# Patient Record
Sex: MALE | Race: WHITE | NOT HISPANIC OR LATINO | Employment: OTHER | URBAN - METROPOLITAN AREA
[De-identification: names, ages, dates, MRNs, and addresses within clinical notes are randomized per-mention and may not be internally consistent; named-entity substitution may affect disease eponyms.]

---

## 2017-01-30 DIAGNOSIS — I70.213 ATHEROSCLEROSIS OF NATIVE ARTERY OF BOTH LOWER EXTREMITIES WITH INTERMITTENT CLAUDICATION (H): ICD-10-CM

## 2017-01-30 DIAGNOSIS — I25.10 CORONARY ATHEROSCLEROSIS OF NATIVE CORONARY ARTERY: Primary | ICD-10-CM

## 2017-01-30 DIAGNOSIS — R06.02 SOB (SHORTNESS OF BREATH): ICD-10-CM

## 2017-02-01 ENCOUNTER — HOSPITAL ENCOUNTER (OUTPATIENT)
Dept: CARDIOLOGY | Facility: CLINIC | Age: 72
Discharge: HOME | End: 2017-02-01
Payer: MEDICARE

## 2017-02-01 ENCOUNTER — TELEPHONE (OUTPATIENT)
Dept: FAMILY MEDICINE | Facility: CLINIC | Age: 72
End: 2017-02-01

## 2017-02-01 DIAGNOSIS — I70.213 ATHEROSCLEROSIS OF NATIVE ARTERY OF BOTH LOWER EXTREMITIES WITH INTERMITTENT CLAUDICATION (H): ICD-10-CM

## 2017-02-01 DIAGNOSIS — R06.02 SOB (SHORTNESS OF BREATH): ICD-10-CM

## 2017-02-01 DIAGNOSIS — I25.10 CORONARY ATHEROSCLEROSIS OF NATIVE CORONARY ARTERY: ICD-10-CM

## 2017-02-01 PROCEDURE — 40000264 ECHO COMPLETE WITH LUMASON

## 2017-02-01 PROCEDURE — 25500064 ZZH RX 255 OP 636: Performed by: INTERNAL MEDICINE

## 2017-02-01 PROCEDURE — 93306 TTE W/DOPPLER COMPLETE: CPT | Mod: 26 | Performed by: INTERNAL MEDICINE

## 2017-02-01 RX ADMIN — SULFUR HEXAFLUORIDE 5 ML: KIT at 10:48

## 2017-02-01 NOTE — TELEPHONE ENCOUNTER
----- Message from [employer] sent at 1/31/2017  2:19 PM CST -----  Regarding: Appointment Request ()  Contact: [phone]  Appointment Request From: Michele Vance    With Provider: Juarez Nuno DO [-Primary Care Physician-]    Preferred Date Range: From 2/6/2017 To 2/8/2017    Preferred Times: Any    Reason: To address the following health maintenance concerns.  Medicare Annual Wellness Visit    Comments:

## 2017-02-01 NOTE — TELEPHONE ENCOUNTER
Phone picked up, unable to leave a message. Please find out if the patient prefers [facility] or [facility] and when he was hoping to get in to be seen? Amparo AYALA

## 2017-02-13 ENCOUNTER — OFFICE VISIT (OUTPATIENT)
Dept: INTERNAL MEDICINE | Facility: CLINIC | Age: 72
End: 2017-02-13
Payer: MEDICARE

## 2017-02-13 VITALS
OXYGEN SATURATION: 98 % | SYSTOLIC BLOOD PRESSURE: 112 MMHG | DIASTOLIC BLOOD PRESSURE: 62 MMHG | HEIGHT: 68 IN | BODY MASS INDEX: 26.07 KG/M2 | HEART RATE: 93 BPM | TEMPERATURE: 96.6 F | WEIGHT: 172 LBS

## 2017-02-13 DIAGNOSIS — I73.9 PERIPHERAL VASCULAR DISEASE, UNSPECIFIED (H): ICD-10-CM

## 2017-02-13 DIAGNOSIS — K21.9 GASTROESOPHAGEAL REFLUX DISEASE WITHOUT ESOPHAGITIS: ICD-10-CM

## 2017-02-13 DIAGNOSIS — F51.01 PRIMARY INSOMNIA: ICD-10-CM

## 2017-02-13 DIAGNOSIS — N18.30 CKD (CHRONIC KIDNEY DISEASE) STAGE 3, GFR 30-59 ML/MIN (H): ICD-10-CM

## 2017-02-13 DIAGNOSIS — I10 ESSENTIAL HYPERTENSION, BENIGN: ICD-10-CM

## 2017-02-13 DIAGNOSIS — I63.239 CEREBRAL INFARCTION DUE TO OCCLUSION OF CAROTID ARTERY, UNSPECIFIED BLOOD VESSEL LATERALITY (H): Primary | ICD-10-CM

## 2017-02-13 LAB
ALBUMIN UR-MCNC: NEGATIVE MG/DL
ANION GAP SERPL CALCULATED.3IONS-SCNC: 6 MMOL/L (ref 3–14)
APPEARANCE UR: CLEAR
BILIRUB UR QL STRIP: NEGATIVE
BUN SERPL-MCNC: 21 MG/DL (ref 7–30)
CALCIUM SERPL-MCNC: 9.3 MG/DL (ref 8.5–10.1)
CHLORIDE SERPL-SCNC: 104 MMOL/L (ref 94–109)
CO2 SERPL-SCNC: 31 MMOL/L (ref 20–32)
COLOR UR AUTO: YELLOW
CREAT SERPL-MCNC: 1.27 MG/DL (ref 0.66–1.25)
GFR SERPL CREATININE-BSD FRML MDRD: 56 ML/MIN/1.7M2
GLUCOSE SERPL-MCNC: 92 MG/DL (ref 70–99)
GLUCOSE UR STRIP-MCNC: NEGATIVE MG/DL
HGB UR QL STRIP: ABNORMAL
KETONES UR STRIP-MCNC: NEGATIVE MG/DL
LEUKOCYTE ESTERASE UR QL STRIP: NEGATIVE
NITRATE UR QL: NEGATIVE
NON-SQ EPI CELLS #/AREA URNS LPF: NORMAL /LPF
PH UR STRIP: 6 PH (ref 5–7)
POTASSIUM SERPL-SCNC: 3.6 MMOL/L (ref 3.4–5.3)
RBC #/AREA URNS AUTO: NORMAL /HPF (ref 0–2)
SODIUM SERPL-SCNC: 141 MMOL/L (ref 133–144)
SP GR UR STRIP: <=1.005 (ref 1–1.03)
URN SPEC COLLECT METH UR: ABNORMAL
UROBILINOGEN UR STRIP-ACNC: 0.2 EU/DL (ref 0.2–1)
WBC #/AREA URNS AUTO: NORMAL /HPF (ref 0–2)

## 2017-02-13 PROCEDURE — 81001 URINALYSIS AUTO W/SCOPE: CPT | Performed by: INTERNAL MEDICINE

## 2017-02-13 PROCEDURE — 36415 COLL VENOUS BLD VENIPUNCTURE: CPT | Performed by: INTERNAL MEDICINE

## 2017-02-13 PROCEDURE — G0438 PPPS, INITIAL VISIT: HCPCS | Performed by: INTERNAL MEDICINE

## 2017-02-13 PROCEDURE — 80048 BASIC METABOLIC PNL TOTAL CA: CPT | Performed by: INTERNAL MEDICINE

## 2017-02-13 RX ORDER — CILOSTAZOL 100 MG/1
100 TABLET ORAL 2 TIMES DAILY
Qty: 180 TABLET | Refills: 3 | Status: SHIPPED | OUTPATIENT
Start: 2017-02-13 | End: 2018-04-07

## 2017-02-13 RX ORDER — ZOLPIDEM TARTRATE 5 MG/1
5 TABLET ORAL
Qty: 90 TABLET | Refills: 1 | Status: SHIPPED | OUTPATIENT
Start: 2017-02-13 | End: 2017-08-16

## 2017-02-13 RX ORDER — AMLODIPINE BESYLATE 10 MG/1
10 TABLET ORAL EVERY EVENING
Qty: 90 TABLET | Refills: 3 | Status: SHIPPED | OUTPATIENT
Start: 2017-02-13 | End: 2018-02-24

## 2017-02-13 RX ORDER — OMEPRAZOLE 40 MG/1
40 CAPSULE, DELAYED RELEASE ORAL DAILY
Qty: 90 CAPSULE | Refills: 1 | Status: SHIPPED | OUTPATIENT
Start: 2017-02-13 | End: 2017-08-27

## 2017-02-13 ASSESSMENT — PAIN SCALES - GENERAL: PAINLEVEL: NO PAIN (0)

## 2017-02-13 NOTE — PROGRESS NOTES
SUBJECTIVE:                                                            Michele Vance is a 71 year old male who presents for Preventive Visit.  Are you in the first 12 months of your Medicare Part B coverage?  No    Healthy Habits:    Do you get at least three servings of calcium containing foods daily (dairy, green leafy vegetables, etc.)? yes    Amount of exercise or daily activities, outside of work: 3 day(s) per week    Problems taking medications regularly No    Medication side effects: No    Have you had an eye exam in the past two years? no    Do you see a dentist twice per year? yes    Do you have sleep apnea, excessive snoring or daytime drowsiness?no    COGNITIVE SCREEN  1) Repeat 3 items (Banana, Sunrise, Chair)    2) Clock draw: NORMAL  3) 3 item recall: Recalls 1 object   Results: NORMAL clock, 1-2 items recalled: COGNITIVE IMPAIRMENT LESS LIKELY    Mini-CogTM Copyright S Jovanna. Licensed by the author for use in [facility]; reprinted with permission (matthew@[facility]). All rights reserved.          All Histories reviewed and updated in [facility] as appropriate.  Social History   Substance Use Topics     Smoking status: Former Smoker     Packs/day: 2.50     Years: 20.00     Types: Cigarettes     Quit date: 1/1/2000     Smokeless tobacco: Never Used     Alcohol use No       The patient does not drink >3 drinks per day nor >7 drinks per week.    Today's PHQ-2 Score:   PHQ-2 ( 1999 Pf[employer]) 2/13/2017 10/19/2016   Q1: Little interest or pleasure in doing things 0 0   Q2: Feeling down, depressed or hopeless 0 0   PHQ-2 Score 0 0       Do you feel safe in your environment - Yes    Do you have a Health Care Directive?: No: Advance care planning reviewed with patient; information given to patient to review.    Current providers sharing in care for this patient include:   Patient Care Team:  Juarez Nuno DO as PCP - General (Internal Medicine)  Benito Hale MD as Referring Physician  (Surgery)  Micheal Hogan MD as MD (Internal Medicine)  Catia Null, RN as Nurse Coordinator (Cardiology)  Mikie Wise MD as MD (Cardiology)  Yusuf Xiong MD as MD (Orthopaedic Surgery)  Suzy Hammer, VIPIN as Clinic Care Coordinator (Neurosurgery)      Hearing impairment: No    Ability to successfully perform activities of daily living: Yes, no assistance needed     Fall risk:  Fallen 2 or more times in the past year?: No  Any fall with injury in the past year?: No    Home safety:  none identified    The following health maintenance items are reviewed in Epic and correct as of today:  Health Maintenance   Topic Date Due     MIGRAINE ACTION PLAN,ONE TIME,NO INBASKET  08/24/1964     PNEUMOCOCCAL (1 of 2 - PCV13) 08/24/2011     AORTIC ANEURYSM SCREENING (SYSTEM ASSIGNED)  08/24/2011     MEDICARE ANNUAL WELLNESS VISIT  08/24/2012     HEMOGLOBIN Q1 YR (NO INBASKET)  03/26/2016     INFLUENZA VACCINE (SYSTEM ASSIGNED)  09/01/2016     MICROALBUMIN Q1 YEAR( NO INBASKET)  02/25/2017     BMP Q1 YR (NO INBASKET)  06/02/2017     FALL RISK ASSESSMENT  06/02/2017     LIPID MONITORING Q1 YEAR( NO INBASKET)  08/08/2017     ADVANCE DIRECTIVE PLANNING Q5 YRS (NO INBASKET)  02/10/2019     TETANUS IMMUNIZATION (SYSTEM ASSIGNED)  04/25/2021     COLON CANCER SCREEN (SYSTEM ASSIGNED)  10/07/2024     HEPATITIS C SCREENING  Completed         Pneumonia Vaccine:Adults age 65+ who received Pneumovax (PPSV23) at 65 years or older: Should be given PCV13 > 1 year after their most recent PPSV23     ROS:  C: NEGATIVE for fever, chills, change in weight  I: NEGATIVE for worrisome rashes, moles or lesions  E: NEGATIVE for vision changes or irritation  E/M: NEGATIVE for ear, mouth and throat problems  R: NEGATIVE for significant cough or SOB  B: NEGATIVE for masses, tenderness or discharge  CV: NEGATIVE for chest pain, palpitations or peripheral edema  GI: NEGATIVE for nausea, abdominal pain, heartburn, or change in  "bowel habits  : NEGATIVE for frequency, dysuria, or hematuria  M: NEGATIVE for significant arthralgias or myalgia  N: NEGATIVE for weakness, dizziness or paresthesias  E: NEGATIVE for temperature intolerance, skin/hair changes  H: NEGATIVE for bleeding problems  P: NEGATIVE for changes in mood or affect    Problem list, Medication list, Allergies, and Medical/Social/Surgical histories reviewed in [facility] and updated as appropriate.  Labs reviewed in EPIC  BP Readings from Last 3 Encounters:   02/13/17 112/62   10/19/16 128/64   09/20/16 142/89    Wt Readings from Last 3 Encounters:   02/13/17 172 lb (78 kg)   10/19/16 179 lb (81.2 kg)   09/20/16 174 lb 12.8 oz (79.3 kg)                  OBJECTIVE:                                                            /62 (BP Location: Left arm, Patient Position: Chair, Cuff Size: Adult Large)  Pulse 93  Temp 96.6  F (35.9  C) (Temporal)  Ht 5' 8\" (1.727 m)  Wt 172 lb (78 kg)  SpO2 98%  BMI 26.15 kg/m2 Estimated body mass index is 26.15 kg/(m^2) as calculated from the following:    Height as of this encounter: 5' 8\" (1.727 m).    Weight as of this encounter: 172 lb (78 kg).  EXAM:   GENERAL: healthy, alert and no distress  EYES: Eyes grossly normal to inspection, PERRL and conjunctivae and sclerae normal  HENT: ear canals and TM's normal, nose and mouth without ulcers or lesions  NECK: no adenopathy, no asymmetry, masses, or scars and thyroid normal to palpation  RESP: lungs clear to auscultation - no rales, rhonchi or wheezes  CV: regular rate and rhythm, normal S1 S2, no S3 or S4, no murmur, click or rub, no peripheral edema and peripheral pulses strong  ABDOMEN: soft, nontender, no hepatosplenomegaly, no masses and bowel sounds normal  MS: no gross musculoskeletal defects noted, no edema  SKIN: no suspicious lesions or rashes  NEURO: Normal strength and tone, mentation intact and speech normal  PSYCH: mentation appears normal, affect normal/bright    ASSESSMENT / PLAN:   " "                                                             ICD-10-CM    1. Cerebral infarction due to occlusion of carotid artery, unspecified blood vessel laterality (H) I63.239    2. Primary insomnia F51.01 zolpidem (AMBIEN) 5 MG tablet   3. Essential hypertension, benign I10 amLODIPine (NORVASC) 10 MG tablet     *UA reflex to Microscopic and Culture ([facility] (except [place] and [place])   4. Peripheral vascular disease, unspecified (H) I73.9 cilostazol (PLETAL) 100 MG tablet     Basic metabolic panel   5. Gastroesophageal reflux disease without esophagitis K21.9 omeprazole (PRILOSEC) 40 MG capsule   6. CKD (chronic kidney disease) stage 3, GFR 30-59 ml/min N18.3        End of Life Planning:  Patient currently has an advanced directive: No.  I have verified the patient's ablity to prepare an advanced directive/make health care decisions.  Literature was provided to assist patient in preparing an advanced directive.    COUNSELING:  Reviewed preventive health counseling, as reflected in patient instructions       Regular exercise       Healthy diet/nutrition       Vision screening       Hearing screening        Estimated body mass index is 26.15 kg/(m^2) as calculated from the following:    Height as of this encounter: 5' 8\" (1.727 m).    Weight as of this encounter: 172 lb (78 kg).     reports that he quit smoking about 17 years ago. His smoking use included Cigarettes. He has a 50.00 pack-year smoking history. He has never used smokeless tobacco.      Appropriate preventive services were discussed with this patient, including applicable screening as appropriate for cardiovascular disease, diabetes, osteopenia/osteoporosis, and glaucoma.  As appropriate for age/gender, discussed screening for colorectal cancer, prostate cancer, breast cancer, and cervical cancer. Checklist reviewing preventive services available has been given to the patient.    Reviewed patients plan of care and " provided an AVS. The Basic Care Plan (routine screening as documented in Health Maintenance) for Michele meets the Care Plan requirement. This Care Plan has been established and reviewed with the Patient and spouse.    Counseling Resources:  ATP IV Guidelines  Pooled Cohorts Equation Calculator  Breast Cancer Risk Calculator  FRAX Risk Assessment  ICSI Preventive Guidelines  Dietary Guidelines for Americans, 2010  USDA's MyPlate  ASA Prophylaxis  Lung CA Screening    Juarez Nuno DO  [facility]

## 2017-02-13 NOTE — LETTER
My Migraine Action Plan      Date: 2/13/2017     My Name: Michele Vance   YOB: 1945  My Pharmacy:    [facility] - EPRESCRIBE ONLY  [facility] - [place], [place] - [address] [facility] [place] - [place], [place] - [address] [facility] - [place], [place] - [address]       My (Preventative) Control Medicine:         My Rescue Medicine:    My Doctor: Juarez Nuno     My Clinic: [address] [address] [phone]        GREEN ZONE = Good Control    My headache plan is working.   I can do what I need to do.           I WILL:     ? Keep managing my triggers.  ? Write in my migraine diary each time I have a headache.  ? Keep taking my preventive (controller) medicine daily.  ? Take my relief and rescue medicine as needed.             YELLOW ZONE = Not Enough Control    My headache plan isn t always working.   My headaches keep me from doing   some of the things I need to do.       I WILL:     ? Set goals to control my triggers and act on them.  ? Write in my migraine diary each time I have a headache and review it for                      patterns or new triggers.  ? Keep taking my preventive (controller) medicine daily.  ? Take my relief and rescue medicine as needed.  ? Call my doctor or clinic at if I stay in the Yellow Zone.             RED ZONE = Poor or No Control    My headache plan has  failed. I can t do anything  when I have one. My  medicines aren t working.           I WILL:   ? Set goals to control my triggers and act on them.  ? Write in my migraine diary each time I have a headache and review it for                      patterns or new triggers.  ? Keep taking my preventive (controller) medicine daily.  ? Take my relief and rescue medicine as needed.  ? Call my doctor or clinic or go to urgent care or an ER if I m having the worst                   headache of my life.  ? Call my doctor or clinic or go to urgent care or an ER if my medicine doesn t work.  ? Let my doctor or clinic know within 2 weeks if I have gone to an urgent care or             emergency department.          Provider specific instructions:

## 2017-02-13 NOTE — MR AVS SNAPSHOT
After Visit Summary   2/13/2017    Michele Vance    MRN: [id number]           Patient Information     Date Of Birth          1945        Visit Information        Provider Department      2/13/2017 7:20 AM Juarez Nuno DO [facility]        Today's Diagnoses     Cerebral infarction due to occlusion of carotid artery, unspecified blood vessel laterality (H)    -  1    Primary insomnia        Essential hypertension, benign        Peripheral vascular disease, unspecified (H)        Gastroesophageal reflux disease without esophagitis        CKD (chronic kidney disease) stage 3, GFR 30-59 ml/min          Care Instructions      Preventive Health Recommendations:       Male Ages 65 and over    Yearly exam:             See your health care provider every year in order to  o   Review health changes.   o   Discuss preventive care.    o   Review your medicines if your doctor has prescribed any.    Talk with your health care provider about whether you should have a test to screen for prostate cancer (PSA).    Every 3 years, have a diabetes test (fasting glucose). If you are at risk for diabetes, you should have this test more often.    Every 5 years, have a cholesterol test. Have this test more often if you are at risk for high cholesterol or heart disease.     Every 10 years, have a colonoscopy. Or, have a yearly FIT test (stool test). These exams will check for colon cancer.    Talk to with your health care provider about screening for Abdominal Aortic Aneurysm if you have a family history of AAA or have a history of smoking.  Shots:     Get a flu shot each year.     Get a tetanus shot every 10 years.     Talk to your doctor about your pneumonia vaccines. There are now two you should receive - Pneumovax (PPSV 23) and Prevnar (PCV 13).    Talk to your doctor about a shingles vaccine.     Talk to your doctor about the hepatitis B vaccine.  Nutrition:     Eat at least 5 servings  of fruits and vegetables each day.     Eat whole-grain bread, whole-wheat pasta and brown rice instead of white grains and rice.     Talk to your doctor about Calcium and Vitamin D.   Lifestyle    Exercise for at least 150 minutes a week (30 minutes a day, 5 days a week). This will help you control your weight and prevent disease.     Limit alcohol to one drink per day.     No smoking.     Wear sunscreen to prevent skin cancer.     See your dentist every six months for an exam and cleaning.     See your eye doctor every 1 to 2 years to screen for conditions such as glaucoma, macular degeneration and cataracts.        Follow-ups after your visit        Follow-up notes from your care team     Return in about 6 months (around 8/13/2017).      Your next 10 appointments already scheduled     Feb 14, 2017  8:40 AM CST   Return Visit with Pari Matta PA-C   [facility] ([facility])    [address] [place] [id number]   [phone]            Sep 19, 2017  9:00 AM CDT   US CAROTID BILATERAL with UCUSV2   [facility] ([facility])    [address] [place] [address] [phone]           Please bring a list of your medicines (including vitamins, minerals and over-the-counter drugs). Also, tell your doctor about any allergies you may have. Wear comfortable clothes and leave your valuables at home.  You do not need to do anything special to prepare for your exam.  Please call the Imaging Department at your exam site with any questions.            Sep 19, 2017 10:30 AM CDT   (Arrive by 10:15 AM)   Return Visit with Shola Cuellar MD   [facility] ([facility])    [address] [place] [address] [phone]              Who to contact     If you have questions or need follow up information about today's clinic visit or your schedule please  "contact [facility] directly at [phone].  Normal or non-critical lab and imaging results will be communicated to you by MyChart, letter or phone within 4 business days after the clinic has received the results. If you do not hear from us within 7 days, please contact the clinic through MyChart or phone. If you have a critical or abnormal lab result, we will notify you by phone as soon as possible.  Submit refill requests through [employer] or call your pharmacy and they will forward the refill request to us. Please allow 3 business days for your refill to be completed.          Additional Information About Your Visit        [employer] Information     [employer] gives you secure access to your electronic health record. If you see a primary care provider, you can also send messages to your care team and make appointments. If you have questions, please call your primary care clinic.  If you do not have a primary care provider, please call [phone] and they will assist you.        Care EveryWhere ID     This is your Care EveryWhere ID. This could be used by other organizations to access your [place] medical records  [id number]        Your Vitals Were     Pulse Temperature Height Pulse Oximetry BMI (Body Mass Index)       93 96.6  F (35.9  C) (Temporal) 5' 8\" (1.727 m) 98% 26.15 kg/m2        Blood Pressure from Last 3 Encounters:   02/13/17 112/62   10/19/16 128/64   09/20/16 142/89    Weight from Last 3 Encounters:   02/13/17 172 lb (78 kg)   10/19/16 179 lb (81.2 kg)   09/20/16 174 lb 12.8 oz (79.3 kg)              We Performed the Following     *UA reflex to Microscopic and Culture ([facility] (except [place] and [place])     Basic metabolic panel          Where to get your medicines      These medications were sent to [facility] - [facility], [place] - [id number] [facility] [address]     Phone:  [phone]     amLODIPine " 10 MG tablet    cilostazol 100 MG tablet    omeprazole 40 MG capsule         Some of these will need a paper prescription and others can be bought over the counter.  Ask your nurse if you have questions.     Bring a paper prescription for each of these medications     zolpidem 5 MG tablet          Primary Care Provider Office Phone # Fax #    Juarez Nuno [phone]       [address] [place] [address]        Thank you!     Thank you for choosing [facility]  for your care. Our goal is always to provide you with excellent care. Hearing back from our patients is one way we can continue to improve our services. Please take a few minutes to complete the written survey that you may receive in the mail after your visit with us. Thank you!             Your Updated Medication List - Protect others around you: Learn how to safely use, store and throw away your medicines at www.disposemymeds.org.          This list is accurate as of: 2/13/17  8:01 AM.  Always use your most recent med list.                   Brand Name Dispense Instructions for use    amitriptyline 25 MG tablet    ELAVIL    180 tablet    Take 1 tablet (25 mg) by mouth 2 times daily       amLODIPine 10 MG tablet    NORVASC    90 tablet    Take 1 tablet (10 mg) by mouth every evening       ASPIRIN PO      Take 325 mg by mouth daily       cilostazol 100 MG tablet    PLETAL    180 tablet    Take 1 tablet (100 mg) by mouth 2 times daily       gabapentin 300 MG capsule    NEURONTIN    210 capsule    TAKE 1 CAPSULE THREE TIMES A DAY       losartan 25 MG tablet    COZAAR    93 tablet    Take 1 tablet (25 mg) by mouth daily       metoprolol 100 MG 24 hr tablet    TOPROL-XL    93 tablet    Take 1 tablet (100 mg) by mouth every morning       omeprazole 40 MG capsule    priLOSEC    90 capsule    Take 1 capsule (40 mg) by mouth daily       polyethylene glycol Packet    MIRALAX/GLYCOLAX     Take 1  packet by mouth daily       psyllium 58.6 % Powd    METAMUCIL     Take by mouth daily       sildenafil 50 MG cap/tab    REVATIO/VIAGRA    6 tablet    Take 0.5-1 tablets (25-50 mg) by mouth daily as needed for erectile dysfunction Take 30 min to 4 hours before intercourse.  Never use with nitroglycerin, terazosin or doxazosin.       STATIN NOT PRESCRIBED (INTENTIONAL)     1 each    Pt has known rhabdomyolysis in the past, somewhat associated with statins.  Also wasn't able to tolerate Zetia.       zolpidem 5 MG tablet    AMBIEN    90 tablet    Take 1 tablet (5 mg) by mouth nightly as needed for sleep

## 2017-02-13 NOTE — NURSING NOTE
"Chief Complaint   Patient presents with     Wellness Visit     Medicare Visit       Initial /62 (BP Location: Left arm, Patient Position: Chair, Cuff Size: Adult Large)  Pulse 93  Temp 96.6  F (35.9  C) (Temporal)  Ht 5' 8\" (1.727 m)  Wt 172 lb (78 kg)  SpO2 98%  BMI 26.15 kg/m2 Estimated body mass index is 26.15 kg/(m^2) as calculated from the following:    Height as of this encounter: 5' 8\" (1.727 m).    Weight as of this encounter: 172 lb (78 kg).  Medication Reconciliation: complete   Amparo AYALA      "

## 2017-02-15 NOTE — PROGRESS NOTES
Dear Michele, your recent test results are attached.   Your urine sample was normal.  Kidney function is slightly decreased but stable.            Feel free to contact me via the office or My Chart if you have any questions regarding the above.    Sincerely,  Juarez Nuno DO FACOI

## 2017-03-10 ENCOUNTER — APPOINTMENT (OUTPATIENT)
Dept: GENERAL RADIOLOGY | Facility: CLINIC | Age: 72
End: 2017-03-10
Attending: EMERGENCY MEDICINE
Payer: MEDICARE

## 2017-03-10 ENCOUNTER — HOSPITAL ENCOUNTER (EMERGENCY)
Facility: CLINIC | Age: 72
Discharge: HOME | End: 2017-03-10
Attending: EMERGENCY MEDICINE | Admitting: EMERGENCY MEDICINE
Payer: MEDICARE

## 2017-03-10 ENCOUNTER — TELEPHONE (OUTPATIENT)
Dept: INTERNAL MEDICINE | Facility: CLINIC | Age: 72
End: 2017-03-10

## 2017-03-10 VITALS
TEMPERATURE: 96.7 F | SYSTOLIC BLOOD PRESSURE: 128 MMHG | DIASTOLIC BLOOD PRESSURE: 67 MMHG | HEIGHT: 68 IN | OXYGEN SATURATION: 97 % | BODY MASS INDEX: 25.76 KG/M2 | WEIGHT: 170 LBS

## 2017-03-10 DIAGNOSIS — K59.01 SLOW TRANSIT CONSTIPATION: ICD-10-CM

## 2017-03-10 PROCEDURE — A9270 NON-COVERED ITEM OR SERVICE: HCPCS | Mod: GY | Performed by: EMERGENCY MEDICINE

## 2017-03-10 PROCEDURE — 99284 EMERGENCY DEPT VISIT MOD MDM: CPT

## 2017-03-10 PROCEDURE — 99284 EMERGENCY DEPT VISIT MOD MDM: CPT | Performed by: EMERGENCY MEDICINE

## 2017-03-10 PROCEDURE — 25000132 ZZH RX MED GY IP 250 OP 250 PS 637: Mod: GY | Performed by: EMERGENCY MEDICINE

## 2017-03-10 PROCEDURE — 74020 XR ABDOMEN 2 VW: CPT | Mod: TC

## 2017-03-10 PROCEDURE — 25000125 ZZHC RX 250: Performed by: EMERGENCY MEDICINE

## 2017-03-10 RX ORDER — METOCLOPRAMIDE 10 MG/1
10 TABLET ORAL 4 TIMES DAILY PRN
Qty: 40 TABLET | Refills: 0 | Status: SHIPPED | OUTPATIENT
Start: 2017-03-10 | End: 2017-03-20

## 2017-03-10 RX ORDER — MAGNESIUM CARB/ALUMINUM HYDROX 105-160MG
296 TABLET,CHEWABLE ORAL ONCE
Status: COMPLETED | OUTPATIENT
Start: 2017-03-10 | End: 2017-03-10

## 2017-03-10 RX ORDER — ONDANSETRON 4 MG/1
4 TABLET, ORALLY DISINTEGRATING ORAL ONCE
Status: COMPLETED | OUTPATIENT
Start: 2017-03-10 | End: 2017-03-10

## 2017-03-10 RX ADMIN — MAGESIUM CITRATE 296 ML: 1.75 LIQUID ORAL at 11:53

## 2017-03-10 RX ADMIN — ONDANSETRON 4 MG: 4 TABLET, ORALLY DISINTEGRATING ORAL at 09:45

## 2017-03-10 RX ADMIN — DOCUSATE SODIUM 286 ML: 50 LIQUID ORAL at 10:35

## 2017-03-10 NOTE — ED NOTES
In to check on patient to see if there were any results. Still sitting on the commode, no results yet. MD updated. Annelise Hawkins

## 2017-03-10 NOTE — ED NOTES
Patient able to tolerate full pink lady enema. Patient resting on right side, call light within reach. Bedside commode placed at bedside. Annelise Hawkins

## 2017-03-10 NOTE — ED AVS SNAPSHOT
[facility] Emergency Department    [address] [place] [address]    Phone:  [phone]    Fax:  [phone]                                       Michele Vance   MRN: [id number]    Department:  [facility] Emergency Department   Date of Visit:  3/10/2017           Patient Information     Date Of Birth          1945        Your diagnoses for this visit were:     Slow transit constipation        You were seen by Mrecedes Salcido MD.      Follow-up Information     Follow up with Juarez Nuno DO.    Specialty:  Internal Medicine    Contact information:    [address] [place] [id number] [phone]          Discharge Instructions       Be sure to drink plenty of fluids.    Please see information regarding bowel prep. You can take MiraLAX mixed with Gatorade until you have a bowel movement.    Reglan as needed for nausea.    Return to the ED at any time for worsening, changes or concerns.    Follow-up with Dr. Nuno as needed.    I hope this resolves soon!!!        Discharge References/Attachments     CONSTIPATION (ADULT) (ENGLISH)    CONSTIPATION, TREATING (ENGLISH)    DIET, EATING A HIGH-FIBER  (ENGLISH)      Future Appointments        Provider Department Dept Phone Center    4/27/2017 9:00 AM Stevie Felipe MD [facility] [phone] [facility]    9/19/2017 9:00 AM [facility] VASCULAR ULTRASOUND ROOM 2 [facility]  [phone] [facility] [[email or web address]]    9/19/2017 10:30 AM Shola Cuellar MD [facility] Neurosurgery [phone] [facility] [[email or web address]]      24 Hour Appointment Hotline       To make an appointment at any [facility], call [phone] ([phone]). If you don't have a family doctor or clinic, we will help you find one. [facility] are conveniently located to serve the needs of you and your family.             Review of your medicines      START taking        Dose /  Directions Last dose taken    metoclopramide 10 MG tablet   Commonly known as:  REGLAN   Dose:  10 mg   Quantity:  40 tablet        Take 1 tablet (10 mg) by mouth 4 times daily as needed For nausea   Refills:  0          Our records show that you are taking the medicines listed below. If these are incorrect, please call your family doctor or clinic.        Dose / Directions Last dose taken    amitriptyline 25 MG tablet   Commonly known as:  ELAVIL   Dose:  25 mg   Quantity:  180 tablet        Take 1 tablet (25 mg) by mouth 2 times daily   Refills:  2        amLODIPine 10 MG tablet   Commonly known as:  NORVASC   Dose:  10 mg   Quantity:  90 tablet        Take 1 tablet (10 mg) by mouth every evening   Refills:  3        ASPIRIN PO   Dose:  325 mg        Take 325 mg by mouth daily   Refills:  0        cilostazol 100 MG tablet   Commonly known as:  PLETAL   Dose:  100 mg   Quantity:  180 tablet        Take 1 tablet (100 mg) by mouth 2 times daily   Refills:  3        gabapentin 300 MG capsule   Commonly known as:  NEURONTIN   Quantity:  210 capsule        TAKE 1 CAPSULE THREE TIMES A DAY   Refills:  4        losartan 25 MG tablet   Commonly known as:  COZAAR   Dose:  25 mg   Quantity:  93 tablet        Take 1 tablet (25 mg) by mouth daily   Refills:  3        metoprolol 100 MG 24 hr tablet   Commonly known as:  TOPROL-XL   Dose:  100 mg   Quantity:  93 tablet        Take 1 tablet (100 mg) by mouth every morning   Refills:  3        omeprazole 40 MG capsule   Commonly known as:  priLOSEC   Dose:  40 mg   Quantity:  90 capsule        Take 1 capsule (40 mg) by mouth daily   Refills:  1        polyethylene glycol Packet   Commonly known as:  MIRALAX/GLYCOLAX   Dose:  1 packet        Take 1 packet by mouth daily   Refills:  0        psyllium 58.6 % Powd   Commonly known as:  METAMUCIL        Take by mouth daily   Refills:  0        sildenafil 50 MG cap/tab   Commonly known as:  REVATIO/VIAGRA   Dose:  25-50 mg   Quantity:   6 tablet        Take 0.5-1 tablets (25-50 mg) by mouth daily as needed for erectile dysfunction Take 30 min to 4 hours before intercourse.  Never use with nitroglycerin, terazosin or doxazosin.   Refills:  0        STATIN NOT PRESCRIBED (INTENTIONAL)   Quantity:  1 each        Pt has known rhabdomyolysis in the past, somewhat associated with statins.  Also wasn't able to tolerate Zetia.   Refills:  0        zolpidem 5 MG tablet   Commonly known as:  AMBIEN   Dose:  5 mg   Quantity:  90 tablet        Take 1 tablet (5 mg) by mouth nightly as needed for sleep   Refills:  1                Prescriptions were sent or printed at these locations (1 Prescription)                   [facility] - [address]    [address] [place] [address]    Telephone:  [phone]   Fax:  [phone]   Hours:                  E-Prescribed (1 of 1)         metoclopramide (REGLAN) 10 MG tablet                Procedures and tests performed during your visit     XR Abdomen 2 Views      Orders Needing Specimen Collection     Ordered          03/10/17 0902  UA with Microscopic - ROUTINE, Prio: Routine, Needs to be Collected     Scheduled Task Status   03/10/17 0902 Collect UA with Microscopic Open   Order Class:  PCU Collect                  Pending Results     No orders found from 3/8/2017 to 3/11/2017.            Pending Culture Results     No orders found from 3/8/2017 to 3/11/2017.            Thank you for choosing [facility]       Thank you for choosing [facility] for your care. Our goal is always to provide you with excellent care. Hearing back from our patients is one way we can continue to improve our services. Please take a few minutes to complete the written survey that you may receive in the mail after you visit with us. Thank you!        [facility] Information     [facility] gives you secure access to your electronic health record. If you see a primary care provider, you can also send messages  to your care team and make appointments. If you have questions, please call your primary care clinic.  If you do not have a primary care provider, please call [phone] and they will assist you.        Care EveryWhere ID     This is your Care EveryWhere ID. This could be used by other organizations to access your [facility] medical records  [id number]        After Visit Summary       This is your record. Keep this with you and show to your community pharmacist(s) and doctor(s) at your next visit.

## 2017-03-10 NOTE — DISCHARGE INSTRUCTIONS
Be sure to drink plenty of fluids.    Please see information regarding bowel prep. You can take MiraLAX mixed with Gatorade until you have a bowel movement.    Reglan as needed for nausea.    Return to the ED at any time for worsening, changes or concerns.    Follow-up with Dr. Nuno as needed.    I hope this resolves soon!!!

## 2017-03-10 NOTE — ED NOTES
In to check on patient. Patient still tolerating the enema and resting on his right side. Told that if he could hold it in for about 40-45 min it would be ideal. Patient already holding for 20 min on this check. Call light within reach. Annelise Hawkins

## 2017-03-10 NOTE — ED NOTES
"Patient has not had a bowel movement in the last 4 days. Last night woke up, feeling like he needed to have a BM, but could not pass stool. Patient last vomited on Sunday. He regularly takes Metamucil and Miralax everyday. Has recently added \"dulcolax\"  with no result. Spoke to regular provider on the phone and told to come to the ED. Has a history of IBS. Annelise Hawkins    "

## 2017-03-10 NOTE — ED AVS SNAPSHOT
[facility] Emergency Department    [address] [place] [address]    Phone:  [phone]    Fax:  [phone]                                       Michele Vance   MRN: [id number]    Department:  [facility] Emergency Department   Date of Visit:  3/10/2017           After Visit Summary Signature Page     I have received my discharge instructions, and my questions have been answered. I have discussed any challenges I see with this plan with the nurse or doctor.    ..........................................................................................................................................  Patient/Patient Representative Signature      ..........................................................................................................................................  Patient Representative Print Name and Relationship to Patient    ..................................................               ................................................  Date                                            Time    ..........................................................................................................................................  Reviewed by Signature/Title    ...................................................              ..............................................  Date                                                            Time

## 2017-03-10 NOTE — TELEPHONE ENCOUNTER
Reason for Call:  Same Day Appointment, Requested Provider:  Juarez Nuno DO    PCP: Juarez Nuno    Reason for visit: Pts wife states pt hasn't had a BM in over 4 days & last wknd was throwing up.  Laxatives & Metamucil are not working, wife is concerned they would end up in the ED over the wknd, please advise if pt could be seen today    Duration of symptoms: 1 wk    Have you been treated for this in the past? Yes    Additional comments:     Can we leave a detailed message on this number? YES    Phone number patient can be reached at: Home number on file [phone] (home)    Best Time: any    Call taken on 3/10/2017 at 7:51 AM by Dasia Ortiz

## 2017-03-11 NOTE — ED PROVIDER NOTES
"  History     Chief Complaint   Patient presents with     Constipation     possible blockage; vomitting; no passing BM last 4 days; hx or      The history is provided by medical records, the patient and the spouse.     This is a 71-year-old male with history of irritable bowel syndrome - constipation type, GERD and coronary disease status post CABG presenting with constipation. Patient notes that he was ill 1 week ago with symptoms of nausea and vomiting. He believes that this was a \"flare\" of his IBS. Since that time, he has had difficulties passing stools. He has not passed stool for 4 days. He feels bloated, nauseated. He has not had any other episodes of vomiting. He is still passing gas. He has tried his usual regimen of daily Metamucil and MiraLAX and recently added Dulcolax without results. Patient has history of appendectomy but no other abdominal surgeries. He has had colonoscopies in the past and is followed by gastroenterology. He denies any fevers, chills. He is still making urine. No chest pain, shortness of breath, other symptoms.    I have reviewed the Medications, Allergies, Past Medical and Surgical History, and Social History in the Epic system.    Review of Systems   All other ROS reviewed and are negative or non-contributory except as stated in HPI.     Physical Exam   BP: 136/83  Heart Rate: 109  Temp: 96.7  F (35.9  C)  Height: 172.7 cm (5' 8\")  Weight: 77.1 kg (170 lb)  SpO2: 97 %  Physical Exam   Constitutional: He appears well-developed and well-nourished.   Uncomfortable appearing male sitting in the bed   HENT:   Head: Normocephalic.   Nose: Nose normal.   Mouth/Throat: Oropharynx is clear and moist.   Eyes: Conjunctivae and EOM are normal.   Neck: Normal range of motion. Neck supple.   Cardiovascular: Regular rhythm, normal heart sounds and intact distal pulses.    Tachycardia   Pulmonary/Chest: Effort normal and breath sounds normal.   Abdominal: Soft. There is tenderness (Mild diffuse " tenderness with bloating).   Some audible bowel sounds   Musculoskeletal: Normal range of motion.   Neurological: He is alert. He exhibits normal muscle tone.   Skin: Skin is warm and dry. He is not diaphoretic.   Psychiatric: His behavior is normal.   Mild anxiety   Vitals reviewed.      ED Course (with Medical Decision Making)    Pt seen and examined by me.  RN and EPIC notes reviewed.      Patient with abdominal pain, bloating, decreased stools, nausea. He had some sort of nausea and vomiting episode 6 days ago that may have either been food related, viral or a flare of his IBS. It is reassuring that he is still passing stools. I'm going to give him 1 dose of Zofran for the nausea and get an x-ray of the abdomen to evaluate for any evidence for blockage.     X-ray results as below show evidence of large amounts of stool consistent with constipation. By my read, there seems to be the most stool in the right upper quadrant.     Patient was given a pink lady enema. He was not having good results and he was given magnesium citrate. Still no results. He continues to pass gas.     We discussed options. Plan is to try to pass stool at home. I recommend that he use bowel prep regimen with increased amounts of MiraLAX, Dulcolax and magnesium citrate. I gave him Reglan to use as needed for nausea. Hopefully this could also help stimulate some of the bowel.     If he has any increased pain, vomiting, worsening symptoms they will return to the ED. Patient and his wife comfortable with this plan.      ED Course     Procedures  Results for orders placed or performed during the hospital encounter of 03/10/17   XR Abdomen 2 Views    Narrative    ABDOMEN TWO VIEWS   3/10/2017 10:05 AM    HISTORY: Abdomen pain.  Constipation (evaluate for obstruction).    COMPARISON: 3/6/2014.      Impression    IMPRESSION: Previously seen support devices and tubes have been  removed. There is currently a very large amount of stool throughout  the  entire colon consistent with constipation. No definite distended  loop of bowel is seen to suggest an obstruction. No other change or  abnormality is seen.     HUNTER DE LA CRUZ MD        Assessments & Plan      I have reviewed the findings, diagnosis, plan and need for follow up with the patient.    Discharge Medication List as of 3/10/2017  1:37 PM      START taking these medications    Details   metoclopramide (REGLAN) 10 MG tablet Take 1 tablet (10 mg) by mouth 4 times daily as needed For nausea, Disp-40 tablet, R-0, E-Prescribe             Final diagnoses:   Slow transit constipation     Disposition: Patient discharged home in the care of his wife stable condition. Plan as above. Return for concerns.    Note: Chart documentation done in part with Dragon Voice Recognition software. Although reviewed after completion, some word and grammatical errors may remain.     3/10/2017   [facility] EMERGENCY DEPARTMENT     Mercedes aSlcido MD  03/10/17 0096

## 2017-03-20 ENCOUNTER — OFFICE VISIT (OUTPATIENT)
Dept: INTERNAL MEDICINE | Facility: CLINIC | Age: 72
End: 2017-03-20
Payer: MEDICARE

## 2017-03-20 ENCOUNTER — HOSPITAL ENCOUNTER (OUTPATIENT)
Dept: GENERAL RADIOLOGY | Facility: CLINIC | Age: 72
Discharge: HOME | End: 2017-03-20
Attending: INTERNAL MEDICINE | Admitting: INTERNAL MEDICINE
Payer: MEDICARE

## 2017-03-20 VITALS
DIASTOLIC BLOOD PRESSURE: 62 MMHG | SYSTOLIC BLOOD PRESSURE: 122 MMHG | TEMPERATURE: 97.1 F | BODY MASS INDEX: 25.85 KG/M2 | OXYGEN SATURATION: 96 % | HEART RATE: 106 BPM | WEIGHT: 170 LBS

## 2017-03-20 DIAGNOSIS — K59.01 SLOW TRANSIT CONSTIPATION: ICD-10-CM

## 2017-03-20 DIAGNOSIS — K59.01 SLOW TRANSIT CONSTIPATION: Primary | ICD-10-CM

## 2017-03-20 PROCEDURE — 99213 OFFICE O/P EST LOW 20 MIN: CPT | Performed by: INTERNAL MEDICINE

## 2017-03-20 PROCEDURE — 74020 XR ABDOMEN 2 VW: CPT | Mod: TC

## 2017-03-20 RX ORDER — METOCLOPRAMIDE 10 MG/1
10 TABLET ORAL 4 TIMES DAILY
Qty: 40 TABLET | Refills: 0 | Status: SHIPPED | OUTPATIENT
Start: 2017-03-20 | End: 2017-04-06

## 2017-03-20 ASSESSMENT — PAIN SCALES - GENERAL: PAINLEVEL: NO PAIN (0)

## 2017-03-20 NOTE — PROGRESS NOTES
SUBJECTIVE:                                                    Michele Vance is a 71 year old male who presents to clinic today for the following health issues:    ED/UC Followup:    Facility:  [facility]  Date of visit: 3/10/17  Reason for visit: slow transit constipation  Current Status: still swollen         Problem list and histories reviewed & adjusted, as indicated.  Additional history: none        CHIEF COMPLAINT:    The patient is a pleasant 71-year-old gentlemennwho was recently in the emergency department with severe obstipation. He was treated with repetitive cough preparation and has had significant output. He notes most of his bowel passage has been liquid. He has a great deal of abdominal bloating and discomfort at this time. He notes that he feels water sloshing around in his abdomen. He has not been taking metoclopramide because he was believing that this was for nausea only. X-ray performed demonstrates clearance of the obstipation but now has significant bloating and distention of the colon consistent with his preparation. Bowel sounds are present and hyperactive.                         PAST, FAMILY,SOCIAL HISTORY:     Medical  History:   has a past medical history of Carotid stenosis; Chronic kidney disease; Coronary artery disease (3-2014); Crohn's disease (H); CVA (cerebral infarction); Elevated CK; Esophageal reflux; Hypercholesteremia; Hypertension; Nonsenile cataract; Other and unspecified hyperlipidemia; PAD (peripheral artery disease) (H); Rhabdomyolysis (2010); and Unspecified essential hypertension.     Surgical History:   has a past surgical history that includes knee surgery (1976); lasix (2001); appendectomy (1974); Dental surgery; cataracts; colonoscopy (12/12/02); UPPER GI ENDOSCOPY,EXAM (01/08/99); Release carpal tunnel (1/13/2011); Release carpal tunnel (1/20/2011); Bypass graft artery coronary (3/5/2014); cataract iol, rt/lt (Bilateral, 2008); Endarterectomy carotid;  CAPSULE ENDOSCOPY (N/A, 10/7/2014); Colonoscopy (N/A, 10/7/2014); Esophagoscopy, gastroscopy, duodenoscopy (EGD), combined (Left, 10/7/2014); and Esophagoscopy, gastroscopy, duodenoscopy (EGD), combined (Left, 10/7/2014).     Social History:   reports that he quit smoking about 17 years ago. His smoking use included Cigarettes. He has a 50.00 pack-year smoking history. He has never used smokeless tobacco. He reports that he does not drink alcohol or use illicit drugs.     Family History:  family history includes CANCER in his sister; CEREBROVASCULAR DISEASE in his father; Eye Disorder in his mother; HEART DISEASE in his father and sister; Hypertension in his mother; Lipids in his father; Obesity in his father. There is no history of Glaucoma or Macular Degeneration.            MEDICATIONS  Current Outpatient Prescriptions   Medication Sig Dispense Refill     metoclopramide (REGLAN) 10 MG tablet Take 1 tablet (10 mg) by mouth 4 times daily 40 tablet 0     zolpidem (AMBIEN) 5 MG tablet Take 1 tablet (5 mg) by mouth nightly as needed for sleep 90 tablet 1     amLODIPine (NORVASC) 10 MG tablet Take 1 tablet (10 mg) by mouth every evening 90 tablet 3     cilostazol (PLETAL) 100 MG tablet Take 1 tablet (100 mg) by mouth 2 times daily 180 tablet 3     omeprazole (PRILOSEC) 40 MG capsule Take 1 capsule (40 mg) by mouth daily 90 capsule 1     amitriptyline (ELAVIL) 25 MG tablet Take 1 tablet (25 mg) by mouth 2 times daily 180 tablet 2     losartan (COZAAR) 25 MG tablet Take 1 tablet (25 mg) by mouth daily 93 tablet 3     psyllium (METAMUCIL) 58.6 % POWD Take by mouth daily       gabapentin (NEURONTIN) 300 MG capsule TAKE 1 CAPSULE THREE TIMES A  capsule 4     metoprolol (TOPROL-XL) 100 MG 24 hr tablet Take 1 tablet (100 mg) by mouth every morning 93 tablet 3     sildenafil (VIAGRA) 50 MG tablet Take 0.5-1 tablets (25-50 mg) by mouth daily as needed for erectile dysfunction Take 30 min to 4 hours before intercourse.   Never use with nitroglycerin, terazosin or doxazosin. 6 tablet 0     ASPIRIN PO Take 325 mg by mouth daily       polyethylene glycol (MIRALAX/GLYCOLAX) packet Take 1 packet by mouth daily        STATIN NOT PRESCRIBED, INTENTIONAL, Pt has known rhabdomyolysis in the past, somewhat associated with statins.  Also wasn't able to tolerate Zetia. 1 each 0         --------------------------------------------------------------------------------------------------------------------                          REVIEW OF SYSTEMS:         LUNGS: Pt denies: cough,excess sputum, hemoptysis, or shortness of breath.   HEART: Pt denies: chest pain, arrythmia, syncope, tachy or bradyarrhythmia or excess edema.   GI: Pt denies: nausea, vomitting. As the bloating as noted with considerable diarrhea   NEURO: Pt denies: seizures, strokes, diplopia, weakness, paraesthesias, or paralysis.   SKIN: Pt denies: itching, rashes, discoloration, or specific lesions of concern. Denies recent hair loss.                          EXAMINATION:         /62 (BP Location: Left arm, Patient Position: Chair, Cuff Size: Adult Regular)  Pulse 106  Temp 97.1  F (36.2  C) (Temporal)  Wt 170 lb (77.1 kg)  SpO2 96%  BMI 25.85 kg/m2   Constitutional: The patient appears to be in moderate acute distress. The patient appears to be adequately hydrated. No acute respiratory or hemodynamic distress is noted at this time.   LUNGS: clear bilaterally, airflow is brisk, no intercostal retraction or stridor is noted. No coughing is noted during visit.   HEART:  regular without rubs, clicks, gallops, or murmurs. PMI is nondisplaced. Upstrokes are brisk. S1,S2 are heard.   GI: Abdomen is soft, without rebound, guarding or tenderness. Bowel sounds are markedly hyperactive. No renal bruits are heard.    NEURO: Pt is alert and appropriate. No neurologic lateralization is noted. Cranial nerves 2-12 are intact. Peripheral sensory and motor function are grossly normal                         DECISION MAKIN. Slow transit constipation  Discontinue: Colonoscopy prep, Doreen Lax, magnesium citrate, etc.  Continue Reglan  Advance diet slowly  Possible benefit of probiotic    - metoclopramide (REGLAN) 10 MG tablet; Take 1 tablet (10 mg) by mouth 4 times daily  Dispense: 40 tablet; Refill: 0  - XR Abdomen 2 Views; Future                             FOLLOW UP    I have asked the patient to make an appointment for follow up with me in 2 weeks or sooner as needed        I have carefully explained the diagnosis and treatment options with the patient. The patient has displayed an understanding of the above, and all subsequent questions were answered.         DO JENNA Bal    Portions of this note were produced using [employer]  Although every attempt at real-time proof reading has been made, occasional grammar/syntax errors may have been missed.

## 2017-03-20 NOTE — MR AVS SNAPSHOT
After Visit Summary   3/20/2017    Michele Vance    MRN: [id number]           Patient Information     Date Of Birth          1945        Visit Information        Provider Department      3/20/2017 7:40 AM Juarez Nuno DO [facility]        Today's Diagnoses     Slow transit constipation    -  1       Follow-ups after your visit        Your next 10 appointments already scheduled     Apr 27, 2017  9:00 AM CDT   Return Visit with Stevie Felipe MD   [facility]    [address] [place] [id number] [phone]            Sep 19, 2017  9:00 AM CDT   US CAROTID BILATERAL with UCUSV2   [facility] ([facility])    [address] [place] [id number] [phone]           Please bring a list of your medicines (including vitamins, minerals and over-the-counter drugs). Also, tell your doctor about any allergies you may have. Wear comfortable clothes and leave your valuables at home.  You do not need to do anything special to prepare for your exam.  Please call the Imaging Department at your exam site with any questions.            Sep 19, 2017 10:30 AM CDT   (Arrive by 10:15 AM)   Return Visit with Shola Cuellar MD   [facility] ([facility])    [address] [place] [address] [phone]              Who to contact     If you have questions or need follow up information about today's clinic visit or your schedule please contact [facility] directly at [phone].  Normal or non-critical lab and imaging results will be communicated to you by MyChart, letter or phone within 4 business days after the clinic has received the results. If you do not hear from us within 7 days, please contact the clinic through MyChart or phone. If you have a critical or abnormal  lab result, we will notify you by phone as soon as possible.  Submit refill requests through [employer] or call your pharmacy and they will forward the refill request to us. Please allow 3 business days for your refill to be completed.          Additional Information About Your Visit        [employer] Information     [employer] gives you secure access to your electronic health record. If you see a primary care provider, you can also send messages to your care team and make appointments. If you have questions, please call your primary care clinic.  If you do not have a primary care provider, please call [phone] and they will assist you.        Care EveryWhere ID     This is your Care EveryWhere ID. This could be used by other organizations to access your [place] medical records  [id number]        Your Vitals Were     Pulse Temperature Pulse Oximetry BMI (Body Mass Index)          106 97.1  F (36.2  C) (Temporal) 96% 25.85 kg/m2         Blood Pressure from Last 3 Encounters:   03/20/17 122/62   03/10/17 128/67   02/13/17 112/62    Weight from Last 3 Encounters:   03/20/17 170 lb (77.1 kg)   03/10/17 170 lb (77.1 kg)   02/13/17 172 lb (78 kg)                 Today's Medication Changes          These changes are accurate as of: 3/20/17 11:59 PM.  If you have any questions, ask your nurse or doctor.               These medicines have changed or have updated prescriptions.        Dose/Directions    metoclopramide 10 MG tablet   Commonly known as:  REGLAN   This may have changed:    - when to take this  - reasons to take this  - additional instructions   Used for:  Slow transit constipation   Changed by:  Juarez Nuno DO        Dose:  10 mg   Take 1 tablet (10 mg) by mouth 4 times daily   Quantity:  40 tablet   Refills:  0            Where to get your medicines      These medications were sent to [facility] - [id number]  [address] [address]      Phone:  [phone]     metoclopramide 10 MG tablet                Primary Care Provider Office Phone # Fax #    Juarez Nuno [phone]       [address] [place] [address]        Thank you!     Thank you for choosing [facility]  for your care. Our goal is always to provide you with excellent care. Hearing back from our patients is one way we can continue to improve our services. Please take a few minutes to complete the written survey that you may receive in the mail after your visit with us. Thank you!             Your Updated Medication List - Protect others around you: Learn how to safely use, store and throw away your medicines at www.disposemymeds.org.          This list is accurate as of: 3/20/17 11:59 PM.  Always use your most recent med list.                   Brand Name Dispense Instructions for use    amitriptyline 25 MG tablet    ELAVIL    180 tablet    Take 1 tablet (25 mg) by mouth 2 times daily       amLODIPine 10 MG tablet    NORVASC    90 tablet    Take 1 tablet (10 mg) by mouth every evening       ASPIRIN PO      Take 325 mg by mouth daily       cilostazol 100 MG tablet    PLETAL    180 tablet    Take 1 tablet (100 mg) by mouth 2 times daily       gabapentin 300 MG capsule    NEURONTIN    210 capsule    TAKE 1 CAPSULE THREE TIMES A DAY       losartan 25 MG tablet    COZAAR    93 tablet    Take 1 tablet (25 mg) by mouth daily       metoclopramide 10 MG tablet    REGLAN    40 tablet    Take 1 tablet (10 mg) by mouth 4 times daily       metoprolol 100 MG 24 hr tablet    TOPROL-XL    93 tablet    Take 1 tablet (100 mg) by mouth every morning       omeprazole 40 MG capsule    priLOSEC    90 capsule    Take 1 capsule (40 mg) by mouth daily       polyethylene glycol Packet    MIRALAX/GLYCOLAX     Take 1 packet by mouth daily       psyllium 58.6 % Powd    METAMUCIL     Take by mouth daily       sildenafil 50 MG cap/tab     REVATIO/VIAGRA    6 tablet    Take 0.5-1 tablets (25-50 mg) by mouth daily as needed for erectile dysfunction Take 30 min to 4 hours before intercourse.  Never use with nitroglycerin, terazosin or doxazosin.       STATIN NOT PRESCRIBED (INTENTIONAL)     1 each    Pt has known rhabdomyolysis in the past, somewhat associated with statins.  Also wasn't able to tolerate Zetia.       zolpidem 5 MG tablet    AMBIEN    90 tablet    Take 1 tablet (5 mg) by mouth nightly as needed for sleep

## 2017-03-20 NOTE — NURSING NOTE
"Chief Complaint   Patient presents with     Tingling     inlegs- right leg more     ER F/U       Initial /62 (BP Location: Left arm, Patient Position: Chair, Cuff Size: Adult Regular)  Pulse 106  Temp 97.1  F (36.2  C) (Temporal)  Wt 170 lb (77.1 kg)  SpO2 96%  BMI 25.85 kg/m2 Estimated body mass index is 25.85 kg/(m^2) as calculated from the following:    Height as of 3/10/17: 5' 8\" (1.727 m).    Weight as of this encounter: 170 lb (77.1 kg).  Medication Reconciliation: complete   Amparo AYALA      "

## 2017-03-29 DIAGNOSIS — I10 ESSENTIAL HYPERTENSION, BENIGN: ICD-10-CM

## 2017-03-30 NOTE — TELEPHONE ENCOUNTER
metoprolol (TOPROL-XL) 100 MG 24 hr tablet      Last Written Prescription Date: 2/25/16  Last Fill Quantity: 93, # refills: 3    Last Office Visit with G, P or [facility] prescribing provider:  3/20/17   Future Office Visit:    Next 5 appointments (look out 90 days)     Apr 06, 2017  7:00 AM CDT   Office Visit with Juarez Nuno DO   [facility]    [address] [place] [id number]   [phone]            Apr 27, 2017  9:00 AM CDT   Return Visit with Stevie Felipe MD   [facility]    [address] [place] [address]   [phone]                    BP Readings from Last 3 Encounters:   03/20/17 122/62   03/10/17 128/67   02/13/17 112/62

## 2017-04-03 RX ORDER — METOPROLOL SUCCINATE 100 MG/1
TABLET, EXTENDED RELEASE ORAL
Qty: 93 TABLET | Refills: 2 | Status: SHIPPED | OUTPATIENT
Start: 2017-04-03 | End: 2018-01-08

## 2017-04-03 NOTE — TELEPHONE ENCOUNTER
Routing refill request to provider for review/approval because:  A break in medication, patient should have been out of medication >1 month ago    Maura Shukla RN  [facility]

## 2017-04-06 ENCOUNTER — OFFICE VISIT (OUTPATIENT)
Dept: INTERNAL MEDICINE | Facility: CLINIC | Age: 72
End: 2017-04-06
Payer: MEDICARE

## 2017-04-06 ENCOUNTER — HOSPITAL ENCOUNTER (OUTPATIENT)
Dept: MRI IMAGING | Facility: CLINIC | Age: 72
Discharge: HOME | End: 2017-04-06
Attending: INTERNAL MEDICINE | Admitting: INTERNAL MEDICINE
Payer: MEDICARE

## 2017-04-06 ENCOUNTER — HOSPITAL ENCOUNTER (OUTPATIENT)
Dept: GENERAL RADIOLOGY | Facility: CLINIC | Age: 72
End: 2017-04-06
Attending: INTERNAL MEDICINE
Payer: MEDICARE

## 2017-04-06 VITALS
RESPIRATION RATE: 16 BRPM | HEART RATE: 80 BPM | WEIGHT: 174 LBS | TEMPERATURE: 97.9 F | SYSTOLIC BLOOD PRESSURE: 128 MMHG | BODY MASS INDEX: 26.46 KG/M2 | DIASTOLIC BLOOD PRESSURE: 62 MMHG

## 2017-04-06 DIAGNOSIS — K59.01 SLOW TRANSIT CONSTIPATION: ICD-10-CM

## 2017-04-06 DIAGNOSIS — M54.41 CHRONIC RIGHT-SIDED LOW BACK PAIN WITH RIGHT-SIDED SCIATICA: ICD-10-CM

## 2017-04-06 DIAGNOSIS — M70.22 OLECRANON BURSITIS OF LEFT ELBOW: ICD-10-CM

## 2017-04-06 DIAGNOSIS — G89.29 CHRONIC RIGHT-SIDED LOW BACK PAIN WITH RIGHT-SIDED SCIATICA: ICD-10-CM

## 2017-04-06 DIAGNOSIS — G89.29 CHRONIC RIGHT-SIDED LOW BACK PAIN WITH RIGHT-SIDED SCIATICA: Primary | ICD-10-CM

## 2017-04-06 DIAGNOSIS — M54.41 CHRONIC RIGHT-SIDED LOW BACK PAIN WITH RIGHT-SIDED SCIATICA: Primary | ICD-10-CM

## 2017-04-06 PROCEDURE — 72100 X-RAY EXAM L-S SPINE 2/3 VWS: CPT | Mod: TC

## 2017-04-06 PROCEDURE — 73080 X-RAY EXAM OF ELBOW: CPT | Mod: TC,LT

## 2017-04-06 PROCEDURE — 99214 OFFICE O/P EST MOD 30 MIN: CPT | Performed by: INTERNAL MEDICINE

## 2017-04-06 PROCEDURE — 72148 MRI LUMBAR SPINE W/O DYE: CPT

## 2017-04-06 RX ORDER — METOCLOPRAMIDE 10 MG/1
10 TABLET ORAL 4 TIMES DAILY
Qty: 120 TABLET | Refills: 0 | Status: SHIPPED | OUTPATIENT
Start: 2017-04-06 | End: 2017-04-12

## 2017-04-06 ASSESSMENT — PAIN SCALES - GENERAL: PAINLEVEL: MILD PAIN (2)

## 2017-04-06 NOTE — MR AVS SNAPSHOT
After Visit Summary   4/6/2017    Michele Vance    MRN: [id number]           Patient Information     Date Of Birth          1945        Visit Information        Provider Department      4/6/2017 7:00 AM Juarez Nuno DO [facility]        Today's Diagnoses     Chronic right-sided low back pain with right-sided sciatica    -  1    Slow transit constipation        Olecranon bursitis of left elbow           Follow-ups after your visit        Your next 10 appointments already scheduled     Apr 27, 2017  9:00 AM CDT   Return Visit with Stevie Felipe MD   [facility]    [address] [place] [id number] [phone]            Sep 19, 2017  9:00 AM CDT   US CAROTID BILATERAL with UCUSV2   [facility] ([facility])    [address] [place] [address] [phone]           Please bring a list of your medicines (including vitamins, minerals and over-the-counter drugs). Also, tell your doctor about any allergies you may have. Wear comfortable clothes and leave your valuables at home.  You do not need to do anything special to prepare for your exam.  Please call the Imaging Department at your exam site with any questions.            Sep 19, 2017 10:30 AM CDT   (Arrive by 10:15 AM)   Return Visit with Shola Cuellar MD   [facility] ([facility])    [address] [place] [address] [phone]              Who to contact     If you have questions or need follow up information about today's clinic visit or your schedule please contact [facility] directly at [phone].  Normal or non-critical lab and imaging results will be communicated to you by MyChart, letter or phone within 4 business days after the clinic has received the results. If you do not hear from  us within 7 days, please contact the clinic through [employer] or phone. If you have a critical or abnormal lab result, we will notify you by phone as soon as possible.  Submit refill requests through [employer] or call your pharmacy and they will forward the refill request to us. Please allow 3 business days for your refill to be completed.          Additional Information About Your Visit        [employer] Information     [employer] gives you secure access to your electronic health record. If you see a primary care provider, you can also send messages to your care team and make appointments. If you have questions, please call your primary care clinic.  If you do not have a primary care provider, please call [phone] and they will assist you.        Care EveryWhere ID     This is your Care EveryWhere ID. This could be used by other organizations to access your [place] medical records  [id number]        Your Vitals Were     Pulse Temperature Respirations BMI (Body Mass Index)          80 97.9  F (36.6  C) (Temporal) 16 26.46 kg/m2         Blood Pressure from Last 3 Encounters:   04/12/17 152/70   04/06/17 128/62   03/20/17 122/62    Weight from Last 3 Encounters:   04/17/17 170 lb 1.6 oz (77.2 kg)   04/12/17 167 lb 12.8 oz (76.1 kg)   04/06/17 174 lb (78.9 kg)                 Where to get your medicines      These medications were sent to [facility] - [id number]  [address] [place] [address]     Phone:  [phone]     metoclopramide 10 MG tablet          Primary Care Provider Office Phone # Fax #    Juarezmarti FernandezDO frances [phone]       [address] [place] [address]        Thank you!     Thank you for choosing [facility]  for your care. Our goal is always to provide you with excellent care. Hearing back from our patients is one way we can continue to improve our services. Please take a few minutes to  complete the written survey that you may receive in the mail after your visit with us. Thank you!             Your Updated Medication List - Protect others around you: Learn how to safely use, store and throw away your medicines at www.disposemymeds.org.          This list is accurate as of: 4/6/17 11:59 PM.  Always use your most recent med list.                   Brand Name Dispense Instructions for use    amitriptyline 25 MG tablet    ELAVIL    180 tablet    Take 1 tablet (25 mg) by mouth 2 times daily       amLODIPine 10 MG tablet    NORVASC    90 tablet    Take 1 tablet (10 mg) by mouth every evening       ASPIRIN PO      Take 325 mg by mouth daily       cilostazol 100 MG tablet    PLETAL    180 tablet    Take 1 tablet (100 mg) by mouth 2 times daily       gabapentin 300 MG capsule    NEURONTIN    210 capsule    TAKE 1 CAPSULE THREE TIMES A DAY       losartan 25 MG tablet    COZAAR    93 tablet    Take 1 tablet (25 mg) by mouth daily       metoclopramide 10 MG tablet    REGLAN    120 tablet    Take 1 tablet (10 mg) by mouth 4 times daily       omeprazole 40 MG capsule    priLOSEC    90 capsule    Take 1 capsule (40 mg) by mouth daily       polyethylene glycol Packet    MIRALAX/GLYCOLAX     Take 1 packet by mouth daily       psyllium 58.6 % Powd    METAMUCIL     Take by mouth daily       sildenafil 50 MG cap/tab    REVATIO/VIAGRA    6 tablet    Take 0.5-1 tablets (25-50 mg) by mouth daily as needed for erectile dysfunction Take 30 min to 4 hours before intercourse.  Never use with nitroglycerin, terazosin or doxazosin.       STATIN NOT PRESCRIBED (INTENTIONAL)     1 each    Pt has known rhabdomyolysis in the past, somewhat associated with statins.  Also wasn't able to tolerate Zetia.       TOPROL  MG 24 hr tablet   Generic drug:  metoprolol     93 tablet    TAKE 1 TABLET EVERY MORNING       zolpidem 5 MG tablet    AMBIEN    90 tablet    Take 1 tablet (5 mg) by mouth nightly as needed for sleep

## 2017-04-06 NOTE — PROGRESS NOTES
SUBJECTIVE:                                                    Michele Vance is a 71 year old male who presents to clinic today for the following health issues:      Joint Pain     Onset: 1 month    Description:   Location: right shin  Character: tingling    Intensity: moderate    Progression of Symptoms: same    Accompanying Signs & Symptoms:  Other symptoms: tingling and swelling   History:   Previous similar pain: YES      Precipitating factors:   Trauma or overuse: no     Alleviating factors:  Improved by: nothing       Therapies Tried and outcome: none    Concern - bunyon on left elbow     Onset: ongoing    Description:   Raised and filled with fluid    Intensity: moderate    Progression of Symptoms:  worsening    Accompanying Signs & Symptoms:  none       Previous history of similar problem:   Yes, had it drained    Precipitating factors:   Worsened by: none    Alleviating factors:  Improved by: none       Therapies Tried and outcome: none  CHIEF COMPLAINT:    The patient is a pleasant 71-year-old gentleman who presents today for several reasons. First of all, he has recurrence of the olecranon bursitis on his left elbow. It is not fluid filled or tense at this time. Is minimally painful with motion. It is not red or inflamed. He notes that he has not struck or hit it again. His second concern is that of some increased back discomfort in the lumbar area with now pain in the lower right leg ranging from the knee on down. He notes that he has been evaluated by his chiropractor who is suggesting MRI. I would agree that this is appropriate at this time given the radiation and radicular pain. He remembers no injury or trauma. His next concern is that of his ongoing gastric bloating. He notes that upon discontinuing the Reglan, his symptoms worsened. He has a lot of belching and burping. He questions whether it's because he is using generic omeprazole. I suggested is probably because he discontinued the  medication. He does have a history of stroke and has no residual effect from it at this time. He is otherwise doing well.                         PAST, FAMILY,SOCIAL HISTORY:     Medical  History:   has a past medical history of Carotid stenosis; Chronic kidney disease; Coronary artery disease (3-2014); Crohn's disease (H); CVA (cerebral infarction); Elevated CK; Esophageal reflux; Hypercholesteremia; Hypertension; Nonsenile cataract; Other and unspecified hyperlipidemia; PAD (peripheral artery disease) (H); Rhabdomyolysis (2010); and Unspecified essential hypertension.     Surgical History:   has a past surgical history that includes knee surgery (1976); lasix (2001); appendectomy (1974); Dental surgery; cataracts; colonoscopy (12/12/02); UPPER GI ENDOSCOPY,EXAM (01/08/99); Release carpal tunnel (1/13/2011); Release carpal tunnel (1/20/2011); Bypass graft artery coronary (3/5/2014); cataract iol, rt/lt (Bilateral, 2008); Endarterectomy carotid; CAPSULE ENDOSCOPY (N/A, 10/7/2014); Colonoscopy (N/A, 10/7/2014); Esophagoscopy, gastroscopy, duodenoscopy (EGD), combined (Left, 10/7/2014); and Esophagoscopy, gastroscopy, duodenoscopy (EGD), combined (Left, 10/7/2014).     Social History:   reports that he quit smoking about 17 years ago. His smoking use included Cigarettes. He has a 50.00 pack-year smoking history. He has never used smokeless tobacco. He reports that he does not drink alcohol or use illicit drugs.     Family History:  family history includes CANCER in his sister; CEREBROVASCULAR DISEASE in his father; Eye Disorder in his mother; HEART DISEASE in his father and sister; Hypertension in his mother; Lipids in his father; Obesity in his father. There is no history of Glaucoma or Macular Degeneration.            MEDICATIONS  Current Outpatient Prescriptions   Medication Sig Dispense Refill     metoclopramide (REGLAN) 10 MG tablet Take 1 tablet (10 mg) by mouth 4 times daily 120 tablet 0     TOPROL  MG 24  hr tablet TAKE 1 TABLET EVERY MORNING 93 tablet 2     zolpidem (AMBIEN) 5 MG tablet Take 1 tablet (5 mg) by mouth nightly as needed for sleep 90 tablet 1     amLODIPine (NORVASC) 10 MG tablet Take 1 tablet (10 mg) by mouth every evening 90 tablet 3     cilostazol (PLETAL) 100 MG tablet Take 1 tablet (100 mg) by mouth 2 times daily 180 tablet 3     omeprazole (PRILOSEC) 40 MG capsule Take 1 capsule (40 mg) by mouth daily 90 capsule 1     amitriptyline (ELAVIL) 25 MG tablet Take 1 tablet (25 mg) by mouth 2 times daily 180 tablet 2     losartan (COZAAR) 25 MG tablet Take 1 tablet (25 mg) by mouth daily 93 tablet 3     psyllium (METAMUCIL) 58.6 % POWD Take by mouth daily       gabapentin (NEURONTIN) 300 MG capsule TAKE 1 CAPSULE THREE TIMES A  capsule 4     sildenafil (VIAGRA) 50 MG tablet Take 0.5-1 tablets (25-50 mg) by mouth daily as needed for erectile dysfunction Take 30 min to 4 hours before intercourse.  Never use with nitroglycerin, terazosin or doxazosin. 6 tablet 0     ASPIRIN PO Take 325 mg by mouth daily       polyethylene glycol (MIRALAX/GLYCOLAX) packet Take 1 packet by mouth daily        STATIN NOT PRESCRIBED, INTENTIONAL, Pt has known rhabdomyolysis in the past, somewhat associated with statins.  Also wasn't able to tolerate Zetia. 1 each 0         --------------------------------------------------------------------------------------------------------------------                          REVIEW OF SYSTEMS:         LUNGS: Pt denies: cough,excess sputum, hemoptysis, or shortness of breath.   HEART: Pt denies: chest pain, arrythmia, syncope, tachy or bradyarrhythmia or excess edema.   GI: Pt denies: nausea, vomitting, diarrhea, constipation, melena, or hematochezia. He does have a lot of belching and burping.   NEURO: Pt denies: seizures, strokes, diplopia, weakness, paraesthesias, or paralysis.   SKIN: Pt denies: itching, rashes, discoloration, or specific lesions of concern. Denies recent hair  "loss.                          EXAMINATION:            Constitutional: The patient appears to be in no acute distress. The patient appears to be adequately hydrated. No acute respiratory or hemodynamic distress is noted at this time.   LUNGS: clear bilaterally, airflow is brisk, no intercostal retraction or stridor is noted. No coughing is noted during visit.   HEART:  regular without rubs, clicks, gallops, or murmurs. PMI is nondisplaced. Upstrokes are brisk. S1,S2 are heard.   GI: Abdomen is soft, without rebound, guarding or tenderness. Bowel sounds are slightly decreased. No renal bruits are heard.    NEURO: Pt is alert and appropriate. No neurologic lateralization is noted. Cranial nerves 2-12 are intact. Peripheral sensory and motor function are grossly normal. Heel and toe walking are intact. Sensation is intact in the lower extremities bilaterally.   MS: Minimal crepitance is noted in the extremities. No deformity is present. Muscle strength is appropriate and equal bilaterally. No acute joint erythema or swelling is present. Some nonspecific back discomfort is noted with palpation of the paraspinal musculature in the lumbar area. It is more tender on the right than the left. The left olecranon bursa is enlarged not inflamed, warm to touch. There is no fluid in it.                        DECISION MAKIN. Chronic right-sided low back pain with right-sided sciatica    - XR Lumbar Spine 2/3 Views; Future  - MR Lumbar Spine w/o Contrast; Future    2. Slow transit constipation    - metoclopramide (REGLAN) 10 MG tablet; Take 1 tablet (10 mg) by mouth 4 times daily  Dispense: 120 tablet; Refill: 0    3. Olecranon bursitis of left elbow  Will obtain x-ray to rule out possible occult fracture. At this time there is no reason to \"tap\" the elbow as is minimal fluid. There is no inflammation.  - XR Elbow Left 2 Views; Future                             FOLLOW UP    I have asked the patient to make an " appointment for follow up with me following the x-rays        I have carefully explained the diagnosis and treatment options with the patient. The patient has displayed an understanding of the above, and all subsequent questions were answered.         DO JENNA Bal    Portions of this note were produced using [employer]  Although every attempt at real-time proof reading has been made, occasional grammar/syntax errors may have been missed.

## 2017-04-06 NOTE — NURSING NOTE
"Chief Complaint   Patient presents with     Musculoskeletal Problem     Shin tingle for 1 month in the right leg     Elbow left     Bunyon fluid, pt wants drained, came back for 3 weeks       Initial /62 (Cuff Size: Adult Regular)  Pulse 80  Temp 97.9  F (36.6  C) (Temporal)  Resp 16  Wt 174 lb (78.9 kg)  BMI 26.46 kg/m2 Estimated body mass index is 26.46 kg/(m^2) as calculated from the following:    Height as of 3/10/17: 5' 8\" (1.727 m).    Weight as of this encounter: 174 lb (78.9 kg).  Medication Reconciliation: complete   Armando Calderon MA      "

## 2017-04-12 ENCOUNTER — OFFICE VISIT (OUTPATIENT)
Dept: FAMILY MEDICINE | Facility: OTHER | Age: 72
End: 2017-04-12
Payer: MEDICARE

## 2017-04-12 VITALS
SYSTOLIC BLOOD PRESSURE: 152 MMHG | WEIGHT: 167.8 LBS | TEMPERATURE: 97.2 F | DIASTOLIC BLOOD PRESSURE: 70 MMHG | OXYGEN SATURATION: 100 % | BODY MASS INDEX: 25.51 KG/M2 | HEART RATE: 74 BPM

## 2017-04-12 DIAGNOSIS — G89.29 CHRONIC MIDLINE LOW BACK PAIN WITH BILATERAL SCIATICA: Primary | ICD-10-CM

## 2017-04-12 DIAGNOSIS — M54.41 CHRONIC MIDLINE LOW BACK PAIN WITH BILATERAL SCIATICA: Primary | ICD-10-CM

## 2017-04-12 DIAGNOSIS — I10 ESSENTIAL HYPERTENSION WITH GOAL BLOOD PRESSURE LESS THAN 140/90: ICD-10-CM

## 2017-04-12 DIAGNOSIS — M70.22 OLECRANON BURSITIS OF LEFT ELBOW: ICD-10-CM

## 2017-04-12 DIAGNOSIS — K59.01 SLOW TRANSIT CONSTIPATION: ICD-10-CM

## 2017-04-12 DIAGNOSIS — K59.09 CHRONIC CONSTIPATION: ICD-10-CM

## 2017-04-12 DIAGNOSIS — M54.42 CHRONIC MIDLINE LOW BACK PAIN WITH BILATERAL SCIATICA: Primary | ICD-10-CM

## 2017-04-12 DIAGNOSIS — K55.1 MESENTERIC ARTERY STENOSIS (H): ICD-10-CM

## 2017-04-12 LAB
BACTERIA SPEC CULT: NORMAL
GRAM STN SPEC: NORMAL
MICRO REPORT STATUS: NORMAL
MICRO REPORT STATUS: NORMAL
SPECIMEN SOURCE: NORMAL
SPECIMEN SOURCE: NORMAL

## 2017-04-12 PROCEDURE — 20605 DRAIN/INJ JOINT/BURSA W/O US: CPT | Performed by: INTERNAL MEDICINE

## 2017-04-12 PROCEDURE — 99213 OFFICE O/P EST LOW 20 MIN: CPT | Mod: 25 | Performed by: INTERNAL MEDICINE

## 2017-04-12 RX ORDER — METOCLOPRAMIDE 10 MG/1
10 TABLET ORAL 4 TIMES DAILY
Qty: 120 TABLET | Refills: 0 | Status: SHIPPED | OUTPATIENT
Start: 2017-04-12 | End: 2017-05-17

## 2017-04-12 ASSESSMENT — PAIN SCALES - GENERAL: PAINLEVEL: MILD PAIN (2)

## 2017-04-12 NOTE — PATIENT INSTRUCTIONS
LUMBAR SPINE THREE VIEWS 4/6/2017 4:23 PM      HISTORY: Lumbago with sciatica, right side. Other chronic pain.     COMPARISON: MRI today. Radiographs 3/20/2017.         IMPRESSION: Chronic-appearing fractures of T12 and L2 superior  endplates are unchanged. Multilevel degenerative disc disease,  especially at L5-S1 and to lesser extent at the other lumbar levels.  Five lumbar-type vertebral bodies. Minimal scoliosis convex to the  left. Arterial calcifications.     HAYDEN SANTOS MD        IMPRESSION:  1. Multilevel degenerative disc and facet disease.  2. Suspected cholelithiasis  3. L4-L5: Moderate central stenosis. Moderate-severe bilateral  foraminal stenosis.  4. L5-S1: Moderate-severe right and moderate-severe to severe left  foraminal stenosis.     CHERELLE THORPE MD

## 2017-04-12 NOTE — MR AVS SNAPSHOT
After Visit Summary   4/12/2017    Michele Vance    MRN: [id number]           Patient Information     Date Of Birth          1945        Visit Information        Provider Department      4/12/2017 7:20 AM Juarez Nuno DO [facility]        Today's Diagnoses     Slow transit constipation          Care Instructions    LUMBAR SPINE THREE VIEWS 4/6/2017 4:23 PM      HISTORY: Lumbago with sciatica, right side. Other chronic pain.     COMPARISON: MRI today. Radiographs 3/20/2017.         IMPRESSION: Chronic-appearing fractures of T12 and L2 superior  endplates are unchanged. Multilevel degenerative disc disease,  especially at L5-S1 and to lesser extent at the other lumbar levels.  Five lumbar-type vertebral bodies. Minimal scoliosis convex to the  left. Arterial calcifications.     HAYDEN SANTOS MD        IMPRESSION:  1. Multilevel degenerative disc and facet disease.  2. Suspected cholelithiasis  3. L4-L5: Moderate central stenosis. Moderate-severe bilateral  foraminal stenosis.  4. L5-S1: Moderate-severe right and moderate-severe to severe left  foraminal stenosis.     CHERELLE THORPE MD          Follow-ups after your visit        Your next 10 appointments already scheduled     Apr 27, 2017  9:00 AM CDT   [facility] Felipe [facility]   [facility]    [address] [place] [id number] [phone]            Sep 19, 2017  9:00 AM CDT   US CAROTID BILATERAL with UCUSV2   [facility] ([facility])    [address] [address] [phone]           Please bring a list of your medicines (including vitamins, minerals and over-the-counter drugs). Also, tell your doctor about any allergies you may have. Wear comfortable clothes and leave your valuables at home.  You do not need to do anything special to prepare for your exam.  Please call  the Imaging Department at your exam site with any questions.            Sep 19, 2017 10:30 AM CDT   (Arrive by 10:15 AM)   Return Visit with Shola Cuellar MD   [facility] ([facility])    [address] [facility]  3rd [facility] [id number] [phone]              Who to contact     If you have questions or need follow up information about today's clinic visit or your schedule please contact [facility] directly at [phone].  Normal or non-critical lab and imaging results will be communicated to you by CypherWorXhart, letter or phone within 4 business days after the clinic has received the results. If you do not hear from us within 7 days, please contact the clinic through Cymbett or phone. If you have a critical or abnormal lab result, we will notify you by phone as soon as possible.  Submit refill requests through CreditCardsOnline or call your pharmacy and they will forward the refill request to us. Please allow 3 business days for your refill to be completed.          Additional Information About Your Visit        CreditCardsOnline Information     [employer] gives you secure access to your electronic health record. If you see a primary care provider, you can also send messages to your care team and make appointments. If you have questions, please call your primary care clinic.  If you do not have a primary care provider, please call [phone] and they will assist you.        Care EveryWhere ID     This is your Care EveryWhere ID. This could be used by other organizations to access your [facility] medical records  [id number]        Your Vitals Were     Pulse Temperature Pulse Oximetry BMI (Body Mass Index)          74 97.2  F (36.2  C) (Temporal) 100% 25.51 kg/m2         Blood Pressure from Last 3 Encounters:   04/12/17 152/70   04/06/17 128/62   03/20/17 122/62    Weight from Last 3 Encounters:   04/12/17 167 lb 12.8 oz (76.1 kg)   04/06/17 174 lb (78.9 kg)    03/20/17 170 lb (77.1 kg)              Today, you had the following     No orders found for display         Where to get your medicines      These medications were sent to [email or web address] - [place], [place] - [address] [place] [address]  [address] [place] [address]     Phone:  [phone]     metoclopramide 10 MG tablet          Primary Care Provider Office Phone # Fax #    Juarez Nuno [phone]       [address] [place] [address]        Thank you!     Thank you for choosing [facility]  for your care. Our goal is always to provide you with excellent care. Hearing back from our patients is one way we can continue to improve our services. Please take a few minutes to complete the written survey that you may receive in the mail after your visit with us. Thank you!             Your Updated Medication List - Protect others around you: Learn how to safely use, store and throw away your medicines at www.disposemymeds.org.          This list is accurate as of: 4/12/17  7:57 AM.  Always use your most recent med list.                   Brand Name Dispense Instructions for use    amitriptyline 25 MG tablet    ELAVIL    180 tablet    Take 1 tablet (25 mg) by mouth 2 times daily       amLODIPine 10 MG tablet    NORVASC    90 tablet    Take 1 tablet (10 mg) by mouth every evening       ASPIRIN PO      Take 325 mg by mouth daily       cilostazol 100 MG tablet    PLETAL    180 tablet    Take 1 tablet (100 mg) by mouth 2 times daily       gabapentin 300 MG capsule    NEURONTIN    210 capsule    TAKE 1 CAPSULE THREE TIMES A DAY       losartan 25 MG tablet    COZAAR    93 tablet    Take 1 tablet (25 mg) by mouth daily       metoclopramide 10 MG tablet    REGLAN    120 tablet    Take 1 tablet (10 mg) by mouth 4 times daily       omeprazole 40 MG capsule    priLOSEC    90 capsule    Take 1 capsule (40 mg) by mouth daily       polyethylene  glycol Packet    MIRALAX/GLYCOLAX     Take 1 packet by mouth daily       psyllium 58.6 % Powd    METAMUCIL     Take by mouth daily       sildenafil 50 MG cap/tab    REVATIO/VIAGRA    6 tablet    Take 0.5-1 tablets (25-50 mg) by mouth daily as needed for erectile dysfunction Take 30 min to 4 hours before intercourse.  Never use with nitroglycerin, terazosin or doxazosin.       STATIN NOT PRESCRIBED (INTENTIONAL)     1 each    Pt has known rhabdomyolysis in the past, somewhat associated with statins.  Also wasn't able to tolerate Zetia.       TOPROL  MG 24 hr tablet   Generic drug:  metoprolol     93 tablet    TAKE 1 TABLET EVERY MORNING       zolpidem 5 MG tablet    AMBIEN    90 tablet    Take 1 tablet (5 mg) by mouth nightly as needed for sleep

## 2017-04-12 NOTE — PROGRESS NOTES
Chief Complaint   Patient presents with     Elbow Pain     f/u not better     CHIEF COMPLAINT:    The patient is a pleasant 8-year-old gentleman who has a olecranon bursitis on the left. X-rays previously performed demonstrate no chip fracture or elbow injury. It is now becoming somewhat more tender. It is mildly warm to touch, he has decreased range of motion due to the discomfort. He remembers no specific trauma or injury. Additionally, he has ongoing low back pain and his chiropractor would not manipulate him without an MRI, MRI does demonstrate significant foraminal stenosis and I recommended neurosurgical evaluation for this. I anticipate that his chiropractor will not be interested in any manipulation at this time. Otherwise, his gastrointestinal symptoms have improved completely with the use the metoclopramide. He is now passing appropriate gas and no longer feeling of bloatedness/uncomfortable feeling. He's been able to drop the dose down to twice daily and has had good results.                         PAST, FAMILY,SOCIAL HISTORY:     Medical  History:   has a past medical history of Carotid stenosis; Chronic kidney disease; Coronary artery disease (3-2014); Crohn's disease (H); CVA (cerebral infarction); Elevated CK; Esophageal reflux; Hypercholesteremia; Hypertension; Nonsenile cataract; Other and unspecified hyperlipidemia; PAD (peripheral artery disease) (H); Rhabdomyolysis (2010); and Unspecified essential hypertension.     Surgical History:   has a past surgical history that includes knee surgery (1976); lasix (2001); appendectomy (1974); Dental surgery; cataracts; colonoscopy (12/12/02); UPPER GI ENDOSCOPY,EXAM (01/08/99); Release carpal tunnel (1/13/2011); Release carpal tunnel (1/20/2011); Bypass graft artery coronary (3/5/2014); cataract iol, rt/lt (Bilateral, 2008); Endarterectomy carotid; CAPSULE ENDOSCOPY (N/A, 10/7/2014); Colonoscopy (N/A, 10/7/2014); Esophagoscopy, gastroscopy, duodenoscopy  (EGD), combined (Left, 10/7/2014); and Esophagoscopy, gastroscopy, duodenoscopy (EGD), combined (Left, 10/7/2014).     Social History:   reports that he quit smoking about 17 years ago. His smoking use included Cigarettes. He has a 50.00 pack-year smoking history. He has never used smokeless tobacco. He reports that he does not drink alcohol or use illicit drugs.     Family History:  family history includes CANCER in his sister; CEREBROVASCULAR DISEASE in his father; Eye Disorder in his mother; HEART DISEASE in his father and sister; Hypertension in his mother; Lipids in his father; Obesity in his father. There is no history of Glaucoma or Macular Degeneration.            MEDICATIONS  Current Outpatient Prescriptions   Medication Sig Dispense Refill     metoclopramide (REGLAN) 10 MG tablet Take 1 tablet (10 mg) by mouth 4 times daily 120 tablet 0     TOPROL  MG 24 hr tablet TAKE 1 TABLET EVERY MORNING 93 tablet 2     zolpidem (AMBIEN) 5 MG tablet Take 1 tablet (5 mg) by mouth nightly as needed for sleep 90 tablet 1     amLODIPine (NORVASC) 10 MG tablet Take 1 tablet (10 mg) by mouth every evening 90 tablet 3     cilostazol (PLETAL) 100 MG tablet Take 1 tablet (100 mg) by mouth 2 times daily 180 tablet 3     omeprazole (PRILOSEC) 40 MG capsule Take 1 capsule (40 mg) by mouth daily 90 capsule 1     amitriptyline (ELAVIL) 25 MG tablet Take 1 tablet (25 mg) by mouth 2 times daily 180 tablet 2     losartan (COZAAR) 25 MG tablet Take 1 tablet (25 mg) by mouth daily 93 tablet 3     psyllium (METAMUCIL) 58.6 % POWD Take by mouth daily       gabapentin (NEURONTIN) 300 MG capsule TAKE 1 CAPSULE THREE TIMES A  capsule 4     sildenafil (VIAGRA) 50 MG tablet Take 0.5-1 tablets (25-50 mg) by mouth daily as needed for erectile dysfunction Take 30 min to 4 hours before intercourse.  Never use with nitroglycerin, terazosin or doxazosin. 6 tablet 0     ASPIRIN PO Take 325 mg by mouth daily       polyethylene glycol  (MIRALAX/GLYCOLAX) packet Take 1 packet by mouth daily        STATIN NOT PRESCRIBED, INTENTIONAL, Pt has known rhabdomyolysis in the past, somewhat associated with statins.  Also wasn't able to tolerate Zetia. 1 each 0         --------------------------------------------------------------------------------------------------------------------                          REVIEW OF SYSTEMS:         LUNGS: Pt denies: cough,excess sputum, hemoptysis, or shortness of breath.   HEART: Pt denies: chest pain, arrythmia, syncope, tachy or bradyarrhythmia or excess edema.   GI: Pt denies: nausea, vomitting, diarrhea, constipation, melena, or hematochezia.   NEURO: Pt denies: seizures, strokes, diplopia, weakness, paraesthesias, or paralysis.   SKIN: Pt denies: itching, rashes, discoloration, or specific lesions of concern. Denies recent hair loss.                          EXAMINATION:         /70 (BP Location: Right arm, Patient Position: Chair, Cuff Size: Adult Large)  Pulse 74  Temp 97.2  F (36.2  C) (Temporal)  Wt 167 lb 12.8 oz (76.1 kg)  SpO2 100%  BMI 25.51 kg/m2   Constitutional: The patient appears to be in no acute distress. The patient appears to be adequately hydrated. No acute respiratory or hemodynamic distress is noted at this time.   LUNGS: clear bilaterally, airflow is brisk, no intercostal retraction or stridor is noted. No coughing is noted during visit.   HEART:  regular without rubs, clicks, gallops, or murmurs. PMI is nondisplaced. Upstrokes are brisk. S1,S2 are heard.   GI: Abdomen is soft, without rebound, guarding or tenderness. Bowel sounds are appropriate. No renal bruits are heard.    MS: Minimal crepitance is noted in the elbow. No deformity is present. Muscle strength is appropriate and equal bilaterally. No acute joint erythema or swelling is present. Olecranon bursitis is noted. It is mildly warm to touch but not particularly inflamed.   NEURO: Pt is alert and appropriate. No neurologic  lateralization is noted. Cranial nerves 2-12 are intact. Peripheral sensory and motor function are grossly normal. Straight leg raising is positive bilaterally. Heel and toe walking are intact. He does have pain down the back of both legs.                          DECISION MAKIN. Slow transit constipation  Continue metoclopramide twice daily  - metoclopramide (REGLAN) 10 MG tablet; Take 1 tablet (10 mg) by mouth 4 times daily  Dispense: 120 tablet; Refill: 0    2. Chronic midline low back pain with bilateral sciatica  Continue gabapentin, avoid narcotics area  - SPINE SURGERY REFERRAL    3. Olecranon bursitis of left elbow  Arthrocentesis performed  Sent for culture  1 cc of Depo-Medrol instilled into the elbow/bursa    4. Chronic constipation  As above    5. Essential hypertension with goal blood pressure less than 140/90  Continue current blood pressure medication    6. Mesenteric artery stenosis (H)  Continue treatment atherosclerotic disease i.e. Aspirin  Statin contraindicated due to rhabdomyolysis                               FOLLOW UP    I have asked the patient to make an appointment for follow up with me in 1 month or sooner as needed        I have carefully explained the diagnosis and treatment options with the patient. The patient has displayed an understanding of the above, and all subsequent questions were answered.         DO JENNA Bal    Portions of this note were produced using [employer]Gene  Although every attempt at real-time proof reading has been made, occasional grammar/syntax errors may have been missed.                 Procedure note  Procedure: Arthrocentesis of left elbow  Indication: Olecranon bursitis  Sedation: None  Anesthesia: 1 cc of 1% lidocaine without epinephrine  Medications instilled: 1 cc of Depo-Medrol  Blood loss: 1 drop  Postprocedural diagnosis: Olecranon bursitis  Procedure details: Sterilely prepped and draped with Betadine, lidocaine was used  for the superficial skin using a #27 needle. A #22 needle was then placed via this area and fluid was removed/approximately 5 cc/passed off to pathology for culture, Depo-Medrol and 1/2 cc of lidocaine instilled into the bursa. Needle removed and Band-Aid applied    Follow-up: As needed            DO JENNA Bal    Portions of this note were produced using Jumpstarter  Although every attempt at real-time proof reading has been made, occasional grammar/syntax errors may have been missed.

## 2017-04-12 NOTE — NURSING NOTE
"Chief Complaint   Patient presents with     Elbow Pain     f/u not better       Initial /70 (BP Location: Right arm, Patient Position: Chair, Cuff Size: Adult Large)  Pulse 74  Temp 97.2  F (36.2  C) (Temporal)  Wt 167 lb 12.8 oz (76.1 kg)  SpO2 100%  BMI 25.51 kg/m2 Estimated body mass index is 25.51 kg/(m^2) as calculated from the following:    Height as of 3/10/17: 5' 8\" (1.727 m).    Weight as of this encounter: 167 lb 12.8 oz (76.1 kg).  Medication Reconciliation: complete   Amparo AYALA      "

## 2017-04-13 NOTE — PROGRESS NOTES
Dear Michele, your recent test results are attached.  You have multilevel degenerative disc disease.  The MRI also shows some spinal stenosis in the low back.  This is severe in locations.              Feel free to contact me via the office or My Chart if you have any questions regarding the above.    Sincerely,  Juarez Nuno, DO [facility]

## 2017-04-17 ENCOUNTER — OFFICE VISIT (OUTPATIENT)
Dept: NEUROSURGERY | Facility: CLINIC | Age: 72
End: 2017-04-17
Payer: MEDICARE

## 2017-04-17 VITALS — BODY MASS INDEX: 25.78 KG/M2 | HEIGHT: 68 IN | WEIGHT: 170.1 LBS | TEMPERATURE: 97.6 F

## 2017-04-17 DIAGNOSIS — M54.16 LUMBAR RADICULOPATHY: Primary | ICD-10-CM

## 2017-04-17 PROCEDURE — 99204 OFFICE O/P NEW MOD 45 MIN: CPT | Performed by: PHYSICIAN ASSISTANT

## 2017-04-17 ASSESSMENT — PAIN SCALES - GENERAL: PAINLEVEL: SEVERE PAIN (6)

## 2017-04-17 NOTE — PROGRESS NOTES
Dr. Ole Thurman  [facility]  Neurosurgery Clinic Visit      CC: Right leg pain    Primary care Provider: Juarez Nuno      Reason For Visit:   I was asked to consult on the patient for right leg pain.      HPI: Michele Vance is a 71 year old male who presents for evaluation of his chief complaint of right leg pain. He has been experiencing burning, shooting pain down the right leg for the last month, with no event or injury. He describes pain that radiates down the lateral aspect of his right thigh, then crosses the knee and moves anteriorly into the shin as far as the ankle. He does have some numbness in this distribution as well. It is difficult to walk for any significant length of time. He does achieve some relief with rest. He denies bowel or bladder changes, or any problems with balance or coordination. He does have a history of angioplasty in both lower extremities.    Past Medical History:   Diagnosis Date     Carotid stenosis     right endartectomy     Chronic kidney disease     stage 3     Coronary artery disease 3-2014    CABG x6     Crohn's disease (H)      CVA (cerebral infarction)     balance problems, mild     Elevated CK      Esophageal reflux      Hypercholesteremia      Hypertension      Nonsenile cataract      Other and unspecified hyperlipidemia      PAD (peripheral artery disease) (H)      Rhabdomyolysis 2010     Unspecified essential hypertension        Past Medical History reviewed with patient during visit.    Past Surgical History:   Procedure Laterality Date     APPENDECTOMY  1974     BYPASS GRAFT ARTERY CORONARY  3/5/2014    Procedure: BYPASS GRAFT ARTERY CORONARY;  Median Sternotomy, Coronary Artery Bypass Graft X6 used Left internal mammary artery, Left and Right Greater Saphenous vein, on Pump oxygenator.;  Surgeon: Tim Montanez MD;  Location: [facility] OR     CATARACT IOL, RT/LT Bilateral 2008    in [place]     cataracts       COLONOSCOPY  12/12/02     [facility]     COLONOSCOPY N/A 10/7/2014    Procedure: COMBINED COLONOSCOPY, SINGLE OR MULTIPLE BIOPSY/POLYPECTOMY BY BIOPSY;  Surgeon: Micheal Mora MD;  Location:  GI     DENTAL SURGERY      TMJ, implants     ENDARTERECTOMY CAROTID      right carotid stent     ESOPHAGOSCOPY, GASTROSCOPY, DUODENOSCOPY (EGD), COMBINED Left 10/7/2014    Procedure: COMBINED ESOPHAGOSCOPY, GASTROSCOPY, DUODENOSCOPY (EGD), BIOPSY SINGLE OR MULTIPLE;  Surgeon: Micheal Mora MD;  Location: [facility]     ESOPHAGOSCOPY, GASTROSCOPY, DUODENOSCOPY (EGD), COMBINED Left 10/7/2014    Procedure: COMBINED ESOPHAGOSCOPY, GASTROSCOPY, DUODENOSCOPY (EGD), REMOVE FOREIGN BODY;  Surgeon: Micheal Mora MD;  Location: [facility] CAPSULE ENDOSCOPY N/A 10/7/2014    Procedure: CAPSULE/PILL CAM ENDOSCOPY;  Surgeon: Micheal Mora MD;  Location: [facility] UGI ENDOSCOPY, SIMPLE EXAM  01/08/99    [facility]     KNEE SURGERY  1976    left knee reconstruction     lasix  2001    both eyes     RELEASE CARPAL TUNNEL  1/13/2011    RELEASE CARPAL TUNNEL performed by JO MADRID at  OR     RELEASE CARPAL TUNNEL  1/20/2011    RELEASE CARPAL TUNNEL performed by JO MADRID at  OR     Past Surgical History reviewed with patient during visit.    Current Outpatient Prescriptions   Medication     metoclopramide (REGLAN) 10 MG tablet     TOPROL  MG 24 hr tablet     zolpidem (AMBIEN) 5 MG tablet     amLODIPine (NORVASC) 10 MG tablet     cilostazol (PLETAL) 100 MG tablet     omeprazole (PRILOSEC) 40 MG capsule     amitriptyline (ELAVIL) 25 MG tablet     losartan (COZAAR) 25 MG tablet     psyllium (METAMUCIL) 58.6 % POWD     gabapentin (NEURONTIN) 300 MG capsule     sildenafil (VIAGRA) 50 MG tablet     ASPIRIN PO     polyethylene glycol (MIRALAX/GLYCOLAX) packet     STATIN NOT PRESCRIBED, INTENTIONAL,     No current facility-administered medications for this visit.        Allergies   Allergen Reactions     Hmg-Coa-R  "Inhibitors Other (See Comments)     Rhabdo  Same as \"statins\"     Gemfibrozil      Resting thigh-calf pain     Sulfa Drugs      Zetia [Ezetimibe]      Muscle cramping       Social History     Social History     Marital status:      Spouse name: N/A     Number of children: N/A     Years of education: N/A     Occupational History      Retired     Social History Main Topics     Smoking status: Former Smoker     Packs/day: 2.50     Years: 20.00     Types: Cigarettes     Quit date: 1/1/2000     Smokeless tobacco: Never Used     Alcohol use No     Drug use: No     Sexual activity: Yes     Partners: Female     Other Topics Concern     Exercise Yes     3 times per week     Social History Narrative    Moved from [place] in August, retired  for [employer].       Family History   Problem Relation Age of Onset     Hypertension Mother      Eye Disorder Mother      CEREBROVASCULAR DISEASE Father      HEART DISEASE Father      Lipids Father      Obesity Father      CANCER Sister      HEART DISEASE Sister      Glaucoma No family hx of      Macular Degeneration No family hx of           ROS: 10 point ROS neg other than the symptoms noted above in the HPI.    Vital Signs: Temp 97.6  F (36.4  C) (Temporal)  Ht 1.727 m (5' 8\")  Wt 77.2 kg (170 lb 1.6 oz)  BMI 25.86 kg/m2    Examination:  Constitutional:  Alert, well nourished, NAD.  HEENT: Normocephalic, atraumatic.   Pulmonary:  Without shortness of breath, normal effort.   Lymph: no lymphadenopathy to low back or LE.   Integumentary: Skin is free of rashes or lesions.   Cardiovascular:  No pitting edema of BLE.      Neurological:  Awake  Alert  Oriented x 3  Speech clear  Cranial nerves II - XII grossly intact  PERRL  EOMI  Face symmetric  Tongue midline  Motor exam     Hip Flexor:                Right: 5/5  Left:  5/5  Hip Adductor:             Right:  5/5  Left:  5/5  Hip Abductor:             Right:  5/5  Left:  5/5  Gastroc Soleus:        Right:  5/5 "  Left:  5/5  Tib/Ant:                      Right:  5/5  Left:  5/5  EHL:                          Right:  5/5  Left:  5/5       Sensation normal to bilateral upper and lower extremities.    Reflexes are 2+ in the patellar and Achilles. There is no clonus. Downgoing Babinski.    Musculoskeletal:  Gait: Able to stand from a seated position. Normal non-antalgic, non-myelopathic gait.  Able to heel/toe walk without loss of balance    Lumbar examination reveals no tenderness of the spine or paraspinous muscles.  Hip height is symmetrical. Negative SI joint, sciatic notch or greater trochanteric tenderness to palpation bilaterally.  Straight leg raise is negative bilaterally.      Imaging:   MRI of the lumbar spine was reviewed in the office today. It demonstrates degenerative disc disease most pronounced at L4-5 and L5-S1, with collapse of disc height at L5-S1 and bilateral foraminal stenosis. There is right worse than left foraminal stenosis at L4 5.    Assessment/Plan:     Radicular right leg pain  Multilevel disc degeneration lumbar spine most pronounced at L5-S1  Right-sided foraminal stenosis at L4-5    Michele Vance is a 71 year old male. I did have a discussion with the patient regarding his symptoms. He is describing radicular right leg pain, and an L4 distribution. He is currently working on a home exercise program and has done formal physical therapy in the past. He is also taking occasional anti-inflammatories. I think his next best treatment option would be epidural steroid injections. I did refer him for a right-sided L4-5 transforaminal epidural injection. He will follow up with us after the injection if necessary to discuss further treatment options.      Mushtaq Sierra PA-C  [facility]  [address] [address]    Tel [phone]  Pager [phone]

## 2017-04-17 NOTE — PROGRESS NOTES
"Michele Vance is a 71 year old male who presents for:  Chief Complaint   Patient presents with     Neurologic Problem     mid to low back pain w/ pain going down right leg w/ numbness & swelling        Initial Vitals:  There were no vitals taken for this visit. Estimated body mass index is 25.51 kg/(m^2) as calculated from the following:    Height as of 3/10/17: 1.727 m (5' 8\").    Weight as of 4/12/17: 76.1 kg (167 lb 12.8 oz).. There is no height or weight on file to calculate BSA. BP completed using cuff size: NA (Not Taken)  Data Unavailable     Medications and allergies- reviewed    Do you feel safe in your environment?  Yes  Do you need any refills today? No    Nursing Comments:         Lavern Ram CMA (AAMA)      "

## 2017-04-17 NOTE — MR AVS SNAPSHOT
After Visit Summary   4/17/2017    Michele Vance    MRN: [id number]           Patient Information     Date Of Birth          1945        Visit Information        Provider Department      4/17/2017 1:10 PM Mushtaq Sierra PA-C [facility]        Today's Diagnoses     Lumbar radiculopathy    -  1       Follow-ups after your visit        Additional Services     PAIN MANAGEMENT CENTER ([place]) REFERRAL       Your provider has referred you to the [facility].    Reason for Referral: Procedure or injection only - patient will be contacted within 24 hrs to schedule: Epidural Steroid (transforaminal approach): Lumbar, Transforaminal JORDAN, right L4-5    Please complete the following questions:    What is your diagnosis for the patient's pain?     Do you have any specific questions for the pain specialist? No    Are there any red flags that may impact the assessment or management of the patient? None    **ANY DIAGNOSTIC TESTS THAT ARE NOT IN EPIC SHOULD BE SENT TO THE PAIN CENTER**    Please note the Pre-Op Pain Consult must be scheduled 2-3 weeks prior to the patient's surgery.  Patient's trying to schedule within 2 weeks of surgery may not be accommodated.     Pre-Op Pain Consults are only good for 30 days.    REGARDING OPIOID MEDICATIONS:  We will always address appropriateness of opioid pain medications, but we generally will not automatically take on a prescribing role. When we do take on prescribing of opioids for chronic pain, it is in collaboration with the referring physician for an intermediate period of time (months), with an expectation that the primary physician or provider will assume the prescribing role if medications are effective at stable doses with demonstrated compliance.  Therefore, please do not assume that your prescribing responsibilities end on the day of pain clinic consultation.  Is this agreeable to you? YES    For any questions, contact the  [facility] at [phone].    Please be aware that coverage of these services is subject to the terms and limitations of your health insurance plan.  Call member services at your health plan with any benefit or coverage questions.      Please bring the following with you to your appointment:    (1) Any X-Rays, CTs or MRIs which have been performed.  Contact the facility where they were done to arrange for  prior to your scheduled appointment.    (2) List of current medications   (3) This referral request   (4) Any documents/labs given to you for this referral                  Follow-up notes from your care team     Return if symptoms worsen or fail to improve.      Your next 10 appointments already scheduled     Apr 27, 2017  9:00 AM CDT   [facility]    [address] [place] [id number] [phone]            Sep 19, 2017  9:00 AM CDT   US CAROTID BILATERAL with [id number]   [facility] ([facility])    [address] [place] [id number] [phone]           Please bring a list of your medicines (including vitamins, minerals and over-the-counter drugs). Also, tell your doctor about any allergies you may have. Wear comfortable clothes and leave your valuables at home.  You do not need to do anything special to prepare for your exam.  Please call the Imaging Department at your exam site with any questions.            Sep 19, 2017 10:30 AM CDT   (Arrive by 10:15 AM)   Return Visit with Shola Cuellar MD   [facility] ([facility])    [address] [place] [address] [phone]              Who to contact     If you have questions or need follow up information about today's clinic visit or your schedule please contact [facility]  "directly at [phone].  Normal or non-critical lab and imaging results will be communicated to you by MyChart, letter or phone within 4 business days after the clinic has received the results. If you do not hear from us within 7 days, please contact the clinic through lmbanghart or phone. If you have a critical or abnormal lab result, we will notify you by phone as soon as possible.  Submit refill requests through [employer] or call your pharmacy and they will forward the refill request to us. Please allow 3 business days for your refill to be completed.          Additional Information About Your Visit        lmbanghart Information     [employer] gives you secure access to your electronic health record. If you see a primary care provider, you can also send messages to your care team and make appointments. If you have questions, please call your primary care clinic.  If you do not have a primary care provider, please call [phone] and they will assist you.        Care EveryWhere ID     This is your Care EveryWhere ID. This could be used by other organizations to access your [facility] medical records  [id number]        Your Vitals Were     Temperature Height BMI (Body Mass Index)             97.6  F (36.4  C) (Temporal) 1.727 m (5' 8\") 25.86 kg/m2          Blood Pressure from Last 3 Encounters:   04/12/17 152/70   04/06/17 128/62   03/20/17 122/62    Weight from Last 3 Encounters:   04/17/17 77.2 kg (170 lb 1.6 oz)   04/12/17 76.1 kg (167 lb 12.8 oz)   04/06/17 78.9 kg (174 lb)              We Performed the Following     PAIN MANAGEMENT CENTER ([place]) REFERRAL        Primary Care Provider Office Phone # Fax #    Juarez Hubbard DO [facility] [phone]       [address] [place] [address]        Thank you!     Thank you for choosing [facility]  for your care. Our goal is always to provide you with excellent care. Hearing back from our patients is one way we can " continue to improve our services. Please take a few minutes to complete the written survey that you may receive in the mail after your visit with us. Thank you!             Your Updated Medication List - Protect others around you: Learn how to safely use, store and throw away your medicines at www.disposemymeds.org.          This list is accurate as of: 4/17/17  2:37 PM.  Always use your most recent med list.                   Brand Name Dispense Instructions for use    amitriptyline 25 MG tablet    ELAVIL    180 tablet    Take 1 tablet (25 mg) by mouth 2 times daily       amLODIPine 10 MG tablet    NORVASC    90 tablet    Take 1 tablet (10 mg) by mouth every evening       ASPIRIN PO      Take 325 mg by mouth daily       cilostazol 100 MG tablet    PLETAL    180 tablet    Take 1 tablet (100 mg) by mouth 2 times daily       gabapentin 300 MG capsule    NEURONTIN    210 capsule    TAKE 1 CAPSULE THREE TIMES A DAY       losartan 25 MG tablet    COZAAR    93 tablet    Take 1 tablet (25 mg) by mouth daily       metoclopramide 10 MG tablet    REGLAN    120 tablet    Take 1 tablet (10 mg) by mouth 4 times daily       omeprazole 40 MG capsule    priLOSEC    90 capsule    Take 1 capsule (40 mg) by mouth daily       polyethylene glycol Packet    MIRALAX/GLYCOLAX     Take 1 packet by mouth daily       psyllium 58.6 % Powd    METAMUCIL     Take by mouth daily       sildenafil 50 MG cap/tab    REVATIO/VIAGRA    6 tablet    Take 0.5-1 tablets (25-50 mg) by mouth daily as needed for erectile dysfunction Take 30 min to 4 hours before intercourse.  Never use with nitroglycerin, terazosin or doxazosin.       STATIN NOT PRESCRIBED (INTENTIONAL)     1 each    Pt has known rhabdomyolysis in the past, somewhat associated with statins.  Also wasn't able to tolerate Zetia.       TOPROL  MG 24 hr tablet   Generic drug:  metoprolol     93 tablet    TAKE 1 TABLET EVERY MORNING       zolpidem 5 MG tablet    AMBIEN    90 tablet    Take 1  tablet (5 mg) by mouth nightly as needed for sleep

## 2017-04-19 DIAGNOSIS — E78.5 HYPERLIPIDEMIA LDL GOAL <130: Primary | ICD-10-CM

## 2017-04-25 ENCOUNTER — TELEPHONE (OUTPATIENT)
Dept: PALLIATIVE MEDICINE | Facility: CLINIC | Age: 72
End: 2017-04-25

## 2017-04-25 NOTE — TELEPHONE ENCOUNTER
Pre-screening Questions for Radiology Injections:    Injection to be done at which interventional clinic site? [place]    Procedure ordered by Rigo    Procedure ordered? Lumbar Epidural Steroid Injection    What insurance would patient like us to bill for this procedure? Medicare      Worker's comp-Any injection DO NOT SCHEDULE and route to Mercedes Chawla.      [employer] insurance - For SI joint injections, DO NOT SCHEDULE and route Mercedes Chawla.      [employer]- MBB's must be scheduled at LEAST two weeks apart      [employer] - Any injection besides hip/shoulder/knee joint DO NOT SCHEDULE and route to Mercedes Chawla. She will obtain PA and call pt back to schedule procedure or notify pt of denial.     HP [employer]-PA REQUIRED FOR NON-JORDAN OR Joint injections    Any chance of pregnancy? NO   If YES, do NOT schedule and route to RN pool    Is an  needed? No     Patient has a drive home? (mandatory) YES:     Is patient taking any blood thinners (plavix, coumadin, jantoven, warfarin, heparin, pradaxa or dabigatran )? No   If hold needed, do NOT schedule, route to RN pool     Is patient taking any aspirin products? Yes - Pt takes 81mg daily; instructed to hold 325 day(s) prior to procedure.      If more than 325mg/day do NOT schedule; route to RN pool     For CERVICAL procedures, hold all aspirin products for 6 days.      Does the patient have a bleeding or clotting disorder? No     If yes, okay to schedule AND route to RN nurse pool    **For any patients with platelet count <100, must be forwarded to provider**    Is patient diabetic?  No  If YES, have them bring their glucometer.    Does patient have an active infection or treated for one within the past week? No     Is patient currently taking any antibiotics?  No     For patients on chronic, preventative, or prophylactic antibiotics, procedures may be scheduled.     For patients on antibiotics for active or recent infection:    Liana Kerns,  Kimani Evans-antibiotic course must have been completed for 4 days    Negin Lazo-antibiotic course must have been completed for 7 days    Is patient currently taking any steroid medications? (i.e. Prednisone, Medrol)  No     For patients on steroid medications:    Liana Kerns [place], [place]-steroid course must have been completed for 4 days    Negin Lazo-steroid course must have been completed for 7 days    Reviewed with patient:  If you are started on any steroids or antibiotics between now and your appointment, you must contact us because it may affect our ability to perform your procedure.  Yes    Is patient actively being treated for cancer or immunocompromised, including the spleen having been removed? No    If YES, do NOT schedule and route to RN pool     **For Dr. Denise patients without spleens should have the chart sent to her**    Are you able to get on and off an exam table with minimal or no assistance? Yes  If NO, do NOT schedule and route to RN pool    Are you able to roll over and lay on your stomach with minimal or no assistance? Yes  If NO, do NOT schedule and route to RN pool     Any allergies to contrast dye, iodine, shellfish, or numbing and steroid medications? No  If YES, route to RN pool AND add allergy information to appointment notes    Allergies: Hmg-coa-r inhibitors; Gemfibrozil; Sulfa drugs; and Zetia [ezetimibe]        Has the patient had a flu shot or any other vaccinations within 7 days before or after the procedure.  No       Does patient have an MRI/CT?  YES: MRI  (SI joint, hip injections, lumbar sympathetic blocks, and stellate ganglion blocks do not require an MRI)    Was the MRI done w/in the last 3 years?  Yes    Was MRI done at [facility]? Yes      If not, where was it done? N/A       If MRI was not done at [facility], [facility] or [facility] do NOT schedule and route to nursing.  If pt has an imaging disc, the injection may be scheduled but pt  has to bring disc to appt. If they show up w/out disc the injection cannot be done    Reminders (please tell patient if applicable):       Instructed pt to arrive 30 minutes early for IV start if this is for a cervical procedure, ALL sympathetic (stellate ganglion, hypogastric, or lumbar sympathetic block) and all sedation procedures (RFA, spinal cord stimulation trials).  Not Applicable    -IVs are not routinely placed for [place] and [place] cervical case       If NPO for sedation, informed patient that it is okay to take medications with sips of water (except if they are to hold blood thinners).  Not Applicable   *DO take blood pressure medication if it is prescribed*      If this is for a cervical JORDAN, informed patient that aspirin needs to be held for 6 days.   Not Applicable      For all patients not having spinal cord stimulator (SCS) trials or radiofrequency ablations (RFAs), informed patient:  IV sedation is not provided for this procedure.  If you feel that an oral anti-anxiety medication is needed, you can discuss this further with your referring provider or primary care provider.  The Pain Clinic provider will discuss specifics of what the procedure includes at your appointment.  Most procedures last 10-20 minutes.  We use numbing medications to help with any discomfort during the procedure.  Not Applicable      Do not schedule procedures requiring IV placement in the first appointment of the day or first appointment after lunch.       For patients 85 or older we recommend having an adult stay w/ them for the remainder of the day.       Does the patient have any questions?  NO  Deidra Desir  [facility]

## 2017-04-27 ENCOUNTER — TELEPHONE (OUTPATIENT)
Dept: CARDIOLOGY | Facility: CLINIC | Age: 72
End: 2017-04-27

## 2017-04-27 ENCOUNTER — OFFICE VISIT (OUTPATIENT)
Dept: CARDIOLOGY | Facility: CLINIC | Age: 72
End: 2017-04-27
Payer: MEDICARE

## 2017-04-27 VITALS
DIASTOLIC BLOOD PRESSURE: 58 MMHG | HEART RATE: 70 BPM | BODY MASS INDEX: 25.82 KG/M2 | SYSTOLIC BLOOD PRESSURE: 144 MMHG | HEIGHT: 68 IN | OXYGEN SATURATION: 99 % | WEIGHT: 170.4 LBS

## 2017-04-27 DIAGNOSIS — E78.00 HYPERCHOLESTEREMIA: ICD-10-CM

## 2017-04-27 DIAGNOSIS — I70.211 ATHEROSCLEROSIS OF NATIVE ARTERY OF RIGHT LOWER EXTREMITY WITH INTERMITTENT CLAUDICATION (H): ICD-10-CM

## 2017-04-27 DIAGNOSIS — E78.5 HYPERLIPIDEMIA LDL GOAL <130: ICD-10-CM

## 2017-04-27 DIAGNOSIS — I25.10 CORONARY ARTERY DISEASE INVOLVING NATIVE CORONARY ARTERY OF NATIVE HEART WITHOUT ANGINA PECTORIS: Primary | ICD-10-CM

## 2017-04-27 PROCEDURE — 99214 OFFICE O/P EST MOD 30 MIN: CPT | Performed by: INTERNAL MEDICINE

## 2017-04-27 NOTE — PROGRESS NOTES
HPI and Plan:   See dictation    Orders Placed This Encounter   Procedures     Follow-Up with Cardiologist       No orders of the defined types were placed in this encounter.      There are no discontinued medications.      Encounter Diagnoses   Name Primary?     Hyperlipidemia LDL goal <130      Coronary artery disease involving native coronary artery of native heart without angina pectoris Yes     Atherosclerosis of native artery of right lower extremity with intermittent claudication (H)      Hypercholesteremia        CURRENT MEDICATIONS:  Current Outpatient Prescriptions   Medication Sig Dispense Refill     metoclopramide (REGLAN) 10 MG tablet Take 1 tablet (10 mg) by mouth 4 times daily 120 tablet 0     TOPROL  MG 24 hr tablet TAKE 1 TABLET EVERY MORNING 93 tablet 2     zolpidem (AMBIEN) 5 MG tablet Take 1 tablet (5 mg) by mouth nightly as needed for sleep 90 tablet 1     amLODIPine (NORVASC) 10 MG tablet Take 1 tablet (10 mg) by mouth every evening 90 tablet 3     cilostazol (PLETAL) 100 MG tablet Take 1 tablet (100 mg) by mouth 2 times daily 180 tablet 3     omeprazole (PRILOSEC) 40 MG capsule Take 1 capsule (40 mg) by mouth daily 90 capsule 1     amitriptyline (ELAVIL) 25 MG tablet Take 1 tablet (25 mg) by mouth 2 times daily 180 tablet 2     losartan (COZAAR) 25 MG tablet Take 1 tablet (25 mg) by mouth daily 93 tablet 3     psyllium (METAMUCIL) 58.6 % POWD Take by mouth daily       gabapentin (NEURONTIN) 300 MG capsule TAKE 1 CAPSULE THREE TIMES A  capsule 4     sildenafil (VIAGRA) 50 MG tablet Take 0.5-1 tablets (25-50 mg) by mouth daily as needed for erectile dysfunction Take 30 min to 4 hours before intercourse.  Never use with nitroglycerin, terazosin or doxazosin. 6 tablet 0     ASPIRIN PO Take 325 mg by mouth daily       polyethylene glycol (MIRALAX/GLYCOLAX) packet Take 1 packet by mouth daily        STATIN NOT PRESCRIBED, INTENTIONAL, Pt has known rhabdomyolysis in the past, somewhat  "associated with statins.  Also wasn't able to tolerate Zetia. 1 each 0       ALLERGIES     Allergies   Allergen Reactions     Hmg-Coa-R Inhibitors Other (See Comments)     Rhabdo  Same as \"statins\"     Gemfibrozil      Resting thigh-calf pain     Sulfa Drugs      Zetia [Ezetimibe]      Muscle cramping       PAST MEDICAL HISTORY:  Past Medical History:   Diagnosis Date     Carotid stenosis     right endartectomy     Chronic kidney disease     stage 3     Coronary artery disease 3-2014    CABG x6     Crohn's disease (H)      CVA (cerebral infarction)     balance problems, mild     Elevated CK      Esophageal reflux      Hypercholesteremia      Hypertension      Nonsenile cataract      Other and unspecified hyperlipidemia      PAD (peripheral artery disease) (H)      Rhabdomyolysis 2010     Unspecified essential hypertension        PAST SURGICAL HISTORY:  Past Surgical History:   Procedure Laterality Date     APPENDECTOMY  1974     BYPASS GRAFT ARTERY CORONARY  3/5/2014    Procedure: BYPASS GRAFT ARTERY CORONARY;  Median Sternotomy, Coronary Artery Bypass Graft X6 used Left internal mammary artery, Left and Right Greater Saphenous vein, on Pump oxygenator.;  Surgeon: Tim Montanez MD;  Location:  OR     CATARACT IOL, RT/LT Bilateral 2008    in [place]     cataracts       COLONOSCOPY  12/12/02    [facility]     COLONOSCOPY N/A 10/7/2014    Procedure: COMBINED COLONOSCOPY, SINGLE OR MULTIPLE BIOPSY/POLYPECTOMY BY BIOPSY;  Surgeon: Micheal Mora MD;  Location:  GI     DENTAL SURGERY      TMJ, implants     ENDARTERECTOMY CAROTID      right carotid stent     ESOPHAGOSCOPY, GASTROSCOPY, DUODENOSCOPY (EGD), COMBINED Left 10/7/2014    Procedure: COMBINED ESOPHAGOSCOPY, GASTROSCOPY, DUODENOSCOPY (EGD), BIOPSY SINGLE OR MULTIPLE;  Surgeon: Micheal Mora MD;  Location:  GI     ESOPHAGOSCOPY, GASTROSCOPY, DUODENOSCOPY (EGD), COMBINED Left 10/7/2014    Procedure: COMBINED ESOPHAGOSCOPY, GASTROSCOPY, " DUODENOSCOPY (EGD), REMOVE FOREIGN BODY;  Surgeon: Micheal Mora MD;  Location:  GI      CAPSULE ENDOSCOPY N/A 10/7/2014    Procedure: CAPSULE/PILL CAM ENDOSCOPY;  Surgeon: Micheal Mora MD;  Location:  GI      UGI ENDOSCOPY, SIMPLE EXAM  01/08/99    [facility]     KNEE SURGERY  1976    left knee reconstruction     lasix  2001    both eyes     RELEASE CARPAL TUNNEL  1/13/2011    RELEASE CARPAL TUNNEL performed by JO MADRID at  OR     RELEASE CARPAL TUNNEL  1/20/2011    RELEASE CARPAL TUNNEL performed by JO MADRID at  OR       FAMILY HISTORY:  Family History   Problem Relation Age of Onset     Hypertension Mother      Eye Disorder Mother      CEREBROVASCULAR DISEASE Father      HEART DISEASE Father      Lipids Father      Obesity Father      CANCER Sister      HEART DISEASE Sister      Glaucoma No family hx of      Macular Degeneration No family hx of        SOCIAL HISTORY:  Social History     Social History     Marital status:      Spouse name: N/A     Number of children: N/A     Years of education: N/A     Occupational History      Retired     Social History Main Topics     Smoking status: Former Smoker     Packs/day: 2.50     Years: 20.00     Types: Cigarettes     Quit date: 1/1/2000     Smokeless tobacco: Never Used     Alcohol use No     Drug use: No     Sexual activity: Yes     Partners: Female     Other Topics Concern     Exercise Yes     3 times per week     Social History Narrative    Moved from [place] in August, retired  for [employer].       Review of Systems:  Skin:  Negative       Eyes:  Positive for glasses    ENT:  Negative      Respiratory:  Positive for shortness of breath when just sitting has to take deep breaths once in a while    Cardiovascular:  Negative for;palpitations;chest pain;lightheadedness;dizziness Positive for;chest pain;edema slight swelling in right leg  Gastroenterology: Positive for   IBS undercontrol with  "mirilax and metamucil  Genitourinary:  Negative      Musculoskeletal:  Positive for   bursitis in left arm  Neurologic:  Positive for numbness or tingling of hands;numbness or tingling of feet;stroke hx stoke   Psychiatric:  Positive for sleep disturbances has ambien if needed  Heme/Lymph/Imm:  Positive for allergies    Endocrine:  Negative        Physical Exam:  Vitals: /58 (BP Location: Right arm, Patient Position: Fowlers, Cuff Size: Adult Regular)  Pulse 70  Ht 1.727 m (5' 8\")  Wt 77.3 kg (170 lb 6.4 oz)  SpO2 99%  BMI 25.91 kg/m2    Constitutional:  cooperative, alert and oriented, well developed, well nourished, in no acute distress        Skin:  warm and dry to the touch, no apparent skin lesions or masses noted        Head:  normocephalic, no masses or lesions        Eyes:  pupils equal and round, conjunctivae and lids unremarkable, sclera white, no xanthalasma, EOMS intact, no nystagmus        ENT:  no pallor or cyanosis, dentition good        Neck:  carotid pulses are full and equal bilaterally, JVP normal, no carotid bruit, no thyromegaly        Chest:  normal breath sounds, clear to auscultation, normal A-P diameter, normal symmetry, normal respiratory excursion, no use of accessory muscles          Cardiac: normal S1 and S2       early systolic murmur          Abdomen:  abdomen soft, non-tender, BS normoactive, no mass, no HSM, no bruits        Vascular:     1+ 3+ 3+           2+ 2+   0 0 0 right femoral bruit (+) left femoral bruit (+)      Extremities and Back:        RLE edema;1+        Neurological:  affect appropriate, oriented to time, person and place              CC  No referring provider defined for this encounter.              "

## 2017-04-27 NOTE — LETTER
4/27/2017    Juarez Nuno, [address] [place] [address]    RE: Michele Vance       Dear Colleague,    I had the pleasure of seeing Michele Vance in the [facility].    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      Mr. Vance has a longstanding history of hypercholesterolemia, characterized by elevated LDL cholesterols.  The patient is unable to take statin drugs because of documented myositis with attempts to add any statin drug.  He is likewise not able to tolerate either fibrate drugs or bile acid questions.  At one point, the [facility] even considered ileal bypass.      The patient has a history of known coronary disease, status post bypass surgery at the [facility], at which time a LIMA graft to the LAD and separate saphenous venous bypass grafts were placed to the acute marginal branch of the right coronary, the posterior descending branch of the right coronary, the second marginal branch of the circumflex, the first marginal branch and the second diagonal branch.  The patient never experienced angina and remains free of angina since I last saw him 1 year ago.      The patient has known severe peripheral vascular disease.  He underwent carotid stent implantation in 2014 after a TIA.  The patient has a history of longstanding lower extremity claudication.  He underwent angioplasty of the right superficial femoral artery and the right popliteal artery which has relieved his claudication.  He then developed left leg claudication and underwent successful angioplasty of the left leg.  The patient has been followed very carefully by Dr. Brown and most recently underwent stent implantation in the left leg.  The patient still  experiences claudication with vigorous activity primarily in the buttocks, but finds the symptoms are tolerable.  He still continues to walk daily on the treadmill.  He remains free of chest, arm, neck, jaw or back discomfort with exertion or new neurologic deficit.      Unfortunately, our application for alirocumab was turned down (Praluent).  We may consider an application for evolocumab (Repatha).  The patient continues to follow a Mediterranean-style diet.      PAST MEDICAL HISTORY:   1.  Coronary artery disease, status post bypass surgery at the [facility].  Previous implantation of a LIMA graft to the LAD.  Vein graft placed to the acute marginal branch of right coronary, posterior descending branch of right coronary, second marginal branch of circumflex, first marginal branch circumflex and second diagonal branch.   2.  Atherosclerotic peripheral vascular disease:   a.  History of TIA with a stent implantation in carotid.   b.  History of longstanding lower extremity claudication with multiple peripheral vascular procedures.  Currently with chronic left leg claudication.   3.  Hypertension.   4.  Dyslipidemia.  Unable to tolerate statin drugs with myositis.  Unable to tolerate fibrates and fibrates has requested agents.  Recent application for alirocumab declined.   5.  Dyslipidemia.   6.  Hypertension.      PHYSICAL EXAMINATION:   GENERAL:  Exam today demonstrates a very pleasant and cooperative 71-year-old man.   VITAL SIGNS:  His blood pressure was 144/58.  His heart rate is 70.   LUNGS:  Clear to percussion and auscultation.   CARDIOVASCULAR:  Shows a normal S1 with a normal S2, no S3.  There is no murmur, rub or click.   The patient's carotid, radial and brachial pulses are full and symmetrical bilaterally.  There are bilateral carotid bruits.  The patient has bilateral femoral bruits.  I can palpate a good right femoral pulse, but not a left femoral pulse.  I cannot feel pulses on either  side below the femorals.  His right leg has mild 1+ pedal edema.      LABORATORY STUDIES:  The patient's most recent total was 242, HDL 46, LDL of 175.  The patient's most recent creatinine is 1.27.      I reviewed the echocardiogram performed earlier this month.  The patient's ejection fraction is unchanged in the 50% range with an area of posterior wall akinesis and no significant valvular stenosis or insufficiency.     Outpatient Encounter Prescriptions as of 4/27/2017   Medication Sig Dispense Refill     metoclopramide (REGLAN) 10 MG tablet Take 1 tablet (10 mg) by mouth 4 times daily 120 tablet 0     TOPROL  MG 24 hr tablet TAKE 1 TABLET EVERY MORNING 93 tablet 2     zolpidem (AMBIEN) 5 MG tablet Take 1 tablet (5 mg) by mouth nightly as needed for sleep 90 tablet 1     amLODIPine (NORVASC) 10 MG tablet Take 1 tablet (10 mg) by mouth every evening 90 tablet 3     cilostazol (PLETAL) 100 MG tablet Take 1 tablet (100 mg) by mouth 2 times daily 180 tablet 3     omeprazole (PRILOSEC) 40 MG capsule Take 1 capsule (40 mg) by mouth daily 90 capsule 1     amitriptyline (ELAVIL) 25 MG tablet Take 1 tablet (25 mg) by mouth 2 times daily 180 tablet 2     losartan (COZAAR) 25 MG tablet Take 1 tablet (25 mg) by mouth daily 93 tablet 3     psyllium (METAMUCIL) 58.6 % POWD Take by mouth daily       gabapentin (NEURONTIN) 300 MG capsule TAKE 1 CAPSULE THREE TIMES A  capsule 4     sildenafil (VIAGRA) 50 MG tablet Take 0.5-1 tablets (25-50 mg) by mouth daily as needed for erectile dysfunction Take 30 min to 4 hours before intercourse.  Never use with nitroglycerin, terazosin or doxazosin. 6 tablet 0     ASPIRIN PO Take 325 mg by mouth daily       polyethylene glycol (MIRALAX/GLYCOLAX) packet Take 1 packet by mouth daily        STATIN NOT PRESCRIBED, INTENTIONAL, Pt has known rhabdomyolysis in the past, somewhat associated with statins.  Also wasn't able to tolerate Zetia. 1 each 0     No facility-administered  encounter medications on file as of 4/27/2017.       ASSESSMENT:  Mr. Vance remains free of anginal symptoms or heart failure.  He has chronic severe peripheral vascular disease with left leg claudication which appears to be unchanged and managed aggressively by a vascular surgeon.      I would prefer for the patient to be on a PCSK9 inhibitor.  He clearly seems to be a good candidate in that he has a true documented allergy to all statin drugs with development of myositis as well as intolerance of both gemfibrozil and bile acid sequestering agents.  Our next step will be to try to apply for an alternate PCSK9 inhibitor.      PLAN:   1.  Will again explore options of trying to obtain an alternate PCSK9 inhibitor for the patient.   2.  Mediterranean style diet.   3.  Exercise program.   4.  Continue present medical therapy.   5.  Visit with us next year.      We greatly appreciate helping your patient, Mr. Michele Vance.      Sincerely,    Stevie Felipe MD     [facility]

## 2017-04-27 NOTE — PROGRESS NOTES
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      Mr. Vnace has a longstanding history of hypercholesterolemia, characterized by elevated LDL cholesterols.  The patient is unable to take statin drugs because of documented myositis with attempts to add any statin drug.  He is likewise not able to tolerate either fibrate drugs or bile acid questions.  At one point, the [facility] even considered ileal bypass.      The patient has a history of known coronary disease, status post bypass surgery at the [facility], at which time a LIMA graft to the LAD and separate saphenous venous bypass grafts were placed to the acute marginal branch of the right coronary, the posterior descending branch of the right coronary, the second marginal branch of the circumflex, the first marginal branch and the second diagonal branch.  The patient never experienced angina and remains free of angina since I last saw him 1 year ago.      The patient has known severe peripheral vascular disease.  He underwent carotid stent implantation in 2014 after a TIA.  The patient has a history of longstanding lower extremity claudication.  He underwent angioplasty of the right superficial femoral artery and the right popliteal artery which has relieved his claudication.  He then developed left leg claudication and underwent successful angioplasty of the left leg.  The patient has been followed very carefully by Dr. Brown and most recently underwent stent implantation in the left leg.  The patient still experiences claudication with vigorous activity primarily in the buttocks, but finds the symptoms are tolerable.  He still continues to walk daily on the treadmill.  He remains free of chest, arm, neck, jaw or back discomfort  with exertion or new neurologic deficit.      Unfortunately, our application for alirocumab was turned down (Praluent).  We may consider an application for evolocumab (Repatha).  The patient continues to follow a Mediterranean-style diet.      PAST MEDICAL HISTORY:   1.  Coronary artery disease, status post bypass surgery at the [facility].  Previous implantation of a LIMA graft to the LAD.  Vein graft placed to the acute marginal branch of right coronary, posterior descending branch of right coronary, second marginal branch of circumflex, first marginal branch circumflex and second diagonal branch.   2.  Atherosclerotic peripheral vascular disease:   a.  History of TIA with a stent implantation in carotid.   b.  History of longstanding lower extremity claudication with multiple peripheral vascular procedures.  Currently with chronic left leg claudication.   3.  Hypertension.   4.  Dyslipidemia.  Unable to tolerate statin drugs with myositis.  Unable to tolerate fibrates and fibrates has requested agents.  Recent application for alirocumab declined.   5.  Dyslipidemia.   6.  Hypertension.      PHYSICAL EXAMINATION:   GENERAL:  Exam today demonstrates a very pleasant and cooperative 71-year-old man.   VITAL SIGNS:  His blood pressure was 144/58.  His heart rate is 70.   LUNGS:  Clear to percussion and auscultation.   CARDIOVASCULAR:  Shows a normal S1 with a normal S2, no S3.  There is no murmur, rub or click.   The patient's carotid, radial and brachial pulses are full and symmetrical bilaterally.  There are bilateral carotid bruits.  The patient has bilateral femoral bruits.  I can palpate a good right femoral pulse, but not a left femoral pulse.  I cannot feel pulses on either side below the femorals.  His right leg has mild 1+ pedal edema.      LABORATORY STUDIES:  The patient's most recent total was 242, HDL 46, LDL of 175.  The patient's most recent creatinine is 1.27.      I reviewed the  echocardiogram performed earlier this month.  The patient's ejection fraction is unchanged in the 50% range with an area of posterior wall akinesis and no significant valvular stenosis or insufficiency.      ASSESSMENT:  Mr. Vance remains free of anginal symptoms or heart failure.  He has chronic severe peripheral vascular disease with left leg claudication which appears to be unchanged and managed aggressively by a vascular surgeon.      I would prefer for the patient to be on a PCSK9 inhibitor.  He clearly seems to be a good candidate in that he has a true documented allergy to all statin drugs with development of myositis as well as intolerance of both gemfibrozil and bile acid sequestering agents.  Our next step will be to try to apply for an alternate PCSK9 inhibitor.      PLAN:   1.  Will again explore options of trying to obtain an alternate PCSK9 inhibitor for the patient.   2.  Mediterranean style diet.   3.  Exercise program.   4.  Continue present medical therapy.   5.  Visit with us next year.      We greatly appreciate helping your patient, Mr. Michele Vance.      cc:      [place] [address]         KARISSA GIPSON MD             D: 2017 09:43   T: 2017 15:52   MT: RAIN      Name:     MICHELE VANCE   MRN:      [id number]        Account:      [id number]   :      1945           Service Date: 2017      Document: [id number]

## 2017-04-27 NOTE — TELEPHONE ENCOUNTER
Message left for Team 1 Nurses for Dr. Felipe. Patient saw Dr. Felipe today here in [facility] and would to continue to pursue on trying to get praluent or repatha approved for patient. Writers direct call back number given to return call.

## 2017-04-27 NOTE — MR AVS SNAPSHOT
After Visit Summary   4/27/2017    Michele Vance    MRN: [id number]           Patient Information     Date Of Birth          1945        Visit Information        Provider Department      4/27/2017 9:00 AM Stevie Felipe MD [facility]        Today's Diagnoses     Coronary artery disease involving native coronary artery of native heart without angina pectoris    -  1    Hyperlipidemia LDL goal <130        Atherosclerosis of native artery of right lower extremity with intermittent claudication (H)        Hypercholesteremia           Follow-ups after your visit        Additional Services     Follow-Up with Cardiologist                 Your next 10 appointments already scheduled     May 08, 2017   Procedure with Anthony Mandel MD   [facility] ([facility])    [address] [id number] [phone]           From [address]: Exit at [address] on [address] side of [place]. Turn right on [address]. Turn left at stoplight on [address]. [facility] will be in view two blocks ahead            May 08, 2017  8:30 AM CDT   XR FLUORO NEEDLE PLACEMENT SPINE with [id number]   [facility] ([facility])    [address] [address]   [phone]           For nerve root injection, please send or bring copies of any MRIs or other scans you have had.  Bring a list of your current medicines to your exam. (Include vitamins, minerals and over-the-counter medicines.) Leave your valuables at home.  Plan to have someone drive you home afterward.  Stop taking the following medicines (but talk to your doctor first):   If you take blood thinners, you may need to stop taking them a few days before treatment. Talk to your doctor before stopping these medicines.Stop taking Coumadin (warfarin) 3 days before treatment. Restart the day after treatment.   If you take Plavix,  Ticlid, Pletal or Persantine, please ask your doctor if you should stop these medicines. You may need extra tests on the morning of your scan. You may take your other medicines as normal.  Stop all food and drink (including water) 3 hours before your test or treatment.  Please tell the doctor:   If you are allergic to X-ray dye (contrast fluid).   If you may be pregnant.  Injections take about 30 to 45 minutes. Most people spend up to 2 hours in the clinic or hospital.  Please call the Imaging Department at your exam site with any questions            Sep 19, 2017  9:00 AM CDT   US CAROTID BILATERAL with [email or web address]   [facility] ([facility])    [address] [place] [id number] [phone]           Please bring a list of your medicines (including vitamins, minerals and over-the-counter drugs). Also, tell your doctor about any allergies you may have. Wear comfortable clothes and leave your valuables at home.  You do not need to do anything special to prepare for your exam.  Please call the Imaging Department at your exam site with any questions.            Sep 19, 2017 10:30 AM CDT   (Arrive by 10:15 AM)   Return Visit with Shola Cuellar MD   [facility] ([facility])    [address] [place] [address] [phone]              Future tests that were ordered for you today     Open Future Orders        Priority Expected Expires Ordered    Follow-Up with Cardiologist Routine 4/27/2018 9/9/2018 4/27/2017    XR Fluoro Needle Placement Spine (With Procedure) Routine 4/27/2017 4/27/2018 4/27/2017            Who to contact     If you have questions or need follow up information about today's clinic visit or your schedule please contact [facility] directly at [phone].  Normal or non-critical lab and imaging results will be communicated to you by MyChart, letter or  "phone within 4 business days after the clinic has received the results. If you do not hear from us within 7 days, please contact the clinic through [employer] or phone. If you have a critical or abnormal lab result, we will notify you by phone as soon as possible.  Submit refill requests through [employer] or call your pharmacy and they will forward the refill request to us. Please allow 3 business days for your refill to be completed.          Additional Information About Your Visit        [facility] Information     [employer] gives you secure access to your electronic health record. If you see a primary care provider, you can also send messages to your care team and make appointments. If you have questions, please call your primary care clinic.  If you do not have a primary care provider, please call [phone] and they will assist you.        Care EveryWhere ID     This is your Care EveryWhere ID. This could be used by other organizations to access your [facility] medical records  [id number]        Your Vitals Were     Pulse Height Pulse Oximetry BMI (Body Mass Index)          70 1.727 m (5' 8\") 99% 25.91 kg/m2         Blood Pressure from Last 3 Encounters:   04/27/17 144/58   04/12/17 152/70   04/06/17 128/62    Weight from Last 3 Encounters:   04/27/17 77.3 kg (170 lb 6.4 oz)   04/17/17 77.2 kg (170 lb 1.6 oz)   04/12/17 76.1 kg (167 lb 12.8 oz)               Primary Care Provider Office Phone # Fax #    Juarez Stevie Nuno [phone]       [address]   [facility] [address]        Thank you!     Thank you for choosing [facility]  for your care. Our goal is always to provide you with excellent care. Hearing back from our patients is one way we can continue to improve our services. Please take a few minutes to complete the written survey that you may receive in the mail after your visit with us. Thank you!             Your Updated Medication List - Protect " others around you: Learn how to safely use, store and throw away your medicines at www.disposemymeds.org.          This list is accurate as of: 4/27/17  9:30 AM.  Always use your most recent med list.                   Brand Name Dispense Instructions for use    amitriptyline 25 MG tablet    ELAVIL    180 tablet    Take 1 tablet (25 mg) by mouth 2 times daily       amLODIPine 10 MG tablet    NORVASC    90 tablet    Take 1 tablet (10 mg) by mouth every evening       ASPIRIN PO      Take 325 mg by mouth daily       cilostazol 100 MG tablet    PLETAL    180 tablet    Take 1 tablet (100 mg) by mouth 2 times daily       gabapentin 300 MG capsule    NEURONTIN    210 capsule    TAKE 1 CAPSULE THREE TIMES A DAY       losartan 25 MG tablet    COZAAR    93 tablet    Take 1 tablet (25 mg) by mouth daily       metoclopramide 10 MG tablet    REGLAN    120 tablet    Take 1 tablet (10 mg) by mouth 4 times daily       omeprazole 40 MG capsule    priLOSEC    90 capsule    Take 1 capsule (40 mg) by mouth daily       polyethylene glycol Packet    MIRALAX/GLYCOLAX     Take 1 packet by mouth daily       psyllium 58.6 % Powd    METAMUCIL     Take by mouth daily       sildenafil 50 MG cap/tab    REVATIO/VIAGRA    6 tablet    Take 0.5-1 tablets (25-50 mg) by mouth daily as needed for erectile dysfunction Take 30 min to 4 hours before intercourse.  Never use with nitroglycerin, terazosin or doxazosin.       STATIN NOT PRESCRIBED (INTENTIONAL)     1 each    Pt has known rhabdomyolysis in the past, somewhat associated with statins.  Also wasn't able to tolerate Zetia.       TOPROL  MG 24 hr tablet   Generic drug:  metoprolol     93 tablet    TAKE 1 TABLET EVERY MORNING       zolpidem 5 MG tablet    AMBIEN    90 tablet    Take 1 tablet (5 mg) by mouth nightly as needed for sleep

## 2017-04-27 NOTE — LETTER
4/27/2017    RE: Michele Vance  [address]  [place], [place] [address]       Dear Colleague,    Thank you for the opportunity to participate in the care of your patient, Michele Vance, at the [facility] at [facility]. Please see a copy of my visit note below.    HPI and Plan:   See dictation    Orders Placed This Encounter   Procedures     Follow-Up with Cardiologist       No orders of the defined types were placed in this encounter.      There are no discontinued medications.      Encounter Diagnoses   Name Primary?     Hyperlipidemia LDL goal <130      Coronary artery disease involving native coronary artery of native heart without angina pectoris Yes     Atherosclerosis of native artery of right lower extremity with intermittent claudication (H)      Hypercholesteremia        CURRENT MEDICATIONS:  Current Outpatient Prescriptions   Medication Sig Dispense Refill     metoclopramide (REGLAN) 10 MG tablet Take 1 tablet (10 mg) by mouth 4 times daily 120 tablet 0     TOPROL  MG 24 hr tablet TAKE 1 TABLET EVERY MORNING 93 tablet 2     zolpidem (AMBIEN) 5 MG tablet Take 1 tablet (5 mg) by mouth nightly as needed for sleep 90 tablet 1     amLODIPine (NORVASC) 10 MG tablet Take 1 tablet (10 mg) by mouth every evening 90 tablet 3     cilostazol (PLETAL) 100 MG tablet Take 1 tablet (100 mg) by mouth 2 times daily 180 tablet 3     omeprazole (PRILOSEC) 40 MG capsule Take 1 capsule (40 mg) by mouth daily 90 capsule 1     amitriptyline (ELAVIL) 25 MG tablet Take 1 tablet (25 mg) by mouth 2 times daily 180 tablet 2     losartan (COZAAR) 25 MG tablet Take 1 tablet (25 mg) by mouth daily 93 tablet 3     psyllium (METAMUCIL) 58.6 % POWD Take by mouth daily       gabapentin (NEURONTIN) 300 MG capsule TAKE 1 CAPSULE THREE TIMES A  capsule 4     sildenafil (VIAGRA) 50 MG tablet Take 0.5-1 tablets (25-50 mg) by mouth daily as needed for erectile dysfunction Take  "30 min to 4 hours before intercourse.  Never use with nitroglycerin, terazosin or doxazosin. 6 tablet 0     ASPIRIN PO Take 325 mg by mouth daily       polyethylene glycol (MIRALAX/GLYCOLAX) packet Take 1 packet by mouth daily        STATIN NOT PRESCRIBED, INTENTIONAL, Pt has known rhabdomyolysis in the past, somewhat associated with statins.  Also wasn't able to tolerate Zetia. 1 each 0       ALLERGIES     Allergies   Allergen Reactions     Hmg-Coa-R Inhibitors Other (See Comments)     Rhabdo  Same as \"statins\"     Gemfibrozil      Resting thigh-calf pain     Sulfa Drugs      Zetia [Ezetimibe]      Muscle cramping       PAST MEDICAL HISTORY:  Past Medical History:   Diagnosis Date     Carotid stenosis     right endartectomy     Chronic kidney disease     stage 3     Coronary artery disease 3-2014    CABG x6     Crohn's disease (H)      CVA (cerebral infarction)     balance problems, mild     Elevated CK      Esophageal reflux      Hypercholesteremia      Hypertension      Nonsenile cataract      Other and unspecified hyperlipidemia      PAD (peripheral artery disease) (H)      Rhabdomyolysis 2010     Unspecified essential hypertension        PAST SURGICAL HISTORY:  Past Surgical History:   Procedure Laterality Date     APPENDECTOMY  1974     BYPASS GRAFT ARTERY CORONARY  3/5/2014    Procedure: BYPASS GRAFT ARTERY CORONARY;  Median Sternotomy, Coronary Artery Bypass Graft X6 used Left internal mammary artery, Left and Right Greater Saphenous vein, on Pump oxygenator.;  Surgeon: Tim Montanez MD;  Location:  OR     CATARACT IOL, RT/LT Bilateral 2008    in [place]     cataracts       COLONOSCOPY  12/12/02    [facility]     COLONOSCOPY N/A 10/7/2014    Procedure: COMBINED COLONOSCOPY, SINGLE OR MULTIPLE BIOPSY/POLYPECTOMY BY BIOPSY;  Surgeon: Micheal Mora MD;  Location:  GI     DENTAL SURGERY      TMJ, implants     ENDARTERECTOMY CAROTID      right carotid stent     ESOPHAGOSCOPY, GASTROSCOPY, " DUODENOSCOPY (EGD), COMBINED Left 10/7/2014    Procedure: COMBINED ESOPHAGOSCOPY, GASTROSCOPY, DUODENOSCOPY (EGD), BIOPSY SINGLE OR MULTIPLE;  Surgeon: Micheal Mora MD;  Location:  GI     ESOPHAGOSCOPY, GASTROSCOPY, DUODENOSCOPY (EGD), COMBINED Left 10/7/2014    Procedure: COMBINED ESOPHAGOSCOPY, GASTROSCOPY, DUODENOSCOPY (EGD), REMOVE FOREIGN BODY;  Surgeon: Micheal Mora MD;  Location:  GI      CAPSULE ENDOSCOPY N/A 10/7/2014    Procedure: CAPSULE/PILL CAM ENDOSCOPY;  Surgeon: Micheal Mora MD;  Location: [facility] UGI ENDOSCOPY, SIMPLE EXAM  01/08/99    [facility]     KNEE SURGERY  1976    left knee reconstruction     lasix  2001    both eyes     RELEASE CARPAL TUNNEL  1/13/2011    RELEASE CARPAL TUNNEL performed by JO MADRID at  OR     RELEASE CARPAL TUNNEL  1/20/2011    RELEASE CARPAL TUNNEL performed by JO MADRID at  OR       FAMILY HISTORY:  Family History   Problem Relation Age of Onset     Hypertension Mother      Eye Disorder Mother      CEREBROVASCULAR DISEASE Father      HEART DISEASE Father      Lipids Father      Obesity Father      CANCER Sister      HEART DISEASE Sister      Glaucoma No family hx of      Macular Degeneration No family hx of        SOCIAL HISTORY:  Social History     Social History     Marital status:      Spouse name: N/A     Number of children: N/A     Years of education: N/A     Occupational History      Retired     Social History Main Topics     Smoking status: Former Smoker     Packs/day: 2.50     Years: 20.00     Types: Cigarettes     Quit date: 1/1/2000     Smokeless tobacco: Never Used     Alcohol use No     Drug use: No     Sexual activity: Yes     Partners: Female     Other Topics Concern     Exercise Yes     3 times per week     Social History Narrative    Moved from [place] in August, retired  for [employer].       Review of Systems:  Skin:  Negative       Eyes:  Positive for glasses    ENT:   "Negative      Respiratory:  Positive for shortness of breath when just sitting has to take deep breaths once in a while    Cardiovascular:  Negative for;palpitations;chest pain;lightheadedness;dizziness Positive for;chest pain;edema slight swelling in right leg  Gastroenterology: Positive for   IBS undercontrol with mirilax and metamucil  Genitourinary:  Negative      Musculoskeletal:  Positive for   bursitis in left arm  Neurologic:  Positive for numbness or tingling of hands;numbness or tingling of feet;stroke hx stoke   Psychiatric:  Positive for sleep disturbances has ambien if needed  Heme/Lymph/Imm:  Positive for allergies    Endocrine:  Negative        Physical Exam:  Vitals: /58 (BP Location: Right arm, Patient Position: Fowlers, Cuff Size: Adult Regular)  Pulse 70  Ht 1.727 m (5' 8\")  Wt 77.3 kg (170 lb 6.4 oz)  SpO2 99%  BMI 25.91 kg/m2    Constitutional:  cooperative, alert and oriented, well developed, well nourished, in no acute distress        Skin:  warm and dry to the touch, no apparent skin lesions or masses noted        Head:  normocephalic, no masses or lesions        Eyes:  pupils equal and round, conjunctivae and lids unremarkable, sclera white, no xanthalasma, EOMS intact, no nystagmus        ENT:  no pallor or cyanosis, dentition good        Neck:  carotid pulses are full and equal bilaterally, JVP normal, no carotid bruit, no thyromegaly        Chest:  normal breath sounds, clear to auscultation, normal A-P diameter, normal symmetry, normal respiratory excursion, no use of accessory muscles          Cardiac: normal S1 and S2       early systolic murmur          Abdomen:  abdomen soft, non-tender, BS normoactive, no mass, no HSM, no bruits        Vascular:     1+ 3+ 3+           2+ 2+   0 0 0 right femoral bruit (+) left femoral bruit (+)      Extremities and Back:        RLE edema;1+        Neurological:  affect appropriate, oriented to time, person and place              CC  No " referring provider defined for this encounter.                          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      Mr. Vance has a longstanding history of hypercholesterolemia, characterized by elevated LDL cholesterols.  The patient is unable to take statin drugs because of documented myositis with attempts to add any statin drug.  He is likewise not able to tolerate either fibrate drugs or bile acid questions.  At one point, the [facility] even considered ileal bypass.      The patient has a history of known coronary disease, status post bypass surgery at the [facility], at which time a LIMA graft to the LAD and separate saphenous venous bypass grafts were placed to the acute marginal branch of the right coronary, the posterior descending branch of the right coronary, the second marginal branch of the circumflex, the first marginal branch and the second diagonal branch.  The patient never experienced angina and remains free of angina since I last saw him 1 year ago.      The patient has known severe peripheral vascular disease.  He underwent carotid stent implantation in 2014 after a TIA.  The patient has a history of longstanding lower extremity claudication.  He underwent angioplasty of the right superficial femoral artery and the right popliteal artery which has relieved his claudication.  He then developed left leg claudication and underwent successful angioplasty of the left leg.  The patient has been followed very carefully by Dr. Brown and most recently underwent stent implantation in the left leg.  The patient still experiences claudication with vigorous activity primarily in the buttocks, but finds the symptoms are tolerable.  He still continues to walk daily on the  treadmill.  He remains free of chest, arm, neck, jaw or back discomfort with exertion or new neurologic deficit.      Unfortunately, our application for alirocumab was turned down (Praluent).  We may consider an application for evolocumab (Repatha).  The patient continues to follow a Mediterranean-style diet.      PAST MEDICAL HISTORY:   1.  Coronary artery disease, status post bypass surgery at the [facility].  Previous implantation of a LIMA graft to the LAD.  Vein graft placed to the acute marginal branch of right coronary, posterior descending branch of right coronary, second marginal branch of circumflex, first marginal branch circumflex and second diagonal branch.   2.  Atherosclerotic peripheral vascular disease:   a.  History of TIA with a stent implantation in carotid.   b.  History of longstanding lower extremity claudication with multiple peripheral vascular procedures.  Currently with chronic left leg claudication.   3.  Hypertension.   4.  Dyslipidemia.  Unable to tolerate statin drugs with myositis.  Unable to tolerate fibrates and fibrates has requested agents.  Recent application for alirocumab declined.   5.  Dyslipidemia.   6.  Hypertension.      PHYSICAL EXAMINATION:   GENERAL:  Exam today demonstrates a very pleasant and cooperative 71-year-old man.   VITAL SIGNS:  His blood pressure was 144/58.  His heart rate is 70.   LUNGS:  Clear to percussion and auscultation.   CARDIOVASCULAR:  Shows a normal S1 with a normal S2, no S3.  There is no murmur, rub or click.   The patient's carotid, radial and brachial pulses are full and symmetrical bilaterally.  There are bilateral carotid bruits.  The patient has bilateral femoral bruits.  I can palpate a good right femoral pulse, but not a left femoral pulse.  I cannot feel pulses on either side below the femorals.  His right leg has mild 1+ pedal edema.      LABORATORY STUDIES:  The patient's most recent total was 242, HDL 46, LDL of 175.  The  patient's most recent creatinine is 1.27.      I reviewed the echocardiogram performed earlier this month.  The patient's ejection fraction is unchanged in the 50% range with an area of posterior wall akinesis and no significant valvular stenosis or insufficiency.      ASSESSMENT:  Mr. Vance remains free of anginal symptoms or heart failure.  He has chronic severe peripheral vascular disease with left leg claudication which appears to be unchanged and managed aggressively by a vascular surgeon.      I would prefer for the patient to be on a PCSK9 inhibitor.  He clearly seems to be a good candidate in that he has a true documented allergy to all statin drugs with development of myositis as well as intolerance of both gemfibrozil and bile acid sequestering agents.  Our next step will be to try to apply for an alternate PCSK9 inhibitor.      PLAN:   1.  Will again explore options of trying to obtain an alternate PCSK9 inhibitor for the patient.   2.  Mediterranean style diet.   3.  Exercise program.   4.  Continue present medical therapy.   5.  Visit with us next year.      We greatly appreciate helping your patient, Mr. Michele Vance.         KARISSA GIPSON MD        cc:      [place] [address]      D: 2017 09:43   T: 2017 15:52   MT: RAIN    Name:     MICHELE VANCE   MRN:      [id number]        Account:      [id number]   :      1945           Service Date: 2017    Document: [id number]

## 2017-05-08 ENCOUNTER — HOSPITAL ENCOUNTER (OUTPATIENT)
Facility: CLINIC | Age: 72
Discharge: HOME | End: 2017-05-08
Attending: ANESTHESIOLOGY | Admitting: ANESTHESIOLOGY
Payer: MEDICARE

## 2017-05-08 ENCOUNTER — SURGERY (OUTPATIENT)
Age: 72
End: 2017-05-08
Payer: MEDICARE

## 2017-05-08 ENCOUNTER — HOSPITAL ENCOUNTER (OUTPATIENT)
Dept: GENERAL RADIOLOGY | Facility: CLINIC | Age: 72
End: 2017-05-08
Attending: ANESTHESIOLOGY
Payer: MEDICARE

## 2017-05-08 VITALS
RESPIRATION RATE: 14 BRPM | SYSTOLIC BLOOD PRESSURE: 146 MMHG | OXYGEN SATURATION: 95 % | DIASTOLIC BLOOD PRESSURE: 85 MMHG | TEMPERATURE: 97.5 F

## 2017-05-08 DIAGNOSIS — M54.16 LUMBAR RADICULOPATHY: ICD-10-CM

## 2017-05-08 PROCEDURE — 25000125 ZZHC RX 250: Performed by: ANESTHESIOLOGY

## 2017-05-08 PROCEDURE — 64483 NJX AA&/STRD TFRM EPI L/S 1: CPT | Mod: RT | Performed by: ANESTHESIOLOGY

## 2017-05-08 PROCEDURE — 64484 NJX AA&/STRD TFRM EPI L/S EA: CPT | Performed by: ANESTHESIOLOGY

## 2017-05-08 PROCEDURE — 25000125 ZZHC RX 250: Performed by: NURSE ANESTHETIST, CERTIFIED REGISTERED

## 2017-05-08 PROCEDURE — 25500064 ZZH RX 255 OP 636: Performed by: ANESTHESIOLOGY

## 2017-05-08 PROCEDURE — 25000128 H RX IP 250 OP 636: Performed by: ANESTHESIOLOGY

## 2017-05-08 PROCEDURE — 64483 NJX AA&/STRD TFRM EPI L/S 1: CPT | Performed by: ANESTHESIOLOGY

## 2017-05-08 PROCEDURE — 40000277 XR FLUORO NEEDLE PLACEMENT SPINE (WITH PROCEDURE)

## 2017-05-08 PROCEDURE — S0020 INJECTION, BUPIVICAINE HYDRO: HCPCS | Performed by: ANESTHESIOLOGY

## 2017-05-08 PROCEDURE — 64484 NJX AA&/STRD TFRM EPI L/S EA: CPT | Mod: RT | Performed by: ANESTHESIOLOGY

## 2017-05-08 RX ORDER — BUPIVACAINE HYDROCHLORIDE 2.5 MG/ML
INJECTION, SOLUTION EPIDURAL; INFILTRATION; INTRACAUDAL PRN
Status: DISCONTINUED | OUTPATIENT
Start: 2017-05-08 | End: 2017-05-08 | Stop reason: HOSPADM

## 2017-05-08 RX ORDER — IOPAMIDOL 612 MG/ML
INJECTION, SOLUTION INTRATHECAL PRN
Status: DISCONTINUED | OUTPATIENT
Start: 2017-05-08 | End: 2017-05-08 | Stop reason: HOSPADM

## 2017-05-08 RX ORDER — METHYLPREDNISOLONE ACETATE 40 MG/ML
INJECTION, SUSPENSION INTRA-ARTICULAR; INTRALESIONAL; INTRAMUSCULAR; SOFT TISSUE PRN
Status: DISCONTINUED | OUTPATIENT
Start: 2017-05-08 | End: 2017-05-08 | Stop reason: HOSPADM

## 2017-05-08 RX ORDER — LIDOCAINE HYDROCHLORIDE 10 MG/ML
INJECTION, SOLUTION INFILTRATION; PERINEURAL PRN
Status: DISCONTINUED | OUTPATIENT
Start: 2017-05-08 | End: 2017-05-08 | Stop reason: HOSPADM

## 2017-05-08 RX ORDER — DEXAMETHASONE SODIUM PHOSPHATE 10 MG/ML
INJECTION, SOLUTION INTRAMUSCULAR; INTRAVENOUS PRN
Status: DISCONTINUED | OUTPATIENT
Start: 2017-05-08 | End: 2017-05-08 | Stop reason: HOSPADM

## 2017-05-08 RX ADMIN — DEXAMETHASONE SODIUM PHOSPHATE 10 MG: 10 INJECTION, SOLUTION INTRAMUSCULAR; INTRAVENOUS at 09:03

## 2017-05-08 RX ADMIN — IOPAMIDOL 1 ML: 612 INJECTION, SOLUTION INTRATHECAL at 09:03

## 2017-05-08 RX ADMIN — METHYLPREDNISOLONE ACETATE 40 MG: 40 INJECTION, SUSPENSION INTRA-ARTICULAR; INTRALESIONAL; INTRAMUSCULAR; SOFT TISSUE at 09:03

## 2017-05-08 RX ADMIN — BUPIVACAINE HYDROCHLORIDE 1 ML: 2.5 INJECTION, SOLUTION EPIDURAL; INFILTRATION; INTRACAUDAL; PERINEURAL at 09:02

## 2017-05-08 RX ADMIN — LIDOCAINE HYDROCHLORIDE 1 ML: 10 INJECTION, SOLUTION EPIDURAL; INFILTRATION; INTRACAUDAL; PERINEURAL at 08:35

## 2017-05-08 RX ADMIN — LIDOCAINE HYDROCHLORIDE 1 ML: 10 INJECTION, SOLUTION INFILTRATION; PERINEURAL at 09:04

## 2017-05-08 NOTE — OP NOTE
Pre procedure Diagnosis: lumbar radiculopathy, lumbar stenosis   Post procedure Diagnosis: Same  Procedure performed: lumbar transforaminal epidural steroid injection at L4-5 and L5-S1 right, fluoroscopically guided, contrast controlled  Anesthesia: none  Complications: none  Operators: Anthony Mandel MD     Indications:   Michele Vance is a 71 year old male was sent by Mushtaq Sierra PA-C  for lumbar epidural steroid injection.  They have a history of chronic lower back pain with pain and paresthesias down the right lower extremity.  Exam shows lumbar tenderness and positive straight leg raise on the right and they have tried conservative treatment including activity modifications, medications, and surgical evaluation    MRI lumbar spine was done on 4/6/17 which showed   FINDINGS: Five lumbar vertebrae are assumed. Multilevel degenerative  disc disease changes, most prominent at L5-S1. Chronic compression  deformities at L2, T12. Incidentally noted hemangioma within L5. I  suspect incidental cholelithiasis.     Findings by specific level:     There is facet degenerative change as follows: Mild right and minimal  left L2-L3, minimal bilateral L3-L4, moderate bilateral L4-L5, mild  right and minimal left L5-S1.     There is disc bulging at each level from L1 to S1. There are foraminal  osteophyte-disc complexes bilaterally at L4-L5, L5-S1. There is  ligamentum flavum thickening at L4-L5.      These findings combine to result in central stenosis as follows:  Moderate L4-L5.   There is right foraminal stenosis as follows: Moderate-severe L5-S1  and L4-L5, mild L3-L4 and L2-L3.      There is left foraminal stenosis as follows: Moderate-severe to severe  L5-S1, moderate-severe L4-L5, mild-moderate to moderate L3-L4,  mild-moderate L2-L3.        IMPRESSION:  1. Multilevel degenerative disc and facet disease.  2. Suspected cholelithiasis  3. L4-L5: Moderate central stenosis. Moderate-severe bilateral  foraminal  stenosis.  4. L5-S1: Moderate-severe right and moderate-severe to severe left  foraminal stenosis.    Options/alternatives, benefits and risks were discussed with the patient including bleeding, infection, tissue trauma, numbness, weakness, paralysis, spinal cord injury, radiation exposure, headache and reaction to medications. Questions were answered to his satisfaction and he agrees to proceed. Voluntary informed consent was obtained and signed.     Vitals were reviewed: Yes  /85  Temp 97.5  F (36.4  C) (Oral)  Resp 14  SpO2 95%  Allergies were reviewed:  Yes   Medications were reviewed:  Yes   Pre-procedure pain score: 5/10    Procedure:  After getting informed consent, patient was brought into the procedure suite and was placed in a prone position on the procedure table.   A Pause for the Cause was performed.  Patient was prepped and draped in sterile fashion.     After identifying the right L4-5 and L5-S1 neuroforamen, the C-arm was rotated to a right lateral oblique angle.  A total of 2 ml of Lidocaine 1% was used to anesthetize the skin and the needle track at a skin entry site coaxial with the fluoroscopy beam, and overriding the superior aspect of the neuroforamen.  25 gauge 5 inch spinal needles were advanced under intermittent fluoroscopy until they entered the foramen superiorly beneath the pedicles at both levels.    The needle positions were then inspected from anteroposterior and lateral views, and the needles adjusted appropriately.  A total of 1.5 ml of Isovue-300 was injected, confirming appropriate needle positions, with spread into the nerve root sheath and the epidural space at both levels, with no intravascular uptake. 1.5 ml was wasted    Then, after repeated negative aspiration, each level was injected with 2.5 ml of a combination of Depomedrol 40 mg, Decadron 5 mg, bupivacaine 0.25% 1.5 ml, diluted with 2 ml of normal saline for a total injectate volume of 5 ml and the needles were  flushed with lidocaine and removed.    During the procedure, there was a paresthesia described as a mild pressure sensation in his usual pain distribution with instillation of medications.  Hemostasis was achieved, the area was cleaned, and bandaids were placed when appropriate.  The patient tolerated the procedure well, and was taken to the recovery room.    Images were saved to PACS.    Post-procedure pain score: 1/10  Follow-up includes:   -f/u phone call in one week  -f/u with referring provider    Anthony Mandel MD  [facility]

## 2017-05-08 NOTE — IP AVS SNAPSHOT
MRN:[id number]                      After Visit Summary   5/8/2017    Michele Vance    MRN: [id number]           Thank you!     Thank you for choosing [facility] for your care. Our goal is always to provide you with excellent care. Hearing back from our patients is one way we can continue to improve our services. Please take a few minutes to complete the written survey that you may receive in the mail after you visit with us. Thank you!        Patient Information     Date Of Birth          1945        About your hospital stay     You were admitted on:  May 8, 2017 You last received care in the:  [facility]    You were discharged on:  May 8, 2017       Who to Call     For medical emergencies, please call 911.  For non-urgent questions about your medical care, please call your primary care provider or clinic, [phone]  For questions related to your surgery, please call your surgery clinic        Attending Provider     Provider Specialty    Anthony Gonzalez MD ANESTHESIOLOGY-PAIN MEDICINE       Primary Care Provider Office Phone # Fax #    Juarez Stevie Nuno [phone]       [address] [address]        After Care Instructions     Discharge Instructions       [facility]   Procedure Discharge Instructions    Today you saw:    Dr. Anthony Denise, Dr. Lamar Harman    You had an:  Epidural steroid injection   sacro-iliac joint injection   facet joint injection  hip injection  piriformis injection     Medications used:  Lidocaine   Bupivacaine   Dexamethasone Depo-medrol  Omnipaque  Ropivicaine   Kenalog   Omniscan   Normal saline        If you have received sedation before, during, or after your procedure, for the next 24 hours you shall NOT:   -Drive  -Operate machinery  -Drink alcohol  -Sign any legal  documents  Be cautious when walking. Numbness and/or weakness in the lower extremities may occur up to 6-8 hours after the procedure due to effect of the local anesthetic  Do not drive for 6 hours. The effect of the local anesthetic could slow your reflexes.   You may resume your regular activities after 24 hours  Avoid strenuous activity for the first 24 hours  You may shower, however avoid swimming, tub baths or hot tubs for 24 hours following your procedure  You may have a mild to moderate increase in pain for several days following the injection.  It may take up to 14 days for the steroid medication to start working although you may feel the effect as early as a few days after the procedure.     You may use ice packs for 10-15 minutes, 3 to 4 times a day at the injection site for comfort  Do not use heat to painful areas for 6 to 8 hours. This will give the local anesthetic time to wear off and prevent you from accidentally burning your skin.   You may use anti-inflammatory medications (such as Ibuprofen or Aleve or Advil) or Tylenol for pain control if necessary  If you were fasting, you may resume your normal diet and medications after the procedure  If you have diabetes, check your blood sugar more frequently than usual as your blood sugar may be higher than normal for 10-14 days following a steroid injection. Contact your doctor who manages your diabetes if your blood sugar is higher than usual  If you experience any of the following, call the pain center nursing line during work hours at [phone] or the after hours provider line at [phone]:  -Fever over 100 degree F  -Swelling, bleeding, redness, drainage, warmth at the injection site  -Progressive weakness or numbness in your legs or arms  -Loss of bowel or bladder function  -Unusual headache that is not relieved by Tylenol  -Unusual new onset of pain that is not improving    Phone #s:  Appointment line: [phone];  Nurse line: [phone]                   Your next 10 appointments already scheduled     Sep 19, 2017  9:00 AM CDT   US CAROTID BILATERAL with UCUSV2   [facility] ([facility])    [address] [place] [address] [phone]           Please bring a list of your medicines (including vitamins, minerals and over-the-counter drugs). Also, tell your doctor about any allergies you may have. Wear comfortable clothes and leave your valuables at home.  You do not need to do anything special to prepare for your exam.  Please call the Imaging Department at your exam site with any questions.            Sep 19, 2017 10:30 AM CDT   (Arrive by 10:15 AM)   Return Visit with Shola Cuellar MD   [facility] ([facility] [place])    [address] [place] [address] [phone]              Pending Results     No orders found from 5/6/2017 to 5/9/2017.            Admission Information     Date & Time Provider Department Dept. Phone    5/8/2017 Anthony Gonzalez MD [facility] [phone]      Your Vitals Were     Blood Pressure Temperature Respirations Pulse Oximetry          153/82 97.5  F (36.4  C) (Oral) 14 100%        MyChart Information     [facility] gives you secure access to your electronic health record. If you see a primary care provider, you can also send messages to your care team and make appointments. If you have questions, please call your primary care clinic.  If you do not have a primary care provider, please call [phone] and they will assist you.        Care EveryWhere ID     This is your Care EveryWhere ID. This could be used by other organizations to access your [facility] medical records  [id number]           Review of your medicines      UNREVIEWED medicines. Ask your doctor about these medicines        Dose / Directions    amitriptyline 25 MG tablet   Commonly known as:  ELAVIL    Used for:  Migraine without aura and without status migrainosus, not intractable        Dose:  25 mg   Take 1 tablet (25 mg) by mouth 2 times daily   Quantity:  180 tablet   Refills:  2       amLODIPine 10 MG tablet   Commonly known as:  NORVASC   Used for:  Essential hypertension, benign        Dose:  10 mg   Take 1 tablet (10 mg) by mouth every evening   Quantity:  90 tablet   Refills:  3       ASPIRIN PO        Dose:  325 mg   Take 325 mg by mouth daily   Refills:  0       cilostazol 100 MG tablet   Commonly known as:  PLETAL   Used for:  Peripheral vascular disease, unspecified (H)        Dose:  100 mg   Take 1 tablet (100 mg) by mouth 2 times daily   Quantity:  180 tablet   Refills:  3       gabapentin 300 MG capsule   Commonly known as:  NEURONTIN   Used for:  Arthritis        TAKE 1 CAPSULE THREE TIMES A DAY   Quantity:  210 capsule   Refills:  4       losartan 25 MG tablet   Commonly known as:  COZAAR   Used for:  Essential hypertension, benign        Dose:  25 mg   Take 1 tablet (25 mg) by mouth daily   Quantity:  93 tablet   Refills:  3       metoclopramide 10 MG tablet   Commonly known as:  REGLAN   Used for:  Slow transit constipation        Dose:  10 mg   Take 1 tablet (10 mg) by mouth 4 times daily   Quantity:  120 tablet   Refills:  0       omeprazole 40 MG capsule   Commonly known as:  priLOSEC   Used for:  Gastroesophageal reflux disease without esophagitis        Dose:  40 mg   Take 1 capsule (40 mg) by mouth daily   Quantity:  90 capsule   Refills:  1       polyethylene glycol Packet   Commonly known as:  MIRALAX/GLYCOLAX        Dose:  1 packet   Take 1 packet by mouth daily   Refills:  0       psyllium 58.6 % Powd   Commonly known as:  METAMUCIL        Take by mouth daily   Refills:  0       sildenafil 50 MG cap/tab   Commonly known as:  REVATIO/VIAGRA   Used for:  Peripheral vascular disease, unspecified (H)        Dose:  25-50 mg   Take 0.5-1 tablets (25-50 mg) by mouth daily as needed for  erectile dysfunction Take 30 min to 4 hours before intercourse.  Never use with nitroglycerin, terazosin or doxazosin.   Quantity:  6 tablet   Refills:  0       STATIN NOT PRESCRIBED (INTENTIONAL)   Used for:  Hyperlipidemia LDL goal <130        Pt has known rhabdomyolysis in the past, somewhat associated with statins.  Also wasn't able to tolerate Zetia.   Quantity:  1 each   Refills:  0       TOPROL  MG 24 hr tablet   Used for:  Essential hypertension, benign   Generic drug:  metoprolol        TAKE 1 TABLET EVERY MORNING   Quantity:  93 tablet   Refills:  2       zolpidem 5 MG tablet   Commonly known as:  AMBIEN   Used for:  Primary insomnia        Dose:  5 mg   Take 1 tablet (5 mg) by mouth nightly as needed for sleep   Quantity:  90 tablet   Refills:  1                Protect others around you: Learn how to safely use, store and throw away your medicines at www.disposemymeds.org.             Medication List: This is a list of all your medications and when to take them. Check marks below indicate your daily home schedule. Keep this list as a reference.      Medications           Morning Afternoon Evening Bedtime As Needed    amitriptyline 25 MG tablet   Commonly known as:  ELAVIL   Take 1 tablet (25 mg) by mouth 2 times daily                                amLODIPine 10 MG tablet   Commonly known as:  NORVASC   Take 1 tablet (10 mg) by mouth every evening                                ASPIRIN PO   Take 325 mg by mouth daily                                cilostazol 100 MG tablet   Commonly known as:  PLETAL   Take 1 tablet (100 mg) by mouth 2 times daily                                gabapentin 300 MG capsule   Commonly known as:  NEURONTIN   TAKE 1 CAPSULE THREE TIMES A DAY                                losartan 25 MG tablet   Commonly known as:  COZAAR   Take 1 tablet (25 mg) by mouth daily                                metoclopramide 10 MG tablet   Commonly known as:  REGLAN   Take 1 tablet (10 mg)  by mouth 4 times daily                                omeprazole 40 MG capsule   Commonly known as:  priLOSEC   Take 1 capsule (40 mg) by mouth daily                                polyethylene glycol Packet   Commonly known as:  MIRALAX/GLYCOLAX   Take 1 packet by mouth daily                                psyllium 58.6 % Powd   Commonly known as:  METAMUCIL   Take by mouth daily                                sildenafil 50 MG cap/tab   Commonly known as:  REVATIO/VIAGRA   Take 0.5-1 tablets (25-50 mg) by mouth daily as needed for erectile dysfunction Take 30 min to 4 hours before intercourse.  Never use with nitroglycerin, terazosin or doxazosin.                                STATIN NOT PRESCRIBED (INTENTIONAL)   Pt has known rhabdomyolysis in the past, somewhat associated with statins.  Also wasn't able to tolerate Zetia.                                TOPROL  MG 24 hr tablet   TAKE 1 TABLET EVERY MORNING   Generic drug:  metoprolol                                zolpidem 5 MG tablet   Commonly known as:  AMBIEN   Take 1 tablet (5 mg) by mouth nightly as needed for sleep

## 2017-05-08 NOTE — IP AVS SNAPSHOT
[facility]    [address]     [place] [address]    Phone:  [phone]                                       After Visit Summary   5/8/2017    Michele Vance    MRN: [id number]           After Visit Summary Signature Page     I have received my discharge instructions, and my questions have been answered. I have discussed any challenges I see with this plan with the nurse or doctor.    ..........................................................................................................................................  Patient/Patient Representative Signature      ..........................................................................................................................................  Patient Representative Print Name and Relationship to Patient    ..................................................               ................................................  Date                                            Time    ..........................................................................................................................................  Reviewed by Signature/Title    ...................................................              ..............................................  Date                                                            Time

## 2017-05-17 DIAGNOSIS — K59.01 SLOW TRANSIT CONSTIPATION: ICD-10-CM

## 2017-05-17 NOTE — TELEPHONE ENCOUNTER
metoclopramide (REGLAN) 10 MG tablet      Last Written Prescription Date: 4/12/17  Last Fill Quantity: 120,  # refills: 0   Last Office Visit with [facility], [facility] or [facility] prescribing provider: 4/12/17

## 2017-05-18 RX ORDER — METOCLOPRAMIDE 10 MG/1
TABLET ORAL
Qty: 120 TABLET | Refills: 1 | Status: SHIPPED | OUTPATIENT
Start: 2017-05-18 | End: 2017-06-22

## 2017-05-18 NOTE — TELEPHONE ENCOUNTER
Prescription approved per [facility].    Charlotte Olguin RN

## 2017-06-22 ENCOUNTER — OFFICE VISIT (OUTPATIENT)
Dept: INTERNAL MEDICINE | Facility: CLINIC | Age: 72
End: 2017-06-22
Payer: MEDICARE

## 2017-06-22 VITALS
DIASTOLIC BLOOD PRESSURE: 70 MMHG | RESPIRATION RATE: 16 BRPM | OXYGEN SATURATION: 98 % | HEART RATE: 75 BPM | TEMPERATURE: 96.5 F | BODY MASS INDEX: 26 KG/M2 | SYSTOLIC BLOOD PRESSURE: 128 MMHG | WEIGHT: 171 LBS

## 2017-06-22 DIAGNOSIS — K59.01 SLOW TRANSIT CONSTIPATION: ICD-10-CM

## 2017-06-22 DIAGNOSIS — I73.9 PAD (PERIPHERAL ARTERY DISEASE) (H): ICD-10-CM

## 2017-06-22 DIAGNOSIS — Z95.1 S/P CABG X 6: ICD-10-CM

## 2017-06-22 DIAGNOSIS — M54.16 LUMBAR RADICULOPATHY: Primary | ICD-10-CM

## 2017-06-22 DIAGNOSIS — I10 ESSENTIAL HYPERTENSION: ICD-10-CM

## 2017-06-22 DIAGNOSIS — J43.1 PANLOBULAR EMPHYSEMA (H): ICD-10-CM

## 2017-06-22 PROCEDURE — 99214 OFFICE O/P EST MOD 30 MIN: CPT | Performed by: INTERNAL MEDICINE

## 2017-06-22 RX ORDER — METOCLOPRAMIDE 10 MG/1
TABLET ORAL
Qty: 120 TABLET | Refills: 1 | Status: SHIPPED | OUTPATIENT
Start: 2017-06-22 | End: 2017-09-06

## 2017-06-22 ASSESSMENT — PAIN SCALES - GENERAL: PAINLEVEL: SEVERE PAIN (6)

## 2017-06-22 NOTE — PROGRESS NOTES
CHIEF COMPLAINT:    The patient is a pleasant 71-year-old gentleman who recently had a lumbar epidural injection. This was at the recommendation of neurosurgery. He was told that if he does not have complete resolution that he should return. He states that he believes that he needs a referral to set this up again. With respect to his history of coronary artery disease and peripheral vascular disease, these are currently stable.He denies any chest pain, he denies any significant intermittent claudication with normal activity.ddoes have a history of some mesenteric arterial stenosis but this seems to be doing better. He's had no abdominal pain. Does have slow transit constipation which is responding quite well to the use of the Reglan.                         PAST, FAMILY,SOCIAL HISTORY:     Medical  History:   has a past medical history of Carotid stenosis; Chronic kidney disease; Coronary artery disease (3-2014); Crohn's disease (H); CVA (cerebral infarction); Elevated CK; Esophageal reflux; Hypercholesteremia; Hypertension; Nonsenile cataract; Other and unspecified hyperlipidemia; PAD (peripheral artery disease) (H); Rhabdomyolysis (2010); and Unspecified essential hypertension.     Surgical History:   has a past surgical history that includes knee surgery (1976); lasix (2001); appendectomy (1974); Dental surgery; cataracts; colonoscopy (12/12/02); UPPER GI ENDOSCOPY,EXAM (01/08/99); Release carpal tunnel (1/13/2011); Release carpal tunnel (1/20/2011); Bypass graft artery coronary (3/5/2014); cataract iol, rt/lt (Bilateral, 2008); Endarterectomy carotid; CAPSULE ENDOSCOPY (N/A, 10/7/2014); Colonoscopy (N/A, 10/7/2014); Esophagoscopy, gastroscopy, duodenoscopy (EGD), combined (Left, 10/7/2014); Esophagoscopy, gastroscopy, duodenoscopy (EGD), combined (Left, 10/7/2014); and Inject epidural lumbar (Right, 5/8/2017).     Social History:   reports that he quit smoking about 17 years ago. His smoking use included  Cigarettes. He has a 50.00 pack-year smoking history. He has never used smokeless tobacco. He reports that he does not drink alcohol or use illicit drugs.     Family History:  family history includes CANCER in his sister; CEREBROVASCULAR DISEASE in his father; Eye Disorder in his mother; HEART DISEASE in his father and sister; Hypertension in his mother; Lipids in his father; Obesity in his father. There is no history of Glaucoma or Macular Degeneration.            MEDICATIONS  Current Outpatient Prescriptions   Medication Sig Dispense Refill     metoclopramide (REGLAN) 10 MG tablet TAKE 1 TABLET FOUR TIMES A  tablet 1     TOPROL  MG 24 hr tablet TAKE 1 TABLET EVERY MORNING 93 tablet 2     zolpidem (AMBIEN) 5 MG tablet Take 1 tablet (5 mg) by mouth nightly as needed for sleep 90 tablet 1     amLODIPine (NORVASC) 10 MG tablet Take 1 tablet (10 mg) by mouth every evening 90 tablet 3     cilostazol (PLETAL) 100 MG tablet Take 1 tablet (100 mg) by mouth 2 times daily 180 tablet 3     omeprazole (PRILOSEC) 40 MG capsule Take 1 capsule (40 mg) by mouth daily 90 capsule 1     amitriptyline (ELAVIL) 25 MG tablet Take 1 tablet (25 mg) by mouth 2 times daily 180 tablet 2     losartan (COZAAR) 25 MG tablet Take 1 tablet (25 mg) by mouth daily 93 tablet 3     psyllium (METAMUCIL) 58.6 % POWD Take by mouth daily       gabapentin (NEURONTIN) 300 MG capsule TAKE 1 CAPSULE THREE TIMES A  capsule 4     sildenafil (VIAGRA) 50 MG tablet Take 0.5-1 tablets (25-50 mg) by mouth daily as needed for erectile dysfunction Take 30 min to 4 hours before intercourse.  Never use with nitroglycerin, terazosin or doxazosin. 6 tablet 0     ASPIRIN PO Take 325 mg by mouth daily       polyethylene glycol (MIRALAX/GLYCOLAX) packet Take 1 packet by mouth daily        STATIN NOT PRESCRIBED, INTENTIONAL, Pt has known rhabdomyolysis in the past, somewhat associated with statins.  Also wasn't able to tolerate Zetia. 1 each 0          --------------------------------------------------------------------------------------------------------------------                          REVIEW OF SYSTEMS:         LUNGS: Pt denies: cough,excess sputum, hemoptysis, or shortness of breath at rest. Patient does have some dyspnea upon exertion.   HEART: Pt denies: chest pain, arrythmia, syncope, tachy or bradyarrhythmia or excess edema.   GI: Pt denies: nausea, vomitting, diarrhea, constipation, melena, or hematochezia.   NEURO: Pt denies: seizures, strokes, diplopia, weakness, paraesthesias, or paralysis.   SKIN: Pt denies: itching, rashes, discoloration, or specific lesions of concern. Denies recent hair loss.                          EXAMINATION:         /70 (BP Location: Left arm, Patient Position: Chair, Cuff Size: Adult Regular)  Pulse 75  Temp 96.5  F (35.8  C) (Temporal)  Resp 16  Wt 171 lb (77.6 kg)  SpO2 98%  BMI 26 kg/m2   Constitutional: The patient appears to be in no acute distress. The patient appears to be adequately hydrated. No acute respiratory or hemodynamic distress is noted at this time.   LUNGS: breath sounds are decreased bilaterally, airflow is average, no intercostal retraction or stridor is noted. No coughing is noted during visit.   GI: Abdomen is soft, without rebound, guarding or tenderness. Bowel sounds are appropriate. No renal bruits are heard.    NEURO: Pt is alert and appropriate. No neurologic lateralization is noted. Cranial nerves 2-12 are intact. Peripheral sensory and motor function are grossly normal. Heel and toe walking are intact. Straight leg raising is positive bilaterally.   SKIN:  warm and dry. No erythema, or rashes are noted. No specific lesions of concern are noted.   No ischemic changes or ulceration is noted peripherally.   MS: moderatecrepitance is noted in the extremities. No deformity is present. Muscle strength is appropriate and equal bilaterally. No acute joint erythema or swelling is  present. lumbar range of motion is decreased due to discomfort.                      DECISION MAKIN. Slow transit constipation  Continue metoclopramide  - metoclopramide (REGLAN) 10 MG tablet; TAKE 1 TABLET FOUR TIMES A DAY  Dispense: 120 tablet; Refill: 1    2. Lumbar radiculopathy  Set up for follow-up with neurosurgery  - NEUROSURGERY REFERRAL    3. Essential hypertension  Currently controlled continue current medication    4. PAD (peripheral artery disease) (H)  Stable,continue Pletal    5. S/P CABG x 6  Continue beta blocker, aspirin,  Intolerant to Zetia  Intolerant to statins due to rhabdomyolysis.    6. Panlobular emphysema (H)  Stable, not requiring nebulizer therapy at this time.                                 FOLLOW UP    I have asked the patient to make an appointment for follow up with me 4 months or as needed        I have carefully explained the diagnosis and treatment options with the patient. The patient has displayed an understanding of the above, and all subsequent questions were answered.

## 2017-06-22 NOTE — NURSING NOTE
"Chief Complaint   Patient presents with     Back Pain       Initial /70 (BP Location: Left arm, Patient Position: Chair, Cuff Size: Adult Regular)  Pulse 75  Temp 96.5  F (35.8  C) (Temporal)  Resp 16  Wt 171 lb (77.6 kg)  SpO2 98%  BMI 26 kg/m2 Estimated body mass index is 26 kg/(m^2) as calculated from the following:    Height as of 4/27/17: 5' 8\" (1.727 m).    Weight as of this encounter: 171 lb (77.6 kg)..   BP completed using cuff size: regular  Medication Rec Completed    Prisca Montaño CMA    "

## 2017-06-22 NOTE — MR AVS SNAPSHOT
After Visit Summary   6/22/2017    Michele Vance    MRN: [id number]           Patient Information     Date Of Birth          1945        Visit Information        Provider Department      6/22/2017 9:20 AM Juarez Nuno DO [facility]        Today's Diagnoses     Lumbar radiculopathy    -  1    Slow transit constipation        Essential hypertension        PAD (peripheral artery disease) (H)        S/P CABG x 6        Panlobular emphysema (H)           Follow-ups after your visit        Additional Services     NEUROSURGERY REFERRAL       Your provider has referred you to:  [facility]: [facility]  Neurosurgery Clinic [phone]   [email or web address]    Please be aware that coverage of these services is subject to the terms and limitations of your health insurance plan.  Call member services at your health plan with any benefit or coverage questions.      Please bring the following to your appointment:  >>   Any x-rays, CTs or MRIs which have been performed.  Contact the facility where they were done to arrange for  prior to your scheduled appointment.  Any new CT, MRI or other procedures ordered by your specialist must be performed at a [place] facility or coordinated by your clinic's referral office.    >>   List of current medications   >>   This referral request   >>   Any documents/labs given to you for this referral                  Your next 10 appointments already scheduled     Jun 30, 2017  2:30 PM CDT   Return Visit with Mushtaq Sierra PA-C   [facility] ([facility])    [address] [place] [id number] [phone]            Sep 19, 2017  9:00 AM CDT   US CAROTID BILATERAL with UCUSV2   [facility] ([facility])    [address]  1st Floor  [facility] [address] [phone]           Please bring a list of  your medicines (including vitamins, minerals and over-the-counter drugs). Also, tell your doctor about any allergies you may have. Wear comfortable clothes and leave your valuables at home.  You do not need to do anything special to prepare for your exam.  Please call the Imaging Department at your exam site with any questions.            Sep 19, 2017 10:30 AM CDT   (Arrive by 10:15 AM)   Return Visit with Shola Cuellar MD   [facility] ([facility])    [address] [place] [address]  [address] [facility] [address] [phone]              Who to contact     If you have questions or need follow up information about today's clinic visit or your schedule please contact [facility] directly at [phone].  Normal or non-critical lab and imaging results will be communicated to you by MyChart, letter or phone within 4 business days after the clinic has received the results. If you do not hear from us within 7 days, please contact the clinic through [employer] or phone. If you have a critical or abnormal lab result, we will notify you by phone as soon as possible.  Submit refill requests through [employer] or call your pharmacy and they will forward the refill request to us. Please allow 3 business days for your refill to be completed.          Additional Information About Your Visit        [employer] Information     [employer] gives you secure access to your electronic health record. If you see a primary care provider, you can also send messages to your care team and make appointments. If you have questions, please call your primary care clinic.  If you do not have a primary care provider, please call [phone] and they will assist you.        Care EveryWhere ID     This is your Care EveryWhere ID. This could be used by other organizations to access your [facility] medical records  [id number]        Your Vitals Were     Pulse Temperature Respirations Pulse Oximetry  BMI (Body Mass Index)       75 96.5  F (35.8  C) (Temporal) 16 98% 26 kg/m2        Blood Pressure from Last 3 Encounters:   06/22/17 128/70   05/08/17 146/85   04/27/17 144/58    Weight from Last 3 Encounters:   06/22/17 171 lb (77.6 kg)   04/27/17 170 lb 6.4 oz (77.3 kg)   04/17/17 170 lb 1.6 oz (77.2 kg)              We Performed the Following     NEUROSURGERY REFERRAL          Today's Medication Changes          These changes are accurate as of: 6/22/17  5:24 PM.  If you have any questions, ask your nurse or doctor.               These medicines have changed or have updated prescriptions.        Dose/Directions    metoclopramide 10 MG tablet   Commonly known as:  REGLAN   This may have changed:  See the new instructions.   Used for:  Slow transit constipation   Changed by:  Juarez Nuno DO        TAKE 1 TABLET FOUR TIMES A DAY   Quantity:  120 tablet   Refills:  1            Where to get your medicines      These medications were sent to [facility] [address] [address]     Phone:  [phone]     metoclopramide 10 MG tablet                Primary Care Provider Office Phone # Fax #    Juarez Nuno [phone]       [address] [place] [address]        Equal Access to Services     ANSHU AGUSTIN AH: Hadii naomi montgomery hadasho Soriaali, waaxda luqadaha, qaybta kaalmada adeegyada, arnoldo marin . So [facility] [phone].    ATENCIÓN: Si habla español, tiene a fischer disposición servicios gratuitos de asistencia lingüística. Ladonna al [phone].    We comply with applicable federal civil rights laws and [place] laws. We do not discriminate on the basis of race, color, national origin, age, disability sex, sexual orientation or gender identity.            Thank you!     Thank you for choosing [facility]  for your care. Our goal is  always to provide you with excellent care. Hearing back from our patients is one way we can continue to improve our services. Please take a few minutes to complete the written survey that you may receive in the mail after your visit with us. Thank you!             Your Updated Medication List - Protect others around you: Learn how to safely use, store and throw away your medicines at www.disposemymeds.org.          This list is accurate as of: 6/22/17  5:24 PM.  Always use your most recent med list.                   Brand Name Dispense Instructions for use Diagnosis    amitriptyline 25 MG tablet    ELAVIL    180 tablet    Take 1 tablet (25 mg) by mouth 2 times daily    Migraine without aura and without status migrainosus, not intractable       amLODIPine 10 MG tablet    NORVASC    90 tablet    Take 1 tablet (10 mg) by mouth every evening    Essential hypertension, benign       ASPIRIN PO      Take 325 mg by mouth daily        cilostazol 100 MG tablet    PLETAL    180 tablet    Take 1 tablet (100 mg) by mouth 2 times daily    Peripheral vascular disease, unspecified (H)       gabapentin 300 MG capsule    NEURONTIN    210 capsule    TAKE 1 CAPSULE THREE TIMES A DAY    Arthritis       losartan 25 MG tablet    COZAAR    93 tablet    Take 1 tablet (25 mg) by mouth daily    Essential hypertension, benign       metoclopramide 10 MG tablet    REGLAN    120 tablet    TAKE 1 TABLET FOUR TIMES A DAY    Slow transit constipation       omeprazole 40 MG capsule    priLOSEC    90 capsule    Take 1 capsule (40 mg) by mouth daily    Gastroesophageal reflux disease without esophagitis       polyethylene glycol Packet    MIRALAX/GLYCOLAX     Take 1 packet by mouth daily        psyllium 58.6 % Powd    METAMUCIL     Take by mouth daily        sildenafil 50 MG cap/tab    REVATIO/VIAGRA    6 tablet    Take 0.5-1 tablets (25-50 mg) by mouth daily as needed for erectile dysfunction Take 30 min to 4 hours before intercourse.  Never use with  nitroglycerin, terazosin or doxazosin.    Peripheral vascular disease, unspecified (H)       STATIN NOT PRESCRIBED (INTENTIONAL)     1 each    Pt has known rhabdomyolysis in the past, somewhat associated with statins.  Also wasn't able to tolerate Zetia.    Hyperlipidemia LDL goal <130       TOPROL  MG 24 hr tablet   Generic drug:  metoprolol     93 tablet    TAKE 1 TABLET EVERY MORNING    Essential hypertension, benign       zolpidem 5 MG tablet    AMBIEN    90 tablet    Take 1 tablet (5 mg) by mouth nightly as needed for sleep    Primary insomnia

## 2017-06-30 ENCOUNTER — TELEPHONE (OUTPATIENT)
Dept: PALLIATIVE MEDICINE | Facility: CLINIC | Age: 72
End: 2017-06-30

## 2017-06-30 ENCOUNTER — OFFICE VISIT (OUTPATIENT)
Dept: NEUROSURGERY | Facility: CLINIC | Age: 72
End: 2017-06-30
Payer: MEDICARE

## 2017-06-30 VITALS — HEIGHT: 67 IN | WEIGHT: 171 LBS | BODY MASS INDEX: 26.84 KG/M2 | TEMPERATURE: 97 F

## 2017-06-30 DIAGNOSIS — M53.3 SI (SACROILIAC) JOINT DYSFUNCTION: Primary | ICD-10-CM

## 2017-06-30 PROCEDURE — 99213 OFFICE O/P EST LOW 20 MIN: CPT | Performed by: PHYSICIAN ASSISTANT

## 2017-06-30 NOTE — TELEPHONE ENCOUNTER
Pre-screening Questions for Radiology Injections:    Injection to be done at which interventional clinic site? [facility]    Procedure ordered by Mushtaq Sierra    Procedure ordered? Bilateral SI Joint Injection    What insurance would patient like us to bill for this procedure? Medicare, [employer]      Worker's comp-Any injection DO NOT SCHEDULE and route to Mercedes Chawla.      [employer] insurance - For SI joint injections, DO NOT SCHEDULE and route Mercedes Chawla.      [employer]- MBB's must be scheduled at LEAST two weeks apart      [employer] - Any injection besides hip/shoulder/knee joint DO NOT SCHEDULE and route to Mercedes Chawla. She will obtain PA and call pt back to schedule procedure or notify pt of denial.     HP [employer]-PA REQUIRED FOR NON-JORDAN OR Joint injections    Any chance of pregnancy? Not Applicable   If YES, do NOT schedule and route to RN pool    Is an  needed? No     Patient has a drive home? (mandatory) YES: INFORMED    Is patient taking any blood thinners (plavix, coumadin, jantoven, warfarin, heparin, pradaxa or dabigatran )? No   If hold needed, do NOT schedule, route to RN pool     Is patient taking any aspirin products? Yes - Pt takes 325mg daily; instructed to hold 0 day(s) prior to procedure.      If more than 325mg/day do NOT schedule; route to RN pool     For CERVICAL procedures, hold all aspirin products for 6 days.      Does the patient have a bleeding or clotting disorder? No     If yes, okay to schedule AND route to RN nurse pool    **For any patients with platelet count <100, must be forwarded to provider**    Is patient diabetic?  No  If YES, have them bring their glucometer.    Does patient have an active infection or treated for one within the past week? No     Is patient currently taking any antibiotics?  No     For patients on chronic, preventative, or prophylactic antibiotics, procedures may be scheduled.     For patients on antibiotics for active or recent  infection:    Liana Kerns [place], [place]-antibiotic course must have been completed for 4 days    Negin Lazo-antibiotic course must have been completed for 7 days    Is patient currently taking any steroid medications? (i.e. Prednisone, Medrol)  No     For patients on steroid medications:    Liana Kerns [place], [place]-steroid course must have been completed for 4 days    Negin Lazo-steroid course must have been completed for 7 days    Reviewed with patient:  If you are started on any steroids or antibiotics between now and your appointment, you must contact us because it may affect our ability to perform your procedure.  Yes    Is patient actively being treated for cancer or immunocompromised, including the spleen having been removed? No    If YES, do NOT schedule and route to RN pool     **For Dr. Denise patients without spleens should have the chart sent to her**    Are you able to get on and off an exam table with minimal or no assistance? Yes  If NO, do NOT schedule and route to RN pool    Are you able to roll over and lay on your stomach with minimal or no assistance? Yes  If NO, do NOT schedule and route to RN pool     Any allergies to contrast dye, iodine, shellfish, or numbing and steroid medications? No  If YES, route to RN pool AND add allergy information to appointment notes    Allergies: Hmg-coa-r inhibitors; Gemfibrozil; Sulfa drugs; and Zetia [ezetimibe]        Has the patient had a flu shot or any other vaccinations within 7 days before or after the procedure.  No       Does patient have an MRI/CT?  Not Applicable  (SI joint, hip injections, lumbar sympathetic blocks, and stellate ganglion blocks do not require an MRI)    Was the MRI done w/in the last 3 years?  NA    Was MRI done at [place]? No      If not, where was it done? N/A       If MRI was not done at [place], [facility] or [facility] do NOT schedule and route to nursing.  If pt has an imaging  disc, the injection may be scheduled but pt has to bring disc to appt. If they show up w/out disc the injection cannot be done    Reminders (please tell patient if applicable):       Instructed pt to arrive 30 minutes early for IV start if this is for a cervical procedure, ALL sympathetic (stellate ganglion, hypogastric, or lumbar sympathetic block) and all sedation procedures (RFA, spinal cord stimulation trials).  Not Applicable    -IVs are not routinely placed for [place] and [place] cervical case       If NPO for sedation, informed patient that it is okay to take medications with sips of water (except if they are to hold blood thinners).  Not Applicable   *DO take blood pressure medication if it is prescribed*      If this is for a cervical JORDAN, informed patient that aspirin needs to be held for 6 days.   Not Applicable      For all patients not having spinal cord stimulator (SCS) trials or radiofrequency ablations (RFAs), informed patient:  IV sedation is not provided for this procedure.  If you feel that an oral anti-anxiety medication is needed, you can discuss this further with your referring provider or primary care provider.  The Pain Clinic provider will discuss specifics of what the procedure includes at your appointment.  Most procedures last 10-20 minutes.  We use numbing medications to help with any discomfort during the procedure.  Not Applicable      Do not schedule procedures requiring IV placement in the first appointment of the day or first appointment after lunch. REVIEWED      For patients 85 or older we recommend having an adult stay w/ them for the remainder of the day.   REVIEWED    Does the patient have any questions?  NO  Evelin Godinez  [facility]

## 2017-06-30 NOTE — MR AVS SNAPSHOT
After Visit Summary   6/30/2017    Michele Vance    MRN: [id number]           Patient Information     Date Of Birth          1945        Visit Information        Provider Department      6/30/2017 2:30 PM Mushtaq Sierra PA-C [facility]        Today's Diagnoses     SI (sacroiliac) joint dysfunction    -  1       Follow-ups after your visit        Additional Services     PAIN [facility]NA[facility]EMENT CENTER ([place]) REFERRAL       Your provider has referred you to the [facility].    Reason for Referral: Procedure or injection only - patient will be contacted within 24 hrs to schedule: Right SI injection    Please complete the following questions:    What is your diagnosis for the patient's pain?     Do you have any specific questions for the pain specialist? No    Are there any red flags that may impact the assessment or management of the patient? None    **ANY DIAGNOSTIC TESTS THAT ARE NOT IN EPIC SHOULD BE SENT TO THE PAIN CENTER**    Please note the Pre-Op Pain Consult must be scheduled 2-3 weeks prior to the patient's surgery.  Patient's trying to schedule within 2 weeks of surgery may not be accommodated.     Pre-Op Pain Consults are only good for 30 days.    REGARDING OPIOID MEDICATIONS:  We will always address appropriateness of opioid pain medications, but we generally will not automatically take on a prescribing role. When we do take on prescribing of opioids for chronic pain, it is in collaboration with the referring physician for an intermediate period of time (months), with an expectation that the primary physician or provider will assume the prescribing role if medications are effective at stable doses with demonstrated compliance.  Therefore, please do not assume that your prescribing responsibilities end on the day of pain clinic consultation.  Is this agreeable to you? YES    For any questions, contact the [facility] at [phone]  [phone].    Please be aware that coverage of these services is subject to the terms and limitations of your health insurance plan.  Call member services at your health plan with any benefit or coverage questions.      Please bring the following with you to your appointment:    (1) Any X-Rays, CTs or MRIs which have been performed.  Contact the facility where they were done to arrange for  prior to your scheduled appointment.    (2) List of current medications   (3) This referral request   (4) Any documents/labs given to you for this referral            PAIN [facility]NA[facility]ENTER ([place]) REFERRAL       Your provider has referred you to the [facility].    Reason for Referral: Procedure or injection only - patient will be contacted within 24 hrs to schedule: Other: Left SI joint injection    Please complete the following questions:    What is your diagnosis for the patient's pain? SI joint dysfunction    Do you have any specific questions for the pain specialist? No    Are there any red flags that may impact the assessment or management of the patient? None    **ANY DIAGNOSTIC TESTS THAT ARE NOT IN EPIC SHOULD BE SENT TO THE PAIN CENTER**    Please note the Pre-Op Pain Consult must be scheduled 2-3 weeks prior to the patient's surgery.  Patient's trying to schedule within 2 weeks of surgery may not be accommodated.     Pre-Op Pain Consults are only good for 30 days.    REGARDING OPIOID MEDICATIONS:  We will always address appropriateness of opioid pain medications, but we generally will not automatically take on a prescribing role. When we do take on prescribing of opioids for chronic pain, it is in collaboration with the referring physician for an intermediate period of time (months), with an expectation that the primary physician or provider will assume the prescribing role if medications are effective at stable doses with demonstrated compliance.  Therefore, please do not assume that your  prescribing responsibilities end on the day of pain clinic consultation.  Is this agreeable to you? YES    For any questions, contact the [facility] at [phone].    Please be aware that coverage of these services is subject to the terms and limitations of your health insurance plan.  Call member services at your health plan with any benefit or coverage questions.      Please bring the following with you to your appointment:    (1) Any X-Rays, CTs or MRIs which have been performed.  Contact the facility where they were done to arrange for  prior to your scheduled appointment.    (2) List of current medications   (3) This referral request   (4) Any documents/labs given to you for this referral                  Your next 10 appointments already scheduled     Sep 19, 2017  9:00 AM CDT   US CAROTID BILATERAL with [id number]   [facility] ([facility])    [address] [place] [address]  [address] [facility] [address] [phone]           Please bring a list of your medicines (including vitamins, minerals and over-the-counter drugs). Also, tell your doctor about any allergies you may have. Wear comfortable clothes and leave your valuables at home.  You do not need to do anything special to prepare for your exam.  Please call the Imaging Department at your exam site with any questions.            Sep 19, 2017 10:30 AM CDT   (Arrive by 10:15 AM)   Return Visit with Shola Cuellar MD   [facility] ([facility])    [address] [place] [address]  [address] [address] [phone]              Who to contact     If you have questions or need follow up information about today's clinic visit or your schedule please contact [facility] directly at [phone].  Normal or non-critical lab and imaging results will be communicated to you by MyChart, letter or phone within 4 business days after  "the clinic has received the results. If you do not hear from us within 7 days, please contact the clinic through [employer] or phone. If you have a critical or abnormal lab result, we will notify you by phone as soon as possible.  Submit refill requests through [employer] or call your pharmacy and they will forward the refill request to us. Please allow 3 business days for your refill to be completed.          Additional Information About Your Visit        [employer] Information     [employer] gives you secure access to your electronic health record. If you see a primary care provider, you can also send messages to your care team and make appointments. If you have questions, please call your primary care clinic.  If you do not have a primary care provider, please call [phone] and they will assist you.        Care EveryWhere ID     This is your Care EveryWhere ID. This could be used by other organizations to access your [facility] medical records  [id number]        Your Vitals Were     Temperature Height BMI (Body Mass Index)             97  F (36.1  C) (Temporal) 1.702 m (5' 7\") 26.78 kg/m2          Blood Pressure from Last 3 Encounters:   06/22/17 128/70   05/08/17 146/85   04/27/17 144/58    Weight from Last 3 Encounters:   06/30/17 77.6 kg (171 lb)   06/22/17 77.6 kg (171 lb)   04/27/17 77.3 kg (170 lb 6.4 oz)              We Performed the Following     PAIN [facility]EMENT [facility] ([place]) REFERRAL     [facility] ([place]) REFERRAL        Primary Care Provider Office Phone # Fax #    Juarez Stevie Nuno [phone]       [address]   [facility] [address]        Equal Access to Services     [facility] : Hadii naomi Dill, waaxda luqadaha, qaybta kaalmaarnoldo walsh. So [facility] [phone].    ATENCIÓN: Si habla español, tiene a fischer disposición servicios gratuitos de asistencia lingüística. Llame al " [phone].    We comply with applicable federal civil rights laws and [place] laws. We do not discriminate on the basis of race, color, national origin, age, disability sex, sexual orientation or gender identity.            Thank you!     Thank you for choosing [facility]  for your care. Our goal is always to provide you with excellent care. Hearing back from our patients is one way we can continue to improve our services. Please take a few minutes to complete the written survey that you may receive in the mail after your visit with us. Thank you!             Your Updated Medication List - Protect others around you: Learn how to safely use, store and throw away your medicines at www.disposemymeds.org.          This list is accurate as of: 6/30/17  2:40 PM.  Always use your most recent med list.                   Brand Name Dispense Instructions for use Diagnosis    amitriptyline 25 MG tablet    ELAVIL    180 tablet    Take 1 tablet (25 mg) by mouth 2 times daily    Migraine without aura and without status migrainosus, not intractable       amLODIPine 10 MG tablet    NORVASC    90 tablet    Take 1 tablet (10 mg) by mouth every evening    Essential hypertension, benign       ASPIRIN PO      Take 325 mg by mouth daily        cilostazol 100 MG tablet    PLETAL    180 tablet    Take 1 tablet (100 mg) by mouth 2 times daily    Peripheral vascular disease, unspecified (H)       gabapentin 300 MG capsule    NEURONTIN    210 capsule    TAKE 1 CAPSULE THREE TIMES A DAY    Arthritis       losartan 25 MG tablet    COZAAR    93 tablet    Take 1 tablet (25 mg) by mouth daily    Essential hypertension, benign       metoclopramide 10 MG tablet    REGLAN    120 tablet    TAKE 1 TABLET FOUR TIMES A DAY    Slow transit constipation       omeprazole 40 MG capsule    priLOSEC    90 capsule    Take 1 capsule (40 mg) by mouth daily    Gastroesophageal reflux disease without esophagitis       polyethylene glycol Packet     MIRALAX/GLYCOLAX     Take 1 packet by mouth daily        psyllium 58.6 % Powd    METAMUCIL     Take by mouth daily        sildenafil 50 MG cap/tab    REVATIO/VIAGRA    6 tablet    Take 0.5-1 tablets (25-50 mg) by mouth daily as needed for erectile dysfunction Take 30 min to 4 hours before intercourse.  Never use with nitroglycerin, terazosin or doxazosin.    Peripheral vascular disease, unspecified (H)       STATIN NOT PRESCRIBED (INTENTIONAL)     1 each    Pt has known rhabdomyolysis in the past, somewhat associated with statins.  Also wasn't able to tolerate Zetia.    Hyperlipidemia LDL goal <130       TOPROL  MG 24 hr tablet   Generic drug:  metoprolol     93 tablet    TAKE 1 TABLET EVERY MORNING    Essential hypertension, benign       zolpidem 5 MG tablet    AMBIEN    90 tablet    Take 1 tablet (5 mg) by mouth nightly as needed for sleep    Primary insomnia

## 2017-06-30 NOTE — NURSING NOTE
"Michele Vance is a 71 year old male who presents for:  Chief Complaint   Patient presents with     RECHECK     discuss care options, possibly get another injection     Neurologic Problem        Initial Vitals:  Temp 97  F (36.1  C) (Temporal)  Ht 1.702 m (5' 7\")  Wt 77.6 kg (171 lb)  BMI 26.78 kg/m2 Estimated body mass index is 26.78 kg/(m^2) as calculated from the following:    Height as of this encounter: 1.702 m (5' 7\").    Weight as of this encounter: 77.6 kg (171 lb).. Body surface area is 1.92 meters squared. BP completed using cuff size: NA (Not Taken)  Data Unavailable    Do you feel safe in your environment?  Yes  Do you need any refills today? No    Nursing Comments:         Navin Figueredo    "

## 2017-06-30 NOTE — PROGRESS NOTES
[facility]  Neurosurgery followup:    HPI: 71-year-old male, intubated to discuss his progress. At his last visit, I did send him for right-sided transforaminal epidural injections at L4-5 and L5-S1. He states this was very helpful in relieving his leg pain. However, he does continue with bilateral low back pain, which seems to be localized over the SI joints. This is exacerbated with any twisting motions.   Exam:  Constitutional:  Alert, well nourished, NAD.  HEENT: Normocephalic, atraumatic.   Pulm:  Without shortness of breath   CV:  No pitting edema of BLE.      Neurological:  Awake  Alert  Oriented x 3  Motor exam:        IP Q DF PF EHL  R   5  5   5   5    5  L   5  5   5   5    5     Reflexes are 2+ in the patellar and Achilles. There is no clonus. Downgoing Babinski.    Reflexes are 2+ in the brachial radialis and triceps. Negative Lyudmila sign bilaterally.    Able to spontaneously move L/E bilaterally  Sensation intact throughout all L/E dermatomes.    He is tender to palpation over the SI joints bilaterally.  A/P: Bilateral SI joint dysfunction  Fortunately, the previous injections were very helpful in relieving his leg pain, however he does still have some low back pain. It was suggested to him that he may benefit from SI joint injections. I think this is a good option for him. I did place an order for bilateral SI injections. He will follow up with us afterwards if needed. He voiced agreement and understanding.      Mushtaq Sierra PA-C  [facility]  [address] [address]    Tel [phone]  Pager [phone]

## 2017-07-06 ENCOUNTER — TELEPHONE (OUTPATIENT)
Dept: INTERNAL MEDICINE | Facility: CLINIC | Age: 72
End: 2017-07-06

## 2017-07-06 NOTE — TELEPHONE ENCOUNTER
Panel Management Review      Patient has the following on his problem list:    COPD  Diagnosis date: 6/4/2014   Is this diagnosis new within the last year?   NO   Was spirometry completed?  YES      Composite cancer screening  Chart review shows that this patient is due/due soon for the following None  Summary:    Patient is due/failing the following:   Review diagnosis    Action needed:   Routed to provider for review.    Type of outreach:    None, routed to provider for review.    Questions for provider review:    None                                                                                                                                    Amparo AYALA       Chart routed to Provider .

## 2017-07-25 ENCOUNTER — OFFICE VISIT (OUTPATIENT)
Dept: FAMILY MEDICINE | Facility: OTHER | Age: 72
End: 2017-07-25
Payer: MEDICARE

## 2017-07-25 VITALS
HEIGHT: 67 IN | OXYGEN SATURATION: 97 % | BODY MASS INDEX: 27.15 KG/M2 | DIASTOLIC BLOOD PRESSURE: 62 MMHG | TEMPERATURE: 97.1 F | WEIGHT: 173 LBS | RESPIRATION RATE: 16 BRPM | SYSTOLIC BLOOD PRESSURE: 110 MMHG | HEART RATE: 77 BPM

## 2017-07-25 DIAGNOSIS — J43.1 PANLOBULAR EMPHYSEMA (H): ICD-10-CM

## 2017-07-25 DIAGNOSIS — M70.22 OLECRANON BURSITIS OF LEFT ELBOW: Primary | ICD-10-CM

## 2017-07-25 DIAGNOSIS — I10 ESSENTIAL HYPERTENSION WITH GOAL BLOOD PRESSURE LESS THAN 140/90: ICD-10-CM

## 2017-07-25 PROCEDURE — 20605 DRAIN/INJ JOINT/BURSA W/O US: CPT | Performed by: INTERNAL MEDICINE

## 2017-07-25 PROCEDURE — 99213 OFFICE O/P EST LOW 20 MIN: CPT | Mod: 25 | Performed by: INTERNAL MEDICINE

## 2017-07-25 ASSESSMENT — PAIN SCALES - GENERAL: PAINLEVEL: SEVERE PAIN (6)

## 2017-07-25 NOTE — NURSING NOTE
"Chief Complaint   Patient presents with     Elbow Pain     Left elbow pain, requesting steriod injection       Initial /62  Pulse 77  Temp 97.1  F (36.2  C) (Tympanic)  Resp 16  Ht 5' 7\" (1.702 m)  Wt 173 lb (78.5 kg)  SpO2 97%  BMI 27.1 kg/m2 Estimated body mass index is 27.1 kg/(m^2) as calculated from the following:    Height as of this encounter: 5' 7\" (1.702 m).    Weight as of this encounter: 173 lb (78.5 kg).  Medication Reconciliation: complete   Loren Navarrete CMA (AAMA)   "

## 2017-07-25 NOTE — PROGRESS NOTES
SUBJECTIVE:                                                    Michele Vance is a 71 year old male who presents to clinic today for the following health issues:      Chief Complaint   Patient presents with     Elbow Pain     Left elbow pain, requesting steriod injection     CHIEF COMPLAINT:    Patient has a significant left olecranon bursitis which has been present for weeks. It is slowly worsening and getting larger. It is tender, swollen and making it difficult for him to bend his elbow. After obtaining informed consent the procedure was initiated.  He also notes that he has been doing quite well from the stand point of his emphysema.  He continues To not need Any nebulizer therapy. His blood pressure has been well-controlled.  He takes medications compliantly and has had no headache, chest pain, shortness of breath at rest.  A little bit of dyspnea with exertion associated with his modest emphysema.  He is tolerating this well.                        Examination       Vital Signs:  B/P: 110/62, T: 97.1, P: 77, R: 16, BMI: Body mass index is 27.1 kg/(m^2).   Constitutional: The patient appears to be in no acute distress. The patient appears to be adequately hydrated. No acute respiratory or hemodynamic distress is noted at this time.   LUNGS: Clear but decreased bilaterally, airflow is Slightly decreased, no intercostal retraction or stridor is noted. No coughing is noted during visit.   HEART:  regular without rubs, clicks, gallops, or murmurs. PMI is nondisplaced. Upstrokes are brisk. S1,S2 are heard.   GI: Abdomen is soft, without rebound, guarding or tenderness. Bowel sounds are appropriate. No renal bruits are heard.    NEURO: Pt is alert and appropriate. No neurologic lateralization is noted. Cranial nerves 2-12 are intact. Peripheral sensory and motor function are grossly normal.    MS: Minimal crepitance is noted in the Left elbow.  Significant fluid accumulation in the elbow is noted.  No acute  tenderness or evidence of infection present.. No deformity is present. Muscle strength is appropriate and equal bilaterally. No acute joint erythema or swelling is present.                         Decision-Making          1. Olecranon bursitis of left elbow  Addressed below.  Avoid contact or leaning on things    2. Essential hypertension with goal blood pressure less than 140/90  Continue current medications including Norvasc, low certain    3. Panlobular emphysema (H)  Discuss the benefits of long-acting beta agonist, patient would like to hold off for now.            I have carefully explained the diagnosis and treatment options with the patient. The patient has displayed an understanding of the above, and all subsequent questions were answered.               DO JENNA Bal    Portions of this note were produced using [employer]  Although every attempt at real-time proof reading has been made, occasional grammar/syntax errors may have been missed.               Procedure Note  Procedure: Aspiration of olecranon bursa with injection of steroid medication  Indication: Chronic olecranon bursitis with pain  Medications: 1 cc of 1% lidocaine without epinephrine/1 cc of 40 mg Kenalog suspension  Procedure details: After sterilely prepping the elbow with rubbing alcohol, a insulin syringe was used to superficially inject lidocaine into the skin. Following this a 23-gauge needle on a 60 cc syringe was advanced into the olecranon bursa where 25 cc of fluid were removed. Fluid was resting brown color and nonpurulent. Subsequent to deflation of the bursa, injection of the above medication was performed with a third needle.    Patient tolerated procedure well.    Postprocedural diagnosis: The same as above    Follow-up: As needed      Nurys

## 2017-07-25 NOTE — MR AVS SNAPSHOT
After Visit Summary   7/25/2017    Michele Vance    MRN: [id number]           Patient Information     Date Of Birth          1945        Visit Information        Provider Department      7/25/2017 1:20 PM Juarze Nuno DO [facility]         Follow-ups after your visit        Your next 10 appointments already scheduled     Aug 28, 2017   Procedure with Anthony Mandel MD   [facility] ([facility])    [address] [place] [address] [address] [place] [address]   [phone]           From [address]: Exit at [employer] on south side of [place]. Turn right on [address]. Turn left at stoplight on [address]. [facility] will be in view two blocks ahead            Sep 19, 2017  9:00 AM CDT   US CAROTID BILATERAL with UCUSV2   [facility] US ([facility])    [address]  1st [facility] [id number] [phone]           Please bring a list of your medicines (including vitamins, minerals and over-the-counter drugs). Also, tell your doctor about any allergies you may have. Wear comfortable clothes and leave your valuables at home.  You do not need to do anything special to prepare for your exam.  Please call the Imaging Department at your exam site with any questions.            Sep 19, 2017 10:30 AM CDT   (Arrive by 10:15 AM)   Return Visit with Shola Cuellar MD   [facility] ([facility])    [address]  3rd [facility] [id number] [phone]              Who to contact     If you have questions or need follow up information about today's clinic visit or your schedule please contact [facility] directly at [phone].  Normal or non-critical lab and imaging results will be communicated to you by MyChart, letter or phone within 4 business days after the clinic has received the results. If  "you do not hear from us within 7 days, please contact the clinic through [facility] or phone. If you have a critical or abnormal lab result, we will notify you by phone as soon as possible.  Submit refill requests through [facility] or call your pharmacy and they will forward the refill request to us. Please allow 3 business days for your refill to be completed.          Additional Information About Your Visit        zkipsterharModusly Information     [facility] gives you secure access to your electronic health record. If you see a primary care provider, you can also send messages to your care team and make appointments. If you have questions, please call your primary care clinic.  If you do not have a primary care provider, please call [phone] and they will assist you.        Care EveryWhere ID     This is your Care EveryWhere ID. This could be used by other organizations to access your [place] medical records  [id number]        Your Vitals Were     Pulse Temperature Respirations Height Pulse Oximetry BMI (Body Mass Index)    77 97.1  F (36.2  C) (Tympanic) 16 5' 7\" (1.702 m) 97% 27.1 kg/m2       Blood Pressure from Last 3 Encounters:   07/25/17 110/62   06/22/17 128/70   05/08/17 146/85    Weight from Last 3 Encounters:   07/25/17 173 lb (78.5 kg)   06/30/17 171 lb (77.6 kg)   06/22/17 171 lb (77.6 kg)              Today, you had the following     No orders found for display       Primary Care Provider Office Phone # Fax #    Juarez Stevie Nuno [phone]       [address]   [facility] [address]        Equal Access to Services     [facility] Center[facility] : Hadii naomi montgomery hadashtom Sobe, waaxda luqadaha, qaybta kaalmada maliha, arnoldo scherer. So [facility] [phone].    ATENCIÓN: Si habla español, tiene a fischer disposición servicios gratuitos de asistencia lingüística. Llame al [phone].    We comply with applicable federal civil rights laws and [place] laws. " We do not discriminate on the basis of race, color, national origin, age, disability sex, sexual orientation or gender identity.            Thank you!     Thank you for choosing [facility]  for your care. Our goal is always to provide you with excellent care. Hearing back from our patients is one way we can continue to improve our services. Please take a few minutes to complete the written survey that you may receive in the mail after your visit with us. Thank you!             Your Updated Medication List - Protect others around you: Learn how to safely use, store and throw away your medicines at www.disposemymeds.org.          This list is accurate as of: 7/25/17  6:53 PM.  Always use your most recent med list.                   Brand Name Dispense Instructions for use Diagnosis    amitriptyline 25 MG tablet    ELAVIL    180 tablet    Take 1 tablet (25 mg) by mouth 2 times daily    Migraine without aura and without status migrainosus, not intractable       amLODIPine 10 MG tablet    NORVASC    90 tablet    Take 1 tablet (10 mg) by mouth every evening    Essential hypertension, benign       ASPIRIN PO      Take 325 mg by mouth daily        cilostazol 100 MG tablet    PLETAL    180 tablet    Take 1 tablet (100 mg) by mouth 2 times daily    Peripheral vascular disease, unspecified (H)       gabapentin 300 MG capsule    NEURONTIN    210 capsule    TAKE 1 CAPSULE THREE TIMES A DAY    Arthritis       losartan 25 MG tablet    COZAAR    93 tablet    Take 1 tablet (25 mg) by mouth daily    Essential hypertension, benign       metoclopramide 10 MG tablet    REGLAN    120 tablet    TAKE 1 TABLET FOUR TIMES A DAY    Slow transit constipation       omeprazole 40 MG capsule    priLOSEC    90 capsule    Take 1 capsule (40 mg) by mouth daily    Gastroesophageal reflux disease without esophagitis       polyethylene glycol Packet    MIRALAX/GLYCOLAX     Take 1 packet by mouth daily        psyllium 58.6 % Powd     METAMUCIL     Take by mouth daily        sildenafil 50 MG cap/tab    REVATIO/VIAGRA    6 tablet    Take 0.5-1 tablets (25-50 mg) by mouth daily as needed for erectile dysfunction Take 30 min to 4 hours before intercourse.  Never use with nitroglycerin, terazosin or doxazosin.    Peripheral vascular disease, unspecified (H)       STATIN NOT PRESCRIBED (INTENTIONAL)     1 each    Pt has known rhabdomyolysis in the past, somewhat associated with statins.  Also wasn't able to tolerate Zetia.    Hyperlipidemia LDL goal <130       TOPROL  MG 24 hr tablet   Generic drug:  metoprolol     93 tablet    TAKE 1 TABLET EVERY MORNING    Essential hypertension, benign       zolpidem 5 MG tablet    AMBIEN    90 tablet    Take 1 tablet (5 mg) by mouth nightly as needed for sleep    Primary insomnia

## 2017-08-16 ENCOUNTER — MYC REFILL (OUTPATIENT)
Dept: FAMILY MEDICINE | Facility: OTHER | Age: 72
End: 2017-08-16

## 2017-08-16 DIAGNOSIS — F51.01 PRIMARY INSOMNIA: ICD-10-CM

## 2017-08-17 NOTE — TELEPHONE ENCOUNTER
Message from [email or web address]:  Original authorizing provider: DO Michele Costello would like a refill of the following medications:  zolpidem (AMBIEN) 5 MG tablet [Juarez Nuno DO]    Preferred pharmacy: [facility] - [address]    Comment:

## 2017-08-17 NOTE — TELEPHONE ENCOUNTER
AMBIEN      Last Written Prescription Date:  2/13/17  Last Fill Quantity: 90,   # refills: 1  Last Office Visit with [facility] prescribing provider: 7/25/17  Future Office visit:       Routing refill request to provider for review/approval because:  Drug not on the [facility] refill protocol or controlled substance

## 2017-08-18 RX ORDER — ZOLPIDEM TARTRATE 5 MG/1
5 TABLET ORAL
Qty: 90 TABLET | Refills: 1 | Status: SHIPPED | OUTPATIENT
Start: 2017-08-18 | End: 2019-01-15

## 2017-08-28 ENCOUNTER — HOSPITAL ENCOUNTER (OUTPATIENT)
Dept: GENERAL RADIOLOGY | Facility: CLINIC | Age: 72
End: 2017-08-28
Attending: ANESTHESIOLOGY | Admitting: ANESTHESIOLOGY
Payer: MEDICARE

## 2017-08-28 ENCOUNTER — HOSPITAL ENCOUNTER (OUTPATIENT)
Facility: CLINIC | Age: 72
Discharge: HOME | End: 2017-08-28
Attending: ANESTHESIOLOGY | Admitting: ANESTHESIOLOGY
Payer: MEDICARE

## 2017-08-28 ENCOUNTER — SURGERY (OUTPATIENT)
Age: 72
End: 2017-08-28

## 2017-08-28 VITALS
SYSTOLIC BLOOD PRESSURE: 157 MMHG | OXYGEN SATURATION: 97 % | RESPIRATION RATE: 16 BRPM | DIASTOLIC BLOOD PRESSURE: 73 MMHG

## 2017-08-28 DIAGNOSIS — M53.3 SI (SACROILIAC) JOINT DYSFUNCTION: ICD-10-CM

## 2017-08-28 PROCEDURE — S0020 INJECTION, BUPIVICAINE HYDRO: HCPCS | Performed by: ANESTHESIOLOGY

## 2017-08-28 PROCEDURE — 40000277 XR SURGERY CARM FLUORO LESS THAN 5 MIN W STILLS: Mod: TC

## 2017-08-28 PROCEDURE — 25000125 ZZHC RX 250: Performed by: ANESTHESIOLOGY

## 2017-08-28 PROCEDURE — 27096 INJECT SACROILIAC JOINT: CPT | Performed by: ANESTHESIOLOGY

## 2017-08-28 PROCEDURE — 27096 INJECT SACROILIAC JOINT: CPT | Mod: 50 | Performed by: ANESTHESIOLOGY

## 2017-08-28 PROCEDURE — 25000128 H RX IP 250 OP 636: Performed by: ANESTHESIOLOGY

## 2017-08-28 RX ORDER — BUPIVACAINE HYDROCHLORIDE 2.5 MG/ML
INJECTION, SOLUTION EPIDURAL; INFILTRATION; INTRACAUDAL PRN
Status: DISCONTINUED | OUTPATIENT
Start: 2017-08-28 | End: 2017-08-28 | Stop reason: HOSPADM

## 2017-08-28 RX ORDER — IOPAMIDOL 612 MG/ML
INJECTION, SOLUTION INTRATHECAL PRN
Status: DISCONTINUED | OUTPATIENT
Start: 2017-08-28 | End: 2017-08-28 | Stop reason: HOSPADM

## 2017-08-28 RX ORDER — METHYLPREDNISOLONE ACETATE 40 MG/ML
INJECTION, SUSPENSION INTRA-ARTICULAR; INTRALESIONAL; INTRAMUSCULAR; SOFT TISSUE PRN
Status: DISCONTINUED | OUTPATIENT
Start: 2017-08-28 | End: 2017-08-28 | Stop reason: HOSPADM

## 2017-08-28 RX ADMIN — METHYLPREDNISOLONE ACETATE 40 MG: 40 INJECTION, SUSPENSION INTRA-ARTICULAR; INTRALESIONAL; INTRAMUSCULAR; SOFT TISSUE at 08:33

## 2017-08-28 RX ADMIN — IOPAMIDOL 1 ML: 612 INJECTION, SOLUTION INTRATHECAL at 08:33

## 2017-08-28 RX ADMIN — BUPIVACAINE HYDROCHLORIDE 1 ML: 2.5 INJECTION, SOLUTION EPIDURAL; INFILTRATION; INTRACAUDAL; PERINEURAL at 08:33

## 2017-08-28 RX ADMIN — LIDOCAINE HYDROCHLORIDE 1 ML: 10 INJECTION, SOLUTION EPIDURAL; INFILTRATION; INTRACAUDAL; PERINEURAL at 08:28

## 2017-08-28 NOTE — IP AVS SNAPSHOT
[facility] OR    [address] [place] [address] DR [place]URIDIA [place]ARRASCO [address]    Phone:  [phone]                                       After Visit Summary   8/28/2017    Michele Vance    MRN: [id number]           After Visit Summary Signature Page     I have received my discharge instructions, and my questions have been answered. I have discussed any challenges I see with this plan with the nurse or doctor.    ..........................................................................................................................................  Patient/Patient Representative Signature      ..........................................................................................................................................  Patient Representative Print Name and Relationship to Patient    ..................................................               ................................................  Date                                            Time    ..........................................................................................................................................  Reviewed by Signature/Title    ...................................................              ..............................................  Date                                                            Time

## 2017-08-28 NOTE — OP NOTE
Pre procedure Diagnosis: SI joint dysfunction, chronic lower back pain    Post procedure Diagnosis: Same  Procedure performed: bilateral SI joint injections, fluroscopically guided, contrast controlled  Anesthesia: none  Complications: none  Operators: Anthony Mandel MD     Indications:   Michele Vance is a 72 year old male was sent by Mushtaq Sierra PA-C for sacroiliac joint injections.  The patient has a history of chronic lower back pain across the lower back.  Exam shows tenderness at the SI joints bilaterally.  Other conservative treatments prior to injection include activity modifications, medications, and previous interventional procedures.    Options/alternatives, benefits and risks were discussed with the patient including bleeding, infection, tissue trauma, exposure to radiation, reaction to medications including seizure, nerve injury, weakness, and numbness.  Questions were answered to his satisfaction and he agrees to proceed. Voluntary informed consent was obtained and signed.     Vitals were reviewed: Yes  /73  Resp 16  SpO2 97%  Allergies were reviewed:  Yes   Medications were reviewed:  Yes   Pre-procedure pain score: 5/10    Procedure:  After obtaining signed informed consent, the patient was brought into the procedure suite and was placed in a prone position on the procedure table.   A Pause for the Cause was performed.  The patient was prepped and draped in the usual sterile fashion.     After identifying the bilateral SI joints, the C-arm was rotated obliquely to obtain the best view of the inferior angle of each joint.  Then 2 ml of Lidocaine 1%  was used to anesthetize the skin at both skin entry sites coaxial with the fluoroscopy beam at both locations.  27 gauge 3.5 inch spinal needles were advanced under intermittent fluoroscopy until they were felt to enter the SI joints with positive pain reproduction bilaterally.  Aspiration was negative.    A total of 1 ml of Isovue-300 was  injected, confirming appropriate needle positions, with spread into the intraarticular space, with no intravascular uptake noted.  2 ml of Isovue was wasted. Location was verified in lateral view.    Each joint was injected with 1.5 ml of a combination of 0.5% bupivacaine 2 ml with 40 mg of depomedrol (total injectate volume 3 ml) and the needles were flushed with lidocaine and removed.     Hemostasis was achieved, the area was cleaned, and bandaids were placed when appropriate.  The patient tolerated the procedure well, and was taken to the recovery room.  Images were saved to PACS.    Post-procedure pain score: 5/10  Follow-up includes:   -f/u phone call in one week  -f/u with referring provider    Anthony Mandel MD  [facility] [place]

## 2017-08-28 NOTE — IP AVS SNAPSHOT
MRN:[id number]                      After Visit Summary   8/28/2017    Michele Vance    MRN: [id number]           Thank you!     Thank you for choosing [facility] for your care. Our goal is always to provide you with excellent care. Hearing back from our patients is one way we can continue to improve our services. Please take a few minutes to complete the written survey that you may receive in the mail after you visit with us. Thank you!        Patient Information     Date Of Birth          1945        About your hospital stay     You were admitted on:  August 28, 2017 You last received care in the:  [facility] OR    You were discharged on:  August 28, 2017       Who to Call     For medical emergencies, please call 911.  For non-urgent questions about your medical care, please call your primary care provider or clinic, [phone]  For questions related to your surgery, please call your surgery clinic        Attending Provider     Provider Specialty    Anthony Gonzalez MD ANESTHESIOLOGY-PAIN MEDICINE       Primary Care Provider Office Phone # Fax #    Juarez Stevie NunoDO [phone]      After Care Instructions     Discharge Instructions       [facility]   Procedure Discharge Instructions    Today you saw:    Dr. Anthony Harman    You had an:  Epidural steroid injection   sacro-iliac joint injection   facet joint injection  hip injection  piriformis injection     Medications used:  Lidocaine   Bupivacaine   Dexamethasone Depo-medrol  Omnipaque  Ropivicaine   Kenalog   Omniscan   Normal saline        If you have received sedation before, during, or after your procedure, for the next 24 hours you shall NOT:   -Drive  -Operate machinery  -Drink alcohol  -Sign any legal documents  Be cautious when walking. Numbness and/or weakness in the lower extremities may occur for up to 6-8 hours after the  procedure due to effect of the local anesthetic  Do not drive for 6 hours. The effect of the local anesthetic could slow your reflexes.   You may resume your regular activities after 24 hours  Avoid strenuous activity for the first 24 hours  You may shower, however avoid swimming, tub baths or hot tubs for 24 hours following your procedure  You may have a mild to moderate increase in pain for several days following the injection.  It may take up to 14 days for the steroid medication to start working although you may feel the effect as early as a few days after the procedure.     You may use ice packs for 10-15 minutes, 3 to 4 times a day at the injection site for comfort  Do not use heat to painful areas for 6 to 8 hours. This will give the local anesthetic time to wear off and prevent you from accidentally burning your skin.   You may use anti-inflammatory medications (such as Ibuprofen or Aleve or Advil) or Tylenol for pain control if necessary  If you were fasting, you may resume your normal diet and medications after the procedure  If you have diabetes, check your blood sugar more frequently than usual as your blood sugar may be higher than normal for 10-14 days following a steroid injection. Contact your doctor who manages your diabetes if your blood sugar is higher than usual  If you experience any of the following, call the pain center nursing line during work hours at [phone] or the after hours provider line at [phone]:  -Fever over 100 degree F  -Swelling, bleeding, redness, drainage, warmth at the injection site  -Progressive weakness or numbness in your legs or arms  -Loss of bowel or bladder function  -Unusual headache that is not relieved by Tylenol  -Unusual new onset of pain that is not improving    Phone #s:  Appointment line: [phone];  Nurse line: [phone]                  Your next 10 appointments already scheduled     Sep 19, 2017  9:00 AM CDT   US CAROTID BILATERAL with UCUSV2    [facility] ([facility] [place])    [address] [facility] [address]  1st Floor  [facility] [address] [phone]           Please bring a list of your medicines (including vitamins, minerals and over-the-counter drugs). Also, tell your doctor about any allergies you may have. Wear comfortable clothes and leave your valuables at home.  You do not need to do anything special to prepare for your exam.  Please call the Imaging Department at your exam site with any questions.            Sep 19, 2017 10:30 AM CDT   (Arrive by 10:15 AM)   Return Visit with Shola Cuellar MD   [facility] ([facility])    [address] [facility]  3rd [facility] [address] [phone]              Pending Results     Date and Time Order Name Status Description    8/28/2017 0714 XR Surgery ROBYN L/T 5 Min Fluoro w Stills In process             Admission Information     Date & Time Provider Department Dept. Phone    8/28/2017 Anthony Gonzalez MD [facility] [phone]      Your Vitals Were     Blood Pressure Respirations Pulse Oximetry             161/79 16 98%         MyChart Information     [employer] gives you secure access to your electronic health record. If you see a primary care provider, you can also send messages to your care team and make appointments. If you have questions, please call your primary care clinic.  If you do not have a primary care provider, please call [phone] and they will assist you.        Care EveryWhere ID     This is your Care EveryWhere ID. This could be used by other organizations to access your [place] medical records  [id number]        Equal Access to Services     JIMBO AGUSTIN : Hadii naomi mejias Sobe, waaxda luqadaha, qaybta kaalmada arnoldo jett . So [facility] [phone].    ATENCIÓN: Si habla español, tiene a fischer disposición servicios gratuitos de  asistencia lingüística. Ladonna al [phone].    We comply with applicable federal civil rights laws and [place] laws. We do not discriminate on the basis of race, color, national origin, age, disability sex, sexual orientation or gender identity.               Review of your medicines      UNREVIEWED medicines. Ask your doctor about these medicines        Dose / Directions    amitriptyline 25 MG tablet   Commonly known as:  ELAVIL   Used for:  Migraine without aura and without status migrainosus, not intractable        Dose:  25 mg   Take 1 tablet (25 mg) by mouth 2 times daily   Quantity:  180 tablet   Refills:  2       amLODIPine 10 MG tablet   Commonly known as:  NORVASC   Used for:  Essential hypertension, benign        Dose:  10 mg   Take 1 tablet (10 mg) by mouth every evening   Quantity:  90 tablet   Refills:  3       ASPIRIN PO        Dose:  325 mg   Take 325 mg by mouth daily   Refills:  0       cilostazol 100 MG tablet   Commonly known as:  PLETAL   Used for:  Peripheral vascular disease, unspecified (H)        Dose:  100 mg   Take 1 tablet (100 mg) by mouth 2 times daily   Quantity:  180 tablet   Refills:  3       gabapentin 300 MG capsule   Commonly known as:  NEURONTIN   Used for:  Arthritis        TAKE 1 CAPSULE THREE TIMES A DAY   Quantity:  210 capsule   Refills:  4       losartan 25 MG tablet   Commonly known as:  COZAAR   Used for:  Essential hypertension, benign        Dose:  25 mg   Take 1 tablet (25 mg) by mouth daily   Quantity:  93 tablet   Refills:  3       metoclopramide 10 MG tablet   Commonly known as:  REGLAN   Used for:  Slow transit constipation        TAKE 1 TABLET FOUR TIMES A DAY   Quantity:  120 tablet   Refills:  1       omeprazole 40 MG capsule   Commonly known as:  priLOSEC   Used for:  Gastroesophageal reflux disease without esophagitis        Dose:  40 mg   Take 1 capsule (40 mg) by mouth daily   Quantity:  90 capsule   Refills:  1       polyethylene glycol Packet    Commonly known as:  MIRALAX/GLYCOLAX        Dose:  1 packet   Take 1 packet by mouth daily   Refills:  0       psyllium 58.6 % Powd   Commonly known as:  METAMUCIL        Take by mouth daily   Refills:  0       sildenafil 50 MG cap/tab   Commonly known as:  REVATIO/VIAGRA   Used for:  Peripheral vascular disease, unspecified (H)        Dose:  25-50 mg   Take 0.5-1 tablets (25-50 mg) by mouth daily as needed for erectile dysfunction Take 30 min to 4 hours before intercourse.  Never use with nitroglycerin, terazosin or doxazosin.   Quantity:  6 tablet   Refills:  0       STATIN NOT PRESCRIBED (INTENTIONAL)   Used for:  Hyperlipidemia LDL goal <130        Pt has known rhabdomyolysis in the past, somewhat associated with statins.  Also wasn't able to tolerate Zetia.   Quantity:  1 each   Refills:  0       TOPROL  MG 24 hr tablet   Used for:  Essential hypertension, benign   Generic drug:  metoprolol        TAKE 1 TABLET EVERY MORNING   Quantity:  93 tablet   Refills:  2       zolpidem 5 MG tablet   Commonly known as:  AMBIEN   Used for:  Primary insomnia        Dose:  5 mg   Take 1 tablet (5 mg) by mouth nightly as needed for sleep   Quantity:  90 tablet   Refills:  1                Protect others around you: Learn how to safely use, store and throw away your medicines at www.disposemymeds.org.             Medication List: This is a list of all your medications and when to take them. Check marks below indicate your daily home schedule. Keep this list as a reference.      Medications           Morning Afternoon Evening Bedtime As Needed    amitriptyline 25 MG tablet   Commonly known as:  ELAVIL   Take 1 tablet (25 mg) by mouth 2 times daily                                amLODIPine 10 MG tablet   Commonly known as:  NORVASC   Take 1 tablet (10 mg) by mouth every evening                                ASPIRIN PO   Take 325 mg by mouth daily                                cilostazol 100 MG tablet   Commonly known  as:  PLETAL   Take 1 tablet (100 mg) by mouth 2 times daily                                gabapentin 300 MG capsule   Commonly known as:  NEURONTIN   TAKE 1 CAPSULE THREE TIMES A DAY                                losartan 25 MG tablet   Commonly known as:  COZAAR   Take 1 tablet (25 mg) by mouth daily                                metoclopramide 10 MG tablet   Commonly known as:  REGLAN   TAKE 1 TABLET FOUR TIMES A DAY                                omeprazole 40 MG capsule   Commonly known as:  priLOSEC   Take 1 capsule (40 mg) by mouth daily                                polyethylene glycol Packet   Commonly known as:  MIRALAX/GLYCOLAX   Take 1 packet by mouth daily                                psyllium 58.6 % Powd   Commonly known as:  METAMUCIL   Take by mouth daily                                sildenafil 50 MG cap/tab   Commonly known as:  REVATIO/VIAGRA   Take 0.5-1 tablets (25-50 mg) by mouth daily as needed for erectile dysfunction Take 30 min to 4 hours before intercourse.  Never use with nitroglycerin, terazosin or doxazosin.                                STATIN NOT PRESCRIBED (INTENTIONAL)   Pt has known rhabdomyolysis in the past, somewhat associated with statins.  Also wasn't able to tolerate Zetia.                                TOPROL  MG 24 hr tablet   TAKE 1 TABLET EVERY MORNING   Generic drug:  metoprolol                                zolpidem 5 MG tablet   Commonly known as:  AMBIEN   Take 1 tablet (5 mg) by mouth nightly as needed for sleep

## 2017-08-29 ENCOUNTER — MYC REFILL (OUTPATIENT)
Dept: FAMILY MEDICINE | Facility: OTHER | Age: 72
End: 2017-08-29

## 2017-08-29 DIAGNOSIS — M19.90 ARTHRITIS: ICD-10-CM

## 2017-08-29 RX ORDER — GABAPENTIN 300 MG/1
CAPSULE ORAL
Qty: 210 CAPSULE | Refills: 4 | Status: SHIPPED | OUTPATIENT
Start: 2017-08-29 | End: 2018-09-26

## 2017-08-29 NOTE — TELEPHONE ENCOUNTER
Message from MyChart:  Original authorizing provider: DO Michele Costello would like a refill of the following medications:  gabapentin (NEURONTIN) 300 MG capsule [Juarez Nuno DO]    Preferred pharmacy: [facility] - [address]    Comment:

## 2017-08-29 NOTE — TELEPHONE ENCOUNTER
NEURONTIN      Last Written Prescription Date:  2/25/16  Last Fill Quantity: 210,   # refills: 4  Last Office Visit with [facility] or  Health prescribing provider: 7/25/17  Future Office visit:       Routing refill request to provider for review/approval because:  Drug not on the [facility], [facility] or  [facility] refill protocol or controlled substance

## 2017-09-06 DIAGNOSIS — K59.01 SLOW TRANSIT CONSTIPATION: ICD-10-CM

## 2017-09-08 RX ORDER — METOCLOPRAMIDE 10 MG/1
TABLET ORAL
Qty: 120 TABLET | Refills: 2 | Status: SHIPPED | OUTPATIENT
Start: 2017-09-08 | End: 2017-11-22

## 2017-11-13 ENCOUNTER — TELEPHONE (OUTPATIENT)
Dept: INTERNAL MEDICINE | Facility: CLINIC | Age: 72
End: 2017-11-13

## 2017-11-13 NOTE — TELEPHONE ENCOUNTER
After reviewing the patient's symptoms, I don't believe he needs an appointment immediately but please schedule him for the first available Nonemergent..  Nurys

## 2017-11-13 NOTE — TELEPHONE ENCOUNTER
Reason for Call:  Same Day Appointment, Requested Provider:  Juarez Nuno D.O.    PCP: Juarez Nuno    Reason for visit: sudden onset nausea, dizziness and became pale.  Then went away    Duration of symptoms: Came on Thursday 11/9/17 and Saturday 11/11/17    Have you been treated for this in the past? Yes    Additional comments: wife states he had bypass in the past and had similar symptoms which worries her    Can we leave a detailed message on this number? YES    Phone number patient can be reached at: Home number on file [phone] (home)    Best Time:     Call taken on 11/13/2017 at 10:02 AM by Abbi Garcia

## 2017-11-13 NOTE — TELEPHONE ENCOUNTER
Called and spoke to the patient. He has a couple symptoms he would like Dr. Nuno to review.     On Thursday he said he got up on a ladder in the garage and suddenly felt extremely dizzy. He said the entire room was spinning. Patient was able to hang onto the ladder and get down. He said he felt nauseous and then threw up. Patient said the dizziness lasted about 5 minutes and then was better after he threw up.     Patient said this same episode happened on Saturday as well. He said as soon as he got up on the ladder in the garage he felt dizzy and sick to his stomach. He said he got down and took an antinausea pill and then felt better. Patient said yesterday he felt nauseous and dizzy but was not up on the ladder. He said again it lasted about 5 minutes and then went away. Patient denies any chest pain or trouble breathing.     Patient said today he feels fine. He said he has not been up on the ladder too much today. He said he does have halogen lights in the garage. He is not sure if the lights cause the dizziness or maybe the height of the ladder?    Wife is concerned because he has had a bypass in the past. Patient is wondering if Dr. Nuno thinks he should come in for a check up or see if symptoms improve. He again said today he has been feeling fine. He also denies any chest pain or any other symptom besides nausea and dizziness.     Will route to provider for further advise.     Maura Shukla RN  [facility]

## 2017-11-13 NOTE — TELEPHONE ENCOUNTER
Pt was advised of the recommendations from Dr. Nuno. Pt was scheduled on 11/20/17 at 7:20 am for a follow on Dizziness.

## 2017-11-22 DIAGNOSIS — K59.01 SLOW TRANSIT CONSTIPATION: ICD-10-CM

## 2017-11-24 RX ORDER — METOCLOPRAMIDE 10 MG/1
TABLET ORAL
Qty: 120 TABLET | Refills: 2 | Status: SHIPPED | OUTPATIENT
Start: 2017-11-24 | End: 2018-12-07

## 2017-11-24 NOTE — TELEPHONE ENCOUNTER
Requested Prescriptions   Pending Prescriptions Disp Refills     metoclopramide (REGLAN) 10 MG tablet 120 tablet 2     Sig: TAKE 1 TABLET FOUR TIMES A DAY     Antivertigo/Antiemetic Agents Passed    11/22/2017 11:15 AM       Passed - Recent or future visit with authorizing provider's specialty    Patient had office visit in the last year or has a visit in the next 30 days with authorizing provider.  See chart review.              Passed - Patient is 18 years of age or older

## 2017-11-24 NOTE — TELEPHONE ENCOUNTER
Prescription approved per [facility] Refill Protocol.    Maura Shukla RN  [facility]

## 2017-12-07 ENCOUNTER — TELEPHONE (OUTPATIENT)
Dept: CARDIOLOGY | Facility: CLINIC | Age: 72
End: 2017-12-07

## 2017-12-07 DIAGNOSIS — E78.5 HYPERLIPIDEMIA LDL GOAL <130: Primary | ICD-10-CM

## 2017-12-07 NOTE — TELEPHONE ENCOUNTER
RN spoke with patient regarding interest in pursuing prior authorization for Praluent. Patient remains interested. He will need to have a current lipid profile.

## 2017-12-08 NOTE — TELEPHONE ENCOUNTER
RN called patient and left . Per Dr. Felipe, we are to start the process for PCKS9. Lipid panel order has been placed. Informed patient in  to have his labs drawn in the next 2 weeks.

## 2017-12-09 DIAGNOSIS — E78.5 HYPERLIPIDEMIA LDL GOAL <130: ICD-10-CM

## 2017-12-09 LAB
CHOLEST SERPL-MCNC: 233 MG/DL
HDLC SERPL-MCNC: 41 MG/DL
LDLC SERPL CALC-MCNC: 167 MG/DL
NONHDLC SERPL-MCNC: 192 MG/DL
TRIGL SERPL-MCNC: 125 MG/DL

## 2017-12-09 PROCEDURE — 36415 COLL VENOUS BLD VENIPUNCTURE: CPT | Performed by: INTERNAL MEDICINE

## 2017-12-09 PROCEDURE — 80061 LIPID PANEL: CPT | Performed by: INTERNAL MEDICINE

## 2017-12-15 ENCOUNTER — TELEPHONE (OUTPATIENT)
Dept: CARDIOLOGY | Facility: CLINIC | Age: 72
End: 2017-12-15

## 2017-12-15 NOTE — TELEPHONE ENCOUNTER
Patient's wife called with questions about obtaining drug from a local [facility] pharmacy. [facility]  RN also provided the MyPraluent help line for injection training. Advised wife to call back with any other questions or concerns.

## 2017-12-19 ENCOUNTER — TELEPHONE (OUTPATIENT)
Dept: CARDIOLOGY | Facility: CLINIC | Age: 72
End: 2017-12-19

## 2017-12-19 NOTE — TELEPHONE ENCOUNTER
Called patient to inform him he was approved for Praluent. Asked patient to call back to discuss resources available for training.

## 2018-01-04 ENCOUNTER — TELEPHONE (OUTPATIENT)
Dept: CARDIOLOGY | Facility: CLINIC | Age: 73
End: 2018-01-04

## 2018-01-04 DIAGNOSIS — E78.5 HYPERLIPIDEMIA LDL GOAL <130: Primary | ICD-10-CM

## 2018-01-08 ENCOUNTER — TELEPHONE (OUTPATIENT)
Dept: FAMILY MEDICINE | Facility: OTHER | Age: 73
End: 2018-01-08

## 2018-01-08 DIAGNOSIS — Z12.11 SPECIAL SCREENING FOR MALIGNANT NEOPLASMS, COLON: Primary | ICD-10-CM

## 2018-01-08 DIAGNOSIS — I10 ESSENTIAL HYPERTENSION, BENIGN: ICD-10-CM

## 2018-01-08 NOTE — TELEPHONE ENCOUNTER
Requested Prescriptions   Pending Prescriptions Disp Refills     TOPROL  MG 24 hr tablet [Pharmacy Med Name: TOPROL XL TABS 100MG] 93 tablet 2     Sig: TAKE 1 TABLET EVERY MORNING    Beta-Blockers Protocol Failed    1/8/2018  9:46 AM       Failed - Blood pressure under 140/90    BP Readings from Last 3 Encounters:   08/28/17 157/73   07/25/17 110/62   06/22/17 128/70                Passed - Patient is age 6 or older       Passed - Recent or future visit with authorizing provider's specialty    Patient had office visit in the last year or has a visit in the next 30 days with authorizing provider.  See chart review.               Last Written Prescription Date:  4/3/17  Last Fill Quantity: 93,  # refills: 2   Last Office Visit with G, P or [facility] prescribing provider:  11/20/17   Future Office Visit:

## 2018-01-08 NOTE — TELEPHONE ENCOUNTER
Reason for Call: Request for an order or referral:  Order     Order or referral being requested: Colonoscopy    Date needed: at your convenience    Has the patient been seen by the PCP for this problem? Not Applicable    Additional comments: Pt received notice in the mail that he is due for a colonscopy    Phone number Patient can be reached at:  [phone]    Best Time:  any    Can we leave a detailed message on this number?  YES    Call taken on 1/8/2018 at 11:07 AM by Kassandra Short

## 2018-01-09 RX ORDER — METOPROLOL SUCCINATE 100 MG/1
TABLET, EXTENDED RELEASE ORAL
Qty: 93 TABLET | Refills: 1 | Status: SHIPPED | OUTPATIENT
Start: 2018-01-09 | End: 2018-07-02

## 2018-01-09 NOTE — TELEPHONE ENCOUNTER
Routing refill request to provider for review/approval because:  Labs out of range:  BP    Maura Shukla, RN  [facility]

## 2018-01-10 ENCOUNTER — TELEPHONE (OUTPATIENT)
Dept: INTERNAL MEDICINE | Facility: CLINIC | Age: 73
End: 2018-01-10

## 2018-01-10 DIAGNOSIS — Z12.11 SPECIAL SCREENING FOR MALIGNANT NEOPLASM OF COLON: Primary | ICD-10-CM

## 2018-01-10 RX ORDER — BISACODYL 5 MG/1
TABLET, DELAYED RELEASE ORAL
Qty: 4 TABLET | Refills: 0 | Status: SHIPPED | OUTPATIENT
Start: 2018-01-10 | End: 2018-04-02

## 2018-01-10 NOTE — LETTER
COLONOSCOPY INSTRUCTIONS   For patients with CHF, cardiomyopathy,   kidney and/or renal disease or over the age of 75    (with GoLytely/NuLytely/CoLyte)    Patient Name   Michele Vance   Procedure Date 1/16/2047 Arrival Time 8:00am Procedure Time 9:00am   Physician Dr. Sims   Location   [facility], Same Day Surgery   Other Instructions       Review the preparation schedule below for the five days preceding your procedure.     If you have any questions, please call [phone].  YOU WILL NEED TO PURCHASE   - Bisacodyl/Dulcolax tablets  5 mg (4 ORAL tablets) (No prescription needed)  - Fill your prescription for GoLytely/NuLytely/Colyte  - A packet of non-red or non-purple Crystal Light (Optional--to improve taste of prep solution)   BEFORE THE PROCEDURE   - You will receive a phone call 1 to 2 days prior to your procedure. Information will be collected to pre-admit you, which will assist us in giving you the best care possible or you can call [phone].  - If you are diabetic, consult your physician for additional instruction.  - Check with your insurance carrier about coverage (phone number on the back of your insurance card).  - You will need to make arrangements to have someone drive you home. You will not be able to drive the day of your examination. You can expect to be here approximately 2 hours; your  needs to be at [facility] 2 hours after your arrival time.  - You will not be able to return to work the day of your procedure.  - If using iron supplements, stop taking those five days before your procedure.  - Three days before your procedure, begin a low-fiber diet. Avoid raw fruits, vegetables, whole wheat, nuts, popcorn, Metamucil, Fibercon, bran or bulking agents. Meats and cooked vegetables are fine.  - Two days before your procedure, increase your water intake (8 glasses of water is recommended).   DAY BEFORE PROCEDURE   - IF YOU ARE SCHEDULED TO ARRIVE AT 11 AM OR  BEFORE FOLLOW THE INSTRUCTIONS BELOW  - You will need to be on a clear liquid diet the entire day. No solid foods.  - In the morning, mix the GoLytely/NuLytely/Colyte powder with water until completely dissolved and then refrigerate.  - At 9 am take four tablets of Bisacodyl.  -  At 3 pm start drinking the prep solution. You will need to have access to the bathroom once you start drinking the prep solution. Some people prefer to drink the solution at room temperature. You may take it out of the refrigerator 1-2 hours prior to drinking it. You may also add a packet of non-red or non-purple Crystal Light to improve the taste. It is best to drink an 8 oz glassful every 15 minutes.    - It will take about 4-6 hours to drink the solution.  Continue drinking clear liquids after you have finished the prep solution.   - If you experience bloating, cramping, nausea, or vomiting, take a 15-30 minute break and then start drinking prep solution again.     IF YOU ARE SCHEDULED TO ARRIVE 11:00 AM OR LATER:  DAY BEFORE PROCEDURE   - You will need to be on a clear liquid diet the entire day (SEE BELOW). No solid foods.  - In the morning, mix the GoLytely/NuLytely/Colyte powder with water until completely dissolved and then refrigerate.  - At 9 am take four tablets of Bisacodyl.  - At 3 pm you will start drinking your prep solution. You will only be drinking half of the solution today, it should take you about 2 - 3 hours to drink half. Some people prefer to drink the solution at room temperature. You may take it out of the refrigerator 1-2 hours prior to drinking it. You may also add a packet of non-red or non-purple Crystal Light to improve the taste. It is best to drink an 8 oz glassful every 15 minutes.    -  Put the remainder of the prep solution back in the refrigerator for procedure morning.   - If you experience bloating, cramping, nausea, or vomiting, take a 15-30 minute break and then start drinking the prep solution  again.   PROCEDURE DAY    - THIS STEP ONLY FOR THOSE WHO DRANK HALF OF PREP DAY BEFORE  - Start drinking your last half of prep solution at 6 am.  Drink an 8 oz glass every 15 minutes until prep solution is gone.  It will take about 2 to 3 hours to finish solution.   - No eating or drinking 3 hours prior to your procedure.  - If you experience bloating, cramping, nausea, or vomiting take a 15 to 30 minute break and then start drinking prep solution again.   Dress comfortably. Short sleeves are allowed under your patient gown. You may have clear liquids until three hours before your procedure. Please do not wear any perfume or cologne.    Please check with your provider regarding holding any medications. Please bring a list of your current medications with you. If you have any questions regarding these instructions, please call us at [phone].        CLEAR LIQUID DIET  The clear liquid diet are foods that are free of fat and fiber. They will liquefy to clear liquid at room temperature. No red or purple colored juices or Jell-O s, dairy products, or alcoholic beverages.  TYPE OF FOOD USE ONLY THESE FOODS   Beverages Coffee      Tea      Decaffeinated coffee  Carbonated beverages (pop)  Water  Sport drinks (except red or purple)   Desserts Jell-O (except red or purple)  Fruit ice  Popsicle   Fruit and Fruit Juices Citrus juices, strained  Fruit nectars, strained  Apple juice  Fruit drinks (except red or purple)   Soups Broth  Bouillon  Consommé  Meat stock, strained  Vegetable broth, strained   Sweets Hard candy, non-red or non-purple  Sugar   Miscellaneous Flavorings  Salt           [place] [facility] [place]:   Take the [address]nd [address] exit off of [address] (on the south side of [place]).  Turn left on [address]  Turn left at the first stoplight (at [facility]).  The hospital and clinic is the third building on the left.     From the South:  Follow [address] north to  the first [place] exit.  Exit on [address] following it to the right.  Turn left at the first stoplight.  The hospital and clinic is the third building on the left.    From the East:     Following [address] west, turn left at the stoplight onto [address] Follow [address] through [place].  Take a right at the light on [address] (at [facility]).  The hospital and clinic is the third building on the left.    From the West:    Following [address] going east, turn south on [address].  Take the [address] exit off of [address] (on the south side of [place]).  Follow [address] through [place] to the stoplight on [address] (at [facility]). Take a left.  The hospital and clinic is the third building on the left.    Colonoscopy  What you should know    What is a colonoscopy?  A colonoscopy lets the doctor look inside your large intestine (colon). The doctor guides a long, flexible, lighted tube through the entire colon. The exam is used to look for early signs of cancer. It is also used to find the cause of a change in bowel habits. The doctor is able to see any inflamed tissue, polyps, ulcers, bleeding or muscle spasms.    How do I prepare for the exam?  See the guidelines for cleaning out your colon. The steps to prepare your colon begin four days before the exam.    Please arrive with someone who can drive you home after the exam. The medicines used in the exam will make you sleepy.  You will not be able to drive. You cannot take a bus or taxi by yourself.  You cannot walk home.    How do I prepare the day of the exam?    *Dress in comfortable, loose clothes.  *Leave your purse, billfold, credit cards, etc. at home.  *Bring your insurance card.  *Bring a list of your medications.  *Plan to arrive on time.  *We do our best to stay on time, but there may be a delay. Please bring something to pass the time, such as a newspaper, book or magazine.    What happens when I arrive?    *You will do  some paper work.  *We will take you to the admission area. Your family may stay with you during this time.  *A nurse will check your records and ask you questions.  *You will change into a hospital gown without underwear.   *You will sign a consent form.  *We will start an IV (intravenous). We will use it to give you medicines during the exam.    What happens during the exam?    *We will take you on a cart to the exam room.   *You can ask questions of the doctor who is doing your exam.  *You will lie on your left side on a table.    *You will receive medicines through your IV to relax you and for pain.    *The doctor will insert a thin, lighted tube (scope) into your rectum. Then the doctor will slowly guide it into your colon. The scope bends, so he or she can move it around the curves of your colon.    *The scope sends an image of the inside of the colon. The image allows the doctor to examine the lining of the colon.    *We may ask you to change positions to help the doctor move the scope.    *The scope also blows air into your colon. This enlarges the colon and helps the doctor see the lining.  *You should feel little pain during the exam.   *You may feel full and have cramps. Breathe slowly and evenly to help your body relax. Tell your doctor or nurse if you have any pain.    If we see a polyp, we will remove a piece of it (polypectomy). That tissue (biopsy) will be tested in the lab. Most polyps are benign (not cancer). We remove most polyps because they can cause bleeding or turn into cancer.     Risks of the exam are bleeding and piercing or tearing the colon. But this is rare.    What happens after the exam?    *We will return you to your room. We will watch you for one hour or until most of the pain medicine has worn off.  *You may feel bloated or have cramping for a short time because of the air injected during the exam. You will pass the rest of the air from your colon in the next couple hours.  *We will  remove the IV.   *Your first meal should be light. Slowly return to normal meals, unless your doctor gives you other orders.

## 2018-01-11 NOTE — TELEPHONE ENCOUNTER
Spoke with patient, he is aware of Rx in [place], I am also going to call (Sera at [facility]) and have prep instructions placed at the  for him.    Ale Merritt XRO/  [facility]

## 2018-01-16 ENCOUNTER — HOSPITAL ENCOUNTER (OUTPATIENT)
Facility: CLINIC | Age: 73
Discharge: HOME | End: 2018-01-16
Attending: SURGERY | Admitting: SURGERY
Payer: MEDICARE

## 2018-01-16 ENCOUNTER — ANESTHESIA (OUTPATIENT)
Dept: GASTROENTEROLOGY | Facility: CLINIC | Age: 73
End: 2018-01-16
Payer: MEDICARE

## 2018-01-16 ENCOUNTER — ANESTHESIA EVENT (OUTPATIENT)
Dept: GASTROENTEROLOGY | Facility: CLINIC | Age: 73
End: 2018-01-16
Payer: MEDICARE

## 2018-01-16 ENCOUNTER — SURGERY (OUTPATIENT)
Age: 73
End: 2018-01-16

## 2018-01-16 VITALS
RESPIRATION RATE: 16 BRPM | OXYGEN SATURATION: 97 % | TEMPERATURE: 97.5 F | SYSTOLIC BLOOD PRESSURE: 170 MMHG | DIASTOLIC BLOOD PRESSURE: 82 MMHG

## 2018-01-16 LAB — COLONOSCOPY: NORMAL

## 2018-01-16 PROCEDURE — 45385 COLONOSCOPY W/LESION REMOVAL: CPT | Performed by: SURGERY

## 2018-01-16 PROCEDURE — 25000128 H RX IP 250 OP 636: Performed by: NURSE ANESTHETIST, CERTIFIED REGISTERED

## 2018-01-16 PROCEDURE — 40000296 ZZH STATISTIC ENDO RECOVERY CLASS 1:2 FIRST HOUR: Performed by: SURGERY

## 2018-01-16 PROCEDURE — 45385 COLONOSCOPY W/LESION REMOVAL: CPT | Mod: PT | Performed by: SURGERY

## 2018-01-16 PROCEDURE — 88305 TISSUE EXAM BY PATHOLOGIST: CPT | Mod: 26 | Performed by: SURGERY

## 2018-01-16 PROCEDURE — 25000125 ZZHC RX 250: Performed by: NURSE ANESTHETIST, CERTIFIED REGISTERED

## 2018-01-16 PROCEDURE — 88305 TISSUE EXAM BY PATHOLOGIST: CPT | Performed by: SURGERY

## 2018-01-16 RX ORDER — LIDOCAINE HYDROCHLORIDE 20 MG/ML
INJECTION, SOLUTION INFILTRATION; PERINEURAL PRN
Status: DISCONTINUED | OUTPATIENT
Start: 2018-01-16 | End: 2018-01-16

## 2018-01-16 RX ORDER — NALOXONE HYDROCHLORIDE 0.4 MG/ML
.1-.4 INJECTION, SOLUTION INTRAMUSCULAR; INTRAVENOUS; SUBCUTANEOUS
Status: DISCONTINUED | OUTPATIENT
Start: 2018-01-16 | End: 2018-01-16 | Stop reason: HOSPADM

## 2018-01-16 RX ORDER — ONDANSETRON 2 MG/ML
4 INJECTION INTRAMUSCULAR; INTRAVENOUS
Status: DISCONTINUED | OUTPATIENT
Start: 2018-01-16 | End: 2018-01-16 | Stop reason: HOSPADM

## 2018-01-16 RX ORDER — LIDOCAINE 40 MG/G
CREAM TOPICAL
Status: DISCONTINUED | OUTPATIENT
Start: 2018-01-16 | End: 2018-01-16 | Stop reason: HOSPADM

## 2018-01-16 RX ORDER — PROPOFOL 10 MG/ML
INJECTION, EMULSION INTRAVENOUS PRN
Status: DISCONTINUED | OUTPATIENT
Start: 2018-01-16 | End: 2018-01-16

## 2018-01-16 RX ORDER — SODIUM CHLORIDE, SODIUM LACTATE, POTASSIUM CHLORIDE, CALCIUM CHLORIDE 600; 310; 30; 20 MG/100ML; MG/100ML; MG/100ML; MG/100ML
INJECTION, SOLUTION INTRAVENOUS CONTINUOUS
Status: DISCONTINUED | OUTPATIENT
Start: 2018-01-16 | End: 2018-01-16 | Stop reason: HOSPADM

## 2018-01-16 RX ORDER — ONDANSETRON 4 MG/1
4 TABLET, ORALLY DISINTEGRATING ORAL EVERY 6 HOURS PRN
Status: DISCONTINUED | OUTPATIENT
Start: 2018-01-16 | End: 2018-01-16 | Stop reason: HOSPADM

## 2018-01-16 RX ORDER — FLUMAZENIL 0.1 MG/ML
0.2 INJECTION, SOLUTION INTRAVENOUS
Status: DISCONTINUED | OUTPATIENT
Start: 2018-01-16 | End: 2018-01-16 | Stop reason: HOSPADM

## 2018-01-16 RX ORDER — PROPOFOL 10 MG/ML
INJECTION, EMULSION INTRAVENOUS CONTINUOUS PRN
Status: DISCONTINUED | OUTPATIENT
Start: 2018-01-16 | End: 2018-01-16

## 2018-01-16 RX ORDER — ONDANSETRON 2 MG/ML
4 INJECTION INTRAMUSCULAR; INTRAVENOUS EVERY 6 HOURS PRN
Status: DISCONTINUED | OUTPATIENT
Start: 2018-01-16 | End: 2018-01-16 | Stop reason: HOSPADM

## 2018-01-16 RX ADMIN — SODIUM CHLORIDE, POTASSIUM CHLORIDE, SODIUM LACTATE AND CALCIUM CHLORIDE: 600; 310; 30; 20 INJECTION, SOLUTION INTRAVENOUS at 08:24

## 2018-01-16 RX ADMIN — PROPOFOL 50 MG: 10 INJECTION, EMULSION INTRAVENOUS at 08:59

## 2018-01-16 RX ADMIN — LIDOCAINE HYDROCHLORIDE 1 ML: 10 INJECTION, SOLUTION EPIDURAL; INFILTRATION; INTRACAUDAL; PERINEURAL at 08:24

## 2018-01-16 RX ADMIN — LIDOCAINE HYDROCHLORIDE 80 MG: 20 INJECTION, SOLUTION INFILTRATION; PERINEURAL at 08:58

## 2018-01-16 RX ADMIN — PROPOFOL 150 MCG/KG/MIN: 10 INJECTION, EMULSION INTRAVENOUS at 08:59

## 2018-01-16 ASSESSMENT — LIFESTYLE VARIABLES: TOBACCO_USE: 1

## 2018-01-16 NOTE — DISCHARGE INSTRUCTIONS
[facility]    Home Care Following Endoscopy    Dr. Josef Sims      Activity:    You have just undergone an endoscopic procedure usually performed with conscious sedation.  Do not work or operate machinery (including a car) for at least 12 hours.      I encourage you to walk and attempt to pass this air as soon as possible.    Diet:    Return to the diet you were on before your procedure but eat lightly for the first 12-24 hours.    Drink plenty of water.    Resume any regular medications unless otherwise advised by your physician.  Please begin any new medication prescribed as a result of your procedure as directed by your physician.     If you had any biopsy or polyp removed please refrain from aspirin or aspirin products for 2 days.  If on Coumadin please restart as instructed by your physician.   Pain:    You may take Tylenol as needed for pain.  Expected Recovery:    You can expect some mild abdominal fullness and/or discomfort due to the air used to inflate your intestinal tract. It is also normal to have a mild sore throat after upper endoscopy.    Call Your Physician if You Have:    After Upper Endoscopy:  o Shoulder, back or chest pain.  o Difficulty breathing or swallowing.  o Vomiting blood.    After Colonoscopy:  o Worsening persisting abdominal pain which is worse with activity.  o Fevers (>101 degrees F), chills or shakes.  o Passage of continued blood with bowel movements.   Any questions or concerns about your recovery, please call [phone] or after hours [phone] Nurse Advice Line.    Follow-up Care:    You should receive a call or letter with your results within 1 week. Please call if you have not received a notification of your results.  If asked to return to clinic please make an appointment 1 week after your procedure.  Call [phone]      [facility]   Adult Discharge Orders & Instructions     For 24 hours after surgery    1. Get plenty of rest.  A  responsible adult must stay with you for at least 24 hours after you leave the hospital.   2. Do not drive or use heavy equipment.  If you have weakness or tingling, don't drive or use heavy equipment until this feeling goes away.  3. Do not drink alcohol.  4. Avoid strenuous or risky activities.  Ask for help when climbing stairs.   5. You may feel lightheaded.  IF so, sit for a few minutes before standing.  Have someone help you get up.   6. If you have nausea (feel sick to your stomach): Drink only clear liquids such as apple juice, ginger ale, broth or 7-Up.  Rest may also help.  Be sure to drink enough fluids.  Move to a regular diet as you feel able.  7. You may have a slight fever. Call the doctor if your fever is over 100 F (37.7 C) (taken under the tongue) or lasts longer than 24 hours.  8. You may have a dry mouth, a sore throat, muscle aches or trouble sleeping.  These should go away after 24 hours.  9. Do not make important or legal decisions.   Call your doctor for any of the followin.  Signs of infection (fever, growing tenderness at the surgery site, a large amount of drainage or bleeding, severe pain, foul-smelling drainage, redness, swelling).    2. It has been over 8 to 10 hours since surgery and you are still not able to urinate (pass water).    3.  Headache for over 24 hours.  4.  Numbness, tingling or weakness the day after surgery (if you had spinal anesthesia).

## 2018-01-16 NOTE — IP AVS SNAPSHOT
[facility]    [address] [address]    Phone:  [phone]                                       After Visit Summary   1/16/2018    Michele Vance    MRN: [id number]           After Visit Summary Signature Page     I have received my discharge instructions, and my questions have been answered. I have discussed any challenges I see with this plan with the nurse or doctor.    ..........................................................................................................................................  Patient/Patient Representative Signature      ..........................................................................................................................................  Patient Representative Print Name and Relationship to Patient    ..................................................               ................................................  Date                                            Time    ..........................................................................................................................................  Reviewed by Signature/Title    ...................................................              ..............................................  Date                                                            Time

## 2018-01-16 NOTE — ANESTHESIA PREPROCEDURE EVALUATION
Anesthesia Evaluation     . Pt has had prior anesthetic. Type: General and MAC           ROS/MED HX    ENT/Pulmonary:     (+)tobacco use, Past use , . .    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 : . . . :. .       METS/Exercise Tolerance:     Hematologic:  - neg hematologic  ROS       Musculoskeletal:  - neg musculoskeletal ROS       GI/Hepatic:     (+) GERD Asymptomatic on medication,       Renal/Genitourinary:  - ROS Renal section negative       Endo:  - neg endo ROS       Psychiatric:  - neg psychiatric ROS       Infectious Disease:  - neg infectious disease ROS       Malignancy:      - no malignancy   Other:    - neg other ROS                 Physical Exam  Normal systems: cardiovascular, pulmonary and dental    Airway   Mallampati: IV  TM distance: <3 FB  Neck ROM: full    Dental     Cardiovascular   Rhythm and rate: regular and normal      Pulmonary    breath sounds clear to auscultation                    Anesthesia Plan      History & Physical Review  History and physical reviewed and following examination; no interval change.    ASA Status:  3 .    NPO Status:  > 2 hours    Plan for MAC with Propofol induction. Maintenance will be Balanced.           Postoperative Care      Consents  Anesthetic plan, risks, benefits and alternatives discussed with:  Patient..                          .

## 2018-01-16 NOTE — ANESTHESIA POSTPROCEDURE EVALUATION
Patient: Michele Vance    Procedure(s):  Colonoscopy with polypectomy by snare - Wound Class: II-Clean Contaminated    Diagnosis:screening  Diagnosis Additional Information: No value filed.    Anesthesia Type:  MAC    Note:  Anesthesia Post Evaluation    Patient location during evaluation: Phase 2  Patient participation: Able to fully participate in evaluation  Level of consciousness: awake and alert  Pain management: adequate  Airway patency: patent  Cardiovascular status: acceptable  Respiratory status: acceptable  Hydration status: acceptable  PONV: none     Anesthetic complications: None          Last vitals:  Vitals:    01/16/18 0814   BP: 187/85   Resp: 20   Temp: 97.5  F (36.4  C)   SpO2: 97%         Electronically Signed By: MIAH Velasco CRNA  January 16, 2018  9:31 AM

## 2018-01-16 NOTE — LETTER
Michele Vance  [address] [facility] [address]    January 19, 2018    Dear Michele,  This letter is to inform you of the results of your pathology report from your colonoscopy.  Your pathology report was:  FINAL DIAGNOSIS:   Colon polyp, descending:   - Tubular adenoma.   - Negative for high grade dysplasia or malignancy.    This type of polyp carries a low risk to develop into a cancer. It was completely removed at the time of your colonoscopy. For these reasons I recommend a surveillance colonoscopy in 5 years.  If you have further questions please don t hesitate to call our clinic at [phone].   Sincerely,     Josef Sims, DO

## 2018-01-16 NOTE — ANESTHESIA CARE TRANSFER NOTE
Patient: Michele Vance    Procedure(s):  Colonoscopy with polypectomy by snare - Wound Class: II-Clean Contaminated    Diagnosis: screening  Diagnosis Additional Information: No value filed.    Anesthesia Type:   MAC     Note:  Airway :Face Mask  Patient transferred to:Phase II  Handoff Report: Identifed the Patient, Identified the Reponsible Provider, Reviewed the pertinent medical history, Discussed the surgical course, Reviewed Intra-OP anesthesia mangement and issues during anesthesia, Set expectations for post-procedure period and Allowed opportunity for questions and acknowledgement of understanding      Vitals: (Last set prior to Anesthesia Care Transfer)    CRNA VITALS  1/16/2018 0854 - 1/16/2018 0930      1/16/2018             Resp Rate (observed): 15                Electronically Signed By: MIAH Velasco CRNA  January 16, 2018  9:30 AM

## 2018-01-16 NOTE — H&P
Patient seen for screening colonoscopy    HPI:  Patient is a 72 year old male with no active GI issues. He carried a diagnosis of Crohn's disease for several years made by clinical evaluation but multiple colonscopies since with biopsies have not demonstrated this. He denies rectal bleeding, no unexplained weight loss or abdominal pain. He does take an ASA. He takes cilostazol but he held it for the procedure today. No family history of colon cancer or IBD. He has had multiple cardiac bypass and a left sided carotid stent. He took plavix for some time but currently only ASA.    Review Of Systems    Skin: negative  Ears/Nose/Throat: negative  Respiratory: No shortness of breath, dyspnea on exertion, cough, or hemoptysis  Cardiovascular: negative  Gastrointestinal: as above  Genitourinary: negative  Musculoskeletal: negative  Neurologic: negative  Hematologic/Lymphatic/Immunologic: negative  Endocrine: negative      Past Medical History:   Diagnosis Date     Carotid stenosis     right endartectomy     Chronic kidney disease     stage 3     Coronary artery disease 3-2014    CABG x6     Crohn's disease (H)      CVA (cerebral infarction)     balance problems, mild     Elevated CK      Esophageal reflux      Hypercholesteremia      Hypertension      Nonsenile cataract      Other and unspecified hyperlipidemia      PAD (peripheral artery disease) (H)      Rhabdomyolysis 2010     Unspecified essential hypertension        Past Surgical History:   Procedure Laterality Date     APPENDECTOMY  1974     BYPASS GRAFT ARTERY CORONARY  3/5/2014    Procedure: BYPASS GRAFT ARTERY CORONARY;  Median Sternotomy, Coronary Artery Bypass Graft X6 used Left internal mammary artery, Left and Right Greater Saphenous vein, on Pump oxygenator.;  Surgeon: Tim Montanez MD;  Location: [facility] OR     CATARACT IOL, RT/LT Bilateral 2008    in [place]     cataracts       COLONOSCOPY  12/12/02    [facility]     COLONOSCOPY N/A 10/7/2014     Procedure: COMBINED COLONOSCOPY, SINGLE OR MULTIPLE BIOPSY/POLYPECTOMY BY BIOPSY;  Surgeon: Micheal Mora MD;  Location:  GI     DENTAL SURGERY      TMJ, implants     ENDARTERECTOMY CAROTID      right carotid stent     ESOPHAGOSCOPY, GASTROSCOPY, DUODENOSCOPY (EGD), COMBINED Left 10/7/2014    Procedure: COMBINED ESOPHAGOSCOPY, GASTROSCOPY, DUODENOSCOPY (EGD), BIOPSY SINGLE OR MULTIPLE;  Surgeon: Micheal Mora MD;  Location:  GI     ESOPHAGOSCOPY, GASTROSCOPY, DUODENOSCOPY (EGD), COMBINED Left 10/7/2014    Procedure: COMBINED ESOPHAGOSCOPY, GASTROSCOPY, DUODENOSCOPY (EGD), REMOVE FOREIGN BODY;  Surgeon: Micheal Mora MD;  Location:  GI      CAPSULE ENDOSCOPY N/A 10/7/2014    Procedure: CAPSULE/PILL CAM ENDOSCOPY;  Surgeon: Micheal Mora MD;  Location:  GI      UGI ENDOSCOPY, SIMPLE EXAM  01/08/99    [facility]     INJECT EPIDURAL LUMBAR Right 5/8/2017    Procedure: INJECT EPIDURAL LUMBAR;  Lumbar transforaminal Epidural Steroid Injection right lumbar 4-5, and right  lumbar 5-Sacral 1;  Surgeon: Anthony Gonzalez MD;  Location: [facility] OR     INJECT JOINT SACROILIAC Bilateral 8/28/2017    Procedure: INJECT JOINT SACROILIAC;  sacroiliac joint injection bilateral;  Surgeon: Anthony Gonzalez MD;  Location:  OR     KNEE SURGERY  1976    left knee reconstruction     lasix  2001    both eyes     RELEASE CARPAL TUNNEL  1/13/2011    RELEASE CARPAL TUNNEL performed by JO MADRID at  OR     RELEASE CARPAL TUNNEL  1/20/2011    RELEASE CARPAL TUNNEL performed by JO MADRID at  OR       Family History   Problem Relation Age of Onset     Hypertension Mother      Eye Disorder Mother      CEREBROVASCULAR DISEASE Father      HEART DISEASE Father      Lipids Father      Obesity Father      CANCER Sister      HEART DISEASE Sister      Glaucoma No family hx of      Macular Degeneration No family hx of        Social History     Social History     Marital status:       Spouse name: N/A     Number of children: N/A     Years of education: N/A     Occupational History      Retired     Social History Main Topics     Smoking status: Former Smoker     Packs/day: 2.50     Years: 20.00     Types: Cigarettes     Quit date: 1/1/2000     Smokeless tobacco: Never Used     Alcohol use No     Drug use: No     Sexual activity: Yes     Partners: Female     Other Topics Concern     Exercise Yes     3 times per week     Social History Narrative    Moved from [place] in August, retired  for [employer].       No current outpatient prescriptions on file.       Medications and history reviewed    Physical exam:  Vitals: /85  Temp 97.5  F (36.4  C) (Oral)  Resp 20  SpO2 97%  BMI= There is no height or weight on file to calculate BMI.    Constitutional: healthy, alert and no distress  Head: Normocephalic. No masses, lesions, tenderness or abnormalities  Cardiovascular: PMI normal. No lifts, heaves, or thrills. RRR. No murmurs, clicks gallops or rub  Respiratory: Percussion normal. Good diaphragmatic excursion. Lungs clear  Gastrointestinal: Abdomen soft, non-tender. BS normal. No masses, organomegaly  : Deferred  Musculoskeletal: extremities normal- no gross deformities noted, gait normal and normal muscle tone  Skin: no suspicious lesions or rashes  Psychiatric: mentation appears normal and affect normal/bright  Hematologic/Lymphatic/Immunologic: Normal cervical lymph nodes  Patient able to get up on table without difficulty.      Assessment: screening colonoscopy  Plan: OK to proceed with colonoscopy with MAC/propofol sedation. If this colonoscopy is normal, he may not need another screening colonoscopy.    Josef Sims, DO

## 2018-01-16 NOTE — IP AVS SNAPSHOT
MRN:[id number]                      After Visit Summary   1/16/2018    Michele Vance    MRN: [id number]           Thank you!     Thank you for choosing [facility] for your care. Our goal is always to provide you with excellent care. Hearing back from our patients is one way we can continue to improve our services. Please take a few minutes to complete the written survey that you may receive in the mail after you visit with us. Thank you!        Patient Information     Date Of Birth          1945        About your hospital stay     You were admitted on:  January 16, 2018 You last received care in the:  [facility]    You were discharged on:  January 16, 2018       Who to Call     For medical emergencies, please call 911.  For non-urgent questions about your medical care, please call your primary care provider or clinic, [phone]  For questions related to your surgery, please call your surgery clinic        Attending Provider     Provider Josef Coles DO Surgery       Primary Care Provider Office Phone # Fax #    Rljgdetv Stevie Nuno [phone]      Further instructions from your care team         [facility]    Home Care Following Endoscopy    Dr. Josef Sims      Activity:    You have just undergone an endoscopic procedure usually performed with conscious sedation.  Do not work or operate machinery (including a car) for at least 12 hours.      I encourage you to walk and attempt to pass this air as soon as possible.    Diet:    Return to the diet you were on before your procedure but eat lightly for the first 12-24 hours.    Drink plenty of water.    Resume any regular medications unless otherwise advised by your physician.  Please begin any new medication prescribed as a result of your procedure as directed by your physician.     If you had any biopsy or polyp removed please refrain from aspirin or aspirin  products for 2 days.  If on Coumadin please restart as instructed by your physician.   Pain:    You may take Tylenol as needed for pain.  Expected Recovery:    You can expect some mild abdominal fullness and/or discomfort due to the air used to inflate your intestinal tract. It is also normal to have a mild sore throat after upper endoscopy.    Call Your Physician if You Have:    After Upper Endoscopy:  o Shoulder, back or chest pain.  o Difficulty breathing or swallowing.  o Vomiting blood.    After Colonoscopy:  o Worsening persisting abdominal pain which is worse with activity.  o Fevers (>101 degrees F), chills or shakes.  o Passage of continued blood with bowel movements.   Any questions or concerns about your recovery, please call [phone] or after hours [phone] Nurse Advice Line.    Follow-up Care:    You should receive a call or letter with your results within 1 week. Please call if you have not received a notification of your results.  If asked to return to clinic please make an appointment 1 week after your procedure.  Call [phone]      [facility]   Adult Discharge Orders & Instructions     For 24 hours after surgery    1. Get plenty of rest.  A responsible adult must stay with you for at least 24 hours after you leave the hospital.   2. Do not drive or use heavy equipment.  If you have weakness or tingling, don't drive or use heavy equipment until this feeling goes away.  3. Do not drink alcohol.  4. Avoid strenuous or risky activities.  Ask for help when climbing stairs.   5. You may feel lightheaded.  IF so, sit for a few minutes before standing.  Have someone help you get up.   6. If you have nausea (feel sick to your stomach): Drink only clear liquids such as apple juice, ginger ale, broth or 7-Up.  Rest may also help.  Be sure to drink enough fluids.  Move to a regular diet as you feel able.  7. You may have a slight fever. Call the doctor if your fever is over 100 F  (37.7 C) (taken under the tongue) or lasts longer than 24 hours.  8. You may have a dry mouth, a sore throat, muscle aches or trouble sleeping.  These should go away after 24 hours.  9. Do not make important or legal decisions.   Call your doctor for any of the followin.  Signs of infection (fever, growing tenderness at the surgery site, a large amount of drainage or bleeding, severe pain, foul-smelling drainage, redness, swelling).    2. It has been over 8 to 10 hours since surgery and you are still not able to urinate (pass water).    3.  Headache for over 24 hours.  4.  Numbness, tingling or weakness the day after surgery (if you had spinal anesthesia).        Pending Results     No orders found from 2018 to 2018.            Admission Information     Date & Time Provider Department Dept. Phone    2018 Josef Sims,  [facility] [phone]      Your Vitals Were     Blood Pressure Temperature Respirations Pulse Oximetry          168/77 97.5  F (36.4  C) (Oral) 16 97%        MyChart Information     [employer] gives you secure access to your electronic health record. If you see a primary care provider, you can also send messages to your care team and make appointments. If you have questions, please call your primary care clinic.  If you do not have a primary care provider, please call [phone] and they will assist you.        Care EveryWhere ID     This is your Care EveryWhere ID. This could be used by other organizations to access your [facility] medical records  [id number]        Equal Access to Services     [facility]: Hadii aad ku hadasho Soriaali, waaxda luqadaha, qaybta kaalmada adeegyada, arnoldo scherer. So [facility] [phone].    ATENCIÓN: Si habla español, tiene a fischer disposición servicios gratuitos de asistencia lingüística. Llame al [phone].    We comply with applicable federal civil rights laws and [place] laws. We do  "not discriminate on the basis of race, color, national origin, age, disability, sex, sexual orientation, or gender identity.               Review of your medicines      CONTINUE these medicines which have NOT CHANGED        Dose / Directions    alirocumab 75 MG/ML injectable pen   Commonly known as:  PRALUENT   Used for:  Hyperlipidemia LDL goal <130        Dose:  75 mg   Inject 1 mL (75 mg) Subcutaneous every 14 days   Quantity:  2 mL   Refills:  11       amitriptyline 25 MG tablet   Commonly known as:  ELAVIL   Used for:  Migraine without aura and without status migrainosus, not intractable        TAKE 1 TABLET TWICE A DAY   Quantity:  180 tablet   Refills:  2       amLODIPine 10 MG tablet   Commonly known as:  NORVASC   Used for:  Essential hypertension, benign        Dose:  10 mg   Take 1 tablet (10 mg) by mouth every evening   Quantity:  90 tablet   Refills:  3       ASPIRIN PO        Dose:  325 mg   Take 325 mg by mouth daily   Refills:  0       bisacodyl 5 MG EC tablet   Commonly known as:  DULCOLAX   Used for:  Special screening for malignant neoplasm of colon        Refer to \"Getting Ready for a Colonoscopy\" instruction handout   Quantity:  4 tablet   Refills:  0       cilostazol 100 MG tablet   Commonly known as:  PLETAL   Used for:  Peripheral vascular disease, unspecified        Dose:  100 mg   Take 1 tablet (100 mg) by mouth 2 times daily   Quantity:  180 tablet   Refills:  3       gabapentin 300 MG capsule   Commonly known as:  NEURONTIN   Used for:  Arthritis        TAKE 1 CAPSULE THREE TIMES A DAY   Quantity:  210 capsule   Refills:  4       losartan 25 MG tablet   Commonly known as:  COZAAR   Used for:  Essential hypertension, benign        Dose:  25 mg   Take 1 tablet (25 mg) by mouth daily   Quantity:  93 tablet   Refills:  3       metoclopramide 10 MG tablet   Commonly known as:  REGLAN   Used for:  Slow transit constipation        TAKE 1 TABLET FOUR TIMES A DAY   Quantity:  120 tablet   Refills: "  2       omeprazole 40 MG capsule   Commonly known as:  priLOSEC   Used for:  Gastroesophageal reflux disease without esophagitis        TAKE 1 CAPSULE DAILY   Quantity:  90 capsule   Refills:  2       polyethylene glycol Packet   Commonly known as:  MIRALAX/GLYCOLAX        Dose:  1 packet   Take 1 packet by mouth daily   Refills:  0       psyllium 58.6 % Powd   Commonly known as:  METAMUCIL        Take by mouth daily   Refills:  0       STATIN NOT PRESCRIBED (INTENTIONAL)   Used for:  Hyperlipidemia LDL goal <130        Pt has known rhabdomyolysis in the past, somewhat associated with statins.  Also wasn't able to tolerate Zetia.   Quantity:  1 each   Refills:  0       TOPROL  MG 24 hr tablet   Used for:  Essential hypertension, benign   Generic drug:  metoprolol succinate        TAKE 1 TABLET EVERY MORNING   Quantity:  93 tablet   Refills:  1       zolpidem 5 MG tablet   Commonly known as:  AMBIEN   Used for:  Primary insomnia        Dose:  5 mg   Take 1 tablet (5 mg) by mouth nightly as needed for sleep   Quantity:  90 tablet   Refills:  1                Protect others around you: Learn how to safely use, store and throw away your medicines at www.disposemymeds.org.             Medication List: This is a list of all your medications and when to take them. Check marks below indicate your daily home schedule. Keep this list as a reference.      Medications           Morning Afternoon Evening Bedtime As Needed    alirocumab 75 MG/ML injectable pen   Commonly known as:  PRALUENT   Inject 1 mL (75 mg) Subcutaneous every 14 days                                amitriptyline 25 MG tablet   Commonly known as:  ELAVIL   TAKE 1 TABLET TWICE A DAY                                amLODIPine 10 MG tablet   Commonly known as:  NORVASC   Take 1 tablet (10 mg) by mouth every evening                                ASPIRIN PO   Take 325 mg by mouth daily                                bisacodyl 5 MG EC tablet   Commonly known  "as:  DULCOLAX   Refer to \"Getting Ready for a Colonoscopy\" instruction handout                                cilostazol 100 MG tablet   Commonly known as:  PLETAL   Take 1 tablet (100 mg) by mouth 2 times daily                                gabapentin 300 MG capsule   Commonly known as:  NEURONTIN   TAKE 1 CAPSULE THREE TIMES A DAY                                losartan 25 MG tablet   Commonly known as:  COZAAR   Take 1 tablet (25 mg) by mouth daily                                metoclopramide 10 MG tablet   Commonly known as:  REGLAN   TAKE 1 TABLET FOUR TIMES A DAY                                omeprazole 40 MG capsule   Commonly known as:  priLOSEC   TAKE 1 CAPSULE DAILY                                polyethylene glycol Packet   Commonly known as:  MIRALAX/GLYCOLAX   Take 1 packet by mouth daily                                psyllium 58.6 % Powd   Commonly known as:  METAMUCIL   Take by mouth daily                                STATIN NOT PRESCRIBED (INTENTIONAL)   Pt has known rhabdomyolysis in the past, somewhat associated with statins.  Also wasn't able to tolerate Zetia.                                TOPROL  MG 24 hr tablet   TAKE 1 TABLET EVERY MORNING   Generic drug:  metoprolol succinate                                zolpidem 5 MG tablet   Commonly known as:  AMBIEN   Take 1 tablet (5 mg) by mouth nightly as needed for sleep                                  "

## 2018-01-18 LAB — COPATH REPORT: NORMAL

## 2018-01-19 NOTE — PROGRESS NOTES
Dear Michele, your recent test results are attached.  The colon polyp was benign.  Would recommend repeat endoscopy in 2 years.  Feel free to contact me via the office or My Chart if you have any questions regarding the above.  Sincerely,  DO JERAMIE BalOI

## 2018-02-24 DIAGNOSIS — I10 ESSENTIAL HYPERTENSION, BENIGN: ICD-10-CM

## 2018-02-26 RX ORDER — AMLODIPINE BESYLATE 10 MG/1
TABLET ORAL
Qty: 90 TABLET | Refills: 3 | Status: SHIPPED | OUTPATIENT
Start: 2018-02-26 | End: 2019-03-13

## 2018-02-26 NOTE — TELEPHONE ENCOUNTER
"Requested Prescriptions   Pending Prescriptions Disp Refills     amLODIPine (NORVASC) 10 MG tablet [Pharmacy Med Name: AMLODIPINE BESYLATE TABS 10MG] 90 tablet 3     Sig: TAKE 1 TABLET EVERY EVENING    Calcium Channel Blockers Protocol  Failed    2/24/2018  3:50 PM       Failed - Blood pressure under 140/90 in past 12 months    BP Readings from Last 3 Encounters:   01/16/18 170/82   08/28/17 157/73   07/25/17 110/62                Failed - Normal serum creatinine on file in past 12 months    Recent Labs   Lab Test  02/13/17   0758   CR  1.27*            Passed - Recent or future visit with authorizing provider    Patient had office visit in the last year or has a visit in the next 30 days with authorizing provider.  See \"Patient Info\" tab in inbasket, or \"Choose Columns\" in Meds & Orders section of the refill encounter.            Passed - Patient is age 18 or older          Last Written Prescription Date:  2/13/17  Last Fill Quantity: 90,  # refills: 3   Last Office Visit with G, P or [facility] prescribing provider:  11/20/17   Future Office Visit:       "

## 2018-02-26 NOTE — TELEPHONE ENCOUNTER
Routing refill request to provider for review/approval because:  BP was 170/82 on 1/16/18   Kassandra Gorman RN

## 2018-02-27 NOTE — TELEPHONE ENCOUNTER
Patient is to set up a follow-up appointment for recheck of his hypertension.    Thank you.    Nurys

## 2018-03-05 DIAGNOSIS — E78.5 HYPERLIPIDEMIA LDL GOAL <130: ICD-10-CM

## 2018-03-05 LAB
CHOLEST SERPL-MCNC: 202 MG/DL
HDLC SERPL-MCNC: 47 MG/DL
LDLC SERPL CALC-MCNC: 133 MG/DL
NONHDLC SERPL-MCNC: 155 MG/DL
TRIGL SERPL-MCNC: 112 MG/DL

## 2018-03-05 PROCEDURE — 36415 COLL VENOUS BLD VENIPUNCTURE: CPT | Performed by: INTERNAL MEDICINE

## 2018-03-05 PROCEDURE — 80061 LIPID PANEL: CPT | Performed by: INTERNAL MEDICINE

## 2018-03-10 DIAGNOSIS — I10 ESSENTIAL HYPERTENSION, BENIGN: ICD-10-CM

## 2018-03-12 ENCOUNTER — OFFICE VISIT (OUTPATIENT)
Dept: FAMILY MEDICINE | Facility: OTHER | Age: 73
End: 2018-03-12
Payer: MEDICARE

## 2018-03-12 VITALS
DIASTOLIC BLOOD PRESSURE: 60 MMHG | OXYGEN SATURATION: 95 % | HEART RATE: 76 BPM | TEMPERATURE: 96.1 F | BODY MASS INDEX: 25.46 KG/M2 | RESPIRATION RATE: 16 BRPM | SYSTOLIC BLOOD PRESSURE: 142 MMHG | WEIGHT: 168 LBS | HEIGHT: 68 IN

## 2018-03-12 DIAGNOSIS — E87.6 HYPOKALEMIA: ICD-10-CM

## 2018-03-12 DIAGNOSIS — I10 ESSENTIAL HYPERTENSION: Primary | ICD-10-CM

## 2018-03-12 DIAGNOSIS — Z23 NEED FOR PROPHYLACTIC VACCINATION AGAINST STREPTOCOCCUS PNEUMONIAE (PNEUMOCOCCUS): ICD-10-CM

## 2018-03-12 DIAGNOSIS — Z91.81 AT RISK FOR FALLING: ICD-10-CM

## 2018-03-12 DIAGNOSIS — R53.83 FATIGUE, UNSPECIFIED TYPE: ICD-10-CM

## 2018-03-12 DIAGNOSIS — I73.9 PAD (PERIPHERAL ARTERY DISEASE) (H): ICD-10-CM

## 2018-03-12 DIAGNOSIS — Z95.1 S/P CABG X 6: ICD-10-CM

## 2018-03-12 LAB
ANION GAP SERPL CALCULATED.3IONS-SCNC: 5 MMOL/L (ref 3–14)
BUN SERPL-MCNC: 12 MG/DL (ref 7–30)
CALCIUM SERPL-MCNC: 9.2 MG/DL (ref 8.5–10.1)
CHLORIDE SERPL-SCNC: 99 MMOL/L (ref 94–109)
CO2 SERPL-SCNC: 36 MMOL/L (ref 20–32)
CREAT SERPL-MCNC: 1.13 MG/DL (ref 0.66–1.25)
CREAT UR-MCNC: 16 MG/DL
GFR SERPL CREATININE-BSD FRML MDRD: 64 ML/MIN/1.7M2
GLUCOSE SERPL-MCNC: 94 MG/DL (ref 70–99)
HGB BLD-MCNC: 14.7 G/DL (ref 13.3–17.7)
MICROALBUMIN UR-MCNC: 259 MG/L
MICROALBUMIN/CREAT UR: 1588.96 MG/G CR (ref 0–17)
POTASSIUM SERPL-SCNC: 2.6 MMOL/L (ref 3.4–5.3)
SODIUM SERPL-SCNC: 140 MMOL/L (ref 133–144)

## 2018-03-12 PROCEDURE — 85018 HEMOGLOBIN: CPT | Performed by: INTERNAL MEDICINE

## 2018-03-12 PROCEDURE — 36415 COLL VENOUS BLD VENIPUNCTURE: CPT | Performed by: INTERNAL MEDICINE

## 2018-03-12 PROCEDURE — 99214 OFFICE O/P EST MOD 30 MIN: CPT | Performed by: INTERNAL MEDICINE

## 2018-03-12 PROCEDURE — 82043 UR ALBUMIN QUANTITATIVE: CPT | Performed by: INTERNAL MEDICINE

## 2018-03-12 PROCEDURE — 80048 BASIC METABOLIC PNL TOTAL CA: CPT | Performed by: INTERNAL MEDICINE

## 2018-03-12 ASSESSMENT — PAIN SCALES - GENERAL: PAINLEVEL: NO PAIN (0)

## 2018-03-12 NOTE — MR AVS SNAPSHOT
After Visit Summary   3/12/2018    Michele Vance    MRN: [id number]           Patient Information     Date Of Birth          1945        Visit Information        Provider Department      3/12/2018 1:40 PM Juarez Nuno DO [facility]        Today's Diagnoses     Essential hypertension    -  1    PAD (peripheral artery disease) (H)        S/P CABG x 6        Need for prophylactic vaccination against Streptococcus pneumoniae (pneumococcus)        At risk for falling        Fatigue, unspecified type           Follow-ups after your visit        Follow-up notes from your care team     Return in about 4 months (around 7/12/2018).      Your next 10 appointments already scheduled     Mar 28, 2018  9:30 AM CDT   Return Visit with Stevie Felipe MD   [facility] ([facility])    [address] [place] [id number] [phone]              Who to contact     If you have questions or need follow up information about today's clinic visit or your schedule please contact [facility] directly at [phone].  Normal or non-critical lab and imaging results will be communicated to you by [employer], letter or phone within 4 business days after the clinic has received the results. If you do not hear from us within 7 days, please contact the clinic through [employer] or phone. If you have a critical or abnormal lab result, we will notify you by phone as soon as possible.  Submit refill requests through [employer] or call your pharmacy and they will forward the refill request to us. Please allow 3 business days for your refill to be completed.          Additional Information About Your Visit        MyChart Information     [employer] gives you secure access to your electronic health record. If you see a primary care provider, you can also send messages to your care team and make appointments. If you have questions,  "please call your primary care clinic.  If you do not have a primary care provider, please call [phone] and they will assist you.        Care EveryWhere ID     This is your Care EveryWhere ID. This could be used by other organizations to access your [facility] medical records  [id number]        Your Vitals Were     Pulse Temperature Respirations Height Pulse Oximetry BMI (Body Mass Index)    76 96.1  F (35.6  C) (Tympanic) 16 5' 8\" (1.727 m) 95% 25.54 kg/m2       Blood Pressure from Last 3 Encounters:   03/12/18 142/60   01/16/18 170/82   08/28/17 157/73    Weight from Last 3 Encounters:   03/12/18 168 lb (76.2 kg)   07/25/17 173 lb (78.5 kg)   06/30/17 171 lb (77.6 kg)              We Performed the Following     Albumin Random Urine Quantitative with Creat Ratio     BASIC METABOLIC PANEL     Hemoglobin        Primary Care Provider Office Phone # Fax #    Juarez Stevie Nuno [phone]       [address] [place] [address]        Equal Access to Services     JIMBO AGUSTIN : Hadii aad ku hadasho Soomaali, waaxda luqadaha, qaybta kaalmada adeegyada, arnoldo marin . [facility] [phone].    ATENCIÓN: Si habla español, tiene a fischer disposición servicios gratuitos de asistencia lingüística. [facility] [phone].    We comply with applicable federal civil rights laws and [place] laws. We do not discriminate on the basis of race, color, national origin, age, disability, sex, sexual orientation, or gender identity.            Thank you!     Thank you for choosing [facility]  for your care. Our goal is always to provide you with excellent care. Hearing back from our patients is one way we can continue to improve our services. Please take a few minutes to complete the written survey that you may receive in the mail after your visit with us. Thank you!             Your Updated Medication List - Protect others around you: Learn how to safely use, store " "and throw away your medicines at www.disposemymeds.org.          This list is accurate as of 3/12/18 11:59 PM.  Always use your most recent med list.                   Brand Name Dispense Instructions for use Diagnosis    alirocumab 75 MG/ML injectable pen    PRALUENT    2 mL    Inject 1 mL (75 mg) Subcutaneous every 14 days    Hyperlipidemia LDL goal <130       amitriptyline 25 MG tablet    ELAVIL    180 tablet    TAKE 1 TABLET TWICE A DAY    Migraine without aura and without status migrainosus, not intractable       amLODIPine 10 MG tablet    NORVASC    90 tablet    TAKE 1 TABLET EVERY EVENING    Essential hypertension, benign       ASPIRIN PO      Take 325 mg by mouth daily        bisacodyl 5 MG EC tablet    DULCOLAX    4 tablet    Refer to \"Getting Ready for a Colonoscopy\" instruction handout    Special screening for malignant neoplasm of colon       cilostazol 100 MG tablet    PLETAL    180 tablet    Take 1 tablet (100 mg) by mouth 2 times daily    Peripheral vascular disease, unspecified       gabapentin 300 MG capsule    NEURONTIN    210 capsule    TAKE 1 CAPSULE THREE TIMES A DAY    Arthritis       losartan 25 MG tablet    COZAAR    93 tablet    TAKE 1 TABLET DAILY    Essential hypertension, benign       metoclopramide 10 MG tablet    REGLAN    120 tablet    TAKE 1 TABLET FOUR TIMES A DAY    Slow transit constipation       omeprazole 40 MG capsule    priLOSEC    90 capsule    TAKE 1 CAPSULE DAILY    Gastroesophageal reflux disease without esophagitis       polyethylene glycol Packet    MIRALAX/GLYCOLAX     Take 1 packet by mouth daily        psyllium 58.6 % Powd    METAMUCIL     Take by mouth daily        STATIN NOT PRESCRIBED (INTENTIONAL)     1 each    Pt has known rhabdomyolysis in the past, somewhat associated with statins.  Also wasn't able to tolerate Zetia.    Hyperlipidemia LDL goal <130       TOPROL  MG 24 hr tablet   Generic drug:  metoprolol succinate     93 tablet    TAKE 1 TABLET EVERY " MORNING    Essential hypertension, benign       zolpidem 5 MG tablet    AMBIEN    90 tablet    Take 1 tablet (5 mg) by mouth nightly as needed for sleep    Primary insomnia

## 2018-03-12 NOTE — NURSING NOTE
"Chief Complaint   Patient presents with     Hypertension       Initial /60 (BP Location: Left arm, Patient Position: Chair, Cuff Size: Adult Regular)  Pulse 76  Temp 96.1  F (35.6  C) (Tympanic)  Resp 16  Ht 5' 8\" (1.727 m)  Wt 168 lb (76.2 kg)  SpO2 95%  BMI 25.54 kg/m2 Estimated body mass index is 25.54 kg/(m^2) as calculated from the following:    Height as of this encounter: 5' 8\" (1.727 m).    Weight as of this encounter: 168 lb (76.2 kg).  Medication Reconciliation: complete   Health Maintenance Due   Topic Date Due     MIGRAINE ACTION PLAN  08/24/1964     PNEUMOCOCCAL (1 of 2 - PCV13) 08/24/2011     AORTIC ANEURYSM SCREENING (SYSTEM ASSIGNED)  08/24/2011     HEMOGLOBIN Q1 YR  03/26/2016     MICROALBUMIN Q1 YEAR  02/25/2017     BMP Q1 YR  02/13/2018     MEDICARE ANNUAL WELLNESS VISIT  02/13/2018     FALL RISK ASSESSMENT  02/13/2018         "

## 2018-03-12 NOTE — PROGRESS NOTES
SUBJECTIVE:   Michele Vance is a 72 year old male who presents to clinic today for the following health issues:      Hypertension Follow-up      Outpatient blood pressures are being checked at home.  Results are was higher last week, but recently good.    Low Salt Diet: no added salt      Amount of exercise or physical activity: 2-3 days/week for an average of 45-60 minutes    Problems taking medications regularly: No    Medication side effects: none    Diet: regular (no restrictions)    CHIEF COMPLAINT:    The patient is a pleasant 72-year-old gentleman who has a history of significant vasculopathy. He's recently seen his surgeon regarding follow-up for his lower extremity revascularization. He has a history of coronary artery bypass and several CVAs. Despite all this, he is alert, appropriate and modest distress at this time. He is now taking Praluent for his hyperlipidemia. Despite this, his LDL is still elevated at 133. This is markedly better however. He denies any problems or concerns at this time. His blood pressures at home are still in the 140 range systolically and slightly higher than desired. He is taking medications now including the amlodipine without any difficulty.                         PAST, FAMILY,SOCIAL HISTORY:     Medical  History:   has a past medical history of Carotid stenosis; Chronic kidney disease; Coronary artery disease (3-2014); Crohn's disease (H); CVA (cerebral infarction); Elevated CK; Esophageal reflux; Hypercholesteremia; Hypertension; Nonsenile cataract; Other and unspecified hyperlipidemia; PAD (peripheral artery disease) (H); Rhabdomyolysis (2010); and Unspecified essential hypertension.     Surgical History:   has a past surgical history that includes knee surgery (1976); lasix (2001); appendectomy (1974); Dental surgery; cataracts; colonoscopy (12/12/02); UPPER GI ENDOSCOPY,EXAM (01/08/99); Release carpal tunnel (1/13/2011); Release carpal tunnel (1/20/2011); Bypass graft  artery coronary (3/5/2014); cataract iol, rt/lt (Bilateral, 2008); Endarterectomy carotid; CAPSULE ENDOSCOPY (N/A, 10/7/2014); Colonoscopy (N/A, 10/7/2014); Esophagoscopy, gastroscopy, duodenoscopy (EGD), combined (Left, 10/7/2014); Esophagoscopy, gastroscopy, duodenoscopy (EGD), combined (Left, 10/7/2014); Inject epidural lumbar (Right, 5/8/2017); and Inject joint sacroiliac (Bilateral, 8/28/2017).     Social History:   reports that he quit smoking about 18 years ago. His smoking use included Cigarettes. He has a 50.00 pack-year smoking history. He has never used smokeless tobacco. He reports that he does not drink alcohol or use illicit drugs.     Family History:  family history includes CANCER in his sister; CEREBROVASCULAR DISEASE in his father; Eye Disorder in his mother; HEART DISEASE in his father and sister; Hypertension in his mother; Lipids in his father; Obesity in his father. There is no history of Glaucoma or Macular Degeneration.            MEDICATIONS  Current Outpatient Prescriptions   Medication Sig Dispense Refill     amLODIPine (NORVASC) 10 MG tablet TAKE 1 TABLET EVERY EVENING 90 tablet 3     TOPROL  MG 24 hr tablet TAKE 1 TABLET EVERY MORNING 93 tablet 1     alirocumab (PRALUENT) 75 MG/ML injectable pen Inject 1 mL (75 mg) Subcutaneous every 14 days 2 mL 11     metoclopramide (REGLAN) 10 MG tablet TAKE 1 TABLET FOUR TIMES A  tablet 2     omeprazole (PRILOSEC) 40 MG capsule TAKE 1 CAPSULE DAILY 90 capsule 2     amitriptyline (ELAVIL) 25 MG tablet TAKE 1 TABLET TWICE A  tablet 2     gabapentin (NEURONTIN) 300 MG capsule TAKE 1 CAPSULE THREE TIMES A  capsule 4     cilostazol (PLETAL) 100 MG tablet Take 1 tablet (100 mg) by mouth 2 times daily 180 tablet 3     losartan (COZAAR) 25 MG tablet Take 1 tablet (25 mg) by mouth daily 93 tablet 3     psyllium (METAMUCIL) 58.6 % POWD Take by mouth daily       polyethylene glycol (MIRALAX/GLYCOLAX) packet Take 1 packet by mouth  "daily        bisacodyl (DULCOLAX) 5 MG EC tablet Refer to \"Getting Ready for a Colonoscopy\" instruction handout (Patient not taking: Reported on 3/12/2018) 4 tablet 0     zolpidem (AMBIEN) 5 MG tablet Take 1 tablet (5 mg) by mouth nightly as needed for sleep (Patient not taking: Reported on 3/12/2018) 90 tablet 1     ASPIRIN PO Take 325 mg by mouth daily       STATIN NOT PRESCRIBED, INTENTIONAL, Pt has known rhabdomyolysis in the past, somewhat associated with statins.  Also wasn't able to tolerate Zetia. (Patient not taking: Reported on 3/12/2018) 1 each 0         --------------------------------------------------------------------------------------------------------------------                          REVIEW OF SYSTEMS:         LUNGS: Pt denies: cough,excess sputum, hemoptysis, or shortness of breath.   HEART: Pt denies: chest pain, arrythmia, syncope, tachy or bradyarrhythmia or excess edema.   GI: Pt denies: nausea, vomitting, diarrhea, constipation, melena, or hematochezia.   NEURO: Pt denies: seizures, strokes, diplopia, weakness, paraesthesias, or paralysis.   SKIN: Pt denies: itching, rashes, discoloration, or specific lesions of concern. Denies recent hair loss.                          EXAMINATION:         /60 (BP Location: Left arm, Patient Position: Chair, Cuff Size: Adult Regular)  Pulse 76  Temp 96.1  F (35.6  C) (Tympanic)  Resp 16  Ht 5' 8\" (1.727 m)  Wt 168 lb (76.2 kg)  SpO2 95%  BMI 25.54 kg/m2   Constitutional: The patient appears to be in no acute distress. The patient appears to be adequately hydrated. No acute respiratory or hemodynamic distress is noted at this time.   LUNGS: clear bilaterally, airflow is brisk, no intercostal retraction or stridor is noted. No coughing is noted during visit.   HEART:  regular without rubs, clicks, gallops, or murmurs. PMI is nondisplaced. Upstrokes are brisk. S1,S2 are heard. Peripheral pulses are present but slightly weak. No ischemic changes " are noted peripherally   GI: Abdomen is soft, without rebound, guarding or tenderness. Bowel sounds are appropriate. No renal bruits are heard.    NEURO: Pt is alert and appropriate. No neurologic lateralization is noted. Cranial nerves 2-12 are intact. Peripheral sensory and motor function are grossly normal                        DECISION MAKIN. Essential hypertension  Will check laboratory work before changing his medications. Anticipate slight increase in his ARB.  - BASIC METABOLIC PANEL  - Albumin Random Urine Quantitative with Creat Ratio    2. PAD (peripheral artery disease) (H)  Continue to follow up with vascular medicine  Continue antihyperlipidemia medications  Continue blood pressure management.  Continue antiplatelet therapy with Pletal and aspirin  3. S/P CABG x 6  Stable, as above    4. Need for prophylactic vaccination against Streptococcus pneumoniae (pneumococcus)  Given    5. At risk for falling  Discussed careful motion and movement. No need for physical therapy at this time    6. Fatigue, unspecified type  Persistent, rule out hypothyroidism  - Hemoglobin                             FOLLOW UP    I have asked the patient to make an appointment for follow up with me in 4 months or as needed        I have carefully explained the diagnosis and treatment options with the patient. The patient has displayed an understanding of the above, and all subsequent questions were answered.         DO JENNA Bal    Portions of this note were produced using [employer]  Although every attempt at real-time proof reading has been made, occasional grammar/syntax errors may have been missed.

## 2018-03-12 NOTE — LETTER
March 20, 2018      Michele Vance  [address]  [place] [address]        Dear ,    We are writing to inform you of your test results.    The microalbumin has increased significantly.   Potassium is decreased at 2.6.   I have called a prescription for potassium to your pharmacy ([facility]  You should take 1 pill twice daily and we should recheck your potassium in 1 week.   Feel free to contact me via the office or My Chart if you have any questions regarding the above.     Resulted Orders   BASIC METABOLIC PANEL   Result Value Ref Range    Sodium 140 133 - 144 mmol/L    Potassium 2.6 (LL) 3.4 - 5.3 mmol/L      Comment:      1st attempt to call critical value on clinic patient  2048,MP  Critical Value called to and read back by  DR AUSTIN MP,2052      Chloride 99 94 - 109 mmol/L    Carbon Dioxide 36 (H) 20 - 32 mmol/L    Anion Gap 5 3 - 14 mmol/L    Glucose 94 70 - 99 mg/dL    Urea Nitrogen 12 7 - 30 mg/dL    Creatinine 1.13 0.66 - 1.25 mg/dL    GFR Estimate 64 >60 mL/min/1.7m2      Comment:      CORRECTED ON 03/12 AT 2051: PREVIOUSLY REPORTED AS 64 Non  GFR   Calc      GFR Estimate If Black 77 >60 mL/min/1.7m2      Comment:      CORRECTED ON 03/12 AT 2051: PREVIOUSLY REPORTED AS 77  GFR   Calc      Calcium 9.2 8.5 - 10.1 mg/dL   Hemoglobin   Result Value Ref Range    Hemoglobin 14.7 13.3 - 17.7 g/dL   Albumin Random Urine Quantitative with Creat Ratio   Result Value Ref Range    Creatinine Urine 16 mg/dL    Albumin Urine mg/L 259 mg/L    Albumin Urine mg/g Cr 1588.96 (H) 0 - 17 mg/g Cr       If you have any questions or concerns, please call the clinic at the number listed above.       Sincerely,    Juarez Nuno, DO

## 2018-03-12 NOTE — TELEPHONE ENCOUNTER
"Last Written Prescription Date:  10/19/2016  Last Fill Quantity: 93,  # refills: 3   Last office visit: 6/22/2017 with prescribing provider:  7/ 25/2017  Future Office Visit:   Next 5 appointments (look out 90 days)     Mar 12, 2018  1:40 PM CDT   SHORT with Juarez Nuno DO   [facility]    [address] [facility] [id number] [phone]            Mar 28, 2018  9:30 AM CDT   Return Visit with Stevie Felipe MD   [facility] ([facility])    [address] [facility] [address] [phone]                 Requested Prescriptions   Pending Prescriptions Disp Refills     losartan (COZAAR) 25 MG tablet [Pharmacy Med Name: LOSARTAN TABS 25MG] 93 tablet 3     Sig: TAKE 1 TABLET DAILY    Angiotensin-II Receptors Failed    3/10/2018  9:37 AM       Failed - Blood pressure under 140/90 in past 12 months    BP Readings from Last 3 Encounters:   01/16/18 170/82   08/28/17 157/73   07/25/17 110/62                Failed - Normal serum creatinine on file in past 12 months    Recent Labs   Lab Test  02/13/17   0758   CR  1.27*            Failed - Normal serum potassium on file in past 12 months    Recent Labs   Lab Test  02/13/17   0758   POTASSIUM  3.6                   Passed - Recent (12 mo) or future (30 days) visit within the authorizing provider's specialty    Patient had office visit in the last 12 months or has a visit in the next 30 days with authorizing provider or within the authorizing provider's specialty.  See \"Patient Info\" tab in inbasket, or \"Choose Columns\" in Meds & Orders section of the refill encounter.           Passed - Patient is age 18 or older          "

## 2018-03-13 RX ORDER — LOSARTAN POTASSIUM 25 MG/1
TABLET ORAL
Qty: 93 TABLET | Refills: 1 | Status: SHIPPED | OUTPATIENT
Start: 2018-03-13 | End: 2018-03-28

## 2018-03-13 NOTE — PROGRESS NOTES
Dear Michele, your recent test results are attached.  Hemoglobin is stable at 14.7.  Remaining lab work is pending.  Feel free to contact me via the office or My Chart if you have any questions regarding the above.  Sincerely,  Juarez Nuno,  FACOI

## 2018-03-13 NOTE — TELEPHONE ENCOUNTER
Prescription approved per [facility] Refill Protocol.........VIPIN Mcbride

## 2018-03-19 RX ORDER — POTASSIUM CHLORIDE 750 MG/1
10 TABLET, EXTENDED RELEASE ORAL DAILY
Qty: 30 TABLET | Refills: 1 | Status: SHIPPED | OUTPATIENT
Start: 2018-03-19 | End: 2018-04-02

## 2018-03-20 NOTE — PROGRESS NOTES
Dear Michele, your recent test results are attached.  The microalbumin has increased significantly.  Potassium is decreased at 2.6.  I have called a prescription for potassium to your pharmacy ([facility]).  You should take 1 pill twice daily and we should recheck your potassium in 1 week.  Feel free to contact me via the office or My Chart if you have any questions regarding the above.  Sincerely,  Juarez Nuno,  FACOI

## 2018-03-28 ENCOUNTER — OFFICE VISIT (OUTPATIENT)
Dept: CARDIOLOGY | Facility: CLINIC | Age: 73
End: 2018-03-28
Payer: MEDICARE

## 2018-03-28 VITALS
BODY MASS INDEX: 25.14 KG/M2 | SYSTOLIC BLOOD PRESSURE: 138 MMHG | HEIGHT: 68 IN | OXYGEN SATURATION: 98 % | DIASTOLIC BLOOD PRESSURE: 60 MMHG | HEART RATE: 68 BPM | WEIGHT: 165.9 LBS

## 2018-03-28 DIAGNOSIS — E78.00 HYPERCHOLESTEREMIA: ICD-10-CM

## 2018-03-28 DIAGNOSIS — I70.211 ATHEROSCLEROSIS OF NATIVE ARTERY OF RIGHT LOWER EXTREMITY WITH INTERMITTENT CLAUDICATION (H): ICD-10-CM

## 2018-03-28 DIAGNOSIS — I25.10 CORONARY ARTERY DISEASE INVOLVING NATIVE CORONARY ARTERY OF NATIVE HEART WITHOUT ANGINA PECTORIS: ICD-10-CM

## 2018-03-28 DIAGNOSIS — I10 ESSENTIAL HYPERTENSION, BENIGN: ICD-10-CM

## 2018-03-28 DIAGNOSIS — E78.5 HYPERLIPIDEMIA LDL GOAL <130: ICD-10-CM

## 2018-03-28 PROCEDURE — 99214 OFFICE O/P EST MOD 30 MIN: CPT | Performed by: INTERNAL MEDICINE

## 2018-03-28 RX ORDER — LOSARTAN POTASSIUM 25 MG/1
50 TABLET ORAL DAILY
Qty: 93 TABLET | Refills: 1 | Status: SHIPPED | OUTPATIENT
Start: 2018-03-28 | End: 2018-08-22

## 2018-03-28 NOTE — PROGRESS NOTES
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      Mr. Vance has a longstanding history of hypercholesterolemia. In the past, applications for PCSK9 inhibitors were turned down despite the fact that the patient has been proven  intolerant of all statin therapy manifested by myositis. He is also intolerant of fibrates and at one point had been considered for ileal bypass surgery.    He was finally approved for alirocumab (Praluent) after our last visit  and is on 75 mg biweekly since late Fall 2017.  His LDL cholesterol on 03/05/2018 was 133 (improved from over 166 at baseline.     The patient has known severe peripheral vascular disease.  He underwent a carotid stent implantation in 2014 after a TIA.  He has had longstanding right eye blindness since then.  The patient has a longstanding history of chronic lower extremity claudication which presently involves the left calf.  He has undergone angioplasty of the right superficial femoral artery and the right popliteal artery.  He recently underwent successful stent implantation in the left leg.  He is followed very carefully by Dr. Reno Brown, peripheral vascular surgeon.  Dr. Brown had advised removal of the patient's left toenail because of failure to heal.  The patient does not report any active ulcers or change in his chronic claudication at this time.  He is satisfied with his current activity level.     He underwent bypass surgery at the [facility] several years ago at which time  LIMA graft was placed to the LAD, a sequential saphenous venous bypass graft was placed to M2/D1, and separate saphenous venous grafts were placed to the acute marginal branch of the RCA, the distal RCA and M1. He remains  free of angina since seen one year ago.      The patient's blood pressures have remained in the 140 range despite his current dose of losartan.  The patient is on potassium supplementation.      PAST MEDICAL HISTORY:   1.  Coronary artery disease, status post bypass surgery with LIMA graft to the LAD, vein graft to the acute marginal of the right coronary, vein graft to posterior descending branch of right coronary, vein graft to the second marginal branch of circumflex, and sequential vein bypass graft to the first marginal and second diagonal branch.   2.  Atherosclerotic peripheral vascular disease:   a.  History of TIA with stent implantation and left carotid, chronic right eye blindness   b.  Longstanding lower extremity claudication with multiple previous percutaneous interventions.  Most recently implantation of stent in the left iliac artery   c.  Chronic left leg calf claudication, improved after recent stent treatment and with continued regular exercise on treadmill.   3.  Hypertension.   4.  Dyslipidemia.   5.  Intolerance of statin drugs with myositis.  Unable to tolerate fibrates.  Currently on alirocumab LDL cholesterol of 133.      PHYSICAL EXAMINATION:   GENERAL:  Exam today demonstrates a very pleasant, cooperative and intelligent 72-year-old man.   VITAL SIGNS:  His blood pressure is 138/60.  His heart rate is 68.  His height is 1.7 meters, his weight is 75 kg.  His BMI is 25.3.   LUNGS:  Clear to percussion and auscultation.   CARDIOVASCULAR:  Shows a normal S1 with a soft S4.  There is no S3.  There is no murmur, rub or click.      His carotids, radials and brachials are full and symmetrical at 2+.  He has bilateral carotid bruits.  He has bilateral femoral bruits.  His right femoral pulse is palpable.  I cannot palpate either a dorsalis pedis or posterior tibial pulse on the right side.  I cannot palpate femoral, popliteal, dorsalis pedis pulse on the left side.      LABORATORY STUDIES:  His  total cholesterol is 202, HDL 47, .  His most recent creatinine is 1.13.      ASSESSMENT:  Except for stable  chronic left leg calf  claudication, Mr. Vance is asymptomatic at this time.  He is on guideline-directed medical therapy.Current ACC guidelines would advise trying to lower his LDL cholesterol further. and I would recommend that we increase his alirocumab (Praluent) from 75 mg  to 150 mg subcutaneous biweekly.  I would also recommend that we increase losartan from 25 mg to 50 mg a day and if possible  target systolic pressure of 130 mmHg consistent with current ACC consensus recommendations.     RECOMMENDATIONS:   1.  Increase alirocumab (Praluent) to 150 mg injectable every 14 days.   2.  Increase losartan from 25 mg to 50 mg daily. Target systolic blood pressure 130 or less if tolerated.    3.  Followup lipid profile in 3 months.   4.  Follow up with me in about 1 year with a lipid profile at that time.      We greatly appreciate the opportunity to be involved in the care of your patient, Michele Vance.      cc:      [place] [address]         KARISSA GIPSON MD             D: 2018   T: 2018   MT: RAIN      Name:     MICHELE VANCE   MRN:      [id number]        Account:      [id number]   :      1945           Service Date: 2018      Document: [id number]

## 2018-03-28 NOTE — LETTER
3/28/2018      Juarez Nuno, DO  [address] [place] [address]      RE: Michele Vance       Dear Colleague,    I had the pleasure of seeing Michele Vance in the [facility].    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      Mr. Vance has a longstanding history of hypercholesterolemia. In the past, applications for PCSK9 inhibitors were turned down despite the fact that the patient has been proven  intolerant of all statin therapy manifested by myositis. He is also intolerant of fibrates and at one point had been considered for ileal bypass surgery.    He was finally approved for alirocumab (Praluent) after our last visit  and is on 75 mg biweekly since late Fall 2017.  His LDL cholesterol on 03/05/2018 was 133 (improved from over 166 at baseline.     The patient has known severe peripheral vascular disease.  He underwent a carotid stent implantation in 2014 after a TIA.  He has had longstanding right eye blindness since then.  The patient has a longstanding history of chronic lower extremity claudication which presently involves the left calf.  He has undergone angioplasty of the right superficial femoral artery and the right popliteal artery.  He recently underwent successful stent implantation in the left leg.  He is followed very carefully by Dr. Reno Brown, peripheral vascular surgeon.  Dr. Brown had advised removal of the patient's left toenail because of failure to heal.  The patient does not report any active ulcers or change in his chronic claudication at this time.  He is satisfied with his current activity level.     He underwent bypass surgery at the [facility] several years ago at which  time  LIMA graft was placed to the LAD, a sequential saphenous venous bypass graft was placed to M2/D1, and separate saphenous venous grafts were placed to the acute marginal branch of the RCA, the distal RCA and M1. He remains free of angina since seen one year ago.      The patient's blood pressures have remained in the 140 range despite his current dose of losartan.  The patient is on potassium supplementation.      PAST MEDICAL HISTORY:   1.  Coronary artery disease, status post bypass surgery with LIMA graft to the LAD, vein graft to the acute marginal of the right coronary, vein graft to posterior descending branch of right coronary, vein graft to the second marginal branch of circumflex, and sequential vein bypass graft to the first marginal and second diagonal branch.   2.  Atherosclerotic peripheral vascular disease:   a.  History of TIA with stent implantation and left carotid, chronic right eye blindness   b.  Longstanding lower extremity claudication with multiple previous percutaneous interventions.  Most recently implantation of stent in the left iliac artery   c.  Chronic left leg calf claudication, improved after recent stent treatment and with continued regular exercise on treadmill.   3.  Hypertension.   4.  Dyslipidemia.   5.  Intolerance of statin drugs with myositis.  Unable to tolerate fibrates.  Currently on alirocumab LDL cholesterol of 133.      PHYSICAL EXAMINATION:   GENERAL:  Exam today demonstrates a very pleasant, cooperative and intelligent 72-year-old man.   VITAL SIGNS:  His blood pressure is 138/60.  His heart rate is 68.  His height is 1.7 meters, his weight is 75 kg.  His BMI is 25.3.   LUNGS:  Clear to percussion and auscultation.   CARDIOVASCULAR:  Shows a normal S1 with a soft S4.  There is no S3.  There is no murmur, rub or click.      His carotids, radials and brachials are full and symmetrical at 2+.  He has bilateral carotid bruits.  He has bilateral femoral bruits.  His  right femoral pulse is palpable.  I cannot palpate either a dorsalis pedis or posterior tibial pulse on the right side.  I cannot palpate femoral, popliteal, dorsalis pedis pulse on the left side.      LABORATORY STUDIES:  His total cholesterol is 202, HDL 47, .  His most recent creatinine is 1.13.      ASSESSMENT:  Except for stable  chronic left leg calf  claudication, Mr. Vance is asymptomatic at this time.  He is on guideline-directed medical therapy.Current ACC guidelines would advise trying to lower his LDL cholesterol further. and I would recommend that we increase his alirocumab (Praluent) from 75 mg  to 150 mg subcutaneous biweekly.  I would also recommend that we increase losartan from 25 mg to 50 mg a day and if possible  target systolic pressure of 130 mmHg consistent with current ACC consensus recommendations.     RECOMMENDATIONS:   1.  Increase alirocumab (Praluent) to 150 mg injectable every 14 days.   2.  Increase losartan from 25 mg to 50 mg daily. Target systolic blood pressure 130 or less if tolerated.    3.  Followup lipid profile in 3 months.   4.  Follow up with me in about 1 year with a lipid profile at that time.      We greatly appreciate the opportunity to be involved in the care of your patient, Michele Vance.      cc:      [place] [address]         KARISSA GIPSON MD             D: 2018   T: 2018   MT: RAIN      Name:     MICHELE VANCE   MRN:      [id number]        Account:      [id number]   :      1945           Service Date: 2018      Document: [id number]           Outpatient Encounter Prescriptions as of 3/28/2018   Medication Sig Dispense Refill     alirocumab (PRALUENT) 75 MG/ML injectable pen Inject 2 mLs (150 mg) Subcutaneous every 14 days 2 mL 11     losartan (COZAAR) 25 MG tablet Take 2 tablets (50 mg) by mouth daily 93 tablet 1     potassium chloride SA  "(K-DUR/KLOR-CON M) 10 MEQ CR tablet Take 1 tablet (10 mEq) by mouth daily 30 tablet 1     amLODIPine (NORVASC) 10 MG tablet TAKE 1 TABLET EVERY EVENING 90 tablet 3     TOPROL  MG 24 hr tablet TAKE 1 TABLET EVERY MORNING 93 tablet 1     metoclopramide (REGLAN) 10 MG tablet TAKE 1 TABLET FOUR TIMES A  tablet 2     omeprazole (PRILOSEC) 40 MG capsule TAKE 1 CAPSULE DAILY 90 capsule 2     amitriptyline (ELAVIL) 25 MG tablet TAKE 1 TABLET TWICE A  tablet 2     gabapentin (NEURONTIN) 300 MG capsule TAKE 1 CAPSULE THREE TIMES A  capsule 4     cilostazol (PLETAL) 100 MG tablet Take 1 tablet (100 mg) by mouth 2 times daily 180 tablet 3     psyllium (METAMUCIL) 58.6 % POWD Take by mouth daily       ASPIRIN PO Take 325 mg by mouth daily       polyethylene glycol (MIRALAX/GLYCOLAX) packet Take 1 packet by mouth daily        [DISCONTINUED] losartan (COZAAR) 25 MG tablet TAKE 1 TABLET DAILY 93 tablet 1     [DISCONTINUED] bisacodyl (DULCOLAX) 5 MG EC tablet Refer to \"Getting Ready for a Colonoscopy\" instruction handout (Patient not taking: Reported on 4/2/2018) 4 tablet 0     [DISCONTINUED] alirocumab (PRALUENT) 75 MG/ML injectable pen Inject 1 mL (75 mg) Subcutaneous every 14 days 2 mL 11     zolpidem (AMBIEN) 5 MG tablet Take 1 tablet (5 mg) by mouth nightly as needed for sleep (Patient not taking: Reported on 4/2/2018) 90 tablet 1     STATIN NOT PRESCRIBED, INTENTIONAL, Pt has known rhabdomyolysis in the past, somewhat associated with statins.  Also wasn't able to tolerate Zetia. 1 each 0     No facility-administered encounter medications on file as of 3/28/2018.        Again, thank you for allowing me to participate in the care of your patient.      Sincerely,    Stevie Felipe MD     [facility]    "

## 2018-03-28 NOTE — LETTER
3/28/2018    Juarez Nuno, DO  [address] [place] [address]    RE: Michele Vance       Dear Colleague,    I had the pleasure of seeing Michele Vance in the [facility].    HISTORY OF PRESENT ILLNESS:  Asymptomatic.      Orders this Visit:  Orders Placed This Encounter   Procedures     Lipid Profile     Follow-Up with Cardiologist     Orders Placed This Encounter   Medications     alirocumab (PRALUENT) 75 MG/ML injectable pen     Sig: Inject 2 mLs (150 mg) Subcutaneous every 14 days     Dispense:  2 mL     Refill:  11     Medications Discontinued During This Encounter   Medication Reason     alirocumab (PRALUENT) 75 MG/ML injectable pen Reorder       Encounter Diagnoses   Name Primary?     Hyperlipidemia LDL goal <130      Hypercholesteremia      Atherosclerosis of native artery of right lower extremity with intermittent claudication (H)      Coronary artery disease involving native coronary artery of native heart without angina pectoris        CURRENT MEDICATIONS:  Current Outpatient Prescriptions   Medication Sig Dispense Refill     alirocumab (PRALUENT) 75 MG/ML injectable pen Inject 2 mLs (150 mg) Subcutaneous every 14 days 2 mL 11     potassium chloride SA (K-DUR/KLOR-CON M) 10 MEQ CR tablet Take 1 tablet (10 mEq) by mouth daily 30 tablet 1     losartan (COZAAR) 25 MG tablet TAKE 1 TABLET DAILY 93 tablet 1     amLODIPine (NORVASC) 10 MG tablet TAKE 1 TABLET EVERY EVENING 90 tablet 3     TOPROL  MG 24 hr tablet TAKE 1 TABLET EVERY MORNING 93 tablet 1     metoclopramide (REGLAN) 10 MG tablet TAKE 1 TABLET FOUR TIMES A  tablet 2     omeprazole (PRILOSEC) 40 MG capsule TAKE 1 CAPSULE DAILY 90 capsule 2     amitriptyline (ELAVIL) 25 MG tablet TAKE 1 TABLET TWICE A  tablet 2     gabapentin (NEURONTIN) 300 MG capsule TAKE 1 CAPSULE THREE TIMES A  capsule 4     cilostazol (PLETAL) 100 MG tablet Take 1 tablet (100 mg) by mouth 2  "times daily 180 tablet 3     psyllium (METAMUCIL) 58.6 % POWD Take by mouth daily       ASPIRIN PO Take 325 mg by mouth daily       polyethylene glycol (MIRALAX/GLYCOLAX) packet Take 1 packet by mouth daily        bisacodyl (DULCOLAX) 5 MG EC tablet Refer to \"Getting Ready for a Colonoscopy\" instruction handout (Patient not taking: Reported on 3/12/2018) 4 tablet 0     zolpidem (AMBIEN) 5 MG tablet Take 1 tablet (5 mg) by mouth nightly as needed for sleep (Patient not taking: Reported on 3/12/2018) 90 tablet 1     STATIN NOT PRESCRIBED, INTENTIONAL, Pt has known rhabdomyolysis in the past, somewhat associated with statins.  Also wasn't able to tolerate Zetia. (Patient not taking: Reported on 3/12/2018) 1 each 0       ALLERGIES     Allergies   Allergen Reactions     Hmg-Coa-R Inhibitors Other (See Comments)     Rhabdo  Same as \"statins\"     Gemfibrozil      Resting thigh-calf pain     Sulfa Drugs      Reacted as a child     Zetia [Ezetimibe]      Muscle cramping       PAST MEDICAL, SURGICAL, FAMILY, SOCIAL HISTORY:  History was reviewed and updated as needed, see medical record.    Review of Systems:  A 12-point review of systems was completed, see medical record for detailed review of systems information.    Physical Exam:  Vitals: /60  Pulse 68  Ht 1.727 m (5' 8\")  Wt 75.3 kg (165 lb 14.4 oz)  SpO2 98%  BMI 25.23 kg/m2    Constitutional:  cooperative, alert and oriented, well developed, well nourished, in no acute distress        Skin:  warm and dry to the touch, no apparent skin lesions or masses noted        Head:  normocephalic, no masses or lesions        Eyes:  pupils equal and round, conjunctivae and lids unremarkable, sclera white, no xanthalasma, EOMS intact, no nystagmus   Right eye blind    ENT:           Neck:           Chest:  normal breath sounds, clear to auscultation, normal A-P diameter, normal symmetry, normal respiratory excursion, no use of accessory muscles        Cardiac: regular " rhythm, normal S1/S2, no S3 or S4, apical impulse not displaced, no murmurs, gallops or rubs                  Abdomen:  abdomen soft, non-tender, BS normoactive, no mass, no HSM, no bruits        Vascular:     2+ 2+ 2+   0 0 0 0 2+ 2+ 2+ 0 0 0 right femoral bruit (+);right carotid bruit (+)      Extremities and Back:  no deformities, clubbing, cyanosis, erythema observed        Neurological:  no gross motor deficits        ASSESSMENT:  Increase Praluent to 150 biweekly       RECOMMENDATIONS: Losartan 50  Praluent 150  Lipid profile in 3 months Fu with me in 1 year      Recent Lab Results:  LIPID RESULTS:  Lab Results   Component Value Date    CHOL 202 (H) 03/05/2018    HDL 47 03/05/2018     (H) 03/05/2018    TRIG 112 03/05/2018    CHOLHDLRATIO 5.2 (H) 07/23/2015       LIVER ENZYME RESULTS:  Lab Results   Component Value Date    AST 18 03/26/2015    ALT 18 08/08/2016       CBC RESULTS:  Lab Results   Component Value Date    WBC 7.5 03/26/2015    RBC 4.32 (L) 03/26/2015    HGB 14.7 03/12/2018    HCT 41.4 03/26/2015    MCV 96 03/26/2015    MCH 32.4 03/26/2015    MCHC 33.8 03/26/2015    RDW 13.6 03/26/2015     03/26/2015       BMP RESULTS:  Lab Results   Component Value Date     03/12/2018    POTASSIUM 2.6 (LL) 03/12/2018    CHLORIDE 99 03/12/2018    CO2 36 (H) 03/12/2018    ANIONGAP 5 03/12/2018    GLC 94 03/12/2018    BUN 12 03/12/2018    CR 1.13 03/12/2018    GFRESTIMATED 64 03/12/2018    GFRESTBLACK 77 03/12/2018    RAHEEM 9.2 03/12/2018        A1C RESULTS:  Lab Results   Component Value Date    A1C 5.6 03/07/2014       INR RESULTS:  Lab Results   Component Value Date    INR 1.00 12/05/2014    INR 1.32 (H) 03/06/2014       We greatly appreciate the opportunity to be involved in the care of your patient, Michele Vance.    Sincerely,  Stevie Felipe MD      CC  No referring provider defined for this encounter.                                                                       Thank you for  allowing me to participate in the care of your patient.      Sincerely,     Stevie Felipe MD     [facility]    cc:   No referring provider defined for this encounter.

## 2018-03-28 NOTE — PROGRESS NOTES
HISTORY OF PRESENT ILLNESS:  Asymptomatic.      Orders this Visit:  Orders Placed This Encounter   Procedures     Lipid Profile     Follow-Up with Cardiologist     Orders Placed This Encounter   Medications     alirocumab (PRALUENT) 75 MG/ML injectable pen     Sig: Inject 2 mLs (150 mg) Subcutaneous every 14 days     Dispense:  2 mL     Refill:  11     Medications Discontinued During This Encounter   Medication Reason     alirocumab (PRALUENT) 75 MG/ML injectable pen Reorder       Encounter Diagnoses   Name Primary?     Hyperlipidemia LDL goal <130      Hypercholesteremia      Atherosclerosis of native artery of right lower extremity with intermittent claudication (H)      Coronary artery disease involving native coronary artery of native heart without angina pectoris        CURRENT MEDICATIONS:  Current Outpatient Prescriptions   Medication Sig Dispense Refill     alirocumab (PRALUENT) 75 MG/ML injectable pen Inject 2 mLs (150 mg) Subcutaneous every 14 days 2 mL 11     potassium chloride SA (K-DUR/KLOR-CON M) 10 MEQ CR tablet Take 1 tablet (10 mEq) by mouth daily 30 tablet 1     losartan (COZAAR) 25 MG tablet TAKE 1 TABLET DAILY 93 tablet 1     amLODIPine (NORVASC) 10 MG tablet TAKE 1 TABLET EVERY EVENING 90 tablet 3     TOPROL  MG 24 hr tablet TAKE 1 TABLET EVERY MORNING 93 tablet 1     metoclopramide (REGLAN) 10 MG tablet TAKE 1 TABLET FOUR TIMES A  tablet 2     omeprazole (PRILOSEC) 40 MG capsule TAKE 1 CAPSULE DAILY 90 capsule 2     amitriptyline (ELAVIL) 25 MG tablet TAKE 1 TABLET TWICE A  tablet 2     gabapentin (NEURONTIN) 300 MG capsule TAKE 1 CAPSULE THREE TIMES A  capsule 4     cilostazol (PLETAL) 100 MG tablet Take 1 tablet (100 mg) by mouth 2 times daily 180 tablet 3     psyllium (METAMUCIL) 58.6 % POWD Take by mouth daily       ASPIRIN PO Take 325 mg by mouth daily       polyethylene glycol (MIRALAX/GLYCOLAX) packet Take 1 packet by mouth daily        bisacodyl  "(DULCOLAX) 5 MG EC tablet Refer to \"Getting Ready for a Colonoscopy\" instruction handout (Patient not taking: Reported on 3/12/2018) 4 tablet 0     zolpidem (AMBIEN) 5 MG tablet Take 1 tablet (5 mg) by mouth nightly as needed for sleep (Patient not taking: Reported on 3/12/2018) 90 tablet 1     STATIN NOT PRESCRIBED, INTENTIONAL, Pt has known rhabdomyolysis in the past, somewhat associated with statins.  Also wasn't able to tolerate Zetia. (Patient not taking: Reported on 3/12/2018) 1 each 0       ALLERGIES     Allergies   Allergen Reactions     Hmg-Coa-R Inhibitors Other (See Comments)     Rhabdo  Same as \"statins\"     Gemfibrozil      Resting thigh-calf pain     Sulfa Drugs      Reacted as a child     Zetia [Ezetimibe]      Muscle cramping       PAST MEDICAL, SURGICAL, FAMILY, SOCIAL HISTORY:  History was reviewed and updated as needed, see medical record.    Review of Systems:  A 12-point review of systems was completed, see medical record for detailed review of systems information.    Physical Exam:  Vitals: /60  Pulse 68  Ht 1.727 m (5' 8\")  Wt 75.3 kg (165 lb 14.4 oz)  SpO2 98%  BMI 25.23 kg/m2    Constitutional:  cooperative, alert and oriented, well developed, well nourished, in no acute distress        Skin:  warm and dry to the touch, no apparent skin lesions or masses noted        Head:  normocephalic, no masses or lesions        Eyes:  pupils equal and round, conjunctivae and lids unremarkable, sclera white, no xanthalasma, EOMS intact, no nystagmus   Right eye blind    ENT:           Neck:           Chest:  normal breath sounds, clear to auscultation, normal A-P diameter, normal symmetry, normal respiratory excursion, no use of accessory muscles        Cardiac: regular rhythm, normal S1/S2, no S3 or S4, apical impulse not displaced, no murmurs, gallops or rubs                  Abdomen:  abdomen soft, non-tender, BS normoactive, no mass, no HSM, no bruits        Vascular:     2+ 2+ 2+   0 0 0 " 0 2+ 2+ 2+ 0 0 0 right femoral bruit (+);right carotid bruit (+)      Extremities and Back:  no deformities, clubbing, cyanosis, erythema observed        Neurological:  no gross motor deficits        ASSESSMENT:  Increase Praluent to 150 biweekly       RECOMMENDATIONS: Losartan 50  Praluent 150  Lipid profile in 3 months Fu with me in 1 year      Recent Lab Results:  LIPID RESULTS:  Lab Results   Component Value Date    CHOL 202 (H) 03/05/2018    HDL 47 03/05/2018     (H) 03/05/2018    TRIG 112 03/05/2018    CHOLHDLRATIO 5.2 (H) 07/23/2015       LIVER ENZYME RESULTS:  Lab Results   Component Value Date    AST 18 03/26/2015    ALT 18 08/08/2016       CBC RESULTS:  Lab Results   Component Value Date    WBC 7.5 03/26/2015    RBC 4.32 (L) 03/26/2015    HGB 14.7 03/12/2018    HCT 41.4 03/26/2015    MCV 96 03/26/2015    MCH 32.4 03/26/2015    MCHC 33.8 03/26/2015    RDW 13.6 03/26/2015     03/26/2015       BMP RESULTS:  Lab Results   Component Value Date     03/12/2018    POTASSIUM 2.6 (LL) 03/12/2018    CHLORIDE 99 03/12/2018    CO2 36 (H) 03/12/2018    ANIONGAP 5 03/12/2018    GLC 94 03/12/2018    BUN 12 03/12/2018    CR 1.13 03/12/2018    GFRESTIMATED 64 03/12/2018    GFRESTBLACK 77 03/12/2018    RAHEEM 9.2 03/12/2018        A1C RESULTS:  Lab Results   Component Value Date    A1C 5.6 03/07/2014       INR RESULTS:  Lab Results   Component Value Date    INR 1.00 12/05/2014    INR 1.32 (H) 03/06/2014       We greatly appreciate the opportunity to be involved in the care of your patient, Michele Vance.    Sincerely,  Stevie Felipe MD      CC  No referring provider defined for this encounter.

## 2018-03-28 NOTE — MR AVS SNAPSHOT
After Visit Summary   3/28/2018    Michele Vance    MRN: [id number]           Patient Information     Date Of Birth          1945        Visit Information        Provider Department      3/28/2018 9:30 AM Stevie Felipe MD [facility]        Today's Diagnoses     Hyperlipidemia LDL goal <130        Hypercholesteremia        Atherosclerosis of native artery of right lower extremity with intermittent claudication (H)        Coronary artery disease involving native coronary artery of native heart without angina pectoris        Essential hypertension, benign           Follow-ups after your visit        Additional Services     Follow-Up with Cardiologist                 Your next 10 appointments already scheduled     Apr 02, 2018 10:00 AM CDT   Office Visit with Juarez Nuno DO   [facility]    [address] [facility] [address] [phone]           Bring a current list of meds and any records pertaining to this visit. For Physicals, please bring immunization records and any forms needing to be filled out. Please arrive 10 minutes early to complete paperwork.              Future tests that were ordered for you today     Open Future Orders        Priority Expected Expires Ordered    Lipid Profile Routine 6/26/2018 3/28/2019 3/28/2018    Lipid Profile Routine 3/28/2019 3/29/2019 3/28/2018    Follow-Up with Cardiologist Routine 3/28/2019 3/29/2019 3/28/2018            Who to contact     If you have questions or need follow up information about today's clinic visit or your schedule please contact [facility] directly at [phone].  Normal or non-critical lab and imaging results will be communicated to you by MyChart, letter or phone within 4 business days after the clinic has received the results. If you do not hear from us within 7 days, please  "contact the clinic through [employer] or phone. If you have a critical or abnormal lab result, we will notify you by phone as soon as possible.  Submit refill requests through [employer] or call your pharmacy and they will forward the refill request to us. Please allow 3 business days for your refill to be completed.          Additional Information About Your Visit        [email or web address] Information     [employer] gives you secure access to your electronic health record. If you see a primary care provider, you can also send messages to your care team and make appointments. If you have questions, please call your primary care clinic.  If you do not have a primary care provider, please call [phone] and they will assist you.        Care EveryWhere ID     This is your Care EveryWhere ID. This could be used by other organizations to access your [place] medical records  [id number]        Your Vitals Were     Pulse Height Pulse Oximetry BMI (Body Mass Index)          68 1.727 m (5' 8\") 98% 25.23 kg/m2         Blood Pressure from Last 3 Encounters:   03/28/18 138/60   03/12/18 142/60   01/16/18 170/82    Weight from Last 3 Encounters:   03/28/18 75.3 kg (165 lb 14.4 oz)   03/12/18 76.2 kg (168 lb)   07/25/17 78.5 kg (173 lb)              We Performed the Following     Follow-Up with Cardiologist          Today's Medication Changes          These changes are accurate as of 3/28/18  9:41 AM.  If you have any questions, ask your nurse or doctor.               These medicines have changed or have updated prescriptions.        Dose/Directions    alirocumab 75 MG/ML injectable pen   Commonly known as:  PRALUENT   This may have changed:  how much to take   Used for:  Hyperlipidemia LDL goal <130   Changed by:  Stevie Felipe MD        Dose:  150 mg   Inject 2 mLs (150 mg) Subcutaneous every 14 days   Quantity:  2 mL   Refills:  11       losartan 25 MG tablet   Commonly known as:  COZAAR   This may have changed:  See the new " instructions.   Used for:  Essential hypertension, benign   Changed by:  Stevie Felipe MD        Dose:  50 mg   Take 2 tablets (50 mg) by mouth daily   Quantity:  93 tablet   Refills:  1            Where to get your medicines      These medications were sent to [facility] [address] [place] [address]  [address] [facility] [address]     Phone:  [phone]     alirocumab 75 MG/ML injectable pen    losartan 25 MG tablet                Primary Care Provider Office Phone # Fax #    Juarez Stevie Nuno,  [phone]       [address] [place] [address]        Equal Access to Services     [facility]: Hadii aad ku hadasho Soomaali, waaxda luqadaha, qaybta kaalmada adeegyada, waxaneta marin . So [facility] [phone].    ATENCIÓN: Si habla español, tiene a fischer disposición servicios gratuitos de asistencia lingüística. [facility] [phone].    We comply with applicable federal civil rights laws and [place] laws. We do not discriminate on the basis of race, color, national origin, age, disability, sex, sexual orientation, or gender identity.            Thank you!     Thank you for choosing [facility]  for your care. Our goal is always to provide you with excellent care. Hearing back from our patients is one way we can continue to improve our services. Please take a few minutes to complete the written survey that you may receive in the mail after your visit with us. Thank you!             Your Updated Medication List - Protect others around you: Learn how to safely use, store and throw away your medicines at www.disposemymeds.org.          This list is accurate as of 3/28/18  9:41 AM.  Always use your most recent med list.                   Brand Name Dispense Instructions for use Diagnosis    alirocumab 75 MG/ML injectable pen    PRALUENT    2 mL    Inject 2 mLs (150 mg)  "Subcutaneous every 14 days    Hyperlipidemia LDL goal <130       amitriptyline 25 MG tablet    ELAVIL    180 tablet    TAKE 1 TABLET TWICE A DAY    Migraine without aura and without status migrainosus, not intractable       amLODIPine 10 MG tablet    NORVASC    90 tablet    TAKE 1 TABLET EVERY EVENING    Essential hypertension, benign       ASPIRIN PO      Take 325 mg by mouth daily        bisacodyl 5 MG EC tablet    DULCOLAX    4 tablet    Refer to \"Getting Ready for a Colonoscopy\" instruction handout    Special screening for malignant neoplasm of colon       cilostazol 100 MG tablet    PLETAL    180 tablet    Take 1 tablet (100 mg) by mouth 2 times daily    Peripheral vascular disease, unspecified       gabapentin 300 MG capsule    NEURONTIN    210 capsule    TAKE 1 CAPSULE THREE TIMES A DAY    Arthritis       losartan 25 MG tablet    COZAAR    93 tablet    Take 2 tablets (50 mg) by mouth daily    Essential hypertension, benign       metoclopramide 10 MG tablet    REGLAN    120 tablet    TAKE 1 TABLET FOUR TIMES A DAY    Slow transit constipation       omeprazole 40 MG capsule    priLOSEC    90 capsule    TAKE 1 CAPSULE DAILY    Gastroesophageal reflux disease without esophagitis       polyethylene glycol Packet    MIRALAX/GLYCOLAX     Take 1 packet by mouth daily        potassium chloride SA 10 MEQ CR tablet    K-DUR/KLOR-CON M    30 tablet    Take 1 tablet (10 mEq) by mouth daily    Hypokalemia       psyllium 58.6 % Powd    METAMUCIL     Take by mouth daily        STATIN NOT PRESCRIBED (INTENTIONAL)     1 each    Pt has known rhabdomyolysis in the past, somewhat associated with statins.  Also wasn't able to tolerate Zetia.    Hyperlipidemia LDL goal <130       TOPROL  MG 24 hr tablet   Generic drug:  metoprolol succinate     93 tablet    TAKE 1 TABLET EVERY MORNING    Essential hypertension, benign       zolpidem 5 MG tablet    AMBIEN    90 tablet    Take 1 tablet (5 mg) by mouth nightly as needed for " sleep    Primary insomnia

## 2018-03-30 ENCOUNTER — TELEPHONE (OUTPATIENT)
Dept: CARDIOLOGY | Facility: CLINIC | Age: 73
End: 2018-03-30

## 2018-03-30 NOTE — TELEPHONE ENCOUNTER
Patient has new order for Praluent 150 mg every 14 days. Pharmacist called to say it is better to change to 150 mg pen inject 1 ml every 14 days, 90 day supply with 1 refill.

## 2018-04-02 ENCOUNTER — OFFICE VISIT (OUTPATIENT)
Dept: FAMILY MEDICINE | Facility: OTHER | Age: 73
End: 2018-04-02
Payer: MEDICARE

## 2018-04-02 ENCOUNTER — TELEPHONE (OUTPATIENT)
Dept: FAMILY MEDICINE | Facility: OTHER | Age: 73
End: 2018-04-02

## 2018-04-02 VITALS
HEART RATE: 70 BPM | BODY MASS INDEX: 25.83 KG/M2 | WEIGHT: 169.9 LBS | OXYGEN SATURATION: 96 % | DIASTOLIC BLOOD PRESSURE: 70 MMHG | TEMPERATURE: 97.8 F | SYSTOLIC BLOOD PRESSURE: 132 MMHG | RESPIRATION RATE: 16 BRPM

## 2018-04-02 DIAGNOSIS — E87.6 HYPOKALEMIA: Primary | ICD-10-CM

## 2018-04-02 DIAGNOSIS — E78.5 HYPERLIPIDEMIA LDL GOAL <130: ICD-10-CM

## 2018-04-02 DIAGNOSIS — N18.30 CKD (CHRONIC KIDNEY DISEASE) STAGE 3, GFR 30-59 ML/MIN (H): ICD-10-CM

## 2018-04-02 DIAGNOSIS — I10 ESSENTIAL HYPERTENSION: ICD-10-CM

## 2018-04-02 DIAGNOSIS — I63.10 CEREBROVASCULAR ACCIDENT (CVA) DUE TO EMBOLISM OF PRECEREBRAL ARTERY (H): ICD-10-CM

## 2018-04-02 DIAGNOSIS — E87.6 HYPOKALEMIA: ICD-10-CM

## 2018-04-02 DIAGNOSIS — Z95.1 S/P CABG X 6: ICD-10-CM

## 2018-04-02 LAB
ANION GAP SERPL CALCULATED.3IONS-SCNC: 5 MMOL/L (ref 3–14)
BUN SERPL-MCNC: 9 MG/DL (ref 7–30)
CALCIUM SERPL-MCNC: 8.8 MG/DL (ref 8.5–10.1)
CHLORIDE SERPL-SCNC: 101 MMOL/L (ref 94–109)
CO2 SERPL-SCNC: 35 MMOL/L (ref 20–32)
CREAT SERPL-MCNC: 1.15 MG/DL (ref 0.66–1.25)
GFR SERPL CREATININE-BSD FRML MDRD: 63 ML/MIN/1.7M2
GLUCOSE SERPL-MCNC: 134 MG/DL (ref 70–99)
POTASSIUM SERPL-SCNC: 2.4 MMOL/L (ref 3.4–5.3)
SODIUM SERPL-SCNC: 141 MMOL/L (ref 133–144)

## 2018-04-02 PROCEDURE — 99214 OFFICE O/P EST MOD 30 MIN: CPT | Performed by: INTERNAL MEDICINE

## 2018-04-02 PROCEDURE — 36415 COLL VENOUS BLD VENIPUNCTURE: CPT | Performed by: INTERNAL MEDICINE

## 2018-04-02 PROCEDURE — 80048 BASIC METABOLIC PNL TOTAL CA: CPT | Performed by: INTERNAL MEDICINE

## 2018-04-02 RX ORDER — POTASSIUM CHLORIDE 750 MG/1
10 TABLET, EXTENDED RELEASE ORAL 3 TIMES DAILY
Qty: 30 TABLET | Refills: 1 | Status: SHIPPED | OUTPATIENT
Start: 2018-04-02 | End: 2018-05-17

## 2018-04-02 ASSESSMENT — PAIN SCALES - GENERAL: PAINLEVEL: NO PAIN (0)

## 2018-04-02 NOTE — TELEPHONE ENCOUNTER
[facility] Cher, just called RN at [facility] with a Critical Lab result for Michele. His K+ today= 2.4. RN will send message to Dr. Nuno as well and hand deliver the message.  VIPIN Mcbride

## 2018-04-02 NOTE — NURSING NOTE
"Chief Complaint   Patient presents with     RECHECK       Initial /70 (BP Location: Left arm, Patient Position: Chair, Cuff Size: Adult Regular)  Pulse 70  Temp 97.8  F (36.6  C) (Tympanic)  Resp 16  Wt 169 lb 14.4 oz (77.1 kg)  SpO2 96%  BMI 25.83 kg/m2 Estimated body mass index is 25.83 kg/(m^2) as calculated from the following:    Height as of 3/28/18: 5' 8\" (1.727 m).    Weight as of this encounter: 169 lb 14.4 oz (77.1 kg).  Medication Reconciliation: complete     Health Maintenance Due   Topic Date Due     MIGRAINE ACTION PLAN  08/24/1964     PNEUMOCOCCAL (1 of 2 - PCV13) 08/24/2011     AORTIC ANEURYSM SCREENING (SYSTEM ASSIGNED)  08/24/2011     MEDICARE ANNUAL WELLNESS VISIT  02/13/2018     Grady Martínez CMA      "

## 2018-04-02 NOTE — PROGRESS NOTES
SUBJECTIVE:   Michele Vance is a 72 year old male who presents to clinic today for the following health issues:    Recheck Potassium  The patient is a pleasant 72-year-old gentleman who recently had a potassium level is 2.6. He is currently on potassium supplementation and we are here to recheck his values. He additionally is on an ARB medication and I'm hesitant to over supplement the potassium. He notes no muscle cramps or symptoms of hypokalemia at this time.the abnormal potassium levels found serendipitously.                         PAST, FAMILY,SOCIAL HISTORY:     Medical  History:   has a past medical history of Carotid stenosis; Chronic kidney disease; Coronary artery disease (3-2014); Crohn's disease (H); CVA (cerebral infarction); Elevated CK; Esophageal reflux; Hypercholesteremia; Hypertension; Nonsenile cataract; Other and unspecified hyperlipidemia; PAD (peripheral artery disease) (H); Rhabdomyolysis (2010); and Unspecified essential hypertension.     Surgical History:   has a past surgical history that includes knee surgery (1976); lasix (2001); appendectomy (1974); Dental surgery; cataracts; colonoscopy (12/12/02); UPPER GI ENDOSCOPY,EXAM (01/08/99); Release carpal tunnel (1/13/2011); Release carpal tunnel (1/20/2011); Bypass graft artery coronary (3/5/2014); cataract iol, rt/lt (Bilateral, 2008); Endarterectomy carotid; CAPSULE ENDOSCOPY (N/A, 10/7/2014); Colonoscopy (N/A, 10/7/2014); Esophagoscopy, gastroscopy, duodenoscopy (EGD), combined (Left, 10/7/2014); Esophagoscopy, gastroscopy, duodenoscopy (EGD), combined (Left, 10/7/2014); Inject epidural lumbar (Right, 5/8/2017); and Inject joint sacroiliac (Bilateral, 8/28/2017).     Social History:   reports that he quit smoking about 18 years ago. His smoking use included Cigarettes. He has a 50.00 pack-year smoking history. He has never used smokeless tobacco. He reports that he does not drink alcohol or use illicit drugs.     Family  History:  family history includes CANCER in his sister; CEREBROVASCULAR DISEASE in his father; Eye Disorder in his mother; HEART DISEASE in his father and sister; Hypertension in his mother; Lipids in his father; Obesity in his father. There is no history of Glaucoma or Macular Degeneration.            MEDICATIONS  Current Outpatient Prescriptions   Medication Sig Dispense Refill     alirocumab (PRALUENT) 75 MG/ML injectable pen Inject 2 mLs (150 mg) Subcutaneous every 14 days 2 mL 11     losartan (COZAAR) 25 MG tablet Take 2 tablets (50 mg) by mouth daily 93 tablet 1     amLODIPine (NORVASC) 10 MG tablet TAKE 1 TABLET EVERY EVENING 90 tablet 3     TOPROL  MG 24 hr tablet TAKE 1 TABLET EVERY MORNING 93 tablet 1     metoclopramide (REGLAN) 10 MG tablet TAKE 1 TABLET FOUR TIMES A  tablet 2     omeprazole (PRILOSEC) 40 MG capsule TAKE 1 CAPSULE DAILY 90 capsule 2     amitriptyline (ELAVIL) 25 MG tablet TAKE 1 TABLET TWICE A  tablet 2     gabapentin (NEURONTIN) 300 MG capsule TAKE 1 CAPSULE THREE TIMES A  capsule 4     cilostazol (PLETAL) 100 MG tablet Take 1 tablet (100 mg) by mouth 2 times daily 180 tablet 3     psyllium (METAMUCIL) 58.6 % POWD Take by mouth daily       ASPIRIN PO Take 325 mg by mouth daily       polyethylene glycol (MIRALAX/GLYCOLAX) packet Take 1 packet by mouth daily        STATIN NOT PRESCRIBED, INTENTIONAL, Pt has known rhabdomyolysis in the past, somewhat associated with statins.  Also wasn't able to tolerate Zetia. 1 each 0     potassium chloride SA (K-DUR/KLOR-CON M) 10 MEQ CR tablet Take 1 tablet (10 mEq) by mouth 3 times daily 30 tablet 1     zolpidem (AMBIEN) 5 MG tablet Take 1 tablet (5 mg) by mouth nightly as needed for sleep (Patient not taking: Reported on 4/2/2018) 90 tablet 1         --------------------------------------------------------------------------------------------------------------------                          REVIEW OF SYSTEMS:         LUNGS:  Pt denies: cough,excess sputum, hemoptysis, or shortness of breath.   HEART: Pt denies: chest pain, arrythmia, syncope, tachy or bradyarrhythmia or excess edema.   GI: Pt denies: nausea, vomitting, diarrhea, constipation, melena, or hematochezia.   NEURO: Pt denies: seizures, strokes, diplopia, weakness, paraesthesias, or paralysis.   SKIN: Pt denies: itching, rashes, discoloration, or specific lesions of concern. Denies recent hair loss.                          EXAMINATION:         /70 (BP Location: Left arm, Patient Position: Chair, Cuff Size: Adult Regular)  Pulse 70  Temp 97.8  F (36.6  C) (Tympanic)  Resp 16  Wt 169 lb 14.4 oz (77.1 kg)  SpO2 96%  BMI 25.83 kg/m2   Constitutional: The patient appears to be in no acute distress. The patient appears to be adequately hydrated. No acute respiratory or hemodynamic distress is noted at this time.   LUNGS: clear bilaterally, airflow is brisk, no intercostal retraction or stridor is noted. No coughing is noted during visit.   HEART:  regular without rubs, clicks, gallops, or murmurs. PMI is nondisplaced. Upstrokes are brisk. S1,S2 are heard.   GI: Abdomen is soft, without rebound, guarding or tenderness. Bowel sounds are appropriate. No renal bruits are heard.    NEURO: Pt is alert and appropriate. No neurologic lateralization is noted. Cranial nerves 2-12 are intact. Peripheral sensory and motor function are grossly normal   SKIN:  warm and dry. No erythema, or rashes are noted. No specific lesions of concern are noted.                           DECISION MAKIN. Hypokalemia  Recheck potassium level and adjust accordingly  - Basic metabolic panel    2. Essential hypertension  Currently controlled on medication    3. S/P CABG x 6  Asymptomatic.  Currently on  Beta blocker,aspirin  Intolerant to statins and Zetia  4. CKD (chronic kidney disease) stage 3, GFR 30-59 ml/min  Heck creatinine    5. Hyperlipidemia LDL goal <130  as above  On Praluent per  cardiology  6. Cerebrovascular accident (CVA) due to embolism of precerebral artery (H)  No sequelae                               FOLLOW UP    I have asked the patient to make an appointment for follow up with me in 4 months or as needed          I have carefully explained the diagnosis and treatment options with the patient. The patient has displayed an understanding of the above, and all subsequent questions were answered.         DO JENNA Bal    Portions of this note were produced using [employer]  Although every attempt at real-time proof reading has been made, occasional grammar/syntax errors may have been missed.

## 2018-04-02 NOTE — TELEPHONE ENCOUNTER
Please let the patient know that his potassium is still markedly decreased and I would like to have him increase the potassium to one pill 3 times daily for the next 10 days. I have sent a prescription for additional medication to the [facility] pharmacy as that seems to be his local pharmacy. I would like to recheck his lab work in about 10 days. Orders have been placed.  Thank you.    Nurys

## 2018-04-02 NOTE — MR AVS SNAPSHOT
After Visit Summary   4/2/2018    Michele Vance    MRN: [id number]           Patient Information     Date Of Birth          1945        Visit Information        Provider Department      4/2/2018 10:00 AM Juarez Nuno DO [facility]        Today's Diagnoses     Hypokalemia    -  1    Essential hypertension        S/P CABG x 6           Follow-ups after your visit        Who to contact     If you have questions or need follow up information about today's clinic visit or your schedule please contact [facility] directly at [phone].  Normal or non-critical lab and imaging results will be communicated to you by [employer], letter or phone within 4 business days after the clinic has received the results. If you do not hear from us within 7 days, please contact the clinic through [employer] or phone. If you have a critical or abnormal lab result, we will notify you by phone as soon as possible.  Submit refill requests through [employer] or call your pharmacy and they will forward the refill request to us. Please allow 3 business days for your refill to be completed.          Additional Information About Your Visit        MyChart Information     [employer] gives you secure access to your electronic health record. If you see a primary care provider, you can also send messages to your care team and make appointments. If you have questions, please call your primary care clinic.  If you do not have a primary care provider, please call [phone] and they will assist you.        Care EveryWhere ID     This is your Care EveryWhere ID. This could be used by other organizations to access your [place] medical records  [id number]        Your Vitals Were     Pulse Temperature Respirations Pulse Oximetry BMI (Body Mass Index)       70 97.8  F (36.6  C) (Tympanic) 16 96% 25.83 kg/m2        Blood Pressure from Last 3 Encounters:   04/02/18 132/70   03/28/18 138/60   03/12/18 142/60     Weight from Last 3 Encounters:   04/02/18 169 lb 14.4 oz (77.1 kg)   03/28/18 165 lb 14.4 oz (75.3 kg)   03/12/18 168 lb (76.2 kg)              We Performed the Following     Basic metabolic panel        Primary Care Provider Office Phone # Fax #    Juarez Nuno [phone]       [address] [place] [address]        Equal Access to Services     ANSHU AGUSTIN : Hadii aad ku hadasho Soomaali, waaxda luqadaha, qaybta kaalmada adeegyada, waxay idiin hayaan adeeg kharash la'aan . So [facility] [phone].    ATENCIÓN: Si habla español, tiene a fischer disposición servicios gratuitos de asistencia lingüística. [facility] [phone].    We comply with applicable federal civil rights laws and [place] laws. We do not discriminate on the basis of race, color, national origin, age, disability, sex, sexual orientation, or gender identity.            Thank you!     Thank you for choosing [facility]  for your care. Our goal is always to provide you with excellent care. Hearing back from our patients is one way we can continue to improve our services. Please take a few minutes to complete the written survey that you may receive in the mail after your visit with us. Thank you!             Your Updated Medication List - Protect others around you: Learn how to safely use, store and throw away your medicines at www.disposemymeds.org.          This list is accurate as of 4/2/18  3:35 PM.  Always use your most recent med list.                   Brand Name Dispense Instructions for use Diagnosis    alirocumab 75 MG/ML injectable pen    PRALUENT    2 mL    Inject 2 mLs (150 mg) Subcutaneous every 14 days    Hyperlipidemia LDL goal <130       amitriptyline 25 MG tablet    ELAVIL    180 tablet    TAKE 1 TABLET TWICE A DAY    Migraine without aura and without status migrainosus, not intractable       amLODIPine 10 MG tablet    NORVASC    90 tablet    TAKE 1 TABLET EVERY EVENING    Essential  hypertension, benign       ASPIRIN PO      Take 325 mg by mouth daily        cilostazol 100 MG tablet    PLETAL    180 tablet    Take 1 tablet (100 mg) by mouth 2 times daily    Peripheral vascular disease, unspecified       gabapentin 300 MG capsule    NEURONTIN    210 capsule    TAKE 1 CAPSULE THREE TIMES A DAY    Arthritis       losartan 25 MG tablet    COZAAR    93 tablet    Take 2 tablets (50 mg) by mouth daily    Essential hypertension, benign       metoclopramide 10 MG tablet    REGLAN    120 tablet    TAKE 1 TABLET FOUR TIMES A DAY    Slow transit constipation       omeprazole 40 MG capsule    priLOSEC    90 capsule    TAKE 1 CAPSULE DAILY    Gastroesophageal reflux disease without esophagitis       polyethylene glycol Packet    MIRALAX/GLYCOLAX     Take 1 packet by mouth daily        potassium chloride SA 10 MEQ CR tablet    K-DUR/KLOR-CON M    30 tablet    Take 1 tablet (10 mEq) by mouth daily    Hypokalemia       psyllium 58.6 % Powd    METAMUCIL     Take by mouth daily        STATIN NOT PRESCRIBED (INTENTIONAL)     1 each    Pt has known rhabdomyolysis in the past, somewhat associated with statins.  Also wasn't able to tolerate Zetia.    Hyperlipidemia LDL goal <130       TOPROL  MG 24 hr tablet   Generic drug:  metoprolol succinate     93 tablet    TAKE 1 TABLET EVERY MORNING    Essential hypertension, benign       zolpidem 5 MG tablet    AMBIEN    90 tablet    Take 1 tablet (5 mg) by mouth nightly as needed for sleep    Primary insomnia

## 2018-04-07 DIAGNOSIS — I73.9 PERIPHERAL VASCULAR DISEASE (H): ICD-10-CM

## 2018-04-09 RX ORDER — CILOSTAZOL 100 MG/1
TABLET ORAL
Qty: 180 TABLET | Refills: 3 | Status: SHIPPED | OUTPATIENT
Start: 2018-04-09 | End: 2019-06-23

## 2018-04-09 NOTE — TELEPHONE ENCOUNTER
Last Written Prescription Date:  2/13/2017  Last Fill Quantity: 180,  # refills: 3   Last office visit: 4/2/2018 with prescribing provider:  Dr. Nuno   Future Office Visit:

## 2018-04-09 NOTE — TELEPHONE ENCOUNTER
Routing refill request to provider for review/approval because:  Drug not on the [facility] refill protocol   Sabra Acevedo, RN, BSN

## 2018-05-17 ENCOUNTER — TELEPHONE (OUTPATIENT)
Dept: INTERNAL MEDICINE | Facility: CLINIC | Age: 73
End: 2018-05-17

## 2018-05-17 ENCOUNTER — OFFICE VISIT (OUTPATIENT)
Dept: INTERNAL MEDICINE | Facility: CLINIC | Age: 73
End: 2018-05-17
Payer: MEDICARE

## 2018-05-17 VITALS
BODY MASS INDEX: 24.6 KG/M2 | WEIGHT: 161.8 LBS | TEMPERATURE: 97.1 F | HEART RATE: 74 BPM | OXYGEN SATURATION: 99 % | RESPIRATION RATE: 20 BRPM | DIASTOLIC BLOOD PRESSURE: 60 MMHG | SYSTOLIC BLOOD PRESSURE: 136 MMHG

## 2018-05-17 DIAGNOSIS — I10 ESSENTIAL HYPERTENSION WITH GOAL BLOOD PRESSURE LESS THAN 140/90: Primary | ICD-10-CM

## 2018-05-17 DIAGNOSIS — J43.1 PANLOBULAR EMPHYSEMA (H): ICD-10-CM

## 2018-05-17 DIAGNOSIS — E78.5 HYPERLIPIDEMIA LDL GOAL <130: ICD-10-CM

## 2018-05-17 DIAGNOSIS — Z95.1 S/P CABG X 6: ICD-10-CM

## 2018-05-17 DIAGNOSIS — E87.6 HYPOKALEMIA: ICD-10-CM

## 2018-05-17 DIAGNOSIS — R82.90 ABNORMAL URINE FINDINGS: ICD-10-CM

## 2018-05-17 DIAGNOSIS — R09.89 BILATERAL CAROTID BRUITS: ICD-10-CM

## 2018-05-17 DIAGNOSIS — N52.1 ERECTILE DYSFUNCTION DUE TO DISEASES CLASSIFIED ELSEWHERE: ICD-10-CM

## 2018-05-17 LAB
ALBUMIN UR-MCNC: NEGATIVE MG/DL
ANION GAP SERPL CALCULATED.3IONS-SCNC: 4 MMOL/L (ref 3–14)
APPEARANCE UR: ABNORMAL
BACTERIA #/AREA URNS HPF: ABNORMAL /HPF
BILIRUB UR QL STRIP: NEGATIVE
BUN SERPL-MCNC: 9 MG/DL (ref 7–30)
CALCIUM SERPL-MCNC: 9.2 MG/DL (ref 8.5–10.1)
CHLORIDE SERPL-SCNC: 106 MMOL/L (ref 94–109)
CHOLEST SERPL-MCNC: 171 MG/DL
CO2 SERPL-SCNC: 33 MMOL/L (ref 20–32)
COLOR UR AUTO: YELLOW
CREAT SERPL-MCNC: 1.18 MG/DL (ref 0.66–1.25)
GFR SERPL CREATININE-BSD FRML MDRD: 61 ML/MIN/1.7M2
GLUCOSE SERPL-MCNC: 93 MG/DL (ref 70–99)
GLUCOSE UR STRIP-MCNC: NEGATIVE MG/DL
HDLC SERPL-MCNC: 48 MG/DL
HGB UR QL STRIP: NEGATIVE
KETONES UR STRIP-MCNC: NEGATIVE MG/DL
LDLC SERPL CALC-MCNC: 99 MG/DL
LEUKOCYTE ESTERASE UR QL STRIP: ABNORMAL
MUCOUS THREADS #/AREA URNS LPF: PRESENT /LPF
NITRATE UR QL: NEGATIVE
NONHDLC SERPL-MCNC: 123 MG/DL
PH UR STRIP: 7 PH (ref 5–7)
POTASSIUM SERPL-SCNC: 3.2 MMOL/L (ref 3.4–5.3)
RBC #/AREA URNS AUTO: 9 /HPF (ref 0–2)
SODIUM SERPL-SCNC: 143 MMOL/L (ref 133–144)
SOURCE: ABNORMAL
SP GR UR STRIP: 1 (ref 1–1.03)
SQUAMOUS #/AREA URNS AUTO: 1 /HPF (ref 0–1)
TRIGL SERPL-MCNC: 120 MG/DL
UROBILINOGEN UR STRIP-MCNC: 0 MG/DL (ref 0–2)
WBC #/AREA URNS AUTO: 144 /HPF (ref 0–5)
WBC CLUMPS #/AREA URNS HPF: PRESENT /HPF

## 2018-05-17 PROCEDURE — 87086 URINE CULTURE/COLONY COUNT: CPT | Performed by: INTERNAL MEDICINE

## 2018-05-17 PROCEDURE — 81001 URINALYSIS AUTO W/SCOPE: CPT | Performed by: INTERNAL MEDICINE

## 2018-05-17 PROCEDURE — 80048 BASIC METABOLIC PNL TOTAL CA: CPT | Performed by: INTERNAL MEDICINE

## 2018-05-17 PROCEDURE — 80061 LIPID PANEL: CPT | Performed by: INTERNAL MEDICINE

## 2018-05-17 PROCEDURE — 36415 COLL VENOUS BLD VENIPUNCTURE: CPT | Performed by: INTERNAL MEDICINE

## 2018-05-17 PROCEDURE — 99214 OFFICE O/P EST MOD 30 MIN: CPT | Performed by: INTERNAL MEDICINE

## 2018-05-17 RX ORDER — POTASSIUM CHLORIDE 750 MG/1
10 TABLET, EXTENDED RELEASE ORAL 3 TIMES DAILY
Qty: 30 TABLET | Refills: 1 | Status: SHIPPED | OUTPATIENT
Start: 2018-05-17 | End: 2018-10-23

## 2018-05-17 RX ORDER — SILDENAFIL 100 MG/1
100 TABLET, FILM COATED ORAL DAILY PRN
Qty: 2 TABLET | Refills: 0 | Status: SHIPPED | OUTPATIENT
Start: 2018-05-17 | End: 2018-10-23

## 2018-05-17 NOTE — TELEPHONE ENCOUNTER
Prior Authorization Retail Medication Request    Medication/Dose: Sildenafil 100mg   ICD code (if different than what is on RX):    Previously Tried and Failed:    Rationale:  Prior Auth Required    Insurance Name:  ULISES iverson  Insurance ID:  [id number]      Pharmacy Information (if different than what is on RX)  Name:  [facility]  Phone:  [phone]

## 2018-05-17 NOTE — MR AVS SNAPSHOT
After Visit Summary   5/17/2018    Michele Vance    MRN: [id number]           Patient Information     Date Of Birth          1945        Visit Information        Provider Department      5/17/2018 10:20 AM Juarez Nuno DO [facility]        Today's Diagnoses     Essential hypertension with goal blood pressure less than 140/90    -  1    S/P CABG x 6        Erectile dysfunction due to diseases classified elsewhere        Bilateral carotid bruits        Panlobular emphysema (H)        Hyperlipidemia LDL goal <130        Hypokalemia        Abnormal urine findings           Follow-ups after your visit        Your next 10 appointments already scheduled     May 31, 2018  2:15 PM CDT   US CAROTID BILATERAL with PHUS1   [facility] Ultrasound ([facility])    [address] [address] [phone]           Please bring a list of your medicines (including vitamins, minerals and over-the-counter drugs). Also, tell your doctor about any allergies you may have. Wear comfortable clothes and leave your valuables at home.  You do not need to do anything special to prepare for your exam.  Please call the Imaging Department at your exam site with any questions.              Who to contact     If you have questions or need follow up information about today's clinic visit or your schedule please contact [facility] directly at [phone].  Normal or non-critical lab and imaging results will be communicated to you by MyChart, letter or phone within 4 business days after the clinic has received the results. If you do not hear from us within 7 days, please contact the clinic through MyChart or phone. If you have a critical or abnormal lab result, we will notify you by phone as soon as possible.  Submit refill requests through Qminder or call your pharmacy and they will forward the refill request to us. Please allow 3 business  days for your refill to be completed.          Additional Information About Your Visit        MyChart Information     SeatMe gives you secure access to your electronic health record. If you see a primary care provider, you can also send messages to your care team and make appointments. If you have questions, please call your primary care clinic.  If you do not have a primary care provider, please call [phone] and they will assist you.        Care EveryWhere ID     This is your Care EveryWhere ID. This could be used by other organizations to access your [facility] medical records  [id number]        Your Vitals Were     Pulse Temperature Respirations Pulse Oximetry BMI (Body Mass Index)       74 97.1  F (36.2  C) (Temporal) 20 99% 24.6 kg/m2        Blood Pressure from Last 3 Encounters:   05/17/18 136/60   04/02/18 132/70   03/28/18 138/60    Weight from Last 3 Encounters:   05/17/18 161 lb 12.8 oz (73.4 kg)   04/02/18 169 lb 14.4 oz (77.1 kg)   03/28/18 165 lb 14.4 oz (75.3 kg)              We Performed the Following     *UA reflex to Microscopic and Culture ([place]; [facility]; [facility]; [facility]; [facility]; [facility]; [place]; [place])     Basic metabolic panel     Lipid Profile     Urine Culture Aerobic Bacterial          Today's Medication Changes          These changes are accurate as of 5/17/18 11:59 PM.  If you have any questions, ask your nurse or doctor.               Start taking these medicines.        Dose/Directions    sildenafil 100 MG tablet   Commonly known as:  VIAGRA   Used for:  Erectile dysfunction due to diseases classified elsewhere   Started by:  Juarez Nuno DO        Dose:  100 mg   Take 1 tablet (100 mg) by mouth daily as needed 30 min to 4 hrs before sex. Do not use with nitroglycerin, terazosin or doxazosin.   Quantity:  2 tablet   Refills:  0            Where to get your medicines      These medications were sent to [facility] [place] - [address] [place] [address]  [address] [place] [id number]     Phone:  [phone]     potassium chloride SA 10 MEQ CR tablet         These medications were sent to [facility] - [address]  [address] [address]     Phone:  [phone]     sildenafil 100 MG tablet                Primary Care Provider Office Phone # Fax #    Juarez Nuno,  [phone]       [address] [place] [address]        Equal Access to Services     [facility]: Hadii aad ku hadasho Soomaali, waaxda luqadaha, qaybta kaalmada adeegyada, waxay morgan hayaan kiera marin . So [facility] [phone].    ATENCIÓN: Si habla español, tiene a fischer disposición servicios gratuitos de asistencia lingüística. [facility] [phone].    We comply with applicable federal civil rights laws and [place] laws. We do not discriminate on the basis of race, color, national origin, age, disability, sex, sexual orientation, or gender identity.            Thank you!     Thank you for choosing [facility]  for your care. Our goal is always to provide you with excellent care. Hearing back from our patients is one way we can continue to improve our services. Please take a few minutes to complete the written survey that you may receive in the mail after your visit with us. Thank you!             Your Updated Medication List - Protect others around you: Learn how to safely use, store and throw away your medicines at www.disposemymeds.org.          This list is accurate as of 5/17/18 11:59 PM.  Always use your most recent med list.                   Brand Name Dispense Instructions for use Diagnosis    alirocumab 75 MG/ML injectable pen    PRALUENT    2 mL    Inject 2 mLs (150 mg) Subcutaneous every 14 days    Hyperlipidemia LDL goal <130       amitriptyline 25 MG tablet    ELAVIL    180 tablet    TAKE 1 TABLET TWICE A DAY     Migraine without aura and without status migrainosus, not intractable       amLODIPine 10 MG tablet    NORVASC    90 tablet    TAKE 1 TABLET EVERY EVENING    Essential hypertension, benign       ASPIRIN PO      Take 325 mg by mouth daily        cilostazol 100 MG tablet    PLETAL    180 tablet    TAKE 1 TABLET TWICE A DAY    Peripheral vascular disease (H)       gabapentin 300 MG capsule    NEURONTIN    210 capsule    TAKE 1 CAPSULE THREE TIMES A DAY    Arthritis       losartan 25 MG tablet    COZAAR    93 tablet    Take 2 tablets (50 mg) by mouth daily    Essential hypertension, benign       metoclopramide 10 MG tablet    REGLAN    120 tablet    TAKE 1 TABLET FOUR TIMES A DAY    Slow transit constipation       omeprazole 40 MG capsule    priLOSEC    90 capsule    TAKE 1 CAPSULE DAILY    Gastroesophageal reflux disease without esophagitis       polyethylene glycol Packet    MIRALAX/GLYCOLAX     Take 1 packet by mouth daily        potassium chloride SA 10 MEQ CR tablet    K-DUR/KLOR-CON M    30 tablet    Take 1 tablet (10 mEq) by mouth 3 times daily    Hypokalemia       psyllium 58.6 % Powd    METAMUCIL     Take by mouth daily        sildenafil 100 MG tablet    VIAGRA    2 tablet    Take 1 tablet (100 mg) by mouth daily as needed 30 min to 4 hrs before sex. Do not use with nitroglycerin, terazosin or doxazosin.    Erectile dysfunction due to diseases classified elsewhere       STATIN NOT PRESCRIBED (INTENTIONAL)     1 each    Pt has known rhabdomyolysis in the past, somewhat associated with statins.  Also wasn't able to tolerate Zetia.    Hyperlipidemia LDL goal <130       TOPROL  MG 24 hr tablet   Generic drug:  metoprolol succinate     93 tablet    TAKE 1 TABLET EVERY MORNING    Essential hypertension, benign       zolpidem 5 MG tablet    AMBIEN    90 tablet    Take 1 tablet (5 mg) by mouth nightly as needed for sleep    Primary insomnia

## 2018-05-17 NOTE — PROGRESS NOTES
Recheck potassium. Labs not drawn from 4/2/18. Patient not aware of need for rechecking                   Chief Complaint    The patient is a pleasant 72-year-old gentleman who has a history of coronary artery disease as well as hypertension.  He was recently found to have some hypokalemia.  This has been rechecked.  Additionally,He has a history of some emphysema as well as previous cerebrovascular accident which is left with no significant sequelae.Currently, he denies any chest pain or severe dyspnea.  His emphysema is controlled.  He continues his medications compliantly.                       PAST, FAMILY,SOCIAL HISTORY:     Medical  History:   has a past medical history of Carotid stenosis; Chronic kidney disease; Coronary artery disease (3-2014); Crohn's disease (H); CVA (cerebral infarction); Elevated CK; Esophageal reflux; Hypercholesteremia; Hypertension; Nonsenile cataract; Other and unspecified hyperlipidemia; PAD (peripheral artery disease) (H); Rhabdomyolysis (2010); and Unspecified essential hypertension.     Surgical History:   has a past surgical history that includes knee surgery (1976); lasix (2001); appendectomy (1974); Dental surgery; cataracts; colonoscopy (12/12/02); UPPER GI ENDOSCOPY,EXAM (01/08/99); Release carpal tunnel (1/13/2011); Release carpal tunnel (1/20/2011); Bypass graft artery coronary (3/5/2014); cataract iol, rt/lt (Bilateral, 2008); Endarterectomy carotid; CAPSULE ENDOSCOPY (N/A, 10/7/2014); Colonoscopy (N/A, 10/7/2014); Esophagoscopy, gastroscopy, duodenoscopy (EGD), combined (Left, 10/7/2014); Esophagoscopy, gastroscopy, duodenoscopy (EGD), combined (Left, 10/7/2014); Inject epidural lumbar (Right, 5/8/2017); and Inject joint sacroiliac (Bilateral, 8/28/2017).     Social History:   reports that he quit smoking about 18 years ago. His smoking use included Cigarettes. He has a 50.00 pack-year smoking history. He has never used smokeless tobacco. He reports that he does not  drink alcohol or use illicit drugs.     Family History:  family history includes CANCER in his sister; CEREBROVASCULAR DISEASE in his father; Eye Disorder in his mother; HEART DISEASE in his father and sister; Hypertension in his mother; Lipids in his father; Obesity in his father. There is no history of Glaucoma or Macular Degeneration.            MEDICATIONS  Current Outpatient Prescriptions   Medication Sig Dispense Refill     alirocumab (PRALUENT) 75 MG/ML injectable pen Inject 2 mLs (150 mg) Subcutaneous every 14 days 2 mL 11     amitriptyline (ELAVIL) 25 MG tablet TAKE 1 TABLET TWICE A  tablet 2     amLODIPine (NORVASC) 10 MG tablet TAKE 1 TABLET EVERY EVENING 90 tablet 3     ASPIRIN PO Take 325 mg by mouth daily       cilostazol (PLETAL) 100 MG tablet TAKE 1 TABLET TWICE A  tablet 3     gabapentin (NEURONTIN) 300 MG capsule TAKE 1 CAPSULE THREE TIMES A  capsule 4     losartan (COZAAR) 25 MG tablet Take 2 tablets (50 mg) by mouth daily 93 tablet 1     metoclopramide (REGLAN) 10 MG tablet TAKE 1 TABLET FOUR TIMES A  tablet 2     omeprazole (PRILOSEC) 40 MG capsule TAKE 1 CAPSULE DAILY 90 capsule 2     polyethylene glycol (MIRALAX/GLYCOLAX) packet Take 1 packet by mouth daily        potassium chloride SA (K-DUR/KLOR-CON M) 10 MEQ CR tablet Take 1 tablet (10 mEq) by mouth 3 times daily 30 tablet 1     psyllium (METAMUCIL) 58.6 % POWD Take by mouth daily       sildenafil (VIAGRA) 100 MG tablet Take 1 tablet (100 mg) by mouth daily as needed 30 min to 4 hrs before sex. Do not use with nitroglycerin, terazosin or doxazosin. 2 tablet 0     TOPROL  MG 24 hr tablet TAKE 1 TABLET EVERY MORNING 93 tablet 1     zolpidem (AMBIEN) 5 MG tablet Take 1 tablet (5 mg) by mouth nightly as needed for sleep 90 tablet 1     STATIN NOT PRESCRIBED, INTENTIONAL, Pt has known rhabdomyolysis in the past, somewhat associated with statins.  Also wasn't able to tolerate Zetia. 1 each 0          --------------------------------------------------------------------------------------------------------------------                              Review of Systems     LUNGS: Pt denies: cough,excess sputum, hemoptysis, or shortness of breath.   HEART: Pt denies: chest pain, arrythmia, syncope, tachy or bradyarrhythmia.   GI: Pt denies: nausea, vomitting, diarrhea, constipation, melena, or hematochezia.   NEURO: Pt denies: seizures, strokes, diplopia, weakness, paraesthesias, or paralysis.   SKIN: Pt denies: itching, rashes, discoloration, or specific lesions of concern. Denies recent hair loss.   PSYCH: The patient denies significant depression, anxiety, mood imbalance. Specifically denies any suicidal ideation.   GENITAL: Pt denies: testicular pain or mass, penile lesions.  Patient does have persistent erectile function that does respond to the Viagra.                                   Examination    /60  Pulse 74  Temp 97.1  F (36.2  C) (Temporal)  Resp 20  Wt 161 lb 12.8 oz (73.4 kg)  SpO2 99%  BMI 24.6 kg/m2   Constitutional: The patient appears to be in no acute distress. The patient appears to be adequately hydrated. No acute respiratory or hemodynamic distress is noted at this time.   LUNGS: clear with decreased breath sounds noted bilaterally, airflow is slightly slowed, no intercostal retraction or stridor is noted. No coughing is noted during visit.   HEART:  regular without rubs, clicks, gallops, or murmurs. PMI is nondisplaced. Upstrokes are brisk. S1,S2 are heard.  Bilateral carotid bruits are heard.   GI: Abdomen is soft, without rebound, guarding or tenderness. Bowel sounds are appropriate. No renal bruits are heard.   NEURO: Pt is alert and appropriate. No neurologic lateralization is noted. Cranial nerves 2-12 are intact. Peripheral sensory and motor function are grossly normal.    SKIN:  warm and dry. No erythema, or rashes are noted. No specific lesions of concern are noted.                                              Decision Making    1. Essential hypertension with goal blood pressure less than 140/90  Continue current medications.  Check urine for protein  - *UA reflex to Microscopic and Culture ([place]; [facility]; [facility]; [facility]; [facility]; [facility]; [place]; [place])  - Urine Culture Aerobic Bacterial    2. S/P CABG x 6  Currently stable, check renal function  - Basic metabolic panel    3. Erectile dysfunction due to diseases classified elsewhere  Continue Viagra as needed.  Cautions discussed in detail  - sildenafil (VIAGRA) 100 MG tablet; Take 1 tablet (100 mg) by mouth daily as needed 30 min to 4 hrs before sex. Do not use with nitroglycerin, terazosin or doxazosin.  Dispense: 2 tablet; Refill: 0    4. Bilateral carotid bruits  We will set up carotid Doppler study  - US Carotid Bilateral; Future    5. Panlobular emphysema (H)  Not on any nebulizer therapy at this time.  No longer smoking.    6. Hyperlipidemia LDL goal <130  Check lipid levels and adjust medication  - Lipid Profile    7. Hypokalemia  Continue potassium supplement as needed  - potassium chloride SA (K-DUR/KLOR-CON M) 10 MEQ CR tablet; Take 1 tablet (10 mEq) by mouth 3 times daily  Dispense: 30 tablet; Refill: 1    8. Abnormal urine findings  Urine culture  - Urine Culture Aerobic Bacterial                          FOLLOW UP   I have asked the patient to make an appointment for followup with me in 2 weeks for repeat urine rapid up        I have carefully explained the diagnosis and treatment options to the patient.  The patient has displayed an understanding of the above, and all subsequent questions were answered.      DO JENNA Bal    Portions of this note were produced using [employer]  Although every attempt at real-time proof reading has been made, occasional grammar/syntax errors may have been missed.

## 2018-05-18 LAB
BACTERIA SPEC CULT: NORMAL
Lab: NORMAL
SPECIMEN SOURCE: NORMAL

## 2018-05-18 NOTE — TELEPHONE ENCOUNTER
PA Initiation    Medication: sildenafil (VIAGRA) 100 MG tablet -   Insurance Company: [employer] - Phone [phone] Fax [phone]  Pharmacy Filling the Rx: [facility] - [address]  Filling Pharmacy Phone: [phone]  Filling Pharmacy Fax:    Start Date: 5/18/2018

## 2018-05-26 DIAGNOSIS — K21.9 GASTROESOPHAGEAL REFLUX DISEASE WITHOUT ESOPHAGITIS: ICD-10-CM

## 2018-05-29 RX ORDER — OMEPRAZOLE 40 MG/1
CAPSULE, DELAYED RELEASE ORAL
Qty: 90 CAPSULE | Refills: 1 | Status: SHIPPED | OUTPATIENT
Start: 2018-05-29 | End: 2018-12-07

## 2018-05-29 NOTE — PROGRESS NOTES
Dear Michele, your recent test results are attached.  Urine sample demonstrates sample contamination.  Potassium has improved but is still decreased at 3.2.  Recommend continuing current medication.  Cholesterol is well controlled with an LDL of 99.      Feel free to contact me via the office or My Chart if you have any questions regarding the above.  Sincerely,  Juarez Nuno, DO FACOI

## 2018-05-29 NOTE — TELEPHONE ENCOUNTER
"Requested Prescriptions   Pending Prescriptions Disp Refills     omeprazole (PRILOSEC) 40 MG capsule [Pharmacy Med Name: OMEPRAZOLE CAPS 40MG] 90 capsule 2     Sig: TAKE 1 CAPSULE DAILY    PPI Protocol Passed    5/26/2018  3:04 AM       Passed - Not on Clopidogrel (unless Pantoprazole ordered)       Passed - No diagnosis of osteoporosis on record       Passed - Recent (12 mo) or future (30 days) visit within the authorizing provider's specialty    Patient had office visit in the last 12 months or has a visit in the next 30 days with authorizing provider or within the authorizing provider's specialty.  See \"Patient Info\" tab in inbasket, or \"Choose Columns\" in Meds & Orders section of the refill encounter.           Passed - Patient is age 18 or older        "

## 2018-05-29 NOTE — TELEPHONE ENCOUNTER
Prescription approved per [facility] Refill Protocol.    Maura Shukla RN  [facility]

## 2018-05-31 ENCOUNTER — HOSPITAL ENCOUNTER (OUTPATIENT)
Dept: ULTRASOUND IMAGING | Facility: CLINIC | Age: 73
Discharge: HOME | End: 2018-05-31
Attending: INTERNAL MEDICINE | Admitting: INTERNAL MEDICINE
Payer: MEDICARE

## 2018-05-31 DIAGNOSIS — R09.89 BILATERAL CAROTID BRUITS: ICD-10-CM

## 2018-05-31 PROCEDURE — 93880 EXTRACRANIAL BILAT STUDY: CPT

## 2018-06-03 DIAGNOSIS — G43.009 MIGRAINE WITHOUT AURA AND WITHOUT STATUS MIGRAINOSUS, NOT INTRACTABLE: ICD-10-CM

## 2018-06-04 NOTE — TELEPHONE ENCOUNTER
"Last Written Prescription Date:  8/29/2017  Last Fill Quantity: 180,  # refills: 2   Last office visit: 5/17/2018 with prescribing provider:  Dr. Nuno   Future Office Visit:    Requested Prescriptions   Pending Prescriptions Disp Refills     amitriptyline (ELAVIL) 25 MG tablet [Pharmacy Med Name: AMITRIPTYLINE  TABS 25MG] 180 tablet 2     Sig: TAKE 1 TABLET TWICE A DAY    Tricyclic Agents ( Annual appt and no PHQ9) Passed    6/3/2018  8:51 AM       Passed - Blood Pressure under 140/90 in past 12 mos    BP Readings from Last 3 Encounters:   05/17/18 136/60   04/02/18 132/70   03/28/18 138/60                Passed - Recent (12 mo) or future (30 days) visit within authorizing provider's specialty    Patient had office visit in the last 12 months or has a visit in the next 30 days with authorizing provider or within the authorizing provider's specialty.  See \"Patient Info\" tab in inbasket, or \"Choose Columns\" in Meds & Orders section of the refill encounter.           Passed - Patient is age 18 or older          "

## 2018-06-05 NOTE — TELEPHONE ENCOUNTER
Prescription approved per [facility] Refill Protocol.    Maura Shukal RN  [facility]

## 2018-06-05 NOTE — PROGRESS NOTES
Dear Michele, your recent test results are attached.    The right internal carotid artery demonstrates less than 50% obstruction.  The left demonstrates less than 70%.  At this time, neither 1 of these is significant.  Feel free to contact me via the office or My Chart if you have any questions regarding the above.  Sincerely,  Juarez Nuno,  FACOI

## 2018-07-02 DIAGNOSIS — I10 ESSENTIAL HYPERTENSION, BENIGN: ICD-10-CM

## 2018-07-02 RX ORDER — METOPROLOL SUCCINATE 100 MG/1
TABLET, EXTENDED RELEASE ORAL
Qty: 93 TABLET | Refills: 1 | Status: SHIPPED | OUTPATIENT
Start: 2018-07-02 | End: 2019-01-15

## 2018-07-02 NOTE — TELEPHONE ENCOUNTER
Prescription approved per [facility] Refill Protocol.    Maura Shukla RN  [facility]

## 2018-07-02 NOTE — TELEPHONE ENCOUNTER
"Requested Prescriptions   Pending Prescriptions Disp Refills     TOPROL  MG 24 hr tablet [Pharmacy Med Name: TOPROL XL TABS 100MG] 93 tablet 1     Sig: TAKE 1 TABLET EVERY MORNING    Beta-Blockers Protocol Passed    7/2/2018 10:28 AM       Passed - Blood pressure under 140/90 in past 12 months    BP Readings from Last 3 Encounters:   05/17/18 136/60   04/02/18 132/70   03/28/18 138/60                Passed - Patient is age 6 or older       Passed - Recent (12 mo) or future (30 days) visit within the authorizing provider's specialty    Patient had office visit in the last 12 months or has a visit in the next 30 days with authorizing provider or within the authorizing provider's specialty.  See \"Patient Info\" tab in inbasket, or \"Choose Columns\" in Meds & Orders section of the refill encounter.            "

## 2018-08-22 DIAGNOSIS — I10 ESSENTIAL HYPERTENSION, BENIGN: ICD-10-CM

## 2018-08-22 RX ORDER — LOSARTAN POTASSIUM 50 MG/1
50 TABLET ORAL DAILY
Qty: 90 TABLET | Refills: 3 | Status: SHIPPED | OUTPATIENT
Start: 2018-08-22 | End: 2018-10-23

## 2018-08-27 ENCOUNTER — MYC REFILL (OUTPATIENT)
Dept: CARDIOLOGY | Facility: CLINIC | Age: 73
End: 2018-08-27

## 2018-08-27 DIAGNOSIS — E78.5 HYPERLIPIDEMIA LDL GOAL <130: ICD-10-CM

## 2018-09-05 NOTE — TELEPHONE ENCOUNTER
Message from [place]:  Randa Chamorro RN Mon Aug 27, 2018 4:39 PM        ----- Message -----   From: Michele Vance   Sent: 8/27/2018 9:19 AM   To: [facility] Heart Team 1  Subject: Medication Renewal Request     Original authorizing provider: MD Michele Devlin would like a refill of the following medications:  alirocumab (PRALUENT) 75 MG/ML injectable pen [Stevie Felipe MD]    Preferred pharmacy: [facility] - [address]    Comment:  Current prescription is for the 150 mg/ml single dose pre-filled pen. NOT the 75 mg/ml. If order is for the 75 mg/ml he needs 2 every 14 days. But he has been getting the 150 mg/ml single dose in the last 2 refills of this medication. There are no refills left. Need new prescription sent to [facility].

## 2018-09-26 DIAGNOSIS — M19.90 ARTHRITIS: ICD-10-CM

## 2018-09-26 RX ORDER — GABAPENTIN 300 MG/1
CAPSULE ORAL
Qty: 210 CAPSULE | Refills: 4 | Status: SHIPPED | OUTPATIENT
Start: 2018-09-26 | End: 2019-08-13

## 2018-09-26 NOTE — TELEPHONE ENCOUNTER
Gabapentin 300 MG       Last Written Prescription Date:  8/29/17  Last Fill Quantity: 210,   # refills: 4  Last Office Visit: 5/17/18  Future Office visit:       Routing refill request to provider for review/approval because:  Drug not on the G, P or [facility] refill protocol or controlled substance

## 2018-10-05 ENCOUNTER — HOSPITAL ENCOUNTER (EMERGENCY)
Facility: CLINIC | Age: 73
Discharge: HOME | End: 2018-10-05
Attending: FAMILY MEDICINE | Admitting: FAMILY MEDICINE
Payer: MEDICARE

## 2018-10-05 VITALS
OXYGEN SATURATION: 96 % | BODY MASS INDEX: 23.57 KG/M2 | TEMPERATURE: 96.8 F | WEIGHT: 155 LBS | SYSTOLIC BLOOD PRESSURE: 188 MMHG | DIASTOLIC BLOOD PRESSURE: 84 MMHG | RESPIRATION RATE: 16 BRPM | HEART RATE: 75 BPM

## 2018-10-05 DIAGNOSIS — S30.813A ABRASION OF SCROTUM, INITIAL ENCOUNTER: ICD-10-CM

## 2018-10-05 PROCEDURE — 99282 EMERGENCY DEPT VISIT SF MDM: CPT | Mod: Z6 | Performed by: FAMILY MEDICINE

## 2018-10-05 PROCEDURE — 99282 EMERGENCY DEPT VISIT SF MDM: CPT | Performed by: FAMILY MEDICINE

## 2018-10-05 PROCEDURE — 27211033 ZZH DRESSING QUICK CLOT 4X4: Performed by: FAMILY MEDICINE

## 2018-10-05 NOTE — ED TRIAGE NOTES
Patient here with a laceration/skin tear to the underside of his scrotum. He states he does not know how it got there but he work up with blood in his bed and underwear. Uncomfortable to walk/ move.

## 2018-10-05 NOTE — ED NOTES
Patient's wound is no longer bleeding, he will sent home with gauze to dress it if it starts again.

## 2018-10-05 NOTE — ED AVS SNAPSHOT
[facility] Emergency Department    [address] [place] [address]    Phone:  [phone]    Fax:  [phone]                                       Michele Vance   MRN: [id number]    Department:  [facility] Emergency Department   Date of Visit:  10/5/2018           Patient Information     Date Of Birth          1945        Your diagnoses for this visit were:     Abrasion of scrotum, initial encounter        You were seen by Artem Dyer MD.      Follow-up Information     Schedule an appointment as soon as possible for a visit with Juarez Nuno DO.    Specialty:  Internal Medicine    Why:  As needed    Contact information:    [facility] [address] [facility]   [place] [address]  [phone]          Discharge Instructions       1.  Leave the bandage in place for the remainder of the day.  2.  Remove the bandage tomorrow, and you can place a thin film of antibacterial ointment.  3.  I would wear boxers for the next few days to increase airflow and promote healing in this area.  ----------------------------------------          Abrasions  Abrasions are skin scrapes. Their treatment depends on how large and deep the abrasion is.  Home care  You may be prescribed an antibiotic cream or ointment to apply to the wound. This helps prevent infection. Follow instructions when using this medicine.  General care    To care for the abrasion, do the following each day for as long as directed by your healthcare provider.  ? If you were given a bandage, change it once a day. If your bandage sticks to the wound, soak it in warm water until it loosens.  ? Wash the area with soap and warm water. You may do this in a sink or under a tub faucet or shower. Rinse off the soap. Then pat the area dry with a clean towel.  ? If antibiotic ointment or cream was prescribed, reapply it to the wound as directed. Cover the wound with a fresh nonstick bandage. If the bandage becomes wet or dirty,  change it as soon as possible.  ? Some antibiotic ointments or cream can cause an allergic reaction or dermatitis. This may cause redness, itching and or hives. If this occurs, stop using the ointment immediately and wash off any remaining ointment. You may need to take some allergy medicine to relieve symptoms.    You may use acetaminophen or ibuprofen to control pain unless another pain medicine was prescribed. Talk with your healthcare provider before using these medicines if you have chronic liver or kidney disease or ever had a stomach ulcer or GI bleeding. Don t use ibuprofen in children younger than six months old.    Most skin wounds heal within 10 days. But an infection may occur even with treatment. So it s important to watch the wound for signs of infection as listed below.  Follow-up care  Follow up with your healthcare provider, or as advised.  When to seek medical advice  Call your healthcare provider right away if any of these occur:    Fever of 100.4 F (38 C) or higher, or as directed by your healthcare provider    Increasing pain, redness, swelling, or drainage from the wound    Bleeding from the wound that does not stop after a few minutes of steady, firm pressure    Decreased ability to move any body part near the wound  Date Last Reviewed: 3/3/2017    5858-2769 [employer]. [address] [place] [address]. All rights reserved. This information is not intended as a substitute for professional medical care. Always follow your healthcare professional's instructions.          24 Hour Appointment Hotline       To make an appointment at any [facility], call [phone] ([phone]). If you don't have a family doctor or clinic, we will help you find one. [facility] are conveniently located to serve the needs of you and your family.             Review of your medicines      Our records show that you are taking the medicines listed below. If these are incorrect,  please call your family doctor or clinic.        Dose / Directions Last dose taken    alirocumab 150 MG/ML injectable pen   Commonly known as:  PRALUENT   Dose:  150 mg   Quantity:  6 mL        Inject 1 mL (150 mg) Subcutaneous every 14 days   Refills:  2        amitriptyline 25 MG tablet   Commonly known as:  ELAVIL   Quantity:  180 tablet        TAKE 1 TABLET TWICE A DAY   Refills:  1        amLODIPine 10 MG tablet   Commonly known as:  NORVASC   Quantity:  90 tablet        TAKE 1 TABLET EVERY EVENING   Refills:  3        ASPIRIN PO   Dose:  325 mg        Take 325 mg by mouth daily   Refills:  0        cilostazol 100 MG tablet   Commonly known as:  PLETAL   Quantity:  180 tablet        TAKE 1 TABLET TWICE A DAY   Refills:  3        gabapentin 300 MG capsule   Commonly known as:  NEURONTIN   Quantity:  210 capsule        TAKE 1 CAPSULE THREE TIMES A DAY   Refills:  4        losartan 50 MG tablet   Commonly known as:  COZAAR   Dose:  50 mg   Quantity:  90 tablet        Take 1 tablet (50 mg) by mouth daily   Refills:  3        metoclopramide 10 MG tablet   Commonly known as:  REGLAN   Quantity:  120 tablet        TAKE 1 TABLET FOUR TIMES A DAY   Refills:  2        omeprazole 40 MG capsule   Commonly known as:  priLOSEC   Quantity:  90 capsule        TAKE 1 CAPSULE DAILY   Refills:  1        polyethylene glycol Packet   Commonly known as:  MIRALAX/GLYCOLAX   Dose:  1 packet        Take 1 packet by mouth daily   Refills:  0        potassium chloride SA 10 MEQ CR tablet   Commonly known as:  K-DUR/KLOR-CON M   Dose:  10 mEq   Quantity:  30 tablet        Take 1 tablet (10 mEq) by mouth 3 times daily   Refills:  1        psyllium 58.6 % Powd   Commonly known as:  METAMUCIL        Take by mouth daily   Refills:  0        sildenafil 100 MG tablet   Commonly known as:  VIAGRA   Dose:  100 mg   Quantity:  2 tablet        Take 1 tablet (100 mg) by mouth daily as needed 30 min to 4 hrs before sex. Do not use with nitroglycerin,  terazosin or doxazosin.   Refills:  0        STATIN NOT PRESCRIBED (INTENTIONAL)   Quantity:  1 each        Pt has known rhabdomyolysis in the past, somewhat associated with statins.  Also wasn't able to tolerate Zetia.   Refills:  0        TOPROL  MG 24 hr tablet   Quantity:  93 tablet   Generic drug:  metoprolol succinate        TAKE 1 TABLET EVERY MORNING   Refills:  1        zolpidem 5 MG tablet   Commonly known as:  AMBIEN   Dose:  5 mg   Quantity:  90 tablet        Take 1 tablet (5 mg) by mouth nightly as needed for sleep   Refills:  1                Orders Needing Specimen Collection     None      Pending Results     No orders found from 10/3/2018 to 10/6/2018.            Pending Culture Results     No orders found from 10/3/2018 to 10/6/2018.            Pending Results Instructions     If you had any lab results that were not finalized at the time of your Discharge, you can call the ED Lab Result RN at [phone]. You will be contacted by this team for any positive Lab results or changes in treatment. The nurses are available 7 days a week from 10A to 6:30P.  You can leave a message 24 hours per day and they will return your call.        Thank you for choosing [facility]       Thank you for choosing [facility] for your care. Our goal is always to provide you with excellent care. Hearing back from our patients is one way we can continue to improve our services. Please take a few minutes to complete the written survey that you may receive in the mail after you visit with us. Thank you!        [employer] Information     [employer] gives you secure access to your electronic health record. If you see a primary care provider, you can also send messages to your care team and make appointments. If you have questions, please call your primary care clinic.  If you do not have a primary care provider, please call [phone] and they will assist you.        Care EveryWhere ID     This is your Care EveryWhere ID. This  could be used by other organizations to access your [facility] [facility] medical records  [id number]        Equal Access to Services     ANSHU AGUSTIN : Hadii naomi Dill, barbie millard, arnoldo frank. So [facility] [phone].    ATENCIÓN: Si habla español, tiene a fischer disposición servicios gratuitos de asistencia lingüística. Llame al [phone].    We comply with applicable federal civil rights laws and [place] laws. We do not discriminate on the basis of race, color, national origin, age, disability, sex, sexual orientation, or gender identity.            After Visit Summary       This is your record. Keep this with you and show to your community pharmacist(s) and doctor(s) at your next visit.

## 2018-10-05 NOTE — ED PROVIDER NOTES
History     Chief Complaint   Patient presents with     Laceration     HPI  Michele Vance is a 73 year old male who resents with concerns of bleeding to his right scrotum.  Patient states he just woke up this morning and noticed a lot of blood down in his groin area.  He applied a bandage and came here for further evaluation.  He denies any recent trauma.  He is unsure if he scratched in that area but certainly could have did that while he was sleeping.  Patient is on aspirin a day but no other blood thinners.  Patient denies any fevers or chills.  Patient has been able to urinate well.    Problem List:    Patient Active Problem List    Diagnosis Date Noted     Carotid stenosis      Priority: Medium     right endartectomy       Essential hypertension with goal blood pressure less than 140/90 06/02/2016     Priority: Medium     PVD (peripheral vascular disease) (H) 07/22/2015     Priority: Medium     Chronic constipation 12/24/2014     Priority: Medium     Irritable bowel syndrome 12/24/2014     Priority: Medium     Health Care Home 09/11/2014     Priority: Medium     Status:  Unable to reach  Care Coordinator:  Erika Marcus    See Letters for [facility] Care Plan  Date:  September 11, 2014         Carotid artery occlusion with cerebral infarction (H) 08/06/2014     Priority: Medium     Dizziness 08/06/2014     Priority: Medium     Panlobular emphysema (H) 06/04/2014     Priority: Medium     Cerebral infarction due to occlusion or stenosis of carotid artery 05/07/2014     Priority: Medium     Problem list name updated by automated process. Provider to review       Stroke, embolic (H) 04/25/2014     Priority: Medium     Mesenteric artery stenosis (H) 04/04/2014     Priority: Medium     2014: Asymptomatic SMA and MELI stenoses, meriting clinical FU       Insomnia 03/12/2014     Priority: Medium     Nutritional deficiency 03/12/2014     Priority: Medium     Pain 03/12/2014     Priority: Medium     Urinary retention  03/12/2014     Priority: Medium     S/P CABG x 6 03/06/2014     Priority: Medium     Left main coronary artery disease 03/04/2014     Priority: Medium     PAD (peripheral artery disease) (H) 03/04/2014     Priority: Medium     2/19/14: RUBEN 0.73 LLE       Arthritis 01/14/2013     Priority: Medium     Carotid bruit 01/14/2013     Priority: Medium     Carotid stenosis, bilateral 01/14/2013     Priority: Medium     Anemia 04/06/2012     Priority: Medium     CKD (chronic kidney disease) stage 3, GFR 30-59 ml/min (H) 04/06/2012     Priority: Medium     Advanced directives, counseling/discussion 04/05/2012     Priority: Medium     Hypertension 11/22/2010     Priority: Medium     Hyperlipidemia LDL goal <130 11/22/2010     Priority: Medium     Myalgia and myositis 11/22/2010     Priority: Medium     GERD (gastroesophageal reflux disease) 11/22/2010     Priority: Medium        Past Medical History:    Past Medical History:   Diagnosis Date     Carotid stenosis      Chronic kidney disease      Coronary artery disease 3-2014     Crohn's disease (H)      CVA (cerebral infarction)      Elevated CK      Esophageal reflux      Hypercholesteremia      Hypertension      Nonsenile cataract      Other and unspecified hyperlipidemia      PAD (peripheral artery disease) (H)      Rhabdomyolysis 2010     Unspecified essential hypertension        Past Surgical History:    Past Surgical History:   Procedure Laterality Date     APPENDECTOMY  1974     BYPASS GRAFT ARTERY CORONARY  3/5/2014    Procedure: BYPASS GRAFT ARTERY CORONARY;  Median Sternotomy, Coronary Artery Bypass Graft X6 used Left internal mammary artery, Left and Right Greater Saphenous vein, on Pump oxygenator.;  Surgeon: Tim Montanez MD;  Location: [facility] OR     CATARACT IOL, RT/LT Bilateral 2008    in [place]     cataracts       COLONOSCOPY  12/12/02    [facility]     COLONOSCOPY N/A 10/7/2014    Procedure: COMBINED COLONOSCOPY, SINGLE OR MULTIPLE  BIOPSY/POLYPECTOMY BY BIOPSY;  Surgeon: Micheal Mora MD;  Location:  GI     DENTAL SURGERY      TMJ, implants     ENDARTERECTOMY CAROTID      right carotid stent     ESOPHAGOSCOPY, GASTROSCOPY, DUODENOSCOPY (EGD), COMBINED Left 10/7/2014    Procedure: COMBINED ESOPHAGOSCOPY, GASTROSCOPY, DUODENOSCOPY (EGD), BIOPSY SINGLE OR MULTIPLE;  Surgeon: Micheal Mora MD;  Location:  GI     ESOPHAGOSCOPY, GASTROSCOPY, DUODENOSCOPY (EGD), COMBINED Left 10/7/2014    Procedure: COMBINED ESOPHAGOSCOPY, GASTROSCOPY, DUODENOSCOPY (EGD), REMOVE FOREIGN BODY;  Surgeon: Micheal Mora MD;  Location:  GI      CAPSULE ENDOSCOPY N/A 10/7/2014    Procedure: CAPSULE/PILL CAM ENDOSCOPY;  Surgeon: Micheal Mora MD;  Location:  GI      UGI ENDOSCOPY, SIMPLE EXAM  01/08/99    [facility]     INJECT EPIDURAL LUMBAR Right 5/8/2017    Procedure: INJECT EPIDURAL LUMBAR;  Lumbar transforaminal Epidural Steroid Injection right lumbar 4-5, and right  lumbar 5-Sacral 1;  Surgeon: Anthony Gonzalez MD;  Location: [facility] OR     INJECT JOINT SACROILIAC Bilateral 8/28/2017    Procedure: INJECT JOINT SACROILIAC;  sacroiliac joint injection bilateral;  Surgeon: Anthony Gonzalez MD;  Location:  OR     KNEE SURGERY  1976    left knee reconstruction     lasix  2001    both eyes     RELEASE CARPAL TUNNEL  1/13/2011    RELEASE CARPAL TUNNEL performed by JO MADRID at  OR     RELEASE CARPAL TUNNEL  1/20/2011    RELEASE CARPAL TUNNEL performed by JO MADRID at  OR       Family History:    Family History   Problem Relation Age of Onset     Hypertension Mother      Eye Disorder Mother      Cerebrovascular Disease Father      HEART DISEASE Father      Lipids Father      Obesity Father      Cancer Sister      HEART DISEASE Sister      Glaucoma No family hx of      Macular Degeneration No family hx of        Social History:  Marital Status:   [2]  Social History   Substance Use Topics     Smoking status:  Former Smoker     Packs/day: 2.50     Years: 20.00     Types: Cigarettes     Quit date: 1/1/2000     Smokeless tobacco: Never Used     Alcohol use No        Medications:      alirocumab (PRALUENT) 150 MG/ML injectable pen   amitriptyline (ELAVIL) 25 MG tablet   amLODIPine (NORVASC) 10 MG tablet   ASPIRIN PO   cilostazol (PLETAL) 100 MG tablet   gabapentin (NEURONTIN) 300 MG capsule   losartan (COZAAR) 50 MG tablet   metoclopramide (REGLAN) 10 MG tablet   omeprazole (PRILOSEC) 40 MG capsule   polyethylene glycol (MIRALAX/GLYCOLAX) packet   potassium chloride SA (K-DUR/KLOR-CON M) 10 MEQ CR tablet   psyllium (METAMUCIL) 58.6 % POWD   sildenafil (VIAGRA) 100 MG tablet   STATIN NOT PRESCRIBED, INTENTIONAL,   TOPROL  MG 24 hr tablet   zolpidem (AMBIEN) 5 MG tablet         Review of Systems   All other systems reviewed and are negative.      Physical Exam   BP: 188/84  Pulse: 75  Temp: 96.8  F (36  C)  Resp: 16  Weight: 70.3 kg (155 lb)  SpO2: 96 %      Physical Exam   Constitutional: He appears well-developed and well-nourished. No distress.   HENT:   Head: Normocephalic and atraumatic.   Mouth/Throat: Oropharynx is clear and moist. No oropharyngeal exudate.   Genitourinary: Penis normal. Right testis shows tenderness. Right testis shows no mass and no swelling. Right testis is descended. Cremasteric reflex is not absent on the right side. Left testis shows no mass, no swelling and no tenderness. Left testis is descended. Cremasteric reflex is not absent on the left side.         Skin: He is not diaphoretic.   Nursing note and vitals reviewed.      ED Course     ED Course     Procedures           Patient has what appears to be a small capillary bleed most likely from a scratch.  We will place some quick clot over the area and a bandage and the patient will leave this alone for the rest of the day.  Patient will remove the bandage tomorrow and then just cover with antibacterial ointment.  At this point this does not  need to be sutured or any other procedures done.  Patient is safe to be discharged home.    Assessments & Plan (with Medical Decision Making)  Scrotal abrasion     I have reviewed the nursing notes.    I have reviewed the findings, diagnosis, plan and need for follow up with the patient.              10/5/2018   [facility] EMERGENCY DEPARTMENT     Artem Dyer MD  10/05/18 0923

## 2018-10-05 NOTE — DISCHARGE INSTRUCTIONS
1.  Leave the bandage in place for the remainder of the day.  2.  Remove the bandage tomorrow, and you can place a thin film of antibacterial ointment.  3.  I would wear boxers for the next few days to increase airflow and promote healing in this area.  ----------------------------------------          Abrasions  Abrasions are skin scrapes. Their treatment depends on how large and deep the abrasion is.  Home care  You may be prescribed an antibiotic cream or ointment to apply to the wound. This helps prevent infection. Follow instructions when using this medicine.  General care    To care for the abrasion, do the following each day for as long as directed by your healthcare provider.  ? If you were given a bandage, change it once a day. If your bandage sticks to the wound, soak it in warm water until it loosens.  ? Wash the area with soap and warm water. You may do this in a sink or under a tub faucet or shower. Rinse off the soap. Then pat the area dry with a clean towel.  ? If antibiotic ointment or cream was prescribed, reapply it to the wound as directed. Cover the wound with a fresh nonstick bandage. If the bandage becomes wet or dirty, change it as soon as possible.  ? Some antibiotic ointments or cream can cause an allergic reaction or dermatitis. This may cause redness, itching and or hives. If this occurs, stop using the ointment immediately and wash off any remaining ointment. You may need to take some allergy medicine to relieve symptoms.    You may use acetaminophen or ibuprofen to control pain unless another pain medicine was prescribed. Talk with your healthcare provider before using these medicines if you have chronic liver or kidney disease or ever had a stomach ulcer or GI bleeding. Don t use ibuprofen in children younger than six months old.    Most skin wounds heal within 10 days. But an infection may occur even with treatment. So it s important to watch the wound for signs of infection as listed  below.  Follow-up care  Follow up with your healthcare provider, or as advised.  When to seek medical advice  Call your healthcare provider right away if any of these occur:    Fever of 100.4 F (38 C) or higher, or as directed by your healthcare provider    Increasing pain, redness, swelling, or drainage from the wound    Bleeding from the wound that does not stop after a few minutes of steady, firm pressure    Decreased ability to move any body part near the wound  Date Last Reviewed: 3/3/2017    2750-5733 [employer]. [address] [place], [place] [address]. All rights reserved. This information is not intended as a substitute for professional medical care. Always follow your healthcare professional's instructions.

## 2018-10-05 NOTE — ED AVS SNAPSHOT
[facility] Emergency Department    [address] [place] [address]    Phone:  [phone]    Fax:  [phone]                                       Michele Vance   MRN: [id number]    Department:  [facility] Emergency Department   Date of Visit:  10/5/2018           After Visit Summary Signature Page     I have received my discharge instructions, and my questions have been answered. I have discussed any challenges I see with this plan with the nurse or doctor.    ..........................................................................................................................................  Patient/Patient Representative Signature      ..........................................................................................................................................  Patient Representative Print Name and Relationship to Patient    ..................................................               ................................................  Date                                   Time    ..........................................................................................................................................  Reviewed by Signature/Title    ...................................................              ..............................................  Date                                               Time          22EPIC Rev 08/18

## 2018-10-23 ENCOUNTER — OFFICE VISIT (OUTPATIENT)
Dept: CARDIOLOGY | Facility: CLINIC | Age: 73
End: 2018-10-23
Payer: MEDICARE

## 2018-10-23 VITALS
DIASTOLIC BLOOD PRESSURE: 72 MMHG | SYSTOLIC BLOOD PRESSURE: 170 MMHG | OXYGEN SATURATION: 91 % | HEART RATE: 58 BPM | WEIGHT: 159.7 LBS | BODY MASS INDEX: 24.2 KG/M2 | HEIGHT: 68 IN

## 2018-10-23 DIAGNOSIS — I25.10 CORONARY ARTERY DISEASE INVOLVING NATIVE CORONARY ARTERY OF NATIVE HEART WITHOUT ANGINA PECTORIS: ICD-10-CM

## 2018-10-23 DIAGNOSIS — I10 ESSENTIAL HYPERTENSION, BENIGN: ICD-10-CM

## 2018-10-23 DIAGNOSIS — E87.6 HYPOKALEMIA: ICD-10-CM

## 2018-10-23 DIAGNOSIS — R06.09 DOE (DYSPNEA ON EXERTION): Primary | ICD-10-CM

## 2018-10-23 DIAGNOSIS — R30.0 DYSURIA: ICD-10-CM

## 2018-10-23 LAB
ALBUMIN UR-MCNC: 30 MG/DL
ANION GAP SERPL CALCULATED.3IONS-SCNC: 6 MMOL/L (ref 3–14)
APPEARANCE UR: ABNORMAL
BACTERIA #/AREA URNS HPF: ABNORMAL /HPF
BILIRUB UR QL STRIP: NEGATIVE
BUN SERPL-MCNC: 10 MG/DL (ref 7–30)
CALCIUM SERPL-MCNC: 9 MG/DL (ref 8.5–10.1)
CHLORIDE SERPL-SCNC: 94 MMOL/L (ref 94–109)
CO2 SERPL-SCNC: 41 MMOL/L (ref 20–32)
COLOR UR AUTO: YELLOW
CREAT SERPL-MCNC: 1.19 MG/DL (ref 0.66–1.25)
ERYTHROCYTE [DISTWIDTH] IN BLOOD BY AUTOMATED COUNT: 13.7 % (ref 10–15)
GFR SERPL CREATININE-BSD FRML MDRD: 60 ML/MIN/1.7M2
GLUCOSE SERPL-MCNC: 93 MG/DL (ref 70–99)
GLUCOSE UR STRIP-MCNC: NEGATIVE MG/DL
HCT VFR BLD AUTO: 39.8 % (ref 40–53)
HGB BLD-MCNC: 13.3 G/DL (ref 13.3–17.7)
HGB UR QL STRIP: NEGATIVE
KETONES UR STRIP-MCNC: NEGATIVE MG/DL
LEUKOCYTE ESTERASE UR QL STRIP: ABNORMAL
MCH RBC QN AUTO: 32.1 PG (ref 26.5–33)
MCHC RBC AUTO-ENTMCNC: 33.4 G/DL (ref 31.5–36.5)
MCV RBC AUTO: 96 FL (ref 78–100)
NITRATE UR QL: NEGATIVE
NT-PROBNP SERPL-MCNC: 419 PG/ML (ref 0–125)
PH UR STRIP: 7 PH (ref 5–7)
PLATELET # BLD AUTO: 285 10E9/L (ref 150–450)
POTASSIUM SERPL-SCNC: 2.2 MMOL/L (ref 3.4–5.3)
RBC # BLD AUTO: 4.14 10E12/L (ref 4.4–5.9)
RBC #/AREA URNS AUTO: <1 /HPF (ref 0–2)
SODIUM SERPL-SCNC: 141 MMOL/L (ref 133–144)
SOURCE: ABNORMAL
SP GR UR STRIP: 1 (ref 1–1.03)
SQUAMOUS #/AREA URNS AUTO: <1 /HPF (ref 0–1)
UROBILINOGEN UR STRIP-MCNC: 2 MG/DL (ref 0–2)
WBC # BLD AUTO: 6.8 10E9/L (ref 4–11)
WBC #/AREA URNS AUTO: 19 /HPF (ref 0–5)

## 2018-10-23 PROCEDURE — 85027 COMPLETE CBC AUTOMATED: CPT | Performed by: PHYSICIAN ASSISTANT

## 2018-10-23 PROCEDURE — 80048 BASIC METABOLIC PNL TOTAL CA: CPT | Performed by: PHYSICIAN ASSISTANT

## 2018-10-23 PROCEDURE — 36415 COLL VENOUS BLD VENIPUNCTURE: CPT | Performed by: PHYSICIAN ASSISTANT

## 2018-10-23 PROCEDURE — 87086 URINE CULTURE/COLONY COUNT: CPT | Performed by: PHYSICIAN ASSISTANT

## 2018-10-23 PROCEDURE — 81001 URINALYSIS AUTO W/SCOPE: CPT | Performed by: PHYSICIAN ASSISTANT

## 2018-10-23 PROCEDURE — 99214 OFFICE O/P EST MOD 30 MIN: CPT | Performed by: PHYSICIAN ASSISTANT

## 2018-10-23 PROCEDURE — 83880 ASSAY OF NATRIURETIC PEPTIDE: CPT | Performed by: PHYSICIAN ASSISTANT

## 2018-10-23 RX ORDER — LOSARTAN POTASSIUM 100 MG/1
100 TABLET ORAL DAILY
Qty: 90 TABLET | Refills: 3 | Status: SHIPPED | OUTPATIENT
Start: 2018-10-23 | End: 2019-10-24

## 2018-10-23 RX ORDER — ISOSORBIDE MONONITRATE 60 MG/1
60 TABLET, EXTENDED RELEASE ORAL DAILY
Qty: 30 TABLET | Refills: 11 | Status: SHIPPED | OUTPATIENT
Start: 2018-10-23 | End: 2018-11-13

## 2018-10-23 RX ORDER — POTASSIUM CHLORIDE 1500 MG/1
100 TABLET, EXTENDED RELEASE ORAL DAILY
Qty: 10 TABLET | Refills: 0 | Status: SHIPPED | OUTPATIENT
Start: 2018-10-23 | End: 2018-10-25

## 2018-10-23 NOTE — LETTER
10/23/2018      Juarez Nuno, DO  [address] [place] [address]      RE: Michele GARZA Rajiv       Dear Colleague,    I had the pleasure of seeing Michele Vance in the [facility].    Service Date: 10/23/2018      PRIMARY CARDIOLOGIST:  Stevie Felipe MD      REASON FOR VISIT:  Dyspnea on exertion.      HISTORY OF PRESENT ILLNESS:  Mr. Vance is delightful 73-year-old gentleman who has unfortunately a complex past medical history and is a vasculopath, significant for the followin.  Coronary artery disease with past anginal equivalent being dyspnea on exertion with history of CAB in 2014.  This was a 6-vessel CAB with LIMA to the LAD, saphenous vein graft to the marginal, saphenous vein graft to the PDA, saphenous vein graft to the OM2, saphenous vein graft to the OM1 and saphenous vein graft to the second diagonal.  He had bilateral venous harvesting of the legs.   2.  Peripheral arterial disease with ongoing claudication with history of angioplasty, I believe to his left femoral.   3.  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   4.  Hypertension.   5.  History of TIA.     6.  Dyslipidemia with intolerance to statins with myositis and also unable to tolerate fibrates on Praluent with good results.      He comes in today for non-routine followup.  He notices that over the last month, he has been feeling poorly and had more dyspnea on exertion.  He is primarily limited by hip pain at this point that is bilateral, but if he does something as simple as sweeping the floor, he gets fairly dyspneic and has dizziness and feels like he may pass out.  He also noticed late this summer when he was putting in an irrigation system, he used a tractor for a trencher and if he had to go out and doing one hole, he would get short of breath, he would feel flushed and  diaphoretic and then he would vomit small amounts.  He notes he is irritable bowel syndrome and he previously would have vomiting and diarrhea at the same time, but was not exertional.  Now his vomiting seems to be almost always exertional.  He is also intermittently seems to catch-up breath at rest.  He has not had orthopnea or PND.  He has not had palpitations or true syncope.      His wife notes that before his CAB, he never had chest pain.  All he had was dyspnea on exertion.  At some point he was actually treated for asthma with inhaler.  After his CAB, shortness of breath resolved.  He says that he checked his blood pressure this morning at home and it was 130, but he does not take it typically.  He has taken his pills today.  He also notes that since starting Praluent, his urine seems darker and more cloudy.  He also has increased urinary frequency and occasional burning when he urinates, but this is intermittent.  He otherwise is urinating fine.  He does not take sildenafil on a regular basis as it is not effective.      SOCIAL HISTORY:  He comes in today with his wife, Ke.  They previously lived in [place] and now are back living in [place].  He is a former smoker, quit in 2000, about 50-pack-year history.  No alcohol use.      PHYSICAL EXAMINATION:   GENERAL:  Well-developed, well-nourished gentleman in no acute distress.   HEENT:  Normocephalic, atraumatic.   HEART:  Regular with a few early beats consistent with PACs or PVCs.   EXTREMITIES:  Have 1+ pitting edema to mid shin on the right, none on the left.  I cannot palpate either dorsalis pedis or speech posterior tibialis pulses on either leg.  He has 2+ radial pulses bilaterally and 1+ ulnar pulses bilaterally.   RESPIRATORY:  Lungs are  clear without wheezes, rales or rhonchi and his neck veins are flat at 30 degrees.   SKIN:  Warm and dry.      ASSESSMENT AND PLAN:   1.  Dyspnea on exertion and exertional, vomiting and dizziness and known  vasculopath a vasculopath who has history of 6-vessel CAB.  For this gentleman, his anginal equivalent has visit dyspnea on exertion.  At this point he is limited by claudication type symptoms.  I have a high suspicion for progression of his vascular disease and dyspnea being his anginal equivalent and vomiting possibly being an exertional component as well.  Given he has had a CAB and in order to somewhat localize possible ischemia, we will do a nuclear stress test.  He can remain on his beta blocker for this as he will not be able to exercise on a treadmill adequately to get his heart rate up.  In addition, we will do an echocardiogram as he has not had one recently either.  I would not be surprised if his EF was down and at that point may consider going straight to coronary angiogram.  Should his stress test be positive or his EF be down, we did discuss a coronary angiogram as a next step, and he is agreeable to this if need be.  Risks and benefits were discussed including bruising, bleeding, infection, stroke, heart attack, death, renal failure, and emergency open heart surgery.   2.  Hypertension, markedly elevated today.  This is likely part of his symptoms as well.  I will increase his losartan from 50 mg to 100 mg daily and start him on Imdur 60 mg daily.  He tells me that he does not take his Viagra regularly as it is not effective and will discontinue that completely.  Today, he was told to not take those together.  Hopefully, we can get his blood pressure controlled.   3.  Severe peripheral arterial disease with last carotid ultrasound showing a 50%-70% lesion which is stable.  The other is less than 50% and will follow.  We do know as a part of this are his vertebrals are compromised significantly.   4.  Peripheral arterial disease, follows with Dr. Fisher.   5.  Dyslipidemia.  Tolerating Praluent well, but with concerns that his urine became cloudy.  He does endorse some dysuria as well.  We will get a  UA to rule out a possible UTI that may be smoldering.  Would defer to Dr. Nuno for treatment of this.      We will get these tests and see him back in about 2 weeks with Dr. Felipe or myself, possibly go direct angiogram if need be.      Thank you for allowing me to participate in his care.         AGUSTÍN MESA PA-C             D: 10/23/2018   T: 10/23/2018   MT: RAIN      Name:     NINFA CID   MRN:      [id number]        Account:      [id number]   :      1945           Service Date: 10/23/2018      Document: [id number]           Outpatient Encounter Prescriptions as of 10/23/2018   Medication Sig Dispense Refill     alirocumab (PRALUENT) 150 MG/ML injectable pen Inject 1 mL (150 mg) Subcutaneous every 14 days 6 mL 2     amitriptyline (ELAVIL) 25 MG tablet TAKE 1 TABLET TWICE A  tablet 1     amLODIPine (NORVASC) 10 MG tablet TAKE 1 TABLET EVERY EVENING 90 tablet 3     ASPIRIN PO Take 325 mg by mouth daily       cilostazol (PLETAL) 100 MG tablet TAKE 1 TABLET TWICE A  tablet 3     gabapentin (NEURONTIN) 300 MG capsule TAKE 1 CAPSULE THREE TIMES A  capsule 4     isosorbide mononitrate (IMDUR) 60 MG 24 hr tablet Take 1 tablet (60 mg) by mouth daily 30 tablet 11     losartan (COZAAR) 100 MG tablet Take 1 tablet (100 mg) by mouth daily 90 tablet 3     metoclopramide (REGLAN) 10 MG tablet TAKE 1 TABLET FOUR TIMES A  tablet 2     omeprazole (PRILOSEC) 40 MG capsule TAKE 1 CAPSULE DAILY 90 capsule 1     polyethylene glycol (MIRALAX/GLYCOLAX) packet Take 1 packet by mouth daily        psyllium (METAMUCIL) 58.6 % POWD Take by mouth daily       TOPROL  MG 24 hr tablet TAKE 1 TABLET EVERY MORNING 93 tablet 1     zolpidem (AMBIEN) 5 MG tablet Take 1 tablet (5 mg) by mouth nightly as needed for sleep 90 tablet 1     [DISCONTINUED] potassium chloride SA (K-DUR/KLOR-CON M) 10 MEQ CR tablet Take 1 tablet (10 mEq) by mouth 3 times daily 30 tablet 1     STATIN NOT  PRESCRIBED, INTENTIONAL, Pt has known rhabdomyolysis in the past, somewhat associated with statins.  Also wasn't able to tolerate Zetia. 1 each 0     [DISCONTINUED] losartan (COZAAR) 50 MG tablet Take 1 tablet (50 mg) by mouth daily 90 tablet 3     [DISCONTINUED] sildenafil (VIAGRA) 100 MG tablet Take 1 tablet (100 mg) by mouth daily as needed 30 min to 4 hrs before sex. Do not use with nitroglycerin, terazosin or doxazosin. 2 tablet 0     No facility-administered encounter medications on file as of 10/23/2018.        Again, thank you for allowing me to participate in the care of your patient.      Sincerely,    Christiane Coffey PA-C     [facility]

## 2018-10-23 NOTE — MR AVS SNAPSHOT
After Visit Summary   10/23/2018    Michele Vance    MRN: [id number]           Patient Information     Date Of Birth          1945        Visit Information        Provider Department      10/23/2018 11:20 AM More, Christiane Ca PA-C [facility]        Today's Diagnoses     ELAM (dyspnea on exertion)    -  1    Coronary artery disease involving native coronary artery of native heart without angina pectoris        Essential hypertension, benign          Care Instructions    Thanks for coming into [facility] today.    We discussed: we'll figure out why you are so winded.  Please schedule a stress test, and an echocardiogram.    We'll do labs today.      Medication changes: increase losartan to 100 mg once a day.    Start taking Imdur/ isosorbide mononitrate 60 mg once a day in the morning.      Follow up: in the next 2 weeks with myself or Dr. Felipe.      Please call the clinic at  [phone] with any questions or concerns and my our nurses will be happy to help.    Please call [phone] for scheduling.      Reminder: Please bring in all current medications, over the counter supplements and vitamin bottles to your next appointment.              Follow-ups after your visit        Additional Services     Follow-Up with Cardiologist                 Your next 10 appointments already scheduled     Nov 20, 2018  1:30 PM CST   Return Visit with Stevie Felipe MD   [facility] ([address] [address] [phone]              Future tests that were ordered for you today     Open Future Orders        Priority Expected Expires Ordered    Follow-Up with Cardiologist Routine 10/30/2018 10/23/2019 10/23/2018    Basic metabolic panel Routine 10/30/2018 10/23/2019 10/23/2018    N terminal pro BNP outpatient Routine 10/30/2018 10/23/2019  "10/23/2018    CBC with platelets Routine 10/30/2018 10/23/2019 10/23/2018    NM Lexiscan stress test (nuc card) Routine 10/30/2018 10/23/2019 10/23/2018    Echocardiogram Routine 10/30/2018 10/23/2019 10/23/2018            Who to contact     If you have questions or need follow up information about today's clinic visit or your schedule please contact [facility] directly at [phone].  Normal or non-critical lab and imaging results will be communicated to you by [employer], letter or phone within 4 business days after the clinic has received the results. If you do not hear from us within 7 days, please contact the clinic through [employer] or phone. If you have a critical or abnormal lab result, we will notify you by phone as soon as possible.  Submit refill requests through [email or web address] or call your pharmacy and they will forward the refill request to us. Please allow 3 business days for your refill to be completed.          Additional Information About Your Visit        [email or web address] Information     [email or web address] gives you secure access to your electronic health record. If you see a primary care provider, you can also send messages to your care team and make appointments. If you have questions, please call your primary care clinic.  If you do not have a primary care provider, please call [phone] and they will assist you.        Care EveryWhere ID     This is your Care EveryWhere ID. This could be used by other organizations to access your [place] medical records  [id number]        Your Vitals Were     Pulse Height Pulse Oximetry BMI (Body Mass Index)          58 1.727 m (5' 8\") 91% 24.28 kg/m2         Blood Pressure from Last 3 Encounters:   10/23/18 170/72   10/05/18 188/84   05/17/18 136/60    Weight from Last 3 Encounters:   10/23/18 72.4 kg (159 lb 11.2 oz)   10/05/18 70.3 kg (155 lb)   05/17/18 73.4 kg (161 lb 12.8 oz)                 Today's Medication Changes          These " changes are accurate as of 10/23/18 12:17 PM.  If you have any questions, ask your nurse or doctor.               Start taking these medicines.        Dose/Directions    isosorbide mononitrate 60 MG 24 hr tablet   Commonly known as:  IMDUR   Used for:  ELAM (dyspnea on exertion)        Dose:  60 mg   Take 1 tablet (60 mg) by mouth daily   Quantity:  30 tablet   Refills:  11         These medicines have changed or have updated prescriptions.        Dose/Directions    losartan 100 MG tablet   Commonly known as:  COZAAR   This may have changed:    - medication strength  - how much to take   Used for:  Essential hypertension, benign        Dose:  100 mg   Take 1 tablet (100 mg) by mouth daily   Quantity:  90 tablet   Refills:  3         Stop taking these medicines if you haven't already. Please contact your care team if you have questions.     sildenafil 100 MG tablet   Commonly known as:  VIAGRA                Where to get your medicines      These medications were sent to [facility] [address] [address]     Phone:  [phone]     losartan 100 MG tablet         These medications were sent to [facility]  DANILO Schuler - [address]   [address] [place] [address]     Phone:  [phone]     isosorbide mononitrate 60 MG 24 hr tablet                Primary Care Provider Office Phone # Fax #    Juarez Nuno [phone]        [address] [place] [address]        Equal Access to Services     [facility] Hospital[facility]ARMELINA AH: Hadii aad ku hadasho Soriaali, waaxda luqadaha, qaybta kaalmada adeegyada, arnoldo scherer. [facility] [phone].    ATENCIÓN: Si habla español, tiene a fischer disposición servicios gratuitos de asistencia lingüística. Ladonna al [phone].    We comply with applicable federal civil rights laws and [place] laws. We do not discriminate on the  basis of race, color, national origin, age, disability, sex, sexual orientation, or gender identity.            Thank you!     Thank you for choosing [facility]  for your care. Our goal is always to provide you with excellent care. Hearing back from our patients is one way we can continue to improve our services. Please take a few minutes to complete the written survey that you may receive in the mail after your visit with us. Thank you!             Your Updated Medication List - Protect others around you: Learn how to safely use, store and throw away your medicines at www.disposemymeds.org.          This list is accurate as of 10/23/18 12:17 PM.  Always use your most recent med list.                   Brand Name Dispense Instructions for use Diagnosis    alirocumab 150 MG/ML injectable pen    PRALUENT    6 mL    Inject 1 mL (150 mg) Subcutaneous every 14 days    Hyperlipidemia LDL goal <130       amitriptyline 25 MG tablet    ELAVIL    180 tablet    TAKE 1 TABLET TWICE A DAY    Migraine without aura and without status migrainosus, not intractable       amLODIPine 10 MG tablet    NORVASC    90 tablet    TAKE 1 TABLET EVERY EVENING    Essential hypertension, benign       ASPIRIN PO      Take 325 mg by mouth daily        cilostazol 100 MG tablet    PLETAL    180 tablet    TAKE 1 TABLET TWICE A DAY    Peripheral vascular disease (H)       gabapentin 300 MG capsule    NEURONTIN    210 capsule    TAKE 1 CAPSULE THREE TIMES A DAY    Arthritis       isosorbide mononitrate 60 MG 24 hr tablet    IMDUR    30 tablet    Take 1 tablet (60 mg) by mouth daily    ELAM (dyspnea on exertion)       losartan 100 MG tablet    COZAAR    90 tablet    Take 1 tablet (100 mg) by mouth daily    Essential hypertension, benign       metoclopramide 10 MG tablet    REGLAN    120 tablet    TAKE 1 TABLET FOUR TIMES A DAY    Slow transit constipation       omeprazole 40 MG capsule    priLOSEC    90 capsule     TAKE 1 CAPSULE DAILY    Gastroesophageal reflux disease without esophagitis       polyethylene glycol Packet    MIRALAX/GLYCOLAX     Take 1 packet by mouth daily        potassium chloride SA 10 MEQ CR tablet    K-DUR/KLOR-CON M    30 tablet    Take 1 tablet (10 mEq) by mouth 3 times daily    Hypokalemia       psyllium 58.6 % Powd    METAMUCIL     Take by mouth daily        STATIN NOT PRESCRIBED (INTENTIONAL)     1 each    Pt has known rhabdomyolysis in the past, somewhat associated with statins.  Also wasn't able to tolerate Zetia.    Hyperlipidemia LDL goal <130       TOPROL  MG 24 hr tablet   Generic drug:  metoprolol succinate     93 tablet    TAKE 1 TABLET EVERY MORNING    Essential hypertension, benign       zolpidem 5 MG tablet    AMBIEN    90 tablet    Take 1 tablet (5 mg) by mouth nightly as needed for sleep    Primary insomnia

## 2018-10-23 NOTE — PROGRESS NOTES
[id number]  HPI and Plan:   See dictation    Orders this Visit:  Orders Placed This Encounter   Procedures     NM Lexiscan stress test (nuc card)     Basic metabolic panel     N terminal pro BNP outpatient     CBC with platelets     Routine UA with micro reflex to culture     Follow-Up with Cardiologist     Echocardiogram     Orders Placed This Encounter   Medications     losartan (COZAAR) 100 MG tablet     Sig: Take 1 tablet (100 mg) by mouth daily     Dispense:  90 tablet     Refill:  3     isosorbide mononitrate (IMDUR) 60 MG 24 hr tablet     Sig: Take 1 tablet (60 mg) by mouth daily     Dispense:  30 tablet     Refill:  11     Medications Discontinued During This Encounter   Medication Reason     losartan (COZAAR) 50 MG tablet Reorder     sildenafil (VIAGRA) 100 MG tablet          Encounter Diagnoses   Name Primary?     ELAM (dyspnea on exertion) Yes     Coronary artery disease involving native coronary artery of native heart without angina pectoris      Essential hypertension, benign      Dysuria        CURRENT MEDICATIONS:  Current Outpatient Prescriptions   Medication Sig Dispense Refill     alirocumab (PRALUENT) 150 MG/ML injectable pen Inject 1 mL (150 mg) Subcutaneous every 14 days 6 mL 2     amitriptyline (ELAVIL) 25 MG tablet TAKE 1 TABLET TWICE A  tablet 1     amLODIPine (NORVASC) 10 MG tablet TAKE 1 TABLET EVERY EVENING 90 tablet 3     ASPIRIN PO Take 325 mg by mouth daily       cilostazol (PLETAL) 100 MG tablet TAKE 1 TABLET TWICE A  tablet 3     gabapentin (NEURONTIN) 300 MG capsule TAKE 1 CAPSULE THREE TIMES A  capsule 4     isosorbide mononitrate (IMDUR) 60 MG 24 hr tablet Take 1 tablet (60 mg) by mouth daily 30 tablet 11     losartan (COZAAR) 100 MG tablet Take 1 tablet (100 mg) by mouth daily 90 tablet 3     metoclopramide (REGLAN) 10 MG tablet TAKE 1 TABLET FOUR TIMES A  tablet 2     omeprazole (PRILOSEC) 40 MG capsule TAKE 1 CAPSULE DAILY 90 capsule 1     polyethylene  "glycol (MIRALAX/GLYCOLAX) packet Take 1 packet by mouth daily        potassium chloride SA (K-DUR/KLOR-CON M) 10 MEQ CR tablet Take 1 tablet (10 mEq) by mouth 3 times daily 30 tablet 1     psyllium (METAMUCIL) 58.6 % POWD Take by mouth daily       TOPROL  MG 24 hr tablet TAKE 1 TABLET EVERY MORNING 93 tablet 1     zolpidem (AMBIEN) 5 MG tablet Take 1 tablet (5 mg) by mouth nightly as needed for sleep 90 tablet 1     STATIN NOT PRESCRIBED, INTENTIONAL, Pt has known rhabdomyolysis in the past, somewhat associated with statins.  Also wasn't able to tolerate Zetia. 1 each 0     [DISCONTINUED] losartan (COZAAR) 50 MG tablet Take 1 tablet (50 mg) by mouth daily 90 tablet 3       ALLERGIES     Allergies   Allergen Reactions     Hmg-Coa-R Inhibitors Other (See Comments)     Rhabdo  Same as \"statins\"     Gemfibrozil      Resting thigh-calf pain     Sulfa Drugs      Reacted as a child     Zetia [Ezetimibe]      Muscle cramping       PAST MEDICAL HISTORY:  Past Medical History:   Diagnosis Date     Carotid stenosis     right endartectomy     Chronic kidney disease     stage 3     Coronary artery disease 3-2014    CABG x6     Crohn's disease (H)      CVA (cerebral infarction)     balance problems, mild     Elevated CK      Esophageal reflux      Hypercholesteremia      Hypertension      Nonsenile cataract      Other and unspecified hyperlipidemia      PAD (peripheral artery disease) (H)      Rhabdomyolysis 2010     Unspecified essential hypertension        PAST SURGICAL HISTORY:  Past Surgical History:   Procedure Laterality Date     APPENDECTOMY  1974     BYPASS GRAFT ARTERY CORONARY  3/5/2014    Procedure: BYPASS GRAFT ARTERY CORONARY;  Median Sternotomy, Coronary Artery Bypass Graft X6 used Left internal mammary artery, Left and Right Greater Saphenous vein, on Pump oxygenator.;  Surgeon: Tim Montanez MD;  Location: [facility] OR     CATARACT IOL, RT/LT Bilateral 2008    in [place]     cataracts       COLONOSCOPY  12/12/02    " [facility]     COLONOSCOPY N/A 10/7/2014    Procedure: COMBINED COLONOSCOPY, SINGLE OR MULTIPLE BIOPSY/POLYPECTOMY BY BIOPSY;  Surgeon: Micheal Mora MD;  Location:  GI     DENTAL SURGERY      TMJ, implants     ENDARTERECTOMY CAROTID      right carotid stent     ESOPHAGOSCOPY, GASTROSCOPY, DUODENOSCOPY (EGD), COMBINED Left 10/7/2014    Procedure: COMBINED ESOPHAGOSCOPY, GASTROSCOPY, DUODENOSCOPY (EGD), BIOPSY SINGLE OR MULTIPLE;  Surgeon: Micheal Mora MD;  Location:  GI     ESOPHAGOSCOPY, GASTROSCOPY, DUODENOSCOPY (EGD), COMBINED Left 10/7/2014    Procedure: COMBINED ESOPHAGOSCOPY, GASTROSCOPY, DUODENOSCOPY (EGD), REMOVE FOREIGN BODY;  Surgeon: Micheal Mora MD;  Location: [facility] CAPSULE ENDOSCOPY N/A 10/7/2014    Procedure: CAPSULE/PILL CAM ENDOSCOPY;  Surgeon: Micheal Mora MD;  Location: [facility] UGI ENDOSCOPY, SIMPLE EXAM  01/08/99    [facility]     INJECT EPIDURAL LUMBAR Right 5/8/2017    Procedure: INJECT EPIDURAL LUMBAR;  Lumbar transforaminal Epidural Steroid Injection right lumbar 4-5, and right  lumbar 5-Sacral 1;  Surgeon: Anthony Gonzalez MD;  Location: [facility] OR     INJECT JOINT SACROILIAC Bilateral 8/28/2017    Procedure: INJECT JOINT SACROILIAC;  sacroiliac joint injection bilateral;  Surgeon: Anthony Gonzalez MD;  Location: [facility] OR     KNEE SURGERY  1976    left knee reconstruction     lasix  2001    both eyes     RELEASE CARPAL TUNNEL  1/13/2011    RELEASE CARPAL TUNNEL performed by JO MADRID at  OR     RELEASE CARPAL TUNNEL  1/20/2011    RELEASE CARPAL TUNNEL performed by JO MADRID at  OR       FAMILY HISTORY:  Family History   Problem Relation Age of Onset     Hypertension Mother      Eye Disorder Mother      Cerebrovascular Disease Father      HEART DISEASE Father      Lipids Father      Obesity Father      Cancer Sister      HEART DISEASE Sister      Glaucoma No family hx of      Macular Degeneration No family hx of   "      SOCIAL HISTORY:  Social History     Social History     Marital status:      Spouse name: N/A     Number of children: N/A     Years of education: N/A     Occupational History      Retired     Social History Main Topics     Smoking status: Former Smoker     Packs/day: 2.50     Years: 20.00     Types: Cigarettes     Quit date: 1/1/2000     Smokeless tobacco: Never Used     Alcohol use No     Drug use: No     Sexual activity: Yes     Partners: Female     Other Topics Concern     Exercise Yes     3 times per week     Social History Narrative    Moved from [place] in August, retired  for [employer].       Review of Systems:  Skin:  Negative     Eyes:  Positive for glasses  ENT:  Negative    Respiratory:  Positive for shortness of breath;dyspnea on exertion  Cardiovascular:  Negative for;palpitations;chest pain dizziness;Positive for;edema;fatigue  Gastroenterology: Negative    Genitourinary:  Negative    Musculoskeletal:  Negative    Neurologic:  Positive for numbness or tingling of hands;numbness or tingling of feet;stroke  Psychiatric:  Positive for    Heme/Lymph/Imm:  Positive for allergies  Endocrine:  Negative      Physical Exam:  Vitals: /72 (BP Location: Right arm, Patient Position: Fowlers, Cuff Size: Adult Regular)  Pulse 58  Ht 1.727 m (5' 8\")  Wt 72.4 kg (159 lb 11.2 oz)  SpO2 91%  BMI 24.28 kg/m2   Please refer to dictation for physical exam    Recent Lab Results:  LIPID RESULTS:  Lab Results   Component Value Date    CHOL 171 05/17/2018    HDL 48 05/17/2018    LDL 99 05/17/2018    TRIG 120 05/17/2018    CHOLHDLRATIO 5.2 (H) 07/23/2015       LIVER ENZYME RESULTS:  Lab Results   Component Value Date    AST 18 03/26/2015    ALT 18 08/08/2016       CBC RESULTS:  Lab Results   Component Value Date    WBC 7.5 03/26/2015    RBC 4.32 (L) 03/26/2015    HGB 14.7 03/12/2018    HCT 41.4 03/26/2015    MCV 96 03/26/2015    MCH 32.4 03/26/2015    MCHC 33.8 03/26/2015    RDW 13.6 " 03/26/2015     03/26/2015       BMP RESULTS:  Lab Results   Component Value Date     05/17/2018    POTASSIUM 3.2 (L) 05/17/2018    CHLORIDE 106 05/17/2018    CO2 33 (H) 05/17/2018    ANIONGAP 4 05/17/2018    GLC 93 05/17/2018    BUN 9 05/17/2018    CR 1.18 05/17/2018    GFRESTIMATED 61 05/17/2018    GFRESTBLACK 73 05/17/2018    RAHEEM 9.2 05/17/2018        A1C RESULTS:  Lab Results   Component Value Date    A1C 5.6 03/07/2014       INR RESULTS:  Lab Results   Component Value Date    INR 1.00 12/05/2014    INR 1.32 (H) 03/06/2014           CC  No referring provider defined for this encounter.  ,inic

## 2018-10-23 NOTE — PATIENT INSTRUCTIONS
Thanks for coming into [facility] clinic today.    We discussed: we'll figure out why you are so winded.  Please schedule a stress test, and an echocardiogram.    We'll do labs today.      Medication changes: increase losartan to 100 mg once a day.    Start taking Imdur/ isosorbide mononitrate 60 mg once a day in the morning.      Follow up: in the next 2 weeks with myself or Dr. Felipe.      Please call the clinic at  [phone] with any questions or concerns and my our nurses will be happy to help.    Please call [phone] for scheduling.      Reminder: Please bring in all current medications, over the counter supplements and vitamin bottles to your next appointment.

## 2018-10-23 NOTE — PROGRESS NOTES
Service Date: 10/23/2018      PRIMARY CARDIOLOGIST:  Stevie Felipe MD      REASON FOR VISIT:  Dyspnea on exertion.      HISTORY OF PRESENT ILLNESS:  Mr. Vance is delightful 73-year-old gentleman who has unfortunately a complex past medical history and is a vasculopath, significant for the followin.  Coronary artery disease with past anginal equivalent being dyspnea on exertion with history of CAB in 2014.  This was a 6-vessel CAB with LIMA to the LAD, saphenous vein graft to the marginal, saphenous vein graft to the PDA, saphenous vein graft to the OM2, saphenous vein graft to the OM1 and saphenous vein graft to the second diagonal.  He had bilateral venous harvesting of the legs.   2.  Peripheral arterial disease with ongoing claudication with history of angioplasty, I believe to his left femoral.   3.  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   4.  Hypertension.   5.  History of TIA.     6.  Dyslipidemia with intolerance to statins with myositis and also unable to tolerate fibrates on Praluent with good results.      He comes in today for non-routine followup.  He notices that over the last month, he has been feeling poorly and had more dyspnea on exertion.  He is primarily limited by hip pain at this point that is bilateral, but if he does something as simple as sweeping the floor, he gets fairly dyspneic and has dizziness and feels like he may pass out.  He also noticed late this summer when he was putting in an irrigation system, he used a tractor for a trencher and if he had to go out and doing one hole, he would get short of breath, he would feel flushed and diaphoretic and then he would vomit small amounts.  He notes he is irritable bowel syndrome and he previously would have vomiting and diarrhea at the same time, but was not exertional.  Now his vomiting seems to be almost always  exertional.  He is also intermittently seems to catch-up breath at rest.  He has not had orthopnea or PND.  He has not had palpitations or true syncope.      His wife notes that before his CAB, he never had chest pain.  All he had was dyspnea on exertion.  At some point he was actually treated for asthma with inhaler.  After his CAB, shortness of breath resolved.  He says that he checked his blood pressure this morning at home and it was 130, but he does not take it typically.  He has taken his pills today.  He also notes that since starting Praluent, his urine seems darker and more cloudy.  He also has increased urinary frequency and occasional burning when he urinates, but this is intermittent.  He otherwise is urinating fine.  He does not take sildenafil on a regular basis as it is not effective.      SOCIAL HISTORY:  He comes in today with his wife, Ke.  They previously lived in [place] and now are back living in [place].  He is a former smoker, quit in 2000, about 50-pack-year history.  No alcohol use.      PHYSICAL EXAMINATION:   GENERAL:  Well-developed, well-nourished gentleman in no acute distress.   HEENT:  Normocephalic, atraumatic.   HEART:  Regular with a few early beats consistent with PACs or PVCs.   EXTREMITIES:  Have 1+ pitting edema to mid shin on the right, none on the left.  I cannot palpate either dorsalis pedis or speech posterior tibialis pulses on either leg.  He has 2+ radial pulses bilaterally and 1+ ulnar pulses bilaterally.   RESPIRATORY:  Lungs are  clear without wheezes, rales or rhonchi and his neck veins are flat at 30 degrees.   SKIN:  Warm and dry.      ASSESSMENT AND PLAN:   1.  Dyspnea on exertion and exertional, vomiting and dizziness and known vasculopath a vasculopath who has history of 6-vessel CAB.  For this gentleman, his anginal equivalent has visit dyspnea on exertion.  At this point he is limited by claudication type symptoms.  I have a high suspicion for progression  of his vascular disease and dyspnea being his anginal equivalent and vomiting possibly being an exertional component as well.  Given he has had a CAB and in order to somewhat localize possible ischemia, we will do a nuclear stress test.  He can remain on his beta blocker for this as he will not be able to exercise on a treadmill adequately to get his heart rate up.  In addition, we will do an echocardiogram as he has not had one recently either.  I would not be surprised if his EF was down and at that point may consider going straight to coronary angiogram.  Should his stress test be positive or his EF be down, we did discuss a coronary angiogram as a next step, and he is agreeable to this if need be.  Risks and benefits were discussed including bruising, bleeding, infection, stroke, heart attack, death, renal failure, and emergency open heart surgery.   2.  Hypertension, markedly elevated today.  This is likely part of his symptoms as well.  I will increase his losartan from 50 mg to 100 mg daily and start him on Imdur 60 mg daily.  He tells me that he does not take his Viagra regularly as it is not effective and will discontinue that completely.  Today, he was told to not take those together.  Hopefully, we can get his blood pressure controlled.   3.  Severe peripheral arterial disease with last carotid ultrasound showing a 50%-70% lesion which is stable.  The other is less than 50% and will follow.  We do know as a part of this are his vertebrals are compromised significantly.   4.  Peripheral arterial disease, follows with Dr. Fisher.   5.  Dyslipidemia.  Tolerating Praluent well, but with concerns that his urine became cloudy.  He does endorse some dysuria as well.  We will get a UA to rule out a possible UTI that may be smoldering.  Would defer to Dr. Nuno for treatment of this.      We will get these tests and see him back in about 2 weeks with Dr. Felipe or myself, possibly go direct angiogram if need  be.      Thank you for allowing me to participate in his care.         AGUSTÍN MESA PA-C             D: 10/23/2018   T: 10/23/2018   MT: RAIN      Name:     NINFA CID   MRN:      [id number]        Account:      [id number]   :      1945           Service Date: 10/23/2018      Document: [id number]

## 2018-10-23 NOTE — LETTER
10/23/2018    Juarez Nuno, DO  [address] [place] [address]    RE: Michele Vance       Dear Colleague,    I had the pleasure of seeing Michele Vance in the [facility].    [id number]  HPI and Plan:   See dictation    Orders this Visit:  Orders Placed This Encounter   Procedures     NM Lexiscan stress test (nuc card)     Basic metabolic panel     N terminal pro BNP outpatient     CBC with platelets     Routine UA with micro reflex to culture     Follow-Up with Cardiologist     Echocardiogram     Orders Placed This Encounter   Medications     losartan (COZAAR) 100 MG tablet     Sig: Take 1 tablet (100 mg) by mouth daily     Dispense:  90 tablet     Refill:  3     isosorbide mononitrate (IMDUR) 60 MG 24 hr tablet     Sig: Take 1 tablet (60 mg) by mouth daily     Dispense:  30 tablet     Refill:  11     Medications Discontinued During This Encounter   Medication Reason     losartan (COZAAR) 50 MG tablet Reorder     sildenafil (VIAGRA) 100 MG tablet          Encounter Diagnoses   Name Primary?     ELAM (dyspnea on exertion) Yes     Coronary artery disease involving native coronary artery of native heart without angina pectoris      Essential hypertension, benign      Dysuria        CURRENT MEDICATIONS:  Current Outpatient Prescriptions   Medication Sig Dispense Refill     alirocumab (PRALUENT) 150 MG/ML injectable pen Inject 1 mL (150 mg) Subcutaneous every 14 days 6 mL 2     amitriptyline (ELAVIL) 25 MG tablet TAKE 1 TABLET TWICE A  tablet 1     amLODIPine (NORVASC) 10 MG tablet TAKE 1 TABLET EVERY EVENING 90 tablet 3     ASPIRIN PO Take 325 mg by mouth daily       cilostazol (PLETAL) 100 MG tablet TAKE 1 TABLET TWICE A  tablet 3     gabapentin (NEURONTIN) 300 MG capsule TAKE 1 CAPSULE THREE TIMES A  capsule 4     isosorbide mononitrate (IMDUR) 60 MG 24 hr tablet Take 1 tablet (60 mg) by mouth daily 30 tablet 11     losartan (COZAAR) 100 MG tablet  "Take 1 tablet (100 mg) by mouth daily 90 tablet 3     metoclopramide (REGLAN) 10 MG tablet TAKE 1 TABLET FOUR TIMES A  tablet 2     omeprazole (PRILOSEC) 40 MG capsule TAKE 1 CAPSULE DAILY 90 capsule 1     polyethylene glycol (MIRALAX/GLYCOLAX) packet Take 1 packet by mouth daily        potassium chloride SA (K-DUR/KLOR-CON M) 10 MEQ CR tablet Take 1 tablet (10 mEq) by mouth 3 times daily 30 tablet 1     psyllium (METAMUCIL) 58.6 % POWD Take by mouth daily       TOPROL  MG 24 hr tablet TAKE 1 TABLET EVERY MORNING 93 tablet 1     zolpidem (AMBIEN) 5 MG tablet Take 1 tablet (5 mg) by mouth nightly as needed for sleep 90 tablet 1     STATIN NOT PRESCRIBED, INTENTIONAL, Pt has known rhabdomyolysis in the past, somewhat associated with statins.  Also wasn't able to tolerate Zetia. 1 each 0     [DISCONTINUED] losartan (COZAAR) 50 MG tablet Take 1 tablet (50 mg) by mouth daily 90 tablet 3       ALLERGIES     Allergies   Allergen Reactions     Hmg-Coa-R Inhibitors Other (See Comments)     Rhabdo  Same as \"statins\"     Gemfibrozil      Resting thigh-calf pain     Sulfa Drugs      Reacted as a child     Zetia [Ezetimibe]      Muscle cramping       PAST MEDICAL HISTORY:  Past Medical History:   Diagnosis Date     Carotid stenosis     right endartectomy     Chronic kidney disease     stage 3     Coronary artery disease 3-2014    CABG x6     Crohn's disease (H)      CVA (cerebral infarction)     balance problems, mild     Elevated CK      Esophageal reflux      Hypercholesteremia      Hypertension      Nonsenile cataract      Other and unspecified hyperlipidemia      PAD (peripheral artery disease) (H)      Rhabdomyolysis 2010     Unspecified essential hypertension        PAST SURGICAL HISTORY:  Past Surgical History:   Procedure Laterality Date     APPENDECTOMY  1974     BYPASS GRAFT ARTERY CORONARY  3/5/2014    Procedure: BYPASS GRAFT ARTERY CORONARY;  Median Sternotomy, Coronary Artery Bypass Graft X6 used Left " internal mammary artery, Left and Right Greater Saphenous vein, on Pump oxygenator.;  Surgeon: Tim Montanez MD;  Location: [facility] OR     CATARACT IOL, RT/LT Bilateral 2008    in [place]     cataracts       COLONOSCOPY  12/12/02    [facility]     COLONOSCOPY N/A 10/7/2014    Procedure: COMBINED COLONOSCOPY, SINGLE OR MULTIPLE BIOPSY/POLYPECTOMY BY BIOPSY;  Surgeon: Micheal Mora MD;  Location:  GI     DENTAL SURGERY      TMJ, implants     ENDARTERECTOMY CAROTID      right carotid stent     ESOPHAGOSCOPY, GASTROSCOPY, DUODENOSCOPY (EGD), COMBINED Left 10/7/2014    Procedure: COMBINED ESOPHAGOSCOPY, GASTROSCOPY, DUODENOSCOPY (EGD), BIOPSY SINGLE OR MULTIPLE;  Surgeon: Micheal Mora MD;  Location:  GI     ESOPHAGOSCOPY, GASTROSCOPY, DUODENOSCOPY (EGD), COMBINED Left 10/7/2014    Procedure: COMBINED ESOPHAGOSCOPY, GASTROSCOPY, DUODENOSCOPY (EGD), REMOVE FOREIGN BODY;  Surgeon: Micheal Mora MD;  Location:  GI      CAPSULE ENDOSCOPY N/A 10/7/2014    Procedure: CAPSULE/PILL CAM ENDOSCOPY;  Surgeon: Micheal Mora MD;  Location: [facility] ENDOSCOPY, SIMPLE EXAM  01/08/99    [facility]     INJECT EPIDURAL LUMBAR Right 5/8/2017    Procedure: INJECT EPIDURAL LUMBAR;  Lumbar transforaminal Epidural Steroid Injection right lumbar 4-5, and right  lumbar 5-Sacral 1;  Surgeon: Anthony Gonzalez MD;  Location: [facility] OR     INJECT JOINT SACROILIAC Bilateral 8/28/2017    Procedure: INJECT JOINT SACROILIAC;  sacroiliac joint injection bilateral;  Surgeon: Anthony Gonzalez MD;  Location:  OR     KNEE SURGERY  1976    left knee reconstruction     lasix  2001    both eyes     RELEASE CARPAL TUNNEL  1/13/2011    RELEASE CARPAL TUNNEL performed by JO MADRID at  OR     RELEASE CARPAL TUNNEL  1/20/2011    RELEASE CARPAL TUNNEL performed by JO MADRID at  OR       FAMILY HISTORY:  Family History   Problem Relation Age of Onset     Hypertension Mother      Eye  "Disorder Mother      Cerebrovascular Disease Father      HEART DISEASE Father      Lipids Father      Obesity Father      Cancer Sister      HEART DISEASE Sister      Glaucoma No family hx of      Macular Degeneration No family hx of        SOCIAL HISTORY:  Social History     Social History     Marital status:      Spouse name: N/A     Number of children: N/A     Years of education: N/A     Occupational History      Retired     Social History Main Topics     Smoking status: Former Smoker     Packs/day: 2.50     Years: 20.00     Types: Cigarettes     Quit date: 1/1/2000     Smokeless tobacco: Never Used     Alcohol use No     Drug use: No     Sexual activity: Yes     Partners: Female     Other Topics Concern     Exercise Yes     3 times per week     Social History Narrative    Moved from [place] in August, retired  for [email or web address].       Review of Systems:  Skin:  Negative     Eyes:  Positive for glasses  ENT:  Negative    Respiratory:  Positive for shortness of breath;dyspnea on exertion  Cardiovascular:  Negative for;palpitations;chest pain dizziness;Positive for;edema;fatigue  Gastroenterology: Negative    Genitourinary:  Negative    Musculoskeletal:  Negative    Neurologic:  Positive for numbness or tingling of hands;numbness or tingling of feet;stroke  Psychiatric:  Positive for    Heme/Lymph/Imm:  Positive for allergies  Endocrine:  Negative      Physical Exam:  Vitals: /72 (BP Location: Right arm, Patient Position: Fowlers, Cuff Size: Adult Regular)  Pulse 58  Ht 1.727 m (5' 8\")  Wt 72.4 kg (159 lb 11.2 oz)  SpO2 91%  BMI 24.28 kg/m2   Please refer to dictation for physical exam    Recent Lab Results:  LIPID RESULTS:  Lab Results   Component Value Date    CHOL 171 05/17/2018    HDL 48 05/17/2018    LDL 99 05/17/2018    TRIG 120 05/17/2018    CHOLHDLRATIO 5.2 (H) 07/23/2015       LIVER ENZYME RESULTS:  Lab Results   Component Value Date    AST 18 03/26/2015    ALT 18 " 08/08/2016       CBC RESULTS:  Lab Results   Component Value Date    WBC 7.5 03/26/2015    RBC 4.32 (L) 03/26/2015    HGB 14.7 03/12/2018    HCT 41.4 03/26/2015    MCV 96 03/26/2015    MCH 32.4 03/26/2015    MCHC 33.8 03/26/2015    RDW 13.6 03/26/2015     03/26/2015       BMP RESULTS:  Lab Results   Component Value Date     05/17/2018    POTASSIUM 3.2 (L) 05/17/2018    CHLORIDE 106 05/17/2018    CO2 33 (H) 05/17/2018    ANIONGAP 4 05/17/2018    GLC 93 05/17/2018    BUN 9 05/17/2018    CR 1.18 05/17/2018    GFRESTIMATED 61 05/17/2018    GFRESTBLACK 73 05/17/2018    RAHEEM 9.2 05/17/2018        A1C RESULTS:  Lab Results   Component Value Date    A1C 5.6 03/07/2014       INR RESULTS:  Lab Results   Component Value Date    INR 1.00 12/05/2014    INR 1.32 (H) 03/06/2014           CC  No referring provider defined for this encounter.  ,inic        Thank you for allowing me to participate in the care of your patient.      Sincerely,     YANE SánchezC     [facility]    cc:   No referring provider defined for this encounter.

## 2018-10-25 ENCOUNTER — HOSPITAL ENCOUNTER (OUTPATIENT)
Dept: CARDIOLOGY | Facility: CLINIC | Age: 73
Discharge: HOME | End: 2018-10-25
Attending: PHYSICIAN ASSISTANT | Admitting: PHYSICIAN ASSISTANT
Payer: MEDICARE

## 2018-10-25 ENCOUNTER — HOSPITAL ENCOUNTER (OUTPATIENT)
Dept: NUCLEAR MEDICINE | Facility: CLINIC | Age: 73
Setting detail: NUCLEAR MEDICINE
End: 2018-10-25
Attending: PHYSICIAN ASSISTANT
Payer: MEDICARE

## 2018-10-25 ENCOUNTER — TELEPHONE (OUTPATIENT)
Dept: CARDIOLOGY | Facility: CLINIC | Age: 73
End: 2018-10-25

## 2018-10-25 DIAGNOSIS — I25.10 CORONARY ARTERY DISEASE INVOLVING NATIVE CORONARY ARTERY OF NATIVE HEART WITHOUT ANGINA PECTORIS: ICD-10-CM

## 2018-10-25 DIAGNOSIS — R06.09 DOE (DYSPNEA ON EXERTION): ICD-10-CM

## 2018-10-25 DIAGNOSIS — E87.6 HYPOKALEMIA: Primary | ICD-10-CM

## 2018-10-25 DIAGNOSIS — E87.6 HYPOKALEMIA: ICD-10-CM

## 2018-10-25 LAB
BACTERIA SPEC CULT: NORMAL
Lab: NORMAL
POTASSIUM SERPL-SCNC: 2.3 MMOL/L (ref 3.4–5.3)
SPECIMEN SOURCE: NORMAL

## 2018-10-25 PROCEDURE — A9502 TC99M TETROFOSMIN: HCPCS | Performed by: PHYSICIAN ASSISTANT

## 2018-10-25 PROCEDURE — 93018 CV STRESS TEST I&R ONLY: CPT | Performed by: INTERNAL MEDICINE

## 2018-10-25 PROCEDURE — 78452 HT MUSCLE IMAGE SPECT MULT: CPT

## 2018-10-25 PROCEDURE — 25000128 H RX IP 250 OP 636: Performed by: PHYSICIAN ASSISTANT

## 2018-10-25 PROCEDURE — 93016 CV STRESS TEST SUPVJ ONLY: CPT | Performed by: INTERNAL MEDICINE

## 2018-10-25 PROCEDURE — 78452 HT MUSCLE IMAGE SPECT MULT: CPT | Mod: 26 | Performed by: INTERNAL MEDICINE

## 2018-10-25 PROCEDURE — 93017 CV STRESS TEST TRACING ONLY: CPT

## 2018-10-25 PROCEDURE — 93306 TTE W/DOPPLER COMPLETE: CPT | Mod: 26 | Performed by: INTERNAL MEDICINE

## 2018-10-25 PROCEDURE — 84132 ASSAY OF SERUM POTASSIUM: CPT | Performed by: PHYSICIAN ASSISTANT

## 2018-10-25 PROCEDURE — 36415 COLL VENOUS BLD VENIPUNCTURE: CPT | Performed by: PHYSICIAN ASSISTANT

## 2018-10-25 PROCEDURE — 34300033 ZZH RX 343: Performed by: PHYSICIAN ASSISTANT

## 2018-10-25 PROCEDURE — 93306 TTE W/DOPPLER COMPLETE: CPT

## 2018-10-25 RX ORDER — REGADENOSON 0.08 MG/ML
0.4 INJECTION, SOLUTION INTRAVENOUS ONCE
Status: DISCONTINUED | OUTPATIENT
Start: 2018-10-25 | End: 2018-10-25

## 2018-10-25 RX ORDER — POTASSIUM CHLORIDE 1500 MG/1
40 TABLET, EXTENDED RELEASE ORAL 3 TIMES DAILY
Qty: 180 TABLET | Refills: 3 | Status: SHIPPED | OUTPATIENT
Start: 2018-10-25 | End: 2018-11-08

## 2018-10-25 RX ORDER — REGADENOSON 0.08 MG/ML
0.4 INJECTION, SOLUTION INTRAVENOUS ONCE
Status: COMPLETED | OUTPATIENT
Start: 2018-10-25 | End: 2018-10-25

## 2018-10-25 RX ADMIN — REGADENOSON 0.4 MG: 0.08 INJECTION, SOLUTION INTRAVENOUS at 09:45

## 2018-10-25 RX ADMIN — TETROFOSMIN 10.1 MCI.: 1.38 INJECTION, POWDER, LYOPHILIZED, FOR SOLUTION INTRAVENOUS at 08:30

## 2018-10-25 RX ADMIN — TETROFOSMIN 30.6 MCI.: 1.38 INJECTION, POWDER, LYOPHILIZED, FOR SOLUTION INTRAVENOUS at 10:00

## 2018-10-25 NOTE — TELEPHONE ENCOUNTER
Pt was in clinic for his nuclear stress test and echocardiogram.    Repeat labs showed potassium remains low at 2.3.  On further discussion pt notes that he took 100 mEq of potassium on Tuesday but none yesterday.  Also notes that about 7 years ago in [place] had issues with low potassium- no further testing was done.  He has not had significant diarrhea or vomiting.      Given he has only taken 100 mEq po will replace with medication over the weekend.     Plan:  120 mEq of potassium daily starting today through Monday.  This will be taken as 40 mEq (2 pills) with each meal.      Repeat labs/ BMP on Monday.      Referral to Dr. Nathaniel Spangler of nephrology for further work up.     Will call with results of urine culture, stress test and echo.      Christiane Coffey PA-C 10/25/2018 1:28 PM

## 2018-10-26 ENCOUNTER — TELEPHONE (OUTPATIENT)
Dept: CARDIOLOGY | Facility: CLINIC | Age: 73
End: 2018-10-26

## 2018-10-26 DIAGNOSIS — R06.09 DYSPNEA ON EXERTION: Primary | ICD-10-CM

## 2018-10-26 NOTE — TELEPHONE ENCOUNTER
Notes Recorded by Christiane Coffey PA-C on 10/25/2018 at 7:35 PM  Stress test is totally normal- honestly I am surprised by this given his sx, but with potassium so abnormal, I think something else/ different may be going on.  On echo RV function is slightly decreased.  He should get a ddimer on Monday with his repeat K.  Still needs to see Dr. Spangler with nephrology.  If ddimer is positive will arrange a CT PE study.  Will also see how he feels when potassium improves.      Order placed for for D dimer to be done on 10/29/18. Patient and wife updated with Christiane's results note above.  Understand that they need both K+, and the D dimer done on Monday.

## 2018-10-29 ENCOUNTER — CARE COORDINATION (OUTPATIENT)
Dept: CARDIOLOGY | Facility: CLINIC | Age: 73
End: 2018-10-29

## 2018-10-29 DIAGNOSIS — R79.89 ELEVATED D-DIMER: Primary | ICD-10-CM

## 2018-10-29 DIAGNOSIS — R06.09 DYSPNEA ON EXERTION: ICD-10-CM

## 2018-10-29 DIAGNOSIS — E87.6 HYPOKALEMIA: ICD-10-CM

## 2018-10-29 LAB
ANION GAP SERPL CALCULATED.3IONS-SCNC: 7 MMOL/L (ref 3–14)
BUN SERPL-MCNC: 10 MG/DL (ref 7–30)
CALCIUM SERPL-MCNC: 8.5 MG/DL (ref 8.5–10.1)
CHLORIDE SERPL-SCNC: 104 MMOL/L (ref 94–109)
CO2 SERPL-SCNC: 33 MMOL/L (ref 20–32)
CREAT SERPL-MCNC: 1.21 MG/DL (ref 0.66–1.25)
D DIMER PPP FEU-MCNC: 1.3 UG/ML FEU (ref 0–0.5)
GFR SERPL CREATININE-BSD FRML MDRD: 59 ML/MIN/1.7M2
GLUCOSE SERPL-MCNC: 125 MG/DL (ref 70–99)
POTASSIUM SERPL-SCNC: 3 MMOL/L (ref 3.4–5.3)
SODIUM SERPL-SCNC: 144 MMOL/L (ref 133–144)

## 2018-10-29 PROCEDURE — 80048 BASIC METABOLIC PNL TOTAL CA: CPT | Performed by: PHYSICIAN ASSISTANT

## 2018-10-29 PROCEDURE — 85379 FIBRIN DEGRADATION QUANT: CPT | Performed by: PHYSICIAN ASSISTANT

## 2018-10-29 PROCEDURE — 36415 COLL VENOUS BLD VENIPUNCTURE: CPT | Performed by: PHYSICIAN ASSISTANT

## 2018-10-29 NOTE — PROGRESS NOTES
BMP and D-Dimer results from today noted and reviewed with SARATH Callaway. Pt to continue on higher KCL dosing, 40meq TID with meals, as well as get CT chest/PE study. Order placed. Called [facility], they are able to do CT/PE study there, phone number for pt to call to schedule through Radiology is [phone], option #2.     Called pt, reviewed lab results showing improved K+ level, but still low. Reviewed to continue KCL at 40meq TID. Also reviewed result for elevated D-Dimer, which could indicate possible blood clot. Reviewed need for CT chest within the next 24 hours per SARATH Callaway. Pt stated understanding. Gave pt scheduling phone number, he will call now and try to get scheduled for tomorrow. FYI sent to Team 3 nurses. VIPIN Melendez 3:39 PM 10/29/18

## 2018-10-31 ENCOUNTER — HOSPITAL ENCOUNTER (OUTPATIENT)
Dept: CT IMAGING | Facility: CLINIC | Age: 73
Discharge: HOME | End: 2018-10-31
Attending: PHYSICIAN ASSISTANT | Admitting: PHYSICIAN ASSISTANT
Payer: MEDICARE

## 2018-10-31 ENCOUNTER — TELEPHONE (OUTPATIENT)
Dept: CARDIOLOGY | Facility: CLINIC | Age: 73
End: 2018-10-31

## 2018-10-31 DIAGNOSIS — R79.89 ELEVATED D-DIMER: ICD-10-CM

## 2018-10-31 DIAGNOSIS — I10 BENIGN ESSENTIAL HYPERTENSION: Primary | ICD-10-CM

## 2018-10-31 PROCEDURE — 71260 CT THORAX DX C+: CPT

## 2018-10-31 PROCEDURE — 25000125 ZZHC RX 250: Performed by: PHYSICIAN ASSISTANT

## 2018-10-31 PROCEDURE — 25000128 H RX IP 250 OP 636: Performed by: PHYSICIAN ASSISTANT

## 2018-10-31 RX ORDER — IOPAMIDOL 755 MG/ML
500 INJECTION, SOLUTION INTRAVASCULAR ONCE
Status: COMPLETED | OUTPATIENT
Start: 2018-10-31 | End: 2018-10-31

## 2018-10-31 RX ORDER — SPIRONOLACTONE 25 MG/1
25 TABLET ORAL DAILY
Qty: 30 TABLET | Refills: 1 | Status: SHIPPED | OUTPATIENT
Start: 2018-10-31 | End: 2018-11-13 | Stop reason: DRUGHIGH

## 2018-10-31 RX ADMIN — IOPAMIDOL 70 ML: 755 INJECTION, SOLUTION INTRAVENOUS at 10:45

## 2018-10-31 RX ADMIN — SODIUM CHLORIDE 70 ML: 9 INJECTION, SOLUTION INTRAVENOUS at 10:44

## 2018-10-31 NOTE — TELEPHONE ENCOUNTER
Pls have him start spironolactone 25 mg daily in the am- keep checking bps call on Friday for update pls.    Copy core team so they can call on Friday pls.  Christiane Coffey PA-C 10/31/2018 1:57 PM

## 2018-10-31 NOTE — TELEPHONE ENCOUNTER
Reviewed with ЕКАТЕРИНА Contreras, as pt is not currently CORE pt, in order to determine if pt's follow up visit on 11/8/18 in [place] should be changed to CORE visit.  ЕКАТЕРИНА Contreras confirms pt is not currently CORE and does not need to be scheduled as CORE. Will route Diane's requests for nurse to call on 11/2/18 for update to Diane's general nurse team.    VIPIN Terry 2:13 PM 10/31/2018

## 2018-10-31 NOTE — TELEPHONE ENCOUNTER
Writer called [facility] to get patient scheduled to see nephrologist Dr. Nathaniel Spangler. First available appointment is Jan. 15th at 8am. Writer scheduled the appointment and put the patient on a wait list. Updated patient's spouse regarding nephrology appointment. Patient spouse said patient still isn't feeling well and blood pressures are not improving. Patient's blood pressure this morning was 182/87 with heart rate of 70. Yesterday afternoon systolic was 143 but then by supper time blood pressure had increased to 177/86. Writer sending message to SARATH Callaway for review.

## 2018-10-31 NOTE — TELEPHONE ENCOUNTER
Telephone call to patient's spouse to have patient start spironolactone 25mg daily in the morning. Patient will continue to check blood pressures. Per SARATH Callaway message her nurse team from [place] will call patient on Friday for an update on patient. Medication sent to patients pharmacy.

## 2018-11-04 NOTE — TELEPHONE ENCOUNTER
No call on Friday from Team 3?  Team 3, pls call pt 11/5 and discuss bp and sx.   Also I had thought he had device check 11/1 and cannot find records of that.  We need his biv pacing data.

## 2018-11-05 NOTE — TELEPHONE ENCOUNTER
Spoke with wife, itz. Patient started Spironolactone 25 mg daily on 11/1/18.   BP's as follows.           Date                 8:30  AM                Noon               7:30 PM  Thursday, 11/1        180/85                      -                     167/78  Friday, 11/2              175/84                  180/83               207/95  Saturday, 11/3          157/66                     -                     186/89  Sunday, 11/4            168/79                  169/79                 -  Monday, 11/5            177/87    Does continue to complain of a headache, but tolerable. And not as groggy since the K+ is better.   Clarification, patient does NOT have a device. No pacemaker nor ICD.     Will message Christiane More with update for further recommendations.

## 2018-11-05 NOTE — TELEPHONE ENCOUNTER
Left voice message asking for a return call to review new recommendations of NIKKO Callaway.   Awaiting call back.

## 2018-11-05 NOTE — TELEPHONE ENCOUNTER
BP still remains markedly elevated.  Increase spironolactone to 50 mg daily.  Repeat bmp tomorrow am pls.    Thank you for clarification on pt- device check comment belongs to another pt.  My apologies.    Christiane Coffey PA-C 11/5/2018 3:05 PM

## 2018-11-05 NOTE — TELEPHONE ENCOUNTER
Call back received. Updated on increasing spironolactone to 50 mg daily and to have a BMP done Tuesday morning at [facility]. Verbalized understanding.   No further questions.

## 2018-11-06 DIAGNOSIS — I10 BENIGN ESSENTIAL HYPERTENSION: ICD-10-CM

## 2018-11-06 LAB
ANION GAP SERPL CALCULATED.3IONS-SCNC: 8 MMOL/L (ref 3–14)
BUN SERPL-MCNC: 12 MG/DL (ref 7–30)
CALCIUM SERPL-MCNC: 9.3 MG/DL (ref 8.5–10.1)
CHLORIDE SERPL-SCNC: 105 MMOL/L (ref 94–109)
CO2 SERPL-SCNC: 28 MMOL/L (ref 20–32)
CREAT SERPL-MCNC: 1.26 MG/DL (ref 0.66–1.25)
GFR SERPL CREATININE-BSD FRML MDRD: 56 ML/MIN/1.7M2
GLUCOSE SERPL-MCNC: 159 MG/DL (ref 70–99)
POTASSIUM SERPL-SCNC: 4.7 MMOL/L (ref 3.4–5.3)
SODIUM SERPL-SCNC: 141 MMOL/L (ref 133–144)

## 2018-11-06 PROCEDURE — 36415 COLL VENOUS BLD VENIPUNCTURE: CPT | Performed by: PHYSICIAN ASSISTANT

## 2018-11-06 PROCEDURE — 80048 BASIC METABOLIC PNL TOTAL CA: CPT | Performed by: PHYSICIAN ASSISTANT

## 2018-11-08 ENCOUNTER — OFFICE VISIT (OUTPATIENT)
Dept: CARDIOLOGY | Facility: CLINIC | Age: 73
End: 2018-11-08
Attending: PHYSICIAN ASSISTANT
Payer: MEDICARE

## 2018-11-08 VITALS
HEART RATE: 62 BPM | OXYGEN SATURATION: 98 % | HEIGHT: 68 IN | BODY MASS INDEX: 23.27 KG/M2 | WEIGHT: 153.5 LBS | SYSTOLIC BLOOD PRESSURE: 164 MMHG | RESPIRATION RATE: 13 BRPM | DIASTOLIC BLOOD PRESSURE: 70 MMHG

## 2018-11-08 DIAGNOSIS — I25.10 CORONARY ARTERY DISEASE INVOLVING NATIVE CORONARY ARTERY OF NATIVE HEART WITHOUT ANGINA PECTORIS: ICD-10-CM

## 2018-11-08 DIAGNOSIS — I10 BENIGN ESSENTIAL HTN: ICD-10-CM

## 2018-11-08 DIAGNOSIS — E87.6 HYPOKALEMIA: Primary | ICD-10-CM

## 2018-11-08 DIAGNOSIS — R06.09 DOE (DYSPNEA ON EXERTION): ICD-10-CM

## 2018-11-08 LAB
ANION GAP SERPL CALCULATED.3IONS-SCNC: 9 MMOL/L (ref 3–14)
BUN SERPL-MCNC: 15 MG/DL (ref 7–30)
CALCIUM SERPL-MCNC: 9.6 MG/DL (ref 8.5–10.1)
CHLORIDE SERPL-SCNC: 105 MMOL/L (ref 94–109)
CO2 SERPL-SCNC: 25 MMOL/L (ref 20–32)
CREAT SERPL-MCNC: 1.35 MG/DL (ref 0.66–1.25)
GFR SERPL CREATININE-BSD FRML MDRD: 52 ML/MIN/1.7M2
GLUCOSE SERPL-MCNC: 139 MG/DL (ref 70–99)
POTASSIUM SERPL-SCNC: 4.6 MMOL/L (ref 3.4–5.3)
SODIUM SERPL-SCNC: 139 MMOL/L (ref 133–144)

## 2018-11-08 PROCEDURE — 99214 OFFICE O/P EST MOD 30 MIN: CPT | Performed by: PHYSICIAN ASSISTANT

## 2018-11-08 PROCEDURE — 80048 BASIC METABOLIC PNL TOTAL CA: CPT | Performed by: PHYSICIAN ASSISTANT

## 2018-11-08 PROCEDURE — 36415 COLL VENOUS BLD VENIPUNCTURE: CPT | Performed by: PHYSICIAN ASSISTANT

## 2018-11-08 RX ORDER — CARVEDILOL 25 MG/1
25 TABLET ORAL 2 TIMES DAILY WITH MEALS
Qty: 60 TABLET | Refills: 11 | Status: SHIPPED | OUTPATIENT
Start: 2018-11-08 | End: 2018-12-07

## 2018-11-08 ASSESSMENT — PAIN SCALES - GENERAL: PAINLEVEL: NO PAIN (0)

## 2018-11-08 NOTE — PROGRESS NOTES
Reviewed during clinic visit.  Please see progress note for plan.  Christiane Coffey PA-C 11/8/2018 12:37 PM

## 2018-11-08 NOTE — LETTER
2018      Juarez Nuno, DO  [address] [place] [address]      RE: Michele Vance       Dear Colleague,    I had the pleasure of seeing Michele Vance in the [facility].    Service Date: 2018      PRIMARY CARDIOLOGIST:  Dr. Felipe.      REASON FOR VISIT:  Dyspnea on exertion, hypertension, and hypokalemia.      HISTORY OF PRESENT ILLNESS:  Mr. 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  Coronary disease with some past anginal equivalent being dyspnea on exertion with CAB in 2014.  This is a 6-vessel bypass with LIMA to the LAD, saphenous vein graft to the marginal, saphenous vein graft to the PDA, saphenous vein graft to the OM2, saphenous vein graft to the OM1, and saphenous vein graft to the D2.  He had bilateral venous harvesting of the legs.   2.  Peripheral arterial disease with ongoing claudication and history of angioplasty.   3.  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   4.  Hypertension.   5.  History of CVA post-CABG with residual memory issues.   6.  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      I met him several weeks ago when he had followed up for dyspnea on exertion.  This was a non-routine visit they made as when he exerted himself at all he became very dyspneic, dizzy and felt like he was almost going to pass out.  This was with fairly minimal exertion including sweeping the floor.  Unfortunately, when I saw him, he was markedly hypertensive in the 170s and found to be profoundly hypokalemic at 2.2.  Since that time over the phone, we have continued his workup and been working on replacing his potassium.  He had a positive D-dimer and I sent him for a PEE study.  This did not show any PEE nor significant lung disease, but  of course has extensive coronary and aortic atherosclerosis.      He comes in today feeling about the same.  He continues to be fairly short of breath with minimal exertion like sweeping the floor.  He also gets sometimes dizzy with this.  He has had a hard time swallowing his potassium.  He has developed a rash in his mouth that his dentist said is from his medications.  This is nonpainful on his gums.  He denies orthopnea or PND and he has not had any chest pain.  That being said, he did not have chest pain prior to his CABG either.  He also feels more tremulous.  Previously this was in one hand.  Now, it is in both hands and it seems to be right more than the left.  He also feels like his legs are more tremulous.  He has not had any falls.  He denies palpitations, syncope or syncope.      SOCIAL HISTORY:  He comes in today with his wife, Ke.  He previously lived in [place], now happens to be living back in [place], about a 50-pack-year history of smoking, quit in 2000.  No alcohol use.      PHYSICAL EXAMINATION:   GENERAL:  Reveals a well-developed, well-nourished gentleman in no acute distress.   HEENT:  Normocephalic, atraumatic.   HEART:  Regular.  I do not appreciate murmur, rub or gallop.   RESPIRATORY:  Lungs are clear without wheezes, rales or rhonchi.     EXTREMITIES:  He has no peripheral edema.   SKIN:  Warm and dry.      LABORATORY STUDIES:  Labs checked 2 days ago show a creatinine of 1.26, BUN 12, potassium 4.7, sodium 141.      Echocardiogram shows mild LVH, borderline RV enlargement, mildly decreased RV systolic function, but a normal EF, no significant valvular disease.      ASSESSMENT AND PLAN:   1.  Dyspnea on exertion, ongoing, that is exertional, and a known vasculopath with history of successful CAB.  Amazingly, he is in a normal nuclear study and no wall motion on his echocardiogram.  Even with this, I am concerned that this may be a secondary progressive coronary artery disease.   Alternatively, it could simply be because he is still hypertensive with exercise and at baseline.  Given there is no ischemia on stress test or wall motion, I will continue to try to optimize his blood pressure status to see if symptoms improve.  At this point, we will discontinue his Toprol-XL which is 100 mg a day and replace this with carvedilol 25 mg b.i.d.  He will remain on amlodipine 10 mg daily, losartan 100 mg daily, 60 of Imdur and 50 of spironolactone.  Depending on the results of these changes, I will defer to Dr. Felipe when he sees him in 2 weeks if he would like to send him for coronary angiogram.   2.  Hypertension, as above.   3.  Severe peripheral arterial disease with last carotid ultrasound showing 57% lesions which are stable with known compromised vertebrals.  This is followed by  **.   4.  Dyslipidemia, tolerating Praluent, completely intolerant to statins with myositis.   5.  Hypokalemia of unclear etiology.  He is not on any diuretics or medications that should have made him hypokalemic, especially so profoundly so.  He did not have vomiting or diarrhea.  This has been replaced and actually with excellent response with the addition of spironolactone.  He does have a referral for Nephrology.  He will see Dr. Person in January.   6.  Tremulousness, worsening for unclear reasons.  We will defer to his primary care doctor, Dr. West, and also can reach out to him for these issues.      Thank you for allowing me to participate in this delightful patient's care.  Followup is already arranged with Dr. Felipe.  We will see him back as determined at that time.         AGUSTÍN MESA PA-C             D: 2018   T: 2018   MT: CLARKE      Name:     NINFA CID   MRN:      [id number]        Account:      [id number]   :      1945           Service Date: 2018      Document: [id number]         Outpatient Encounter Prescriptions as of 2018   Medication Sig Dispense  Refill     alirocumab (PRALUENT) 150 MG/ML injectable pen Inject 1 mL (150 mg) Subcutaneous every 14 days 6 mL 2     amitriptyline (ELAVIL) 25 MG tablet TAKE 1 TABLET TWICE A  tablet 1     amLODIPine (NORVASC) 10 MG tablet TAKE 1 TABLET EVERY EVENING 90 tablet 3     ASPIRIN PO Take 325 mg by mouth daily       carvedilol (COREG) 25 MG tablet Take 1 tablet (25 mg) by mouth 2 times daily (with meals) 60 tablet 11     cilostazol (PLETAL) 100 MG tablet TAKE 1 TABLET TWICE A  tablet 3     gabapentin (NEURONTIN) 300 MG capsule TAKE 1 CAPSULE THREE TIMES A  capsule 4     losartan (COZAAR) 100 MG tablet Take 1 tablet (100 mg) by mouth daily 90 tablet 3     metoclopramide (REGLAN) 10 MG tablet TAKE 1 TABLET FOUR TIMES A  tablet 2     omeprazole (PRILOSEC) 40 MG capsule TAKE 1 CAPSULE DAILY 90 capsule 1     polyethylene glycol (MIRALAX/GLYCOLAX) packet Take 1 packet by mouth daily        psyllium (METAMUCIL) 58.6 % POWD Take by mouth daily       spironolactone (ALDACTONE) 25 MG tablet Take 1 tablet (25 mg) by mouth daily (Patient taking differently: Take 50 mg by mouth daily ) 30 tablet 1     STATIN NOT PRESCRIBED, INTENTIONAL, Pt has known rhabdomyolysis in the past, somewhat associated with statins.  Also wasn't able to tolerate Zetia. 1 each 0     TOPROL  MG 24 hr tablet TAKE 1 TABLET EVERY MORNING 93 tablet 1     [DISCONTINUED] isosorbide mononitrate (IMDUR) 60 MG 24 hr tablet Take 1 tablet (60 mg) by mouth daily 30 tablet 11     zolpidem (AMBIEN) 5 MG tablet Take 1 tablet (5 mg) by mouth nightly as needed for sleep (Patient not taking: Reported on 11/8/2018) 90 tablet 1     [DISCONTINUED] potassium chloride SA (K-DUR/KLOR-CON M) 20 MEQ CR tablet Take 2 tablets (40 mEq) by mouth 3 times daily Or as directed by cardiology 180 tablet 3     No facility-administered encounter medications on file as of 11/8/2018.        Again, thank you for allowing me to participate in the care of your  patient.      Sincerely,    Christiane Coffey PA-C     [facility]

## 2018-11-08 NOTE — PATIENT INSTRUCTIONS
Thanks for coming into [facility] today.     We discussed: Your potassium is back to normal.    Stop your potassium supplement.  We'll continue to try and figure out why your potassium was so low.      Stop taking Toprol XL/ metoprolol succinate.    Start taking Coreg/ carvedilol 25 mg twice a day.    Continue spironolactone at 50 mg today- we may adjust based on labs.    Labs today and again when you see Dr. West next week.        Follow up: as scheduled with Dr. Felipe- he can decide on the angiogram at that time.      Please be sure to talk to Dr. West about your tremors.       Please call the clinic at  [phone] with any questions or concerns and my our nurses will be happy to help.     Please call [phone] for scheduling.       Reminder: Please bring in all current medications, over the counter supplements and vitamin bottles to your next appointment.

## 2018-11-08 NOTE — MR AVS SNAPSHOT
After Visit Summary   11/8/2018    Michele Vance    MRN: [id number]           Patient Information     Date Of Birth          1945        Visit Information        Provider Department      11/8/2018 10:00 AM Christiane Coffey PA-C [facility]        Today's Diagnoses     Hypokalemia    -  1    Coronary artery disease involving native coronary artery of native heart without angina pectoris        ELAM (dyspnea on exertion)        Benign essential HTN          Care Instructions    Thanks for coming into [facility] clinic today.     We discussed: Your potassium is back to normal.    Stop your potassium supplement.  We'll continue to try and figure out why your potassium was so low.      Stop taking Toprol XL/ metoprolol succinate.    Start taking Coreg/ carvedilol 25 mg twice a day.    Continue spironolactone at 50 mg today- we may adjust based on labs.    Labs today and again when you see Dr. West next week.        Follow up: as scheduled with Dr. Felipe- he can decide on the angiogram at that time.      Please be sure to talk to Dr. West about your tremors.       Please call the clinic at  [phone] with any questions or concerns and my our nurses will be happy to help.     Please call [phone] for scheduling.       Reminder: Please bring in all current medications, over the counter supplements and vitamin bottles to your next appointment.            Follow-ups after your visit        Your next 10 appointments already scheduled     Nov 16, 2018 10:30 AM CST   [facility] Jonas [facility] MD   [facility]    [address] [place] [id number] [phone]           Bring a current list of meds and any records pertaining to this visit. For Physicals, please bring immunization records and any forms needing to be filled out. Please arrive 10 minutes early to complete  paperwork.            Nov 20, 2018  1:30 PM CST   Return Visit with Stevie Felipe MD   [facility] ([facility])    [address] [id number] [phone]            Juliano 15, 2019  8:00 AM CST   New Visit with Nathaniel Spangler MD   [facility]    [address] [place] [id number] [phone]              Future tests that were ordered for you today     Open Future Orders        Priority Expected Expires Ordered    Basic metabolic panel Routine 11/16/2018 11/8/2019 11/8/2018    Potassium Routine 11/8/2018 11/8/2019 11/8/2018            Who to contact     If you have questions or need follow up information about today's clinic visit or your schedule please contact [facility] directly at [phone].  Normal or non-critical lab and imaging results will be communicated to you by [employer], letter or phone within 4 business days after the clinic has received the results. If you do not hear from us within 7 days, please contact the clinic through [employer] or phone. If you have a critical or abnormal lab result, we will notify you by phone as soon as possible.  Submit refill requests through [employer] or call your pharmacy and they will forward the refill request to us. Please allow 3 business days for your refill to be completed.          Additional Information About Your Visit        [employer] Information     [employer] gives you secure access to your electronic health record. If you see a primary care provider, you can also send messages to your care team and make appointments. If you have questions, please call your primary care clinic.  If you do not have a primary care provider, please call [phone] and they will assist you.        Care EveryWhere ID     This is your Care EveryWhere ID. This could be used by other organizations  "to access your [facility] medical records  [id number]        Your Vitals Were     Pulse Respirations Height Pulse Oximetry BMI (Body Mass Index)       62 13 1.727 m (5' 8\") 98% 23.34 kg/m2        Blood Pressure from Last 3 Encounters:   11/08/18 164/70   10/23/18 170/72   10/05/18 188/84    Weight from Last 3 Encounters:   11/08/18 69.6 kg (153 lb 8 oz)   10/23/18 72.4 kg (159 lb 11.2 oz)   10/05/18 70.3 kg (155 lb)              We Performed the Following     Follow-Up with Cardiologist          Today's Medication Changes          These changes are accurate as of 11/8/18 10:22 AM.  If you have any questions, ask your nurse or doctor.               Start taking these medicines.        Dose/Directions    carvedilol 25 MG tablet   Commonly known as:  COREG   Used for:  Benign essential HTN        Dose:  25 mg   Take 1 tablet (25 mg) by mouth 2 times daily (with meals)   Quantity:  60 tablet   Refills:  11         These medicines have changed or have updated prescriptions.        Dose/Directions    spironolactone 25 MG tablet   Commonly known as:  ALDACTONE   This may have changed:  how much to take   Used for:  Benign essential hypertension        Dose:  25 mg   Take 1 tablet (25 mg) by mouth daily   Quantity:  30 tablet   Refills:  1         Stop taking these medicines if you haven't already. Please contact your care team if you have questions.     potassium chloride SA 20 MEQ CR tablet   Commonly known as:  K-DUR/KLOR-CON M                Where to get your medicines      These medications were sent to [facility] - [address]  [address] [place] [address] [address]     Phone:  [phone]     carvedilol 25 MG tablet                Primary Care Provider Office Phone # Fax #    Juarez Stevie Nuno [phone]       [address] [place] [address]        Equal Access to Services     ANSHU AGUSTIN AH: barbie Hines, " antonio solohectorjoe qureshilakesha arnoldo kamiin hayaan adeeg kharash la'aan ah. So [facility] [phone].    ATENCIÓN: Si dilshad huynh, tiene a fischer disposición servicios gratuitos de asistencia lingüística. Ladonna al [phone].    We comply with applicable federal civil rights laws and [place] laws. We do not discriminate on the basis of race, color, national origin, age, disability, sex, sexual orientation, or gender identity.            Thank you!     Thank you for choosing [facility]  for your care. Our goal is always to provide you with excellent care. Hearing back from our patients is one way we can continue to improve our services. Please take a few minutes to complete the written survey that you may receive in the mail after your visit with us. Thank you!             Your Updated Medication List - Protect others around you: Learn how to safely use, store and throw away your medicines at www.disposemymeds.org.          This list is accurate as of 11/8/18 10:22 AM.  Always use your most recent med list.                   Brand Name Dispense Instructions for use Diagnosis    alirocumab 150 MG/ML injectable pen    PRALUENT    6 mL    Inject 1 mL (150 mg) Subcutaneous every 14 days    Hyperlipidemia LDL goal <130       amitriptyline 25 MG tablet    ELAVIL    180 tablet    TAKE 1 TABLET TWICE A DAY    Migraine without aura and without status migrainosus, not intractable       amLODIPine 10 MG tablet    NORVASC    90 tablet    TAKE 1 TABLET EVERY EVENING    Essential hypertension, benign       ASPIRIN PO      Take 325 mg by mouth daily        carvedilol 25 MG tablet    COREG    60 tablet    Take 1 tablet (25 mg) by mouth 2 times daily (with meals)    Benign essential HTN       cilostazol 100 MG tablet    PLETAL    180 tablet    TAKE 1 TABLET TWICE A DAY    Peripheral vascular disease (H)       gabapentin 300 MG capsule    NEURONTIN    210 capsule    TAKE 1 CAPSULE THREE TIMES A DAY     Arthritis       isosorbide mononitrate 60 MG 24 hr tablet    IMDUR    30 tablet    Take 1 tablet (60 mg) by mouth daily    ELAM (dyspnea on exertion)       losartan 100 MG tablet    COZAAR    90 tablet    Take 1 tablet (100 mg) by mouth daily    Essential hypertension, benign       metoclopramide 10 MG tablet    REGLAN    120 tablet    TAKE 1 TABLET FOUR TIMES A DAY    Slow transit constipation       omeprazole 40 MG capsule    priLOSEC    90 capsule    TAKE 1 CAPSULE DAILY    Gastroesophageal reflux disease without esophagitis       polyethylene glycol Packet    MIRALAX/GLYCOLAX     Take 1 packet by mouth daily        psyllium 58.6 % Powd    METAMUCIL     Take by mouth daily        spironolactone 25 MG tablet    ALDACTONE    30 tablet    Take 1 tablet (25 mg) by mouth daily    Benign essential hypertension       STATIN NOT PRESCRIBED (INTENTIONAL)     1 each    Pt has known rhabdomyolysis in the past, somewhat associated with statins.  Also wasn't able to tolerate Zetia.    Hyperlipidemia LDL goal <130       TOPROL  MG 24 hr tablet   Generic drug:  metoprolol succinate     93 tablet    TAKE 1 TABLET EVERY MORNING    Essential hypertension, benign       zolpidem 5 MG tablet    AMBIEN    90 tablet    Take 1 tablet (5 mg) by mouth nightly as needed for sleep    Primary insomnia

## 2018-11-08 NOTE — PROGRESS NOTES
[id number]  HPI and Plan:   See dictation    Orders this Visit:  Orders Placed This Encounter   Procedures     Potassium     Basic metabolic panel     Orders Placed This Encounter   Medications     carvedilol (COREG) 25 MG tablet     Sig: Take 1 tablet (25 mg) by mouth 2 times daily (with meals)     Dispense:  60 tablet     Refill:  11     Medications Discontinued During This Encounter   Medication Reason     potassium chloride SA (K-DUR/KLOR-CON M) 20 MEQ CR tablet          Encounter Diagnoses   Name Primary?     Coronary artery disease involving native coronary artery of native heart without angina pectoris      ELAM (dyspnea on exertion)      Hypokalemia Yes     Benign essential HTN        CURRENT MEDICATIONS:  Current Outpatient Prescriptions   Medication Sig Dispense Refill     alirocumab (PRALUENT) 150 MG/ML injectable pen Inject 1 mL (150 mg) Subcutaneous every 14 days 6 mL 2     amitriptyline (ELAVIL) 25 MG tablet TAKE 1 TABLET TWICE A  tablet 1     amLODIPine (NORVASC) 10 MG tablet TAKE 1 TABLET EVERY EVENING 90 tablet 3     ASPIRIN PO Take 325 mg by mouth daily       carvedilol (COREG) 25 MG tablet Take 1 tablet (25 mg) by mouth 2 times daily (with meals) 60 tablet 11     cilostazol (PLETAL) 100 MG tablet TAKE 1 TABLET TWICE A  tablet 3     gabapentin (NEURONTIN) 300 MG capsule TAKE 1 CAPSULE THREE TIMES A  capsule 4     isosorbide mononitrate (IMDUR) 60 MG 24 hr tablet Take 1 tablet (60 mg) by mouth daily 30 tablet 11     losartan (COZAAR) 100 MG tablet Take 1 tablet (100 mg) by mouth daily 90 tablet 3     metoclopramide (REGLAN) 10 MG tablet TAKE 1 TABLET FOUR TIMES A  tablet 2     omeprazole (PRILOSEC) 40 MG capsule TAKE 1 CAPSULE DAILY 90 capsule 1     polyethylene glycol (MIRALAX/GLYCOLAX) packet Take 1 packet by mouth daily        psyllium (METAMUCIL) 58.6 % POWD Take by mouth daily       spironolactone (ALDACTONE) 25 MG tablet Take 1 tablet (25 mg) by mouth daily (Patient  "taking differently: Take 50 mg by mouth daily ) 30 tablet 1     STATIN NOT PRESCRIBED, INTENTIONAL, Pt has known rhabdomyolysis in the past, somewhat associated with statins.  Also wasn't able to tolerate Zetia. 1 each 0     TOPROL  MG 24 hr tablet TAKE 1 TABLET EVERY MORNING 93 tablet 1     zolpidem (AMBIEN) 5 MG tablet Take 1 tablet (5 mg) by mouth nightly as needed for sleep (Patient not taking: Reported on 11/8/2018) 90 tablet 1       ALLERGIES     Allergies   Allergen Reactions     Hmg-Coa-R Inhibitors Other (See Comments)     Rhabdo  Same as \"statins\"     Gemfibrozil      Resting thigh-calf pain     Sulfa Drugs      Reacted as a child     Zetia [Ezetimibe]      Muscle cramping       PAST MEDICAL HISTORY:  Past Medical History:   Diagnosis Date     Carotid stenosis     right endartectomy     Chronic kidney disease     stage 3     Coronary artery disease 3-2014    CABG x6     Crohn's disease (H)      CVA (cerebral infarction)     balance problems, mild     Elevated CK      Esophageal reflux      Hypercholesteremia      Hypertension      Nonsenile cataract      Other and unspecified hyperlipidemia      PAD (peripheral artery disease) (H)      Rhabdomyolysis 2010     Unspecified essential hypertension        PAST SURGICAL HISTORY:  Past Surgical History:   Procedure Laterality Date     APPENDECTOMY  1974     BYPASS GRAFT ARTERY CORONARY  3/5/2014    Procedure: BYPASS GRAFT ARTERY CORONARY;  Median Sternotomy, Coronary Artery Bypass Graft X6 used Left internal mammary artery, Left and Right Greater Saphenous vein, on Pump oxygenator.;  Surgeon: Tim Montanez MD;  Location:  OR     CATARACT IOL, RT/LT Bilateral 2008    in [place]     cataracts       COLONOSCOPY  12/12/02    [facility]     COLONOSCOPY N/A 10/7/2014    Procedure: COMBINED COLONOSCOPY, SINGLE OR MULTIPLE BIOPSY/POLYPECTOMY BY BIOPSY;  Surgeon: Micheal Mora MD;  Location:  GI     DENTAL SURGERY      TMJ, implants     " ENDARTERECTOMY CAROTID      right carotid stent     ESOPHAGOSCOPY, GASTROSCOPY, DUODENOSCOPY (EGD), COMBINED Left 10/7/2014    Procedure: COMBINED ESOPHAGOSCOPY, GASTROSCOPY, DUODENOSCOPY (EGD), BIOPSY SINGLE OR MULTIPLE;  Surgeon: Micheal Mora MD;  Location:  GI     ESOPHAGOSCOPY, GASTROSCOPY, DUODENOSCOPY (EGD), COMBINED Left 10/7/2014    Procedure: COMBINED ESOPHAGOSCOPY, GASTROSCOPY, DUODENOSCOPY (EGD), REMOVE FOREIGN BODY;  Surgeon: Micheal Mora MD;  Location:  GI      CAPSULE ENDOSCOPY N/A 10/7/2014    Procedure: CAPSULE/PILL CAM ENDOSCOPY;  Surgeon: Micheal Mora MD;  Location: [facility] UGI ENDOSCOPY, SIMPLE EXAM  01/08/99    [facility]     INJECT EPIDURAL LUMBAR Right 5/8/2017    Procedure: INJECT EPIDURAL LUMBAR;  Lumbar transforaminal Epidural Steroid Injection right lumbar 4-5, and right  lumbar 5-Sacral 1;  Surgeon: Anthony Gonzalez MD;  Location: [facility] OR     INJECT JOINT SACROILIAC Bilateral 8/28/2017    Procedure: INJECT JOINT SACROILIAC;  sacroiliac joint injection bilateral;  Surgeon: Anthony Gonzalez MD;  Location: [facility] OR     KNEE SURGERY  1976    left knee reconstruction     lasix  2001    both eyes     RELEASE CARPAL TUNNEL  1/13/2011    RELEASE CARPAL TUNNEL performed by JO MADRID at  OR     RELEASE CARPAL TUNNEL  1/20/2011    RELEASE CARPAL TUNNEL performed by JO MADRID at  OR       FAMILY HISTORY:  Family History   Problem Relation Age of Onset     Hypertension Mother      Eye Disorder Mother      Cerebrovascular Disease Father      HEART DISEASE Father      Lipids Father      Obesity Father      Cancer Sister      HEART DISEASE Sister      Glaucoma No family hx of      Macular Degeneration No family hx of        SOCIAL HISTORY:  Social History     Social History     Marital status:      Spouse name: N/A     Number of children: N/A     Years of education: N/A     Occupational History      Retired     Social History  "Main Topics     Smoking status: Former Smoker     Packs/day: 2.50     Years: 20.00     Types: Cigarettes     Quit date: 1/1/2000     Smokeless tobacco: Never Used     Alcohol use No     Drug use: No     Sexual activity: Yes     Partners: Female     Other Topics Concern     Exercise Yes     3 times per week     Social History Narrative    Moved from [place] in August, retired  for [employer].       Review of Systems:  Skin:  Negative     Eyes:  Positive for glasses  ENT:  Negative    Respiratory:  Positive for shortness of breath;dyspnea on exertion  Cardiovascular:    Positive for;edema;fatigue;dizziness  Gastroenterology: Negative    Genitourinary:  Negative    Musculoskeletal:  Negative neck pain  Neurologic:  Positive for numbness or tingling of hands;numbness or tingling of feet;stroke  Psychiatric:  Negative for    Heme/Lymph/Imm:  Positive for allergies  Endocrine:  Negative      Physical Exam:  Vitals: /70 (BP Location: Right arm, Cuff Size: Adult Regular)  Pulse 62  Resp 13  Ht 1.727 m (5' 8\")  Wt 69.6 kg (153 lb 8 oz)  SpO2 98%  BMI 23.34 kg/m2   Please refer to dictation for physical exam    Recent Lab Results:  LIPID RESULTS:  Lab Results   Component Value Date    CHOL 171 05/17/2018    HDL 48 05/17/2018    LDL 99 05/17/2018    TRIG 120 05/17/2018    CHOLHDLRATIO 5.2 (H) 07/23/2015       LIVER ENZYME RESULTS:  Lab Results   Component Value Date    AST 18 03/26/2015    ALT 18 08/08/2016       CBC RESULTS:  Lab Results   Component Value Date    WBC 6.8 10/23/2018    RBC 4.14 (L) 10/23/2018    HGB 13.3 10/23/2018    HCT 39.8 (L) 10/23/2018    MCV 96 10/23/2018    MCH 32.1 10/23/2018    MCHC 33.4 10/23/2018    RDW 13.7 10/23/2018     10/23/2018       BMP RESULTS:  Lab Results   Component Value Date     11/06/2018    POTASSIUM 4.7 11/06/2018    CHLORIDE 105 11/06/2018    CO2 28 11/06/2018    ANIONGAP 8 11/06/2018     (H) 11/06/2018    BUN 12 11/06/2018    CR 1.26 (H) " 11/06/2018    GFRESTIMATED 56 (L) 11/06/2018    GFRESTBLACK 68 11/06/2018    RAHEEM 9.3 11/06/2018        A1C RESULTS:  Lab Results   Component Value Date    A1C 5.6 03/07/2014       INR RESULTS:  Lab Results   Component Value Date    INR 1.00 12/05/2014    INR 1.32 (H) 03/06/2014           CC  Christiane Coffey PA-C  [address]  [place] [id number]    ]

## 2018-11-08 NOTE — LETTER
11/8/2018    Juarez Nuno, DO  [address] [place] [address]    RE: Michele Vance       Dear Colleague,    I had the pleasure of seeing Michele Vance in the [facility].    [id number]  HPI and Plan:   See dictation    Orders this Visit:  Orders Placed This Encounter   Procedures     Potassium     Basic metabolic panel     Orders Placed This Encounter   Medications     carvedilol (COREG) 25 MG tablet     Sig: Take 1 tablet (25 mg) by mouth 2 times daily (with meals)     Dispense:  60 tablet     Refill:  11     Medications Discontinued During This Encounter   Medication Reason     potassium chloride SA (K-DUR/KLOR-CON M) 20 MEQ CR tablet          Encounter Diagnoses   Name Primary?     Coronary artery disease involving native coronary artery of native heart without angina pectoris      ELAM (dyspnea on exertion)      Hypokalemia Yes     Benign essential HTN        CURRENT MEDICATIONS:  Current Outpatient Prescriptions   Medication Sig Dispense Refill     alirocumab (PRALUENT) 150 MG/ML injectable pen Inject 1 mL (150 mg) Subcutaneous every 14 days 6 mL 2     amitriptyline (ELAVIL) 25 MG tablet TAKE 1 TABLET TWICE A  tablet 1     amLODIPine (NORVASC) 10 MG tablet TAKE 1 TABLET EVERY EVENING 90 tablet 3     ASPIRIN PO Take 325 mg by mouth daily       carvedilol (COREG) 25 MG tablet Take 1 tablet (25 mg) by mouth 2 times daily (with meals) 60 tablet 11     cilostazol (PLETAL) 100 MG tablet TAKE 1 TABLET TWICE A  tablet 3     gabapentin (NEURONTIN) 300 MG capsule TAKE 1 CAPSULE THREE TIMES A  capsule 4     isosorbide mononitrate (IMDUR) 60 MG 24 hr tablet Take 1 tablet (60 mg) by mouth daily 30 tablet 11     losartan (COZAAR) 100 MG tablet Take 1 tablet (100 mg) by mouth daily 90 tablet 3     metoclopramide (REGLAN) 10 MG tablet TAKE 1 TABLET FOUR TIMES A  tablet 2     omeprazole (PRILOSEC) 40 MG capsule TAKE 1 CAPSULE DAILY 90 capsule 1      "polyethylene glycol (MIRALAX/GLYCOLAX) packet Take 1 packet by mouth daily        psyllium (METAMUCIL) 58.6 % POWD Take by mouth daily       spironolactone (ALDACTONE) 25 MG tablet Take 1 tablet (25 mg) by mouth daily (Patient taking differently: Take 50 mg by mouth daily ) 30 tablet 1     STATIN NOT PRESCRIBED, INTENTIONAL, Pt has known rhabdomyolysis in the past, somewhat associated with statins.  Also wasn't able to tolerate Zetia. 1 each 0     TOPROL  MG 24 hr tablet TAKE 1 TABLET EVERY MORNING 93 tablet 1     zolpidem (AMBIEN) 5 MG tablet Take 1 tablet (5 mg) by mouth nightly as needed for sleep (Patient not taking: Reported on 11/8/2018) 90 tablet 1       ALLERGIES     Allergies   Allergen Reactions     Hmg-Coa-R Inhibitors Other (See Comments)     Rhabdo  Same as \"statins\"     Gemfibrozil      Resting thigh-calf pain     Sulfa Drugs      Reacted as a child     Zetia [Ezetimibe]      Muscle cramping       PAST MEDICAL HISTORY:  Past Medical History:   Diagnosis Date     Carotid stenosis     right endartectomy     Chronic kidney disease     stage 3     Coronary artery disease 3-2014    CABG x6     Crohn's disease (H)      CVA (cerebral infarction)     balance problems, mild     Elevated CK      Esophageal reflux      Hypercholesteremia      Hypertension      Nonsenile cataract      Other and unspecified hyperlipidemia      PAD (peripheral artery disease) (H)      Rhabdomyolysis 2010     Unspecified essential hypertension        PAST SURGICAL HISTORY:  Past Surgical History:   Procedure Laterality Date     APPENDECTOMY  1974     BYPASS GRAFT ARTERY CORONARY  3/5/2014    Procedure: BYPASS GRAFT ARTERY CORONARY;  Median Sternotomy, Coronary Artery Bypass Graft X6 used Left internal mammary artery, Left and Right Greater Saphenous vein, on Pump oxygenator.;  Surgeon: Tim Montanez MD;  Location: [facility] OR     CATARACT IOL, RT/LT Bilateral 2008    in [place]     cataracts       COLONOSCOPY  12/12/02    [facility]     COLONOSCOPY N/A 10/7/2014    Procedure: COMBINED COLONOSCOPY, SINGLE OR MULTIPLE BIOPSY/POLYPECTOMY BY BIOPSY;  Surgeon: Micheal Mora MD;  Location:  GI     DENTAL SURGERY      TMJ, implants     ENDARTERECTOMY CAROTID      right carotid stent     ESOPHAGOSCOPY, GASTROSCOPY, DUODENOSCOPY (EGD), COMBINED Left 10/7/2014    Procedure: COMBINED ESOPHAGOSCOPY, GASTROSCOPY, DUODENOSCOPY (EGD), BIOPSY SINGLE OR MULTIPLE;  Surgeon: Micheal Mora MD;  Location:  GI     ESOPHAGOSCOPY, GASTROSCOPY, DUODENOSCOPY (EGD), COMBINED Left 10/7/2014    Procedure: COMBINED ESOPHAGOSCOPY, GASTROSCOPY, DUODENOSCOPY (EGD), REMOVE FOREIGN BODY;  Surgeon: Micheal Mora MD;  Location: [facility] CAPSULE ENDOSCOPY N/A 10/7/2014    Procedure: CAPSULE/PILL CAM ENDOSCOPY;  Surgeon: Micheal Mora MD;  Location: [facility] UGI ENDOSCOPY, SIMPLE EXAM  01/08/99    [facility]     INJECT EPIDURAL LUMBAR Right 5/8/2017    Procedure: INJECT EPIDURAL LUMBAR;  Lumbar transforaminal Epidural Steroid Injection right lumbar 4-5, and right  lumbar 5-Sacral 1;  Surgeon: Anthony Gonzalez MD;  Location: [facility] OR     INJECT JOINT SACROILIAC Bilateral 8/28/2017    Procedure: INJECT JOINT SACROILIAC;  sacroiliac joint injection bilateral;  Surgeon: Anthony Gonzalez MD;  Location: [facility] OR     KNEE SURGERY  1976    left knee reconstruction     lasix  2001    both eyes     RELEASE CARPAL TUNNEL  1/13/2011    RELEASE CARPAL TUNNEL performed by JO MADRID at  OR     RELEASE CARPAL TUNNEL  1/20/2011    RELEASE CARPAL TUNNEL performed by JO MADRID at  OR       FAMILY HISTORY:  Family History   Problem Relation Age of Onset     Hypertension Mother      Eye Disorder Mother      Cerebrovascular Disease Father      HEART DISEASE Father      Lipids Father      Obesity Father      Cancer Sister      HEART DISEASE Sister      Glaucoma No family hx of      Macular Degeneration No family hx of   "      SOCIAL HISTORY:  Social History     Social History     Marital status:      Spouse name: N/A     Number of children: N/A     Years of education: N/A     Occupational History      Retired     Social History Main Topics     Smoking status: Former Smoker     Packs/day: 2.50     Years: 20.00     Types: Cigarettes     Quit date: 1/1/2000     Smokeless tobacco: Never Used     Alcohol use No     Drug use: No     Sexual activity: Yes     Partners: Female     Other Topics Concern     Exercise Yes     3 times per week     Social History Narrative    Moved from [place] in August, retired  for [employer].       Review of Systems:  Skin:  Negative     Eyes:  Positive for glasses  ENT:  Negative    Respiratory:  Positive for shortness of breath;dyspnea on exertion  Cardiovascular:    Positive for;edema;fatigue;dizziness  Gastroenterology: Negative    Genitourinary:  Negative    Musculoskeletal:  Negative neck pain  Neurologic:  Positive for numbness or tingling of hands;numbness or tingling of feet;stroke  Psychiatric:  Negative for    Heme/Lymph/Imm:  Positive for allergies  Endocrine:  Negative      Physical Exam:  Vitals: /70 (BP Location: Right arm, Cuff Size: Adult Regular)  Pulse 62  Resp 13  Ht 1.727 m (5' 8\")  Wt 69.6 kg (153 lb 8 oz)  SpO2 98%  BMI 23.34 kg/m2   Please refer to dictation for physical exam    Recent Lab Results:  LIPID RESULTS:  Lab Results   Component Value Date    CHOL 171 05/17/2018    HDL 48 05/17/2018    LDL 99 05/17/2018    TRIG 120 05/17/2018    CHOLHDLRATIO 5.2 (H) 07/23/2015       LIVER ENZYME RESULTS:  Lab Results   Component Value Date    AST 18 03/26/2015    ALT 18 08/08/2016       CBC RESULTS:  Lab Results   Component Value Date    WBC 6.8 10/23/2018    RBC 4.14 (L) 10/23/2018    HGB 13.3 10/23/2018    HCT 39.8 (L) 10/23/2018    MCV 96 10/23/2018    MCH 32.1 10/23/2018    MCHC 33.4 10/23/2018    RDW 13.7 10/23/2018     10/23/2018       BMP " RESULTS:  Lab Results   Component Value Date     11/06/2018    POTASSIUM 4.7 11/06/2018    CHLORIDE 105 11/06/2018    CO2 28 11/06/2018    ANIONGAP 8 11/06/2018     (H) 11/06/2018    BUN 12 11/06/2018    CR 1.26 (H) 11/06/2018    GFRESTIMATED 56 (L) 11/06/2018    GFRESTBLACK 68 11/06/2018    RAHEEM 9.3 11/06/2018        A1C RESULTS:  Lab Results   Component Value Date    A1C 5.6 03/07/2014       INR RESULTS:  Lab Results   Component Value Date    INR 1.00 12/05/2014    INR 1.32 (H) 03/06/2014           CC  Christiane Coffey PA-C  [address]  [place] [address]    ]      Thank you for allowing me to participate in the care of your patient.      Sincerely,     Christiane Coffey PA-C     [facility]    cc:   Christiane Coffey PA-C  [address]  [place] [address]

## 2018-11-08 NOTE — PROGRESS NOTES
Service Date: 2018      PRIMARY CARDIOLOGIST:  Dr. Felipe.      REASON FOR VISIT:  Dyspnea on exertion, hypertension, and hypokalemia.      HISTORY OF PRESENT ILLNESS:  Mr. 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  Coronary disease with some past anginal equivalent being dyspnea on exertion with CAB in 2014.  This is a 6-vessel bypass with LIMA to the LAD, saphenous vein graft to the marginal, saphenous vein graft to the PDA, saphenous vein graft to the OM2, saphenous vein graft to the OM1, and saphenous vein graft to the D2.  He had bilateral venous harvesting of the legs.   2.  Peripheral arterial disease with ongoing claudication and history of angioplasty.   3.  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   4.  Hypertension.   5.  History of CVA post-CABG with residual memory issues.   6.  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      I met him several weeks ago when he had followed up for dyspnea on exertion.  This was a non-routine visit they made as when he exerted himself at all he became very dyspneic, dizzy and felt like he was almost going to pass out.  This was with fairly minimal exertion including sweeping the floor.  Unfortunately, when I saw him, he was markedly hypertensive in the 170s and found to be profoundly hypokalemic at 2.2.  Since that time over the phone, we have continued his workup and been working on replacing his potassium.  He had a positive D-dimer and I sent him for a PEE study.  This did not show any PEE nor significant lung disease, but of course has extensive coronary and aortic atherosclerosis.      He comes in today feeling about the same.  He continues to be fairly short of breath with minimal exertion like sweeping the floor.  He also gets sometimes dizzy  with this.  He has had a hard time swallowing his potassium.  He has developed a rash in his mouth that his dentist said is from his medications.  This is nonpainful on his gums.  He denies orthopnea or PND and he has not had any chest pain.  That being said, he did not have chest pain prior to his CABG either.  He also feels more tremulous.  Previously this was in one hand.  Now, it is in both hands and it seems to be right more than the left.  He also feels like his legs are more tremulous.  He has not had any falls.  He denies palpitations, syncope or syncope.      SOCIAL HISTORY:  He comes in today with his wife, Ke.  He previously lived in [place], now happens to be living back in [place], about a 50-pack-year history of smoking, quit in 2000.  No alcohol use.      PHYSICAL EXAMINATION:   GENERAL:  Reveals a well-developed, well-nourished gentleman in no acute distress.   HEENT:  Normocephalic, atraumatic.   HEART:  Regular.  I do not appreciate murmur, rub or gallop.   RESPIRATORY:  Lungs are clear without wheezes, rales or rhonchi.     EXTREMITIES:  He has no peripheral edema.   SKIN:  Warm and dry.      LABORATORY STUDIES:  Labs checked 2 days ago show a creatinine of 1.26, BUN 12, potassium 4.7, sodium 141.      Echocardiogram shows mild LVH, borderline RV enlargement, mildly decreased RV systolic function, but a normal EF, no significant valvular disease.      ASSESSMENT AND PLAN:   1.  Dyspnea on exertion, ongoing, that is exertional, and a known vasculopath with history of successful CAB.  Amazingly, he is in a normal nuclear study and no wall motion on his echocardiogram.  Even with this, I am concerned that this may be a secondary progressive coronary artery disease.  Alternatively, it could simply be because he is still hypertensive with exercise and at baseline.  Given there is no ischemia on stress test or wall motion, I will continue to try to optimize his blood pressure status to see if  symptoms improve.  At this point, we will discontinue his Toprol-XL which is 100 mg a day and replace this with carvedilol 25 mg b.i.d.  He will remain on amlodipine 10 mg daily, losartan 100 mg daily, 60 of Imdur and 50 of spironolactone.  Depending on the results of these changes, I will defer to Dr. Felipe when he sees him in 2 weeks if he would like to send him for coronary angiogram.   2.  Hypertension, as above.   3.  Severe peripheral arterial disease with last carotid ultrasound showing 57% lesions which are stable with known compromised vertebrals.  This is followed by  **.   4.  Dyslipidemia, tolerating Praluent, completely intolerant to statins with myositis.   5.  Hypokalemia of unclear etiology.  He is not on any diuretics or medications that should have made him hypokalemic, especially so profoundly so.  He did not have vomiting or diarrhea.  This has been replaced and actually with excellent response with the addition of spironolactone.  He does have a referral for Nephrology.  He will see Dr. Person in January.   6.  Tremulousness, worsening for unclear reasons.  We will defer to his primary care doctor, Dr. West, and also can reach out to him for these issues.      Thank you for allowing me to participate in this delightful patient's care.  Followup is already arranged with Dr. Felipe.  We will see him back as determined at that time.         AGUSTÍN MESA PA-C             D: 2018   T: 2018   MT: CLARKE      Name:     NINFA CID   MRN:      [id number]        Account:      [id number]   :      1945           Service Date: 2018      Document: [id number]

## 2018-11-13 ENCOUNTER — TELEPHONE (OUTPATIENT)
Dept: CARDIOLOGY | Facility: CLINIC | Age: 73
End: 2018-11-13

## 2018-11-13 DIAGNOSIS — R06.09 DOE (DYSPNEA ON EXERTION): ICD-10-CM

## 2018-11-13 DIAGNOSIS — I10 BENIGN ESSENTIAL HYPERTENSION: Primary | ICD-10-CM

## 2018-11-13 RX ORDER — SPIRONOLACTONE 50 MG/1
50 TABLET, FILM COATED ORAL DAILY
Qty: 90 TABLET | Refills: 3 | Status: SHIPPED | OUTPATIENT
Start: 2018-11-13 | End: 2018-11-16

## 2018-11-13 RX ORDER — ISOSORBIDE MONONITRATE 60 MG/1
60 TABLET, EXTENDED RELEASE ORAL DAILY
Qty: 90 TABLET | Refills: 2 | Status: SHIPPED | OUTPATIENT
Start: 2018-11-13 | End: 2018-11-14

## 2018-11-13 NOTE — TELEPHONE ENCOUNTER
Last seen in clinic by ara Coffey. Clinic note clearly states, patient is taking 50 mg  Of spironolactone daily.   Refill will be sent to [facility] as requested.

## 2018-11-13 NOTE — TELEPHONE ENCOUNTER
Justin (spouse) called in asking for a refill of spironolactone be sent to [facility]. Medication list says spironolactone 25 mg daily. Patient reports taking 50 mg daily. Routing to CORE Team 1 to review, update and send refill authorization to [facility].

## 2018-11-14 DIAGNOSIS — R06.09 DOE (DYSPNEA ON EXERTION): ICD-10-CM

## 2018-11-14 RX ORDER — ISOSORBIDE MONONITRATE 60 MG/1
60 TABLET, EXTENDED RELEASE ORAL DAILY
Qty: 90 TABLET | Refills: 3 | Status: SHIPPED | OUTPATIENT
Start: 2018-11-14 | End: 2018-11-16

## 2018-11-16 ENCOUNTER — TELEPHONE (OUTPATIENT)
Dept: INTERNAL MEDICINE | Facility: CLINIC | Age: 73
End: 2018-11-16

## 2018-11-16 ENCOUNTER — OFFICE VISIT (OUTPATIENT)
Dept: INTERNAL MEDICINE | Facility: CLINIC | Age: 73
End: 2018-11-16
Payer: MEDICARE

## 2018-11-16 VITALS
RESPIRATION RATE: 18 BRPM | WEIGHT: 152 LBS | BODY MASS INDEX: 23.11 KG/M2 | DIASTOLIC BLOOD PRESSURE: 72 MMHG | OXYGEN SATURATION: 99 % | TEMPERATURE: 97.1 F | SYSTOLIC BLOOD PRESSURE: 130 MMHG | HEART RATE: 72 BPM

## 2018-11-16 DIAGNOSIS — I10 BENIGN ESSENTIAL HYPERTENSION: ICD-10-CM

## 2018-11-16 DIAGNOSIS — I73.9 PAD (PERIPHERAL ARTERY DISEASE) (H): ICD-10-CM

## 2018-11-16 DIAGNOSIS — E87.6 HYPOKALEMIA: ICD-10-CM

## 2018-11-16 DIAGNOSIS — E87.6 HYPOKALEMIA: Primary | ICD-10-CM

## 2018-11-16 DIAGNOSIS — R06.09 DOE (DYSPNEA ON EXERTION): ICD-10-CM

## 2018-11-16 DIAGNOSIS — I25.10 ASCVD (ARTERIOSCLEROTIC CARDIOVASCULAR DISEASE): ICD-10-CM

## 2018-11-16 DIAGNOSIS — R82.90 ABNORMAL FINDING ON URINALYSIS: ICD-10-CM

## 2018-11-16 DIAGNOSIS — R30.0 DYSURIA: Primary | ICD-10-CM

## 2018-11-16 LAB
ALBUMIN UR-MCNC: 30 MG/DL
ANION GAP SERPL CALCULATED.3IONS-SCNC: 10 MMOL/L (ref 3–14)
APPEARANCE UR: ABNORMAL
BACTERIA #/AREA URNS HPF: ABNORMAL /HPF
BILIRUB UR QL STRIP: NEGATIVE
BUN SERPL-MCNC: 15 MG/DL (ref 7–30)
CALCIUM SERPL-MCNC: 8.8 MG/DL (ref 8.5–10.1)
CHLORIDE SERPL-SCNC: 101 MMOL/L (ref 94–109)
CO2 SERPL-SCNC: 31 MMOL/L (ref 20–32)
COLOR UR AUTO: YELLOW
CREAT SERPL-MCNC: 1.37 MG/DL (ref 0.66–1.25)
GFR SERPL CREATININE-BSD FRML MDRD: 51 ML/MIN/1.7M2
GLUCOSE SERPL-MCNC: 154 MG/DL (ref 70–99)
GLUCOSE UR STRIP-MCNC: 50 MG/DL
HGB UR QL STRIP: NEGATIVE
KETONES UR STRIP-MCNC: NEGATIVE MG/DL
LEUKOCYTE ESTERASE UR QL STRIP: ABNORMAL
NITRATE UR QL: NEGATIVE
PH UR STRIP: 7 PH (ref 5–7)
POTASSIUM SERPL-SCNC: 3.6 MMOL/L (ref 3.4–5.3)
RBC #/AREA URNS AUTO: 6 /HPF (ref 0–2)
SODIUM SERPL-SCNC: 142 MMOL/L (ref 133–144)
SOURCE: ABNORMAL
SP GR UR STRIP: 1 (ref 1–1.03)
SQUAMOUS #/AREA URNS AUTO: <1 /HPF (ref 0–1)
UROBILINOGEN UR STRIP-MCNC: 0 MG/DL (ref 0–2)
WBC #/AREA URNS AUTO: >182 /HPF (ref 0–5)
WBC CLUMPS #/AREA URNS HPF: PRESENT /HPF

## 2018-11-16 PROCEDURE — 99214 OFFICE O/P EST MOD 30 MIN: CPT | Performed by: INTERNAL MEDICINE

## 2018-11-16 PROCEDURE — 81001 URINALYSIS AUTO W/SCOPE: CPT | Performed by: INTERNAL MEDICINE

## 2018-11-16 PROCEDURE — 36415 COLL VENOUS BLD VENIPUNCTURE: CPT | Performed by: INTERNAL MEDICINE

## 2018-11-16 PROCEDURE — 80048 BASIC METABOLIC PNL TOTAL CA: CPT | Performed by: INTERNAL MEDICINE

## 2018-11-16 PROCEDURE — 87086 URINE CULTURE/COLONY COUNT: CPT | Performed by: INTERNAL MEDICINE

## 2018-11-16 RX ORDER — SPIRONOLACTONE 50 MG/1
50 TABLET, FILM COATED ORAL DAILY
Qty: 30 TABLET | Refills: 3 | Status: SHIPPED | OUTPATIENT
Start: 2018-11-16 | End: 2019-03-02

## 2018-11-16 RX ORDER — POTASSIUM CHLORIDE 750 MG/1
20 TABLET, EXTENDED RELEASE ORAL DAILY
Qty: 180 TABLET | Refills: 3 | Status: SHIPPED | OUTPATIENT
Start: 2018-11-16 | End: 2018-12-07

## 2018-11-16 RX ORDER — CIPROFLOXACIN 500 MG/1
500 TABLET, FILM COATED ORAL 2 TIMES DAILY
Qty: 14 TABLET | Refills: 0 | Status: SHIPPED | OUTPATIENT
Start: 2018-11-16 | End: 2018-12-07

## 2018-11-16 RX ORDER — ISOSORBIDE MONONITRATE 60 MG/1
60 TABLET, EXTENDED RELEASE ORAL DAILY
Qty: 30 TABLET | Refills: 3 | Status: SHIPPED | OUTPATIENT
Start: 2018-11-16 | End: 2019-09-16

## 2018-11-16 ASSESSMENT — PAIN SCALES - GENERAL: PAINLEVEL: NO PAIN (0)

## 2018-11-16 NOTE — TELEPHONE ENCOUNTER
Advised of the results and recommendations. RX pending for Dr. West to sign and send. Lab orders placed.

## 2018-11-16 NOTE — PROGRESS NOTES
SUBJECTIVE:   Michele Vance is a 73 year old male who presents to clinic today for the following health issues:    Chief Complaint   Patient presents with     Establish Care     His provider went to [place].      Had some low potassium at 2.2.  Supplemented and gave spironolactone from cardiology.     Bypass surgery in 2014.      No diarrhea, IBS and constipation, metamucil and miralax.   Past Medical History:   Diagnosis Date     Carotid stenosis     right endartectomy     Chronic kidney disease     stage 3     Coronary artery disease 3-2014    CABG x6     Crohn's disease (H)      CVA (cerebral infarction)     balance problems, mild     Elevated CK      Esophageal reflux      Hypercholesteremia      Hypertension      Nonsenile cataract      Other and unspecified hyperlipidemia      PAD (peripheral artery disease) (H)      Rhabdomyolysis 2010     Unspecified essential hypertension      Current Outpatient Prescriptions   Medication     alirocumab (PRALUENT) 150 MG/ML injectable pen     amitriptyline (ELAVIL) 25 MG tablet     amLODIPine (NORVASC) 10 MG tablet     ASPIRIN PO     carvedilol (COREG) 25 MG tablet     cilostazol (PLETAL) 100 MG tablet     gabapentin (NEURONTIN) 300 MG capsule     isosorbide mononitrate (IMDUR) 60 MG 24 hr tablet     losartan (COZAAR) 100 MG tablet     metoclopramide (REGLAN) 10 MG tablet     omeprazole (PRILOSEC) 40 MG capsule     polyethylene glycol (MIRALAX/GLYCOLAX) packet     potassium chloride SA (K-DUR/KLOR-CON M) 10 MEQ CR tablet     psyllium (METAMUCIL) 58.6 % POWD     spironolactone (ALDACTONE) 50 MG tablet     STATIN NOT PRESCRIBED, INTENTIONAL,     TOPROL  MG 24 hr tablet     zolpidem (AMBIEN) 5 MG tablet     [DISCONTINUED] isosorbide mononitrate (IMDUR) 60 MG 24 hr tablet     [DISCONTINUED] spironolactone (ALDACTONE) 50 MG tablet     No current facility-administered medications for this visit.      Social History   Substance Use Topics     Smoking status: Former Smoker      Packs/day: 2.50     Years: 20.00     Types: Cigarettes     Quit date: 1/1/2000     Smokeless tobacco: Never Used     Alcohol use No     Review of Systems  Constitutional-No fevers, chills, or weight changes..  Cardiac-No chest pain or palpitations.  Respiratory-No cough, sob, or hemoptysis.  Musculoskeletal-No muscles aches or joint pains.    Physical Exam  /72  Pulse 72  Temp 97.1  F (36.2  C) (Temporal)  Resp 18  Wt 152 lb (68.9 kg)  SpO2 99%  BMI 23.11 kg/m2  General Appearance-healthy, alert, no distress  Cardiac-regular rate and rhythm  with normal S1, S2 ; no murmur, rub or gallops  Lungs-clear to auscultation  Extremities-no peripheral edema, peripheral pulses normal    ASSESSMENT:  This is a 73-year-old gentleman who is normally in good health he does have significant vascular disease with a 6 vessel CABG in 2014, also has peripheral arterial disease.  There was concern in the spring and this fall he had hypokalemia down to 2.4 and 2.2 without any diuretics.  I do wonder if his injectable cholesterol medication as the cause of his low potassium there was concern about an adrenal adenoma he had a CT scan without any adrenal masses we will check his aldosterone level.  We will recheck his urinalysis as he did have some UTI since findings on his last UA.  We will check his magnesium as this can drive it down.  He denies vomiting he denies diarrhea he denies abnormal diet.  He has been placed on spironolactone his blood pressure is doing better at 130/72 today.  Off the potassium supplements his potassium did go down to 3.6 today.  I would get him back on 20 mEq of potassium daily.  His creatinine has gone up with spironolactone but I think this is okay I will see him back in 1 month.  If he can needs to continue to see nephrology he does see his cardiologist next week.            Electronically signed by Jonas West MD

## 2018-11-16 NOTE — TELEPHONE ENCOUNTER
----- Message from Jonas West MD sent at 11/16/2018  1:02 PM CST -----  Urine shows a UTI, should do cipro 500mg bid for 7 days then repeat a ua when done.

## 2018-11-16 NOTE — MR AVS SNAPSHOT
After Visit Summary   11/16/2018    Michele Vance    MRN: [id number]           Patient Information     Date Of Birth          1945        Visit Information        Provider Department      11/16/2018 10:30 AM Jonas West MD [facility]        Today's Diagnoses     ELAM (dyspnea on exertion)        Benign essential hypertension           Follow-ups after your visit        Your next 10 appointments already scheduled     Nov 16, 2018 10:30 AM CST   Office Visit with Jonas West MD   [facility] ([address] [address] [phone]           Bring a current list of meds and any records pertaining to this visit. For Physicals, please bring immunization records and any forms needing to be filled out. Please arrive 10 minutes early to complete paperwork.            Nov 17, 2018  8:00 AM CST   LAB with [facility] [address] [id number] [phone]           Please do not eat 10-12 hours before your appointment if you are coming in fasting for labs on lipids, cholesterol, or glucose (sugar). This does not apply to pregnant women. Water, hot tea and black coffee (with nothing added) are okay. Do not drink other fluids, diet soda or chew gum.            Nov 20, 2018  1:30 PM CST   Return Visit with Stevie Felipe MD   [facility] ([address] [address] [phone]            Juliano 15, 2019  8:00 AM CST   New Visit with Nathaniel Spangler MD   [facility]    [id number] [place] [phone]              Who to contact     If you have questions or need follow up information about today's clinic visit or your schedule please contact [facility]  YURIDIA directly at [phone].  Normal or non-critical lab and imaging results will be communicated to you by MyChart, letter or phone within 4 business days after the clinic has received the results. If you do not hear from us within 7 days, please contact the clinic through DÃ³ndehart or phone. If you have a critical or abnormal lab result, we will notify you by phone as soon as possible.  Submit refill requests through Mandic or call your pharmacy and they will forward the refill request to us. Please allow 3 business days for your refill to be completed.          Additional Information About Your Visit        D[employer]ndeharStudy[employer] Information     Mandic gives you secure access to your electronic health record. If you see a primary care provider, you can also send messages to your care team and make appointments. If you have questions, please call your primary care clinic.  If you do not have a primary care provider, please call [phone] and they will assist you.        Care EveryWhere ID     This is your Care EveryWhere ID. This could be used by other organizations to access your [place] medical records  [id number]        Your Vitals Were     Pulse Temperature Respirations Pulse Oximetry BMI (Body Mass Index)       72 97.1  F (36.2  C) (Temporal) 18 99% 23.11 kg/m2        Blood Pressure from Last 3 Encounters:   11/16/18 130/72   11/08/18 164/70   10/23/18 170/72    Weight from Last 3 Encounters:   11/16/18 152 lb (68.9 kg)   11/08/18 153 lb 8 oz (69.6 kg)   10/23/18 159 lb 11.2 oz (72.4 kg)              Today, you had the following     No orders found for display       Primary Care Provider Office Phone # Fax #    Juarez Stevie Nuno [phone] [address] [place] [address]        Equal Access to Services     ANSHU AGUSTIN : Khang Dill, warimmada luqadaha, qaybta kaalmada maliha, arnoldo scherer. So [facility] [phone].    ATENCIÓN:  Si habla lino, tiene a fischer disposición servicios gratuitos de asistencia lingüística. Ladonna louie [phone].    We comply with applicable federal civil rights laws and [place] laws. We do not discriminate on the basis of race, color, national origin, age, disability, sex, sexual orientation, or gender identity.            Thank you!     Thank you for choosing [facility]  for your care. Our goal is always to provide you with excellent care. Hearing back from our patients is one way we can continue to improve our services. Please take a few minutes to complete the written survey that you may receive in the mail after your visit with us. Thank you!             Your Updated Medication List - Protect others around you: Learn how to safely use, store and throw away your medicines at www.disposemymeds.org.          This list is accurate as of 11/16/18 10:22 AM.  Always use your most recent med list.                   Brand Name Dispense Instructions for use Diagnosis    alirocumab 150 MG/ML injectable pen    PRALUENT    6 mL    Inject 1 mL (150 mg) Subcutaneous every 14 days    Hyperlipidemia LDL goal <130       amitriptyline 25 MG tablet    ELAVIL    180 tablet    TAKE 1 TABLET TWICE A DAY    Migraine without aura and without status migrainosus, not intractable       amLODIPine 10 MG tablet    NORVASC    90 tablet    TAKE 1 TABLET EVERY EVENING    Essential hypertension, benign       ASPIRIN PO      Take 325 mg by mouth daily        carvedilol 25 MG tablet    COREG    60 tablet    Take 1 tablet (25 mg) by mouth 2 times daily (with meals)    Benign essential HTN       cilostazol 100 MG tablet    PLETAL    180 tablet    TAKE 1 TABLET TWICE A DAY    Peripheral vascular disease (H)       gabapentin 300 MG capsule    NEURONTIN    210 capsule    TAKE 1 CAPSULE THREE TIMES A DAY    Arthritis       isosorbide mononitrate 60 MG 24 hr tablet    IMDUR    90 tablet    Take 1 tablet (60 mg) by mouth daily    ELAM  (dyspnea on exertion)       losartan 100 MG tablet    COZAAR    90 tablet    Take 1 tablet (100 mg) by mouth daily    Essential hypertension, benign       metoclopramide 10 MG tablet    REGLAN    120 tablet    TAKE 1 TABLET FOUR TIMES A DAY    Slow transit constipation       omeprazole 40 MG capsule    priLOSEC    90 capsule    TAKE 1 CAPSULE DAILY    Gastroesophageal reflux disease without esophagitis       polyethylene glycol Packet    MIRALAX/GLYCOLAX     Take 1 packet by mouth daily        psyllium 58.6 % Powd    METAMUCIL     Take by mouth daily        spironolactone 50 MG tablet    ALDACTONE    90 tablet    Take 1 tablet (50 mg) by mouth daily    Benign essential hypertension       STATIN NOT PRESCRIBED (INTENTIONAL)     1 each    Pt has known rhabdomyolysis in the past, somewhat associated with statins.  Also wasn't able to tolerate Zetia.    Hyperlipidemia LDL goal <130       TOPROL  MG 24 hr tablet   Generic drug:  metoprolol succinate     93 tablet    TAKE 1 TABLET EVERY MORNING    Essential hypertension, benign       zolpidem 5 MG tablet    AMBIEN    90 tablet    Take 1 tablet (5 mg) by mouth nightly as needed for sleep    Primary insomnia

## 2018-11-17 DIAGNOSIS — E78.00 HYPERCHOLESTEREMIA: ICD-10-CM

## 2018-11-17 DIAGNOSIS — I10 ESSENTIAL HYPERTENSION, BENIGN: ICD-10-CM

## 2018-11-17 DIAGNOSIS — I10 BENIGN ESSENTIAL HYPERTENSION: ICD-10-CM

## 2018-11-17 DIAGNOSIS — E78.5 HYPERLIPIDEMIA LDL GOAL <130: ICD-10-CM

## 2018-11-17 DIAGNOSIS — E87.6 HYPOKALEMIA: ICD-10-CM

## 2018-11-17 DIAGNOSIS — I25.10 CORONARY ARTERY DISEASE INVOLVING NATIVE CORONARY ARTERY OF NATIVE HEART WITHOUT ANGINA PECTORIS: ICD-10-CM

## 2018-11-17 DIAGNOSIS — I70.211 ATHEROSCLEROSIS OF NATIVE ARTERY OF RIGHT LOWER EXTREMITY WITH INTERMITTENT CLAUDICATION (H): ICD-10-CM

## 2018-11-17 LAB
CHOLEST SERPL-MCNC: 149 MG/DL
HDLC SERPL-MCNC: 46 MG/DL
LDLC SERPL CALC-MCNC: 82 MG/DL
MAGNESIUM SERPL-MCNC: 1.9 MG/DL (ref 1.6–2.3)
NONHDLC SERPL-MCNC: 103 MG/DL
TRIGL SERPL-MCNC: 104 MG/DL

## 2018-11-17 PROCEDURE — 80061 LIPID PANEL: CPT | Performed by: INTERNAL MEDICINE

## 2018-11-17 PROCEDURE — 36415 COLL VENOUS BLD VENIPUNCTURE: CPT | Performed by: INTERNAL MEDICINE

## 2018-11-17 PROCEDURE — 83735 ASSAY OF MAGNESIUM: CPT | Performed by: INTERNAL MEDICINE

## 2018-11-18 LAB
ALDOST SERPL-MCNC: <3 NG/DL
BACTERIA SPEC CULT: NORMAL
SPECIMEN SOURCE: NORMAL

## 2018-11-20 ENCOUNTER — OFFICE VISIT (OUTPATIENT)
Dept: CARDIOLOGY | Facility: CLINIC | Age: 73
End: 2018-11-20
Attending: INTERNAL MEDICINE
Payer: MEDICARE

## 2018-11-20 VITALS
DIASTOLIC BLOOD PRESSURE: 56 MMHG | SYSTOLIC BLOOD PRESSURE: 116 MMHG | OXYGEN SATURATION: 98 % | HEIGHT: 68 IN | HEART RATE: 58 BPM | WEIGHT: 158.5 LBS | BODY MASS INDEX: 24.02 KG/M2

## 2018-11-20 DIAGNOSIS — I70.211 ATHEROSCLEROSIS OF NATIVE ARTERY OF RIGHT LOWER EXTREMITY WITH INTERMITTENT CLAUDICATION (H): ICD-10-CM

## 2018-11-20 DIAGNOSIS — I25.10 CORONARY ARTERY DISEASE INVOLVING NATIVE CORONARY ARTERY OF NATIVE HEART WITHOUT ANGINA PECTORIS: ICD-10-CM

## 2018-11-20 DIAGNOSIS — E78.00 HYPERCHOLESTEREMIA: ICD-10-CM

## 2018-11-20 DIAGNOSIS — E78.5 HYPERLIPIDEMIA LDL GOAL <130: ICD-10-CM

## 2018-11-20 PROCEDURE — 99214 OFFICE O/P EST MOD 30 MIN: CPT | Performed by: INTERNAL MEDICINE

## 2018-11-20 ASSESSMENT — PAIN SCALES - GENERAL: PAINLEVEL: NO PAIN (0)

## 2018-11-20 NOTE — LETTER
11/20/2018    Jonas West MD  [address] [address]    RE: Michele Vance       Dear Colleague,    I had the pleasure of seeing Michele Vance in the [facility].    HISTORY OF PRESENT ILLNESS:    BP control appears much better with k supplementation and dyazide.     Orders this Visit:  No orders of the defined types were placed in this encounter.    No orders of the defined types were placed in this encounter.    There are no discontinued medications.    Encounter Diagnoses   Name Primary?     Hyperlipidemia LDL goal <130      Hypercholesteremia      Atherosclerosis of native artery of right lower extremity with intermittent claudication (H)      Coronary artery disease involving native coronary artery of native heart without angina pectoris        CURRENT MEDICATIONS:  Current Outpatient Prescriptions   Medication Sig Dispense Refill     alirocumab (PRALUENT) 150 MG/ML injectable pen Inject 1 mL (150 mg) Subcutaneous every 14 days 6 mL 2     amitriptyline (ELAVIL) 25 MG tablet TAKE 1 TABLET TWICE A  tablet 1     amLODIPine (NORVASC) 10 MG tablet TAKE 1 TABLET EVERY EVENING 90 tablet 3     ASPIRIN PO Take 325 mg by mouth daily       carvedilol (COREG) 25 MG tablet Take 1 tablet (25 mg) by mouth 2 times daily (with meals) 60 tablet 11     cilostazol (PLETAL) 100 MG tablet TAKE 1 TABLET TWICE A  tablet 3     ciprofloxacin (CIPRO) 500 MG tablet Take 1 tablet (500 mg) by mouth 2 times daily 14 tablet 0     gabapentin (NEURONTIN) 300 MG capsule TAKE 1 CAPSULE THREE TIMES A  capsule 4     isosorbide mononitrate (IMDUR) 60 MG 24 hr tablet Take 1 tablet (60 mg) by mouth daily 30 tablet 3     losartan (COZAAR) 100 MG tablet Take 1 tablet (100 mg) by mouth daily 90 tablet 3     metoclopramide (REGLAN) 10 MG tablet TAKE 1 TABLET FOUR TIMES A  tablet 2     omeprazole (PRILOSEC) 40 MG capsule TAKE 1 CAPSULE DAILY 90 capsule 1     polyethylene glycol  "(MIRALAX/GLYCOLAX) packet Take 1 packet by mouth daily        potassium chloride SA (K-DUR/KLOR-CON M) 10 MEQ CR tablet Take 2 tablets (20 mEq) by mouth daily 180 tablet 3     psyllium (METAMUCIL) 58.6 % POWD Take by mouth daily       spironolactone (ALDACTONE) 50 MG tablet Take 1 tablet (50 mg) by mouth daily 30 tablet 3     TOPROL  MG 24 hr tablet TAKE 1 TABLET EVERY MORNING 93 tablet 1     zolpidem (AMBIEN) 5 MG tablet Take 1 tablet (5 mg) by mouth nightly as needed for sleep 90 tablet 1     STATIN NOT PRESCRIBED, INTENTIONAL, Pt has known rhabdomyolysis in the past, somewhat associated with statins.  Also wasn't able to tolerate Zetia. (Patient not taking: Reported on 11/20/2018) 1 each 0       ALLERGIES     Allergies   Allergen Reactions     Hmg-Coa-R Inhibitors Other (See Comments)     Rhabdo  Same as \"statins\"     Gemfibrozil      Resting thigh-calf pain     Sulfa Drugs      Reacted as a child     Zetia [Ezetimibe]      Muscle cramping       PAST MEDICAL, SURGICAL, FAMILY, SOCIAL HISTORY:  History was reviewed and updated as needed, see medical record.    Review of Systems:  A 12-point review of systems was completed, see medical record for detailed review of systems information.    Physical Exam:  Vitals: /56 (BP Location: Left arm, Patient Position: Sitting, Cuff Size: Adult Large)  Pulse 58  Ht 1.727 m (5' 8\")  Wt 71.9 kg (158 lb 8 oz)  SpO2 98%  BMI 24.1 kg/m2    Constitutional:  cooperative, alert and oriented, well developed, well nourished, in no acute distress        Skin:  warm and dry to the touch, no apparent skin lesions or masses noted        Head:  normocephalic, no masses or lesions        Eyes:  pupils equal and round, conjunctivae and lids unremarkable, sclera white, no xanthalasma, EOMS intact, no nystagmus        ENT:  no pallor or cyanosis, dentition good        Neck:           Chest:  normal breath sounds, clear to auscultation, normal A-P diameter, normal symmetry, " normal respiratory excursion, no use of accessory muscles        Cardiac: regular rhythm, normal S1/S2, no S3 or S4, apical impulse not displaced, no murmurs, gallops or rubs                  Abdomen:  abdomen soft, non-tender, BS normoactive, no mass, no HSM, no bruits        Vascular:     1+ 1+                         right femoral bruit (+);right carotid bruit (+)      Extremities and Back:  no deformities, clubbing, cyanosis, erythema observed        Neurological:  no gross motor deficits        ASSESSMENT:  Great response to Praluent. BP appears better.       RECOMMENDATIONS: continue present meds  Follow-up in 6 months. ? Endocrine evaluation      Recent Lab Results:  LIPID RESULTS:  Lab Results   Component Value Date    CHOL 149 11/17/2018    HDL 46 11/17/2018    LDL 82 11/17/2018    TRIG 104 11/17/2018    CHOLHDLRATIO 5.2 (H) 07/23/2015       LIVER ENZYME RESULTS:  Lab Results   Component Value Date    AST 18 03/26/2015    ALT 18 08/08/2016       CBC RESULTS:  Lab Results   Component Value Date    WBC 6.8 10/23/2018    RBC 4.14 (L) 10/23/2018    HGB 13.3 10/23/2018    HCT 39.8 (L) 10/23/2018    MCV 96 10/23/2018    MCH 32.1 10/23/2018    MCHC 33.4 10/23/2018    RDW 13.7 10/23/2018     10/23/2018       BMP RESULTS:  Lab Results   Component Value Date     11/16/2018    POTASSIUM 3.6 11/16/2018    CHLORIDE 101 11/16/2018    CO2 31 11/16/2018    ANIONGAP 10 11/16/2018     (H) 11/16/2018    BUN 15 11/16/2018    CR 1.37 (H) 11/16/2018    GFRESTIMATED 51 (L) 11/16/2018    GFRESTBLACK 62 11/16/2018    RAHEEM 8.8 11/16/2018        A1C RESULTS:  Lab Results   Component Value Date    A1C 5.6 03/07/2014       INR RESULTS:  Lab Results   Component Value Date    INR 1.00 12/05/2014    INR 1.32 (H) 03/06/2014       We greatly appreciate the opportunity to be involved in the care of your patient, Michele Vance.    Sincerely,    Stevie Felipe MD      [facility]

## 2018-11-20 NOTE — PROGRESS NOTES
HISTORY OF PRESENT ILLNESS:    BP control appears much better with k supplementation and dyazide.     Orders this Visit:  No orders of the defined types were placed in this encounter.    No orders of the defined types were placed in this encounter.    There are no discontinued medications.    Encounter Diagnoses   Name Primary?     Hyperlipidemia LDL goal <130      Hypercholesteremia      Atherosclerosis of native artery of right lower extremity with intermittent claudication (H)      Coronary artery disease involving native coronary artery of native heart without angina pectoris        CURRENT MEDICATIONS:  Current Outpatient Prescriptions   Medication Sig Dispense Refill     alirocumab (PRALUENT) 150 MG/ML injectable pen Inject 1 mL (150 mg) Subcutaneous every 14 days 6 mL 2     amitriptyline (ELAVIL) 25 MG tablet TAKE 1 TABLET TWICE A  tablet 1     amLODIPine (NORVASC) 10 MG tablet TAKE 1 TABLET EVERY EVENING 90 tablet 3     ASPIRIN PO Take 325 mg by mouth daily       carvedilol (COREG) 25 MG tablet Take 1 tablet (25 mg) by mouth 2 times daily (with meals) 60 tablet 11     cilostazol (PLETAL) 100 MG tablet TAKE 1 TABLET TWICE A  tablet 3     ciprofloxacin (CIPRO) 500 MG tablet Take 1 tablet (500 mg) by mouth 2 times daily 14 tablet 0     gabapentin (NEURONTIN) 300 MG capsule TAKE 1 CAPSULE THREE TIMES A  capsule 4     isosorbide mononitrate (IMDUR) 60 MG 24 hr tablet Take 1 tablet (60 mg) by mouth daily 30 tablet 3     losartan (COZAAR) 100 MG tablet Take 1 tablet (100 mg) by mouth daily 90 tablet 3     metoclopramide (REGLAN) 10 MG tablet TAKE 1 TABLET FOUR TIMES A  tablet 2     omeprazole (PRILOSEC) 40 MG capsule TAKE 1 CAPSULE DAILY 90 capsule 1     polyethylene glycol (MIRALAX/GLYCOLAX) packet Take 1 packet by mouth daily        potassium chloride SA (K-DUR/KLOR-CON M) 10 MEQ CR tablet Take 2 tablets (20 mEq) by mouth daily 180 tablet 3     psyllium (METAMUCIL) 58.6 % POWD Take by  "mouth daily       spironolactone (ALDACTONE) 50 MG tablet Take 1 tablet (50 mg) by mouth daily 30 tablet 3     TOPROL  MG 24 hr tablet TAKE 1 TABLET EVERY MORNING 93 tablet 1     zolpidem (AMBIEN) 5 MG tablet Take 1 tablet (5 mg) by mouth nightly as needed for sleep 90 tablet 1     STATIN NOT PRESCRIBED, INTENTIONAL, Pt has known rhabdomyolysis in the past, somewhat associated with statins.  Also wasn't able to tolerate Zetia. (Patient not taking: Reported on 11/20/2018) 1 each 0       ALLERGIES     Allergies   Allergen Reactions     Hmg-Coa-R Inhibitors Other (See Comments)     Rhabdo  Same as \"statins\"     Gemfibrozil      Resting thigh-calf pain     Sulfa Drugs      Reacted as a child     Zetia [Ezetimibe]      Muscle cramping       PAST MEDICAL, SURGICAL, FAMILY, SOCIAL HISTORY:  History was reviewed and updated as needed, see medical record.    Review of Systems:  A 12-point review of systems was completed, see medical record for detailed review of systems information.    Physical Exam:  Vitals: /56 (BP Location: Left arm, Patient Position: Sitting, Cuff Size: Adult Large)  Pulse 58  Ht 1.727 m (5' 8\")  Wt 71.9 kg (158 lb 8 oz)  SpO2 98%  BMI 24.1 kg/m2    Constitutional:  cooperative, alert and oriented, well developed, well nourished, in no acute distress        Skin:  warm and dry to the touch, no apparent skin lesions or masses noted        Head:  normocephalic, no masses or lesions        Eyes:  pupils equal and round, conjunctivae and lids unremarkable, sclera white, no xanthalasma, EOMS intact, no nystagmus        ENT:  no pallor or cyanosis, dentition good        Neck:           Chest:  normal breath sounds, clear to auscultation, normal A-P diameter, normal symmetry, normal respiratory excursion, no use of accessory muscles        Cardiac: regular rhythm, normal S1/S2, no S3 or S4, apical impulse not displaced, no murmurs, gallops or rubs                  Abdomen:  abdomen soft, " non-tender, BS normoactive, no mass, no HSM, no bruits        Vascular:     1+ 1+                         right femoral bruit (+);right carotid bruit (+)      Extremities and Back:  no deformities, clubbing, cyanosis, erythema observed        Neurological:  no gross motor deficits        ASSESSMENT:  Great response to Praluent. BP appears better.       RECOMMENDATIONS: continue present meds  Follow-up in 6 months. ? Endocrine evaluation      Recent Lab Results:  LIPID RESULTS:  Lab Results   Component Value Date    CHOL 149 11/17/2018    HDL 46 11/17/2018    LDL 82 11/17/2018    TRIG 104 11/17/2018    CHOLHDLRATIO 5.2 (H) 07/23/2015       LIVER ENZYME RESULTS:  Lab Results   Component Value Date    AST 18 03/26/2015    ALT 18 08/08/2016       CBC RESULTS:  Lab Results   Component Value Date    WBC 6.8 10/23/2018    RBC 4.14 (L) 10/23/2018    HGB 13.3 10/23/2018    HCT 39.8 (L) 10/23/2018    MCV 96 10/23/2018    MCH 32.1 10/23/2018    MCHC 33.4 10/23/2018    RDW 13.7 10/23/2018     10/23/2018       BMP RESULTS:  Lab Results   Component Value Date     11/16/2018    POTASSIUM 3.6 11/16/2018    CHLORIDE 101 11/16/2018    CO2 31 11/16/2018    ANIONGAP 10 11/16/2018     (H) 11/16/2018    BUN 15 11/16/2018    CR 1.37 (H) 11/16/2018    GFRESTIMATED 51 (L) 11/16/2018    GFRESTBLACK 62 11/16/2018    RAHEEM 8.8 11/16/2018        A1C RESULTS:  Lab Results   Component Value Date    A1C 5.6 03/07/2014       INR RESULTS:  Lab Results   Component Value Date    INR 1.00 12/05/2014    INR 1.32 (H) 03/06/2014       We greatly appreciate the opportunity to be involved in the care of your patient, Michele Vance.    Sincerely,  Stevie Felipe MD      CC  Setvie Felipe MD  [address]  [place] [address]

## 2018-11-20 NOTE — LETTER
11/20/2018    Jonas West MD  [address] [facility] [address]    RE: Michele Vance       Dear Colleague,    I had the pleasure of seeing Michele Vance in the [facility].    HISTORY OF PRESENT ILLNESS:    BP control appears much better with k supplementation and dyazide.     Orders this Visit:  No orders of the defined types were placed in this encounter.    No orders of the defined types were placed in this encounter.    There are no discontinued medications.    Encounter Diagnoses   Name Primary?     Hyperlipidemia LDL goal <130      Hypercholesteremia      Atherosclerosis of native artery of right lower extremity with intermittent claudication (H)      Coronary artery disease involving native coronary artery of native heart without angina pectoris        CURRENT MEDICATIONS:  Current Outpatient Prescriptions   Medication Sig Dispense Refill     alirocumab (PRALUENT) 150 MG/ML injectable pen Inject 1 mL (150 mg) Subcutaneous every 14 days 6 mL 2     amitriptyline (ELAVIL) 25 MG tablet TAKE 1 TABLET TWICE A  tablet 1     amLODIPine (NORVASC) 10 MG tablet TAKE 1 TABLET EVERY EVENING 90 tablet 3     ASPIRIN PO Take 325 mg by mouth daily       carvedilol (COREG) 25 MG tablet Take 1 tablet (25 mg) by mouth 2 times daily (with meals) 60 tablet 11     cilostazol (PLETAL) 100 MG tablet TAKE 1 TABLET TWICE A  tablet 3     ciprofloxacin (CIPRO) 500 MG tablet Take 1 tablet (500 mg) by mouth 2 times daily 14 tablet 0     gabapentin (NEURONTIN) 300 MG capsule TAKE 1 CAPSULE THREE TIMES A  capsule 4     isosorbide mononitrate (IMDUR) 60 MG 24 hr tablet Take 1 tablet (60 mg) by mouth daily 30 tablet 3     losartan (COZAAR) 100 MG tablet Take 1 tablet (100 mg) by mouth daily 90 tablet 3     metoclopramide (REGLAN) 10 MG tablet TAKE 1 TABLET FOUR TIMES A  tablet 2     omeprazole (PRILOSEC) 40 MG capsule TAKE 1 CAPSULE DAILY 90 capsule 1     polyethylene glycol  "(MIRALAX/GLYCOLAX) packet Take 1 packet by mouth daily        potassium chloride SA (K-DUR/KLOR-CON M) 10 MEQ CR tablet Take 2 tablets (20 mEq) by mouth daily 180 tablet 3     psyllium (METAMUCIL) 58.6 % POWD Take by mouth daily       spironolactone (ALDACTONE) 50 MG tablet Take 1 tablet (50 mg) by mouth daily 30 tablet 3     TOPROL  MG 24 hr tablet TAKE 1 TABLET EVERY MORNING 93 tablet 1     zolpidem (AMBIEN) 5 MG tablet Take 1 tablet (5 mg) by mouth nightly as needed for sleep 90 tablet 1     STATIN NOT PRESCRIBED, INTENTIONAL, Pt has known rhabdomyolysis in the past, somewhat associated with statins.  Also wasn't able to tolerate Zetia. (Patient not taking: Reported on 11/20/2018) 1 each 0       ALLERGIES     Allergies   Allergen Reactions     Hmg-Coa-R Inhibitors Other (See Comments)     Rhabdo  Same as \"statins\"     Gemfibrozil      Resting thigh-calf pain     Sulfa Drugs      Reacted as a child     Zetia [Ezetimibe]      Muscle cramping       PAST MEDICAL, SURGICAL, FAMILY, SOCIAL HISTORY:  History was reviewed and updated as needed, see medical record.    Review of Systems:  A 12-point review of systems was completed, see medical record for detailed review of systems information.    Physical Exam:  Vitals: /56 (BP Location: Left arm, Patient Position: Sitting, Cuff Size: Adult Large)  Pulse 58  Ht 1.727 m (5' 8\")  Wt 71.9 kg (158 lb 8 oz)  SpO2 98%  BMI 24.1 kg/m2    Constitutional:  cooperative, alert and oriented, well developed, well nourished, in no acute distress        Skin:  warm and dry to the touch, no apparent skin lesions or masses noted        Head:  normocephalic, no masses or lesions        Eyes:  pupils equal and round, conjunctivae and lids unremarkable, sclera white, no xanthalasma, EOMS intact, no nystagmus        ENT:  no pallor or cyanosis, dentition good        Neck:           Chest:  normal breath sounds, clear to auscultation, normal A-P diameter, normal symmetry, " normal respiratory excursion, no use of accessory muscles        Cardiac: regular rhythm, normal S1/S2, no S3 or S4, apical impulse not displaced, no murmurs, gallops or rubs                  Abdomen:  abdomen soft, non-tender, BS normoactive, no mass, no HSM, no bruits        Vascular:     1+ 1+                         right femoral bruit (+);right carotid bruit (+)      Extremities and Back:  no deformities, clubbing, cyanosis, erythema observed        Neurological:  no gross motor deficits        ASSESSMENT:  Great response to Praluent. BP appears better.       RECOMMENDATIONS: continue present meds  Follow-up in 6 months. ? Endocrine evaluation      Recent Lab Results:  LIPID RESULTS:  Lab Results   Component Value Date    CHOL 149 11/17/2018    HDL 46 11/17/2018    LDL 82 11/17/2018    TRIG 104 11/17/2018    CHOLHDLRATIO 5.2 (H) 07/23/2015       LIVER ENZYME RESULTS:  Lab Results   Component Value Date    AST 18 03/26/2015    ALT 18 08/08/2016       CBC RESULTS:  Lab Results   Component Value Date    WBC 6.8 10/23/2018    RBC 4.14 (L) 10/23/2018    HGB 13.3 10/23/2018    HCT 39.8 (L) 10/23/2018    MCV 96 10/23/2018    MCH 32.1 10/23/2018    MCHC 33.4 10/23/2018    RDW 13.7 10/23/2018     10/23/2018       BMP RESULTS:  Lab Results   Component Value Date     11/16/2018    POTASSIUM 3.6 11/16/2018    CHLORIDE 101 11/16/2018    CO2 31 11/16/2018    ANIONGAP 10 11/16/2018     (H) 11/16/2018    BUN 15 11/16/2018    CR 1.37 (H) 11/16/2018    GFRESTIMATED 51 (L) 11/16/2018    GFRESTBLACK 62 11/16/2018    RAHEEM 8.8 11/16/2018        A1C RESULTS:  Lab Results   Component Value Date    A1C 5.6 03/07/2014       INR RESULTS:  Lab Results   Component Value Date    INR 1.00 12/05/2014    INR 1.32 (H) 03/06/2014       We greatly appreciate the opportunity to be involved in the care of your patient, Michele Vance.    Sincerely,  Stevie Felipe MD        Stevie Felipe MD  [address]  [place] [address]                                                                       Thank you for allowing me to participate in the care of your patient.      Sincerely,     Stevie Felipe MD     [facility]    cc:   Stevie Felipe MD  [address]  [place] [address]

## 2018-11-20 NOTE — MR AVS SNAPSHOT
After Visit Summary   11/20/2018    Michele Vance    MRN: [id number]           Patient Information     Date Of Birth          1945        Visit Information        Provider Department      11/20/2018 1:30 PM Stevie Felipe MD [facility]        Today's Diagnoses     Hyperlipidemia LDL goal <130        Hypercholesteremia        Atherosclerosis of native artery of right lower extremity with intermittent claudication (H)        Coronary artery disease involving native coronary artery of native heart without angina pectoris           Follow-ups after your visit        Additional Services     Follow-Up with Cardiac Advanced Practice Provider                 Your next 10 appointments already scheduled     Dec 07, 2018 10:00 AM CST   Office Visit with Jonas West MD   [facility] ([facility])    [address] [facility] [address] [phone]           Bring a current list of meds and any records pertaining to this visit. For Physicals, please bring immunization records and any forms needing to be filled out. Please arrive 10 minutes early to complete paperwork.            Juliano 15, 2019  8:00 AM CST   New Visit with Nathaniel Spangler MD   [facility] ([facility])    [address] [place] [address] [phone]              Future tests that were ordered for you today     Open Future Orders        Priority Expected Expires Ordered    Follow-Up with Cardiac Advanced Practice Provider Routine 5/19/2019 11/20/2019 11/20/2018            Who to contact     If you have questions or need follow up information about today's clinic visit or your schedule please contact [facility] directly at [phone].  Normal or non-critical lab and imaging results will be communicated to you by MyChart, letter or phone within 4 business  "days after the clinic has received the results. If you do not hear from us within 7 days, please contact the clinic through [employer] or phone. If you have a critical or abnormal lab result, we will notify you by phone as soon as possible.  Submit refill requests through [employer] or call your pharmacy and they will forward the refill request to us. Please allow 3 business days for your refill to be completed.          Additional Information About Your Visit        [employer] Information     [employer] gives you secure access to your electronic health record. If you see a primary care provider, you can also send messages to your care team and make appointments. If you have questions, please call your primary care clinic.  If you do not have a primary care provider, please call [phone] and they will assist you.        Care EveryWhere ID     This is your Care EveryWhere ID. This could be used by other organizations to access your [place] medical records  [id number]        Your Vitals Were     Pulse Height Pulse Oximetry BMI (Body Mass Index)          58 1.727 m (5' 8\") 98% 24.1 kg/m2         Blood Pressure from Last 3 Encounters:   11/20/18 116/56   11/16/18 130/72   11/08/18 164/70    Weight from Last 3 Encounters:   11/20/18 71.9 kg (158 lb 8 oz)   11/16/18 68.9 kg (152 lb)   11/08/18 69.6 kg (153 lb 8 oz)              We Performed the Following     Follow-Up with Cardiologist        Primary Care Provider Office Phone # Fax #    Jonas West [phone]       [address] [facility] [address]        Equal Access to Services     [facility]: Hadii aad ku hadasho Soomaali, waaxda luqadaha, qaybta kaalmada arnoldo jett . So [facility] [phone].    ATENCIÓN: Si habla español, tiene a fischer disposición servicios gratuitos de asistencia lingüística. Llame al [phone].    We comply with applicable federal civil rights laws and [place] laws. We do not " discriminate on the basis of race, color, national origin, age, disability, sex, sexual orientation, or gender identity.            Thank you!     Thank you for choosing [facility]  for your care. Our goal is always to provide you with excellent care. Hearing back from our patients is one way we can continue to improve our services. Please take a few minutes to complete the written survey that you may receive in the mail after your visit with us. Thank you!             Your Updated Medication List - Protect others around you: Learn how to safely use, store and throw away your medicines at www.disposemymeds.org.          This list is accurate as of 11/20/18  2:18 PM.  Always use your most recent med list.                   Brand Name Dispense Instructions for use Diagnosis    alirocumab 150 MG/ML injectable pen    PRALUENT    6 mL    Inject 1 mL (150 mg) Subcutaneous every 14 days    Hyperlipidemia LDL goal <130       amitriptyline 25 MG tablet    ELAVIL    180 tablet    TAKE 1 TABLET TWICE A DAY    Migraine without aura and without status migrainosus, not intractable       amLODIPine 10 MG tablet    NORVASC    90 tablet    TAKE 1 TABLET EVERY EVENING    Essential hypertension, benign       ASPIRIN PO      Take 325 mg by mouth daily        carvedilol 25 MG tablet    COREG    60 tablet    Take 1 tablet (25 mg) by mouth 2 times daily (with meals)    Benign essential HTN       cilostazol 100 MG tablet    PLETAL    180 tablet    TAKE 1 TABLET TWICE A DAY    Peripheral vascular disease (H)       ciprofloxacin 500 MG tablet    CIPRO    14 tablet    Take 1 tablet (500 mg) by mouth 2 times daily    Dysuria       gabapentin 300 MG capsule    NEURONTIN    210 capsule    TAKE 1 CAPSULE THREE TIMES A DAY    Arthritis       isosorbide mononitrate 60 MG 24 hr tablet    IMDUR    30 tablet    Take 1 tablet (60 mg) by mouth daily    ELAM (dyspnea on exertion)       losartan 100 MG tablet     COZAAR    90 tablet    Take 1 tablet (100 mg) by mouth daily    Essential hypertension, benign       metoclopramide 10 MG tablet    REGLAN    120 tablet    TAKE 1 TABLET FOUR TIMES A DAY    Slow transit constipation       omeprazole 40 MG capsule    priLOSEC    90 capsule    TAKE 1 CAPSULE DAILY    Gastroesophageal reflux disease without esophagitis       polyethylene glycol Packet    MIRALAX/GLYCOLAX     Take 1 packet by mouth daily        potassium chloride SA 10 MEQ CR tablet    K-DUR/KLOR-CON M    180 tablet    Take 2 tablets (20 mEq) by mouth daily    Hypokalemia       psyllium 58.6 % Powd    METAMUCIL     Take by mouth daily        spironolactone 50 MG tablet    ALDACTONE    30 tablet    Take 1 tablet (50 mg) by mouth daily    Benign essential hypertension       STATIN NOT PRESCRIBED (INTENTIONAL)     1 each    Pt has known rhabdomyolysis in the past, somewhat associated with statins.  Also wasn't able to tolerate Zetia.    Hyperlipidemia LDL goal <130       TOPROL  MG 24 hr tablet   Generic drug:  metoprolol succinate     93 tablet    TAKE 1 TABLET EVERY MORNING    Essential hypertension, benign       zolpidem 5 MG tablet    AMBIEN    90 tablet    Take 1 tablet (5 mg) by mouth nightly as needed for sleep    Primary insomnia

## 2018-11-27 ENCOUNTER — TELEPHONE (OUTPATIENT)
Dept: INTERNAL MEDICINE | Facility: CLINIC | Age: 73
End: 2018-11-27

## 2018-11-27 DIAGNOSIS — R30.0 DYSURIA: ICD-10-CM

## 2018-11-27 LAB
ALBUMIN UR-MCNC: 30 MG/DL
APPEARANCE UR: CLEAR
BILIRUB UR QL STRIP: NEGATIVE
COLOR UR AUTO: YELLOW
GLUCOSE UR STRIP-MCNC: 50 MG/DL
HGB UR QL STRIP: NEGATIVE
KETONES UR STRIP-MCNC: NEGATIVE MG/DL
LEUKOCYTE ESTERASE UR QL STRIP: NEGATIVE
NITRATE UR QL: NEGATIVE
PH UR STRIP: 7 PH (ref 5–7)
RBC #/AREA URNS AUTO: 1 /HPF (ref 0–2)
SOURCE: ABNORMAL
SP GR UR STRIP: 1.01 (ref 1–1.03)
UROBILINOGEN UR STRIP-MCNC: 0 MG/DL (ref 0–2)
WBC #/AREA URNS AUTO: 0 /HPF (ref 0–5)

## 2018-11-27 PROCEDURE — 81001 URINALYSIS AUTO W/SCOPE: CPT | Performed by: INTERNAL MEDICINE

## 2018-11-27 NOTE — TELEPHONE ENCOUNTER
----- Message from Jonas West MD sent at 11/27/2018 12:26 PM CST -----  Urine is now clear, how is his blood pressure?

## 2018-11-27 NOTE — TELEPHONE ENCOUNTER
Pt was advised of the results and verbalized understanding. BP reading as follows:    11/24-  /76  11/25- /77  /77   11/26- /73

## 2018-11-27 NOTE — TELEPHONE ENCOUNTER
Left message to call back. Please relay results to pt when calls back and find out about his BP readings.

## 2018-11-28 NOTE — PROGRESS NOTES
Dear Michele, your recent test results are attached.    The chemistry panel shows an elevated blood sugar 154.  Kidney function is slightly decreased but stable.  Feel free to contact me via the office or My Chart if you have any questions regarding the above.  Sincerely,  DO JENNA Bal

## 2018-12-07 ENCOUNTER — TELEPHONE (OUTPATIENT)
Dept: INTERNAL MEDICINE | Facility: CLINIC | Age: 73
End: 2018-12-07

## 2018-12-07 ENCOUNTER — OFFICE VISIT (OUTPATIENT)
Dept: INTERNAL MEDICINE | Facility: CLINIC | Age: 73
End: 2018-12-07
Payer: MEDICARE

## 2018-12-07 VITALS
BODY MASS INDEX: 24.02 KG/M2 | SYSTOLIC BLOOD PRESSURE: 132 MMHG | HEART RATE: 74 BPM | WEIGHT: 158 LBS | OXYGEN SATURATION: 97 % | RESPIRATION RATE: 16 BRPM | DIASTOLIC BLOOD PRESSURE: 64 MMHG | TEMPERATURE: 96.7 F

## 2018-12-07 DIAGNOSIS — M25.512 CHRONIC PAIN OF BOTH SHOULDERS: ICD-10-CM

## 2018-12-07 DIAGNOSIS — G89.29 CHRONIC PAIN OF BOTH SHOULDERS: ICD-10-CM

## 2018-12-07 DIAGNOSIS — G43.009 MIGRAINE WITHOUT AURA AND WITHOUT STATUS MIGRAINOSUS, NOT INTRACTABLE: ICD-10-CM

## 2018-12-07 DIAGNOSIS — M25.511 CHRONIC PAIN OF BOTH SHOULDERS: ICD-10-CM

## 2018-12-07 DIAGNOSIS — E87.6 HYPOKALEMIA: Primary | ICD-10-CM

## 2018-12-07 DIAGNOSIS — K21.9 GASTROESOPHAGEAL REFLUX DISEASE WITHOUT ESOPHAGITIS: ICD-10-CM

## 2018-12-07 DIAGNOSIS — N18.30 CKD (CHRONIC KIDNEY DISEASE) STAGE 3, GFR 30-59 ML/MIN (H): Primary | ICD-10-CM

## 2018-12-07 DIAGNOSIS — I10 BENIGN ESSENTIAL HTN: ICD-10-CM

## 2018-12-07 DIAGNOSIS — K59.01 SLOW TRANSIT CONSTIPATION: ICD-10-CM

## 2018-12-07 LAB
ANION GAP SERPL CALCULATED.3IONS-SCNC: 7 MMOL/L (ref 3–14)
BUN SERPL-MCNC: 15 MG/DL (ref 7–30)
CALCIUM SERPL-MCNC: 8.8 MG/DL (ref 8.5–10.1)
CHLORIDE SERPL-SCNC: 100 MMOL/L (ref 94–109)
CO2 SERPL-SCNC: 29 MMOL/L (ref 20–32)
CREAT SERPL-MCNC: 1.46 MG/DL (ref 0.66–1.25)
GFR SERPL CREATININE-BSD FRML MDRD: 47 ML/MIN/1.7M2
GLUCOSE SERPL-MCNC: 91 MG/DL (ref 70–99)
POTASSIUM SERPL-SCNC: 4.3 MMOL/L (ref 3.4–5.3)
SODIUM SERPL-SCNC: 136 MMOL/L (ref 133–144)

## 2018-12-07 PROCEDURE — 99214 OFFICE O/P EST MOD 30 MIN: CPT | Performed by: INTERNAL MEDICINE

## 2018-12-07 PROCEDURE — 36415 COLL VENOUS BLD VENIPUNCTURE: CPT | Performed by: INTERNAL MEDICINE

## 2018-12-07 PROCEDURE — 80048 BASIC METABOLIC PNL TOTAL CA: CPT | Performed by: INTERNAL MEDICINE

## 2018-12-07 RX ORDER — POTASSIUM CHLORIDE 750 MG/1
10 TABLET, EXTENDED RELEASE ORAL 2 TIMES DAILY
Qty: 180 TABLET | Refills: 3 | COMMUNITY
Start: 2018-12-07 | End: 2019-10-24

## 2018-12-07 RX ORDER — CARVEDILOL 25 MG/1
25 TABLET ORAL 2 TIMES DAILY WITH MEALS
Qty: 180 TABLET | Refills: 3 | Status: SHIPPED | OUTPATIENT
Start: 2018-12-07 | End: 2019-10-24

## 2018-12-07 RX ORDER — METOCLOPRAMIDE 10 MG/1
TABLET ORAL
Qty: 120 TABLET | Refills: 3 | Status: SHIPPED | OUTPATIENT
Start: 2018-12-07 | End: 2019-01-05

## 2018-12-07 RX ORDER — OMEPRAZOLE 40 MG/1
40 CAPSULE, DELAYED RELEASE ORAL DAILY
Qty: 90 CAPSULE | Refills: 3 | Status: SHIPPED | OUTPATIENT
Start: 2018-12-07 | End: 2020-01-09

## 2018-12-07 ASSESSMENT — PAIN SCALES - GENERAL: PAINLEVEL: MODERATE PAIN (5)

## 2018-12-07 NOTE — PROGRESS NOTES
SUBJECTIVE:   Michele Vance is a 73 year old male who presents to clinic today for the following health issues:    Chief Complaint   Patient presents with     RECHECK     Follow up from 11/16/18 visit     He is feeling better.  Shoulder pains and did well with cortizone injections will refer to ortho.    Saw cardiology and doing ok.      Potassium was low for no good reason.  Taking spironolactone and potassium at 10 meq twice daily.      Needs some refills as well.    Past Medical History:   Diagnosis Date     Carotid stenosis     right endartectomy     Chronic kidney disease     stage 3     Coronary artery disease 3-2014    CABG x6     Crohn's disease (H)      CVA (cerebral infarction)     balance problems, mild     Elevated CK      Esophageal reflux      Hypercholesteremia      Hypertension      Nonsenile cataract      Other and unspecified hyperlipidemia      PAD (peripheral artery disease) (H)      Rhabdomyolysis 2010     Unspecified essential hypertension      Current Outpatient Prescriptions   Medication     alirocumab (PRALUENT) 150 MG/ML injectable pen     amitriptyline (ELAVIL) 25 MG tablet     amLODIPine (NORVASC) 10 MG tablet     carvedilol (COREG) 25 MG tablet     cilostazol (PLETAL) 100 MG tablet     gabapentin (NEURONTIN) 300 MG capsule     isosorbide mononitrate (IMDUR) 60 MG 24 hr tablet     losartan (COZAAR) 100 MG tablet     metoclopramide (REGLAN) 10 MG tablet     omeprazole (PRILOSEC) 40 MG DR capsule     polyethylene glycol (MIRALAX/GLYCOLAX) packet     potassium chloride ER (K-DUR/KLOR-CON M) 10 MEQ CR tablet     psyllium (METAMUCIL) 58.6 % POWD     spironolactone (ALDACTONE) 50 MG tablet     TOPROL  MG 24 hr tablet     zolpidem (AMBIEN) 5 MG tablet     ASPIRIN PO     STATIN NOT PRESCRIBED, INTENTIONAL,     [DISCONTINUED] amitriptyline (ELAVIL) 25 MG tablet     [DISCONTINUED] carvedilol (COREG) 25 MG tablet     No current facility-administered medications for this visit.      Social  History   Substance Use Topics     Smoking status: Former Smoker     Packs/day: 2.50     Years: 20.00     Types: Cigarettes     Quit date: 1/1/2000     Smokeless tobacco: Never Used     Alcohol use No     Review of Systems  Constitutional-No fevers, chills, or weight changes..  ENT-No earpain, sore throat, voice changes or rhinitis.  Cardiac-No chest pain or palpitations.  Respiratory-No cough, sob, or hemoptysis.  GI-No nausea, vomitting, diarrhea, constipation, or blood in the stool.  Musculoskeletal-bilateral shoulder pain.    Physical Exam  /62  Pulse 74  Temp 96.7  F (35.9  C) (Temporal)  Resp 16  Wt 158 lb (71.7 kg)  SpO2 97%  BMI 24.02 kg/m2  General Appearance-healthy, alert, no distress  Cardiac-regular rate and rhythm  with normal S1, S2 ; no murmur, rub or gallops  Lungs-clear to auscultation  Extremities-no peripheral edema, peripheral pulses normal      ASSESSMENT:  This is a 73-year-old gentleman who has a history of coronary artery bypass.  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  He is being treated with IV steroid medications which are working.  Unfortunately he is developed some hypokalemia possibly from the medication but he needs to stay on it.  He is also on losartan he is now been placed on spironolactone and potassium 20 mEq daily and his potassium is come up to the 3.6 range.  He continues to deny any vomiting, diarrhea, no issues with magnesium.  We will recheck his kidney function to make sure he is not getting dehydrated with the Spironolactone and check his potassium today.    He also has bilateral shoulder pain he has had multiple pains in the past and had injections with the pain clinic.  I think it is just shoulder pain we can set him up orthopedics for bilateral cortisone shots to his shoulders.   Electronically signed by Jonas West MD

## 2018-12-07 NOTE — MR AVS SNAPSHOT
After Visit Summary   12/7/2018    Michele Vance    MRN: [id number]           Patient Information     Date Of Birth          1945        Visit Information        Provider Department      12/7/2018 10:00 AM Jonas West MD [facility]         Follow-ups after your visit        Your next 10 appointments already scheduled     Juliano 15, 2019  8:00 AM CST   New Visit with Nathaniel Spangler MD   [facility] ([facility]    [address] [place] [address] [phone]              Who to contact     If you have questions or need follow up information about today's clinic visit or your schedule please contact [facility] directly at [phone].  Normal or non-critical lab and imaging results will be communicated to you by MyChart, letter or phone within 4 business days after the clinic has received the results. If you do not hear from us within 7 days, please contact the clinic through MyChart or phone. If you have a critical or abnormal lab result, we will notify you by phone as soon as possible.  Submit refill requests through [employer] or call your pharmacy and they will forward the refill request to us. Please allow 3 business days for your refill to be completed.          Additional Information About Your Visit        MyChart Information     [employer] gives you secure access to your electronic health record. If you see a primary care provider, you can also send messages to your care team and make appointments. If you have questions, please call your primary care clinic.  If you do not have a primary care provider, please call [phone] and they will assist you.        Care EveryWhere ID     This is your Care EveryWhere ID. This could be used by other organizations to access your [place] medical records  [id number]        Your Vitals Were     Pulse Temperature Respirations Pulse Oximetry BMI (Body Mass Index)        74 96.7  F (35.9  C) (Temporal) 16 97% 24.02 kg/m2        Blood Pressure from Last 3 Encounters:   12/07/18 144/62   11/20/18 116/56   11/16/18 130/72    Weight from Last 3 Encounters:   12/07/18 158 lb (71.7 kg)   11/20/18 158 lb 8 oz (71.9 kg)   11/16/18 152 lb (68.9 kg)              Today, you had the following     No orders found for display       Primary Care Provider Office Phone # Fax #    Jonas West [phone] [address] [facility] [address]        Equal Access to Services     [facility] : Hadii naomi montgomery hadsofio Sobe, waaxda luqadaha, qaybta kaalmada adeisabelayada, arnoldo marin . So [facility] [phone].    ATENCIÓN: Si habla español, tiene a fischer disposición servicios gratuitos de asistencia lingüística. Llame al [phone].    We comply with applicable federal civil rights laws and [place] laws. We do not discriminate on the basis of race, color, national origin, age, disability, sex, sexual orientation, or gender identity.            Thank you!     Thank you for choosing [facility]  for your care. Our goal is always to provide you with excellent care. Hearing back from our patients is one way we can continue to improve our services. Please take a few minutes to complete the written survey that you may receive in the mail after your visit with us. Thank you!             Your Updated Medication List - Protect others around you: Learn how to safely use, store and throw away your medicines at www.disposemymeds.org.          This list is accurate as of 12/7/18 10:00 AM.  Always use your most recent med list.                   Brand Name Dispense Instructions for use Diagnosis    alirocumab 150 MG/ML injectable pen    PRALUENT    6 mL    Inject 1 mL (150 mg) Subcutaneous every 14 days    Hyperlipidemia LDL goal <130       amitriptyline 25 MG tablet    ELAVIL    180 tablet    TAKE 1 TABLET TWICE A DAY    Migraine without aura  and without status migrainosus, not intractable       amLODIPine 10 MG tablet    NORVASC    90 tablet    TAKE 1 TABLET EVERY EVENING    Essential hypertension, benign       ASPIRIN PO      Take 325 mg by mouth daily        carvedilol 25 MG tablet    COREG    60 tablet    Take 1 tablet (25 mg) by mouth 2 times daily (with meals)    Benign essential HTN       cilostazol 100 MG tablet    PLETAL    180 tablet    TAKE 1 TABLET TWICE A DAY    Peripheral vascular disease (H)       gabapentin 300 MG capsule    NEURONTIN    210 capsule    TAKE 1 CAPSULE THREE TIMES A DAY    Arthritis       isosorbide mononitrate 60 MG 24 hr tablet    IMDUR    30 tablet    Take 1 tablet (60 mg) by mouth daily    ELAM (dyspnea on exertion)       losartan 100 MG tablet    COZAAR    90 tablet    Take 1 tablet (100 mg) by mouth daily    Essential hypertension, benign       metoclopramide 10 MG tablet    REGLAN    120 tablet    TAKE 1 TABLET FOUR TIMES A DAY    Slow transit constipation       omeprazole 40 MG DR capsule    priLOSEC    90 capsule    TAKE 1 CAPSULE DAILY    Gastroesophageal reflux disease without esophagitis       polyethylene glycol packet    MIRALAX/GLYCOLAX     Take 1 packet by mouth daily        potassium chloride ER 10 MEQ CR tablet    K-DUR/KLOR-CON M    180 tablet    Take 2 tablets (20 mEq) by mouth daily    Hypokalemia       psyllium 58.6 % powder    METAMUCIL/KONSYL     Take by mouth daily        spironolactone 50 MG tablet    ALDACTONE    30 tablet    Take 1 tablet (50 mg) by mouth daily    Benign essential hypertension       STATIN NOT PRESCRIBED    INTENTIONAL    1 each    Pt has known rhabdomyolysis in the past, somewhat associated with statins.  Also wasn't able to tolerate Zetia.    Hyperlipidemia LDL goal <130       TOPROL  MG 24 hr tablet   Generic drug:  metoprolol succinate ER     93 tablet    TAKE 1 TABLET EVERY MORNING    Essential hypertension, benign       zolpidem 5 MG tablet    AMBIEN    90 tablet     Take 1 tablet (5 mg) by mouth nightly as needed for sleep    Primary insomnia

## 2018-12-07 NOTE — PROGRESS NOTES
ORTHOPEDIC CONSULT      Chief Complaint: Michele Vance is a 73 year old right hand dominant male who works as a retired  for [employer].        He is being seen for   Chief Complaints and History of Present Illnesses   Patient presents with     Consult     b/l shoulder pain per Jonas West MD         History of Present Illness:   Michele Vance is a 73 year old male who is seen in consultation at the request of Jonas West MD.  History of Present illness:  Michele presents for evaluation of:  1.) b/l shoulder   Onset: for years but worse in the last few weeks  Symptoms brought on by: nothing.   Character:  stabbing and loss of rom.    Progression of symptoms:  worse.    Previous similar pain: YES.   Pain Level:  8/10.   Previous treatments:  acetaminophen and steroid injections years ago.  Currently on Blood thinners? asa  Diagnosis of Diabetes? No  Anterior and posterolateral shoulder pain, does HEP, right shoulder worse, left not bad  Past injections helped a lot      Patient's past medical, surgical, social and family histories reviewed.       Past Medical History:   Diagnosis Date     Carotid stenosis     right endartectomy     Chronic kidney disease     stage 3     Coronary artery disease 3-2014    CABG x6     Crohn's disease (H)      CVA (cerebral infarction)     balance problems, mild     Elevated CK      Esophageal reflux      Hypercholesteremia      Hypertension      Nonsenile cataract      Other and unspecified hyperlipidemia      PAD (peripheral artery disease) (H)      Rhabdomyolysis 2010     Unspecified essential hypertension          Past Surgical History:   Procedure Laterality Date     APPENDECTOMY  1974     BYPASS GRAFT ARTERY CORONARY  3/5/2014    Procedure: BYPASS GRAFT ARTERY CORONARY;  Median Sternotomy, Coronary Artery Bypass Graft X6 used Left internal mammary artery, Left and Right Greater Saphenous vein, on Pump oxygenator.;  Surgeon: Tim Montanez MD;  Location: [facility] OR     CATARACT  IOL, RT/LT Bilateral 2008    in [place]     cataracts       COLONOSCOPY  12/12/02    [facility]     COLONOSCOPY N/A 10/7/2014    Procedure: COMBINED COLONOSCOPY, SINGLE OR MULTIPLE BIOPSY/POLYPECTOMY BY BIOPSY;  Surgeon: Micheal Mora MD;  Location:  GI     DENTAL SURGERY      TMJ, implants     ENDARTERECTOMY CAROTID      right carotid stent     ESOPHAGOSCOPY, GASTROSCOPY, DUODENOSCOPY (EGD), COMBINED Left 10/7/2014    Procedure: COMBINED ESOPHAGOSCOPY, GASTROSCOPY, DUODENOSCOPY (EGD), BIOPSY SINGLE OR MULTIPLE;  Surgeon: Michela Mora MD;  Location:  GI     ESOPHAGOSCOPY, GASTROSCOPY, DUODENOSCOPY (EGD), COMBINED Left 10/7/2014    Procedure: COMBINED ESOPHAGOSCOPY, GASTROSCOPY, DUODENOSCOPY (EGD), REMOVE FOREIGN BODY;  Surgeon: Micheal Mora MD;  Location: [facility] CAPSULE ENDOSCOPY N/A 10/7/2014    Procedure: CAPSULE/PILL CAM ENDOSCOPY;  Surgeon: Micheal Mora MD;  Location: [facility] UGI ENDOSCOPY, SIMPLE EXAM  01/08/99    [facility]     INJECT EPIDURAL LUMBAR Right 5/8/2017    Procedure: INJECT EPIDURAL LUMBAR;  Lumbar transforaminal Epidural Steroid Injection right lumbar 4-5, and right  lumbar 5-Sacral 1;  Surgeon: Anthony Gonzalez MD;  Location: [facility]     INJECT JOINT SACROILIAC Bilateral 8/28/2017    Procedure: INJECT JOINT SACROILIAC;  sacroiliac joint injection bilateral;  Surgeon: Anthony Gonzalez MD;  Location: [facility] OR     KNEE SURGERY  1976    left knee reconstruction     lasix  2001    both eyes     RELEASE CARPAL TUNNEL  1/13/2011    RELEASE CARPAL TUNNEL performed by JO MADRID at  OR     RELEASE CARPAL TUNNEL  1/20/2011    RELEASE CARPAL TUNNEL performed by JO MADRID at  OR         Medications:    Current Outpatient Medications on File Prior to Visit:  alirocumab (PRALUENT) 150 MG/ML injectable pen Inject 1 mL (150 mg) Subcutaneous every 14 days   amitriptyline (ELAVIL) 25 MG tablet Take 1 tablet (25 mg) by mouth 2 times  "daily   amLODIPine (NORVASC) 10 MG tablet TAKE 1 TABLET EVERY EVENING   ASPIRIN PO Take 325 mg by mouth daily   carvedilol (COREG) 25 MG tablet Take 1 tablet (25 mg) by mouth 2 times daily (with meals)   cilostazol (PLETAL) 100 MG tablet TAKE 1 TABLET TWICE A DAY   gabapentin (NEURONTIN) 300 MG capsule TAKE 1 CAPSULE THREE TIMES A DAY   isosorbide mononitrate (IMDUR) 60 MG 24 hr tablet Take 1 tablet (60 mg) by mouth daily   losartan (COZAAR) 100 MG tablet Take 1 tablet (100 mg) by mouth daily   metoclopramide (REGLAN) 10 MG tablet TAKE 1 TABLET FOUR TIMES A DAY   omeprazole (PRILOSEC) 40 MG DR capsule Take 1 capsule (40 mg) by mouth daily   polyethylene glycol (MIRALAX/GLYCOLAX) packet Take 1 packet by mouth daily    potassium chloride ER (K-DUR/KLOR-CON M) 10 MEQ CR tablet Take 1 tablet (10 mEq) by mouth 2 times daily   psyllium (METAMUCIL) 58.6 % POWD Take by mouth daily   spironolactone (ALDACTONE) 50 MG tablet Take 1 tablet (50 mg) by mouth daily   STATIN NOT PRESCRIBED, INTENTIONAL, Pt has known rhabdomyolysis in the past, somewhat associated with statins.  Also wasn't able to tolerate Zetia. (Patient not taking: Reported on 2018)   TOPROL  MG 24 hr tablet TAKE 1 TABLET EVERY MORNING   zolpidem (AMBIEN) 5 MG tablet Take 1 tablet (5 mg) by mouth nightly as needed for sleep     No current facility-administered medications on file prior to visit.       Allergies   Allergen Reactions     Hmg-Coa-R Inhibitors Other (See Comments)     Rhabdo  Same as \"statins\"     Gemfibrozil      Resting thigh-calf pain     Sulfa Drugs      Reacted as a child     Zetia [Ezetimibe]      Muscle cramping         Social History     Occupational History     Occupation:      Employer: RETIRED   Tobacco Use     Smoking status: Former Smoker     Packs/day: 2.50     Years: 20.00     Pack years: 50.00     Types: Cigarettes     Last attempt to quit: 2000     Years since quittin.9     Smokeless tobacco: Never Used " "  Substance and Sexual Activity     Alcohol use: No     Alcohol/week: 0.0 oz     Drug use: No     Sexual activity: Yes     Partners: Female       Patient does not use Tobacco products.      Family History   Problem Relation Age of Onset     Hypertension Mother      Eye Disorder Mother      Cerebrovascular Disease Father      Heart Disease Father      Lipids Father      Obesity Father      Cancer Sister      Heart Disease Sister      Glaucoma No family hx of      Macular Degeneration No family hx of          REVIEW OF SYSTEMS  10 point review systems performed otherwise negative as noted as per history of present illness.      Physical Exam:  Vitals: /69   Ht 1.727 m (5' 8\")   Wt 71.7 kg (158 lb)   BMI 24.02 kg/m    BMI= Body mass index is 24.02 kg/m .    Constitutional: healthy, alert and no acute distress   Psychiatric: mentation appears normal and affect normal/bright  NEURO: no focal deficits  RESP: Normal with easy respirations and no use of accessory muscles to breathe, no audible wheezing or retractions  CV: regular pulse  SKIN: No erythema, rashes, excoriation, or breakdown. No evidence of infection.   JOINT/EXTREMITIES:right shoulder - FROM, positive impingement, good strength in rotator cuff  GAIT: non-antalgic  Lymph: no palpable lymph nodes    Diagnostic Modalities:  bilateral shoulder X-ray: AC joint narrowing and irregularity   Independent visualization of the images was performed.      Impression: right Shoulder Subacromial Impingement    Plan:  All of the above pertinent physical exam and imaging modalities findings was reviewed with Michele .                                          CONSERVATIVE CARE:  I recommend conservative care for the patient to include NSAIDs, Tylenol, focused self directed physical therapy, steroid injections, activity modifications. Today I provided or dispensed info and hep.                                        INJECTION PROCEDURE:  The patient was counseled about " an  injection, including discussion of risks (including infection), contents of the injection, rationale for performing the injection, and expected benefits of the injection. The skin was prepped with alcohol and betadine and then utilizing sterile technique an injection of the right shoulder subacromial space from the anterolateral approach  was performed. The injection consisted 1ml of Kenalog (40mg per 1ml) with 8ml 1% lidocaine plain. The patient tolerated the injection well, and there were no complications. The injection site was covered with a Band-Aid. The injection was performed by Sera Field M.D.    BP Readings from Last 1 Encounters:   12/10/18 132/69       BP noted to be well controlled today in office.     Return to clinic 6, week(s), PRN, or sooner as needed for changes.  Re-x-ray on return: No    Sera Field M.D.

## 2018-12-07 NOTE — TELEPHONE ENCOUNTER
----- Message from Jonas West MD sent at 12/7/2018 12:05 PM CST -----  Potassium is good but kidneys are dry, try cutting spironolactone from 50 to 25 mg a day, recheck basic panel in a week.

## 2018-12-10 ENCOUNTER — ANCILLARY PROCEDURE (OUTPATIENT)
Dept: GENERAL RADIOLOGY | Facility: CLINIC | Age: 73
End: 2018-12-10
Attending: ORTHOPAEDIC SURGERY
Payer: MEDICARE

## 2018-12-10 ENCOUNTER — OFFICE VISIT (OUTPATIENT)
Dept: ORTHOPEDICS | Facility: CLINIC | Age: 73
End: 2018-12-10
Payer: MEDICARE

## 2018-12-10 VITALS
SYSTOLIC BLOOD PRESSURE: 132 MMHG | HEIGHT: 68 IN | WEIGHT: 158 LBS | DIASTOLIC BLOOD PRESSURE: 69 MMHG | BODY MASS INDEX: 23.95 KG/M2

## 2018-12-10 DIAGNOSIS — M25.512 SHOULDER PAIN, BILATERAL: ICD-10-CM

## 2018-12-10 DIAGNOSIS — M75.41 IMPINGEMENT SYNDROME, SHOULDER, RIGHT: Primary | ICD-10-CM

## 2018-12-10 DIAGNOSIS — M25.511 SHOULDER PAIN, BILATERAL: ICD-10-CM

## 2018-12-10 PROCEDURE — 20610 DRAIN/INJ JOINT/BURSA W/O US: CPT | Mod: RT | Performed by: ORTHOPAEDIC SURGERY

## 2018-12-10 PROCEDURE — 99203 OFFICE O/P NEW LOW 30 MIN: CPT | Mod: 25 | Performed by: ORTHOPAEDIC SURGERY

## 2018-12-10 PROCEDURE — 73030 X-RAY EXAM OF SHOULDER: CPT | Mod: TC

## 2018-12-10 ASSESSMENT — MIFFLIN-ST. JEOR: SCORE: 1441.18

## 2018-12-10 ASSESSMENT — PAIN SCALES - GENERAL: PAINLEVEL: EXTREME PAIN (8)

## 2018-12-10 NOTE — LETTER
12/10/2018         RE: Michele Vance  [address]  [facility] [address]        Dear Colleague,    Thank you for referring your patient, Michele Vance, to the [facility]. Please see a copy of my visit note below.    ORTHOPEDIC CONSULT      Chief Complaint: Michele Vance is a 73 year old right hand dominant male who works as a retired  for [employer].        He is being seen for   Chief Complaints and History of Present Illnesses   Patient presents with     Consult     b/l shoulder pain per Jonas West MD         History of Present Illness:   Michele Vance is a 73 year old male who is seen in consultation at the request of Jonas West MD.  History of Present illness:  Michele presents for evaluation of:  1.) b/l shoulder   Onset: for years but worse in the last few weeks  Symptoms brought on by: nothing.   Character:  stabbing and loss of rom.    Progression of symptoms:  worse.    Previous similar pain: YES.   Pain Level:  8/10.   Previous treatments:  acetaminophen and steroid injections years ago.  Currently on Blood thinners? asa  Diagnosis of Diabetes? No  Anterior and posterolateral shoulder pain, does HEP, right shoulder worse, left not bad  Past injections helped a lot      Patient's past medical, surgical, social and family histories reviewed.       Past Medical History:   Diagnosis Date     Carotid stenosis     right endartectomy     Chronic kidney disease     stage 3     Coronary artery disease 3-2014    CABG x6     Crohn's disease (H)      CVA (cerebral infarction)     balance problems, mild     Elevated CK      Esophageal reflux      Hypercholesteremia      Hypertension      Nonsenile cataract      Other and unspecified hyperlipidemia      PAD (peripheral artery disease) (H)      Rhabdomyolysis 2010     Unspecified essential hypertension          Past Surgical History:   Procedure Laterality Date     APPENDECTOMY  1974     BYPASS GRAFT ARTERY CORONARY  3/5/2014     Procedure: BYPASS GRAFT ARTERY CORONARY;  Median Sternotomy, Coronary Artery Bypass Graft X6 used Left internal mammary artery, Left and Right Greater Saphenous vein, on Pump oxygenator.;  Surgeon: Tim Montanez MD;  [facility]     CATARACT IOL, RT/LT Bilateral 2008    in [place]     cataracts       COLONOSCOPY  12/12/02    [facility]     COLONOSCOPY N/A 10/7/2014    Procedure: COMBINED COLONOSCOPY, SINGLE OR MULTIPLE BIOPSY/POLYPECTOMY BY BIOPSY;  Surgeon: Micheal Mora MD;  Location:  GI     DENTAL SURGERY      TMJ, implants     ENDARTERECTOMY CAROTID      right carotid stent     ESOPHAGOSCOPY, GASTROSCOPY, DUODENOSCOPY (EGD), COMBINED Left 10/7/2014    Procedure: COMBINED ESOPHAGOSCOPY, GASTROSCOPY, DUODENOSCOPY (EGD), BIOPSY SINGLE OR MULTIPLE;  Surgeon: Micheal Mora MD;  Location: [facility]     ESOPHAGOSCOPY, GASTROSCOPY, DUODENOSCOPY (EGD), COMBINED Left 10/7/2014    Procedure: COMBINED ESOPHAGOSCOPY, GASTROSCOPY, DUODENOSCOPY (EGD), REMOVE FOREIGN BODY;  Surgeon: Micheal Mora MD;  Location: [facility][1] CAPSULE ENDOSCOPY N/A 10/7/2014    Procedure: CAPSULE/PILL CAM ENDOSCOPY;  Surgeon: Micheal Mora MD;  Location: [facility][1] UGI ENDOSCOPY, SIMPLE EXAM  01/08/99    [facility]     INJECT EPIDURAL LUMBAR Right 5/8/2017    Procedure: INJECT EPIDURAL LUMBAR;  Lumbar transforaminal Epidural Steroid Injection right lumbar 4-5, and right  lumbar 5-Sacral 1;  Surgeon: Anthony Gonzalez MD;  Location:  OR     INJECT JOINT SACROILIAC Bilateral 8/28/2017    Procedure: INJECT JOINT SACROILIAC;  sacroiliac joint injection bilateral;  Surgeon: Anthony Gonzalez MD;  Location:  OR     KNEE SURGERY  1976    left knee reconstruction     lasix  2001    both eyes     RELEASE CARPAL TUNNEL  1/13/2011    RELEASE CARPAL TUNNEL performed by JO MADRID at  OR     RELEASE CARPAL TUNNEL  1/20/2011    RELEASE CARPAL TUNNEL performed by JO MADRID at  OR  "        Medications:    Current Outpatient Medications on File Prior to Visit:  alirocumab (PRALUENT) 150 MG/ML injectable pen Inject 1 mL (150 mg) Subcutaneous every 14 days   amitriptyline (ELAVIL) 25 MG tablet Take 1 tablet (25 mg) by mouth 2 times daily   amLODIPine (NORVASC) 10 MG tablet TAKE 1 TABLET EVERY EVENING   ASPIRIN PO Take 325 mg by mouth daily   carvedilol (COREG) 25 MG tablet Take 1 tablet (25 mg) by mouth 2 times daily (with meals)   cilostazol (PLETAL) 100 MG tablet TAKE 1 TABLET TWICE A DAY   gabapentin (NEURONTIN) 300 MG capsule TAKE 1 CAPSULE THREE TIMES A DAY   isosorbide mononitrate (IMDUR) 60 MG 24 hr tablet Take 1 tablet (60 mg) by mouth daily   losartan (COZAAR) 100 MG tablet Take 1 tablet (100 mg) by mouth daily   metoclopramide (REGLAN) 10 MG tablet TAKE 1 TABLET FOUR TIMES A DAY   omeprazole (PRILOSEC) 40 MG DR capsule Take 1 capsule (40 mg) by mouth daily   polyethylene glycol (MIRALAX/GLYCOLAX) packet Take 1 packet by mouth daily    potassium chloride ER (K-DUR/KLOR-CON M) 10 MEQ CR tablet Take 1 tablet (10 mEq) by mouth 2 times daily   psyllium (METAMUCIL) 58.6 % POWD Take by mouth daily   spironolactone (ALDACTONE) 50 MG tablet Take 1 tablet (50 mg) by mouth daily   STATIN NOT PRESCRIBED, INTENTIONAL, Pt has known rhabdomyolysis in the past, somewhat associated with statins.  Also wasn't able to tolerate Zetia. (Patient not taking: Reported on 11/20/2018)   TOPROL  MG 24 hr tablet TAKE 1 TABLET EVERY MORNING   zolpidem (AMBIEN) 5 MG tablet Take 1 tablet (5 mg) by mouth nightly as needed for sleep     No current facility-administered medications on file prior to visit.       Allergies   Allergen Reactions     Hmg-Coa-R Inhibitors Other (See Comments)     Rhabdo  Same as \"statins\"     Gemfibrozil      Resting thigh-calf pain     Sulfa Drugs      Reacted as a child     Zetia [Ezetimibe]      Muscle cramping         Social History     Occupational History     Occupation: IT " "manager     Employer: RETIRED   Tobacco Use     Smoking status: Former Smoker     Packs/day: 2.50     Years: 20.00     Pack years: 50.00     Types: Cigarettes     Last attempt to quit: 2000     Years since quittin.9     Smokeless tobacco: Never Used   Substance and Sexual Activity     Alcohol use: No     Alcohol/week: 0.0 oz     Drug use: No     Sexual activity: Yes     Partners: Female       Patient does not use Tobacco products.      Family History   Problem Relation Age of Onset     Hypertension Mother      Eye Disorder Mother      Cerebrovascular Disease Father      Heart Disease Father      Lipids Father      Obesity Father      Cancer Sister      Heart Disease Sister      Glaucoma No family hx of      Macular Degeneration No family hx of          REVIEW OF SYSTEMS  10 point review systems performed otherwise negative as noted as per history of present illness.      Physical Exam:  Vitals: /69   Ht 1.727 m (5' 8\")   Wt 71.7 kg (158 lb)   BMI 24.02 kg/m     BMI= Body mass index is 24.02 kg/m .    Constitutional: healthy, alert and no acute distress   Psychiatric: mentation appears normal and affect normal/bright  NEURO: no focal deficits  RESP: Normal with easy respirations and no use of accessory muscles to breathe, no audible wheezing or retractions  CV: regular pulse  SKIN: No erythema, rashes, excoriation, or breakdown. No evidence of infection.   JOINT/EXTREMITIES:right shoulder - FROM, positive impingement, good strength in rotator cuff  GAIT: non-antalgic  Lymph: no palpable lymph nodes    Diagnostic Modalities:  bilateral shoulder X-ray: AC joint narrowing and irregularity   Independent visualization of the images was performed.      Impression: right Shoulder Subacromial Impingement    Plan:  All of the above pertinent physical exam and imaging modalities findings was reviewed with Michele .                                          CONSERVATIVE CARE:  I recommend conservative care for " the patient to include NSAIDs, Tylenol, focused self directed physical therapy, steroid injections, activity modifications. Today I provided or dispensed info and hep.                                        INJECTION PROCEDURE:  The patient was counseled about an  injection, including discussion of risks (including infection), contents of the injection, rationale for performing the injection, and expected benefits of the injection. The skin was prepped with alcohol and betadine and then utilizing sterile technique an injection of the right shoulder subacromial space from the anterolateral approach  was performed. The injection consisted 1ml of Kenalog (40mg per 1ml) with 8ml 1% lidocaine plain. The patient tolerated the injection well, and there were no complications. The injection site was covered with a Band-Aid. The injection was performed by Sera Field M.D.    BP Readings from Last 1 Encounters:   12/10/18 132/69       BP noted to be well controlled today in office.     Return to clinic 6, week(s), PRN, or sooner as needed for changes.  Re-x-ray on return: No    Sera Field M.D.    Again, thank you for allowing me to participate in the care of your patient.        Sincerely,        Sera Field MD

## 2018-12-10 NOTE — PATIENT INSTRUCTIONS
Patient Education     Understanding Shoulder Impingement Syndrome    Shoulder impingement syndrome is a problem with the shoulder joint. It occurs when certain parts within the joint swell and are pinched. This can cause nagging pain and problems with moving the arm.  What causes shoulder impingement syndrome?  It is possible to develop impingement after years of normal shoulder use. But in most cases the condition occurs because of repeated overhead movements. These include such things as stocking shelves, painting, swimming, and throwing. These movements can irritate parts of the shoulder, leading to swelling. Swollen parts of the shoulder take up more room, making the joint space smaller. Some parts that may be involved include:    A sac of fluid (bursa) that cushions the shoulder joint.  When the bursa is irritated, it may lead to a condition called bursitis. This is when the bursa swells with fluid, filling and squeezing the joint space.    Fibrous tissues (tendons) that connect muscle to bone. When tendons are irritated, they may become swollen. This is called tendonitis.    The end (acromion) of the shoulder blade. This bone may be flat or hooked. If the acromion is hooked, the joint space may be smaller than normal. Growths (bone spurs) on the acromion can also narrow the joint space. Acromion problems don t often cause impingement. But they can make it worse.  Symptoms of shoulder impingement syndrome  Symptoms include pain, pinching, or stiffness in your shoulder. Pain often comes with movement, particularly reaching overhead or backward. It may also be felt when the shoulder is at rest. Pain at night during sleep is common.  Treatment for shoulder impingement syndrome  Treatment will depend on the cause of the problem, how bad the problem is, and if other parts of the shoulder are damaged. Treatment may include the following:    Active rest. This allows the shoulder to heal. It means using the arm and  shoulder, but avoiding activities that cause pain. These likely include reaching overhead or sleeping on your shoulder.    Cold packs. These help reduce swelling and relieve pain.    Prescription or over-the-counter pain medicines. These help relieve pain and swelling.    Arm and shoulder exercises. These help keep your shoulder joint mobile as it heals. They also help improve muscle strength around the joint.    Shots of medicine into the joint. This can help reduce swelling and pain for a short time.  If other measures don t work to relieve symptoms, you may need surgery. This opens up space in the joint to allow pain-free motion.  Possible complications of shoulder impingement syndrome  It might be tempting to stop using your shoulder completely to avoid pain. But doing so may lead to a condition called frozen shoulder. To help prevent this, follow instructions you are given for active rest and for doing exercises to help your shoulder heal.  When to call your healthcare provider  Call your healthcare provider right away if you have any of these:    Fever of 100.4 F (38 C) or higher, or as directed    Symptoms that don t get better, or get worse    New symptoms   Date Last Reviewed: 3/10/2016    5097-1278 [employer]. [address] [place] [address]. All rights reserved. This information is not intended as a substitute for professional medical care. Always follow your healthcare professional's instructions.           Patient Education     Nonsurgical Treatment Options for Shoulder Impingement    Rest is key to healing your shoulder. If an activity hurts, don t do it. Otherwise, you may prevent healing and increase pain. Your shoulder needs active rest. This means avoiding overhead movements and activities that cause pain. But don't stop using your shoulder completely. This can cause it to stiffen or  freeze.  In addition to rest, impingement can be treated a number of ways. Your healthcare  provider can help you find which of these is best for you.  A physical therapist can also help you with exercises specific for your condition.  Listed below are several treatment options that may be considered:     Ice  Ice reduces inflammation and relieves pain. Apply an ice pack for about 15 minutes, 3 times a day. A pillow placed under your arm may help make you more comfortable. To make an ice pack, put ice cubes in a plastic bag that seals at the top. Wrap the bag in a clean, thin towel or cloth. Never put ice or an ice pack directly on the skin.     Heat  Heat may soothe aching muscles, but it won t reduce inflammation. Use a heating pad or take a warm shower or bath. Do this for 15 minutes at a time.  Don't use heat when pain is constant. Heat is best when used for warming up before an activity. You can also switch between ice and heat.  Medicine  To relieve pain and inflammation, try over-the-counter pain relievers, such as acetaminophen or ibuprofen. Or, your healthcare provider may prescribe medicines. Ask how and when to take your medicine. Be sure to follow all instructions you re given.  Electrical stimulation  Electrical stimulation can help reduce pain and swelling. Your healthcare provider attaches small pads to your shoulder. A mild electric current then flows into your shoulder. You may feel tingling. But you should not feel pain.  Ultrasound  Ultrasound can help reduce pain. First a slick gel or medicated cream is applied to your shoulder. Then your healthcare provider places a small device over the area. The device uses sound waves to loosen shoulder tightness. This treatment should be pain-free.  Injection therapy  Injection therapy may be used to help diagnose your problem. It may also be used to reduce pain and inflammation. The injection typically includes 2 medicines. One is an anesthetic to numb the shoulder. The other is a steroid, such as cortisone, to help reduce painful swelling. It can  take from a few hours to a couple of days before the injection helps. Talk with your healthcare provider about the possible risks and benefits of this therapy.  Date Last Reviewed: 5/1/2018 2000-2018 [employer]. [address] [place] [address], [place] [address]. All rights reserved. This information is not intended as a substitute for professional medical care. Always follow your healthcare professional's instructions.

## 2018-12-17 DIAGNOSIS — N18.30 CKD (CHRONIC KIDNEY DISEASE) STAGE 3, GFR 30-59 ML/MIN (H): ICD-10-CM

## 2018-12-17 LAB
ANION GAP SERPL CALCULATED.3IONS-SCNC: 8 MMOL/L (ref 3–14)
BUN SERPL-MCNC: 18 MG/DL (ref 7–30)
CALCIUM SERPL-MCNC: 8.8 MG/DL (ref 8.5–10.1)
CHLORIDE SERPL-SCNC: 100 MMOL/L (ref 94–109)
CO2 SERPL-SCNC: 26 MMOL/L (ref 20–32)
CREAT SERPL-MCNC: 1.46 MG/DL (ref 0.66–1.25)
GFR SERPL CREATININE-BSD FRML MDRD: 47 ML/MIN/1.7M2
GLUCOSE SERPL-MCNC: 84 MG/DL (ref 70–99)
POTASSIUM SERPL-SCNC: 4.5 MMOL/L (ref 3.4–5.3)
SODIUM SERPL-SCNC: 134 MMOL/L (ref 133–144)

## 2018-12-17 PROCEDURE — 36415 COLL VENOUS BLD VENIPUNCTURE: CPT | Performed by: INTERNAL MEDICINE

## 2018-12-17 PROCEDURE — 80048 BASIC METABOLIC PNL TOTAL CA: CPT | Performed by: INTERNAL MEDICINE

## 2018-12-18 ENCOUNTER — TELEPHONE (OUTPATIENT)
Dept: INTERNAL MEDICINE | Facility: CLINIC | Age: 73
End: 2018-12-18

## 2018-12-18 NOTE — TELEPHONE ENCOUNTER
----- Message from Jonas West MD sent at 12/17/2018  5:04 PM CST -----  Kidneys are about the same, continue his medications for now.

## 2018-12-18 NOTE — TELEPHONE ENCOUNTER
Call is returned, given message as written, please clarify if Dr West wants him to to take the 25 mg Spironolactone as previous instructed or the 50 mg?   Please leave a detailed message,

## 2019-01-05 ENCOUNTER — MYC REFILL (OUTPATIENT)
Dept: INTERNAL MEDICINE | Facility: CLINIC | Age: 74
End: 2019-01-05

## 2019-01-05 DIAGNOSIS — K59.01 SLOW TRANSIT CONSTIPATION: ICD-10-CM

## 2019-01-07 RX ORDER — METOCLOPRAMIDE 10 MG/1
TABLET ORAL
Qty: 120 TABLET | Refills: 1 | Status: SHIPPED | OUTPATIENT
Start: 2019-01-07 | End: 2019-10-24

## 2019-01-07 NOTE — TELEPHONE ENCOUNTER
"Prescription approved per RN refill protocol.  Sabra Acevedo RN, BSN    reglan  Last Written Prescription Date:  12/7/2018  Last Fill Quantity: 120,  # refills: 3   Last office visit: 12/7/2018 with prescribing provider:  12/7/2018   Future Office Visit:      Requested Prescriptions   Pending Prescriptions Disp Refills     metoclopramide (REGLAN) 10 MG tablet 120 tablet 3     Sig: TAKE 1 TABLET FOUR TIMES A DAY     Antivertigo/Antiemetic Agents Passed - 1/7/2019  8:18 AM       Passed - Recent (12 mo) or future (30 days) visit within the authorizing provider's specialty    Patient had office visit in the last 12 months or has a visit in the next 30 days with authorizing provider or within the authorizing provider's specialty.  See \"Patient Info\" tab in inbasket, or \"Choose Columns\" in Meds & Orders section of the refill encounter.             Passed - Medication is active on med list       Passed - Patient is 18 years of age or older        Sabra Acevedo RN on 1/7/2019 at 12:44 PM    "

## 2019-01-09 NOTE — TELEPHONE ENCOUNTER
Pt's wife is aware we do not have C2C to speak with her, please call pt and pharmacy.  Thank you,  Sera Gagnon- Pt Rep.

## 2019-01-09 NOTE — TELEPHONE ENCOUNTER
Pt's wife calling. Insurance is wondering why pt is still on this medication? They state that it is usually a medication you take for a short period of time. She is concerned that he may be dependant on it. Pharmacy will not fill it. Please call pharmacy at [phone].   Thank you,  Sera Gagnon- Pt Rep.

## 2019-01-09 NOTE — TELEPHONE ENCOUNTER
Reglan is for nausea and should only be used if needed, if he has symptoms.  Shouldn't need this everyday.

## 2019-01-15 ENCOUNTER — OFFICE VISIT (OUTPATIENT)
Dept: NEPHROLOGY | Facility: CLINIC | Age: 74
End: 2019-01-15
Attending: PHYSICIAN ASSISTANT
Payer: MEDICARE

## 2019-01-15 VITALS
HEIGHT: 68 IN | DIASTOLIC BLOOD PRESSURE: 68 MMHG | OXYGEN SATURATION: 96 % | SYSTOLIC BLOOD PRESSURE: 124 MMHG | BODY MASS INDEX: 24.14 KG/M2 | WEIGHT: 159.3 LBS | HEART RATE: 72 BPM

## 2019-01-15 DIAGNOSIS — E87.6 HYPOKALEMIA: ICD-10-CM

## 2019-01-15 DIAGNOSIS — D47.2 MONOCLONAL GAMMOPATHY: ICD-10-CM

## 2019-01-15 DIAGNOSIS — I10 ESSENTIAL HYPERTENSION: Primary | ICD-10-CM

## 2019-01-15 DIAGNOSIS — N18.30 CKD (CHRONIC KIDNEY DISEASE) STAGE 3, GFR 30-59 ML/MIN (H): ICD-10-CM

## 2019-01-15 DIAGNOSIS — I73.9 PAD (PERIPHERAL ARTERY DISEASE) (H): ICD-10-CM

## 2019-01-15 LAB
ALBUMIN SERPL-MCNC: 3.3 G/DL (ref 3.4–5)
ALBUMIN UR-MCNC: 30 MG/DL
ANION GAP SERPL CALCULATED.3IONS-SCNC: 5 MMOL/L (ref 3–14)
APPEARANCE UR: CLEAR
BILIRUB UR QL STRIP: NEGATIVE
BUN SERPL-MCNC: 11 MG/DL (ref 7–30)
CALCIUM SERPL-MCNC: 9.3 MG/DL (ref 8.5–10.1)
CHLORIDE SERPL-SCNC: 104 MMOL/L (ref 94–109)
CO2 SERPL-SCNC: 30 MMOL/L (ref 20–32)
COLOR UR AUTO: YELLOW
CREAT SERPL-MCNC: 1.29 MG/DL (ref 0.66–1.25)
CREAT UR-MCNC: 54 MG/DL
GFR SERPL CREATININE-BSD FRML MDRD: 55 ML/MIN/{1.73_M2}
GLUCOSE SERPL-MCNC: 120 MG/DL (ref 70–99)
GLUCOSE UR STRIP-MCNC: 70 MG/DL
HGB UR QL STRIP: NEGATIVE
KAPPA LC UR-MCNC: 2.81 MG/DL (ref 0.33–1.94)
KAPPA LC/LAMBDA SER: 1.12 {RATIO} (ref 0.26–1.65)
KETONES UR STRIP-MCNC: NEGATIVE MG/DL
LAMBDA LC SERPL-MCNC: 2.52 MG/DL (ref 0.57–2.63)
LEUKOCYTE ESTERASE UR QL STRIP: NEGATIVE
NITRATE UR QL: NEGATIVE
NON-SQ EPI CELLS #/AREA URNS LPF: NORMAL /LPF
PH UR STRIP: 7 PH (ref 5–7)
PHOSPHATE SERPL-MCNC: 3.1 MG/DL (ref 2.5–4.5)
POTASSIUM SERPL-SCNC: 3.7 MMOL/L (ref 3.4–5.3)
PROT UR-MCNC: 0.38 G/L
PROT/CREAT 24H UR: 0.71 G/G CR (ref 0–0.2)
PTH-INTACT SERPL-MCNC: 25 PG/ML (ref 18–80)
RBC #/AREA URNS AUTO: NORMAL /HPF
SODIUM SERPL-SCNC: 139 MMOL/L (ref 133–144)
SOURCE: ABNORMAL
SP GR UR STRIP: 1.01 (ref 1–1.03)
UROBILINOGEN UR STRIP-MCNC: 2 MG/DL (ref 0–2)
WBC #/AREA URNS AUTO: NORMAL /HPF

## 2019-01-15 PROCEDURE — 84156 ASSAY OF PROTEIN URINE: CPT | Performed by: INTERNAL MEDICINE

## 2019-01-15 PROCEDURE — 99204 OFFICE O/P NEW MOD 45 MIN: CPT | Performed by: INTERNAL MEDICINE

## 2019-01-15 PROCEDURE — 00000402 ZZHCL STATISTIC TOTAL PROTEIN: Performed by: INTERNAL MEDICINE

## 2019-01-15 PROCEDURE — 83970 ASSAY OF PARATHORMONE: CPT | Performed by: INTERNAL MEDICINE

## 2019-01-15 PROCEDURE — 81001 URINALYSIS AUTO W/SCOPE: CPT | Performed by: INTERNAL MEDICINE

## 2019-01-15 PROCEDURE — 80069 RENAL FUNCTION PANEL: CPT | Performed by: INTERNAL MEDICINE

## 2019-01-15 PROCEDURE — 36415 COLL VENOUS BLD VENIPUNCTURE: CPT | Performed by: INTERNAL MEDICINE

## 2019-01-15 PROCEDURE — 84165 PROTEIN E-PHORESIS SERUM: CPT | Performed by: INTERNAL MEDICINE

## 2019-01-15 PROCEDURE — 83883 ASSAY NEPHELOMETRY NOT SPEC: CPT | Performed by: INTERNAL MEDICINE

## 2019-01-15 ASSESSMENT — MIFFLIN-ST. JEOR: SCORE: 1447.08

## 2019-01-15 ASSESSMENT — PAIN SCALES - GENERAL: PAINLEVEL: NO PAIN (0)

## 2019-01-15 NOTE — NURSING NOTE
"Michele Vance's goals for this visit include:   He requests these members of his care team be copied on today's visit information:     PCP: Jonas West    Referring Provider:  Christiane Coffey PA-C  [address]  [place] [address]    /68   Pulse 72   Ht 1.727 m (5' 8\")   Wt 72.3 kg (159 lb 4.8 oz)   SpO2 96%   BMI 24.22 kg/m     "

## 2019-01-15 NOTE — PATIENT INSTRUCTIONS
1. Lab and urine tests today  2. Ultrasound to be done in [place] in the near future  3. Will update on follow-up plans based on results.

## 2019-01-15 NOTE — LETTER
"  1/15/2019      RE: Michele Vance  [address]  [facility] [address]     Dear Colleague,    Thank you for referring your patient, Michele Vance, to the [facility]. Please see a copy of my visit note below.    January 15, 2019    I was asked to see this patient by Christiane Ca PA-C for evaluation of hypertension and kidney disease.     CC: CKD, hypertension    HPI: Michele Vance is a 73 year old male who presents for evaluation of CKD and hypertension. Mr. Vance's hx includes CABG in 2014 - no sxs prior but presented with SOB and dizziness. This procedure was complicated by CVA which has left him with some memory issues and dizziness. Addt vascular hx includes PAD s/p bilateral angioplasty, stent placed in the left. He had the right leg intervened at [facility] and the left by vascular surgery associated (Karen Cash). Addt hx includes hypertension, rhabdomyolysis in 1999, carotid stenosis s/p right sided stent. His creatinine was 1.19 in October, 1.46 in December. When the creatinine jerson to 1.46, however, his spironolactone was lowered to 25 mg daily. He is taking spironolactone 25 mg daily along with potassium 10 BID given his hx of hypokalemia. He has always had his hypokalemia tx but has declined workup in the past. Blood pressure has been 140, 170 in the AM. No hx of diabetes. He is active and works out at gym 3 times per week.     - History of Hematuria: one episode 3 years ago  - Swelling: right lower leg since angioplasty  - Hx of UTIs: one episode  - Hx of stones: no  - Rashes/Joint pain: no  - Family hx of kidney disease: no  - NSAID use: tylenol       Allergies   Allergen Reactions     Hmg-Coa-R Inhibitors Other (See Comments)     Rhabdo  Same as \"statins\"     Gemfibrozil      Resting thigh-calf pain     Sulfa Drugs      Reacted as a child     Zetia [Ezetimibe]      Muscle cramping         Current Outpatient Medications on File Prior to Visit:  alirocumab (PRALUENT) 150 " MG/ML injectable pen Inject 1 mL (150 mg) Subcutaneous every 14 days   amitriptyline (ELAVIL) 25 MG tablet Take 1 tablet (25 mg) by mouth 2 times daily   amLODIPine (NORVASC) 10 MG tablet TAKE 1 TABLET EVERY EVENING   ASPIRIN PO Take 325 mg by mouth daily   carvedilol (COREG) 25 MG tablet Take 1 tablet (25 mg) by mouth 2 times daily (with meals)   cilostazol (PLETAL) 100 MG tablet TAKE 1 TABLET TWICE A DAY   gabapentin (NEURONTIN) 300 MG capsule TAKE 1 CAPSULE THREE TIMES A DAY (Patient taking differently: TAKE 1 CAPSULE  TWICE  A DAY)   isosorbide mononitrate (IMDUR) 60 MG 24 hr tablet Take 1 tablet (60 mg) by mouth daily   losartan (COZAAR) 100 MG tablet Take 1 tablet (100 mg) by mouth daily   metoclopramide (REGLAN) 10 MG tablet TAKE 1 TABLET FOUR TIMES A DAY (Patient taking differently: TAKE 1 TABLET A DAY)   omeprazole (PRILOSEC) 40 MG DR capsule Take 1 capsule (40 mg) by mouth daily   polyethylene glycol (MIRALAX/GLYCOLAX) packet Take 1 packet by mouth daily    potassium chloride ER (K-DUR/KLOR-CON M) 10 MEQ CR tablet Take 1 tablet (10 mEq) by mouth 2 times daily   psyllium (METAMUCIL) 58.6 % POWD Take by mouth daily   spironolactone (ALDACTONE) 50 MG tablet Take 1 tablet (50 mg) by mouth daily (Patient taking differently: Take 25 mg by mouth daily )   STATIN NOT PRESCRIBED, INTENTIONAL, Pt has known rhabdomyolysis in the past, somewhat associated with statins.  Also wasn't able to tolerate Zetia.     Current Facility-Administered Medications on File Prior to Visit:  triamcinolone hexacetonide (ARISTOSPAN) injection 40 mg       Past Medical History:   Diagnosis Date     Carotid stenosis     right endartectomy     Chronic kidney disease     stage 3     Coronary artery disease 3-2014    CABG x6     CVA (cerebral infarction)     balance problems, mild     Elevated CK      Esophageal reflux      Hypercholesteremia      Nonsenile cataract      Other and unspecified hyperlipidemia      PAD (peripheral artery  disease) (H)      Rhabdomyolysis 2010     Unspecified essential hypertension        Past Surgical History:   Procedure Laterality Date     APPENDECTOMY  1974     BYPASS GRAFT ARTERY CORONARY  3/5/2014    Procedure: BYPASS GRAFT ARTERY CORONARY;  Median Sternotomy, Coronary Artery Bypass Graft X6 used Left internal mammary artery, Left and Right Greater Saphenous vein, on Pump oxygenator.;  Surgeon: Tim Montanez MD;  Location: [facility] OR     CATARACT IOL, RT/LT Bilateral 2008    in [place]     cataracts       COLONOSCOPY  12/12/02    [facility]     COLONOSCOPY N/A 10/7/2014    Procedure: COMBINED COLONOSCOPY, SINGLE OR MULTIPLE BIOPSY/POLYPECTOMY BY BIOPSY;  Surgeon: Micheal Mora MD;  Location:  GI     DENTAL SURGERY      TMJ, implants     ENDARTERECTOMY CAROTID      right carotid stent     ESOPHAGOSCOPY, GASTROSCOPY, DUODENOSCOPY (EGD), COMBINED Left 10/7/2014    Procedure: COMBINED ESOPHAGOSCOPY, GASTROSCOPY, DUODENOSCOPY (EGD), BIOPSY SINGLE OR MULTIPLE;  Surgeon: Micheal Mora MD;  Location:  GI     ESOPHAGOSCOPY, GASTROSCOPY, DUODENOSCOPY (EGD), COMBINED Left 10/7/2014    Procedure: COMBINED ESOPHAGOSCOPY, GASTROSCOPY, DUODENOSCOPY (EGD), REMOVE FOREIGN BODY;  Surgeon: Micheal Mora MD;  Location:  GI      CAPSULE ENDOSCOPY N/A 10/7/2014    Procedure: CAPSULE/PILL CAM ENDOSCOPY;  Surgeon: Micheal Mora MD;  Location:  GI      UGI ENDOSCOPY, SIMPLE EXAM  01/08/99    [facility]     INJECT EPIDURAL LUMBAR Right 5/8/2017    Procedure: INJECT EPIDURAL LUMBAR;  Lumbar transforaminal Epidural Steroid Injection right lumbar 4-5, and right  lumbar 5-Sacral 1;  Surgeon: Anthony Gonzalez MD;  Location: [facility] OR     INJECT JOINT SACROILIAC Bilateral 8/28/2017    Procedure: INJECT JOINT SACROILIAC;  sacroiliac joint injection bilateral;  Surgeon: Anthony Gonzalez MD;  Location: [facility] OR     KNEE SURGERY  1976    left knee reconstruction     lasix  2001    both eyes      "RELEASE CARPAL TUNNEL  2011    RELEASE CARPAL TUNNEL performed by JO MADRID at  OR     RELEASE CARPAL TUNNEL  2011    RELEASE CARPAL TUNNEL performed by JO MADRID at  OR       Social History     Tobacco Use     Smoking status: Former Smoker     Packs/day: 2.50     Years: 20.00     Pack years: 50.00     Types: Cigarettes     Last attempt to quit: 2000     Years since quittin.1     Smokeless tobacco: Never Used   Substance Use Topics     Alcohol use: No     Alcohol/week: 0.0 oz     Drug use: No       Family History   Problem Relation Age of Onset     Hypertension Mother      Eye Disorder Mother      Cerebrovascular Disease Father      Heart Disease Father      Lipids Father      Obesity Father      Cancer Sister      Heart Disease Sister      Glaucoma No family hx of      Macular Degeneration No family hx of      Kidney Disease No family hx of        ROS: A 12 system review of systems was negative other than noted here or above.     Exam:  /68   Pulse 72   Ht 1.727 m (5' 8\")   Wt 72.3 kg (159 lb 4.8 oz)   SpO2 96%   BMI 24.22 kg/m       GENERAL APPEARANCE: alert and no distress  EYES: PERRL, no scleral icterus  HENT: mouth without ulcers or lesions  NECK: supple, no adenopathy  RESP: lungs clear to auscultation   CV: regular rhythm, normal rate, no rub  Extremities: no clubbing, cyanosis, trace edema  SKIN: no rash  NEURO: mentation intact and speech normal  PSYCH: affect normal/bright    Results:    Office Visit on 01/15/2019   Component Date Value Ref Range Status     Color Urine 01/15/2019 Yellow   Final     Appearance Urine 01/15/2019 Clear   Final     Glucose Urine 01/15/2019 70* NEG^Negative mg/dL Final     Bilirubin Urine 01/15/2019 Negative  NEG^Negative Final     Ketones Urine 01/15/2019 Negative  NEG^Negative mg/dL Final     Specific Gravity Urine 01/15/2019 1.006  1.003 - 1.035 Final     Blood Urine 01/15/2019 Negative  NEG^Negative Final     pH Urine " 01/15/2019 7.0  5.0 - 7.0 pH Final     Protein Albumin Urine 01/15/2019 30* NEG^Negative mg/dL Final     Urobilinogen mg/dL 01/15/2019 2.0  0.0 - 2.0 mg/dL Final     Nitrite Urine 01/15/2019 Negative  NEG^Negative Final     Leukocyte Esterase Urine 01/15/2019 Negative  NEG^Negative Final     Source 01/15/2019 Midstream Urine   Final     Parathyroid Hormone Intact 01/15/2019 25  18 - 80 pg/mL Final     Kappa Free Lt Chain 01/15/2019 2.81* 0.33 - 1.94 mg/dL Final     Lambda Free Lt Chain 01/15/2019 2.52  0.57 - 2.63 mg/dL Final     Kappa Lambda Ratio 01/15/2019 1.12  0.26 - 1.65 Final     Albumin Fraction 01/15/2019 4.0  3.7 - 5.1 g/dL Final     Alpha 1 Fraction 01/15/2019 0.3  0.2 - 0.4 g/dL Final     Alpha 2 Fraction 01/15/2019 0.7  0.5 - 0.9 g/dL Final     Beta Fraction 01/15/2019 1.0  0.6 - 1.0 g/dL Final     Gamma Fraction 01/15/2019 1.5  0.7 - 1.6 g/dL Final     Monoclonal Peak 01/15/2019 0.7* 0.0 g/dL Final     ELP Interpretation: 01/15/2019    Final                    Value:Monoclonal protein (about 0.7 g/dL) seen in the gamma fraction, not previously   characterized in our laboratory. Recommend serum and urine immunofixation for confirmation   and further characterization if not previously performed elsewhere. Pathologic   significance requires clinical correlation. ANDREW Avalos M.D., Ph.D., Pathologist ([phone]).        Protein Random Urine 01/15/2019 0.38  g/L Final     Protein Total Urine g/gr Creatinine 01/15/2019 0.71* 0 - 0.2 g/g Cr Final     Sodium 01/15/2019 139  133 - 144 mmol/L Final     Potassium 01/15/2019 3.7  3.4 - 5.3 mmol/L Final     Chloride 01/15/2019 104  94 - 109 mmol/L Final     Carbon Dioxide 01/15/2019 30  20 - 32 mmol/L Final     Anion Gap 01/15/2019 5  3 - 14 mmol/L Final     Glucose 01/15/2019 120* 70 - 99 mg/dL Final    Non Fasting     Urea Nitrogen 01/15/2019 11  7 - 30 mg/dL Final     Creatinine 01/15/2019 1.29* 0.66 - 1.25 mg/dL Final     GFR Estimate 01/15/2019 55* >60  mL/min/[1.73_m2] Final    Comment: Non  GFR Calc  Starting 12/18/2018, serum creatinine based estimated GFR (eGFR) will be   calculated using the Chronic Kidney Disease Epidemiology Collaboration   (CKD-EPI) equation.       GFR Estimate If Black 01/15/2019 64  >60 mL/min/[1.73_m2] Final    Comment:  GFR Calc  Starting 12/18/2018, serum creatinine based estimated GFR (eGFR) will be   calculated using the Chronic Kidney Disease Epidemiology Collaboration   (CKD-EPI) equation.       Calcium 01/15/2019 9.3  8.5 - 10.1 mg/dL Final     Phosphorus 01/15/2019 3.1  2.5 - 4.5 mg/dL Final     Albumin 01/15/2019 3.3* 3.4 - 5.0 g/dL Final     Creatinine Urine 01/15/2019 54  mg/dL Final     WBC Urine 01/15/2019 0 - 5  OTO5^0 - 5 /HPF Final     RBC Urine 01/15/2019 O - 2  OTO2^O - 2 /HPF Final     Squamous Epithelial /LPF Urine 01/15/2019 Few  FEW^Few /LPF Final       Assessment/Plan:   1. Hypertension/Hypokalemia: in the setting of hypertension and hypokalemia, need to consider hyperaldosterone state. Aldosterone was <3 when checked in Nov but that may have been when on spironolactone. Renin has not been checked. We spent much time discussing the potential of EJ vs primary hyperaldosteronism. If EJ, the evidence would not support intervention unless having hypertensive urgency/emergency. Ultrasound with doppler was ordered to start and his imaging shows a very small left kidney with normal sized right kidney. Given the size of the left kidney, it seems unlikely that intervening on that stenosis would lead to benefit. I think medical management should be followed for now. Creatinine is improved on this repeat testing. 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   - Potassium is stable at 3.7 today - will continue current regimen.    2.  CKD Stage 3: in the setting of likely vascular related injury given his significant vascular disease hx (CAD, PAD, carotid stenosis, CVA). He has some proteinuria so myeloma studies were done. He has a monoclonal protein of 0.7 so will refer to hematology for addt evaluation. He does not have diabetes. 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     3. Vascular Disease: will be important to optimize his lipid given his vascular disease burden. Last LDL was at level of 82 which is good to see. He is not on statin therapy given his hx of rhabdomyolysis.       Patient Instructions   1. Lab and urine tests today  2. Ultrasound to be done in [place] in the near future  3. Will update on follow-up plans based on results.       Nathaniel Spangler, DO     Again, thank you for allowing me to participate in the care of your patient.      Sincerely,    Nathaniel Spangler MD

## 2019-01-16 LAB
ALBUMIN SERPL ELPH-MCNC: 4 G/DL (ref 3.7–5.1)
ALPHA1 GLOB SERPL ELPH-MCNC: 0.3 G/DL (ref 0.2–0.4)
ALPHA2 GLOB SERPL ELPH-MCNC: 0.7 G/DL (ref 0.5–0.9)
B-GLOBULIN SERPL ELPH-MCNC: 1 G/DL (ref 0.6–1)
GAMMA GLOB SERPL ELPH-MCNC: 1.5 G/DL (ref 0.7–1.6)
M PROTEIN SERPL ELPH-MCNC: 0.7 G/DL
PROT PATTERN SERPL ELPH-IMP: ABNORMAL

## 2019-01-22 ENCOUNTER — HOSPITAL ENCOUNTER (OUTPATIENT)
Dept: ULTRASOUND IMAGING | Facility: CLINIC | Age: 74
Discharge: HOME | End: 2019-01-22
Attending: INTERNAL MEDICINE | Admitting: INTERNAL MEDICINE
Payer: MEDICARE

## 2019-01-22 DIAGNOSIS — E87.6 HYPOKALEMIA: ICD-10-CM

## 2019-01-22 DIAGNOSIS — I10 ESSENTIAL HYPERTENSION: ICD-10-CM

## 2019-01-22 DIAGNOSIS — N18.30 CKD (CHRONIC KIDNEY DISEASE) STAGE 3, GFR 30-59 ML/MIN (H): ICD-10-CM

## 2019-01-22 PROCEDURE — 93975 VASCULAR STUDY: CPT | Mod: TC

## 2019-02-05 NOTE — PROGRESS NOTES
"January 15, 2019    I was asked to see this patient by Christiane Ca PA-C for evaluation of hypertension and kidney disease.     CC: CKD, hypertension    HPI: Michele Vance is a 73 year old male who presents for evaluation of CKD and hypertension. Mr. Vance's hx includes CABG in 2014 - no sxs prior but presented with SOB and dizziness. This procedure was complicated by CVA which has left him with some memory issues and dizziness. Addt vascular hx includes PAD s/p bilateral angioplasty, stent placed in the left. He had the right leg intervened at [facility] and the left by vascular surgery associated (Karen Cash). Addt hx includes hypertension, rhabdomyolysis in 1999, carotid stenosis s/p right sided stent. His creatinine was 1.19 in October, 1.46 in December. When the creatinine jerson to 1.46, however, his spironolactone was lowered to 25 mg daily. He is taking spironolactone 25 mg daily along with potassium 10 BID given his hx of hypokalemia. He has always had his hypokalemia tx but has declined workup in the past. Blood pressure has been 140, 170 in the AM. No hx of diabetes. He is active and works out at gym 3 times per week.     - History of Hematuria: one episode 3 years ago  - Swelling: right lower leg since angioplasty  - Hx of UTIs: one episode  - Hx of stones: no  - Rashes/Joint pain: no  - Family hx of kidney disease: no  - NSAID use: tylenol       Allergies   Allergen Reactions     Hmg-Coa-R Inhibitors Other (See Comments)     Rhabdo  Same as \"statins\"     Gemfibrozil      Resting thigh-calf pain     Sulfa Drugs      Reacted as a child     Zetia [Ezetimibe]      Muscle cramping         Current Outpatient Medications on File Prior to Visit:  alirocumab (PRALUENT) 150 MG/ML injectable pen Inject 1 mL (150 mg) Subcutaneous every 14 days   amitriptyline (ELAVIL) 25 MG tablet Take 1 tablet (25 mg) by mouth 2 times daily   amLODIPine (NORVASC) 10 MG tablet TAKE 1 TABLET EVERY EVENING   ASPIRIN PO Take 325 mg " by mouth daily   carvedilol (COREG) 25 MG tablet Take 1 tablet (25 mg) by mouth 2 times daily (with meals)   cilostazol (PLETAL) 100 MG tablet TAKE 1 TABLET TWICE A DAY   gabapentin (NEURONTIN) 300 MG capsule TAKE 1 CAPSULE THREE TIMES A DAY (Patient taking differently: TAKE 1 CAPSULE  TWICE  A DAY)   isosorbide mononitrate (IMDUR) 60 MG 24 hr tablet Take 1 tablet (60 mg) by mouth daily   losartan (COZAAR) 100 MG tablet Take 1 tablet (100 mg) by mouth daily   metoclopramide (REGLAN) 10 MG tablet TAKE 1 TABLET FOUR TIMES A DAY (Patient taking differently: TAKE 1 TABLET A DAY)   omeprazole (PRILOSEC) 40 MG DR capsule Take 1 capsule (40 mg) by mouth daily   polyethylene glycol (MIRALAX/GLYCOLAX) packet Take 1 packet by mouth daily    potassium chloride ER (K-DUR/KLOR-CON M) 10 MEQ CR tablet Take 1 tablet (10 mEq) by mouth 2 times daily   psyllium (METAMUCIL) 58.6 % POWD Take by mouth daily   spironolactone (ALDACTONE) 50 MG tablet Take 1 tablet (50 mg) by mouth daily (Patient taking differently: Take 25 mg by mouth daily )   STATIN NOT PRESCRIBED, INTENTIONAL, Pt has known rhabdomyolysis in the past, somewhat associated with statins.  Also wasn't able to tolerate Zetia.     Current Facility-Administered Medications on File Prior to Visit:  triamcinolone hexacetonide (ARISTOSPAN) injection 40 mg       Past Medical History:   Diagnosis Date     Carotid stenosis     right endartectomy     Chronic kidney disease     stage 3     Coronary artery disease 3-2014    CABG x6     CVA (cerebral infarction)     balance problems, mild     Elevated CK      Esophageal reflux      Hypercholesteremia      Nonsenile cataract      Other and unspecified hyperlipidemia      PAD (peripheral artery disease) (H)      Rhabdomyolysis 2010     Unspecified essential hypertension        Past Surgical History:   Procedure Laterality Date     APPENDECTOMY  1974     BYPASS GRAFT ARTERY CORONARY  3/5/2014    Procedure: BYPASS GRAFT ARTERY CORONARY;   Median Sternotomy, Coronary Artery Bypass Graft X6 used Left internal mammary artery, Left and Right Greater Saphenous vein, on Pump oxygenator.;  Surgeon: Tim Montanez MD;  Location:  OR     CATARACT IOL, RT/LT Bilateral 2008    in [place]     cataracts       COLONOSCOPY  12/12/02    [facility]     COLONOSCOPY N/A 10/7/2014    Procedure: COMBINED COLONOSCOPY, SINGLE OR MULTIPLE BIOPSY/POLYPECTOMY BY BIOPSY;  Surgeon: Micheal Mora MD;  Location:  GI     DENTAL SURGERY      TMJ, implants     ENDARTERECTOMY CAROTID      right carotid stent     ESOPHAGOSCOPY, GASTROSCOPY, DUODENOSCOPY (EGD), COMBINED Left 10/7/2014    Procedure: COMBINED ESOPHAGOSCOPY, GASTROSCOPY, DUODENOSCOPY (EGD), BIOPSY SINGLE OR MULTIPLE;  Surgeon: Micheal Mora MD;  Location: [facility]     ESOPHAGOSCOPY, GASTROSCOPY, DUODENOSCOPY (EGD), COMBINED Left 10/7/2014    Procedure: COMBINED ESOPHAGOSCOPY, GASTROSCOPY, DUODENOSCOPY (EGD), REMOVE FOREIGN BODY;  Surgeon: Micheal Mora MD;  Location: [facility] CAPSULE ENDOSCOPY N/A 10/7/2014    Procedure: CAPSULE/PILL CAM ENDOSCOPY;  Surgeon: Micheal Mora MD;  Location: [facility] UGI ENDOSCOPY, SIMPLE EXAM  01/08/99    [facility]     INJECT EPIDURAL LUMBAR Right 5/8/2017    Procedure: INJECT EPIDURAL LUMBAR;  Lumbar transforaminal Epidural Steroid Injection right lumbar 4-5, and right  lumbar 5-Sacral 1;  Surgeon: Anthony Gonzalez MD;  Location:  OR     INJECT JOINT SACROILIAC Bilateral 8/28/2017    Procedure: INJECT JOINT SACROILIAC;  sacroiliac joint injection bilateral;  Surgeon: Anthony Gonzalez MD;  Location:  OR     KNEE SURGERY  1976    left knee reconstruction     lasix  2001    both eyes     RELEASE CARPAL TUNNEL  1/13/2011    RELEASE CARPAL TUNNEL performed by JO MADRID at  OR     RELEASE CARPAL TUNNEL  1/20/2011    RELEASE CARPAL TUNNEL performed by JO MADRID at  OR       Social History     Tobacco Use     Smoking  "status: Former Smoker     Packs/day: 2.50     Years: 20.00     Pack years: 50.00     Types: Cigarettes     Last attempt to quit: 2000     Years since quittin.1     Smokeless tobacco: Never Used   Substance Use Topics     Alcohol use: No     Alcohol/week: 0.0 oz     Drug use: No       Family History   Problem Relation Age of Onset     Hypertension Mother      Eye Disorder Mother      Cerebrovascular Disease Father      Heart Disease Father      Lipids Father      Obesity Father      Cancer Sister      Heart Disease Sister      Glaucoma No family hx of      Macular Degeneration No family hx of      Kidney Disease No family hx of        ROS: A 12 system review of systems was negative other than noted here or above.     Exam:  /68   Pulse 72   Ht 1.727 m (5' 8\")   Wt 72.3 kg (159 lb 4.8 oz)   SpO2 96%   BMI 24.22 kg/m      GENERAL APPEARANCE: alert and no distress  EYES: PERRL, no scleral icterus  HENT: mouth without ulcers or lesions  NECK: supple, no adenopathy  RESP: lungs clear to auscultation   CV: regular rhythm, normal rate, no rub  Extremities: no clubbing, cyanosis, trace edema  SKIN: no rash  NEURO: mentation intact and speech normal  PSYCH: affect normal/bright    Results:    Office Visit on 01/15/2019   Component Date Value Ref Range Status     Color Urine 01/15/2019 Yellow   Final     Appearance Urine 01/15/2019 Clear   Final     Glucose Urine 01/15/2019 70* NEG^Negative mg/dL Final     Bilirubin Urine 01/15/2019 Negative  NEG^Negative Final     Ketones Urine 01/15/2019 Negative  NEG^Negative mg/dL Final     Specific Gravity Urine 01/15/2019 1.006  1.003 - 1.035 Final     Blood Urine 01/15/2019 Negative  NEG^Negative Final     pH Urine 01/15/2019 7.0  5.0 - 7.0 pH Final     Protein Albumin Urine 01/15/2019 30* NEG^Negative mg/dL Final     Urobilinogen mg/dL 01/15/2019 2.0  0.0 - 2.0 mg/dL Final     Nitrite Urine 01/15/2019 Negative  NEG^Negative Final     Leukocyte Esterase Urine " 01/15/2019 Negative  NEG^Negative Final     Source 01/15/2019 Midstream Urine   Final     Parathyroid Hormone Intact 01/15/2019 25  18 - 80 pg/mL Final     Kappa Free Lt Chain 01/15/2019 2.81* 0.33 - 1.94 mg/dL Final     Lambda Free Lt Chain 01/15/2019 2.52  0.57 - 2.63 mg/dL Final     Kappa Lambda Ratio 01/15/2019 1.12  0.26 - 1.65 Final     Albumin Fraction 01/15/2019 4.0  3.7 - 5.1 g/dL Final     Alpha 1 Fraction 01/15/2019 0.3  0.2 - 0.4 g/dL Final     Alpha 2 Fraction 01/15/2019 0.7  0.5 - 0.9 g/dL Final     Beta Fraction 01/15/2019 1.0  0.6 - 1.0 g/dL Final     Gamma Fraction 01/15/2019 1.5  0.7 - 1.6 g/dL Final     Monoclonal Peak 01/15/2019 0.7* 0.0 g/dL Final     ELP Interpretation: 01/15/2019    Final                    Value:Monoclonal protein (about 0.7 g/dL) seen in the gamma fraction, not previously   characterized in our laboratory. Recommend serum and urine immunofixation for confirmation   and further characterization if not previously performed elsewhere. Pathologic   significance requires clinical correlation. ANDREW Avalos M.D., Ph.D., Pathologist ([phone]).        Protein Random Urine 01/15/2019 0.38  g/L Final     Protein Total Urine g/gr Creatinine 01/15/2019 0.71* 0 - 0.2 g/g Cr Final     Sodium 01/15/2019 139  133 - 144 mmol/L Final     Potassium 01/15/2019 3.7  3.4 - 5.3 mmol/L Final     Chloride 01/15/2019 104  94 - 109 mmol/L Final     Carbon Dioxide 01/15/2019 30  20 - 32 mmol/L Final     Anion Gap 01/15/2019 5  3 - 14 mmol/L Final     Glucose 01/15/2019 120* 70 - 99 mg/dL Final    Non Fasting     Urea Nitrogen 01/15/2019 11  7 - 30 mg/dL Final     Creatinine 01/15/2019 1.29* 0.66 - 1.25 mg/dL Final     GFR Estimate 01/15/2019 55* >60 mL/min/[1.73_m2] Final    Comment: Non  GFR Calc  Starting 12/18/2018, serum creatinine based estimated GFR (eGFR) will be   calculated using the Chronic Kidney Disease Epidemiology Collaboration   (CKD-EPI) equation.       GFR  Estimate If Black 01/15/2019 64  >60 mL/min/[1.73_m2] Final    Comment:  GFR Calc  Starting 12/18/2018, serum creatinine based estimated GFR (eGFR) will be   calculated using the Chronic Kidney Disease Epidemiology Collaboration   (CKD-EPI) equation.       Calcium 01/15/2019 9.3  8.5 - 10.1 mg/dL Final     Phosphorus 01/15/2019 3.1  2.5 - 4.5 mg/dL Final     Albumin 01/15/2019 3.3* 3.4 - 5.0 g/dL Final     Creatinine Urine 01/15/2019 54  mg/dL Final     WBC Urine 01/15/2019 0 - 5  OTO5^0 - 5 /HPF Final     RBC Urine 01/15/2019 O - 2  OTO2^O - 2 /HPF Final     Squamous Epithelial /LPF Urine 01/15/2019 Few  FEW^Few /LPF Final       Assessment/Plan:   1. Hypertension/Hypokalemia: in the setting of hypertension and hypokalemia, need to consider hyperaldosterone state. Aldosterone was <3 when checked in Nov but that may have been when on spironolactone. Renin has not been checked. We spent much time discussing the potential of EJ vs primary hyperaldosteronism. If EJ, the evidence would not support intervention unless having hypertensive urgency/emergency. Ultrasound with doppler was ordered to start and his imaging shows a very small left kidney with normal sized right kidney. Given the size of the left kidney, it seems unlikely that intervening on that stenosis would lead to benefit. I think medical management should be followed for now. Creatinine is improved on this repeat testing. 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   - Potassium is stable at 3.7 today - will continue current regimen.    2. CKD Stage 3: in the setting of likely vascular related injury given his significant vascular disease hx (CAD, PAD, carotid stenosis, CVA). He has some proteinuria so myeloma studies were done. He has a monoclonal protein of 0.7 so will refer  to hematology for addt evaluation. He does not have diabetes. 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     3. Vascular Disease: will be important to optimize his lipid given his vascular disease burden. Last LDL was at level of 82 which is good to see. He is not on statin therapy given his hx of rhabdomyolysis.       Patient Instructions   1. Lab and urine tests today  2. Ultrasound to be done in [place] in the near future  3. Will update on follow-up plans based on results.       Nathaniel Spangler, DO

## 2019-02-06 ENCOUNTER — TELEPHONE (OUTPATIENT)
Dept: ONCOLOGY | Facility: CLINIC | Age: 74
End: 2019-02-06

## 2019-02-06 NOTE — TELEPHONE ENCOUNTER
ONCOLOGY INTAKE: Records Information      APPT INFORMATION: 02/21 at 02:00 w/ Dr. Baptiste at    Referring provider:  Nathaniel Spangler MD  Referring provider s clinic:   Nephro  Reason for visit/diagnosis:  Monoclonal gammopathy    Were the records received with the referral (via [employer])? Complete/ Per pt     Has patient been seen for any external appt for this diagnosis (enter clinic/location)? No    Monoclonal gammopathy: Caller intake: Ref by: Nathaniel Spangler MD  Nephro: Records In Epic

## 2019-02-18 ENCOUNTER — TELEPHONE (OUTPATIENT)
Dept: CARDIOLOGY | Facility: CLINIC | Age: 74
End: 2019-02-18

## 2019-02-18 NOTE — TELEPHONE ENCOUNTER
RN called [employer] to provide information for Prior Authorization for Praluent 150 mg every 14 days. With the information provided pt was approved from 1/19/19 through 2/18/20.    RN left a message for pt with the above information. Pt to call with any questions or concerns.

## 2019-02-19 NOTE — TELEPHONE ENCOUNTER
RECORDS STATUS - ALL OTHER DIAGNOSIS      RECORDS RECEIVED FROM: Internal Referral   DATE RECEIVED: Epic   NOTES STATUS DETAILS   OFFICE NOTE from referring provider     OFFICE NOTE from medical oncologist     DISCHARGE SUMMARY from hospital     DISCHARGE REPORT from the ER     OPERATIVE REPORT     MEDICATION LIST     CLINICAL TRIAL TREATMENTS TO DATE     LABS     PATHOLOGY REPORTS     ANYTHING RELATED TO DIAGNOSIS     GENONOMIC TESTING     TYPE:     IMAGING (NEED IMAGES & REPORT)     CT SCANS     MRI     MAMMO     ULTRASOUND     PET

## 2019-02-21 ENCOUNTER — PRE VISIT (OUTPATIENT)
Dept: ONCOLOGY | Facility: CLINIC | Age: 74
End: 2019-02-21

## 2019-02-21 ENCOUNTER — ONCOLOGY VISIT (OUTPATIENT)
Dept: ONCOLOGY | Facility: CLINIC | Age: 74
End: 2019-02-21
Attending: INTERNAL MEDICINE
Payer: MEDICARE

## 2019-02-21 VITALS
RESPIRATION RATE: 18 BRPM | DIASTOLIC BLOOD PRESSURE: 66 MMHG | HEIGHT: 68 IN | SYSTOLIC BLOOD PRESSURE: 155 MMHG | OXYGEN SATURATION: 96 % | WEIGHT: 169.75 LBS | HEART RATE: 66 BPM | BODY MASS INDEX: 25.73 KG/M2 | TEMPERATURE: 97.5 F

## 2019-02-21 DIAGNOSIS — D47.2 MGUS (MONOCLONAL GAMMOPATHY OF UNKNOWN SIGNIFICANCE): Primary | ICD-10-CM

## 2019-02-21 LAB
ALBUMIN SERPL-MCNC: 3.4 G/DL (ref 3.4–5)
ALP SERPL-CCNC: 199 U/L (ref 40–150)
ALT SERPL W P-5'-P-CCNC: 43 U/L (ref 0–70)
ANION GAP SERPL CALCULATED.3IONS-SCNC: 9 MMOL/L (ref 3–14)
AST SERPL W P-5'-P-CCNC: 25 U/L (ref 0–45)
BASOPHILS # BLD AUTO: 0 10E9/L (ref 0–0.2)
BASOPHILS NFR BLD AUTO: 0.5 %
BILIRUB SERPL-MCNC: 0.6 MG/DL (ref 0.2–1.3)
BUN SERPL-MCNC: 11 MG/DL (ref 7–30)
CALCIUM SERPL-MCNC: 9 MG/DL (ref 8.5–10.1)
CHLORIDE SERPL-SCNC: 95 MMOL/L (ref 94–109)
CO2 SERPL-SCNC: 25 MMOL/L (ref 20–32)
CREAT SERPL-MCNC: 1.31 MG/DL (ref 0.66–1.25)
CRP SERPL-MCNC: <2.9 MG/L (ref 0–8)
DIFFERENTIAL METHOD BLD: ABNORMAL
EOSINOPHIL # BLD AUTO: 0.4 10E9/L (ref 0–0.7)
EOSINOPHIL NFR BLD AUTO: 5.8 %
ERYTHROCYTE [DISTWIDTH] IN BLOOD BY AUTOMATED COUNT: 12.7 % (ref 10–15)
GFR SERPL CREATININE-BSD FRML MDRD: 54 ML/MIN/{1.73_M2}
GLUCOSE SERPL-MCNC: 121 MG/DL (ref 70–99)
HCT VFR BLD AUTO: 35.5 % (ref 40–53)
HGB BLD-MCNC: 12.4 G/DL (ref 13.3–17.7)
IMM GRANULOCYTES # BLD: 0 10E9/L (ref 0–0.4)
IMM GRANULOCYTES NFR BLD: 0.2 %
LYMPHOCYTES # BLD AUTO: 2 10E9/L (ref 0.8–5.3)
LYMPHOCYTES NFR BLD AUTO: 30.6 %
MCH RBC QN AUTO: 33.1 PG (ref 26.5–33)
MCHC RBC AUTO-ENTMCNC: 34.9 G/DL (ref 31.5–36.5)
MCV RBC AUTO: 95 FL (ref 78–100)
MONOCYTES # BLD AUTO: 0.7 10E9/L (ref 0–1.3)
MONOCYTES NFR BLD AUTO: 10.8 %
NEUTROPHILS # BLD AUTO: 3.4 10E9/L (ref 1.6–8.3)
NEUTROPHILS NFR BLD AUTO: 52.1 %
PLATELET # BLD AUTO: 308 10E9/L (ref 150–450)
POTASSIUM SERPL-SCNC: 4 MMOL/L (ref 3.4–5.3)
PROT SERPL-MCNC: 7.7 G/DL (ref 6.8–8.8)
RBC # BLD AUTO: 3.75 10E12/L (ref 4.4–5.9)
SODIUM SERPL-SCNC: 129 MMOL/L (ref 133–144)
WBC # BLD AUTO: 6.6 10E9/L (ref 4–11)

## 2019-02-21 PROCEDURE — 82784 ASSAY IGA/IGD/IGG/IGM EACH: CPT | Performed by: INTERNAL MEDICINE

## 2019-02-21 PROCEDURE — 86140 C-REACTIVE PROTEIN: CPT | Performed by: INTERNAL MEDICINE

## 2019-02-21 PROCEDURE — 82232 ASSAY OF BETA-2 PROTEIN: CPT | Performed by: INTERNAL MEDICINE

## 2019-02-21 PROCEDURE — 99204 OFFICE O/P NEW MOD 45 MIN: CPT | Performed by: INTERNAL MEDICINE

## 2019-02-21 PROCEDURE — 80053 COMPREHEN METABOLIC PANEL: CPT | Performed by: INTERNAL MEDICINE

## 2019-02-21 PROCEDURE — 85025 COMPLETE CBC W/AUTO DIFF WBC: CPT | Performed by: INTERNAL MEDICINE

## 2019-02-21 PROCEDURE — 36415 COLL VENOUS BLD VENIPUNCTURE: CPT | Performed by: INTERNAL MEDICINE

## 2019-02-21 ASSESSMENT — PAIN SCALES - GENERAL: PAINLEVEL: NO PAIN (0)

## 2019-02-21 ASSESSMENT — MIFFLIN-ST. JEOR: SCORE: 1494.48

## 2019-02-21 NOTE — NURSING NOTE
"Oncology Rooming Note    February 21, 2019 1:45 PM   Michele Vance is a 73 year old male who presents for:    Chief Complaint   Patient presents with     Oncology Clinic Visit     New Patient     Initial Vitals: /66 (BP Location: Right arm)   Pulse 66   Temp 97.5  F (36.4  C) (Oral)   Resp 18   Ht 1.727 m (5' 8\")   Wt 77 kg (169 lb 12 oz)   SpO2 96%   BMI 25.81 kg/m   Estimated body mass index is 25.81 kg/m  as calculated from the following:    Height as of this encounter: 1.727 m (5' 8\").    Weight as of this encounter: 77 kg (169 lb 12 oz). Body surface area is 1.92 meters squared.  No Pain (0) Comment: Data Unavailable   No LMP for male patient.  Allergies reviewed: Yes  Medications reviewed: Yes    Medications: Medication refills not needed today.  Pharmacy name entered into EPIC:    [facility] - EPRESCRIBE ONLY  [facility] - [facility], [place] - [address] [place] [address] [place]ARMACY [place], [place] - [address] [facility] - [place], [place] - [address]              "

## 2019-02-21 NOTE — PROGRESS NOTES
[facility]    NEW PATIENT VISIT NOTE    PATIENT NAME: Michele Vance MRN # [id number]  DATE OF VISIT: February 21, 2019 YOB: 1945    REFERRING PROVIDER: Nathaniel Spangler MD  [address] [address]     HISTORY OF PRESENT ILLNESS     Michele has been following with Dr. Spangler for his CKD. He had CABG in 2014. He would get dizzy after trying to sweep floor. He followed with his cardiology team. He was noted to have hypokalemia. He was worked up for CAD again and was referred to nephrology with his CKD and hypokalemia. He had low potassium in the past when he was in [place] about 10 yrs ago.     He has swelling in his right leg since his CABG. He had peripheral vascular disease and had bilateral angioplasty with stents placed. He needed toe nail removed in left leg but has been deferred for poor circulation.     He is SOB most of the times. He has to take deep breath even at rest; has few episodes during the day. His hips hurt on walking. He can walk with the cart around the grocery store. He can walk half a mile when he has to stop with hip pain. He has dry mouth and cough.     He feels a lot colder since his operation. He is always cold. He had been going to gym but has not gone for last 2-3 weeks. He had been going to gym M/W/F and would spend 45 min. He has memory loss after 3 CVA - right around time of his CABG (before, during and after procedure) per his wife.     He denies anxiety or depression. He lost about 20 lbs over 3 months about 6 months ago. He has been recently gaining weight as he is less active.      PAST MEDICAL HISTORY     Past Medical History:   Diagnosis Date     Carotid stenosis     right endartectomy     Chronic kidney disease     stage 3     Coronary artery disease 3-2014    CABG x6     CVA (cerebral infarction)     balance problems, mild     Elevated CK      Esophageal reflux      Hypercholesteremia      Nonsenile  cataract      Other and unspecified hyperlipidemia      PAD (peripheral artery disease) (H)      Rhabdomyolysis 2010     Unspecified essential hypertension           CURRENT OUTPATIENT MEDICATIONS     Current Outpatient Medications   Medication Sig     alirocumab (PRALUENT) 150 MG/ML injectable pen Inject 1 mL (150 mg) Subcutaneous every 14 days     amitriptyline (ELAVIL) 25 MG tablet Take 1 tablet (25 mg) by mouth 2 times daily     amLODIPine (NORVASC) 10 MG tablet TAKE 1 TABLET EVERY EVENING     ASPIRIN PO Take 325 mg by mouth daily     carvedilol (COREG) 25 MG tablet Take 1 tablet (25 mg) by mouth 2 times daily (with meals)     cilostazol (PLETAL) 100 MG tablet TAKE 1 TABLET TWICE A DAY     gabapentin (NEURONTIN) 300 MG capsule TAKE 1 CAPSULE THREE TIMES A DAY (Patient taking differently: TAKE 1 CAPSULE  TWICE  A DAY)     isosorbide mononitrate (IMDUR) 60 MG 24 hr tablet Take 1 tablet (60 mg) by mouth daily     losartan (COZAAR) 100 MG tablet Take 1 tablet (100 mg) by mouth daily     metoclopramide (REGLAN) 10 MG tablet TAKE 1 TABLET FOUR TIMES A DAY (Patient taking differently: TAKE 1 TABLET A DAY)     omeprazole (PRILOSEC) 40 MG DR capsule Take 1 capsule (40 mg) by mouth daily     polyethylene glycol (MIRALAX/GLYCOLAX) packet Take 1 packet by mouth daily      potassium chloride ER (K-DUR/KLOR-CON M) 10 MEQ CR tablet Take 1 tablet (10 mEq) by mouth 2 times daily     psyllium (METAMUCIL) 58.6 % POWD Take by mouth daily     spironolactone (ALDACTONE) 50 MG tablet Take 1 tablet (50 mg) by mouth daily (Patient taking differently: Take 25 mg by mouth daily )     STATIN NOT PRESCRIBED, INTENTIONAL, Pt has known rhabdomyolysis in the past, somewhat associated with statins.  Also wasn't able to tolerate Zetia.     Current Facility-Administered Medications   Medication     triamcinolone hexacetonide (ARISTOSPAN) injection 40 mg        ALLERGIES      Allergies   Allergen Reactions     Hmg-Coa-R Inhibitors Other (See  "Comments)     Rhabdo  Same as \"statins\"     Gemfibrozil      Resting thigh-calf pain     Sulfa Drugs      Reacted as a child     Zetia [Ezetimibe]      Muscle cramping        SOCIAL HISTORY   He is  and lives with his wife. He worked as  for "[employer] in [place]. He is retired now. He was in  for 20 yrs. He has quit alcohol and cigarettes since then. He denies alcohol or drug abuse.     He smoked pack and half for about 20 yrs.     Social History     Social History Narrative    Moved from [place] in August, retired  for "[employer].          FAMILY HISTORY   - Sister had uterine cancer and oropharyngeal cancer (non-smoker)  - Father had ALS  - Mother had Alzheimer's disease     REVIEW OF SYSTEMS   As above in the HPI, o/w complete 12-point ROS was negative.     PHYSICAL EXAM   B/P: 155/66, T: 97.5, P: 66, R: 18  @LASTSAO2(4)@  Wt Readings from Last 3 Encounters:   02/21/19 77 kg (169 lb 12 oz)   01/15/19 72.3 kg (159 lb 4.8 oz)   12/10/18 71.7 kg (158 lb)     GEN: NAD  HEENT: PERRL, EOMI, no icterus, injection or pallor. Oropharynx is clear.  NECK: no cervical or supraclavicular lymphadenopathy  LUNGS: clear bilaterally  CV: regular, no murmurs, rubs, or gallops  ABDOMEN: soft, non-tender, non-distended, normal bowel sounds, no hepatosplenomegaly by percussion or palpation  EXT: warm, well perfused, no edema  NEURO: alert  SKIN: no rashes     LABORATORY AND IMAGING STUDIES     Recent Labs   Lab Test 01/15/19  0922 12/17/18  1139 12/07/18  1036 11/16/18  0923 11/08/18  1029    134 136 142 139   POTASSIUM 3.7 4.5 4.3 3.6 4.6   CHLORIDE 104 100 100 101 105   CO2 30 26 29 31 25   ANIONGAP 5 8 7 10 9   BUN 11 18 15 15 15   CR 1.29* 1.46* 1.46* 1.37* 1.35*   * 84 91 154* 139*   RAHEEM 9.3 8.8 8.8 8.8 9.6     Recent Labs   Lab Test 01/15/19  0922 11/17/18  0746 03/10/14  0405 03/09/14  0320 03/08/14  0300 03/07/14  0400 03/06/14  1000   MAG  --  1.9 2.0 2.1 2.3 2.2 2.2   PHOS " 3.1  --  2.8  --  2.5 2.9 3.7     Recent Labs   Lab Test 10/23/18  1240 03/12/18  1352 03/26/15  0708 12/05/14  0930 08/07/14  0728 08/06/14  1648 05/14/14  1014  03/06/14  0415   WBC 6.8  --  7.5 8.0 7.7 6.0 5.5   < > 13.3*   HGB 13.3 14.7 14.0 14.2 12.9* 13.2* 12.8*   < > 11.8*     --  307 260 286 327 329   < > 222   MCV 96  --  96 93 90 90 93   < > 94   NEUTROPHIL  --   --   --   --   --  52.3 48.7  --  71.7    < > = values in this interval not displayed.     Recent Labs   Lab Test 01/15/19  0922 08/08/16  0758 03/26/15  0708 10/24/14  0809 08/02/14  0742   BILITOTAL  --   --  0.5 0.4 0.3   ALKPHOS  --   --  72 90 83   ALT  --  18 17 22 21   AST  --   --  18 18 18   ALBUMIN 3.3*  --  3.7 3.4 3.4     TSH   Date Value Ref Range Status   05/14/2014 0.74 0.4 - 5.0 mU/L Final   12/09/2013 1.00 0.4 - 5.0 mU/L Final     No results for input(s): CEA in the last 78840 hours.  Results for orders placed or performed during the hospital encounter of 01/22/19   US Renal Complete w Doppler Complete    Narrative    US RENAL COMPLETE WITH DOPPLER COMPLETE  1/22/2019 10:30 AM     HISTORY:  Hypokalemia. Concerns for renal artery stenosis.    COMPARISON: CT chest dated 10/31/2018    FINDINGS:   The right kidney measures 10.2 x 5.4 x 5.5. The right renal cortex  measures 1.1 in thickness. This is within normal limits. There is no  hydronephrosis. Renal cortical echogenicity is unremarkable.    Spectral waveform analysis was performed.  The peak systolic  velocities in the right renal artery at its origin, mid aspect, and  distal aspect are 1:30, 124, and 1 29 cm/s respectively. Low  resistance waveforms are identified in the right renal artery. The  resistance indices in the right arcuate arteries range between  0.7-0.8.    The left kidney measures 7.7 x 4.4 x 4.5 cm. The left renal cortex  measures 1.1 cm in thickness. This is within normal limits. There is  no hydronephrosis. Renal cortical echogenicity is  "unremarkable.    Spectral waveform analysis was performed.  The peak systolic  velocities in the left renal artery at its origin, mid aspect, and  distal aspect are 768, 565, and 54 cm/s respectively. Low resistance  waveforms are identified in the left renal artery. Dampened  poststenotic waveforms are identified in the arcuate arteries. The  resistance indices in the left arcuate arteries range between 0.7-0.8.    The peak systolic velocity in the abdominal aorta superior to the  origin of the renal arteries is 113 cm/s.    Incidental note is made of a gallstone.      Impression    IMPRESSION: Sonographic findings suggest a significant stenosis in the  proximal left renal artery.     ILEANA GAMBOA MD       Lab Results   Component Value Date    PATH  01/16/2018     Patient Name: NINFA CID  MR#: [id number]  Specimen #: [id number]  Collected: 1/16/2018  Received: 1/16/2018  Reported: 1/18/2018 16:18  Ordering Phy(s): CORTEZ MERRITT    For improved result formatting, select 'View Enhanced Report Format' under   Linked Documents section.    SPECIMEN(S):  Colon polyp, descending    FINAL DIAGNOSIS:  Colon polyp, descending:  - Tubular adenoma.  - Negative for high grade dysplasia or malignancy.    Electronically signed out by:    CARMELINA Pineda M.D.    CLINICAL HISTORY:  72-year-old male. Screening.    GROSS:  The specimen is received in formalin, labeled the patient's name and date   of birth, and designated \"descending  colon polyp\".  It consists of two tan tissue fragments measuring 0.2 cm   and 0.7 cm in greatest dimension.  Entirely submitted in one cassette. (Dictated by: Olive CLEMENS 1/16/2018   04:20 PM)    MICROSCOPIC:  The sections show portions of a low grade adenomatous colon polyp.  The   polyp glands are lined by mitotically  active pseudostratified columnar epithelium with elongated and tapered   basal oriented nuclei. No appreciable  nuclear pleomorphism is seen. The glands show a " depletion of the normal   number of goblet cells.  No areas of  high-grade dysplasia or malignancy are identified.  (Dictated by: ANI Pineda MD 01/18/2018)    CPT Codes:  A: 85155-BM5    TESTING LAB LOCATION:  [facility], [address] [place] [id number]  [phone]    COLLECTION SITE:  Client: [facility]  Location: [facility] (P)       Recent Labs   Lab Test 01/15/19  0922   KAPPAFREELT 2.81*   LAMBDAFREELT 2.52   KLR 1.12   ELPM 0.7*         ASSESSMENT    1. Monoclonal gammopathy of undetermined significance with a M spike of 0.7 g/dl  2. CKD stage III  3. HTN  4. Coronary artery disease s/p PTCA and stents x 6;   5. Carotid stenosis s/p CVA x3 with minimal residual right hemiparesis and significant memory loss  6. Peripheral vascular disease needing bilateral stents for lower extremity    DISCUSSION   I had a lengthy discussion with Mr. Vance in presence of his family. I reviewed the finding of his new monoclonal gammopathy. This is considered a pre-malignant condition involving the plasma cells. I reviewed the 3 involved entities including MGUS, smoldering multiple myeloma(MM) and MM. Clearly MM is a serious malignancy involving the plasma cell and has aggressive course. he barely has a small spike of 0.7g/dl which has been identified incidentally as is usually the case. He has CKD stage III and his renal function has been relatively stable over last several years. I do not feel that this CKD is from his small abnormal protein but rather due to his macrovascular disease like is CAD/CVA/PVD. He does not have peripheral neuropathy, vasculitis, hemolytic anemia, skin rashes, hypercalcemia which are often seen with MM.   I reviewed some additional tests that could be done as a part of workup including the free light chain assay, beta 2 microglobulin, gamma globulin subsets, urinary protein electrophoresis and skeletal survey. I  briefly reviewed a bone marrow biopsy but it is not required given the small spike in abnormal protein.     We could follow him serially with monoclonal spike and work up more aggressively if there was suddenly a rapid increase. The likelihood of having this disease remains small.      PLAN   I would complete some additional work up for his MGUS  I would recommend serial follow up of monoclonal protein  I would schedule a follow up appointment once the above test results are available.     All questions for patient and his wife were answered to their satisfaction.     Over 45 min of direct face to face time spent with patient with more than 50% time spent in counseling and coordinating care.     Roger Garcia ,  Division of Hematology, Oncology & Transplantation  [facility].

## 2019-02-21 NOTE — LETTER
2/21/2019         RE: Michele Vance  [address]  [facility] [address]        Dear Colleague,    Thank you for referring your patient, Michele Vance, to the [facility]. Please see a copy of my visit note below.    [facility] CANCER [facility]    NEW PATIENT VISIT NOTE    PATIENT NAME: Michele Vance MRN # [id number]  DATE OF VISIT: February 21, 2019 YOB: 1945    REFERRING PROVIDER: Nathaniel Spangler MD  [address] [facility] [address]  [place] [address]     HISTORY OF PRESENT ILLNESS     Michele has been following with Dr. Spangler for his CKD. He had CABG in 2014. He would get dizzy after trying to sweep floor. He followed with his cardiology team. He was noted to have hypokalemia. He was worked up for CAD again and was referred to nephrology with his CKD and hypokalemia. He had low potassium in the past when he was in [place] about 10 yrs ago.     He has swelling in his right leg since his CABG. He had peripheral vascular disease and had bilateral angioplasty with stents placed. He needed toe nail removed in left leg but has been deferred for poor circulation.     He is SOB most of the times. He has to take deep breath even at rest; has few episodes during the day. His hips hurt on walking. He can walk with the cart around the grocery store. He can walk half a mile when he has to stop with hip pain. He has dry mouth and cough.     He feels a lot colder since his operation. He is always cold. He had been going to gym but has not gone for last 2-3 weeks. He had been going to gym M/W/F and would spend 45 min. He has memory loss after 3 CVA - right around time of his CABG (before, during and after procedure) per his wife.     He denies anxiety or depression. He lost about 20 lbs over 3 months about 6 months ago. He has been recently gaining weight as he is less active.      PAST MEDICAL HISTORY     Past Medical History:   Diagnosis Date     Carotid  stenosis     right endartectomy     Chronic kidney disease     stage 3     Coronary artery disease 3-2014    CABG x6     CVA (cerebral infarction)     balance problems, mild     Elevated CK      Esophageal reflux      Hypercholesteremia      Nonsenile cataract      Other and unspecified hyperlipidemia      PAD (peripheral artery disease) (H)      Rhabdomyolysis 2010     Unspecified essential hypertension           CURRENT OUTPATIENT MEDICATIONS     Current Outpatient Medications   Medication Sig     alirocumab (PRALUENT) 150 MG/ML injectable pen Inject 1 mL (150 mg) Subcutaneous every 14 days     amitriptyline (ELAVIL) 25 MG tablet Take 1 tablet (25 mg) by mouth 2 times daily     amLODIPine (NORVASC) 10 MG tablet TAKE 1 TABLET EVERY EVENING     ASPIRIN PO Take 325 mg by mouth daily     carvedilol (COREG) 25 MG tablet Take 1 tablet (25 mg) by mouth 2 times daily (with meals)     cilostazol (PLETAL) 100 MG tablet TAKE 1 TABLET TWICE A DAY     gabapentin (NEURONTIN) 300 MG capsule TAKE 1 CAPSULE THREE TIMES A DAY (Patient taking differently: TAKE 1 CAPSULE  TWICE  A DAY)     isosorbide mononitrate (IMDUR) 60 MG 24 hr tablet Take 1 tablet (60 mg) by mouth daily     losartan (COZAAR) 100 MG tablet Take 1 tablet (100 mg) by mouth daily     metoclopramide (REGLAN) 10 MG tablet TAKE 1 TABLET FOUR TIMES A DAY (Patient taking differently: TAKE 1 TABLET A DAY)     omeprazole (PRILOSEC) 40 MG DR capsule Take 1 capsule (40 mg) by mouth daily     polyethylene glycol (MIRALAX/GLYCOLAX) packet Take 1 packet by mouth daily      potassium chloride ER (K-DUR/KLOR-CON M) 10 MEQ CR tablet Take 1 tablet (10 mEq) by mouth 2 times daily     psyllium (METAMUCIL) 58.6 % POWD Take by mouth daily     spironolactone (ALDACTONE) 50 MG tablet Take 1 tablet (50 mg) by mouth daily (Patient taking differently: Take 25 mg by mouth daily )     STATIN NOT PRESCRIBED, INTENTIONAL, Pt has known rhabdomyolysis in the past, somewhat associated with  "statins.  Also wasn't able to tolerate Zetia.     Current Facility-Administered Medications   Medication     triamcinolone hexacetonide (ARISTOSPAN) injection 40 mg        ALLERGIES      Allergies   Allergen Reactions     Hmg-Coa-R Inhibitors Other (See Comments)     Rhabdo  Same as \"statins\"     Gemfibrozil      Resting thigh-calf pain     Sulfa Drugs      Reacted as a child     Zetia [Ezetimibe]      Muscle cramping        SOCIAL HISTORY   He is  and lives with his wife. He worked as  for [employer] in [place]. He is retired now. He was in  for 20 yrs. He has quit alcohol and cigarettes since then. He denies alcohol or drug abuse.     He smoked pack and half for about 20 yrs.     Social History     Social History Narrative    Moved from [place] in August, retired  for [employer].          FAMILY HISTORY   - Sister had uterine cancer and oropharyngeal cancer (non-smoker)  - Father had ALS  - Mother had Alzheimer's disease     REVIEW OF SYSTEMS   As above in the HPI, o/w complete 12-point ROS was negative.     PHYSICAL EXAM   B/P: 155/66, T: 97.5, P: 66, R: 18  @LASTSAO2(4)@  Wt Readings from Last 3 Encounters:   02/21/19 77 kg (169 lb 12 oz)   01/15/19 72.3 kg (159 lb 4.8 oz)   12/10/18 71.7 kg (158 lb)     GEN: NAD  HEENT: PERRL, EOMI, no icterus, injection or pallor. Oropharynx is clear.  NECK: no cervical or supraclavicular lymphadenopathy  LUNGS: clear bilaterally  CV: regular, no murmurs, rubs, or gallops  ABDOMEN: soft, non-tender, non-distended, normal bowel sounds, no hepatosplenomegaly by percussion or palpation  EXT: warm, well perfused, no edema  NEURO: alert  SKIN: no rashes     LABORATORY AND IMAGING STUDIES     Recent Labs   Lab Test 01/15/19  0922 12/17/18  1139 12/07/18  1036 11/16/18  0923 11/08/18  1029    134 136 142 139   POTASSIUM 3.7 4.5 4.3 3.6 4.6   CHLORIDE 104 100 100 101 105   CO2 30 26 29 31 25   ANIONGAP 5 8 7 10 9   BUN 11 18 15 15 15   CR 1.29* " 1.46* 1.46* 1.37* 1.35*   * 84 91 154* 139*   RAHEEM 9.3 8.8 8.8 8.8 9.6     Recent Labs   Lab Test 01/15/19  0922 11/17/18  0746 03/10/14  0405 03/09/14  0320 03/08/14  0300 03/07/14  0400 03/06/14  1000   MAG  --  1.9 2.0 2.1 2.3 2.2 2.2   PHOS 3.1  --  2.8  --  2.5 2.9 3.7     Recent Labs   Lab Test 10/23/18  1240 03/12/18  1352 03/26/15  0708 12/05/14  0930 08/07/14  0728 08/06/14  1648 05/14/14  1014  03/06/14  0415   WBC 6.8  --  7.5 8.0 7.7 6.0 5.5   < > 13.3*   HGB 13.3 14.7 14.0 14.2 12.9* 13.2* 12.8*   < > 11.8*     --  307 260 286 327 329   < > 222   MCV 96  --  96 93 90 90 93   < > 94   NEUTROPHIL  --   --   --   --   --  52.3 48.7  --  71.7    < > = values in this interval not displayed.     Recent Labs   Lab Test 01/15/19  0922 08/08/16  0758 03/26/15  0708 10/24/14  0809 08/02/14  0742   BILITOTAL  --   --  0.5 0.4 0.3   ALKPHOS  --   --  72 90 83   ALT  --  18 17 22 21   AST  --   --  18 18 18   ALBUMIN 3.3*  --  3.7 3.4 3.4     TSH   Date Value Ref Range Status   05/14/2014 0.74 0.4 - 5.0 mU/L Final   12/09/2013 1.00 0.4 - 5.0 mU/L Final     No results for input(s): CEA in the last 04360 hours.  Results for orders placed or performed during the hospital encounter of 01/22/19   US Renal Complete w Doppler Complete    Narrative    US RENAL COMPLETE WITH DOPPLER COMPLETE  1/22/2019 10:30 AM     HISTORY:  Hypokalemia. Concerns for renal artery stenosis.    COMPARISON: CT chest dated 10/31/2018    FINDINGS:   The right kidney measures 10.2 x 5.4 x 5.5. The right renal cortex  measures 1.1 in thickness. This is within normal limits. There is no  hydronephrosis. Renal cortical echogenicity is unremarkable.    Spectral waveform analysis was performed.  The peak systolic  velocities in the right renal artery at its origin, mid aspect, and  distal aspect are 1:30, 124, and 1 29 cm/s respectively. Low  resistance waveforms are identified in the right renal artery. The  resistance indices in the  "right arcuate arteries range between  0.7-0.8.    The left kidney measures 7.7 x 4.4 x 4.5 cm. The left renal cortex  measures 1.1 cm in thickness. This is within normal limits. There is  no hydronephrosis. Renal cortical echogenicity is unremarkable.    Spectral waveform analysis was performed.  The peak systolic  velocities in the left renal artery at its origin, mid aspect, and  distal aspect are 768, 565, and 54 cm/s respectively. Low resistance  waveforms are identified in the left renal artery. Dampened  poststenotic waveforms are identified in the arcuate arteries. The  resistance indices in the left arcuate arteries range between 0.7-0.8.    The peak systolic velocity in the abdominal aorta superior to the  origin of the renal arteries is 113 cm/s.    Incidental note is made of a gallstone.      Impression    IMPRESSION: Sonographic findings suggest a significant stenosis in the  proximal left renal artery.     ILEANA GAMBOA MD       Lab Results   Component Value Date    PATH  01/16/2018     Patient Name: NINFA CID  MR#: [id number]  Specimen #: [id number]  Collected: 1/16/2018  Received: 1/16/2018  Reported: 1/18/2018 16:18  Ordering Phy(s): CORTEZ MERRITT    For improved result formatting, select 'View Enhanced Report Format' under   Linked Documents section.    SPECIMEN(S):  Colon polyp, descending    FINAL DIAGNOSIS:  Colon polyp, descending:  - Tubular adenoma.  - Negative for high grade dysplasia or malignancy.    Electronically signed out by:    CARMELINA Pineda M.D.    CLINICAL HISTORY:  72-year-old male. Screening.    GROSS:  The specimen is received in formalin, labeled the patient's name and date   of birth, and designated \"descending  colon polyp\".  It consists of two tan tissue fragments measuring 0.2 cm   and 0.7 cm in greatest dimension.  Entirely submitted in one cassette. (Dictated by: Olive CLEMENS 1/16/2018   04:20 PM)    MICROSCOPIC:  The sections show portions of a low " grade adenomatous colon polyp.  The   polyp glands are lined by mitotically  active pseudostratified columnar epithelium with elongated and tapered   basal oriented nuclei. No appreciable  nuclear pleomorphism is seen. The glands show a depletion of the normal   number of goblet cells.  No areas of  high-grade dysplasia or malignancy are identified.  (Dictated by: ANI Pineda MD 01/18/2018)    CPT Codes:  A: 65598-YC2    TESTING LAB LOCATION:  [facility], [address] [place] [id number] [phone]    COLLECTION SITE:  Client: [facility]  Location: [facility] (P)       Recent Labs   Lab Test 01/15/19  0922   KAPPAFREELT 2.81*   LAMBDAFREELT 2.52   KLR 1.12   ELPM 0.7*         ASSESSMENT    1. Monoclonal gammopathy of undetermined significance with a M spike of 0.7 g/dl  2. CKD stage III  3. HTN  4. Coronary artery disease s/p PTCA and stents x 6;   5. Carotid stenosis s/p CVA x3 with minimal residual right hemiparesis and significant memory loss  6. Peripheral vascular disease needing bilateral stents for lower extremity    DISCUSSION   I had a lengthy discussion with Mr. Vance in presence of his family. I reviewed the finding of his new monoclonal gammopathy. This is considered a pre-malignant condition involving the plasma cells. I reviewed the 3 involved entities including MGUS, smoldering multiple myeloma(MM) and MM. Clearly MM is a serious malignancy involving the plasma cell and has aggressive course. he barely has a small spike of 0.7g/dl which has been identified incidentally as is usually the case. He has CKD stage III and his renal function has been relatively stable over last several years. I do not feel that this CKD is from his small abnormal protein but rather due to his macrovascular disease like is CAD/CVA/PVD. He does not have peripheral neuropathy, vasculitis, hemolytic anemia, skin rashes, hypercalcemia which  are often seen with MM.   I reviewed some additional tests that could be done as a part of workup including the free light chain assay, beta 2 microglobulin, gamma globulin subsets, urinary protein electrophoresis and skeletal survey. I briefly reviewed a bone marrow biopsy but it is not required given the small spike in abnormal protein.     We could follow him serially with monoclonal spike and work up more aggressively if there was suddenly a rapid increase. The likelihood of having this disease remains small.      PLAN   I would complete some additional work up for his MGUS  I would recommend serial follow up of monoclonal protein  I would schedule a follow up appointment once the above test results are available.     All questions for patient and his wife were answered to their satisfaction.     Over 45 min of direct face to face time spent with patient with more than 50% time spent in counseling and coordinating care.     Roger Baptiste  Adj ,  Division of Hematology, Oncology & Transplantation  [facility].       Again, thank you for allowing me to participate in the care of your patient.        Sincerely,        Rgoer Baptiste MD

## 2019-02-22 ENCOUNTER — HOSPITAL ENCOUNTER (OUTPATIENT)
Dept: GENERAL RADIOLOGY | Facility: CLINIC | Age: 74
Discharge: HOME | End: 2019-02-22
Attending: INTERNAL MEDICINE | Admitting: INTERNAL MEDICINE
Payer: MEDICARE

## 2019-02-22 DIAGNOSIS — D47.2 MGUS (MONOCLONAL GAMMOPATHY OF UNKNOWN SIGNIFICANCE): ICD-10-CM

## 2019-02-22 LAB
B2 MICROGLOB SERPL-MCNC: 3.1 MG/L
IGA SERPL-MCNC: 1050 MG/DL (ref 70–380)
IGG SERPL-MCNC: 1140 MG/DL (ref 695–1620)
IGM SERPL-MCNC: 118 MG/DL (ref 60–265)

## 2019-02-22 PROCEDURE — 86335 IMMUNFIX E-PHORSIS/URINE/CSF: CPT | Performed by: INTERNAL MEDICINE

## 2019-02-22 PROCEDURE — 81050 URINALYSIS VOLUME MEASURE: CPT | Performed by: INTERNAL MEDICINE

## 2019-02-22 PROCEDURE — 77075 RADEX OSSEOUS SURVEY COMPL: CPT | Mod: TC

## 2019-02-22 PROCEDURE — 84166 PROTEIN E-PHORESIS/URINE/CSF: CPT | Performed by: INTERNAL MEDICINE

## 2019-02-23 DIAGNOSIS — D47.2 MGUS (MONOCLONAL GAMMOPATHY OF UNKNOWN SIGNIFICANCE): ICD-10-CM

## 2019-02-23 LAB
COLLECT DURATION TIME UR: 24 H
SPECIMEN VOL UR: 2875 ML

## 2019-02-25 LAB
ALBUMIN MFR UR ELPH: 68.6 %
ALBUMIN UR QL: DETECTED
ALPHA1 GLOB 24H UR QL ELPH: DETECTED
ALPHA1 GLOB MFR UR ELPH: 8.1 %
ALPHA2 GLOB MFR UR ELPH: 3 %
ALPHA2 GLOB UR QL ELPH: DETECTED
B-GLOBULIN MFR UR ELPH: 6.2 %
B-GLOBULIN UR QL ELPH: DETECTED
COLLECT DURATION TIME SPEC: 24 H
GAMMA GLOB MFR UR ELPH: 14.1 %
GAMMA GLOB UR QL ELPH: DETECTED
INTERPRETATION UR IFE-IMP: ABNORMAL
KAPPA LC FREE 24H UR-MRATE: 84.81 MG/D
KAPPA LC FREE UR-MCNC: 2.95 MG/DL (ref 0.14–2.42)
KAPPA LC FREE/LAMBDA FREE UR: 9.22 RATIO (ref 2.04–10.37)
LAMBDA LC FREE 24H UR-MRATE: 9.2 MG/D
LAMBDA LC FREE UR-MCNC: 0.32 MG/DL (ref 0.02–0.67)
M PROTEIN MFR UR ELPH: 3.1 %
PROT 24H UR-MRATE: 364 MG/D (ref 10–140)
PROT ELPH PNL UR ELPH: NORMAL
PROT PATTERN UR ELPH-IMP: ABNORMAL
SPECIMEN VOL ?TM UR: 2875 ML

## 2019-02-26 LAB — TOTAL LIGHT CHAINS UR: NORMAL

## 2019-02-28 DIAGNOSIS — I10 ESSENTIAL HYPERTENSION: ICD-10-CM

## 2019-02-28 DIAGNOSIS — N18.30 CKD (CHRONIC KIDNEY DISEASE) STAGE 3, GFR 30-59 ML/MIN (H): ICD-10-CM

## 2019-02-28 LAB
ALBUMIN SERPL-MCNC: 3.5 G/DL (ref 3.4–5)
ANION GAP SERPL CALCULATED.3IONS-SCNC: 6 MMOL/L (ref 3–14)
BUN SERPL-MCNC: 18 MG/DL (ref 7–30)
CA-I SERPL ISE-MCNC: 4.9 MG/DL (ref 4.4–5.2)
CALCIUM SERPL-MCNC: 8.9 MG/DL (ref 8.5–10.1)
CHLORIDE SERPL-SCNC: 96 MMOL/L (ref 94–109)
CO2 SERPL-SCNC: 27 MMOL/L (ref 20–32)
CREAT SERPL-MCNC: 1.68 MG/DL (ref 0.66–1.25)
GFR SERPL CREATININE-BSD FRML MDRD: 40 ML/MIN/{1.73_M2}
GLUCOSE SERPL-MCNC: 122 MG/DL (ref 70–99)
PHOSPHATE SERPL-MCNC: 3.4 MG/DL (ref 2.5–4.5)
POTASSIUM SERPL-SCNC: 4.6 MMOL/L (ref 3.4–5.3)
SODIUM SERPL-SCNC: 129 MMOL/L (ref 133–144)

## 2019-02-28 PROCEDURE — 80069 RENAL FUNCTION PANEL: CPT | Performed by: INTERNAL MEDICINE

## 2019-02-28 PROCEDURE — 82784 ASSAY IGA/IGD/IGG/IGM EACH: CPT | Performed by: INTERNAL MEDICINE

## 2019-02-28 PROCEDURE — 86334 IMMUNOFIX E-PHORESIS SERUM: CPT | Performed by: INTERNAL MEDICINE

## 2019-02-28 PROCEDURE — 82330 ASSAY OF CALCIUM: CPT | Performed by: INTERNAL MEDICINE

## 2019-02-28 PROCEDURE — 36415 COLL VENOUS BLD VENIPUNCTURE: CPT | Performed by: INTERNAL MEDICINE

## 2019-03-01 DIAGNOSIS — I25.10 CORONARY ARTERY DISEASE INVOLVING NATIVE CORONARY ARTERY OF NATIVE HEART WITHOUT ANGINA PECTORIS: ICD-10-CM

## 2019-03-01 DIAGNOSIS — E78.5 HYPERLIPIDEMIA LDL GOAL <130: ICD-10-CM

## 2019-03-01 DIAGNOSIS — E78.00 HYPERCHOLESTEREMIA: ICD-10-CM

## 2019-03-01 DIAGNOSIS — I70.211 ATHEROSCLEROSIS OF NATIVE ARTERY OF RIGHT LOWER EXTREMITY WITH INTERMITTENT CLAUDICATION (H): ICD-10-CM

## 2019-03-01 LAB
CHOLEST SERPL-MCNC: 150 MG/DL
HDLC SERPL-MCNC: 49 MG/DL
IGA SERPL-MCNC: 994 MG/DL (ref 70–380)
IGG SERPL-MCNC: 1180 MG/DL (ref 695–1620)
IGM SERPL-MCNC: 116 MG/DL (ref 60–265)
LDLC SERPL CALC-MCNC: 82 MG/DL
NONHDLC SERPL-MCNC: 101 MG/DL
PROT PATTERN SERPL IFE-IMP: ABNORMAL
TRIGL SERPL-MCNC: 94 MG/DL

## 2019-03-01 PROCEDURE — 80061 LIPID PANEL: CPT | Performed by: INTERNAL MEDICINE

## 2019-03-01 PROCEDURE — 36415 COLL VENOUS BLD VENIPUNCTURE: CPT | Performed by: INTERNAL MEDICINE

## 2019-03-07 ENCOUNTER — ONCOLOGY VISIT (OUTPATIENT)
Dept: ONCOLOGY | Facility: CLINIC | Age: 74
End: 2019-03-07
Payer: MEDICARE

## 2019-03-07 VITALS
SYSTOLIC BLOOD PRESSURE: 168 MMHG | RESPIRATION RATE: 20 BRPM | OXYGEN SATURATION: 97 % | HEIGHT: 68 IN | BODY MASS INDEX: 26.07 KG/M2 | TEMPERATURE: 97.5 F | WEIGHT: 172 LBS | DIASTOLIC BLOOD PRESSURE: 64 MMHG | HEART RATE: 59 BPM

## 2019-03-07 DIAGNOSIS — D47.2 MGUS (MONOCLONAL GAMMOPATHY OF UNKNOWN SIGNIFICANCE): Primary | ICD-10-CM

## 2019-03-07 PROCEDURE — 99213 OFFICE O/P EST LOW 20 MIN: CPT | Performed by: INTERNAL MEDICINE

## 2019-03-07 ASSESSMENT — MIFFLIN-ST. JEOR: SCORE: 1504.69

## 2019-03-07 ASSESSMENT — PAIN SCALES - GENERAL: PAINLEVEL: NO PAIN (0)

## 2019-03-07 NOTE — PROGRESS NOTES
[facility]  HEMATOLOGY AND ONCOLOGY    FOLLOW-UP VISIT NOTE    PATIENT NAME: Michele Vance MRN # [id number]  DATE OF VISIT: Mar 7, 2019 YOB: 1945    REFERRING PROVIDER: Nathaniel Spangler MD  [address] [address]        DIAGNOSIS:: MGUS   STAGE: NA      CURRENT INTERVENTIONS:  OBSERVATION    SUBJECTIVE   Michele Vance is being followed for MGUS    Michele is being seen with labs for his MGUS. He was seen 3 weeks ago. He has no new symptoms since last visit.     He has SOB which is unchanged.       PAST MEDICAL HISTORY     Past Medical History:   Diagnosis Date     Carotid stenosis     right endartectomy     Chronic kidney disease     stage 3     Coronary artery disease 3-2014    CABG x6     CVA (cerebral infarction)     balance problems, mild     Elevated CK      Esophageal reflux      Hypercholesteremia      Nonsenile cataract      Other and unspecified hyperlipidemia      PAD (peripheral artery disease) (H)      Rhabdomyolysis 2010     Unspecified essential hypertension          CURRENT OUTPATIENT MEDICATIONS     Current Outpatient Medications   Medication Sig     alirocumab (PRALUENT) 150 MG/ML injectable pen Inject 1 mL (150 mg) Subcutaneous every 14 days     amitriptyline (ELAVIL) 25 MG tablet Take 1 tablet (25 mg) by mouth 2 times daily     amLODIPine (NORVASC) 10 MG tablet TAKE 1 TABLET EVERY EVENING     ASPIRIN PO Take 325 mg by mouth daily     carvedilol (COREG) 25 MG tablet Take 1 tablet (25 mg) by mouth 2 times daily (with meals)     cilostazol (PLETAL) 100 MG tablet TAKE 1 TABLET TWICE A DAY     cloNIDine (CATAPRES) 0.1 MG tablet Take 1 tablet (0.1 mg) by mouth 2 times daily     gabapentin (NEURONTIN) 300 MG capsule TAKE 1 CAPSULE THREE TIMES A DAY (Patient taking differently: TAKE 1 CAPSULE  TWICE  A DAY)     isosorbide mononitrate (IMDUR) 60 MG 24 hr tablet Take 1 tablet (60 mg) by mouth daily     losartan (COZAAR) 100 MG tablet Take 1 tablet  "(100 mg) by mouth daily     metoclopramide (REGLAN) 10 MG tablet TAKE 1 TABLET FOUR TIMES A DAY (Patient taking differently: TAKE 1 TABLET A DAY)     omeprazole (PRILOSEC) 40 MG DR capsule Take 1 capsule (40 mg) by mouth daily     polyethylene glycol (MIRALAX/GLYCOLAX) packet Take 1 packet by mouth daily      potassium chloride ER (K-DUR/KLOR-CON M) 10 MEQ CR tablet Take 1 tablet (10 mEq) by mouth 2 times daily     psyllium (METAMUCIL) 58.6 % POWD Take by mouth daily     spironolactone (ALDACTONE) 25 MG tablet Take 1 tablet (25 mg) by mouth daily     STATIN NOT PRESCRIBED, INTENTIONAL, Pt has known rhabdomyolysis in the past, somewhat associated with statins.  Also wasn't able to tolerate Zetia. (Patient not taking: Reported on 3/7/2019)     Current Facility-Administered Medications   Medication     triamcinolone hexacetonide (ARISTOSPAN) injection 40 mg        ALLERGIES      Allergies   Allergen Reactions     Hmg-Coa-R Inhibitors Other (See Comments)     Rhabdo  Same as \"statins\"     Gemfibrozil      Resting thigh-calf pain     Sulfa Drugs      Reacted as a child     Zetia [Ezetimibe]      Muscle cramping        REVIEW OF SYSTEMS   As above in the HPI, o/w complete 12-point ROS was negative.     PHYSICAL EXAM   /64   Pulse 59   Temp 97.5  F (36.4  C)   Resp 20   Ht 1.727 m (5' 8\")   Wt 78 kg (172 lb)   SpO2 97%   BMI 26.15 kg/m    GEN: NAD  HEENT: PERRL, EOMI, no icterus, injection or pallor. Oropharynx is clear.  LYMPHATICs: no cervical or supraclavicular lymphadenopathy; no other abn lymphadenopathy  PULMONARY: clear with good air entry bilaterally  CARDIOVASCULAR: regular, no murmurs, rubs, or gallops  GASTROINTESTINAL: soft, non-tender, non-distended, normal bowel sounds, no hepatosplenomegaly by percussion or palpation  MUSCULOSKELTAL: warm, well perfused, no edema  NEURO: awake, alert and oriented to time place and person, cranial nerves intact - II - XII, no focal neurologic deficits  SKIN: no " odette     LABORATORY AND IMAGING STUDIES     Recent Labs   Lab Test 02/28/19  0919 02/21/19  1429 01/15/19  0922 12/17/18  1139 12/07/18  1036   * 129* 139 134 136   POTASSIUM 4.6 4.0 3.7 4.5 4.3   CHLORIDE 96 95 104 100 100   CO2 27 25 30 26 29   ANIONGAP 6 9 5 8 7   BUN 18 11 11 18 15   CR 1.68* 1.31* 1.29* 1.46* 1.46*   * 121* 120* 84 91   RAHEEM 8.9 9.0 9.3 8.8 8.8     Recent Labs   Lab Test 02/28/19  0919 01/15/19  0922 11/17/18  0746 03/10/14  0405 03/09/14  0320 03/08/14  0300 03/07/14  0400   MAG  --   --  1.9 2.0 2.1 2.3 2.2   PHOS 3.4 3.1  --  2.8  --  2.5 2.9     Recent Labs   Lab Test 02/21/19  1429 10/23/18  1240 03/12/18  1352 03/26/15  0708 12/05/14  0930 08/07/14  0728 08/06/14  1648 05/14/14  1014  03/06/14  0415   WBC 6.6 6.8  --  7.5 8.0 7.7 6.0 5.5   < > 13.3*   HGB 12.4* 13.3 14.7 14.0 14.2 12.9* 13.2* 12.8*   < > 11.8*    285  --  307 260 286 327 329   < > 222   MCV 95 96  --  96 93 90 90 93   < > 94   NEUTROPHIL 52.1  --   --   --   --   --  52.3 48.7  --  71.7    < > = values in this interval not displayed.     Recent Labs   Lab Test 02/28/19  0919 02/21/19  1429 01/15/19  0922 08/08/16  0758 03/26/15  0708 10/24/14  0809   BILITOTAL  --  0.6  --   --  0.5 0.4   ALKPHOS  --  199*  --   --  72 90   ALT  --  43  --  18 17 22   AST  --  25  --   --  18 18   ALBUMIN 3.5 3.4 3.3*  --  3.7 3.4     TSH   Date Value Ref Range Status   05/14/2014 0.74 0.4 - 5.0 mU/L Final   12/09/2013 1.00 0.4 - 5.0 mU/L Final     No results for input(s): CEA in the last 96804 hours.  Results for orders placed or performed during the hospital encounter of 02/22/19   XR Bone Survey Complete    Narrative    BONE SURVEY COMPLETE   2/22/2019 10:29 AM     HISTORY: MGUS with monoclonal protein spike of 0.7 g/dl; MGUS  (monoclonal gammopathy of unknown significance).    COMPARISON: CT chest dated 10/31/2018, abdominal x-rays dated  3/20/2017, CT abdomen and pelvis dated 9/25/2014 and left shoulder  x-rays  dated 5/11/2011.    FINDINGS:      AP and lateral skull: No fracture or evidence for sinusitis. No  evidence for osseous metastasis.    AP and lateral C-spine: No fracture or malalignment. No evidence for  osseous metastasis. Degenerative changes are noted in the cervical  spine. Right carotid artery stent is noted. There are bilateral  carotid artery calcifications.    AP and lateral T-spine: Sternal cerclage wires are noted. There are  mediastinal vascular clips. No acute fracture or malalignment. No  obvious lucent lesion to suggest metastasis or myeloma. Endplate  compression of T12 was also seen on the prior CT abdomen and pelvis  dated 9/25/2014 and therefore is chronic.    AP and lateral L-spine: Anterior compressions of T12 and L1 were also  previously seen on the prior CT abdomen and pelvis dated 9/25/2014.  Degenerative changes are seen in the lower lumbar spine. Aortic  atherosclerotic calcifications are noted.    PA chest: Mild biapical pulmonary scarring is noted. Otherwise the  lungs are clear. Mediastinal surgical clips in sternal cerclage wires  indicate prior coronary artery bypass grafting. Otherwise the heart  size, mediastinum, and pulmonary vascularity are within normal limits.  No pneumothorax or significant pleural fluid collection. No evidence  for pulmonary or osseous metastasis.    Bilateral oblique ribs: There is no fracture or malalignment. No  evidence for osseous metastasis.    Bilateral AP shoulders: There is no fracture or malalignment. No  evidence for osseous metastasis. There is mild left acromioclavicular  joint space loss. Otherwise, the joint spaces are maintained. Postop  changes of the chest have already been described.    Bilateral AP humeri: There is no fracture or malalignment. No evidence  for osseous metastasis. Joint spaces are maintained. Spurring of the  bilateral epicondyles are seen in the distal humerus, worse at the  lateral aspect as compared to the medial aspect  most consistent with  chronic epicondylitis (worse laterally than medially).    Bilateral AP forearm: There is no fracture or malalignment. No  evidence for osseous metastasis. Moderate-to-severe degenerative  changes of the bilateral first carpometacarpal joints is worse in the  right wrist. Arterial calcifications are noted.    AP pelvis: There is no fracture or malalignment. No evidence for  osseous metastasis. Joint spaces are maintained. Lucency projected  over the left ilium likely represents bowel gas. There are mild  degenerative changes of the hips. There are bilateral arterial  calcifications. Surgical clips are seen in the right groin region.    Bilateral AP femur: There is no fracture or malalignment. No evidence  for osseous metastasis. Joint spaces are maintained. There are  arterial calcifications bilaterally. There is an arterial stent in the  left mid thigh medially.    Bilateral AP tib/fib: There is no fracture or malalignment. No  evidence for osseous metastasis. Joint spaces are maintained. Surgical  clips are seen in the lower medial aspect of the right lower leg.  There are arterial calcifications. Surgical clips are also seen in the  medial aspect of the proximal left lower leg. There are arterial  calcifications.      Impression    IMPRESSION:  1. No evidence for osseous metastasis or myelomatous implants.  2. No acute fracture is identified. Chronic anterosuperior endplate  compression fractures of T12 and L1 are again seen.  3. Scattered postoperative and degenerative changes as described above.  4. Atherosclerosis is noted.    BERNIE LEW MD      Recent Labs   Lab Test 02/28/19  0919 02/21/19  1429 01/15/19  0922   KAPPAFREELT  --   --  2.81*   LAMBDAFREELT  --   --  2.52   KLR  --   --  1.12   * 1,050*  --    IGG 1,180 1,140  --     118  --    ELPM  --   --  0.7*     02/22/2019  8:00 AM  Free Kappa Light Chn Urine 2.95 Abnormally high   0.14 - 2.42 mg/dL   Free Kappa  Excr/Day Urine 84.81   mg/d   Free Lambda Lght Chn Urine 0.32  0.02 - 0.67 mg/dL   Free Lambda Excr/Day Urine 9.20   mg/d   F Kappa/Lambda Ratio Urine 9.22  2.04 - 10.37 ratio      ASSESSMENT AND PLAN   1. Monoclonal gammopathy of undetermined significance with a M spike of 0.7 g/dl  2. CKD stage III  3. HTN  4. Coronary artery disease s/p PTCA and stents x 6;   5. Carotid stenosis s/p CVA x3 with minimal residual right hemiparesis and significant memory loss  6. 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 His serum IgA levels are elevated. He does not have any bony lesions. He does not have any other findings that would be concerning for myeloma other than his CKD and marginally low Hgb. I think these are not related to his abnormal monoclonal spkike. However his monoclonal spike is high enough that I would like to follow this over time. I would like to see him in 6 months with labs a week prior to visit.

## 2019-03-07 NOTE — NURSING NOTE
"Oncology Rooming Note    March 7, 2019 3:49 PM   Michele Vance is a 73 year old male who presents for:    Chief Complaint   Patient presents with     Oncology Clinic Visit     follow up lab results      Initial Vitals: /64   Pulse 59   Temp 97.5  F (36.4  C)   Resp 20   Ht 1.727 m (5' 8\")   Wt 78 kg (172 lb)   SpO2 97%   BMI 26.15 kg/m   Estimated body mass index is 26.15 kg/m  as calculated from the following:    Height as of this encounter: 1.727 m (5' 8\").    Weight as of this encounter: 78 kg (172 lb). Body surface area is 1.93 meters squared.  No Pain (0) Comment: Data Unavailable   No LMP for male patient.  Allergies reviewed: Yes  Medications reviewed: Yes    Medications: Medication refills not needed today.  Pharmacy name entered into EPIC:    [facility] - EPRESCRIB[facility] ONLY  [facility] - [place], [place] - [address] [place] [address] [facility] - [place], [place] - [address] [facility] - [place], [place] - [address] LPN              "

## 2019-03-07 NOTE — LETTER
3/7/2019         RE: Michele Vance  [address]  [facility] [address]        Dear Colleague,    Thank you for referring your patient, Michele Vance, to the [facility]. Please see a copy of my visit note below.    [facility]  HEMATOLOGY AND ONCOLOGY    FOLLOW-UP VISIT NOTE    PATIENT NAME: Michele Vance MRN # [id number]  DATE OF VISIT: Mar 7, 2019 YOB: 1945    REFERRING PROVIDER: Nathaniel Spangler MD  [address] [facility] [address]  [place] [address]        DIAGNOSIS:: MGUS   STAGE: NA      CURRENT INTERVENTIONS:  OBSERVATION    SUBJECTIVE   Michele Vance is being followed for MGUS    Michele is being seen with labs for his MGUS. He was seen 3 weeks ago. He has no new symptoms since last visit.     He has SOB which is unchanged.       PAST MEDICAL HISTORY     Past Medical History:   Diagnosis Date     Carotid stenosis     right endartectomy     Chronic kidney disease     stage 3     Coronary artery disease 3-2014    CABG x6     CVA (cerebral infarction)     balance problems, mild     Elevated CK      Esophageal reflux      Hypercholesteremia      Nonsenile cataract      Other and unspecified hyperlipidemia      PAD (peripheral artery disease) (H)      Rhabdomyolysis 2010     Unspecified essential hypertension          CURRENT OUTPATIENT MEDICATIONS     Current Outpatient Medications   Medication Sig     alirocumab (PRALUENT) 150 MG/ML injectable pen Inject 1 mL (150 mg) Subcutaneous every 14 days     amitriptyline (ELAVIL) 25 MG tablet Take 1 tablet (25 mg) by mouth 2 times daily     amLODIPine (NORVASC) 10 MG tablet TAKE 1 TABLET EVERY EVENING     ASPIRIN PO Take 325 mg by mouth daily     carvedilol (COREG) 25 MG tablet Take 1 tablet (25 mg) by mouth 2 times daily (with meals)     cilostazol (PLETAL) 100 MG tablet TAKE 1 TABLET TWICE A DAY     cloNIDine (CATAPRES) 0.1 MG tablet Take 1 tablet (0.1 mg) by mouth 2 times daily     gabapentin (NEURONTIN) 300  "MG capsule TAKE 1 CAPSULE THREE TIMES A DAY (Patient taking differently: TAKE 1 CAPSULE  TWICE  A DAY)     isosorbide mononitrate (IMDUR) 60 MG 24 hr tablet Take 1 tablet (60 mg) by mouth daily     losartan (COZAAR) 100 MG tablet Take 1 tablet (100 mg) by mouth daily     metoclopramide (REGLAN) 10 MG tablet TAKE 1 TABLET FOUR TIMES A DAY (Patient taking differently: TAKE 1 TABLET A DAY)     omeprazole (PRILOSEC) 40 MG DR capsule Take 1 capsule (40 mg) by mouth daily     polyethylene glycol (MIRALAX/GLYCOLAX) packet Take 1 packet by mouth daily      potassium chloride ER (K-DUR/KLOR-CON M) 10 MEQ CR tablet Take 1 tablet (10 mEq) by mouth 2 times daily     psyllium (METAMUCIL) 58.6 % POWD Take by mouth daily     spironolactone (ALDACTONE) 25 MG tablet Take 1 tablet (25 mg) by mouth daily     STATIN NOT PRESCRIBED, INTENTIONAL, Pt has known rhabdomyolysis in the past, somewhat associated with statins.  Also wasn't able to tolerate Zetia. (Patient not taking: Reported on 3/7/2019)     Current Facility-Administered Medications   Medication     triamcinolone hexacetonide (ARISTOSPAN) injection 40 mg        ALLERGIES      Allergies   Allergen Reactions     Hmg-Coa-R Inhibitors Other (See Comments)     Rhabdo  Same as \"statins\"     Gemfibrozil      Resting thigh-calf pain     Sulfa Drugs      Reacted as a child     Zetia [Ezetimibe]      Muscle cramping        REVIEW OF SYSTEMS   As above in the HPI, o/w complete 12-point ROS was negative.     PHYSICAL EXAM   /64   Pulse 59   Temp 97.5  F (36.4  C)   Resp 20   Ht 1.727 m (5' 8\")   Wt 78 kg (172 lb)   SpO2 97%   BMI 26.15 kg/m     GEN: NAD  HEENT: PERRL, EOMI, no icterus, injection or pallor. Oropharynx is clear.  LYMPHATICs: no cervical or supraclavicular lymphadenopathy; no other abn lymphadenopathy  PULMONARY: clear with good air entry bilaterally  CARDIOVASCULAR: regular, no murmurs, rubs, or gallops  GASTROINTESTINAL: soft, non-tender, non-distended, normal " bowel sounds, no hepatosplenomegaly by percussion or palpation  MUSCULOSKELTAL: warm, well perfused, no edema  NEURO: awake, alert and oriented to time place and person, cranial nerves intact - II - XII, no focal neurologic deficits  SKIN: no rashes     LABORATORY AND IMAGING STUDIES     Recent Labs   Lab Test 02/28/19  0919 02/21/19  1429 01/15/19  0922 12/17/18  1139 12/07/18  1036   * 129* 139 134 136   POTASSIUM 4.6 4.0 3.7 4.5 4.3   CHLORIDE 96 95 104 100 100   CO2 27 25 30 26 29   ANIONGAP 6 9 5 8 7   BUN 18 11 11 18 15   CR 1.68* 1.31* 1.29* 1.46* 1.46*   * 121* 120* 84 91   RAHEEM 8.9 9.0 9.3 8.8 8.8     Recent Labs   Lab Test 02/28/19  0919 01/15/19  0922 11/17/18  0746 03/10/14  0405 03/09/14  0320 03/08/14  0300 03/07/14  0400   MAG  --   --  1.9 2.0 2.1 2.3 2.2   PHOS 3.4 3.1  --  2.8  --  2.5 2.9     Recent Labs   Lab Test 02/21/19  1429 10/23/18  1240 03/12/18  1352 03/26/15  0708 12/05/14  0930 08/07/14  0728 08/06/14  1648 05/14/14  1014  03/06/14  0415   WBC 6.6 6.8  --  7.5 8.0 7.7 6.0 5.5   < > 13.3*   HGB 12.4* 13.3 14.7 14.0 14.2 12.9* 13.2* 12.8*   < > 11.8*    285  --  307 260 286 327 329   < > 222   MCV 95 96  --  96 93 90 90 93   < > 94   NEUTROPHIL 52.1  --   --   --   --   --  52.3 48.7  --  71.7    < > = values in this interval not displayed.     Recent Labs   Lab Test 02/28/19  0919 02/21/19  1429 01/15/19  0922 08/08/16  0758 03/26/15  0708 10/24/14  0809   BILITOTAL  --  0.6  --   --  0.5 0.4   ALKPHOS  --  199*  --   --  72 90   ALT  --  43  --  18 17 22   AST  --  25  --   --  18 18   ALBUMIN 3.5 3.4 3.3*  --  3.7 3.4     TSH   Date Value Ref Range Status   05/14/2014 0.74 0.4 - 5.0 mU/L Final   12/09/2013 1.00 0.4 - 5.0 mU/L Final     No results for input(s): CEA in the last 45119 hours.  Results for orders placed or performed during the hospital encounter of 02/22/19   XR Bone Survey Complete    Narrative    BONE SURVEY COMPLETE   2/22/2019 10:29 AM     HISTORY:  MGUS with monoclonal protein spike of 0.7 g/dl; MGUS  (monoclonal gammopathy of unknown significance).    COMPARISON: CT chest dated 10/31/2018, abdominal x-rays dated  3/20/2017, CT abdomen and pelvis dated 9/25/2014 and left shoulder  x-rays dated 5/11/2011.    FINDINGS:      AP and lateral skull: No fracture or evidence for sinusitis. No  evidence for osseous metastasis.    AP and lateral C-spine: No fracture or malalignment. No evidence for  osseous metastasis. Degenerative changes are noted in the cervical  spine. Right carotid artery stent is noted. There are bilateral  carotid artery calcifications.    AP and lateral T-spine: Sternal cerclage wires are noted. There are  mediastinal vascular clips. No acute fracture or malalignment. No  obvious lucent lesion to suggest metastasis or myeloma. Endplate  compression of T12 was also seen on the prior CT abdomen and pelvis  dated 9/25/2014 and therefore is chronic.    AP and lateral L-spine: Anterior compressions of T12 and L1 were also  previously seen on the prior CT abdomen and pelvis dated 9/25/2014.  Degenerative changes are seen in the lower lumbar spine. Aortic  atherosclerotic calcifications are noted.    PA chest: Mild biapical pulmonary scarring is noted. Otherwise the  lungs are clear. Mediastinal surgical clips in sternal cerclage wires  indicate prior coronary artery bypass grafting. Otherwise the heart  size, mediastinum, and pulmonary vascularity are within normal limits.  No pneumothorax or significant pleural fluid collection. No evidence  for pulmonary or osseous metastasis.    Bilateral oblique ribs: There is no fracture or malalignment. No  evidence for osseous metastasis.    Bilateral AP shoulders: There is no fracture or malalignment. No  evidence for osseous metastasis. There is mild left acromioclavicular  joint space loss. Otherwise, the joint spaces are maintained. Postop  changes of the chest have already been described.    Bilateral AP  humeri: There is no fracture or malalignment. No evidence  for osseous metastasis. Joint spaces are maintained. Spurring of the  bilateral epicondyles are seen in the distal humerus, worse at the  lateral aspect as compared to the medial aspect most consistent with  chronic epicondylitis (worse laterally than medially).    Bilateral AP forearm: There is no fracture or malalignment. No  evidence for osseous metastasis. Moderate-to-severe degenerative  changes of the bilateral first carpometacarpal joints is worse in the  right wrist. Arterial calcifications are noted.    AP pelvis: There is no fracture or malalignment. No evidence for  osseous metastasis. Joint spaces are maintained. Lucency projected  over the left ilium likely represents bowel gas. There are mild  degenerative changes of the hips. There are bilateral arterial  calcifications. Surgical clips are seen in the right groin region.    Bilateral AP femur: There is no fracture or malalignment. No evidence  for osseous metastasis. Joint spaces are maintained. There are  arterial calcifications bilaterally. There is an arterial stent in the  left mid thigh medially.    Bilateral AP tib/fib: There is no fracture or malalignment. No  evidence for osseous metastasis. Joint spaces are maintained. Surgical  clips are seen in the lower medial aspect of the right lower leg.  There are arterial calcifications. Surgical clips are also seen in the  medial aspect of the proximal left lower leg. There are arterial  calcifications.      Impression    IMPRESSION:  1. No evidence for osseous metastasis or myelomatous implants.  2. No acute fracture is identified. Chronic anterosuperior endplate  compression fractures of T12 and L1 are again seen.  3. Scattered postoperative and degenerative changes as described above.  4. Atherosclerosis is noted.    BERNIE LEW MD      Recent Labs   Lab Test 02/28/19  0919 02/21/19  1429 01/15/19  0922   KAPPAFREELT  --   --  2.81*    LAMBDAFREELT  --   --  2.52   KLR  --   --  1.12   * 1,050*  --    IGG 1,180 1,140  --     118  --    ELPM  --   --  0.7*     02/22/2019  8:00 AM  Free Kappa Light Chn Urine 2.95 Abnormally high   0.14 - 2.42 mg/dL   Free Kappa Excr/Day Urine 84.81   mg/d   Free Lambda Lght Chn Urine 0.32  0.02 - 0.67 mg/dL   Free Lambda Excr/Day Urine 9.20   mg/d   F Kappa/Lambda Ratio Urine 9.22  2.04 - 10.37 ratio      ASSESSMENT AND PLAN   1. Monoclonal gammopathy of undetermined significance with a M spike of 0.7 g/dl  2. CKD stage III  3. HTN  4. Coronary artery disease s/p PTCA and stents x 6;   5. Carotid stenosis s/p CVA x3 with minimal residual right hemiparesis and significant memory loss  6. 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 His serum IgA levels are elevated. He does not have any bony lesions. He does not have any other findings that would be concerning for myeloma other than his CKD and marginally low Hgb. I think these are not related to his abnormal monoclonal spkike. However his monoclonal spike is high enough that I would like to follow this over time. I would like to see him in 6 months with labs a week prior to visit.       Again, thank you for allowing me to participate in the care of your patient.        Sincerely,        Roger Baptiste MD

## 2019-03-12 DIAGNOSIS — I10 BENIGN ESSENTIAL HYPERTENSION: ICD-10-CM

## 2019-03-12 LAB
ANION GAP SERPL CALCULATED.3IONS-SCNC: 7 MMOL/L (ref 3–14)
BUN SERPL-MCNC: 15 MG/DL (ref 7–30)
CALCIUM SERPL-MCNC: 8.9 MG/DL (ref 8.5–10.1)
CHLORIDE SERPL-SCNC: 97 MMOL/L (ref 94–109)
CO2 SERPL-SCNC: 28 MMOL/L (ref 20–32)
CREAT SERPL-MCNC: 1.44 MG/DL (ref 0.66–1.25)
GFR SERPL CREATININE-BSD FRML MDRD: 48 ML/MIN/{1.73_M2}
GLUCOSE SERPL-MCNC: 117 MG/DL (ref 70–99)
POTASSIUM SERPL-SCNC: 3.9 MMOL/L (ref 3.4–5.3)
SODIUM SERPL-SCNC: 132 MMOL/L (ref 133–144)

## 2019-03-12 PROCEDURE — 36415 COLL VENOUS BLD VENIPUNCTURE: CPT | Performed by: INTERNAL MEDICINE

## 2019-03-12 PROCEDURE — 80048 BASIC METABOLIC PNL TOTAL CA: CPT | Performed by: INTERNAL MEDICINE

## 2019-03-13 DIAGNOSIS — I10 ESSENTIAL HYPERTENSION, BENIGN: ICD-10-CM

## 2019-03-14 RX ORDER — AMLODIPINE BESYLATE 10 MG/1
TABLET ORAL
Qty: 90 TABLET | Refills: 3 | Status: SHIPPED | OUTPATIENT
Start: 2019-03-14 | End: 2019-10-24

## 2019-03-14 NOTE — TELEPHONE ENCOUNTER
"Norvasc  Last Written Prescription Date:  02/26/2018  Last Fill Quantity: 90,  # refills: 3   Last office visit: 12/7/2018 with prescribing provider:  rommel   Future Office Visit:   Next 5 appointments (look out 90 days)    Mar 26, 2019 11:00 AM CDT  Return Visit with Nathaniel Spangler MD  [facility] [address] [place] [id number]  [phone]   Apr 09, 2019  9:00 AM CDT  PHYSICAL with Jonas West MD  [facility] ([facility]) [address] [facility] [id number] [phone]      Routing refill request to provider for review/approval because:  Labs out of range:  Creatinine    Requested Prescriptions   Pending Prescriptions Disp Refills     amLODIPine (NORVASC) 10 MG tablet [Pharmacy Med Name: AMLODIPINE BESYLATE TABS 10MG] 90 tablet 3     Sig: TAKE 1 TABLET EVERY EVENING    Calcium Channel Blockers Protocol  Failed - 3/13/2019  4:41 PM       Failed - Blood pressure under 140/90 in past 12 months    BP Readings from Last 3 Encounters:   03/07/19 168/64   02/21/19 155/66   01/15/19 124/68          Failed - Normal serum creatinine on file in past 12 months    Recent Labs   Lab Test 03/12/19  0951   CR 1.44*          Passed - Recent (12 mo) or future (30 days) visit within the authorizing provider's specialty    Patient had office visit in the last 12 months or has a visit in the next 30 days with authorizing provider or within the authorizing provider's specialty.  See \"Patient Info\" tab in inbasket, or \"Choose Columns\" in Meds & Orders section of the refill encounter.         Passed - Medication is active on med list       Passed - Patient is age 18 or older      Charlotte Olguin RN   "

## 2019-03-25 ENCOUNTER — DOCUMENTATION ONLY (OUTPATIENT)
Dept: LAB | Facility: CLINIC | Age: 74
End: 2019-03-25

## 2019-03-25 DIAGNOSIS — N18.30 CKD (CHRONIC KIDNEY DISEASE) STAGE 3, GFR 30-59 ML/MIN (H): Primary | ICD-10-CM

## 2019-03-26 ENCOUNTER — OFFICE VISIT (OUTPATIENT)
Dept: NEPHROLOGY | Facility: CLINIC | Age: 74
End: 2019-03-26
Payer: MEDICARE

## 2019-03-26 VITALS
BODY MASS INDEX: 25.85 KG/M2 | OXYGEN SATURATION: 97 % | DIASTOLIC BLOOD PRESSURE: 72 MMHG | SYSTOLIC BLOOD PRESSURE: 145 MMHG | HEART RATE: 57 BPM | WEIGHT: 170 LBS

## 2019-03-26 DIAGNOSIS — N18.30 CKD (CHRONIC KIDNEY DISEASE) STAGE 3, GFR 30-59 ML/MIN (H): ICD-10-CM

## 2019-03-26 DIAGNOSIS — E87.6 HYPOKALEMIA: ICD-10-CM

## 2019-03-26 DIAGNOSIS — D47.2 MONOCLONAL GAMMOPATHY: ICD-10-CM

## 2019-03-26 DIAGNOSIS — I10 ESSENTIAL HYPERTENSION: Primary | ICD-10-CM

## 2019-03-26 LAB
ALBUMIN SERPL-MCNC: 3.7 G/DL (ref 3.4–5)
ANION GAP SERPL CALCULATED.3IONS-SCNC: 6 MMOL/L (ref 3–14)
BUN SERPL-MCNC: 12 MG/DL (ref 7–30)
CALCIUM SERPL-MCNC: 9.3 MG/DL (ref 8.5–10.1)
CHLORIDE SERPL-SCNC: 96 MMOL/L (ref 94–109)
CO2 SERPL-SCNC: 27 MMOL/L (ref 20–32)
CREAT SERPL-MCNC: 1.47 MG/DL (ref 0.66–1.25)
CREAT UR-MCNC: 49 MG/DL
GFR SERPL CREATININE-BSD FRML MDRD: 47 ML/MIN/{1.73_M2}
GLUCOSE SERPL-MCNC: 105 MG/DL (ref 70–99)
HGB BLD-MCNC: 12.4 G/DL (ref 13.3–17.7)
PHOSPHATE SERPL-MCNC: 3.3 MG/DL (ref 2.5–4.5)
POTASSIUM SERPL-SCNC: 4.2 MMOL/L (ref 3.4–5.3)
PROT UR-MCNC: 0.4 G/L
PROT/CREAT 24H UR: 0.82 G/G CR (ref 0–0.2)
PTH-INTACT SERPL-MCNC: 14 PG/ML (ref 18–80)
SODIUM SERPL-SCNC: 129 MMOL/L (ref 133–144)

## 2019-03-26 PROCEDURE — 85018 HEMOGLOBIN: CPT | Performed by: INTERNAL MEDICINE

## 2019-03-26 PROCEDURE — 84156 ASSAY OF PROTEIN URINE: CPT | Performed by: INTERNAL MEDICINE

## 2019-03-26 PROCEDURE — 83970 ASSAY OF PARATHORMONE: CPT | Performed by: INTERNAL MEDICINE

## 2019-03-26 PROCEDURE — 36415 COLL VENOUS BLD VENIPUNCTURE: CPT | Performed by: INTERNAL MEDICINE

## 2019-03-26 PROCEDURE — 99214 OFFICE O/P EST MOD 30 MIN: CPT | Performed by: INTERNAL MEDICINE

## 2019-03-26 PROCEDURE — 80069 RENAL FUNCTION PANEL: CPT | Performed by: INTERNAL MEDICINE

## 2019-03-26 RX ORDER — CLONIDINE 0.1 MG/24H
1 PATCH, EXTENDED RELEASE TRANSDERMAL WEEKLY
Qty: 5 PATCH | Refills: 0 | Status: SHIPPED | OUTPATIENT
Start: 2019-03-26 | End: 2019-04-17

## 2019-03-26 ASSESSMENT — PAIN SCALES - GENERAL: PAINLEVEL: MODERATE PAIN (4)

## 2019-03-26 NOTE — PATIENT INSTRUCTIONS
1. Start the clonidine patch 0.1 mg  2. Stop the spironolactone  3. Repeat lab next week earlier in the week at any [place] lab  4. Follow-up in one month

## 2019-03-26 NOTE — NURSING NOTE
Michele Vance's goals for this visit include:   Chief Complaint   Patient presents with     RECHECK     recheck CKD  LOV 1-15-19       He requests these members of his care team be copied on today's visit information: no    PCP: Jonas West    Referring Provider:  No referring provider defined for this encounter.    /72 (BP Location: Right arm, Patient Position: Sitting, Cuff Size: Adult Regular)   Pulse 57   Wt 77.1 kg (170 lb)   SpO2 97%   BMI 25.85 kg/m      Do you need any medication refills at today's visit? No    Kaia Roy LPN

## 2019-04-02 ENCOUNTER — TELEPHONE (OUTPATIENT)
Dept: NEPHROLOGY | Facility: CLINIC | Age: 74
End: 2019-04-02

## 2019-04-02 DIAGNOSIS — N18.30 CKD (CHRONIC KIDNEY DISEASE) STAGE 3, GFR 30-59 ML/MIN (H): Primary | ICD-10-CM

## 2019-04-02 NOTE — TELEPHONE ENCOUNTER
[facility] Call Center    Phone Message    May a detailed message be left on voicemail: yes    Reason for Call: Order(s): Other:   Reason for requested: Nephrology orders  Date needed: ASAP--patient went to schedule at their clinic in [place] but were unable to have the labs drawn because Dr. Spangler didn't place any orders, please advise.  Provider name: Dr. Spangler      Action Taken: Message routed to:  Adult Clinics: Nephrology p [phone]

## 2019-04-03 DIAGNOSIS — N18.30 CKD (CHRONIC KIDNEY DISEASE) STAGE 3, GFR 30-59 ML/MIN (H): ICD-10-CM

## 2019-04-03 DIAGNOSIS — D47.2 MGUS (MONOCLONAL GAMMOPATHY OF UNKNOWN SIGNIFICANCE): ICD-10-CM

## 2019-04-03 DIAGNOSIS — D47.2 MGUS (MONOCLONAL GAMMOPATHY OF UNKNOWN SIGNIFICANCE): Primary | ICD-10-CM

## 2019-04-03 LAB
ALBUMIN SERPL-MCNC: NORMAL G/DL (ref 3.4–5)
ALP SERPL-CCNC: NORMAL U/L (ref 40–150)
ALT SERPL W P-5'-P-CCNC: NORMAL U/L (ref 0–70)
ANION GAP SERPL CALCULATED.3IONS-SCNC: 9 MMOL/L (ref 3–14)
ANION GAP SERPL CALCULATED.3IONS-SCNC: NORMAL MMOL/L (ref 6–17)
AST SERPL W P-5'-P-CCNC: NORMAL U/L (ref 0–45)
BASOPHILS # BLD AUTO: NORMAL 10E9/L (ref 0–0.2)
BASOPHILS NFR BLD AUTO: NORMAL %
BILIRUB SERPL-MCNC: NORMAL MG/DL (ref 0.2–1.3)
BUN SERPL-MCNC: 12 MG/DL (ref 7–30)
BUN SERPL-MCNC: NORMAL MG/DL (ref 7–30)
CALCIUM SERPL-MCNC: 9.3 MG/DL (ref 8.5–10.1)
CALCIUM SERPL-MCNC: NORMAL MG/DL (ref 8.5–10.1)
CHLORIDE SERPL-SCNC: 102 MMOL/L (ref 94–109)
CHLORIDE SERPL-SCNC: NORMAL MMOL/L (ref 94–109)
CO2 SERPL-SCNC: 27 MMOL/L (ref 20–32)
CO2 SERPL-SCNC: NORMAL MMOL/L (ref 20–32)
CREAT SERPL-MCNC: 1.35 MG/DL (ref 0.66–1.25)
CREAT SERPL-MCNC: NORMAL MG/DL (ref 0.66–1.25)
DIFFERENTIAL METHOD BLD: NORMAL
EOSINOPHIL NFR BLD AUTO: NORMAL %
ERYTHROCYTE [DISTWIDTH] IN BLOOD BY AUTOMATED COUNT: NORMAL % (ref 10–15)
GFR SERPL CREATININE-BSD FRML MDRD: 52 ML/MIN/{1.73_M2}
GFR SERPL CREATININE-BSD FRML MDRD: NORMAL ML/MIN/{1.73_M2}
GLUCOSE SERPL-MCNC: 143 MG/DL (ref 70–99)
GLUCOSE SERPL-MCNC: NORMAL MG/DL (ref 70–99)
HCT VFR BLD AUTO: NORMAL % (ref 40–53)
HGB BLD-MCNC: NORMAL G/DL (ref 13.3–17.7)
IMM GRANULOCYTES # BLD: NORMAL 10E9/L (ref 0–0.4)
IMM GRANULOCYTES NFR BLD: NORMAL %
LYMPHOCYTES # BLD AUTO: NORMAL 10E9/L (ref 0.8–5.3)
LYMPHOCYTES NFR BLD AUTO: NORMAL %
MCH RBC QN AUTO: NORMAL PG (ref 26.5–33)
MCHC RBC AUTO-ENTMCNC: NORMAL G/DL (ref 31.5–36.5)
MCV RBC AUTO: NORMAL FL (ref 78–100)
MONOCYTES # BLD AUTO: NORMAL 10E9/L (ref 0–1.3)
MONOCYTES NFR BLD AUTO: NORMAL %
NEUTROPHILS # BLD AUTO: NORMAL 10E9/L (ref 1.6–8.3)
NEUTROPHILS NFR BLD AUTO: NORMAL %
NRBC # BLD AUTO: NORMAL 10*3/UL
NRBC BLD AUTO-RTO: NORMAL /100
PLATELET # BLD AUTO: NORMAL 10E9/L (ref 150–450)
POTASSIUM SERPL-SCNC: 3.3 MMOL/L (ref 3.4–5.3)
POTASSIUM SERPL-SCNC: NORMAL MMOL/L (ref 3.4–5.3)
PROT SERPL-MCNC: NORMAL G/DL (ref 6.8–8.8)
RBC # BLD AUTO: NORMAL 10E12/L (ref 4.4–5.9)
SODIUM SERPL-SCNC: 138 MMOL/L (ref 133–144)
SODIUM SERPL-SCNC: NORMAL MMOL/L (ref 133–144)
WBC # BLD AUTO: NORMAL 10E9/L (ref 4–11)

## 2019-04-03 PROCEDURE — 80048 BASIC METABOLIC PNL TOTAL CA: CPT | Performed by: INTERNAL MEDICINE

## 2019-04-03 PROCEDURE — 36415 COLL VENOUS BLD VENIPUNCTURE: CPT | Performed by: INTERNAL MEDICINE

## 2019-04-03 PROCEDURE — 84166 PROTEIN E-PHORESIS/URINE/CSF: CPT | Performed by: INTERNAL MEDICINE

## 2019-04-09 ENCOUNTER — OFFICE VISIT (OUTPATIENT)
Dept: INTERNAL MEDICINE | Facility: CLINIC | Age: 74
End: 2019-04-09
Payer: MEDICARE

## 2019-04-09 ENCOUNTER — HOSPITAL ENCOUNTER (OUTPATIENT)
Dept: GENERAL RADIOLOGY | Facility: CLINIC | Age: 74
Discharge: HOME | End: 2019-04-09
Attending: INTERNAL MEDICINE | Admitting: INTERNAL MEDICINE
Payer: MEDICARE

## 2019-04-09 VITALS
OXYGEN SATURATION: 98 % | TEMPERATURE: 96.9 F | HEART RATE: 66 BPM | DIASTOLIC BLOOD PRESSURE: 66 MMHG | RESPIRATION RATE: 16 BRPM | SYSTOLIC BLOOD PRESSURE: 138 MMHG | WEIGHT: 172 LBS | BODY MASS INDEX: 26.15 KG/M2
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
## 2019-04-09 DIAGNOSIS — N18.30 CKD (CHRONIC KIDNEY DISEASE) STAGE 3, GFR 30-59 ML/MIN (H): ICD-10-CM

## 2019-04-09 DIAGNOSIS — I10 ESSENTIAL HYPERTENSION, BENIGN: Primary | ICD-10-CM

## 2019-04-09 DIAGNOSIS — E87.6 HYPOKALEMIA: ICD-10-CM

## 2019-04-09 DIAGNOSIS — R07.89 CHEST WALL PAIN: ICD-10-CM

## 2019-04-09 PROCEDURE — 99214 OFFICE O/P EST MOD 30 MIN: CPT | Performed by: INTERNAL MEDICINE

## 2019-04-09 PROCEDURE — 71101 X-RAY EXAM UNILAT RIBS/CHEST: CPT | Mod: RT

## 2019-04-09 RX ORDER — SPIRONOLACTONE 25 MG/1
12.5 TABLET ORAL DAILY
Qty: 30 TABLET | Refills: 1 | Status: SHIPPED | OUTPATIENT
Start: 2019-04-09 | End: 2019-07-16

## 2019-04-09 ASSESSMENT — ENCOUNTER SYMPTOMS
HEMATOCHEZIA: 0
HEADACHES: 0
DYSURIA: 0
ABDOMINAL PAIN: 1
HEMATURIA: 0
COUGH: 1
SHORTNESS OF BREATH: 1
FREQUENCY: 1
DIZZINESS: 1
NERVOUS/ANXIOUS: 0
FEVER: 0
PALPITATIONS: 0
ARTHRALGIAS: 1
MYALGIAS: 0
HEARTBURN: 0
SORE THROAT: 0
CONSTIPATION: 1
PARESTHESIAS: 0
JOINT SWELLING: 0
DIARRHEA: 0
CHILLS: 1
WEAKNESS: 0
NAUSEA: 0
EYE PAIN: 0

## 2019-04-09 ASSESSMENT — PAIN SCALES - GENERAL: PAINLEVEL: EXTREME PAIN (9)

## 2019-04-09 ASSESSMENT — ACTIVITIES OF DAILY LIVING (ADL): CURRENT_FUNCTION: NO ASSISTANCE NEEDED

## 2019-04-09 NOTE — PROGRESS NOTES
Michele Vance is a 73 year old male who presents with not feeling well. Fell 3 feet onto the floor, landing on the right side. Happened 10 days.  Right side is hurting, not much change over the 10 days, wife thinks it is getting worse.  More pain, slower.  Cough and laugh don't cause more pain.    Nephrology put him on clonidine patch for more stable control.  He also went off spironolactone because his sodium level went down.  However then he has potassium has also gone down to 3.3 again.    Patient also saw oncology for a monoclonal gammopathy of unknown significance.  He had labs and has labs scheduled again in 6 months however they would like to do more locally see oncology care for his blood pressure locally not go to [place].    Past Medical History:   Diagnosis Date     Carotid stenosis     right endartectomy     Chronic kidney disease     stage 3     Coronary artery disease 3-2014    CABG x6     CVA (cerebral infarction)     balance problems, mild     Elevated CK      Esophageal reflux      Hypercholesteremia      Nonsenile cataract      Other and unspecified hyperlipidemia      PAD (peripheral artery disease) (H)      Rhabdomyolysis 2010     Unspecified essential hypertension      Current Outpatient Medications   Medication     alirocumab (PRALUENT) 150 MG/ML injectable pen     amitriptyline (ELAVIL) 25 MG tablet     amLODIPine (NORVASC) 10 MG tablet     ASPIRIN PO     carvedilol (COREG) 25 MG tablet     cilostazol (PLETAL) 100 MG tablet     cloNIDine (CATAPRES-TTS1) 0.1 MG/24HR WK patch     gabapentin (NEURONTIN) 300 MG capsule     isosorbide mononitrate (IMDUR) 60 MG 24 hr tablet     losartan (COZAAR) 100 MG tablet     metoclopramide (REGLAN) 10 MG tablet     omeprazole (PRILOSEC) 40 MG DR capsule     polyethylene glycol (MIRALAX/GLYCOLAX) packet     potassium chloride ER (K-DUR/KLOR-CON M) 10 MEQ CR tablet     psyllium (METAMUCIL) 58.6 % POWD     spironolactone (ALDACTONE) 25 MG tablet     STATIN  NOT PRESCRIBED, INTENTIONAL,     Current Facility-Administered Medications   Medication     triamcinolone hexacetonide (ARISTOSPAN) injection 40 mg     Social History     Tobacco Use     Smoking status: Former Smoker     Packs/day: 2.50     Years: 20.00     Pack years: 50.00     Types: Cigarettes     Last attempt to quit: 2000     Years since quittin.2     Smokeless tobacco: Never Used   Substance Use Topics     Alcohol use: No     Alcohol/week: 0.0 oz     Drug use: No     Review of Systems  Constitutional-No fevers, chills, or weight changes..  Cardiac-No chest pain or palpitations.  Respiratory-No cough, sob, or hemoptysis.  GI-No nausea, vomitting, diarrhea, constipation, or blood in the stool.    Physical Exam  /66   Pulse 66   Temp 96.9  F (36.1  C) (Temporal)   Resp 16   Wt 78 kg (172 lb)   SpO2 98%   BMI 26.15 kg/m    General Appearance-healthy, alert, no distress  Cardiac-regular rate and rhythm  with normal S1, S2 ; no murmur, rub or gallops  Lungs-clear to auscultation-0some right lower rib pains  Extremities-no peripheral edema, peripheral pulses normal    ASSESSMENT:  Patient here after a fall off of the step stool, landed on his right side he has some right lower rib pain on exam, we will check rib x-rays today.  His breathing is okay as long exam is fine.    Patient is also here with some blood pressure recheck today his blood pressure is 138/66 at home is in the 150-170 range, he seen nephrology and been tried on spironolactone for low potassium, then was placed on clonidine and finally a clonidine patch I have encouraged him to continue the clonidine patch, we will add back Spironolactone to just 12.5 mg recheck his blood pressure his home blood pressure cuff and his potassium sodium, creatinine next week he does have chronic kidney disease stage III he also has a left renal artery stenosis on ultrasound but no indication or need for a stent at this time.  Appreciate nephrology's  advice and assistance but the patient would like to do his blood pressure control closer to his home we will try to adjust this with him.    Monoclonal gammopathy of unknown significance, he seen oncology and in 6 months can see oncology here in [place].    Electronically signed by Jonas West MD

## 2019-04-09 NOTE — PROGRESS NOTES
Appropriate assistive devices provided during their visit. na (Yes, No, N/A) na (list device)    Exam table and/or cart  placed in the lowest position. na (Yes, No, N/A)    Brakes on tables/carts/wheelchairs used at all times. na (Yes, No, N/A)    Non slip footwear applied. y (Yes, No, NA)    Patient was accompanied by staff throughout visit. y (Yes, No, N/A)    Equipment safety straps used. na (Yes, No, N/A)    Assist with toileting. na (Yes, No, N/A)

## 2019-04-09 NOTE — PROGRESS NOTES
"SUBJECTIVE:   Michele Vance is a 73 year old male who presents for Preventive Visit.      Are you in the first 12 months of your Medicare coverage?  No    Healthy Habits:     In general, how would you rate your overall health?  Fair    Frequency of exercise:  2-3 days/week    Duration of exercise:  15-30 minutes    Do you usually eat at least 4 servings of fruit and vegetables a day, include whole grains    & fiber and avoid regularly eating high fat or \"junk\" foods?  No    Taking medications regularly:  Yes    Medication side effects:  Lightheadedness    Ability to successfully perform activities of daily living:  No assistance needed    Home Safety:  No safety concerns identified    Hearing Impairment:  No hearing concerns    In the past 6 months, have you been bothered by leaking of urine?  No    In general, how would you rate your overall mental or emotional health?  Good      PHQ-2 Total Score: 0    Additional concerns today:  No    Do you feel safe in your environment? Yes    Do you have a Health Care Directive? No: Advance care planning was reviewed with patient; patient declined at this time.      Fall risk  Fallen 2 or more times in the past year?: Yes  Any fall with injury in the past year?: Yes    Cognitive Screening   1) Repeat 3 items (Leader, Season, Table)    2) Clock draw: NORMAL  3) 3 item recall: Recalls 2 objects   Results: NORMAL clock, 1-2 items recalled: COGNITIVE IMPAIRMENT LESS LIKELY    Mini-CogTM Copyright KATHY Nugent. Licensed by the author for use in [facility]; reprinted with permission (matthew@.[facility]). All rights reserved.      Do you have sleep apnea, excessive snoring or daytime drowsiness?: no    Reviewed and updated as needed this visit by clinical staff         Reviewed and updated as needed this visit by Provider        Social History     Tobacco Use     Smoking status: Former Smoker     Packs/day: 2.50     Years: 20.00     Pack years: 50.00     Types: Cigarettes     " Last attempt to quit: 2000     Years since quittin.2     Smokeless tobacco: Never Used   Substance Use Topics     Alcohol use: No     Alcohol/week: 0.0 oz         Alcohol Use 2019   Prescreen: >3 drinks/day or >7 drinks/week? Not Applicable   Prescreen: >3 drinks/day or >7 drinks/week? -           {additional problems to add (Optional):505231}    Current providers sharing in care for this patient include:   Patient Care Team:  Jonas West MD as PCP - General (Internal Medicine)  Benito Hale MD (Inactive) as Referring Physician (Surgery)  Micheal Hogan MD as MD (Internal Medicine)  Mikie Wise MD as MD (Cardiology)  Yusuf Xiong MD as MD (Orthopaedic Surgery)  Suzy Hammer, VIPIN as Clinic Care Coordinator (Neurosurgery)  Jonas West MD as Assigned PCP    The following health maintenance items are reviewed in Epic and correct as of today:  Health Maintenance   Topic Date Due     MIGRAINE ACTION PLAN  1963     AORTIC ANEURYSM SCREENING (SYSTEM ASSIGNED)  2011     COPD ACTION PLAN Q1 YR  2015     MEDICARE ANNUAL WELLNESS VISIT  2018     INFLUENZA VACCINE (1) 2018     ADVANCE DIRECTIVE PLANNING Q5 YRS  02/10/2019     MICROALBUMIN Q1 YEAR  2019     FALL RISK ASSESSMENT  2019     LIPID MONITORING Q1 YEAR  2020     PHQ-2 Q1 YR  2020     BMP Q1 YR  2020     HEMOGLOBIN Q1 YR  2020     DTAP/TDAP/TD IMMUNIZATION (2 - Td) 2021     COLONOSCOPY Q5 YR  2023     SPIROMETRY ONETIME  Completed     ZOSTER IMMUNIZATION  Completed     HEPATITIS C SCREENING  Completed     IPV IMMUNIZATION  Aged Out     MENINGITIS IMMUNIZATION  Aged Out     {Chronicprobdata (Optional):009739}  {Decision Support (Optional):808928}    Review of Systems   Constitutional: Positive for chills. Negative for fever.   HENT: Negative for congestion, ear pain, hearing loss and sore throat.    Eyes: Negative for pain and visual disturbance.  "  Respiratory: Positive for cough and shortness of breath.    Cardiovascular: Positive for peripheral edema. Negative for chest pain and palpitations.   Gastrointestinal: Positive for abdominal pain and constipation. Negative for diarrhea, heartburn, hematochezia and nausea.   Genitourinary: Positive for frequency, impotence and urgency. Negative for discharge, dysuria, genital sores and hematuria.   Musculoskeletal: Positive for arthralgias. Negative for joint swelling and myalgias.   Skin: Negative for rash.   Neurological: Positive for dizziness. Negative for weakness, headaches and paresthesias.   Psychiatric/Behavioral: Negative for mood changes. The patient is not nervous/anxious.      {ROS COMP (Optional):475045}    OBJECTIVE:   There were no vitals taken for this visit. Estimated body mass index is 25.85 kg/m  as calculated from the following:    Height as of 3/7/19: 1.727 m (5' 8\").    Weight as of 3/26/19: 77.1 kg (170 lb).  Physical Exam  {Exam (Optional) :463460}    {Diagnostic Test Results (Optional):202214::\"Diagnostic Test Results:\",\"none \"}    ASSESSMENT / PLAN:   {Diag Picklist:970336}    End of Life Planning:  Patient currently has an advanced directive: { :605014}    COUNSELING:  {Medicare Counselin}    BP Readings from Last 1 Encounters:   19 145/72     Estimated body mass index is 25.85 kg/m  as calculated from the following:    Height as of 3/7/19: 1.727 m (5' 8\").    Weight as of 3/26/19: 77.1 kg (170 lb).    {BP Counseling- Complete if BP >= 120/80  (Optional):234954}  {Weight Management Plan (ACO) Complete if BMI is abnormal-  Ages 18-64  BMI >24.9.  Age 65+ with BMI <23 or >30 (Optional):061657}     reports that he quit smoking about 19 years ago. His smoking use included cigarettes. He has a 50.00 pack-year smoking history. He has never used smokeless tobacco.  {Tobacco Cessation -- Complete if patient is a smoker (Optional):482425}    Appropriate preventive services were " discussed with this patient, including applicable screening as appropriate for cardiovascular disease, diabetes, osteopenia/osteoporosis, and glaucoma.  As appropriate for age/gender, discussed screening for colorectal cancer, prostate cancer, breast cancer, and cervical cancer. Checklist reviewing preventive services available has been given to the patient.    Reviewed patients plan of care and provided an AVS. The {CarePlan:405159} for Michele meets the Care Plan requirement. This Care Plan has been established and reviewed with the {PATIENT, FAMILY MEMBER, CAREGIVER:404986}.    Counseling Resources:  ATP IV Guidelines  Pooled Cohorts Equation Calculator  Breast Cancer Risk Calculator  FRAX Risk Assessment  ICSI Preventive Guidelines  Dietary Guidelines for Americans, 2010  USDA's MyPlate  ASA Prophylaxis  Lung CA Screening    Jonas West MD  [facility]    Identified Health Risks:

## 2019-04-10 NOTE — PROGRESS NOTES
"March 26, 2019    CC: CKD, hypertension    HPI: Michele Vance is a 73 year old male who presents for follow-up of CKD and hypertension. Mr. Vance's hx includes CABG in 2014 - no sxs prior but presented with SOB and dizziness. This procedure was complicated by CVA which has left him with some memory issues and dizziness. Addt vascular hx includes PAD s/p bilateral angioplasty, stent placed in the left. He had the right leg intervened at [facility] and the left by vascular surgery associated (Karen Cash). Addt hx includes hypertension, rhabdomyolysis in 1999, carotid stenosis s/p right sided stent. He has always had his hypokalemia tx but has declined workup in the past.  - Swelling: right lower leg since angioplasty   Today he presents for follow-up. His creatinine has been 1.29-1.68; 1.47 today. His blood pressure is just above goal. His sodium level is noted to be low today. He feels he has constant water intake. He previously had large etoh intake and reports he continues to drink water which he feels stems from previously drinking larger amounts of etoh. He has had difficulty with balance/stability recently.        Allergies   Allergen Reactions     Hmg-Coa-R Inhibitors Other (See Comments)     Rhabdo  Same as \"statins\"     Gemfibrozil      Resting thigh-calf pain     Sulfa Drugs      Reacted as a child     Zetia [Ezetimibe]      Muscle cramping         Current Outpatient Medications on File Prior to Visit:  alirocumab (PRALUENT) 150 MG/ML injectable pen Inject 1 mL (150 mg) Subcutaneous every 14 days   amitriptyline (ELAVIL) 25 MG tablet Take 1 tablet (25 mg) by mouth 2 times daily   amLODIPine (NORVASC) 10 MG tablet TAKE 1 TABLET EVERY EVENING   ASPIRIN PO Take 325 mg by mouth daily   carvedilol (COREG) 25 MG tablet Take 1 tablet (25 mg) by mouth 2 times daily (with meals)   cilostazol (PLETAL) 100 MG tablet TAKE 1 TABLET TWICE A DAY   gabapentin (NEURONTIN) 300 MG capsule TAKE 1 CAPSULE THREE TIMES A DAY " (Patient taking differently: TAKE 1 CAPSULE  TWICE  A DAY)   isosorbide mononitrate (IMDUR) 60 MG 24 hr tablet Take 1 tablet (60 mg) by mouth daily   losartan (COZAAR) 100 MG tablet Take 1 tablet (100 mg) by mouth daily   metoclopramide (REGLAN) 10 MG tablet TAKE 1 TABLET FOUR TIMES A DAY (Patient taking differently: Four times daily)   omeprazole (PRILOSEC) 40 MG DR capsule Take 1 capsule (40 mg) by mouth daily   polyethylene glycol (MIRALAX/GLYCOLAX) packet Take 1 packet by mouth daily    potassium chloride ER (K-DUR/KLOR-CON M) 10 MEQ CR tablet Take 1 tablet (10 mEq) by mouth 2 times daily   psyllium (METAMUCIL) 58.6 % POWD Take by mouth daily   STATIN NOT PRESCRIBED, INTENTIONAL, Pt has known rhabdomyolysis in the past, somewhat associated with statins.  Also wasn't able to tolerate Zetia. (Patient not taking: Reported on 3/7/2019)     Current Facility-Administered Medications on File Prior to Visit:  triamcinolone hexacetonide (ARISTOSPAN) injection 40 mg       Past Medical History:   Diagnosis Date     Carotid stenosis     right endartectomy     Chronic kidney disease     stage 3     Coronary artery disease 3-2014    CABG x6     CVA (cerebral infarction)     balance problems, mild     Elevated CK      Esophageal reflux      Hypercholesteremia      Nonsenile cataract      Other and unspecified hyperlipidemia      PAD (peripheral artery disease) (H)      Rhabdomyolysis 2010     Unspecified essential hypertension        Past Surgical History:   Procedure Laterality Date     APPENDECTOMY  1974     BYPASS GRAFT ARTERY CORONARY  3/5/2014    Procedure: BYPASS GRAFT ARTERY CORONARY;  Median Sternotomy, Coronary Artery Bypass Graft X6 used Left internal mammary artery, Left and Right Greater Saphenous vein, on Pump oxygenator.;  Surgeon: Tim Montanez MD;  Location: [facility] OR     CATARACT IOL, RT/LT Bilateral 2008    in [place]     cataracts       COLONOSCOPY  12/12/02    [facility]     COLONOSCOPY N/A  10/7/2014    Procedure: COMBINED COLONOSCOPY, SINGLE OR MULTIPLE BIOPSY/POLYPECTOMY BY BIOPSY;  Surgeon: Micheal Mora MD;  Location:  GI     DENTAL SURGERY      TMJ, implants     ENDARTERECTOMY CAROTID      right carotid stent     ESOPHAGOSCOPY, GASTROSCOPY, DUODENOSCOPY (EGD), COMBINED Left 10/7/2014    Procedure: COMBINED ESOPHAGOSCOPY, GASTROSCOPY, DUODENOSCOPY (EGD), BIOPSY SINGLE OR MULTIPLE;  Surgeon: Micheal Mora MD;  Location:  GI     ESOPHAGOSCOPY, GASTROSCOPY, DUODENOSCOPY (EGD), COMBINED Left 10/7/2014    Procedure: COMBINED ESOPHAGOSCOPY, GASTROSCOPY, DUODENOSCOPY (EGD), REMOVE FOREIGN BODY;  Surgeon: Micheal Mora MD;  Location: [facility] CAPSULE ENDOSCOPY N/A 10/7/2014    Procedure: CAPSULE/PILL CAM ENDOSCOPY;  Surgeon: Micheal Mora MD;  Location: [facility] UGI ENDOSCOPY, SIMPLE EXAM  99    [facility]     INJECT EPIDURAL LUMBAR Right 2017    Procedure: INJECT EPIDURAL LUMBAR;  Lumbar transforaminal Epidural Steroid Injection right lumbar 4-5, and right  lumbar 5-Sacral 1;  Surgeon: Anthony Gonzalez MD;  Location: [facility] OR     INJECT JOINT SACROILIAC Bilateral 2017    Procedure: INJECT JOINT SACROILIAC;  sacroiliac joint injection bilateral;  Surgeon: Anthony Gonzalez MD;  Location:  OR     KNEE SURGERY  1976    left knee reconstruction     lasix      both eyes     RELEASE CARPAL TUNNEL  2011    RELEASE CARPAL TUNNEL performed by JO MADRID at  OR     RELEASE CARPAL TUNNEL  2011    RELEASE CARPAL TUNNEL performed by JO MADRID at  OR       Social History     Tobacco Use     Smoking status: Former Smoker     Packs/day: 2.50     Years: 20.00     Pack years: 50.00     Types: Cigarettes     Last attempt to quit: 2000     Years since quittin.2     Smokeless tobacco: Never Used   Substance Use Topics     Alcohol use: No     Alcohol/week: 0.0 oz     Drug use: No       Family History   Problem Relation Age of  Onset     Hypertension Mother      Eye Disorder Mother      Cerebrovascular Disease Father      Heart Disease Father      Lipids Father      Obesity Father      Cancer Sister      Heart Disease Sister      Glaucoma No family hx of      Macular Degeneration No family hx of      Kidney Disease No family hx of        ROS: A 4 system review of systems was negative other than noted here or above.     Exam:  /72 (BP Location: Right arm, Patient Position: Sitting, Cuff Size: Adult Regular)   Pulse 57   Wt 77.1 kg (170 lb)   SpO2 97%   BMI 25.85 kg/m      GENERAL APPEARANCE: alert and no distress  RESP: lungs clear to auscultation   CV: regular rhythm, normal rate, no rub  Extremities: no clubbing, cyanosis, trace edema  SKIN: no rash  NEURO: mentation intact and speech normal  PSYCH: affect normal/bright    Results:    Orders Only on 03/26/2019   Component Date Value Ref Range Status     Protein Random Urine 03/26/2019 0.40  g/L Final     Protein Total Urine g/gr Creatinine 03/26/2019 0.82* 0 - 0.2 g/g Cr Final     Sodium 03/26/2019 129* 133 - 144 mmol/L Final     Potassium 03/26/2019 4.2  3.4 - 5.3 mmol/L Final     Chloride 03/26/2019 96  94 - 109 mmol/L Final     Carbon Dioxide 03/26/2019 27  20 - 32 mmol/L Final     Anion Gap 03/26/2019 6  3 - 14 mmol/L Final     Glucose 03/26/2019 105* 70 - 99 mg/dL Final    Non Fasting     Urea Nitrogen 03/26/2019 12  7 - 30 mg/dL Final     Creatinine 03/26/2019 1.47* 0.66 - 1.25 mg/dL Final     GFR Estimate 03/26/2019 47* >60 mL/min/[1.73_m2] Final    Comment: Non  GFR Calc  Starting 12/18/2018, serum creatinine based estimated GFR (eGFR) will be   calculated using the Chronic Kidney Disease Epidemiology Collaboration   (CKD-EPI) equation.       GFR Estimate If Black 03/26/2019 54* >60 mL/min/[1.73_m2] Final    Comment:  GFR Calc  Starting 12/18/2018, serum creatinine based estimated GFR (eGFR) will be   calculated using the Chronic Kidney  Disease Epidemiology Collaboration   (CKD-EPI) equation.       Calcium 03/26/2019 9.3  8.5 - 10.1 mg/dL Final     Phosphorus 03/26/2019 3.3  2.5 - 4.5 mg/dL Final     Albumin 03/26/2019 3.7  3.4 - 5.0 g/dL Final     Parathyroid Hormone Intact 03/26/2019 14* 18 - 80 pg/mL Final     Hemoglobin 03/26/2019 12.4* 13.3 - 17.7 g/dL Final     Creatinine Urine 03/26/2019 49  mg/dL Final        Assessment/Plan:   1. Hypertension/Hypokalemia: in the setting of hypertension and hypokalemia, need to consider hyperaldosterone state. Aldosterone was <3 when checked in Nov but that may have been when on spironolactone. Renin has not been checked. We spent much time at his initial visit discussing the potential of EJ vs primary hyperaldosteronism. If EJ, the evidence would not support intervention unless having hypertensive urgency/emergency. Ultrasound with doppler was ordered to start and his imaging shows a very small left kidney with normal sized right kidney. Given the size of the left kidney, it seems unlikely that intervening on that stenosis would lead to benefit. I think medical management should be followed for now.      2. CKD Stage 3: in the setting of likely vascular related injury given his significant vascular disease hx (CAD, PAD, carotid stenosis, CVA). He has some proteinuria so myeloma studies were done. He has a monoclonal protein of 0.7 so he was seen by Dr. Baptiste in hematology.  He does not have diabetes. 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     3. Vascular Disease: will be important to optimize his lipids given his vascular disease burden. Last LDL was at level of 82 which is good to see. He is not on statin therapy given his hx of rhabdomyolysis.     4. Hyponatremia: I am concerned that this may be caused by the spironolactone so I am stopping it today. Educated him to drink  to his thirst rather than large amounts       Patient Instructions   1. Start the clonidine patch 0.1 mg  2. Stop the spironolactone  3. Repeat lab next week earlier in the week at any [facility] lab  4. Follow-up in one month      Nathaniel Spangler DO

## 2019-04-16 ENCOUNTER — ALLIED HEALTH/NURSE VISIT (OUTPATIENT)
Dept: FAMILY MEDICINE | Facility: CLINIC | Age: 74
End: 2019-04-16
Payer: MEDICARE

## 2019-04-16 VITALS — SYSTOLIC BLOOD PRESSURE: 128 MMHG | DIASTOLIC BLOOD PRESSURE: 66 MMHG

## 2019-04-16 DIAGNOSIS — Z01.30 BLOOD PRESSURE CHECK: Primary | ICD-10-CM

## 2019-04-16 DIAGNOSIS — E87.6 HYPOKALEMIA: ICD-10-CM

## 2019-04-16 DIAGNOSIS — N18.30 CKD (CHRONIC KIDNEY DISEASE) STAGE 3, GFR 30-59 ML/MIN (H): ICD-10-CM

## 2019-04-16 DIAGNOSIS — I10 ESSENTIAL HYPERTENSION, BENIGN: ICD-10-CM

## 2019-04-16 DIAGNOSIS — I10 ESSENTIAL HYPERTENSION: ICD-10-CM

## 2019-04-16 LAB
ANION GAP SERPL CALCULATED.3IONS-SCNC: 6 MMOL/L (ref 3–14)
BUN SERPL-MCNC: 9 MG/DL (ref 7–30)
CALCIUM SERPL-MCNC: 8.9 MG/DL (ref 8.5–10.1)
CHLORIDE SERPL-SCNC: 101 MMOL/L (ref 94–109)
CO2 SERPL-SCNC: 27 MMOL/L (ref 20–32)
CREAT SERPL-MCNC: 1.41 MG/DL (ref 0.66–1.25)
GFR SERPL CREATININE-BSD FRML MDRD: 49 ML/MIN/{1.73_M2}
GLUCOSE SERPL-MCNC: 151 MG/DL (ref 70–99)
POTASSIUM SERPL-SCNC: 3.5 MMOL/L (ref 3.4–5.3)
SODIUM SERPL-SCNC: 134 MMOL/L (ref 133–144)

## 2019-04-16 PROCEDURE — 36415 COLL VENOUS BLD VENIPUNCTURE: CPT | Performed by: INTERNAL MEDICINE

## 2019-04-16 PROCEDURE — 99207 ZZC NO CHARGE NURSE ONLY: CPT

## 2019-04-16 PROCEDURE — 80048 BASIC METABOLIC PNL TOTAL CA: CPT | Performed by: INTERNAL MEDICINE

## 2019-04-16 NOTE — TELEPHONE ENCOUNTER
"Writer received a refill request from: [facility] in [place], [place]. [phone]    Medication: cloNIDine (CATAPRES-TTS1) 0.1 MG/24HR WK patch     Sig: Place 1 patch onto the skin once a week    Date last dispensed: 3/27/19      Pt's last office visit: 3/26/19  Next scheduled office visit: NONE    Chart reviewed.  Pt cancelled 4/30/19 Return appt with Dr. Spangler \"Cancelled via [employer] (Plan is to follow up with BP medication management locally with Dr. West.)\"      Per the RN/LPN medication refill protocol, writer is  to refill this request. If able to refill, please call the pt to inform them the RX was sent to the pharmacy. If unable to refill, route this encounter to the prescribing physician for authorization or further instructions.    "

## 2019-04-16 NOTE — PROGRESS NOTES
Michele Vance is a 73 year old year old patient who comes in today for a Blood Pressure check because of ongoing blood pressure monitoring.    Patient is taking medication as prescribed  Patient is tolerating medications well.  Patient is monitoring Blood Pressure at home.  Average readings if yes are 176/86, 165/66 right arm. Left arm 130's over 70's   Current complaints: none    Different readings from Left arm to right arm. Reading done today on both arms manual and with machine.     BP Readings from Last 3 Encounters:   04/16/19 128/66   04/09/19 138/66   03/26/19 145/72     Catia Escalera CMA

## 2019-04-17 RX ORDER — CLONIDINE 0.1 MG/24H
1 PATCH, EXTENDED RELEASE TRANSDERMAL WEEKLY
Qty: 5 PATCH | Refills: 0 | Status: SHIPPED | OUTPATIENT
Start: 2019-04-17 | End: 2019-05-30

## 2019-05-01 ENCOUNTER — OFFICE VISIT (OUTPATIENT)
Dept: PALLIATIVE MEDICINE | Facility: CLINIC | Age: 74
End: 2019-05-01
Attending: INTERNAL MEDICINE
Payer: MEDICARE

## 2019-05-01 VITALS — SYSTOLIC BLOOD PRESSURE: 156 MMHG | HEART RATE: 60 BPM | DIASTOLIC BLOOD PRESSURE: 64 MMHG

## 2019-05-01 DIAGNOSIS — M19.011 PRIMARY OSTEOARTHRITIS OF RIGHT SHOULDER: Primary | ICD-10-CM

## 2019-05-01 DIAGNOSIS — M75.41 IMPINGEMENT SYNDROME, SHOULDER, RIGHT: ICD-10-CM

## 2019-05-01 DIAGNOSIS — G89.29 CHRONIC RIGHT SHOULDER PAIN: ICD-10-CM

## 2019-05-01 DIAGNOSIS — M54.5 CHRONIC BILATERAL LOW BACK PAIN, WITH SCIATICA PRESENCE UNSPECIFIED: ICD-10-CM

## 2019-05-01 DIAGNOSIS — M47.816 LUMBAR FACET ARTHROPATHY: ICD-10-CM

## 2019-05-01 DIAGNOSIS — M25.511 CHRONIC RIGHT SHOULDER PAIN: ICD-10-CM

## 2019-05-01 DIAGNOSIS — G89.29 CHRONIC BILATERAL LOW BACK PAIN, WITH SCIATICA PRESENCE UNSPECIFIED: ICD-10-CM

## 2019-05-01 DIAGNOSIS — N18.30 CKD (CHRONIC KIDNEY DISEASE) STAGE 3, GFR 30-59 ML/MIN (H): ICD-10-CM

## 2019-05-01 PROCEDURE — 99214 OFFICE O/P EST MOD 30 MIN: CPT | Performed by: ANESTHESIOLOGY

## 2019-05-01 PROCEDURE — 99207 ZZC CONSULT E&M CHANGED TO SUBSEQUENT LEVEL: CPT | Performed by: ANESTHESIOLOGY

## 2019-05-01 ASSESSMENT — PAIN SCALES - GENERAL: PAINLEVEL: EXTREME PAIN (8)

## 2019-05-01 NOTE — PATIENT INSTRUCTIONS
Assessment:  1.  Chronic pain syndrome  2.  Chronic lower back pain  3.  Lumbar facet joint pain  4.  Chronic right shoulder pain with impingement  5.  Right shoulder osteoarthritis  6.  Right shoulder AC joint arthritis    Recommendations:  1.  Right shoulder Glenohumeral joint injection may be helpful - order placed today  2.  May benefit from right shoulder AC joint injection depending on response to glenohumeral joint injection  3.  Right shoulder subacromial bursa injection may be helpful as well  4.  Lumbar facet injections may be helpful if lower back acts up again  5.  Consider trial of Hemp CBD oils and or topicals    ----------------------------------------------------------------  Clinic Number:  [phone]   Call this number with any questions about your care and for scheduling assistance. Calls are returned Monday through Friday between 8 AM and 4:30 PM. We usually get back to you within 2 business days depending on the issue/request.       Medication refills:    For non-opioid medications, call your pharmacy directly to request a refill. The pharmacy will contact the [facility] for authorization. Please allow 3-4 days for these refills to be processed.     For opioid refills, call the clinic number or send a [facility] message. Please contact us 7-10 days before your refill is due. The message MUST include the name of the specific medication(s) requested and how you would like to receive the prescription(s). The options are as follows:    Pain Clinic staff can mail the prescription to your pharmacy. Please tell us the name of the pharmacy.    You may pick the prescription up at the Pain Clinic (tell us the location) or during a clinic visit with your pain provider    Pain Clinic staff can deliver the prescription to the [facility] pharmacy in the clinic building. Please tell us the location.      We believe regular attendance is key to your success in our program.    Any time you are unable to  keep your appointment we ask that you call us at least 24 hours in advance to let us know. This will allow us to offer the appointment time to another patient.

## 2019-05-01 NOTE — PROGRESS NOTES
[facility] Interventional Consultation    Date of visit: May 1, 2019    Reason for consultation:    Michele Vance is a 73 year old year old male who is seen in consultation today at the request of his provider, Jonas West MD, for evaluation for interventional procedures for chronic lower back pain but he states that his right shoulder is giving him more problems at this time.    Chief Complaint:    Chief Complaint   Patient presents with     Pain     Pain history:  Michele Vance is a 73 year old year old male who first started having problems with pain in the right shoulder years ago without history of injury.   He states that he had an injection in the shoulder and it was much better until about a month ago.    He feels that the pain is deep inside the shoulder joint and is worse with use of the right upper extremity.  He denies radiation of pain from the joint.  He feels that the site of worst pain is right in the front of the joint with intermittent pain shooting through to the back.  He denies numbness or tingling of the arm but he does feel he is having weakness about the shoulder with the use of the arm.    He does continue with lower back pain without radiation.  He feels that it has been better since his injections in August 2017.      Pain rating: intensity ranges from 2/10 to 8/10, and Averages 7/10 on a 0-10 scale.  Aggravating factors include: use of the right arm, raising the arm, reaching behind him  Relieving factors include: medications, chiropractic, rest  Any bowel or bladder incontinence: denies changes    Current treatments include:  Gabapentin 300 mg TID  Amitriptyline 25 mg QHS  Tylenol PM     [place] Board of Pharmacy Data Base Reviewed:    Yes;  No current opioids     Other treatments have included:  Michele Vance has not been seen at a pain clinic in the past.    PT: denies  Chiropractic: occasional treatments - some help  Acupuncture: denies  TENs Unit: has one -  some help, helps with sleep  Injections:   - 12/10/18 right shoulder subacromial bursa injection by Sera Field MD - no help  - 8/28/17 bilateral SI joint injections  - 5/8/17 lumbar TFESI L4-5 and L5-S1  - 9/2/15 right C3-4 facet injection (Dr Xiong)  - 10/29/14 right C3-4 facet injection (Dr Xiong)  - 7/17/14 right first MCP joint injection (Dr Bergman)  - 5/10/11 left shoulder glenohumeral joint injection    Past Medical History:   Diagnosis Date     Carotid stenosis     right endartectomy     Chronic kidney disease     stage 3     Coronary artery disease 3-2014    CABG x6     CVA (cerebral infarction)     balance problems, mild     Elevated CK      Esophageal reflux      Hypercholesteremia      Nonsenile cataract      Other and unspecified hyperlipidemia      PAD (peripheral artery disease) (H)      Rhabdomyolysis 2010     Unspecified essential hypertension        Current Outpatient Medications   Medication Sig Dispense Refill     alirocumab (PRALUENT) 150 MG/ML injectable pen Inject 1 mL (150 mg) Subcutaneous every 14 days 6 mL 2     amitriptyline (ELAVIL) 25 MG tablet Take 1 tablet (25 mg) by mouth 2 times daily 180 tablet 3     amLODIPine (NORVASC) 10 MG tablet TAKE 1 TABLET EVERY EVENING 90 tablet 3     ASPIRIN PO Take 325 mg by mouth daily       carvedilol (COREG) 25 MG tablet Take 1 tablet (25 mg) by mouth 2 times daily (with meals) 180 tablet 3     cilostazol (PLETAL) 100 MG tablet TAKE 1 TABLET TWICE A  tablet 3     cloNIDine (CATAPRES-TTS1) 0.1 MG/24HR WK patch Place 1 patch onto the skin once a week 5 patch 0     gabapentin (NEURONTIN) 300 MG capsule TAKE 1 CAPSULE THREE TIMES A DAY (Patient taking differently: TAKE 1 CAPSULE  TWICE  A DAY) 210 capsule 4     isosorbide mononitrate (IMDUR) 60 MG 24 hr tablet Take 1 tablet (60 mg) by mouth daily 30 tablet 3     losartan (COZAAR) 100 MG tablet Take 1 tablet (100 mg) by mouth daily 90 tablet 3     metoclopramide (REGLAN) 10 MG  "tablet TAKE 1 TABLET FOUR TIMES A DAY (Patient taking differently: Four times daily) 120 tablet 1     omeprazole (PRILOSEC) 40 MG DR capsule Take 1 capsule (40 mg) by mouth daily 90 capsule 3     polyethylene glycol (MIRALAX/GLYCOLAX) packet Take 1 packet by mouth daily        potassium chloride ER (K-DUR/KLOR-CON M) 10 MEQ CR tablet Take 1 tablet (10 mEq) by mouth 2 times daily 180 tablet 3     spironolactone (ALDACTONE) 25 MG tablet Take 0.5 tablets (12.5 mg) by mouth daily 30 tablet 1     psyllium (METAMUCIL) 58.6 % POWD Take by mouth daily       STATIN NOT PRESCRIBED, INTENTIONAL, Pt has known rhabdomyolysis in the past, somewhat associated with statins.  Also wasn't able to tolerate Zetia. (Patient not taking: Reported on 3/7/2019) 1 each 0          Allergies   Allergen Reactions     Hmg-Coa-R Inhibitors Other (See Comments)     Rhabdo  Same as \"statins\"     Gemfibrozil      Resting thigh-calf pain     Sulfa Drugs      Reacted as a child     Zetia [Ezetimibe]      Muscle cramping     Social History:  Home situation: , lives with spouse  Occupation/Schooling: retired,   Tobacco use: denies  Alcohol use: denies  Drug use: denies  History of chemical dependency treatment: denies    Review of Systems:  Skin: negative  Eyes: visual blurring  Ears/Nose/Throat: tinnitus, vertigo  Respiratory: shortness of breath, no dyspnea on exertion, cough, or hemoptysis  Cardiovascular: HTN and lower extremity edema  Gastrointestinal: constipation  Genitourinary: frequency and urgency  Musculoskeletal: back pain, neck pain, arthritis and joint pain  Neurologic: stroke and memory problems  Psychiatric: negative  Hematologic/Lymphatic/Immunologic: negative  Endocrine: negative    Physical Exam:  /64   Pulse 60   Exam:  Constitutional: healthy, alert, active, no distress, cooperative, smiling and over weight  Head: normocephalic. Atraumatic.   Eyes: no redness or jaundice noted   ENT: oropharnx normal.  MMM.  Neck " supple.    Cardiovascular:  No edema or JVD appreciated, palpable pulses  Respiratory: non-labored, equal expansion, no audible wheezing, speaking in full sentences  Gastrointestinal: deferred  : deferred  Skin: no suspicious lesions or rashes  Psychiatric: mentation appears normal and affect normal/bright    Musculoskeletal exam:  Gait/Station/Posture: grossly intact and non-antalgic, able to toe and heel walk    Lumbar spine: right lower lumbar paraspinal tenderness, flexes well, mild facet loading on the right, SLR negative    Right shoulder tender at the anterior joint space as well as at the posterior aspect of the joint. reproduction of pain with external and internal rotation of the joint as well as with abduction.  AC joint not tender to palpation, positive crepitus on passive ROM    Neurologic exam:  CN:  Cranial nerves 2-12 are grossly intact  Motor:  5/5 UE and LE strength, except for mild giveway with testing of the right shoulder girdle due to pain  Reflexes:  Not assessed     Sensory:  (upper and lower extremities):   Light touch: grossly intact    Diagnostic tests:  SHOULDER THREE VIEWS BILATERAL   12/10/2018 8:26 AM   FINDINGS:    Right shoulder:  There is no fracture.  The humeral head is well  located within the glenoid fossa. There are mild degenerative changes  of the acromioclavicular joint. The glenohumeral and coracoclavicular  joint spaces are maintained.  Visualized portions of the adjacent lung  are clear. Calcific densities inferior to the right glenoid in the  right shoulder could represent ossified intra-articular free bodies.  These could also represent soft tissue calcifications of other  etiology.     Left shoulder:  There is no fracture.  The humeral head is well  located within the glenoid fossa. There are mild degenerative changes  of the acromioclavicular joint. The coracoclavicular and glenohumeral  joint spaces are maintained.  Visualized portions of the adjacent lung  are  clear. There are what appear to be soft tissue calcifications in  the left upper arm on the axillary view only.                                                                    IMPRESSION:    1. Mild degenerative changes of the bilateral acromioclavicular  joints.  2. Question ossified intervertebral free bodies in the right  glenohumeral joint as described.  3. Soft tissue calcifications upper left arm of indeterminate etiology  and clinical significance.  4. No acute fracture or malalignment. No significant degenerative  changes of the glenohumeral joints is seen.     BERNIE LEW MD    MRI of the lumbar spine was completed on 4/6/17 showing:  FINDINGS:  Five lumbar vertebrae are assumed. Multilevel degenerative  disc disease changes, most prominent at L5-S1. Chronic compression  deformities at L2, T12. Incidentally noted hemangioma within L5. I  suspect incidental cholelithiasis.     Findings by specific level:     There is facet degenerative change as follows: Mild right and minimal  left L2-L3, minimal bilateral L3-L4, moderate bilateral L4-L5, mild  right and minimal left L5-S1.     There is disc bulging at each level from L1 to S1. There are foraminal  osteophyte-disc complexes bilaterally at L4-L5, L5-S1. There is  ligamentum flavum thickening at L4-L5.      These findings combine to result in central stenosis as follows:  Moderate L4-L5.   There is right foraminal stenosis as follows: Moderate-severe L5-S1  and L4-L5, mild L3-L4 and L2-L3.      There is left foraminal stenosis as follows: Moderate-severe to severe  L5-S1, moderate-severe L4-L5, mild-moderate to moderate L3-L4,  mild-moderate L2-L3.                                                                     IMPRESSION:  1. Multilevel degenerative disc and facet disease.  2. Suspected cholelithiasis  3. L4-L5: Moderate central stenosis. Moderate-severe bilateral  foraminal stenosis.  4. L5-S1: Moderate-severe right and moderate-severe to severe  left  foraminal stenosis.     CHERELLE THORPE MD    Other testing (labs, diagnostics):  Component Value Flag Ref Range Units Status Collected Lab   Sodium 134   133 - 144 mmol/L Final 04/16/2019 10:28 AM [facility]   Potassium 3.5   3.4 - 5.3 mmol/L Final 04/16/2019 10:28 AM [facility]   Chloride 101   94 - 109 mmol/L Final 04/16/2019 10:28 AM [facility]   Carbon Dioxide 27   20 - 32 mmol/L Final 04/16/2019 10:28 AM [facility]   Anion Gap 6   3 - 14 mmol/L Final 04/16/2019 10:28 AM [facility]   Glucose 151  High   70 - 99 mg/dL Final 04/16/2019 10:28 AM [facility]   Urea Nitrogen 9   7 - 30 mg/dL Final 04/16/2019 10:28 AM [facility]   Creatinine 1.41  High   0.66 - 1.25 mg/dL Final 04/16/2019 10:28 AM [facility]   GFR Estimate 49  Low   >60 mL/min/ Final 04/16/2019 10:28 AM [facility]   Comment:   Non  GFR Calc   Starting 12/18/2018, serum creatinine based estimated GFR (eGFR) will be   calculated using the Chronic Kidney Disease Epidemiology Collaboration   (CKD-EPI) equation.    GFR Estimate If Black 57  Low   >60 mL/min/ Final 04/16/2019 10:28 AM [facility]   Comment:    GFR Calc   Starting 12/18/2018, serum creatinine based estimated GFR (eGFR) will be   calculated using the Chronic Kidney Disease Epidemiology Collaboration   (CKD-EPI) equation.    Calcium 8.9   8.5 - 10.1 mg/dL Final 04/16/2019 10:28 AM [facility]     Assessment:  1.  Chronic pain syndrome  2.  Chronic lower back pain  3.  Lumbar facet joint pain  4.  Chronic right shoulder pain with impingement  5.  Right shoulder osteoarthritis  6.  Right shoulder AC joint arthritis    Michele Vance is a 73 year old year old male who presents with the complaints of chronic right shoulder pain with impingement as well as much less severe lower back pain. His shoulder is his main complaint at this time.  My recommendations for treatment are as outlined below.    Recommendations:  1.  Right shoulder Glenohumeral joint injection may be helpful - order placed  today  2.  May benefit from right shoulder AC joint injection depending on response to glenohumeral joint injection  3.  Right shoulder subacromial bursa injection may be helpful as well  4.  Lumbar facet injections may be helpful if lower back acts up again  5.  Consider trial of Hemp CBD oils and or topicals    Total time spent was 30  minutes, and more than 50% of face to face time was spent in counseling and/or coordination of care regarding diagnosis, principles of multidisciplinary care, medication management, and interventional procedures.    Anthony Mandel MD  [facility]

## 2019-05-02 ENCOUNTER — TELEPHONE (OUTPATIENT)
Dept: PALLIATIVE MEDICINE | Facility: CLINIC | Age: 74
End: 2019-05-02

## 2019-05-02 NOTE — TELEPHONE ENCOUNTER
Pre-screening Questions for Radiology Injections:    Injection to be done at which interventional clinic site? [facility]    Instruct patient to arrive as directed prior to the scheduled appointment time:    [place] minutes before      [place]: 30 minutes before; if IV needed 1 hour before     Procedure ordered by Dr. Selina Mandel    Procedure ordered? Right Shoulder Injection    What insurance would patient like us to bill for this procedure? Medication,       Worker's comp or MVA (motor vehicle accident) -Any injection DO NOT SCHEDULE and route to Mercedes Denton.      [employer] insurance - For SI joint injections, DO NOT SCHEDULE and route Mercedes Chawla.       [employer] - Any injection besides hip/shoulder/knee joint DO NOT SCHEDULE and route to Mercedes Chawla. She will obtain PA and call pt back to schedule procedure or notify pt of denial.        [employer]-Route to Mercedes for review        IF SCHEDULING IN [place] AND NEEDS A PA, IT IS OKAY TO SCHEDULE. [place] HANDLES THEIR OWN PA'S AFTER THE PATIENT IS SCHEDULED. PLEASE SCHEDULE AT LEAST 1 WEEK OUT SO A PA CAN BE OBTAINED.      Any chance of pregnancy? Not Applicable   If YES, do NOT schedule and route to RN pool    Is an  needed? No     Patient has a drive home? (mandatory) Not Applicable    Is patient taking any blood thinners (plavix, coumadin, jantoven, warfarin, heparin, pradaxa or dabigatran )? No   If hold needed, do NOT schedule, route to RN pool     Is patient taking any aspirin products (includes Excedrin and Fiorinal)? Yes - Pt takes 325mg daily; instructed to hold 0 day(s) prior to procedure.      If more than 325mg/day do NOT schedule; route to RN pool     For CERVICAL procedures, hold all aspirin products for 6 days.     Tell pt that if aspirin product is not held for 6 days, the procedure WILL BE cancelled.      Does the patient have a bleeding or clotting disorder? No     If YES, okay to schedule AND route to RN nurse  pool    For any patients with platelet count <100, must be forwarded to provider    Is patient diabetic?  No  If YES, have them bring their glucometer.    Does patient have an active infection or treated for one within the past week? No     Is patient currently taking any antibiotics?  No     For patients on chronic, preventative, or prophylactic antibiotics, procedures may be scheduled.     For patients on antibiotics for active or recent infection:antibiotic course must have been completed for 4 days    Is patient currently taking any steroid medications? (i.e. Prednisone, Medrol)  No     For patients on steroid medications, course must have been completed for 4 days    Reviewed with patient:  If you are started on any steroids or antibiotics between now and your appointment, you must contact us because the procedure may need to be cancelled.  Yes    Is patient actively being treated for cancer or immunocompromised? No  If YES, do NOT schedule and route to RN pool     Are you able to get on and off an exam table with minimal or no assistance? Yes  If NO, do NOT schedule and route to RN pool    Are you able to roll over and lay on your stomach with minimal or no assistance? Yes  If NO, do NOT schedule and route to RN pool     Any allergies to contrast dye, iodine, shellfish, or numbing and steroid medications? No  If YES, route to RN pool AND add allergy information to appointment notes    Allergies: Hmg-coa-r inhibitors; Gemfibrozil; Sulfa drugs; and Zetia [ezetimibe]      Has the patient had a flu shot or any other vaccinations within 7 days before or after the procedure.  No     Does patient have an MRI/CT?  Not Applicable  (SI joint, hip injections, lumbar sympathetic blocks, and stellate ganglion blocks do not require an MRI)    Was the MRI done w/in the last 3 years?  NA    Was MRI done at [place]? No      If not, where was it done? N/A       If MRI was not done at [place], [facility] or [facility] do NOT  schedule and route to nursing.  If pt has an imaging disc, the injection may be scheduled but pt has to bring disc to appt. If they show up w/out disc the injection cannot be done    Reminders (please tell patient if applicable):       Instructed pt to arrive 30 minutes early for IV start if this is for a cervical procedure, ALL sympathetic (stellate ganglion, hypogastric, or lumbar sympathetic block) and all sedation procedures (RFA, spinal cord stimulation trials).  Not Applicable   -IVs are not routinely placed for Dr. Harman cervical cases   -Dr. Sage: IVs for cervical ESIs and cervical TBDs (not CMBBs/facet inj)      If NPO for sedation, informed patient that it is okay to take medications with sips of water (except if they are to hold blood thinners).  Not Applicable   *DO take blood pressure medication if it is prescribed*      If this is for a cervical JORDAN, informed patient that aspirin needs to be held for 6 days.   Not Applicable      For all patients not having spinal cord stimulator (SCS) trials or radiofrequency ablations (RFAs), informed patient:    IV sedation is not provided for this procedure.  If you feel that an oral anti-anxiety medication is needed, you can discuss this further with your referring provider or primary care provider.  The Pain Clinic provider will discuss specifics of what the procedure includes at your appointment.  Most procedures last 10-20 minutes.  We use numbing medications to help with any discomfort during the procedure.  Not Applicable      Do not schedule procedures requiring IV placement in the first appointment of the day or first appointment after lunch. Do NOT schedule at 0745, 0815 or 1245. N/A      For patients 85 or older we recommend having an adult stay w/ them for the remainder of the day.   N/A    Does the patient have any questions?  NO  Evelin Godinez  [facility]

## 2019-05-07 DIAGNOSIS — E78.5 HYPERLIPIDEMIA LDL GOAL <130: ICD-10-CM

## 2019-05-09 ENCOUNTER — RADIOLOGY INJECTION OFFICE VISIT (OUTPATIENT)
Dept: PALLIATIVE MEDICINE | Facility: CLINIC | Age: 74
End: 2019-05-09
Payer: MEDICARE

## 2019-05-09 ENCOUNTER — HOSPITAL ENCOUNTER (OUTPATIENT)
Dept: RADIOLOGY | Facility: CLINIC | Age: 74
Discharge: HOME | End: 2019-05-09
Attending: ANESTHESIOLOGY | Admitting: ANESTHESIOLOGY
Payer: MEDICARE

## 2019-05-09 VITALS — DIASTOLIC BLOOD PRESSURE: 72 MMHG | RESPIRATION RATE: 16 BRPM | HEART RATE: 64 BPM | SYSTOLIC BLOOD PRESSURE: 156 MMHG

## 2019-05-09 DIAGNOSIS — G89.29 CHRONIC RIGHT SHOULDER PAIN: ICD-10-CM

## 2019-05-09 DIAGNOSIS — M19.011 PRIMARY OSTEOARTHRITIS OF RIGHT SHOULDER: Primary | ICD-10-CM

## 2019-05-09 DIAGNOSIS — M19.011 OSTEOARTHRITIS OF RIGHT SHOULDER, UNSPECIFIED OSTEOARTHRITIS TYPE: ICD-10-CM

## 2019-05-09 DIAGNOSIS — M25.511 CHRONIC RIGHT SHOULDER PAIN: ICD-10-CM

## 2019-05-09 PROCEDURE — 27210202 XR JOINT INJECTION MAJOR RIGHT: Mod: TC

## 2019-05-09 PROCEDURE — 20610 DRAIN/INJ JOINT/BURSA W/O US: CPT | Mod: RT | Performed by: ANESTHESIOLOGY

## 2019-05-09 PROCEDURE — 25000128 H RX IP 250 OP 636: Performed by: ANESTHESIOLOGY

## 2019-05-09 PROCEDURE — 77002 NEEDLE LOCALIZATION BY XRAY: CPT | Mod: 26 | Performed by: ANESTHESIOLOGY

## 2019-05-09 RX ORDER — LIDOCAINE HYDROCHLORIDE 10 MG/ML
5 INJECTION, SOLUTION EPIDURAL; INFILTRATION; INTRACAUDAL; PERINEURAL ONCE
Status: COMPLETED | OUTPATIENT
Start: 2019-05-09 | End: 2019-05-09

## 2019-05-09 RX ORDER — BUPIVACAINE HYDROCHLORIDE 5 MG/ML
10 INJECTION, SOLUTION PERINEURAL ONCE
Status: COMPLETED | OUTPATIENT
Start: 2019-05-09 | End: 2019-05-09

## 2019-05-09 RX ORDER — TRIAMCINOLONE ACETONIDE 40 MG/ML
40 INJECTION, SUSPENSION INTRA-ARTICULAR; INTRAMUSCULAR ONCE
Status: COMPLETED | OUTPATIENT
Start: 2019-05-09 | End: 2019-05-09

## 2019-05-09 RX ORDER — IOPAMIDOL 612 MG/ML
15 INJECTION, SOLUTION INTRATHECAL ONCE
Status: COMPLETED | OUTPATIENT
Start: 2019-05-09 | End: 2019-05-09

## 2019-05-09 RX ADMIN — TRIAMCINOLONE ACETONIDE 40 MG: 40 INJECTION, SUSPENSION INTRA-ARTICULAR; INTRAMUSCULAR at 08:25

## 2019-05-09 RX ADMIN — BUPIVACAINE HYDROCHLORIDE 20 MG: 5 INJECTION, SOLUTION EPIDURAL; INTRACAUDAL; PERINEURAL at 08:25

## 2019-05-09 RX ADMIN — LIDOCAINE HYDROCHLORIDE 1 ML: 10 INJECTION, SOLUTION EPIDURAL; INFILTRATION; INTRACAUDAL; PERINEURAL at 08:25

## 2019-05-09 RX ADMIN — IOPAMIDOL 1 ML: 612 INJECTION, SOLUTION INTRATHECAL at 08:25

## 2019-05-09 ASSESSMENT — PAIN SCALES - GENERAL
PAINLEVEL: SEVERE PAIN (7)
PAINLEVEL: MODERATE PAIN (4)

## 2019-05-09 NOTE — IP AVS SNAPSHOT
[facility]  [address]  [facility] [address]  Phone:  [phone]  Fax:  [phone]                                    After Visit Summary   5/9/2019    Michele Vance    MRN: [id number]           After Visit Summary Signature Page    I have received my discharge instructions, and my questions have been answered. I have discussed any challenges I see with this plan with the nurse or doctor.    ..........................................................................................................................................  Patient/Patient Representative Signature      ..........................................................................................................................................  Patient Representative Print Name and Relationship to Patient    ..................................................               ................................................  Date                                   Time    ..........................................................................................................................................  Reviewed by Signature/Title    ...................................................              ..............................................  Date                                               Time          22EPIC Rev 08/18

## 2019-05-09 NOTE — IP AVS SNAPSHOT
MRN:[id number]                      After Visit Summary   5/9/2019    Michele Vance    MRN: [id number]           Visit Information        Provider Department      5/9/2019  8:15 AM [facility]           Review of your medicines      UNREVIEWED medicines. Ask your doctor about these medicines       Dose / Directions   alirocumab 150 MG/ML injectable pen  Commonly known as:  PRALUENT  Used for:  Hyperlipidemia LDL goal <130      Dose:  150 mg  Inject 1 mL (150 mg) Subcutaneous every 14 days  Quantity:  6 mL  Refills:  2     amitriptyline 25 MG tablet  Commonly known as:  ELAVIL  Used for:  Migraine without aura and without status migrainosus, not intractable      Dose:  25 mg  Take 1 tablet (25 mg) by mouth 2 times daily  Quantity:  180 tablet  Refills:  3     amLODIPine 10 MG tablet  Commonly known as:  NORVASC  Used for:  Essential hypertension, benign      TAKE 1 TABLET EVERY EVENING  Quantity:  90 tablet  Refills:  3     ASPIRIN PO      Dose:  325 mg  Take 325 mg by mouth daily  Refills:  0     carvedilol 25 MG tablet  Commonly known as:  COREG  Used for:  Benign essential HTN      Dose:  25 mg  Take 1 tablet (25 mg) by mouth 2 times daily (with meals)  Quantity:  180 tablet  Refills:  3     cilostazol 100 MG tablet  Commonly known as:  PLETAL  Used for:  Peripheral vascular disease (H)      TAKE 1 TABLET TWICE A DAY  Quantity:  180 tablet  Refills:  3     cloNIDine 0.1 MG/24HR WK patch  Commonly known as:  CATAPRES-TTS1  Used for:  Essential hypertension      Dose:  1 patch  Place 1 patch onto the skin once a week  Quantity:  5 patch  Refills:  0     gabapentin 300 MG capsule  Commonly known as:  NEURONTIN  Used for:  Arthritis      TAKE 1 CAPSULE THREE TIMES A DAY  Quantity:  210 capsule  Refills:  4     isosorbide mononitrate 60 MG 24 hr tablet  Commonly known as:  IMDUR  Used for:  ELAM (dyspnea on exertion)      Dose:  60 mg  Take 1 tablet (60 mg) by mouth  daily  Quantity:  30 tablet  Refills:  3     losartan 100 MG tablet  Commonly known as:  COZAAR  Used for:  Essential hypertension, benign      Dose:  100 mg  Take 1 tablet (100 mg) by mouth daily  Quantity:  90 tablet  Refills:  3     metoclopramide 10 MG tablet  Commonly known as:  REGLAN  Used for:  Slow transit constipation      TAKE 1 TABLET FOUR TIMES A DAY  Quantity:  120 tablet  Refills:  1     omeprazole 40 MG DR capsule  Commonly known as:  priLOSEC  Used for:  Gastroesophageal reflux disease without esophagitis      Dose:  40 mg  Take 1 capsule (40 mg) by mouth daily  Quantity:  90 capsule  Refills:  3     polyethylene glycol packet  Commonly known as:  MIRALAX/GLYCOLAX      Dose:  1 packet  Take 1 packet by mouth daily  Refills:  0     potassium chloride ER 10 MEQ CR tablet  Commonly known as:  K-DUR/KLOR-CON M  Used for:  Hypokalemia      Dose:  10 mEq  Take 1 tablet (10 mEq) by mouth 2 times daily  Quantity:  180 tablet  Refills:  3     psyllium 58.6 % powder  Commonly known as:  METAMUCIL/KONSYL      Take by mouth daily  Refills:  0     spironolactone 25 MG tablet  Commonly known as:  ALDACTONE  Used for:  Essential hypertension, benign, Hypokalemia      Dose:  12.5 mg  Take 0.5 tablets (12.5 mg) by mouth daily  Quantity:  30 tablet  Refills:  1     STATIN NOT PRESCRIBED  Commonly known as:  INTENTIONAL  Used for:  Hyperlipidemia LDL goal <130      Pt has known rhabdomyolysis in the past, somewhat associated with statins.  Also wasn't able to tolerate Zetia.  Quantity:  1 each  Refills:  0              Protect others around you: Learn how to safely use, store and throw away your medicines at www.disposemymeds.org.       Follow-ups after your visit       Your next 10 appointments already scheduled    May 30, 2019  2:00 PM CDT  Return Visit with Christiane Coffey PA-C  [facility] ([facility]) [address]  [address]  [phone]         Care Instructions       Further instructions from your care team       [facility]   Procedure Discharge Instructions    Today you saw:    Dr. Anthony Mandel      You had a:  Right Shoulder Glenohumeral Joint Injection    Medications used:  Lidocaine   Bupivacaine   Isovue   Kenolog               If you were holding your blood thinning medication, please restart taking it: N/A    Be cautious when walking. Numbness and/or weakness in the lower extremities may occur for up to 6-8 hours after the procedure due to effect of the local anesthetic    Do not drive for 6 hours. The effect of the local anesthetic could slow your reflexes.     You may resume your regular activities after 24 hours    Avoid strenuous activity for the first 24 hours    You may shower, however avoid swimming, tub baths or hot tubs for 24 hours following your procedure    You may have a mild to moderate increase in pain for several days following the injection.    It may take up to 14 days for the steroid medication to start working although you may feel the effect as early as a few days after the procedure.       You may use ice packs for 10-15 minutes, 3 to 4 times a day at the injection site for comfort    Do not use heat to painful areas for 6 to 8 hours. This will give the local anesthetic time to wear off and prevent you from accidentally burning your skin.     Unless you have been directed to avoid the use of anti-inflammatory medications (NSAIDS), you may use medications such as ibuprofen, Aleve or Tylenol for pain control if needed.     If you were fasting, you may resume your normal diet and medications after the procedure    Possible side effects of steroids that you may experience include flushing, elevated blood pressure, increased appetite, mild headaches and restlessness.  All of these symptoms will get better with time.    If you experience any of the following, call the Pain  Clinic during work hours at [phone] or the Provider Line after hours at [phone]:  -Fever over 100 degree F  -Swelling, bleeding, redness, drainage, warmth at the injection site  -Progressive weakness or numbness in your legs or arms  -Unusual headache that is not relieved by Tylenol or other pain reliever  -Unusual new onset of pain that is not improving          Additional Information About Your Visit       [employer] Information    [employer] gives you secure access to your electronic health record. If you see a primary care provider, you can also send messages to your care team and make appointments. If you have questions, please call your primary care clinic.  If you do not have a primary care provider, please call [phone] and they will assist you.       Care EveryWhere ID    This is your Care EveryWhere ID. This could be used by other organizations to access your [facility] medical records  [id number]        Primary Care Provider Office Phone # Fax #    Jonas West [phone]      Equal Access to Services    JIMBO AGUSTIN : Hadii aad ku hadasho Soomaali, waaxda luqadaha, qaybta kaalmada adeegyada, waxay morgan marin . So [facility] [phone].    ATENCIÓN: Si habla español, tiene a fischer disposición servicios gratuitos de asistencia lingüística. Llame al [phone].    We comply with applicable federal civil rights laws and [place] laws. We do not discriminate on the basis of race, color, national origin, age, disability, sex, sexual orientation, or gender identity.           Thank you!    Thank you for choosing [facility] for your care. Our goal is always to provide you with excellent care. Hearing back from our patients is one way we can continue to improve our services. Please take a few minutes to complete the written survey that you may receive in the mail after you visit with us. Thank you!            Medication List      ASK your doctor about these  medications          Morning Afternoon Evening Bedtime As Needed    alirocumab 150 MG/ML injectable pen  Also known as:  PRALUENT  INSTRUCTIONS:  Inject 1 mL (150 mg) Subcutaneous every 14 days                     amitriptyline 25 MG tablet  Also known as:  ELAVIL  INSTRUCTIONS:  Take 1 tablet (25 mg) by mouth 2 times daily                     amLODIPine 10 MG tablet  Also known as:  NORVASC  INSTRUCTIONS:  TAKE 1 TABLET EVERY EVENING                     ASPIRIN PO  INSTRUCTIONS:  Take 325 mg by mouth daily                     carvedilol 25 MG tablet  Also known as:  COREG  INSTRUCTIONS:  Take 1 tablet (25 mg) by mouth 2 times daily (with meals)                     cilostazol 100 MG tablet  Also known as:  PLETAL  INSTRUCTIONS:  TAKE 1 TABLET TWICE A DAY                     cloNIDine 0.1 MG/24HR WK patch  Also known as:  CATAPRES-TTS1  INSTRUCTIONS:  Place 1 patch onto the skin once a week                     gabapentin 300 MG capsule  Also known as:  NEURONTIN  INSTRUCTIONS:  TAKE 1 CAPSULE THREE TIMES A DAY                     isosorbide mononitrate 60 MG 24 hr tablet  Also known as:  IMDUR  INSTRUCTIONS:  Take 1 tablet (60 mg) by mouth daily                     losartan 100 MG tablet  Also known as:  COZAAR  INSTRUCTIONS:  Take 1 tablet (100 mg) by mouth daily                     metoclopramide 10 MG tablet  Also known as:  REGLAN  INSTRUCTIONS:  TAKE 1 TABLET FOUR TIMES A DAY  Doctor's comments:  Will need office visit in June 2019 for further refills                     omeprazole 40 MG DR capsule  Also known as:  priLOSEC  INSTRUCTIONS:  Take 1 capsule (40 mg) by mouth daily                     polyethylene glycol packet  Also known as:  MIRALAX/GLYCOLAX  INSTRUCTIONS:  Take 1 packet by mouth daily                     potassium chloride ER 10 MEQ CR tablet  Also known as:  K-DUR/KLOR-CON M  INSTRUCTIONS:  Take 1 tablet (10 mEq) by mouth 2 times daily                     psyllium 58.6 % powder  Also known as:   METAMUCIL/KONSYL  INSTRUCTIONS:  Take by mouth daily                     spironolactone 25 MG tablet  Also known as:  ALDACTONE  INSTRUCTIONS:  Take 0.5 tablets (12.5 mg) by mouth daily                     STATIN NOT PRESCRIBED  Also known as:  INTENTIONAL  INSTRUCTIONS:  Pt has known rhabdomyolysis in the past, somewhat associated with statins.  Also wasn't able to tolerate Zetia.

## 2019-05-09 NOTE — DISCHARGE INSTRUCTIONS
[facility]   Procedure Discharge Instructions    Today you saw:    Dr. Anthony Mandel      You had a:  Right Shoulder Glenohumeral Joint Injection    Medications used:  Lidocaine   Bupivacaine   Isovue   Kenolog               If you were holding your blood thinning medication, please restart taking it: N/A    Be cautious when walking. Numbness and/or weakness in the lower extremities may occur for up to 6-8 hours after the procedure due to effect of the local anesthetic    Do not drive for 6 hours. The effect of the local anesthetic could slow your reflexes.     You may resume your regular activities after 24 hours    Avoid strenuous activity for the first 24 hours    You may shower, however avoid swimming, tub baths or hot tubs for 24 hours following your procedure    You may have a mild to moderate increase in pain for several days following the injection.    It may take up to 14 days for the steroid medication to start working although you may feel the effect as early as a few days after the procedure.       You may use ice packs for 10-15 minutes, 3 to 4 times a day at the injection site for comfort    Do not use heat to painful areas for 6 to 8 hours. This will give the local anesthetic time to wear off and prevent you from accidentally burning your skin.     Unless you have been directed to avoid the use of anti-inflammatory medications (NSAIDS), you may use medications such as ibuprofen, Aleve or Tylenol for pain control if needed.     If you were fasting, you may resume your normal diet and medications after the procedure    Possible side effects of steroids that you may experience include flushing, elevated blood pressure, increased appetite, mild headaches and restlessness.  All of these symptoms will get better with time.    If you experience any of the following, call the Pain Clinic during work hours at [phone] or the Provider Line after hours at [phone]:  -Fever over  100 degree F  -Swelling, bleeding, redness, drainage, warmth at the injection site  -Progressive weakness or numbness in your legs or arms  -Unusual headache that is not relieved by Tylenol or other pain reliever  -Unusual new onset of pain that is not improving

## 2019-05-09 NOTE — PROGRESS NOTES
Pre-procedure Intake    Have you been fasting? No     If yes, for how long?     Are you taking a prescribed blood thinner such as coumadin, Plavix, Xarelto?    No    If yes, when did you take your last dose? Pt is on Pletal, no hold.    Do you take aspirin?  Yes -   ASA    If cervical procedure, have you held aspirin for 6 days?   NA    Do you have any allergies to contrast dye, iodine, steroid and/or numbing medications?  NO    Are you currently taking antibiotics or have an active infection?  NO    Have you had a fever/elevated temperature within the past week? NO    Are you currently taking oral steroids? NO    Do you have a ? Yes       Are you pregnant or breastfeeding?  Not Applicable    Are the vital signs normal?  Yes

## 2019-05-09 NOTE — PROGRESS NOTES
Pre procedure Diagnosis: right shoulder osteoarthritis, chronic right shoulder pain   Post procedure Diagnosis: Same  Procedure performed: right shoulder glenohumeral joint injection, fluoroscopically guided, contrast controlled  Anesthesia: local  Complications: none   Operators: Anthony Mandel MD      Indications:   Michele Vance is a 73 year old year old male was sent by Dr. Jonas Wset for right shoulder joint injection.  They have a history of chronic right shoulder pain with impingement.  Exam shows tenderness to palpation of the right shoulder with significantly decreased ROM in all planes and they have tried conservative treatment including physical therapy, medications, physical therapy, medications, and interventional procedures.      SHOULDER THREE VIEWS BILATERAL   12/10/2018 8:26 AM   FINDINGS:    Right shoulder:  There is no fracture.  The humeral head is well  located within the glenoid fossa. There are mild degenerative changes  of the acromioclavicular joint. The glenohumeral and coracoclavicular  joint spaces are maintained.  Visualized portions of the adjacent lung  are clear. Calcific densities inferior to the right glenoid in the  right shoulder could represent ossified intra-articular free bodies.  These could also represent soft tissue calcifications of other  etiology.     Left shoulder:  There is no fracture.  The humeral head is well  located within the glenoid fossa. There are mild degenerative changes  of the acromioclavicular joint. The coracoclavicular and glenohumeral  joint spaces are maintained.  Visualized portions of the adjacent lung  are clear. There are what appear to be soft tissue calcifications in  the left upper arm on the axillary view only.      IMPRESSION:    1. Mild degenerative changes of the bilateral acromioclavicular  joints.  2. Question ossified intervertebral free bodies in the right  glenohumeral joint as described.  3. Soft tissue calcifications upper left  arm of indeterminate etiology  and clinical significance.  4. No acute fracture or malalignment. No significant degenerative  changes of the glenohumeral joints is seen.     Options/alternatives, benefits and risks were discussed with the patient including bleeding, infection, nerve injury, reaction to medications, lack of expected response.  Questions were answered to his/her satisfaction and he/she agrees to proceed. Voluntary informed consent was obtained and signed.      Vitals were reviewed: Yes  /68 (BP Location: Left arm)   Pulse 63   Resp 15   Allergies were reviewed:  Yes   Medications were reviewed:  Yes   Pre-procedure pain score: 7/10     Procedure:  After getting informed consent, patient was brought into the procedure suite and was placed in a supine position on the procedure table.   A Pause for the Cause was performed.  Patient was prepped and draped in the usual sterile fashion.       The right glenohumeral joint was identified with fluoroscopy.  Local skin and subcutaneous tissue anesthesia was obtained by injecting Lidocaine 1% 1 ml.  Then, a 25 gauge 3.5 inch spinal needle was advanced into the joint space utilizing fluoroscopic guidance.  Aspiration was negative.  Needle location was verified by injecting Isovue-300 1 ml utilizing real time fluoroscopy that showed good needle position and adequate spread within the glenohumeral joint without vascular uptake.  Then, 5 ml of a combination of Kenalog 40 mg and Bupivicaine 0.5% 4 ml was injected into the joint without resistance and the needle was flushed with Lidocaine as it was withdrawn.  Isovue wasted:  14 ml.     Hemostasis was achieved, the area was cleaned, and bandaids were placed when appropriate.  The patient tolerated the procedure well, and was taken to the recovery room.    Images were saved to PACS.     Post-procedure pain score: 4/10  Follow-up includes:   -f/u phone call in one week  -f/u with referring provider     Anthony  John Sevier, MD  [facility]

## 2019-05-23 ENCOUNTER — OFFICE VISIT (OUTPATIENT)
Dept: URGENT CARE | Facility: RETAIL CLINIC | Age: 74
End: 2019-05-23
Payer: MEDICARE

## 2019-05-23 VITALS
HEART RATE: 67 BPM | DIASTOLIC BLOOD PRESSURE: 68 MMHG | TEMPERATURE: 96.5 F | SYSTOLIC BLOOD PRESSURE: 147 MMHG | OXYGEN SATURATION: 96 %

## 2019-05-23 DIAGNOSIS — J02.9 ACUTE PHARYNGITIS, UNSPECIFIED ETIOLOGY: Primary | ICD-10-CM

## 2019-05-23 LAB — S PYO AG THROAT QL IA.RAPID: NEGATIVE

## 2019-05-23 PROCEDURE — 87880 STREP A ASSAY W/OPTIC: CPT | Performed by: INTERNAL MEDICINE

## 2019-05-23 PROCEDURE — 99213 OFFICE O/P EST LOW 20 MIN: CPT | Performed by: INTERNAL MEDICINE

## 2019-05-23 PROCEDURE — 87081 CULTURE SCREEN ONLY: CPT | Performed by: INTERNAL MEDICINE

## 2019-05-23 NOTE — PROGRESS NOTES
[facility] Care Progress Note        Megan Kelsey MD, MPH  05/23/2019        History:      Michele Vance is a pleasant 73 year old year old male with a chief complaint of nasal congestion and sore throat x 2 days.    No fever or chills.   No dyspnea or wheezing.   No smoking history.   No headache or neck pain.  No GI or  symptoms.   No MSK symptoms.         Assessment and Plan:        - RAPID STREP SCREEN: negative.  - BETA STREP GROUP A R/O CULTURE   URI:  Discussed supportive care with the patient  Advised to increase fluid intake and rest.  Patient was advised to use throat lozenges and gargle with salt water for symptomatic relief.  Tylenol 650 mg po for pain q 6 hours prn  F/u w PCP in 4-5 days, earlier if symptoms worsen.                   Physical Exam:      /68 (BP Location: Left arm)   Pulse 67   Temp 96.5  F (35.8  C) (Tympanic)   SpO2 96%      Constitutional: Patient is in no distress The patient is pleasant and cooperative.   HEENT: Head:  Head is atraumatic, normocephalic.    Eyes: Pupils are equal, round and reactive to light and accomodation.  Sclera is non-icteric. No conjunctival injection, or exudate noted. Extraocular motion is intact. Visual acuity is intact bilaterally.  Ears:  External acoustic canals are patent and clear.  There is no erythema and bulging( exudate)  of the ( R/L ) tympanic membrane(s ).   Nose:  Nasal congestion w/o drainage or mucosal ulceration is noted.  Throat:  Oral mucosa is moist.  No oral lesions are noted. Posterior pharyngeal hyperemia w/o exudate noted.     Neck Supple.  There is no cervical lymphadenopathy.  No nuchal rigidity noted.  There is no meningismus.     Cardiovascular: Heart is regular to rate and rhythm.  No murmur is noted.     Lungs: Clear in the anterior and posterior pulmonary fields.   Abdomen: Soft and non-tender.    Back No flank tenderness is noted.   Extremeties No edema, no calf tenderness.   Neuro: No focal  deficit.   Skin No petechiae or purpura is noted.  There is no rash.   Mood Normal              Data:      All new lab and imaging data was reviewed.   Results for orders placed or performed in visit on 05/23/19   RAPID STREP SCREEN   Result Value Ref Range    Rapid Strep A Screen negative neg

## 2019-05-25 LAB
BACTERIA SPEC CULT: NORMAL
SPECIMEN SOURCE: NORMAL

## 2019-05-30 ENCOUNTER — OFFICE VISIT (OUTPATIENT)
Dept: CARDIOLOGY | Facility: CLINIC | Age: 74
End: 2019-05-30
Payer: MEDICARE

## 2019-05-30 VITALS
WEIGHT: 174.9 LBS | HEART RATE: 62 BPM | OXYGEN SATURATION: 95 % | HEIGHT: 68 IN | SYSTOLIC BLOOD PRESSURE: 126 MMHG | BODY MASS INDEX: 26.51 KG/M2 | DIASTOLIC BLOOD PRESSURE: 60 MMHG

## 2019-05-30 DIAGNOSIS — E78.00 HYPERCHOLESTEREMIA: ICD-10-CM

## 2019-05-30 DIAGNOSIS — E78.5 HYPERLIPIDEMIA LDL GOAL <130: ICD-10-CM

## 2019-05-30 DIAGNOSIS — E87.6 HYPOKALEMIA: Primary | ICD-10-CM

## 2019-05-30 DIAGNOSIS — I25.10 CORONARY ARTERY DISEASE INVOLVING NATIVE CORONARY ARTERY OF NATIVE HEART WITHOUT ANGINA PECTORIS: ICD-10-CM

## 2019-05-30 DIAGNOSIS — I15.0 RENOVASCULAR HYPERTENSION: ICD-10-CM

## 2019-05-30 DIAGNOSIS — I70.211 ATHEROSCLEROSIS OF NATIVE ARTERY OF RIGHT LOWER EXTREMITY WITH INTERMITTENT CLAUDICATION (H): ICD-10-CM

## 2019-05-30 LAB
ANION GAP SERPL CALCULATED.3IONS-SCNC: 5 MMOL/L (ref 3–14)
BUN SERPL-MCNC: 14 MG/DL (ref 7–30)
CALCIUM SERPL-MCNC: 8.7 MG/DL (ref 8.5–10.1)
CHLORIDE SERPL-SCNC: 98 MMOL/L (ref 94–109)
CO2 SERPL-SCNC: 27 MMOL/L (ref 20–32)
CREAT SERPL-MCNC: 1.48 MG/DL (ref 0.66–1.25)
GFR SERPL CREATININE-BSD FRML MDRD: 46 ML/MIN/{1.73_M2}
GLUCOSE SERPL-MCNC: 105 MG/DL (ref 70–99)
POTASSIUM SERPL-SCNC: 4.2 MMOL/L (ref 3.4–5.3)
SODIUM SERPL-SCNC: 130 MMOL/L (ref 133–144)

## 2019-05-30 PROCEDURE — 99214 OFFICE O/P EST MOD 30 MIN: CPT | Performed by: PHYSICIAN ASSISTANT

## 2019-05-30 PROCEDURE — 80048 BASIC METABOLIC PNL TOTAL CA: CPT | Performed by: PHYSICIAN ASSISTANT

## 2019-05-30 PROCEDURE — 36415 COLL VENOUS BLD VENIPUNCTURE: CPT | Performed by: PHYSICIAN ASSISTANT

## 2019-05-30 RX ORDER — CLONIDINE HYDROCHLORIDE 0.1 MG/1
0.1 TABLET ORAL 2 TIMES DAILY
Qty: 180 TABLET | Refills: 3 | Status: SHIPPED | OUTPATIENT
Start: 2019-05-30 | End: 2019-06-05

## 2019-05-30 ASSESSMENT — MIFFLIN-ST. JEOR: SCORE: 1517.84

## 2019-05-30 NOTE — LETTER
2019      Jonas West MD  [address] [facility] [address]      RE: Michele Vance       Dear Colleague,    I had the pleasure of seeing Michele Vance in the [facility].    Service Date: 2019      PRIMARY CARDIOLOGIST:  Dr. Felipe.      REASON FOR VISIT:  Hypertension, hypokalemia, dyspnea on exertion, followup.      HISTORY OF PRESENT ILLNESS:  Mr. 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  Coronary artery disease with some in the past anginal equivalent being dyspnea on exertion with a CABG in .  This was a 6-vessel bypass with a LIMA to the LAD, saphenous vein graft to the OM1 and OM2, saphenous vein graft to the PDA, saphenous vein graft to the D2.  He had bilateral venous harvesting of the legs.   2.  Peripheral arterial disease with ongoing claudication with history of angioplasty.   3.  Severe carotid and vertebral artery disease with bilateral vertebral arteries nearly occluded and stenting of the right carotid.   4.  Hypertension.   5.  History of CVA post-CABG with residual memory issues.   6.  Dyslipidemia, intolerant to statins with documented myositis, on Praluent.      I had met him in the fall and he had struggled with vague symptoms of dyspnea and dizziness and tremulousness and was found to be profoundly hypokalemic.  He had a negative PE study.  He did not have any lung disease on his CT but has extensive coronary and vascular disease as documented above.  Since I have seen him, he has followed with Dr. Spangler from Nephrology, Dr. West to further optimize his medications for blood pressure.  He has been on a variety of meds.  Most recently been off and on spironolactone and on a clonidine patch but then switched to the clonidine pill as they were having a hard time getting this filled.  With this, his home blood pressures have been running in the 140s on his good arm and in the  150s on his bad arm (the 1 being higher than the other) but occasionally in the 120s and rarely in the 170s.  He does have some feelings of fatigue from his clonidine and the feeling of being cold.  Even when he is at 75 degrees at home he feels cold, and they are wondering if this is due to medications.      SOCIAL HISTORY:  He comes in today with his wife, Justin.  The previously lived in [place], are happy to be living back in [place].  He has about a 50-pack-year history of smoking, quit in 2000.  No alcohol use.      PHYSICAL EXAMINATION:   GENERAL:  A well-developed, well-nourished gentleman in no acute distress.   HEENT:  Normocephalic, atraumatic.   HEART:  Regular in rate and rhythm without murmur, rub or gallop.   LUNGS:  Clear bilaterally.   EXTREMITIES:  1+ pitting edema in bilateral legs.      Renal artery ultrasound shows a likely left renal artery stenosis.  This is in 01/2019.      Labs last done showed a creatinine of 1.4, BUN 9, potassium 3.5, sodium 134.      ASSESSMENT AND PLAN:   1.  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  He is on 6 medications for his blood pressure and he is intermittently controlled.  In addition, he does have some adverse effects likely from the clonidine and carvedilol.  I do understand that on a population level, the data does not suggest that intervening on a renal artery stenosis can improve blood pressure, but there are cases where this can be helpful.  My recent experience has had several patients have been able to discontinue medications completely or take about half as many medications as previous once we have intervened on their renal artery stenosis.  The patient and his wife are interested in discussing this further, and I will refer them to Dr. Oropeza at [place] for this discussion.   2.  Severe peripheral arterial disease including carotids and leg arteries and uneven blood pressures in his arms.  Will follow with his  vascular surgeon.   3.  Dyslipidemia, on Praluent and doing well on this.  Intolerant to other statins with myositis.   4.  Hypokalemia, intermittent, of unclear etiology.  They do feel like this may be low.  We will have them check a BMP today.      He will follow up with Dr. Oropeza at the next available and then Dr. Felipe in the fall, sooner with concerns.  I will defer to his primary and Dr. Spangler for blood pressure management, as we certainly do not need more cooks in the kitchen at this point.      Christiane Mesa PA-C      cc:   Jonas West MD    [facility]    [address] [place] [address]       Nathaniel Spangler MD    [facility], [address], [place] [address]         CHRISTIANE MESA PA-C             D: 2019   T: 2019   MT: JOHNNIE      Name:     NINFA CID   MRN:      [id number]        Account:      [id number]   :      1945           Service Date: 2019      Document: [id number]         Outpatient Encounter Medications as of 2019   Medication Sig Dispense Refill     alirocumab (PRALUENT) 150 MG/ML injectable pen Inject 1 mL (150 mg) Subcutaneous every 14 days 6 mL 2     amitriptyline (ELAVIL) 25 MG tablet Take 1 tablet (25 mg) by mouth 2 times daily 180 tablet 3     amLODIPine (NORVASC) 10 MG tablet TAKE 1 TABLET EVERY EVENING 90 tablet 3     ASPIRIN PO Take 325 mg by mouth daily       carvedilol (COREG) 25 MG tablet Take 1 tablet (25 mg) by mouth 2 times daily (with meals) 180 tablet 3     cilostazol (PLETAL) 100 MG tablet TAKE 1 TABLET TWICE A  tablet 3     cloNIDine (CATAPRES) 0.1 MG tablet Take 1 tablet (0.1 mg) by mouth 2 times daily 180 tablet 3     gabapentin (NEURONTIN) 300 MG capsule TAKE 1 CAPSULE THREE TIMES A DAY (Patient taking differently: TAKE 1 CAPSULE  TWICE  A DAY) 210 capsule 4     isosorbide mononitrate (IMDUR) 60 MG 24 hr tablet Take 1 tablet (60 mg) by  mouth daily 30 tablet 3     losartan (COZAAR) 100 MG tablet Take 1 tablet (100 mg) by mouth daily 90 tablet 3     metoclopramide (REGLAN) 10 MG tablet TAKE 1 TABLET FOUR TIMES A DAY (Patient taking differently: Four times daily) 120 tablet 1     omeprazole (PRILOSEC) 40 MG DR capsule Take 1 capsule (40 mg) by mouth daily 90 capsule 3     polyethylene glycol (MIRALAX/GLYCOLAX) packet Take 1 packet by mouth daily        potassium chloride ER (K-DUR/KLOR-CON M) 10 MEQ CR tablet Take 1 tablet (10 mEq) by mouth 2 times daily 180 tablet 3     psyllium (METAMUCIL) 58.6 % POWD Take by mouth daily       spironolactone (ALDACTONE) 25 MG tablet Take 0.5 tablets (12.5 mg) by mouth daily 30 tablet 1     STATIN NOT PRESCRIBED, INTENTIONAL, Pt has known rhabdomyolysis in the past, somewhat associated with statins.  Also wasn't able to tolerate Zetia. 1 each 0     [DISCONTINUED] cloNIDine (CATAPRES-TTS1) 0.1 MG/24HR WK patch Place 1 patch onto the skin once a week 5 patch 0     Facility-Administered Encounter Medications as of 5/30/2019   Medication Dose Route Frequency Provider Last Rate Last Dose     triamcinolone hexacetonide (ARISTOSPAN) injection 40 mg  40 mg INTRA-ARTICULAR Once Sera Field MD           Again, thank you for allowing me to participate in the care of your patient.      Sincerely,    Christiane Coffey PA-C     [facility]

## 2019-05-30 NOTE — PROGRESS NOTES
Service Date: 2019      PRIMARY CARDIOLOGIST:  Dr. Felipe.      REASON FOR VISIT:  Hypertension, hypokalemia, dyspnea on exertion, followup.      HISTORY OF PRESENT ILLNESS:  Mr. 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  Coronary artery disease with some in the past anginal equivalent being dyspnea on exertion with a CABG in .  This was a 6-vessel bypass with a LIMA to the LAD, saphenous vein graft to the OM1 and OM2, saphenous vein graft to the PDA, saphenous vein graft to the D2.  He had bilateral venous harvesting of the legs.   2.  Peripheral arterial disease with ongoing claudication with history of angioplasty.   3.  Severe carotid and vertebral artery disease with bilateral vertebral arteries nearly occluded and stenting of the right carotid.   4.  Hypertension.   5.  History of CVA post-CABG with residual memory issues.   6.  Dyslipidemia, intolerant to statins with documented myositis, on Praluent.      I had met him in the fall and he had struggled with vague symptoms of dyspnea and dizziness and tremulousness and was found to be profoundly hypokalemic.  He had a negative PE study.  He did not have any lung disease on his CT but has extensive coronary and vascular disease as documented above.  Since I have seen him, he has followed with Dr. Spangler from Nephrology, Dr. West to further optimize his medications for blood pressure.  He has been on a variety of meds.  Most recently been off and on spironolactone and on a clonidine patch but then switched to the clonidine pill as they were having a hard time getting this filled.  With this, his home blood pressures have been running in the 140s on his good arm and in the 150s on his bad arm (the 1 being higher than the other) but occasionally in the 120s and rarely in the 170s.  He does have some feelings of fatigue from his clonidine and the feeling of being cold.  Even when he is at 75  degrees at home he feels cold, and they are wondering if this is due to medications.      SOCIAL HISTORY:  He comes in today with his wife, Justin.  The previously lived in [place], are happy to be living back in [place].  He has about a 50-pack-year history of smoking, quit in 2000.  No alcohol use.      PHYSICAL EXAMINATION:   GENERAL:  A well-developed, well-nourished gentleman in no acute distress.   HEENT:  Normocephalic, atraumatic.   HEART:  Regular in rate and rhythm without murmur, rub or gallop.   LUNGS:  Clear bilaterally.   EXTREMITIES:  1+ pitting edema in bilateral legs.      Renal artery ultrasound shows a likely left renal artery stenosis.  This is in 01/2019.      Labs last done showed a creatinine of 1.4, BUN 9, potassium 3.5, sodium 134.      ASSESSMENT AND PLAN:   1.  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  He is on 6 medications for his blood pressure and he is intermittently controlled.  In addition, he does have some adverse effects likely from the clonidine and carvedilol.  I do understand that on a population level, the data does not suggest that intervening on a renal artery stenosis can improve blood pressure, but there are cases where this can be helpful.  My recent experience has had several patients have been able to discontinue medications completely or take about half as many medications as previous once we have intervened on their renal artery stenosis.  The patient and his wife are interested in discussing this further, and I will refer them to Dr. Oropeza at [place] for this discussion.   2.  Severe peripheral arterial disease including carotids and leg arteries and uneven blood pressures in his arms.  Will follow with his vascular surgeon.   3.  Dyslipidemia, on Praluent and doing well on this.  Intolerant to other statins with myositis.   4.  Hypokalemia, intermittent, of unclear etiology.  They do feel like this may be low.  We will have  them check a BMP today.      He will follow up with Dr. Oropeza at the next available and then Dr. Felipe in the fall, sooner with concerns.  I will defer to his primary and Dr. Spangler for blood pressure management, as we certainly do not need more cooks in the kitchen at this point.      Christiane Mesa PA-C      cc:   [place] [address]       Nathaniel Spangler MD    [facility], [place] [address]         CHRISTIANE MESA PA-C             D: 2019   T: 2019   MT: JOHNNIE      Name:     NINFA CID   MRN:      [id number]        Account:      [id number]   :      1945           Service Date: 2019      Document: [id number]

## 2019-05-30 NOTE — PATIENT INSTRUCTIONS
Thanks for coming into [facility] today.    We discussed: we'll do labs today to check on your potassium level.      Medication changes:  Continue current meds for now.      Follow up: with Dr. Oropeza in [place] to discuss your renal arteries.  Otherwise we'll have you see Dr. Felipe in the fall.        Please call my nurse at [phone] with any questions or concerns.    Scheduling phone number: [phone]  Reminder: Please bring in all current medications, over the counter supplements and vitamin bottles to your next appointment.

## 2019-05-30 NOTE — LETTER
5/30/2019    Jonas West MD  [address] [facility] [address]    RE: Michele Vance       Dear Colleague,    I had the pleasure of seeing Michele Vance in the [facility].    [id number]  HPI and Plan:   See dictation    Orders this Visit:  Orders Placed This Encounter   Procedures     Basic metabolic panel     Basic metabolic panel     Follow-Up with Cardiologist     Follow-Up with Cardiologist     Orders Placed This Encounter   Medications     cloNIDine (CATAPRES) 0.1 MG tablet     Sig: Take 1 tablet (0.1 mg) by mouth 2 times daily     Dispense:  180 tablet     Refill:  3     Dose adjustment only- DO NOT FILL!     Medications Discontinued During This Encounter   Medication Reason     cloNIDine (CATAPRES-TTS1) 0.1 MG/24HR WK patch          Encounter Diagnoses   Name Primary?     Hypokalemia Yes     Renovascular hypertension      Hyperlipidemia LDL goal <130      Hypercholesteremia      Atherosclerosis of native artery of right lower extremity with intermittent claudication (H)      Coronary artery disease involving native coronary artery of native heart without angina pectoris        CURRENT MEDICATIONS:  Current Outpatient Medications   Medication Sig Dispense Refill     alirocumab (PRALUENT) 150 MG/ML injectable pen Inject 1 mL (150 mg) Subcutaneous every 14 days 6 mL 2     amitriptyline (ELAVIL) 25 MG tablet Take 1 tablet (25 mg) by mouth 2 times daily 180 tablet 3     amLODIPine (NORVASC) 10 MG tablet TAKE 1 TABLET EVERY EVENING 90 tablet 3     ASPIRIN PO Take 325 mg by mouth daily       carvedilol (COREG) 25 MG tablet Take 1 tablet (25 mg) by mouth 2 times daily (with meals) 180 tablet 3     cilostazol (PLETAL) 100 MG tablet TAKE 1 TABLET TWICE A  tablet 3     cloNIDine (CATAPRES) 0.1 MG tablet Take 1 tablet (0.1 mg) by mouth 2 times daily 180 tablet 3     gabapentin (NEURONTIN) 300 MG capsule TAKE 1 CAPSULE THREE TIMES A DAY (Patient taking differently: TAKE 1  "CAPSULE  TWICE  A DAY) 210 capsule 4     isosorbide mononitrate (IMDUR) 60 MG 24 hr tablet Take 1 tablet (60 mg) by mouth daily 30 tablet 3     losartan (COZAAR) 100 MG tablet Take 1 tablet (100 mg) by mouth daily 90 tablet 3     metoclopramide (REGLAN) 10 MG tablet TAKE 1 TABLET FOUR TIMES A DAY (Patient taking differently: Four times daily) 120 tablet 1     omeprazole (PRILOSEC) 40 MG DR capsule Take 1 capsule (40 mg) by mouth daily 90 capsule 3     polyethylene glycol (MIRALAX/GLYCOLAX) packet Take 1 packet by mouth daily        potassium chloride ER (K-DUR/KLOR-CON M) 10 MEQ CR tablet Take 1 tablet (10 mEq) by mouth 2 times daily 180 tablet 3     psyllium (METAMUCIL) 58.6 % POWD Take by mouth daily       spironolactone (ALDACTONE) 25 MG tablet Take 0.5 tablets (12.5 mg) by mouth daily 30 tablet 1     STATIN NOT PRESCRIBED, INTENTIONAL, Pt has known rhabdomyolysis in the past, somewhat associated with statins.  Also wasn't able to tolerate Zetia. 1 each 0       ALLERGIES     Allergies   Allergen Reactions     Hmg-Coa-R Inhibitors Other (See Comments)     Rhabdo  Same as \"statins\"     Gemfibrozil      Resting thigh-calf pain     Sulfa Drugs      Reacted as a child     Zetia [Ezetimibe]      Muscle cramping       PAST MEDICAL HISTORY:  Past Medical History:   Diagnosis Date     Carotid stenosis     right endartectomy     Chronic kidney disease     stage 3     Coronary artery disease 3-2014    CABG x6     CVA (cerebral infarction)     balance problems, mild     Elevated CK      Esophageal reflux      Hypercholesteremia      Nonsenile cataract      Other and unspecified hyperlipidemia      PAD (peripheral artery disease) (H)      Rhabdomyolysis 2010     Unspecified essential hypertension        PAST SURGICAL HISTORY:  Past Surgical History:   Procedure Laterality Date     APPENDECTOMY  1974     BYPASS GRAFT ARTERY CORONARY  3/5/2014    Procedure: BYPASS GRAFT ARTERY CORONARY;  Median Sternotomy, Coronary Artery " Bypass Graft X6 used Left internal mammary artery, Left and Right Greater Saphenous vein, on Pump oxygenator.;  Surgeon: Tim Montanez MD;  Location:  OR     CATARACT IOL, RT/LT Bilateral 2008    in [place]     cataracts       COLONOSCOPY  12/12/02    [facility]     COLONOSCOPY N/A 10/7/2014    Procedure: COMBINED COLONOSCOPY, SINGLE OR MULTIPLE BIOPSY/POLYPECTOMY BY BIOPSY;  Surgeon: Micheal Mora MD;  Location:  GI     DENTAL SURGERY      TMJ, implants     ENDARTERECTOMY CAROTID      right carotid stent     ESOPHAGOSCOPY, GASTROSCOPY, DUODENOSCOPY (EGD), COMBINED Left 10/7/2014    Procedure: COMBINED ESOPHAGOSCOPY, GASTROSCOPY, DUODENOSCOPY (EGD), BIOPSY SINGLE OR MULTIPLE;  Surgeon: Micheal Mora MD;  Location:  GI     ESOPHAGOSCOPY, GASTROSCOPY, DUODENOSCOPY (EGD), COMBINED Left 10/7/2014    Procedure: COMBINED ESOPHAGOSCOPY, GASTROSCOPY, DUODENOSCOPY (EGD), REMOVE FOREIGN BODY;  Surgeon: Micheal Mora MD;  Location: [facility] CAPSULE ENDOSCOPY N/A 10/7/2014    Procedure: CAPSULE/PILL CAM ENDOSCOPY;  Surgeon: Micheal Mora MD;  Location: [facility] UGI ENDOSCOPY, SIMPLE EXAM  01/08/99    [facility]     INJECT EPIDURAL LUMBAR Right 5/8/2017    Procedure: INJECT EPIDURAL LUMBAR;  Lumbar transforaminal Epidural Steroid Injection right lumbar 4-5, and right  lumbar 5-Sacral 1;  Surgeon: Anthony Gonzalez MD;  Location:  OR     INJECT JOINT SACROILIAC Bilateral 8/28/2017    Procedure: INJECT JOINT SACROILIAC;  sacroiliac joint injection bilateral;  Surgeon: Anthony Gonzalez MD;  Location:  OR     KNEE SURGERY  1976    left knee reconstruction     lasix  2001    both eyes     RELEASE CARPAL TUNNEL  1/13/2011    RELEASE CARPAL TUNNEL performed by JO MADRID at  OR     RELEASE CARPAL TUNNEL  1/20/2011    RELEASE CARPAL TUNNEL performed by JO MADRID at  OR       FAMILY HISTORY:  Family History   Problem Relation Age of Onset      Hypertension Mother      Eye Disorder Mother      Cerebrovascular Disease Father      Heart Disease Father      Lipids Father      Obesity Father      Cancer Sister      Heart Disease Sister      Glaucoma No family hx of      Macular Degeneration No family hx of      Kidney Disease No family hx of        SOCIAL HISTORY:  Social History     Socioeconomic History     Marital status:      Spouse name: Not on file     Number of children: Not on file     Years of education: Not on file     Highest education level: Not on file   Occupational History     Occupation:      Employer: RETIRED   Social Needs     Financial resource strain: Not on file     Food insecurity:     Worry: Not on file     Inability: Not on file     Transportation needs:     Medical: Not on file     Non-medical: Not on file   Tobacco Use     Smoking status: Former Smoker     Packs/day: 2.50     Years: 20.00     Pack years: 50.00     Types: Cigarettes     Last attempt to quit: 2000     Years since quittin.4     Smokeless tobacco: Never Used   Substance and Sexual Activity     Alcohol use: No     Alcohol/week: 0.0 oz     Drug use: No     Sexual activity: Yes     Partners: Female   Lifestyle     Physical activity:     Days per week: Not on file     Minutes per session: Not on file     Stress: Not on file   Relationships     Social connections:     Talks on phone: Not on file     Gets together: Not on file     Attends [employer]ats[employer] service: Not on file     Active member of club or organization: Not on file     Attends meetings of clubs or organizations: Not on file     Relationship status: Not on file     Intimate partner violence:     Fear of current or ex partner: Not on file     Emotionally abused: Not on file     Physically abused: Not on file     Forced sexual activity: Not on file   Other Topics Concern      Service Not Asked     Blood Transfusions Not Asked     Caffeine Concern Not Asked     Occupational Exposure Not  "Asked     Hobby Hazards Not Asked     Sleep Concern Not Asked     Stress Concern Not Asked     Weight Concern Not Asked     Special Diet Not Asked     Back Care Not Asked     Exercise Yes     Comment: 3 times per week     Bike Helmet Not Asked     Seat Belt Not Asked     Self-Exams Not Asked     Parent/sibling w/ CABG, MI or angioplasty before 65F 55M? Not Asked   Social History Narrative    Moved from [place] in August, retired  for [employer].       Review of Systems:  Skin:  Negative     Eyes:  Positive for glasses  ENT:  Negative    Respiratory:  Positive for dyspnea on exertion;shortness of breath  Cardiovascular:  Negative for;palpitations;chest pain;edema lightheadedness;dizziness;Positive for;fatigue  Gastroenterology: Negative    Genitourinary:  Negative    Musculoskeletal:  Negative    Neurologic:  Positive for tremors  Psychiatric:  Negative    Heme/Lymph/Imm:  Positive for allergies  Endocrine:  Negative      Physical Exam:  Vitals: /60 (BP Location: Left arm, Patient Position: Fowlers, Cuff Size: Adult Regular)   Pulse 62   Ht 1.727 m (5' 8\")   Wt 79.3 kg (174 lb 14.4 oz)   SpO2 95%   BMI 26.59 kg/m      Please refer to dictation for physical exam    Recent Lab Results:  LIPID RESULTS:  Lab Results   Component Value Date    CHOL 150 03/01/2019    HDL 49 03/01/2019    LDL 82 03/01/2019    TRIG 94 03/01/2019    CHOLHDLRATIO 5.2 (H) 07/23/2015       LIVER ENZYME RESULTS:  Lab Results   Component Value Date    AST Canceled, Test credited 04/03/2019    ALT Canceled, Test credited 04/03/2019       CBC RESULTS:  Lab Results   Component Value Date    WBC Canceled, Test credited 04/03/2019    RBC Canceled, Test credited 04/03/2019    HGB Canceled, Test credited 04/03/2019    HCT Canceled, Test credited 04/03/2019    MCV Canceled, Test credited 04/03/2019    MCH Canceled, Test credited 04/03/2019    MCHC Canceled, Test credited 04/03/2019    RDW Canceled, Test credited 04/03/2019    PLT " Canceled, Test credited 04/03/2019       BMP RESULTS:  Lab Results   Component Value Date     04/16/2019    POTASSIUM 3.5 04/16/2019    CHLORIDE 101 04/16/2019    CO2 27 04/16/2019    ANIONGAP 6 04/16/2019     (H) 04/16/2019    BUN 9 04/16/2019    CR 1.41 (H) 04/16/2019    GFRESTIMATED 49 (L) 04/16/2019    GFRESTBLACK 57 (L) 04/16/2019    RAHEEM 8.9 04/16/2019        A1C RESULTS:  Lab Results   Component Value Date    A1C 5.6 03/07/2014       INR RESULTS:  Lab Results   Component Value Date    INR 1.00 12/05/2014    INR 1.32 (H) 03/06/2014           CC  Stevie Felipe MD  [address]  [place] [id number]        Thank you for allowing me to participate in the care of your patient.      Sincerely,     Christiane Coffey PA-C     [facility]    cc:   Stevie Felipe MD  [address]  [place] [id number]

## 2019-05-30 NOTE — PROGRESS NOTES
[id number]  HPI and Plan:   See dictation    Orders this Visit:  Orders Placed This Encounter   Procedures     Basic metabolic panel     Basic metabolic panel     Follow-Up with Cardiologist     Follow-Up with Cardiologist     Orders Placed This Encounter   Medications     cloNIDine (CATAPRES) 0.1 MG tablet     Sig: Take 1 tablet (0.1 mg) by mouth 2 times daily     Dispense:  180 tablet     Refill:  3     Dose adjustment only- DO NOT FILL!     Medications Discontinued During This Encounter   Medication Reason     cloNIDine (CATAPRES-TTS1) 0.1 MG/24HR WK patch          Encounter Diagnoses   Name Primary?     Hypokalemia Yes     Renovascular hypertension      Hyperlipidemia LDL goal <130      Hypercholesteremia      Atherosclerosis of native artery of right lower extremity with intermittent claudication (H)      Coronary artery disease involving native coronary artery of native heart without angina pectoris        CURRENT MEDICATIONS:  Current Outpatient Medications   Medication Sig Dispense Refill     alirocumab (PRALUENT) 150 MG/ML injectable pen Inject 1 mL (150 mg) Subcutaneous every 14 days 6 mL 2     amitriptyline (ELAVIL) 25 MG tablet Take 1 tablet (25 mg) by mouth 2 times daily 180 tablet 3     amLODIPine (NORVASC) 10 MG tablet TAKE 1 TABLET EVERY EVENING 90 tablet 3     ASPIRIN PO Take 325 mg by mouth daily       carvedilol (COREG) 25 MG tablet Take 1 tablet (25 mg) by mouth 2 times daily (with meals) 180 tablet 3     cilostazol (PLETAL) 100 MG tablet TAKE 1 TABLET TWICE A  tablet 3     cloNIDine (CATAPRES) 0.1 MG tablet Take 1 tablet (0.1 mg) by mouth 2 times daily 180 tablet 3     gabapentin (NEURONTIN) 300 MG capsule TAKE 1 CAPSULE THREE TIMES A DAY (Patient taking differently: TAKE 1 CAPSULE  TWICE  A DAY) 210 capsule 4     isosorbide mononitrate (IMDUR) 60 MG 24 hr tablet Take 1 tablet (60 mg) by mouth daily 30 tablet 3     losartan (COZAAR) 100 MG tablet Take 1 tablet (100 mg) by mouth daily 90  "tablet 3     metoclopramide (REGLAN) 10 MG tablet TAKE 1 TABLET FOUR TIMES A DAY (Patient taking differently: Four times daily) 120 tablet 1     omeprazole (PRILOSEC) 40 MG DR capsule Take 1 capsule (40 mg) by mouth daily 90 capsule 3     polyethylene glycol (MIRALAX/GLYCOLAX) packet Take 1 packet by mouth daily        potassium chloride ER (K-DUR/KLOR-CON M) 10 MEQ CR tablet Take 1 tablet (10 mEq) by mouth 2 times daily 180 tablet 3     psyllium (METAMUCIL) 58.6 % POWD Take by mouth daily       spironolactone (ALDACTONE) 25 MG tablet Take 0.5 tablets (12.5 mg) by mouth daily 30 tablet 1     STATIN NOT PRESCRIBED, INTENTIONAL, Pt has known rhabdomyolysis in the past, somewhat associated with statins.  Also wasn't able to tolerate Zetia. 1 each 0       ALLERGIES     Allergies   Allergen Reactions     Hmg-Coa-R Inhibitors Other (See Comments)     Rhabdo  Same as \"statins\"     Gemfibrozil      Resting thigh-calf pain     Sulfa Drugs      Reacted as a child     Zetia [Ezetimibe]      Muscle cramping       PAST MEDICAL HISTORY:  Past Medical History:   Diagnosis Date     Carotid stenosis     right endartectomy     Chronic kidney disease     stage 3     Coronary artery disease 3-2014    CABG x6     CVA (cerebral infarction)     balance problems, mild     Elevated CK      Esophageal reflux      Hypercholesteremia      Nonsenile cataract      Other and unspecified hyperlipidemia      PAD (peripheral artery disease) (H)      Rhabdomyolysis 2010     Unspecified essential hypertension        PAST SURGICAL HISTORY:  Past Surgical History:   Procedure Laterality Date     APPENDECTOMY  1974     BYPASS GRAFT ARTERY CORONARY  3/5/2014    Procedure: BYPASS GRAFT ARTERY CORONARY;  Median Sternotomy, Coronary Artery Bypass Graft X6 used Left internal mammary artery, Left and Right Greater Saphenous vein, on Pump oxygenator.;  Surgeon: Tim Montanez MD;  Location: [facility] OR     CATARACT IOL, RT/LT Bilateral 2008    in [place]     cataracts "       COLONOSCOPY  12/12/02    [facility]     COLONOSCOPY N/A 10/7/2014    Procedure: COMBINED COLONOSCOPY, SINGLE OR MULTIPLE BIOPSY/POLYPECTOMY BY BIOPSY;  Surgeon: Micheal Mora MD;  Location:  GI     DENTAL SURGERY      TMJ, implants     ENDARTERECTOMY CAROTID      right carotid stent     ESOPHAGOSCOPY, GASTROSCOPY, DUODENOSCOPY (EGD), COMBINED Left 10/7/2014    Procedure: COMBINED ESOPHAGOSCOPY, GASTROSCOPY, DUODENOSCOPY (EGD), BIOPSY SINGLE OR MULTIPLE;  Surgeon: Micheal Mora MD;  Location: [facility]     ESOPHAGOSCOPY, GASTROSCOPY, DUODENOSCOPY (EGD), COMBINED Left 10/7/2014    Procedure: COMBINED ESOPHAGOSCOPY, GASTROSCOPY, DUODENOSCOPY (EGD), REMOVE FOREIGN BODY;  Surgeon: Micheal Mora MD;  Location[facility] CAPSULE ENDOSCOPY N/A 10/7/2014    Procedure: CAPSULE/PILL CAM ENDOSCOPY;  Surgeon: Micheal Mora MD;  Location: [facility] UGI ENDOSCOPY, SIMPLE EXAM  01/08/99    [facility]     INJECT EPIDURAL LUMBAR Right 5/8/2017    Procedure: INJECT EPIDURAL LUMBAR;  Lumbar transforaminal Epidural Steroid Injection right lumbar 4-5, and right  lumbar 5-Sacral 1;  Surgeon: Anthony Gonzalez MD;  Location: [facility]     INJECT JOINT SACROILIAC Bilateral 8/28/2017    Procedure: INJECT JOINT SACROILIAC;  sacroiliac joint injection bilateral;  Surgeon: Anthony Gonzalez MD;  Location:  OR     KNEE SURGERY  1976    left knee reconstruction     lasix  2001    both eyes     RELEASE CARPAL TUNNEL  1/13/2011    RELEASE CARPAL TUNNEL performed by JO MADRID at  OR     RELEASE CARPAL TUNNEL  1/20/2011    RELEASE CARPAL TUNNEL performed by JO MADRID at  OR       FAMILY HISTORY:  Family History   Problem Relation Age of Onset     Hypertension Mother      Eye Disorder Mother      Cerebrovascular Disease Father      Heart Disease Father      Lipids Father      Obesity Father      Cancer Sister      Heart Disease Sister      Glaucoma No family hx of      Macular  Degeneration No family hx of      Kidney Disease No family hx of        SOCIAL HISTORY:  Social History     Socioeconomic History     Marital status:      Spouse name: Not on file     Number of children: Not on file     Years of education: Not on file     Highest education level: Not on file   Occupational History     Occupation:      Employer: RETIRED   Social Needs     Financial resource strain: Not on file     Food insecurity:     Worry: Not on file     Inability: Not on file     Transportation needs:     Medical: Not on file     Non-medical: Not on file   Tobacco Use     Smoking status: Former Smoker     Packs/day: 2.50     Years: 20.00     Pack years: 50.00     Types: Cigarettes     Last attempt to quit: 2000     Years since quittin.4     Smokeless tobacco: Never Used   Substance and Sexual Activity     Alcohol use: No     Alcohol/week: 0.0 oz     Drug use: No     Sexual activity: Yes     Partners: Female   Lifestyle     Physical activity:     Days per week: Not on file     Minutes per session: Not on file     Stress: Not on file   Relationships     Social connections:     Talks on phone: Not on file     Gets together: Not on file     Attends Yazidi service: Not on file     Active member of club or organization: Not on file     Attends meetings of clubs or organizations: Not on file     Relationship status: Not on file     Intimate partner violence:     Fear of current or ex partner: Not on file     Emotionally abused: Not on file     Physically abused: Not on file     Forced sexual activity: Not on file   Other Topics Concern      Service Not Asked     Blood Transfusions Not Asked     Caffeine Concern Not Asked     Occupational Exposure Not Asked     Hobby Hazards Not Asked     Sleep Concern Not Asked     Stress Concern Not Asked     Weight Concern Not Asked     Special Diet Not Asked     Back Care Not Asked     Exercise Yes     Comment: 3 times per week     Bike Helmet Not  "Asked     Seat Belt Not Asked     Self-Exams Not Asked     Parent/sibling w/ CABG, MI or angioplasty before 65F 55M? Not Asked   Social History Narrative    Moved from [place] in August, retired  for [employer].       Review of Systems:  Skin:  Negative     Eyes:  Positive for glasses  ENT:  Negative    Respiratory:  Positive for dyspnea on exertion;shortness of breath  Cardiovascular:  Negative for;palpitations;chest pain;edema lightheadedness;dizziness;Positive for;fatigue  Gastroenterology: Negative    Genitourinary:  Negative    Musculoskeletal:  Negative    Neurologic:  Positive for tremors  Psychiatric:  Negative    Heme/Lymph/Imm:  Positive for allergies  Endocrine:  Negative      Physical Exam:  Vitals: /60 (BP Location: Left arm, Patient Position: Fowlers, Cuff Size: Adult Regular)   Pulse 62   Ht 1.727 m (5' 8\")   Wt 79.3 kg (174 lb 14.4 oz)   SpO2 95%   BMI 26.59 kg/m     Please refer to dictation for physical exam    Recent Lab Results:  LIPID RESULTS:  Lab Results   Component Value Date    CHOL 150 03/01/2019    HDL 49 03/01/2019    LDL 82 03/01/2019    TRIG 94 03/01/2019    CHOLHDLRATIO 5.2 (H) 07/23/2015       LIVER ENZYME RESULTS:  Lab Results   Component Value Date    AST Canceled, Test credited 04/03/2019    ALT Canceled, Test credited 04/03/2019       CBC RESULTS:  Lab Results   Component Value Date    WBC Canceled, Test credited 04/03/2019    RBC Canceled, Test credited 04/03/2019    HGB Canceled, Test credited 04/03/2019    HCT Canceled, Test credited 04/03/2019    MCV Canceled, Test credited 04/03/2019    MCH Canceled, Test credited 04/03/2019    MCHC Canceled, Test credited 04/03/2019    RDW Canceled, Test credited 04/03/2019    PLT Canceled, Test credited 04/03/2019       BMP RESULTS:  Lab Results   Component Value Date     04/16/2019    POTASSIUM 3.5 04/16/2019    CHLORIDE 101 04/16/2019    CO2 27 04/16/2019    ANIONGAP 6 04/16/2019     (H) 04/16/2019    BUN " 9 04/16/2019    CR 1.41 (H) 04/16/2019    GFRESTIMATED 49 (L) 04/16/2019    GFRESTBLACK 57 (L) 04/16/2019    RAHEEM 8.9 04/16/2019        A1C RESULTS:  Lab Results   Component Value Date    A1C 5.6 03/07/2014       INR RESULTS:  Lab Results   Component Value Date    INR 1.00 12/05/2014    INR 1.32 (H) 03/06/2014           CC  Stevie Felipe MD  [id number]  [place] [id number]

## 2019-06-05 DIAGNOSIS — I15.0 RENOVASCULAR HYPERTENSION: ICD-10-CM

## 2019-06-05 RX ORDER — CLONIDINE HYDROCHLORIDE 0.1 MG/1
0.1 TABLET ORAL 2 TIMES DAILY
Qty: 180 TABLET | Refills: 3 | Status: ON HOLD | OUTPATIENT
Start: 2019-06-05 | End: 2019-09-09

## 2019-06-05 NOTE — TELEPHONE ENCOUNTER
Patient calling stating [facility] did not get the script for clonidine. Will resend script.

## 2019-06-23 ENCOUNTER — MYC REFILL (OUTPATIENT)
Dept: FAMILY MEDICINE | Facility: OTHER | Age: 74
End: 2019-06-23

## 2019-06-23 DIAGNOSIS — I73.9 PERIPHERAL VASCULAR DISEASE (H): ICD-10-CM

## 2019-06-24 ENCOUNTER — TELEPHONE (OUTPATIENT)
Dept: CARDIOLOGY | Facility: CLINIC | Age: 74
End: 2019-06-24

## 2019-06-24 ENCOUNTER — NURSE TRIAGE (OUTPATIENT)
Dept: INTERNAL MEDICINE | Facility: CLINIC | Age: 74
End: 2019-06-24

## 2019-06-24 NOTE — TELEPHONE ENCOUNTER
If pt only has pain when pressing/ palpating nipples this is likely an adverse effect from spironolactone causing breast tenderness.  If it's cp without touching his chest he should be seen by Dr. Felipe if possible in clinic to assess further.  Christiane Coffey PA-C 6/24/2019 3:11 PM

## 2019-06-24 NOTE — TELEPHONE ENCOUNTER
"Michele does not feel this is worthy of an ED visit.  RN spoke to Michele and his wife. They will contact their cardiologist to see if they can get in today to be evaluated. Other wise will go to ED.    Reason for Disposition    Chest pain lasting longer than 5 minutes and ANY of the following:* Over 50 years old* Over 30 years old and at least one cardiac risk factor (i.e., high blood pressure, diabetes, high cholesterol, obesity, smoker or strong family history of heart disease)* Pain is crushing, pressure-like, or heavy * Took nitroglycerin and chest pain was not relieved* History of heart disease (i.e., angina, heart attack, bypass surgery, angioplasty, CHF)    Answer Assessment - Initial Assessment Questions  1. LOCATION: \"Where does it hurt?\"        Behind nipples on both sides  2. RADIATION: \"Does the pain go anywhere else?\" (e.g., into neck, jaw, arms, back)      no  3. ONSET: \"When did the chest pain begin?\" (Minutes, hours or days)       A week ago  4. PATTERN \"Does the pain come and go, or has it been constant since it started?\"  \"Does it get worse with exertion?\"       Constant.   5. DURATION: \"How long does it last\" (e.g., seconds, minutes, hours)         6. SEVERITY: \"How bad is the pain?\"  (e.g., Scale 1-10; mild, moderate, or severe)     - MILD (1-3): doesn't interfere with normal activities      - MODERATE (4-7): interferes with normal activities or awakens from sleep     - SEVERE (8-10): excruciating pain, unable to do any normal activities        five  7. CARDIAC RISK FACTORS: \"Do you have any history of heart problems or risk factors for heart disease?\" (e.g., prior heart attack, angina; high blood pressure, diabetes, being overweight, high cholesterol, smoking, or strong family history of heart disease)      Had open heart surg in 2014, 6 vessel bypass.  8. PULMONARY RISK FACTORS: \"Do you have any history of lung disease?\"  (e.g., blood clots in lung, asthma, emphysema, birth control pills)       " "  9. CAUSE: \"What do you think is causing the chest pain?\"          10. OTHER SYMPTOMS: \"Do you have any other symptoms?\" (e.g., dizziness, nausea, vomiting, sweating, fever, difficulty breathing, cough)           11. PREGNANCY: \"Is there any chance you are pregnant?\" \"When was your last menstrual period?\"    Protocols used: CHEST PAIN-A-OH      "

## 2019-06-24 NOTE — TELEPHONE ENCOUNTER
He can stop the spironolactone if it's with palpation and increase potassium to 10  Meq tid.  Christiane Coffey PA-C 6/24/2019 4:32 PM

## 2019-06-24 NOTE — TELEPHONE ENCOUNTER
"Requested Prescriptions   Pending Prescriptions Disp Refills     cilostazol (PLETAL) 100 MG tablet 180 tablet 3     Sig: Take 1 tablet (100 mg) by mouth 2 times daily   Last Written Prescription Date:  4/9/2018  Last Fill Quantity: 180,  # refills: 3   Last office visit: 4/09/2019 with prescribing provider:  Brett   Future Office Visit:   Next 5 appointments (look out 90 days)    Jul 18, 2019 10:00 AM CDT  Return Visit with Christiane Coffey PA-C  [facility] ([address] [address]  [phone]   Sep 05, 2019  9:45 AM CDT  Return Visit with Julio Oropeza MD  [facility] ([facility]) [address] [place] [id number]  [phone] OPT 2             Platelet Inhibitors Failed - 6/24/2019  9:50 AM        Failed - Normal HGB on file in past 12 months     Recent Labs   Lab Test 04/03/19  0959   HGB Canceled, Test credited           Failed - Normal Platelets on file in past 12 months     Recent Labs   Lab Test 04/03/19  0959   PLT Canceled, Test credited           Failed - Normal serum creatinine on file in past 12 months     Recent Labs   Lab Test 05/30/19  1459   CR 1.48*           Passed - Recent (12 mo) or future (30 days) visit within the authorizing provider's specialty     Patient had office visit in the last 12 months or has a visit in the next 30 days with authorizing provider or within the authorizing provider's specialty.  See \"Patient Info\" tab in inbasket, or \"Choose Columns\" in Meds & Orders section of the refill encounter.              Passed - Medication is active on med list        Passed - Patient is age 18 or older      Routing refill request to provider for review/approval because:  Labs out of range:  CR  Unsure if lab orders completed for platelets and HGB    Erin Aden RN            "

## 2019-06-24 NOTE — TELEPHONE ENCOUNTER
Patient calling stating he has had some chest pain behind both nipples when pressing on them for about a week. Feels like a pressure when pressing on them. Patient does not have exertional chest pain, denies shortness of breath, lightheaded, dizziness. Patient does not feel it is heart related and does not feel like same symptoms as to his prior heart issues. Patient does take Imdur 60mg daily, does not have sublingual nitroglycerin. However writer did suggest to patient going to the ER to be evaluated and further work up of the pain. Patient stated he does not think it is that severe and does not want to go to the ER. Writer instructed patient to be sure to report the ER immediately if symptoms get worse, has exertional or resting chest pain without pressing on the chest (pressure, tightness, squeezing), shortness of breath, and or lightheaded/dizziness. Patient verbalized understanding. Patient will see SARATH Callaway on 7/18 at 10am for follow up. Patient has cardiology phone number to call back for further questions or concerns.

## 2019-06-25 RX ORDER — CILOSTAZOL 100 MG/1
100 TABLET ORAL 2 TIMES DAILY
Qty: 180 TABLET | Refills: 0 | Status: SHIPPED | OUTPATIENT
Start: 2019-06-25 | End: 2019-09-17

## 2019-06-25 NOTE — TELEPHONE ENCOUNTER
Patient returned call. Patient would like to keep medications as they are but if the pain gets to uncomfortable patient will let us know.

## 2019-07-16 ENCOUNTER — OFFICE VISIT (OUTPATIENT)
Dept: INTERNAL MEDICINE | Facility: CLINIC | Age: 74
End: 2019-07-16
Payer: MEDICARE

## 2019-07-16 VITALS
HEART RATE: 62 BPM | SYSTOLIC BLOOD PRESSURE: 132 MMHG | OXYGEN SATURATION: 96 % | DIASTOLIC BLOOD PRESSURE: 64 MMHG | BODY MASS INDEX: 26 KG/M2 | WEIGHT: 171 LBS | RESPIRATION RATE: 16 BRPM | TEMPERATURE: 96.3 F

## 2019-07-16 DIAGNOSIS — I10 ESSENTIAL HYPERTENSION, BENIGN: Primary | ICD-10-CM

## 2019-07-16 DIAGNOSIS — N62 GYNECOMASTIA: ICD-10-CM

## 2019-07-16 DIAGNOSIS — H00.013 HORDEOLUM EXTERNUM OF RIGHT EYE, UNSPECIFIED EYELID: ICD-10-CM

## 2019-07-16 PROCEDURE — 99214 OFFICE O/P EST MOD 30 MIN: CPT | Performed by: INTERNAL MEDICINE

## 2019-07-16 RX ORDER — ERYTHROMYCIN 5 MG/G
OINTMENT OPHTHALMIC 3 TIMES DAILY
Qty: 1 TUBE | Refills: 0 | Status: ON HOLD | OUTPATIENT
Start: 2019-07-16 | End: 2019-09-09

## 2019-07-16 ASSESSMENT — PAIN SCALES - GENERAL: PAINLEVEL: EXTREME PAIN (8)

## 2019-07-16 NOTE — PROGRESS NOTES
Subjective     Michele Vance is a 73 year old male who presents to clinic today for the following health issues:    HPI   Chief Complaint   Patient presents with     Recheck Medication     discuss medications and some chest/breast tenderness     Breasts have swollen on him and some pain.  Cardiology phone call and it appears the spironolactone is the cause of the breast tenderness.      Blood pressure was lot two days ago at 112 and dizzy at times.  Another reading of 122.  Some orthostatic hypotension.     Right eye stye, erythromycin ointment worked before.      Past Medical History:   Diagnosis Date     Carotid stenosis     right endartectomy     Chronic kidney disease     stage 3     Coronary artery disease 3-2014    CABG x6     CVA (cerebral infarction)     balance problems, mild     Elevated CK      Esophageal reflux      Hypercholesteremia      Nonsenile cataract      Other and unspecified hyperlipidemia      PAD (peripheral artery disease) (H)      Rhabdomyolysis 2010     Unspecified essential hypertension      Current Outpatient Medications   Medication     alirocumab (PRALUENT) 150 MG/ML injectable pen     amitriptyline (ELAVIL) 25 MG tablet     amLODIPine (NORVASC) 10 MG tablet     ASPIRIN PO     carvedilol (COREG) 25 MG tablet     cilostazol (PLETAL) 100 MG tablet     cloNIDine (CATAPRES) 0.1 MG tablet     erythromycin (ROMYCIN) 5 MG/GM ophthalmic ointment     gabapentin (NEURONTIN) 300 MG capsule     isosorbide mononitrate (IMDUR) 60 MG 24 hr tablet     losartan (COZAAR) 100 MG tablet     metoclopramide (REGLAN) 10 MG tablet     omeprazole (PRILOSEC) 40 MG DR capsule     polyethylene glycol (MIRALAX/GLYCOLAX) packet     potassium chloride ER (K-DUR/KLOR-CON M) 10 MEQ CR tablet     psyllium (METAMUCIL) 58.6 % POWD     STATIN NOT PRESCRIBED, INTENTIONAL,     No current facility-administered medications for this visit.      Social History     Tobacco Use     Smoking status: Former Smoker     Packs/day:  2.50     Years: 20.00     Pack years: 50.00     Types: Cigarettes     Last attempt to quit: 2000     Years since quittin.5     Smokeless tobacco: Never Used   Substance Use Topics     Alcohol use: No     Alcohol/week: 0.0 oz     Drug use: No     Review of Systems  Constitutional-No fevers, chills, or weight changes..  Eyes-stye right eye.  Cardiac-No chest pain or palpitations.  Respiratory-No cough, sob, or hemoptysis.  GI-No nausea, vomitting, diarrhea, constipation, or blood in the stool.    Physical Exam  /64   Pulse 62   Temp 96.3  F (35.7  C) (Temporal)   Resp 16   Wt 77.6 kg (171 lb)   SpO2 96%   BMI 26.00 kg/m    General Appearance-healthy, alert, no distress  Eyes-right eye stye  Breast exam, no nodules, left side is tender to touch.      ASSESSMENT:  Hypertension-patient is doing well blood pressure today 132/64 continue carvedilol, amlodipine, losartan, isosorbide, and clonidine.  We will stop his Spironolactone due to gynecomastia.  Hopefully his breast tenderness will resolve otherwise we will need to do a work-up but I think is all from the spironolactone.    Right stye will refill erythromycin ointment for him and can use hot warm compresses.    Electronically signed by Jonas West MD

## 2019-08-13 ENCOUNTER — MYC REFILL (OUTPATIENT)
Dept: FAMILY MEDICINE | Facility: OTHER | Age: 74
End: 2019-08-13

## 2019-08-13 DIAGNOSIS — M19.90 ARTHRITIS: ICD-10-CM

## 2019-08-13 RX ORDER — GABAPENTIN 300 MG/1
300 CAPSULE ORAL 3 TIMES DAILY
Qty: 210 CAPSULE | Refills: 4 | Status: SHIPPED | OUTPATIENT
Start: 2019-08-13 | End: 2020-09-23

## 2019-08-13 NOTE — TELEPHONE ENCOUNTER
Gabapentin      Last Written Prescription Date:  9/26/2018  Last Fill Quantity: 210,   # refills: 4  Last Office Visit: 7/16/2019  Future Office visit:    Next 5 appointments (look out 90 days)    Sep 05, 2019  9:45 AM CDT  Return Visit with Julio Oropeza MD  [facility] ([facility]) [address] [place] [id number] [phone] OPT 2           Routing refill request to provider for review/approval because:  Drug not on the [facility], [facility] or [facility] refill protocol or controlled substance

## 2019-08-25 ENCOUNTER — HOSPITAL ENCOUNTER (EMERGENCY)
Facility: CLINIC | Age: 74
Discharge: HOME | End: 2019-08-25
Attending: FAMILY MEDICINE | Admitting: FAMILY MEDICINE
Payer: MEDICARE

## 2019-08-25 VITALS
RESPIRATION RATE: 18 BRPM | BODY MASS INDEX: 25.95 KG/M2 | SYSTOLIC BLOOD PRESSURE: 173 MMHG | WEIGHT: 171.25 LBS | HEIGHT: 68 IN | OXYGEN SATURATION: 100 % | HEART RATE: 76 BPM | DIASTOLIC BLOOD PRESSURE: 74 MMHG | TEMPERATURE: 98 F

## 2019-08-25 DIAGNOSIS — S81.811A LACERATION OF RIGHT LOWER EXTREMITY, INITIAL ENCOUNTER: ICD-10-CM

## 2019-08-25 PROCEDURE — 99283 EMERGENCY DEPT VISIT LOW MDM: CPT | Performed by: FAMILY MEDICINE

## 2019-08-25 PROCEDURE — 12002 RPR S/N/AX/GEN/TRNK2.6-7.5CM: CPT | Mod: Z6 | Performed by: FAMILY MEDICINE

## 2019-08-25 PROCEDURE — 12002 RPR S/N/AX/GEN/TRNK2.6-7.5CM: CPT | Performed by: FAMILY MEDICINE

## 2019-08-25 PROCEDURE — 99283 EMERGENCY DEPT VISIT LOW MDM: CPT | Mod: 25 | Performed by: FAMILY MEDICINE

## 2019-08-25 ASSESSMENT — MIFFLIN-ST. JEOR: SCORE: 1496.28

## 2019-08-25 NOTE — ED PROVIDER NOTES
"  History     Chief Complaint   Patient presents with     Laceration     HPI  Michele Vance is a 74 year old male who presents with a laceration to his right lower leg.  Patient was walking around some farm machinery when he cut his leg on the side.  He actually did not realize he cut his leg until he went inside and his wife noticed some bleeding.  Patient denies any other injuries.  Patient is not on any blood thinners but does take an aspirin a day.  Bleeding was controlled with just pressure.  He went inside and took a shower and ate dinner and then decided to come in to have it checked out.    Allergies:  Allergies   Allergen Reactions     Hmg-Coa-R Inhibitors Other (See Comments)     Rhabdo  Same as \"statins\"     Gemfibrozil      Resting thigh-calf pain     Sulfa Drugs      Reacted as a child     Zetia [Ezetimibe]      Muscle cramping       Problem List:    Patient Active Problem List    Diagnosis Date Noted     Carotid stenosis      Priority: Medium     right endartectomy       Essential hypertension with goal blood pressure less than 140/90 06/02/2016     Priority: Medium     PVD (peripheral vascular disease) (H) 07/22/2015     Priority: Medium     Chronic constipation 12/24/2014     Priority: Medium     Irritable bowel syndrome 12/24/2014     Priority: Medium     Health Care Home 09/11/2014     Priority: Medium     Status:  Unable to reach  Care Coordinator:  Erika Marcus    See Letters for [facility] Care Plan  Date:  September 11, 2014         Carotid artery occlusion with cerebral infarction (H) 08/06/2014     Priority: Medium     Dizziness 08/06/2014     Priority: Medium     Panlobular emphysema (H) 06/04/2014     Priority: Medium     Cerebral infarction due to occlusion or stenosis of carotid artery 05/07/2014     Priority: Medium     Problem list name updated by automated process. Provider to review       Stroke, embolic (H) 04/25/2014     Priority: Medium     Mesenteric artery stenosis (H) 04/04/2014     " Priority: Medium     2014: Asymptomatic SMA and MELI stenoses, meriting clinical FU       Insomnia 03/12/2014     Priority: Medium     Nutritional deficiency 03/12/2014     Priority: Medium     Pain 03/12/2014     Priority: Medium     Urinary retention 03/12/2014     Priority: Medium     S/P CABG x 6 03/06/2014     Priority: Medium     Left main coronary artery disease 03/04/2014     Priority: Medium     PAD (peripheral artery disease) (H) 03/04/2014     Priority: Medium     2/19/14: RUBEN 0.73 LLE       Arthritis 01/14/2013     Priority: Medium     Carotid bruit 01/14/2013     Priority: Medium     Carotid stenosis, bilateral 01/14/2013     Priority: Medium     Anemia 04/06/2012     Priority: Medium     CKD (chronic kidney disease) stage 3, GFR 30-59 ml/min (H) 04/06/2012     Priority: Medium     Advanced directives, counseling/discussion 04/05/2012     Priority: Medium     Impingement syndrome, shoulder, right 03/16/2011     Priority: Medium     Hypertension 11/22/2010     Priority: Medium     Hyperlipidemia LDL goal <130 11/22/2010     Priority: Medium     Myalgia and myositis 11/22/2010     Priority: Medium     GERD (gastroesophageal reflux disease) 11/22/2010     Priority: Medium        Past Medical History:    Past Medical History:   Diagnosis Date     Carotid stenosis      Chronic kidney disease      Coronary artery disease 3-2014     CVA (cerebral infarction)      Elevated CK      Esophageal reflux      Hypercholesteremia      Nonsenile cataract      Other and unspecified hyperlipidemia      PAD (peripheral artery disease) (H)      Rhabdomyolysis 2010     Unspecified essential hypertension        Past Surgical History:    Past Surgical History:   Procedure Laterality Date     APPENDECTOMY  1974     BYPASS GRAFT ARTERY CORONARY  3/5/2014    Procedure: BYPASS GRAFT ARTERY CORONARY;  Median Sternotomy, Coronary Artery Bypass Graft X6 used Left internal mammary artery, Left and Right Greater Saphenous vein, on Pump  oxygenator.;  Surgeon: Tim Montanez MD;  Location:  OR     CATARACT IOL, RT/LT Bilateral 2008    in [place]     cataracts       COLONOSCOPY  12/12/02    [facility]     COLONOSCOPY N/A 10/7/2014    Procedure: COMBINED COLONOSCOPY, SINGLE OR MULTIPLE BIOPSY/POLYPECTOMY BY BIOPSY;  Surgeon: Micheal Mora MD;  Location:  GI     DENTAL SURGERY      TMJ, implants     ENDARTERECTOMY CAROTID      right carotid stent     ESOPHAGOSCOPY, GASTROSCOPY, DUODENOSCOPY (EGD), COMBINED Left 10/7/2014    Procedure: COMBINED ESOPHAGOSCOPY, GASTROSCOPY, DUODENOSCOPY (EGD), BIOPSY SINGLE OR MULTIPLE;  Surgeon: Micheal Mora MD;  Location:  GI     ESOPHAGOSCOPY, GASTROSCOPY, DUODENOSCOPY (EGD), COMBINED Left 10/7/2014    Procedure: COMBINED ESOPHAGOSCOPY, GASTROSCOPY, DUODENOSCOPY (EGD), REMOVE FOREIGN BODY;  Surgeon: Micheal Mora MD;  Location: [facility] CAPSULE ENDOSCOPY N/A 10/7/2014    Procedure: CAPSULE/PILL CAM ENDOSCOPY;  Surgeon: Micheal Mora MD;  Location: [facility] UGI ENDOSCOPY, SIMPLE EXAM  01/08/99    [facility]     INJECT EPIDURAL LUMBAR Right 5/8/2017    Procedure: INJECT EPIDURAL LUMBAR;  Lumbar transforaminal Epidural Steroid Injection right lumbar 4-5, and right  lumbar 5-Sacral 1;  Surgeon: Anthony Gonzalez MD;  Location:  OR     INJECT JOINT SACROILIAC Bilateral 8/28/2017    Procedure: INJECT JOINT SACROILIAC;  sacroiliac joint injection bilateral;  Surgeon: Anthony Gonzalez MD;  Location:  OR     KNEE SURGERY  1976    left knee reconstruction     lasix  2001    both eyes     RELEASE CARPAL TUNNEL  1/13/2011    RELEASE CARPAL TUNNEL performed by JO MADRID at  OR     RELEASE CARPAL TUNNEL  1/20/2011    RELEASE CARPAL TUNNEL performed by JO MADRID at  OR       Family History:    Family History   Problem Relation Age of Onset     Hypertension Mother      Eye Disorder Mother      Cerebrovascular Disease Father      Heart Disease Father  "     Lipids Father      Obesity Father      Cancer Sister      Heart Disease Sister      Glaucoma No family hx of      Macular Degeneration No family hx of      Kidney Disease No family hx of        Social History:  Marital Status:   [2]  Social History     Tobacco Use     Smoking status: Former Smoker     Packs/day: 2.50     Years: 20.00     Pack years: 50.00     Types: Cigarettes     Last attempt to quit: 2000     Years since quittin.6     Smokeless tobacco: Never Used   Substance Use Topics     Alcohol use: No     Alcohol/week: 0.0 oz     Drug use: No        Medications:      alirocumab (PRALUENT) 150 MG/ML injectable pen   amitriptyline (ELAVIL) 25 MG tablet   amLODIPine (NORVASC) 10 MG tablet   ASPIRIN PO   carvedilol (COREG) 25 MG tablet   cilostazol (PLETAL) 100 MG tablet   cloNIDine (CATAPRES) 0.1 MG tablet   erythromycin (ROMYCIN) 5 MG/GM ophthalmic ointment   gabapentin (NEURONTIN) 300 MG capsule   isosorbide mononitrate (IMDUR) 60 MG 24 hr tablet   losartan (COZAAR) 100 MG tablet   metoclopramide (REGLAN) 10 MG tablet   omeprazole (PRILOSEC) 40 MG DR capsule   polyethylene glycol (MIRALAX/GLYCOLAX) packet   potassium chloride ER (K-DUR/KLOR-CON M) 10 MEQ CR tablet   psyllium (METAMUCIL) 58.6 % POWD   STATIN NOT PRESCRIBED, INTENTIONAL,         Review of Systems   All other systems reviewed and are negative.      Physical Exam   BP: (!) 173/74  Pulse: 76  Temp: 98  F (36.7  C)  Resp: 18  Height: 172.7 cm (5' 8\")  Weight: 77.7 kg (171 lb 4 oz)  SpO2: 100 %      Physical Exam   Constitutional: He appears well-developed and well-nourished. No distress.   HENT:   Head: Normocephalic and atraumatic.   Musculoskeletal:        Right lower leg: He exhibits laceration.        Legs:  Skin: He is not diaphoretic.   Nursing note and vitals reviewed.      ED Course        [facility]    Laceration repair  Date/Time: 2019 6:17 PM  Performed by: Artem Dyer, " MD  Authorized by: Artem Dyer MD     ED EVALUATION:      I have performed an Emergency Department Evaluation including taking a history and physical examination, this evaluation will be documented in the electronic medical record for this ED encounter.      NPO Status: appropriately NPO for procedure  UNIVERSAL PROTOCOL   Site Marked: NA  Prior Images Obtained and Reviewed:  NA  Required items: Required blood products, implants, devices and special equipment available    Patient identity confirmed:  Verbally with patient, arm band, provided demographic data and hospital-assigned identification number  Patient was reevaluated immediately before administering moderate or deep sedation or anesthesia  Confirmation Checklist:  Patient's identity using two indicators, relevant allergies, procedure was appropriate and matched the consent or emergent situation and correct equipment/implants were available  Time out: Immediately prior to the procedure a time out was called    Preparation: Patient was prepped and draped in usual sterile fashion      ANESTHESIA (see MAR for exact dosages):     Anesthesia method:  Local infiltration    Local anesthetic:  Lidocaine 1% w/o epi  LACERATION DETAILS     Location:  Leg    Leg location:  R lower leg    Length (cm):  4    REPAIR TYPE:     Repair type:  Simple      EXPLORATION:     Wound exploration: wound explored through full range of motion and entire depth of wound probed and visualized      TREATMENT:     Area cleansed with:  Betadine    SKIN REPAIR     Repair method:  Sutures    Suture size:  4-0    Suture material:  Nylon    Suture technique:  Simple interrupted    Number of sutures:  7    APPROXIMATION     Approximation:  Close    POST-PROCEDURE DETAILS     Dressing:  Tube gauze and bulky dressing      PROCEDURE   Patient Tolerance:  Patient tolerated the procedure well with no immediate complications    Time of Sedation in Minutes by Physician:  0               Medications   lidocaine 1 % 1 mL (has no administration in time range)     Laceration was repaired as noted above.  Patient does have a lot of swelling of his legs which may inhibit good healing.  We will place another bandage on the leg and will do an Ace wrap of the whole leg to try and work on keeping the swelling down.  Patient will need to see his doctor in the next 7 to 10 days to have the sutures removed.    Assessments & Plan (with Medical Decision Making)  Right leg laceration     I have reviewed the nursing notes.    I have reviewed the findings, diagnosis, plan and need for follow up with the patient.              8/25/2019   [facility] EMERGENCY DEPARTMENT     Artem Dyer MD  08/25/19 [id number]

## 2019-08-25 NOTE — ED AVS SNAPSHOT
[facility] Emergency Department  [address] [place] [address]  Phone:  [phone]  Fax:  [phone]                                    Michele Vance   MRN: [id number]    Department:  [facility] Emergency Department   Date of Visit:  8/25/2019           After Visit Summary Signature Page    I have received my discharge instructions, and my questions have been answered. I have discussed any challenges I see with this plan with the nurse or doctor.    ..........................................................................................................................................  Patient/Patient Representative Signature      ..........................................................................................................................................  Patient Representative Print Name and Relationship to Patient    ..................................................               ................................................  Date                                   Time    ..........................................................................................................................................  Reviewed by Signature/Title    ...................................................              ..............................................  Date                                               Time          22EPIC Rev 08/18

## 2019-08-25 NOTE — ED TRIAGE NOTES
He cut his R shin on a tiller blade and has a 2 inch cut.  It is covered with paper towel and taped. The bleeding is controlled.

## 2019-08-26 ENCOUNTER — TELEPHONE (OUTPATIENT)
Dept: INTERNAL MEDICINE | Facility: CLINIC | Age: 74
End: 2019-08-26

## 2019-08-26 NOTE — TELEPHONE ENCOUNTER
Reason for Call:  Same Day Appointment, Requested Provider:  Jonas West MD    PCP: Jonas West    Reason for visit: ed follow up/ leg laceration     Duration of symptoms:      Have you been treated for this in the past? Yes    Additional comments: Told to follow up in 10 days for suture removal around 9/4. Unable to come in on 9/5 and asking for an appointment on 9/6. Wondering if he can be worked in.     Can we leave a detailed message on this number? YES    Phone number patient can be reached at: Other phone number:[phone]        Best Time:      Call taken on 8/26/2019 at 11:30 AM by Michaelle Null

## 2019-09-05 ENCOUNTER — OFFICE VISIT (OUTPATIENT)
Dept: CARDIOLOGY | Facility: CLINIC | Age: 74
End: 2019-09-05
Payer: MEDICARE

## 2019-09-05 ENCOUNTER — TELEPHONE (OUTPATIENT)
Dept: CARDIOLOGY | Facility: CLINIC | Age: 74
End: 2019-09-05

## 2019-09-05 VITALS
HEIGHT: 68 IN | DIASTOLIC BLOOD PRESSURE: 70 MMHG | BODY MASS INDEX: 25.28 KG/M2 | HEART RATE: 61 BPM | SYSTOLIC BLOOD PRESSURE: 125 MMHG | WEIGHT: 166.8 LBS

## 2019-09-05 DIAGNOSIS — I15.0 RENOVASCULAR HYPERTENSION: ICD-10-CM

## 2019-09-05 DIAGNOSIS — I65.23 BILATERAL CAROTID ARTERY STENOSIS: ICD-10-CM

## 2019-09-05 DIAGNOSIS — I70.1 RENAL ARTERY STENOSIS (H): Primary | ICD-10-CM

## 2019-09-05 PROCEDURE — 99203 OFFICE O/P NEW LOW 30 MIN: CPT | Performed by: INTERNAL MEDICINE

## 2019-09-05 RX ORDER — LIDOCAINE 40 MG/G
CREAM TOPICAL
Status: CANCELLED | OUTPATIENT
Start: 2019-09-05

## 2019-09-05 RX ORDER — SODIUM CHLORIDE 9 MG/ML
INJECTION, SOLUTION INTRAVENOUS CONTINUOUS
Status: CANCELLED | OUTPATIENT
Start: 2019-09-05

## 2019-09-05 RX ORDER — POTASSIUM CHLORIDE 1500 MG/1
20 TABLET, EXTENDED RELEASE ORAL
Status: CANCELLED | OUTPATIENT
Start: 2019-09-05

## 2019-09-05 ASSESSMENT — MIFFLIN-ST. JEOR: SCORE: 1476.1

## 2019-09-05 NOTE — TELEPHONE ENCOUNTER
ADDENDUM: patient returned call and RN reviewed with him the pre-cath instructions. Patient had no further questions.     RN called patient and left VM advising patient to callback to review pre-cath instructions       Pre cath instructions:     Contrast allergy: no  Anticoagulation: no   Metformin: no  Oral DM meds: no  Insulin: no  Diuretic: no  Use of phosphodiesterase type 5 inhibitor: no  Aspirin: yes, 325mg    Pt informed to be NPO at midnight  Renal issues yes, 3 hours hydration prior   Pt has transportation and 24 hours post procedure monitoring set up.   Pt aware of no driving for 24 hours post procedure.     Pt aware of arrival time and location. Pt verbalized understanding of instructions.

## 2019-09-05 NOTE — PROGRESS NOTES
Service Date: 09/05/2019      REASON FOR CONSULTATION:  Renovascular hypertension and renal artery stenosis.      REFERRING PROVIDER:  Christiane Coffey PA-C.      HISTORY OF PRESENT ILLNESS:  I had the pleasure of seeing Michele Vance at the [facility] in [place] this morning.  He is a very pleasant 74-year-old gentleman who is a patient of my colleagues, Dr. Stevie Felipe and SARATH Callaway, who is referred for resistant hypertension in the setting of known severe left renal artery stenosis.      His background history is notable for extensive vascular disease including coronary artery disease status post 6-vessel CABG in 2014, carotid artery disease status post prior right carotid artery stenting, severe peripheral arterial disease status post prior bilateral SFA angioplasty and stenting, and CVA post-CABG with some residual memory issues.      He is stable from a cardiac point of view but has had difficulty with resistant hypertension over the past year.  He is currently on 5 antihypertensives but has been on as high as 6 antihypertensive medications.  Despite this, his blood pressure is still not reasonably well controlled.  He has had issues with dizziness and lightheadedness which have been attributed to clonidine.  However, he does have a significant blood pressure discrepancy on arms, and symptoms therefore could be due to subclavian steal syndrome.  Subclavian artery stenosis has not been investigated so far.  Of note, the patient does not endorse any left upper extremity claudication.      He tells me he follows an independent vascular surgeon for his carotid and peripheral arterial disease.  He does not endorse any significant claudication-type symptoms at this point.      IMPRESSION, REPORT AND PLAN:   1.  Resistant hypertension.   2.  Known severe renal artery stenosis.   3.  Severe peripheral arterial disease, status post prior bilateral SFA angioplasty and stenting.   4.   Carotid artery disease, status post prior right carotid artery stenting.   5.  Hyperlipidemia.      I have reviewed the patient's most recent renal artery ultrasound from January of this year.  That ultrasound demonstrated severe left renal artery stenosis with velocities reaching close to 800 cm per second.  The right renal artery velocities are within the normal range indicating no significant stenosis.      On exam, his left radial pulse is slightly diminished but still palpable.      I went over indications regarding renal artery revascularization.  I did explain to him that although the randomized trials have not shown any significant benefit over medical therapy in the general population, there are certain patients who clearly benefit from this therapy.  Given his resistant hypertension despite 5 antihypertensives and evidence of renal dysfunction, I believe he will benefit from renal artery revascularization.  I discussed the risks, benefits, alternatives and complications of the procedure with him in detail.  He expressed understanding.  He is very much interested in proceeding with an angiogram and possible revascularization of his left renal artery.      Additionally, given the history of blood pressure discrepancy between the 2 arms and the dizzy spells, we will perform left subclavian angiography as well to make sure he does not have significant left subclavian artery stenosis.  We will do this at the time of his renal angiogram.      It was a pleasure seeing Mr. Cid in the clinic.  As always, I appreciate the opportunity to be part of your patient's care.         NEHA SANDERS MD             D: 2019   T: 2019   MT: JOHNNIE      Name:     NINFA CID   MRN:      [id number]        Account:      [id number]   :      1945           Service Date: 2019      Document: [id number]

## 2019-09-05 NOTE — LETTER
9/5/2019    Jonas West MD  [address] [address]    RE: Michele Vance       Dear Colleague,    I had the pleasure of seeing Michele Vance in the [facility].    Service Date: 09/05/2019      REASON FOR CONSULTATION:  Renovascular hypertension and renal artery stenosis.      REFERRING PROVIDER:  Christiane Coffey PA-C.      HISTORY OF PRESENT ILLNESS:  I had the pleasure of seeing Michele Vance at the [facility] in [place] this morning.  He is a very pleasant 74-year-old gentleman who is a patient of my colleagues, Dr. Stevie Felipe and SARATH Callaway, who is referred for resistant hypertension in the setting of known severe left renal artery stenosis.      His background history is notable for extensive vascular disease including coronary artery disease status post 6-vessel CABG in 2014, carotid artery disease status post prior right carotid artery stenting, severe peripheral arterial disease status post prior bilateral SFA angioplasty and stenting, and CVA post-CABG with some residual memory issues.      He is stable from a cardiac point of view but has had difficulty with resistant hypertension over the past year.  He is currently on 5 antihypertensives but has been on as high as 6 antihypertensive medications.  Despite this, his blood pressure is still not reasonably well controlled.  He has had issues with dizziness and lightheadedness which have been attributed to clonidine.  However, he does have a significant blood pressure discrepancy on arms, and symptoms therefore could be due to subclavian steal syndrome.  Subclavian artery stenosis has not been investigated so far.  Of note, the patient does not endorse any left upper extremity claudication.      He tells me he follows an independent vascular surgeon for his carotid and peripheral arterial disease.  He does not endorse any significant claudication-type symptoms at this point.       IMPRESSION, REPORT AND PLAN:   1.  Resistant hypertension.   2.  Known severe renal artery stenosis.   3.  Severe peripheral arterial disease, status post prior bilateral SFA angioplasty and stenting.   4.  Carotid artery disease, status post prior right carotid artery stenting.   5.  Hyperlipidemia.      I have reviewed the patient's most recent renal artery ultrasound from January of this year.  That ultrasound demonstrated severe left renal artery stenosis with velocities reaching close to 800 cm per second.  The right renal artery velocities are within the normal range indicating no significant stenosis.      On exam, his left radial pulse is slightly diminished but still palpable.      I went over indications regarding renal artery revascularization.  I did explain to him that although the randomized trials have not shown any significant benefit over medical therapy in the general population, there are certain patients who clearly benefit from this therapy.  Given his resistant hypertension despite 5 antihypertensives and evidence of renal dysfunction, I believe he will benefit from renal artery revascularization.  I discussed the risks, benefits, alternatives and complications of the procedure with him in detail.  He expressed understanding.  He is very much interested in proceeding with an angiogram and possible revascularization of his left renal artery.      Additionally, given the history of blood pressure discrepancy between the 2 arms and the dizzy spells, we will perform left subclavian angiography as well to make sure he does not have significant left subclavian artery stenosis.  We will do this at the time of his renal angiogram.      It was a pleasure seeing Mr. Cid in the clinic.  As always, I appreciate the opportunity to be part of your patient's care.         NEHA SANDERS MD             D: 09/05/2019   T: 09/05/2019   MT: JOHNNIE      Name:     NINFA CID   MRN:      [id number]         Account:      [id number]   :      1945           Service Date: 2019      Document: [id number]        Thank you for allowing me to participate in the care of your patient.      Sincerely,     Julio Oropeza MD, MD     [facility]

## 2019-09-06 ENCOUNTER — OFFICE VISIT (OUTPATIENT)
Dept: INTERNAL MEDICINE | Facility: CLINIC | Age: 74
End: 2019-09-06
Payer: MEDICARE

## 2019-09-06 VITALS
OXYGEN SATURATION: 98 % | BODY MASS INDEX: 25.54 KG/M2 | DIASTOLIC BLOOD PRESSURE: 64 MMHG | RESPIRATION RATE: 16 BRPM | SYSTOLIC BLOOD PRESSURE: 128 MMHG | TEMPERATURE: 97.4 F | WEIGHT: 168 LBS | HEART RATE: 64 BPM

## 2019-09-06 DIAGNOSIS — S81.811D LACERATION OF RIGHT LOWER EXTREMITY, SUBSEQUENT ENCOUNTER: Primary | ICD-10-CM

## 2019-09-06 PROCEDURE — 99213 OFFICE O/P EST LOW 20 MIN: CPT | Performed by: INTERNAL MEDICINE

## 2019-09-06 ASSESSMENT — PAIN SCALES - GENERAL: PAINLEVEL: NO PAIN (0)

## 2019-09-06 NOTE — PROGRESS NOTES
Subjective     Michele Vance is a 74 year old male who presents to clinic today for the following health issues:    HPI   ED/UC Followup:    Facility:  [facility]  Date of visit: 8/25/19  Reason for visit: laceration of right lower extremity  Current Status: follow up/suture removal     Bumped into his tiller, needed sutures.  Done on the 25th, no pain, doing well.      Past Medical History:   Diagnosis Date     Carotid stenosis     right endartectomy     Chronic kidney disease     stage 3     Coronary artery disease 3-2014    CABG x6     CVA (cerebral infarction)     balance problems, mild     Elevated CK      Esophageal reflux      Hypercholesteremia      Nonsenile cataract      Other and unspecified hyperlipidemia      PAD (peripheral artery disease) (H)      Rhabdomyolysis 2010     Unspecified essential hypertension      Current Outpatient Medications   Medication     alirocumab (PRALUENT) 150 MG/ML injectable pen     amitriptyline (ELAVIL) 25 MG tablet     amLODIPine (NORVASC) 10 MG tablet     ASPIRIN PO     carvedilol (COREG) 25 MG tablet     cilostazol (PLETAL) 100 MG tablet     cloNIDine (CATAPRES) 0.1 MG tablet     erythromycin (ROMYCIN) 5 MG/GM ophthalmic ointment     gabapentin (NEURONTIN) 300 MG capsule     isosorbide mononitrate (IMDUR) 60 MG 24 hr tablet     losartan (COZAAR) 100 MG tablet     metoclopramide (REGLAN) 10 MG tablet     omeprazole (PRILOSEC) 40 MG DR capsule     polyethylene glycol (MIRALAX/GLYCOLAX) packet     potassium chloride ER (K-DUR/KLOR-CON M) 10 MEQ CR tablet     psyllium (METAMUCIL) 58.6 % POWD     STATIN NOT PRESCRIBED, INTENTIONAL,     No current facility-administered medications for this visit.      Physical Exam  /64   Pulse 64   Temp 97.4  F (36.3  C) (Temporal)   Resp 16   Wt 76.2 kg (168 lb)   SpO2 98%   BMI 25.54 kg/m    General Appearance-healthy, alert, no distress  Both lower legs with 1+ pitting edema.  Right leg on the lateral side as the incision  which is well-healed but not perfectly closed.  Stitches are removed area is treated with bacitracin and a Band-Aid it will have some healing by secondary intent.    ASSESSMENT:  Laceration on the right lower leg has mild erythema but not is not infected.  There is some swelling of the leg that has spread the incision open.  This is healing from the inside.  Treated with bacitracin should improve over the next week.    Electronically signed by Jonas West MD

## 2019-09-09 ENCOUNTER — HOSPITAL ENCOUNTER (OUTPATIENT)
Facility: CLINIC | Age: 74
Discharge: HOME | End: 2019-09-09
Attending: INTERNAL MEDICINE | Admitting: INTERNAL MEDICINE
Payer: MEDICARE

## 2019-09-09 ENCOUNTER — SURGERY (OUTPATIENT)
Age: 74
End: 2019-09-09
Payer: MEDICARE

## 2019-09-09 VITALS
WEIGHT: 167 LBS | HEART RATE: 80 BPM | TEMPERATURE: 97.6 F | OXYGEN SATURATION: 95 % | DIASTOLIC BLOOD PRESSURE: 95 MMHG | HEIGHT: 68 IN | SYSTOLIC BLOOD PRESSURE: 136 MMHG | RESPIRATION RATE: 16 BRPM | BODY MASS INDEX: 25.31 KG/M2

## 2019-09-09 DIAGNOSIS — I70.1 RENAL ARTERY STENOSIS (H): ICD-10-CM

## 2019-09-09 LAB
ANION GAP SERPL CALCULATED.3IONS-SCNC: 5 MMOL/L (ref 3–14)
APTT PPP: 29 SEC (ref 22–37)
BUN SERPL-MCNC: 12 MG/DL (ref 7–30)
CALCIUM SERPL-MCNC: 9.2 MG/DL (ref 8.5–10.1)
CHLORIDE SERPL-SCNC: 109 MMOL/L (ref 94–109)
CO2 SERPL-SCNC: 29 MMOL/L (ref 20–32)
CREAT SERPL-MCNC: 1.47 MG/DL (ref 0.66–1.25)
ERYTHROCYTE [DISTWIDTH] IN BLOOD BY AUTOMATED COUNT: 13.8 % (ref 10–15)
GFR SERPL CREATININE-BSD FRML MDRD: 47 ML/MIN/{1.73_M2}
GLUCOSE SERPL-MCNC: 104 MG/DL (ref 70–99)
HCT VFR BLD AUTO: 34.9 % (ref 40–53)
HGB BLD-MCNC: 12 G/DL (ref 13.3–17.7)
INR PPP: 1.07 (ref 0.86–1.14)
KCT BLD-ACNC: 179 SEC (ref 75–150)
KCT BLD-ACNC: 191 SEC (ref 75–150)
KCT BLD-ACNC: 203 SEC (ref 75–150)
MCH RBC QN AUTO: 32.5 PG (ref 26.5–33)
MCHC RBC AUTO-ENTMCNC: 34.4 G/DL (ref 31.5–36.5)
MCV RBC AUTO: 95 FL (ref 78–100)
PLATELET # BLD AUTO: 247 10E9/L (ref 150–450)
POTASSIUM SERPL-SCNC: 3.7 MMOL/L (ref 3.4–5.3)
RBC # BLD AUTO: 3.69 10E12/L (ref 4.4–5.9)
SODIUM SERPL-SCNC: 143 MMOL/L (ref 133–144)
WBC # BLD AUTO: 5.8 10E9/L (ref 4–11)

## 2019-09-09 PROCEDURE — 40000853 ZZH STATISTIC ANGIOGRAM, STENT, VERTEBRO PLASTY

## 2019-09-09 PROCEDURE — 40000065 ZZH STATISTIC EKG NON-CHARGEABLE

## 2019-09-09 PROCEDURE — 36251 INS CATH REN ART 1ST UNILAT: CPT | Mod: 51 | Performed by: INTERNAL MEDICINE

## 2019-09-09 PROCEDURE — C1725 CATH, TRANSLUMIN NON-LASER: HCPCS | Performed by: INTERNAL MEDICINE

## 2019-09-09 PROCEDURE — 99153 MOD SED SAME PHYS/QHP EA: CPT | Performed by: INTERNAL MEDICINE

## 2019-09-09 PROCEDURE — 80048 BASIC METABOLIC PNL TOTAL CA: CPT | Performed by: INTERNAL MEDICINE

## 2019-09-09 PROCEDURE — 85730 THROMBOPLASTIN TIME PARTIAL: CPT | Performed by: INTERNAL MEDICINE

## 2019-09-09 PROCEDURE — C1887 CATHETER, GUIDING: HCPCS | Performed by: INTERNAL MEDICINE

## 2019-09-09 PROCEDURE — 25800030 ZZH RX IP 258 OP 636: Performed by: INTERNAL MEDICINE

## 2019-09-09 PROCEDURE — 37252 INTRVASC US NONCORONARY 1ST: CPT | Performed by: INTERNAL MEDICINE

## 2019-09-09 PROCEDURE — 93010 ELECTROCARDIOGRAM REPORT: CPT | Performed by: INTERNAL MEDICINE

## 2019-09-09 PROCEDURE — 37236 OPEN/PERQ PLACE STENT 1ST: CPT | Performed by: INTERNAL MEDICINE

## 2019-09-09 PROCEDURE — 25000128 H RX IP 250 OP 636: Performed by: INTERNAL MEDICINE

## 2019-09-09 PROCEDURE — 85347 COAGULATION TIME ACTIVATED: CPT | Mod: 91

## 2019-09-09 PROCEDURE — 75625 CONTRAST EXAM ABDOMINL AORTA: CPT | Performed by: INTERNAL MEDICINE

## 2019-09-09 PROCEDURE — C1769 GUIDE WIRE: HCPCS | Performed by: INTERNAL MEDICINE

## 2019-09-09 PROCEDURE — 85610 PROTHROMBIN TIME: CPT | Performed by: INTERNAL MEDICINE

## 2019-09-09 PROCEDURE — C1876 STENT, NON-COA/NON-COV W/DEL: HCPCS | Performed by: INTERNAL MEDICINE

## 2019-09-09 PROCEDURE — 25000132 ZZH RX MED GY IP 250 OP 250 PS 637: Mod: GY | Performed by: INTERNAL MEDICINE

## 2019-09-09 PROCEDURE — 27210794 ZZH OR GENERAL SUPPLY STERILE: Performed by: INTERNAL MEDICINE

## 2019-09-09 PROCEDURE — C1876 STENT, NON-COA/NON-COV W/DEL: HCPCS

## 2019-09-09 PROCEDURE — 36415 COLL VENOUS BLD VENIPUNCTURE: CPT

## 2019-09-09 PROCEDURE — 93005 ELECTROCARDIOGRAM TRACING: CPT

## 2019-09-09 PROCEDURE — 99152 MOD SED SAME PHYS/QHP 5/>YRS: CPT | Performed by: INTERNAL MEDICINE

## 2019-09-09 PROCEDURE — 37236 OPEN/PERQ PLACE STENT 1ST: CPT | Mod: LT | Performed by: INTERNAL MEDICINE

## 2019-09-09 PROCEDURE — 85027 COMPLETE CBC AUTOMATED: CPT | Performed by: INTERNAL MEDICINE

## 2019-09-09 PROCEDURE — C1894 INTRO/SHEATH, NON-LASER: HCPCS | Performed by: INTERNAL MEDICINE

## 2019-09-09 PROCEDURE — C1760 CLOSURE DEV, VASC: HCPCS | Performed by: INTERNAL MEDICINE

## 2019-09-09 PROCEDURE — 75625 CONTRAST EXAM ABDOMINL AORTA: CPT | Mod: 26 | Performed by: INTERNAL MEDICINE

## 2019-09-09 PROCEDURE — 36251 INS CATH REN ART 1ST UNILAT: CPT | Performed by: INTERNAL MEDICINE

## 2019-09-09 PROCEDURE — 25000125 ZZHC RX 250: Performed by: INTERNAL MEDICINE

## 2019-09-09 RX ORDER — ATROPINE SULFATE 0.1 MG/ML
0.5 INJECTION INTRAVENOUS EVERY 5 MIN PRN
Status: DISCONTINUED | OUTPATIENT
Start: 2019-09-09 | End: 2019-09-09 | Stop reason: HOSPADM

## 2019-09-09 RX ORDER — FLUMAZENIL 0.1 MG/ML
0.2 INJECTION, SOLUTION INTRAVENOUS
Status: DISCONTINUED | OUTPATIENT
Start: 2019-09-09 | End: 2019-09-09 | Stop reason: HOSPADM

## 2019-09-09 RX ORDER — EPTIFIBATIDE 2 MG/ML
180 INJECTION, SOLUTION INTRAVENOUS EVERY 10 MIN PRN
Status: DISCONTINUED | OUTPATIENT
Start: 2019-09-09 | End: 2019-09-09 | Stop reason: HOSPADM

## 2019-09-09 RX ORDER — IOPAMIDOL 755 MG/ML
INJECTION, SOLUTION INTRAVASCULAR
Status: DISCONTINUED | OUTPATIENT
Start: 2019-09-09 | End: 2019-09-09 | Stop reason: HOSPADM

## 2019-09-09 RX ORDER — DOBUTAMINE HYDROCHLORIDE 200 MG/100ML
2-20 INJECTION INTRAVENOUS CONTINUOUS PRN
Status: DISCONTINUED | OUTPATIENT
Start: 2019-09-09 | End: 2019-09-09 | Stop reason: HOSPADM

## 2019-09-09 RX ORDER — PHENYLEPHRINE HCL IN 0.9% NACL 50MG/250ML
.5-6 PLASTIC BAG, INJECTION (ML) INTRAVENOUS CONTINUOUS PRN
Status: DISCONTINUED | OUTPATIENT
Start: 2019-09-09 | End: 2019-09-09 | Stop reason: HOSPADM

## 2019-09-09 RX ORDER — CLOPIDOGREL BISULFATE 75 MG/1
TABLET ORAL
Status: DISCONTINUED | OUTPATIENT
Start: 2019-09-09 | End: 2019-09-09 | Stop reason: HOSPADM

## 2019-09-09 RX ORDER — FENTANYL CITRATE 50 UG/ML
INJECTION, SOLUTION INTRAMUSCULAR; INTRAVENOUS
Status: DISCONTINUED | OUTPATIENT
Start: 2019-09-09 | End: 2019-09-09 | Stop reason: HOSPADM

## 2019-09-09 RX ORDER — POTASSIUM CHLORIDE 1500 MG/1
20 TABLET, EXTENDED RELEASE ORAL
Status: DISCONTINUED | OUTPATIENT
Start: 2019-09-09 | End: 2019-09-09 | Stop reason: HOSPADM

## 2019-09-09 RX ORDER — CLOPIDOGREL BISULFATE 75 MG/1
75 TABLET ORAL DAILY
Qty: 30 TABLET | Refills: 0 | Status: SHIPPED | OUTPATIENT
Start: 2019-09-10 | End: 2019-10-24

## 2019-09-09 RX ORDER — LIDOCAINE 40 MG/G
CREAM TOPICAL
Status: DISCONTINUED | OUTPATIENT
Start: 2019-09-09 | End: 2019-09-09 | Stop reason: HOSPADM

## 2019-09-09 RX ORDER — EPTIFIBATIDE 2 MG/ML
1 INJECTION, SOLUTION INTRAVENOUS CONTINUOUS PRN
Status: DISCONTINUED | OUTPATIENT
Start: 2019-09-09 | End: 2019-09-09 | Stop reason: HOSPADM

## 2019-09-09 RX ORDER — HYDRALAZINE HYDROCHLORIDE 20 MG/ML
INJECTION INTRAMUSCULAR; INTRAVENOUS
Status: DISCONTINUED | OUTPATIENT
Start: 2019-09-09 | End: 2019-09-09 | Stop reason: HOSPADM

## 2019-09-09 RX ORDER — FENTANYL CITRATE 50 UG/ML
25-50 INJECTION, SOLUTION INTRAMUSCULAR; INTRAVENOUS
Status: DISCONTINUED | OUTPATIENT
Start: 2019-09-09 | End: 2019-09-09 | Stop reason: HOSPADM

## 2019-09-09 RX ORDER — NOREPINEPHRINE BITARTRATE 0.06 MG/ML
.03-.4 INJECTION, SOLUTION INTRAVENOUS CONTINUOUS PRN
Status: DISCONTINUED | OUTPATIENT
Start: 2019-09-09 | End: 2019-09-09 | Stop reason: HOSPADM

## 2019-09-09 RX ORDER — ARGATROBAN 1 MG/ML
150 INJECTION, SOLUTION INTRAVENOUS
Status: DISCONTINUED | OUTPATIENT
Start: 2019-09-09 | End: 2019-09-09 | Stop reason: HOSPADM

## 2019-09-09 RX ORDER — ASPIRIN 81 MG/1
81 TABLET ORAL DAILY
Status: DISCONTINUED | OUTPATIENT
Start: 2019-09-09 | End: 2019-09-09 | Stop reason: HOSPADM

## 2019-09-09 RX ORDER — DOPAMINE HYDROCHLORIDE 160 MG/100ML
2-20 INJECTION, SOLUTION INTRAVENOUS CONTINUOUS PRN
Status: DISCONTINUED | OUTPATIENT
Start: 2019-09-09 | End: 2019-09-09 | Stop reason: HOSPADM

## 2019-09-09 RX ORDER — ONDANSETRON 2 MG/ML
4 INJECTION INTRAMUSCULAR; INTRAVENOUS EVERY 6 HOURS PRN
Status: DISCONTINUED | OUTPATIENT
Start: 2019-09-09 | End: 2019-09-09 | Stop reason: HOSPADM

## 2019-09-09 RX ORDER — EPTIFIBATIDE 2 MG/ML
2 INJECTION, SOLUTION INTRAVENOUS CONTINUOUS PRN
Status: DISCONTINUED | OUTPATIENT
Start: 2019-09-09 | End: 2019-09-09 | Stop reason: HOSPADM

## 2019-09-09 RX ORDER — SODIUM CHLORIDE 9 MG/ML
INJECTION, SOLUTION INTRAVENOUS CONTINUOUS
Status: DISCONTINUED | OUTPATIENT
Start: 2019-09-09 | End: 2019-09-09 | Stop reason: HOSPADM

## 2019-09-09 RX ORDER — HEPARIN SODIUM 1000 [USP'U]/ML
INJECTION, SOLUTION INTRAVENOUS; SUBCUTANEOUS
Status: DISCONTINUED | OUTPATIENT
Start: 2019-09-09 | End: 2019-09-09 | Stop reason: HOSPADM

## 2019-09-09 RX ORDER — ARGATROBAN 1 MG/ML
350 INJECTION, SOLUTION INTRAVENOUS
Status: DISCONTINUED | OUTPATIENT
Start: 2019-09-09 | End: 2019-09-09 | Stop reason: HOSPADM

## 2019-09-09 RX ORDER — NALOXONE HYDROCHLORIDE 0.4 MG/ML
.2-.4 INJECTION, SOLUTION INTRAMUSCULAR; INTRAVENOUS; SUBCUTANEOUS
Status: DISCONTINUED | OUTPATIENT
Start: 2019-09-09 | End: 2019-09-09 | Stop reason: HOSPADM

## 2019-09-09 RX ORDER — CLONIDINE HYDROCHLORIDE 0.1 MG/1
0.05 TABLET ORAL 2 TIMES DAILY
COMMUNITY
End: 2019-09-19

## 2019-09-09 RX ORDER — NITROGLYCERIN 20 MG/100ML
.07-2 INJECTION INTRAVENOUS CONTINUOUS PRN
Status: DISCONTINUED | OUTPATIENT
Start: 2019-09-09 | End: 2019-09-09 | Stop reason: HOSPADM

## 2019-09-09 RX ADMIN — FENTANYL CITRATE 50 MCG: 50 INJECTION, SOLUTION INTRAMUSCULAR; INTRAVENOUS at 10:11

## 2019-09-09 RX ADMIN — MIDAZOLAM 1 MG: 1 INJECTION INTRAMUSCULAR; INTRAVENOUS at 10:11

## 2019-09-09 RX ADMIN — FENTANYL CITRATE 25 MCG: 50 INJECTION, SOLUTION INTRAMUSCULAR; INTRAVENOUS at 11:01

## 2019-09-09 RX ADMIN — FENTANYL CITRATE 25 MCG: 50 INJECTION, SOLUTION INTRAMUSCULAR; INTRAVENOUS at 11:42

## 2019-09-09 RX ADMIN — FENTANYL CITRATE 25 MCG: 50 INJECTION, SOLUTION INTRAMUSCULAR; INTRAVENOUS at 10:52

## 2019-09-09 RX ADMIN — CLOPIDOGREL BISULFATE 600 MG: 75 TABLET ORAL at 11:54

## 2019-09-09 RX ADMIN — HEPARIN SODIUM 3000 UNITS: 1000 INJECTION, SOLUTION INTRAVENOUS; SUBCUTANEOUS at 10:45

## 2019-09-09 RX ADMIN — SODIUM CHLORIDE 150 ML/HR: 9 INJECTION, SOLUTION INTRAVENOUS at 07:35

## 2019-09-09 RX ADMIN — IOPAMIDOL 175 ML: 755 INJECTION, SOLUTION INTRAVENOUS at 11:53

## 2019-09-09 RX ADMIN — MIDAZOLAM 0.5 MG: 1 INJECTION INTRAMUSCULAR; INTRAVENOUS at 11:41

## 2019-09-09 RX ADMIN — HYDRALAZINE HYDROCHLORIDE 10 MG: 20 INJECTION INTRAMUSCULAR; INTRAVENOUS at 11:43

## 2019-09-09 RX ADMIN — HEPARIN SODIUM 1000 UNITS: 1000 INJECTION, SOLUTION INTRAVENOUS; SUBCUTANEOUS at 11:24

## 2019-09-09 RX ADMIN — HYDRALAZINE HYDROCHLORIDE 10 MG: 20 INJECTION INTRAMUSCULAR; INTRAVENOUS at 10:53

## 2019-09-09 RX ADMIN — LIDOCAINE HYDROCHLORIDE 10 ML: 10 INJECTION, SOLUTION EPIDURAL; INFILTRATION; INTRACAUDAL; PERINEURAL at 10:16

## 2019-09-09 RX ADMIN — HEPARIN SODIUM 4000 UNITS: 1000 INJECTION, SOLUTION INTRAVENOUS; SUBCUTANEOUS at 10:30

## 2019-09-09 RX ADMIN — FENTANYL CITRATE 25 MCG: 50 INJECTION, SOLUTION INTRAMUSCULAR; INTRAVENOUS at 11:48

## 2019-09-09 RX ADMIN — HEPARIN SODIUM 1000 UNITS: 1000 INJECTION, SOLUTION INTRAVENOUS; SUBCUTANEOUS at 11:05

## 2019-09-09 RX ADMIN — MIDAZOLAM 0.5 MG: 1 INJECTION INTRAMUSCULAR; INTRAVENOUS at 11:26

## 2019-09-09 RX ADMIN — ONDANSETRON 4 MG: 2 INJECTION INTRAMUSCULAR; INTRAVENOUS at 15:50

## 2019-09-09 RX ADMIN — HEPARIN SODIUM 500 UNITS: 1000 INJECTION, SOLUTION INTRAVENOUS; SUBCUTANEOUS at 11:54

## 2019-09-09 ASSESSMENT — MIFFLIN-ST. JEOR: SCORE: 1477.01

## 2019-09-09 NOTE — PROGRESS NOTES
Care Suites Post-Procedure Note    Procedure: Left renal artery stent placed per Dr. Oropeza.  CS arrival time: 1205  Accompanied by: Cath lab RN.  Concerns/abnormal assessment after procedure: None at this time. 1215 Received report from Kari Melissa RN.  Assessed right groin together.  Right groin with Mynx closure device covered with tegaderm.  Site C/D/I, no hematoma.  Pulses present with doppler.  Denies pain. VSS.  Plan: Bedrest x 3 hours.  Wife at bedside.   1500- Off BR, VSS, Right groin with Mynx intact.   Walked to Bathroom and voided.   On return to room, pt. Became nauseated and vomited 300+ ml.   AVS has been reviewed and understood by patient and spouse.   1530- Hand off to Abbi LEW- call to be placed to Dr. Oroepza regarding nausea.

## 2019-09-09 NOTE — PROVIDER NOTIFICATION
1543 Spoke with Dr. Oropeza regarding patients nausea and emesis. One time order for zofran given by telephone.

## 2019-09-09 NOTE — Clinical Note
The first balloon was inserted into the other vessel and left prox renal.Max pressure = 8 soco. Total duration = 37 seconds.

## 2019-09-09 NOTE — Clinical Note
Chief Complaint: Frequency    HPI:   3/28/19: 74 yo woman referred for nocturia/frequency.  Nocturia x2 lately.  Daytime frequency.  Been present just for a month.  No abd/pelvic pain and no exac/rel factors.  No hematuria.  No recent urolithiasis but did pass a stone 15 years ago.  No other urinary bother.  No  history.  Normal sexual function.  No incontinence just a safety pad.  Water is only drink.  Often constipated, not worse lately.  All food is spicy.  Snores at night, waking herself up at times with it.    Allergies:  Patient has no known allergies.    Medications: has a current medication list which includes the following prescription(s): albuterol, aripiprazole, atorvastatin, fluticasone, gabapentin, levocetirizine, levothyroxine, losartan-hydrochlorothiazide 100-25 mg, meloxicam, metformin, pantoprazole, sertraline, tizanidine, trazodone, azelastine, and benzonatate.    Review of Systems:  General: No fever, chills, fatigability, or weight loss.  Skin: No rashes, itching, or changes in color or texture of skin.  Chest: Denies NAVA, cyanosis, wheezing, cough, and sputum production.  Abdomen: Appetite fine. No weight loss. Denies diarrhea, abdominal pain, hematemesis, or blood in stool.  Musculoskeletal: No joint stiffness or swelling. Denies back pain.  : As above.  All other review of systems negative.    PMH:   has a past medical history of Arthritis, Degenerative disc disease, cervical, Diabetes mellitus, type 2, Emphysema of lung, MVP (mitral valve prolapse), Osteoporosis, and Thyroid condition.    PSH:   has a past surgical history that includes cataract surgery  (Bilateral, 10/ 2012); elbow spur removed (Left); Hysterectomy (Left, 1978); Ovarian cyst removal; Vascular surgery (Right); Back surgery (2012); and Oophorectomy.    FamHx: family history includes Alzheimer's disease in her sister; Arthritis in her mother; Breast cancer in her mother; Cancer in her father and sister; Heart disease in her  The first balloon was inserted into the other vessel and left prox renal.Max pressure = 12 soco. Total duration = 25 seconds.       mother; Kidney disease in her mother; Ovarian cancer in her sister.    SocHx:  reports that she quit smoking about 3 years ago. She has never used smokeless tobacco. She reports that she does not drink alcohol or use drugs.     Physical Exam:  Vitals:   Vitals:    03/28/19 1303   BP: 128/72   Pulse: 68     General: A&Ox3. No apparent distress. No deformities.  Neck: No masses. Normal thyroid.  Lungs: normal inspiration. No use of accessory muscles.  Heart: normal pulse. No arrhythmias.  Abdomen: Soft. NT. ND. No masses. No hernias. No hepatosplenomegaly.  Lymphatic: Neck and groin nodes negative.  Skin: The skin is warm and dry. No jaundice.  Ext: No c/c/e.  : External genitalia normal.     Labs/Studies:   Urinalysis performed in clinic, summary: UA normal    Impression/Plan:   1. Miralax for constipation, reduce spicy foods.  2. Cath UA/UCx today  3. Can add OAB med if constipation/food changes aren't enough.  4. Sleep study for snoring and likely sleep apnea/tired a lot.  5. KUB/US to screen upper tracts

## 2019-09-09 NOTE — PROGRESS NOTES
Care Suites Admission Nursing Note    Reason for admission: Peripheral Angiogram - 3 hours hydration prior   CS arrival time: 0715  Accompanied by: wife   Name/phone of DC : Justin  [phone]  Medications held: no  Consent signed: yes  Abnormal assessment/labs: K 3.7  Creat 1.47  INR 1.07   If abnormal, provider notified: cath lab notified of K 3.7 and not treated by RN- spoke with Su LEW  Education/questions answered: yes- questions answered with pt. And wife.   Plan: LE Angiogram at 10:00   1000- to Periph angiogram

## 2019-09-09 NOTE — PRE-PROCEDURE
GENERAL PRE-PROCEDURE:     Written consent obtained?: Yes    Risks and benefits: Risks, benefits and alternatives were discussed    Consent given by:  Patient  Patient states understanding of procedure being performed: Yes    Patient's understanding of procedure matches consent: Yes    Procedure consent matches procedure scheduled: Yes    Expected level of sedation:  Moderate  Appropriately NPO:  Yes  ASA Class:  Class 2- mild systemic disease, no acute problems, no functional limitations  Mallampati  :  Grade 1- soft palate, uvula, tonsillar pillars, and posterior pharyngeal wall visible  Lungs:  Lungs clear with good breath sounds bilaterally  Heart:  Normal heart sounds and rate  History & Physical reviewed:  History and physical reviewed and no updates needed  Statement of review:  I have reviewed the lab findings, diagnostic data, medications, and the plan for sedation

## 2019-09-09 NOTE — DISCHARGE INSTRUCTIONS
Renal Angiogram Discharge Instructions     After you go home:      Have an adult stay with you until tomorrow.    Drink extra fluids for 2 days.    You may resume your normal diet.    No smoking       For 24 hours - due to the sedation you received:    Relax and take it easy.    Do NOT make any important or legal decisions.    Do NOT drive or operate machines at home or at work.    Do NOT drink alcohol.    Care of Groin Puncture Site:      For the first 24 hrs - check the puncture site every 1-2 hours while awake.    For 2 days, when you cough, sneeze, laugh or move your bowels, hold your hand over the puncture site and press firmly.    Remove the bandaid after 24 hours. If there is minor oozing, apply another bandaid and remove it after 12 hours.    It is normal to have a small bruise or pea size lump at the site.    You may shower tomorrow. Do NOT take a bath, or use a hot tub or pool for at least 3 days. Do NOT scrub the site. Do not use lotion or powder near the puncture site.     Activity:            For 2 days:    No stooping or squatting    Do NOT do any heavy activity such as exercise, lifting, or straining.     No housework, yard work or any activity that make you sweat    Do NOT lift more than 10 pounds    Bleeding:      If you start bleeding from the site in your groin, lie down flat and press firmly on/above the site for 10 minutes.     Once bleeding stops, lay flat for 2 hours.     [facility] as soon as you can.       Call 911 right away if you have heavy bleeding or bleeding that does not stop.      Medicines:       You are taking an antiplatelet medication called clopidogrel (Plavix), do not stop taking it until you talk to your provider.       Take your medications, including blood thinners, unless your provider tells you not to.  If you take Coumadin (Warfarin), have your INR checked by your provider in  3-5 days. Call your clinic to schedule this.    If you have stopped any  medicines, check with your provider about when to restart them.    Follow Up Appointments:      Follow up with [facility] as directed.    Call the clinic if:      You have increased pain or a large or growing hard lump around the site.    The site is red, swollen, hot or tender.    Blood or fluid is draining from the site.    You have chills or a fever greater than 101 F (38 C).    Your leg feels numb, cool or changes color.    You have hives, a rash or unusual itching.    New pain in the back or belly that you cannot control with Tylenol.    Any questions or concerns.    Other Instructions:      You received a stent - carry your stent card with you at all times.      If you have questions or your original symptoms do not improve, call:         [facility] @ [phone]

## 2019-09-09 NOTE — Clinical Note
Sheath prepped and inserted in the artery.  Angiogram of the  left renal artery. Injected with power injection.

## 2019-09-10 ENCOUNTER — TELEPHONE (OUTPATIENT)
Dept: CARDIOLOGY | Facility: CLINIC | Age: 74
End: 2019-09-10

## 2019-09-10 NOTE — PROGRESS NOTES
1645 Good relief of nausea from zofran. Up in room. (R) groin site stable. VSS. Preparing for discharge.

## 2019-09-10 NOTE — TELEPHONE ENCOUNTER
Spoke with patient post discharge from care suites after  angiography.     Intervention: Angioplasty and stenting to the left renal artery    Access Site: Right femoral artery    Instructions:   Patient may remove the Band-Aid after 24 hours, but if there is minor oozing apply another and may remove second Band-Aid after 12 hours. Also, no heavy exercise for 2 days, do not take a bath, or use a hot tub or pool for at least 3 days but may shower.     Reviewed with patient care of groin site to not scrub the site, for the first 2 days do not squat or stoop, and hold your hand over the puncture site when you cough, sneeze or have a bowel movement. It is normal to have a lump or bruise. No lifting >10 lbs for 3-5 days. Lie down and place firm pressure on groin site if it start bleeding.    Instructed patient to call 911 right away if the bleeding is heavy or does not stop. Call the clinic if there is a large or growing lump around the site, the site is red, swollen, hot, or tender, have fever >101 degrees F or chills, your arm or leg feels numb or cool, or hives, a rash, or unusual itching, shortness of breath, or chest discomfort.    He has not taking his BP today, but denies lightheadedness or dizziness. Provided Team 3 phone number if he becomes symptomatic      [facility] Follow Up: Christiane Coffey 9/19 in [place]    After hours contact number [phone] option 2.     Constanza Overton, MIAH, CNP  9/10/2019

## 2019-09-10 NOTE — PROGRESS NOTES
Care Suites Discharge Nursing Note    Education/questions answered: reinforced discharge instructions.  Patient DC location: care suites to home  Accompanied by: spouse  CS discharge time: 1700

## 2019-09-11 ENCOUNTER — HOSPITAL ENCOUNTER (OUTPATIENT)
Dept: ULTRASOUND IMAGING | Facility: CLINIC | Age: 74
Discharge: HOME | End: 2019-09-11
Attending: INTERNAL MEDICINE | Admitting: INTERNAL MEDICINE
Payer: MEDICARE

## 2019-09-11 DIAGNOSIS — I65.23 BILATERAL CAROTID ARTERY STENOSIS: ICD-10-CM

## 2019-09-11 PROCEDURE — 93880 EXTRACRANIAL BILAT STUDY: CPT

## 2019-09-12 LAB — INTERPRETATION ECG - MUSE: NORMAL

## 2019-09-16 DIAGNOSIS — R06.09 DOE (DYSPNEA ON EXERTION): ICD-10-CM

## 2019-09-16 RX ORDER — ISOSORBIDE MONONITRATE 60 MG/1
60 TABLET, EXTENDED RELEASE ORAL DAILY
Qty: 90 TABLET | Refills: 1 | Status: SHIPPED | OUTPATIENT
Start: 2019-09-16 | End: 2019-10-24

## 2019-09-17 DIAGNOSIS — I73.9 PERIPHERAL VASCULAR DISEASE (H): ICD-10-CM

## 2019-09-18 RX ORDER — CILOSTAZOL 100 MG/1
TABLET ORAL
Qty: 180 TABLET | Refills: 4 | Status: SHIPPED | OUTPATIENT
Start: 2019-09-18 | End: 2019-10-08

## 2019-09-18 NOTE — TELEPHONE ENCOUNTER
"Routing refill request to provider for review/approval because:  Labs out of range:  Cr 1.47          cilostazal  Last Written Prescription Date:  6/25/2019  Last Fill Quantity: 180,  # refills: 0   Last office visit: 4/16/2019 with prescribing provider:      Future Office Visit:   Next 5 appointments (look out 90 days)    Sep 19, 2019  9:20 AM CDT  Return Visit with Christiane Coffey PA-C  [facility] ([address] [address] [phone]           Requested Prescriptions   Pending Prescriptions Disp Refills     cilostazol (PLETAL) 100 MG tablet [Pharmacy Med Name: CILOSTAZOL TABS 100MG] 180 tablet 4     Sig: TAKE 1 TABLET TWICE A DAY       Platelet Inhibitors Failed - 9/17/2019 12:24 PM        Failed - Normal HGB on file in past 12 months     Recent Labs   Lab Test 09/09/19  0730   HGB 12.0*           Failed - Normal serum creatinine on file in past 12 months     Recent Labs   Lab Test 09/09/19  0730   CR 1.47*           Passed - Normal Platelets on file in past 12 months     Recent Labs   Lab Test 09/09/19  0730                  Passed - Recent (12 mo) or future (30 days) visit within the authorizing provider's specialty     Patient had office visit in the last 12 months or has a visit in the next 30 days with authorizing provider or within the authorizing provider's specialty.  See \"Patient Info\" tab in inbasket, or \"Choose Columns\" in Meds & Orders section of the refill encounter.              Passed - Medication is active on med list        Passed - Patient is age 18 or older              Treva Null RN on 9/18/2019 at 3:37 PM    "

## 2019-09-19 ENCOUNTER — TELEPHONE (OUTPATIENT)
Dept: CARDIOLOGY | Facility: CLINIC | Age: 74
End: 2019-09-19

## 2019-09-19 ENCOUNTER — OFFICE VISIT (OUTPATIENT)
Dept: CARDIOLOGY | Facility: CLINIC | Age: 74
End: 2019-09-19
Payer: MEDICARE

## 2019-09-19 VITALS
BODY MASS INDEX: 24.41 KG/M2 | RESPIRATION RATE: 12 BRPM | HEIGHT: 68 IN | OXYGEN SATURATION: 96 % | DIASTOLIC BLOOD PRESSURE: 58 MMHG | WEIGHT: 161.1 LBS | HEART RATE: 64 BPM | SYSTOLIC BLOOD PRESSURE: 124 MMHG

## 2019-09-19 DIAGNOSIS — I73.9 PAD (PERIPHERAL ARTERY DISEASE) (H): Primary | ICD-10-CM

## 2019-09-19 DIAGNOSIS — I12.9 RENAL HYPERTENSION: ICD-10-CM

## 2019-09-19 DIAGNOSIS — I65.29 STENOSIS OF CAROTID ARTERY, UNSPECIFIED LATERALITY: Primary | ICD-10-CM

## 2019-09-19 LAB
ANION GAP SERPL CALCULATED.3IONS-SCNC: 5 MMOL/L (ref 3–14)
BUN SERPL-MCNC: 12 MG/DL (ref 7–30)
CALCIUM SERPL-MCNC: 9.3 MG/DL (ref 8.5–10.1)
CHLORIDE SERPL-SCNC: 107 MMOL/L (ref 94–109)
CO2 SERPL-SCNC: 29 MMOL/L (ref 20–32)
CREAT SERPL-MCNC: 1.29 MG/DL (ref 0.66–1.25)
GFR SERPL CREATININE-BSD FRML MDRD: 55 ML/MIN/{1.73_M2}
GLUCOSE SERPL-MCNC: 134 MG/DL (ref 70–99)
POTASSIUM SERPL-SCNC: 3.9 MMOL/L (ref 3.4–5.3)
SODIUM SERPL-SCNC: 141 MMOL/L (ref 133–144)

## 2019-09-19 PROCEDURE — 36415 COLL VENOUS BLD VENIPUNCTURE: CPT | Performed by: PHYSICIAN ASSISTANT

## 2019-09-19 PROCEDURE — 80048 BASIC METABOLIC PNL TOTAL CA: CPT | Performed by: PHYSICIAN ASSISTANT

## 2019-09-19 PROCEDURE — 99214 OFFICE O/P EST MOD 30 MIN: CPT | Performed by: PHYSICIAN ASSISTANT

## 2019-09-19 ASSESSMENT — MIFFLIN-ST. JEOR: SCORE: 1450.24

## 2019-09-19 ASSESSMENT — PAIN SCALES - GENERAL: PAINLEVEL: MILD PAIN (2)

## 2019-09-19 NOTE — LETTER
9/19/2019      Jonas West MD  [address] [facility] [address]      RE: Michele Vance       Dear Colleague,    I had the pleasure of seeing Michele Vance in the [facility].    Service Date: 09/19/2019      PRIMARY CARDIOLOGIST:  Dr. Felipe for general Cardiology, Dr. Oropeza for vascular.      REASON FOR VISIT:  Hypertension, renal artery stenosis followup.      HISTORY OF PRESENT ILLNESS:  Mr. 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  Coronary artery disease with his anginal equivalent being dyspnea on exertion.  He had a 6-vessel CABG in 2014 that was a LIMA to the LAD, saphenous vein graft to the OM1 and OM2, saphenous vein graft to the PDA and saphenous vein graft to the D2.  He had bilateral venous harvesting of the legs.   2.  Peripheral arterial disease with history of both angioplasty and stenting of the legs.   3.  Severe carotid and vertebral disease with bilateral vertebral arteries nearly occluded and stenting of his right carotid.   4.  Hypertension.   5.  History of CVA post-CABG with residual memory issues.   6.  Dyslipidemia.  Intolerant of statins with documented myositis, on Praluent.   7.  Recent diagnosis renal artery stenosis, now status post renal artery stent for a 95% proximal lesion.      I had been following him and working on getting his blood pressure controlled, as it was markedly elevated.  As part of this workup he underwent a renal artery ultrasound and he was sent to Dr. Oropeza for evaluation.  He underwent his renal artery stent on 09/09 with excellent results.  With this, his home blood pressures have improved dramatically.  Prior to stenting they were running in the 130s-160, and after stenting even off of clonidine his blood pressures have been in the 110s-130.  With the discontinuation of clonidine, he has felt much better without as much dizziness, nausea or  fatigue.  He has had no bleeding issues, and otherwise he feels back to himself or nearly back to himself.  He has very minimal shortness of breath.  He denies chest pain.  He denies coldness or pain in his right leg.  He has not had any numbness or tingling.      SOCIAL HISTORY:  He comes in today with his wife, Ke.  They previously lived in [place] and are happy to be back living in [place].  He has a 50-pack-year history of smoking, quit in 2000.  No alcohol use.      PHYSICAL EXAMINATION:   GENERAL:  Well-developed, well-nourished gentleman in no acute distress.   HEENT:  Normocephalic, atraumatic.   HEART:  Regular with a 2/6 systolic murmur in the lower left sternal border.   LUNGS:  Clear without wheezes, rales or rhonchi.   EXTREMITIES:  Trace peripheral edema bilaterally.  Right femoral access site is clean, dry and intact without ecchymosis.  He has a 2+ right femoral pulse but a loud bruit over this artery.      Labs are pending.      ASSESSMENT AND PLAN:   1.  Renal artery stenosis with a 95% proximal left renal artery, now with a stent with excellent results.  He will be on aspirin and Plavix for 1 month, then aspirin indefinitely as well as his Pletal.  We have already been able to discontinue his clonidine, which has resolved some adverse effects and his blood pressure has dropped nicely into the 110s and 120s.  This may continue to come down.  I have asked them to call if he is consistently below the 120s, and we will slowly cut back meds further.   2.  Right femoral bruit, unfortunately I did not listen to his femorals prior to his procedure and I do not see documentation that Dr. Oropeza did either.  I have ordered a femoral artery ultrasound with possible compression and injection if need be for complication.  At this point, he has a warm foot, good distal pulses.  It does not need to happen today but will have this completed over the next week.   3.  Carotid artery disease with a greater than  70% left carotid stenosis and a 50%-70% right carotid stenosis in the context of him having minimal flow through his vertebral arteries historically.  I have asked for Dr. Oropeza's recommendations on this for the next steps, whether this will be imaging or repeat angiogram.   4.  CKD.  We will repeat BMP today to assess for change in renal function with this intervention.   5.  Dyslipidemia, on Praluent and doing well on this.  Intolerant to statins.   6.  Hypokalemia of unclear etiology.  We will repeat BMP today, although it has been improving.      Thank you for allowing me to participate in this delightful patient's care.  We will continue to follow up on his vascular issues.  I will touch base with him in about a month just to make sure we have his meds fine-tuned, sooner with concerns.      ЕКАТЕРИНА Callaway PA-C             D: 2019   T: 2019   MT: JOHNNIE      Name:     NINFA CID   MRN:      [id number]        Account:      [id number]   :      1945           Service Date: 2019      Document: [id number]         Outpatient Encounter Medications as of 2019   Medication Sig Dispense Refill     alirocumab (PRALUENT) 150 MG/ML injectable pen Inject 1 mL (150 mg) Subcutaneous every 14 days 6 mL 2     amitriptyline (ELAVIL) 25 MG tablet Take 1 tablet (25 mg) by mouth 2 times daily 180 tablet 3     amLODIPine (NORVASC) 10 MG tablet TAKE 1 TABLET EVERY EVENING 90 tablet 3     ASPIRIN PO Take 325 mg by mouth daily       carvedilol (COREG) 25 MG tablet Take 1 tablet (25 mg) by mouth 2 times daily (with meals) 180 tablet 3     cilostazol (PLETAL) 100 MG tablet TAKE 1 TABLET TWICE A  tablet 4     clopidogrel (PLAVIX) 75 MG tablet Take 1 tablet (75 mg) by mouth daily Starting tomorrow. 30 tablet 0     gabapentin (NEURONTIN) 300 MG capsule Take 1 capsule (300 mg) by mouth 3 times daily (Patient taking differently: Take 300 mg by mouth 2 times daily ) 210  capsule 4     isosorbide mononitrate (IMDUR) 60 MG 24 hr tablet Take 1 tablet (60 mg) by mouth daily 90 tablet 1     losartan (COZAAR) 100 MG tablet Take 1 tablet (100 mg) by mouth daily 90 tablet 3     metoclopramide (REGLAN) 10 MG tablet TAKE 1 TABLET FOUR TIMES A DAY (Patient taking differently: 4 times daily as needed Four times daily) 120 tablet 1     omeprazole (PRILOSEC) 40 MG DR capsule Take 1 capsule (40 mg) by mouth daily 90 capsule 3     polyethylene glycol (MIRALAX/GLYCOLAX) packet Take 1 packet by mouth daily        potassium chloride ER (K-DUR/KLOR-CON M) 10 MEQ CR tablet Take 1 tablet (10 mEq) by mouth 2 times daily 180 tablet 3     psyllium (METAMUCIL) 58.6 % POWD Take by mouth daily       STATIN NOT PRESCRIBED, INTENTIONAL, Pt has known rhabdomyolysis in the past, somewhat associated with statins.  Also wasn't able to tolerate Zetia. 1 each 0     [DISCONTINUED] cloNIDine (CATAPRES) 0.1 MG tablet Take 0.05 mg by mouth 2 times daily Pt. Takes half of a 0.10mg tab       No facility-administered encounter medications on file as of 9/19/2019.        Again, thank you for allowing me to participate in the care of your patient.      Sincerely,    Christiane Coffey PA-C     [facility]

## 2019-09-19 NOTE — PROGRESS NOTES
[id number]  HPI and Plan:   See dictation    Orders this Visit:  Orders Placed This Encounter   Procedures     US Lower Extremity Arterial rt     Basic metabolic panel     No orders of the defined types were placed in this encounter.    Medications Discontinued During This Encounter   Medication Reason     cloNIDine (CATAPRES) 0.1 MG tablet          Encounter Diagnoses   Name Primary?     PAD (peripheral artery disease) (H) Yes     Renal hypertension        CURRENT MEDICATIONS:  Current Outpatient Medications   Medication Sig Dispense Refill     alirocumab (PRALUENT) 150 MG/ML injectable pen Inject 1 mL (150 mg) Subcutaneous every 14 days 6 mL 2     amitriptyline (ELAVIL) 25 MG tablet Take 1 tablet (25 mg) by mouth 2 times daily 180 tablet 3     amLODIPine (NORVASC) 10 MG tablet TAKE 1 TABLET EVERY EVENING 90 tablet 3     ASPIRIN PO Take 325 mg by mouth daily       carvedilol (COREG) 25 MG tablet Take 1 tablet (25 mg) by mouth 2 times daily (with meals) 180 tablet 3     cilostazol (PLETAL) 100 MG tablet TAKE 1 TABLET TWICE A  tablet 4     clopidogrel (PLAVIX) 75 MG tablet Take 1 tablet (75 mg) by mouth daily Starting tomorrow. 30 tablet 0     gabapentin (NEURONTIN) 300 MG capsule Take 1 capsule (300 mg) by mouth 3 times daily (Patient taking differently: Take 300 mg by mouth 2 times daily ) 210 capsule 4     isosorbide mononitrate (IMDUR) 60 MG 24 hr tablet Take 1 tablet (60 mg) by mouth daily 90 tablet 1     losartan (COZAAR) 100 MG tablet Take 1 tablet (100 mg) by mouth daily 90 tablet 3     metoclopramide (REGLAN) 10 MG tablet TAKE 1 TABLET FOUR TIMES A DAY (Patient taking differently: 4 times daily as needed Four times daily) 120 tablet 1     omeprazole (PRILOSEC) 40 MG DR capsule Take 1 capsule (40 mg) by mouth daily 90 capsule 3     polyethylene glycol (MIRALAX/GLYCOLAX) packet Take 1 packet by mouth daily        potassium chloride ER (K-DUR/KLOR-CON M) 10 MEQ CR tablet Take 1 tablet (10 mEq) by mouth 2  "times daily 180 tablet 3     psyllium (METAMUCIL) 58.6 % POWD Take by mouth daily       STATIN NOT PRESCRIBED, INTENTIONAL, Pt has known rhabdomyolysis in the past, somewhat associated with statins.  Also wasn't able to tolerate Zetia. 1 each 0       ALLERGIES     Allergies   Allergen Reactions     Hmg-Coa-R Inhibitors Other (See Comments)     Rhabdo  Same as \"statins\"     Gemfibrozil      Resting thigh-calf pain     Sulfa Drugs      Reacted as a child     Zetia [Ezetimibe]      Muscle cramping       PAST MEDICAL HISTORY:  Past Medical History:   Diagnosis Date     Carotid stenosis     right endartectomy     Chronic kidney disease     stage 3     Coronary artery disease 3-2014    CABG x6     CVA (cerebral infarction)     balance problems, mild     Elevated CK      Esophageal reflux      Hypercholesteremia      Nonsenile cataract      Other and unspecified hyperlipidemia      PAD (peripheral artery disease) (H)      Rhabdomyolysis 2010     Unspecified essential hypertension        PAST SURGICAL HISTORY:  Past Surgical History:   Procedure Laterality Date     APPENDECTOMY  1974     BYPASS GRAFT ARTERY CORONARY  3/5/2014    Procedure: BYPASS GRAFT ARTERY CORONARY;  Median Sternotomy, Coronary Artery Bypass Graft X6 used Left internal mammary artery, Left and Right Greater Saphenous vein, on Pump oxygenator.;  Surgeon: Tim Montanez MD;  Location: [facility]     CATARACT IOL, RT/LT Bilateral 2008    in [place]     cataracts       COLONOSCOPY  12/12/02    [facility]     COLONOSCOPY N/A 10/7/2014    Procedure: COMBINED COLONOSCOPY, SINGLE OR MULTIPLE BIOPSY/POLYPECTOMY BY BIOPSY;  Surgeon: Micheal Mora MD;  Location:  GI     CV ANGIOGRAM LOWER EXTREMITY Bilateral 9/9/2019    Procedure: Lower Extremity Angiogram Bilateral;  Surgeon: Julio Oropeza MD;  Location:  HEART CARDIAC CATH LAB     DENTAL SURGERY      TMJ, implants     ENDARTERECTOMY CAROTID      right carotid stent     ESOPHAGOSCOPY, " GASTROSCOPY, DUODENOSCOPY (EGD), COMBINED Left 10/7/2014    Procedure: COMBINED ESOPHAGOSCOPY, GASTROSCOPY, DUODENOSCOPY (EGD), BIOPSY SINGLE OR MULTIPLE;  Surgeon: Micheal Mora MD;  Location:  GI     ESOPHAGOSCOPY, GASTROSCOPY, DUODENOSCOPY (EGD), COMBINED Left 10/7/2014    Procedure: COMBINED ESOPHAGOSCOPY, GASTROSCOPY, DUODENOSCOPY (EGD), REMOVE FOREIGN BODY;  Surgeon: Micheal Mora MD;  Location:  GI      CAPSULE ENDOSCOPY N/A 10/7/2014    Procedure: CAPSULE/PILL CAM ENDOSCOPY;  Surgeon: Micheal Mora MD;  Location:  GI      UGI ENDOSCOPY, SIMPLE EXAM  01/08/99    [facility]     INJECT EPIDURAL LUMBAR Right 5/8/2017    Procedure: INJECT EPIDURAL LUMBAR;  Lumbar transforaminal Epidural Steroid Injection right lumbar 4-5, and right  lumbar 5-Sacral 1;  Surgeon: Anthony Gonzalez MD;  Location: [facility]     INJECT JOINT SACROILIAC Bilateral 8/28/2017    Procedure: INJECT JOINT SACROILIAC;  sacroiliac joint injection bilateral;  Surgeon: Anthony Gonzalez MD;  Location: [facility]     KNEE SURGERY  1976    left knee reconstruction     lasix  2001    both eyes     RELEASE CARPAL TUNNEL  1/13/2011    RELEASE CARPAL TUNNEL performed by JO MADRID at  OR     RELEASE CARPAL TUNNEL  1/20/2011    RELEASE CARPAL TUNNEL performed by JO MADRID at  OR       FAMILY HISTORY:  Family History   Problem Relation Age of Onset     Hypertension Mother      Eye Disorder Mother      Cerebrovascular Disease Father      Heart Disease Father      Lipids Father      Obesity Father      Cancer Sister      Heart Disease Sister      Glaucoma No family hx of      Macular Degeneration No family hx of      Kidney Disease No family hx of        SOCIAL HISTORY:  Social History     Socioeconomic History     Marital status:      Spouse name: Not on file     Number of children: Not on file     Years of education: Not on file     Highest education level: Not on file   Occupational History      Occupation:      Employer: RETIRED   Social Needs     Financial resource strain: Not on file     Food insecurity:     Worry: Not on file     Inability: Not on file     Transportation needs:     Medical: Not on file     Non-medical: Not on file   Tobacco Use     Smoking status: Former Smoker     Packs/day: 2.50     Years: 20.00     Pack years: 50.00     Types: Cigarettes     Last attempt to quit: 2000     Years since quittin.7     Smokeless tobacco: Never Used   Substance and Sexual Activity     Alcohol use: No     Alcohol/week: 0.0 oz     Drug use: No     Sexual activity: Yes     Partners: Female   Lifestyle     Physical activity:     Days per week: Not on file     Minutes per session: Not on file     Stress: Not on file   Relationships     Social connections:     Talks on phone: Not on file     Gets together: Not on file     Attends Sikh service: Not on file     Active member of club or organization: Not on file     Attends meetings of clubs or organizations: Not on file     Relationship status: Not on file     Intimate partner violence:     Fear of current or ex partner: Not on file     Emotionally abused: Not on file     Physically abused: Not on file     Forced sexual activity: Not on file   Other Topics Concern      Service Not Asked     Blood Transfusions Not Asked     Caffeine Concern Not Asked     Occupational Exposure Not Asked     Hobby Hazards Not Asked     Sleep Concern Not Asked     Stress Concern Not Asked     Weight Concern Not Asked     Special Diet Not Asked     Back Care Not Asked     Exercise Yes     Comment: 3 times per week     Bike Helmet Not Asked     Seat Belt Not Asked     Self-Exams Not Asked     Parent/sibling w/ CABG, MI or angioplasty before 65F 55M? Not Asked   Social History Narrative    Moved from [place] in August, retired  for "[employer]       Review of Systems:  Skin:  Negative     Eyes:  Positive for glasses  ENT:  Negative   "  Respiratory:  Negative dyspnea on exertion;shortness of breath  Cardiovascular:  Negative for;palpitations;chest pain;edema;lightheadedness;dizziness Positive for;fatigue;edema  Gastroenterology: Negative    Genitourinary:  Negative    Musculoskeletal:  Negative neck pain  Neurologic:    tremors  Psychiatric:  Negative sleep disturbances  Heme/Lymph/Imm:  Positive for allergies  Endocrine:  Negative      Physical Exam:  Vitals: /58   Pulse 64   Resp 12   Ht 1.727 m (5' 8\")   Wt 73.1 kg (161 lb 1.6 oz)   SpO2 96%   BMI 24.50 kg/m     Please refer to dictation for physical exam    Recent Lab Results:  LIPID RESULTS:  Lab Results   Component Value Date    CHOL 150 03/01/2019    HDL 49 03/01/2019    LDL 82 03/01/2019    TRIG 94 03/01/2019    CHOLHDLRATIO 5.2 (H) 07/23/2015       LIVER ENZYME RESULTS:  Lab Results   Component Value Date    AST Canceled, Test credited 04/03/2019    ALT Canceled, Test credited 04/03/2019       CBC RESULTS:  Lab Results   Component Value Date    WBC 5.8 09/09/2019    RBC 3.69 (L) 09/09/2019    HGB 12.0 (L) 09/09/2019    HCT 34.9 (L) 09/09/2019    MCV 95 09/09/2019    MCH 32.5 09/09/2019    MCHC 34.4 09/09/2019    RDW 13.8 09/09/2019     09/09/2019       BMP RESULTS:  Lab Results   Component Value Date     09/09/2019    POTASSIUM 3.7 09/09/2019    CHLORIDE 109 09/09/2019    CO2 29 09/09/2019    ANIONGAP 5 09/09/2019     (H) 09/09/2019    BUN 12 09/09/2019    CR 1.47 (H) 09/09/2019    GFRESTIMATED 47 (L) 09/09/2019    GFRESTBLACK 54 (L) 09/09/2019    RAHEEM 9.2 09/09/2019        A1C RESULTS:  Lab Results   Component Value Date    A1C 5.6 03/07/2014       INR RESULTS:  Lab Results   Component Value Date    INR 1.07 09/09/2019    INR 1.00 12/05/2014           CC  No referring provider defined for this encounter.      "

## 2019-09-19 NOTE — PATIENT INSTRUCTIONS
Thank you for coming into [facility] clinic today.    We discussed: Keep an eye on your blood pressure, if it continues to run <120 call and we'll decrease medicines further.      Medication changes:  Ok to stay off clonidine.   Plavix will be one month only, then continue on aspirin and pletal.     Today we'll do an ultrasound of your femoral artery, and labs to check kidney function.      Follow up: with me in about 1 month or so.   I'll talk to Dr. Oropeza about your carotid ultrasound and see what he'd like to do for next steps.        Please call my nurse at [phone] with any questions or concerns.    Scheduling phone number: [phone]  Reminder: Please bring in all current medications, over the counter supplements and vitamin bottles to your next appointment.

## 2019-09-19 NOTE — PROGRESS NOTES
Service Date: 09/19/2019      PRIMARY CARDIOLOGIST:  Dr. Felipe for general Cardiology, Dr. Oropeza for vascular.      REASON FOR VISIT:  Hypertension, renal artery stenosis followup.      HISTORY OF PRESENT ILLNESS:  Mr. 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  Coronary artery disease with his anginal equivalent being dyspnea on exertion.  He had a 6-vessel CABG in 2014 that was a LIMA to the LAD, saphenous vein graft to the OM1 and OM2, saphenous vein graft to the PDA and saphenous vein graft to the D2.  He had bilateral venous harvesting of the legs.   2.  Peripheral arterial disease with history of both angioplasty and stenting of the legs.   3.  Severe carotid and vertebral disease with bilateral vertebral arteries nearly occluded and stenting of his right carotid.   4.  Hypertension.   5.  History of CVA post-CABG with residual memory issues.   6.  Dyslipidemia.  Intolerant of statins with documented myositis, on Praluent.   7.  Recent diagnosis renal artery stenosis, now status post renal artery stent for a 95% proximal lesion.      I had been following him and working on getting his blood pressure controlled, as it was markedly elevated.  As part of this workup he underwent a renal artery ultrasound and he was sent to Dr. Oropeza for evaluation.  He underwent his renal artery stent on 09/09 with excellent results.  With this, his home blood pressures have improved dramatically.  Prior to stenting they were running in the 130s-160, and after stenting even off of clonidine his blood pressures have been in the 110s-130.  With the discontinuation of clonidine, he has felt much better without as much dizziness, nausea or fatigue.  He has had no bleeding issues, and otherwise he feels back to himself or nearly back to himself.  He has very minimal shortness of breath.  He denies chest pain.  He denies coldness or pain in his right leg.  He  has not had any numbness or tingling.      SOCIAL HISTORY:  He comes in today with his wife, Ke.  They previously lived in [place] and are happy to be back living in [place].  He has a 50-pack-year history of smoking, quit in 2000.  No alcohol use.      PHYSICAL EXAMINATION:   GENERAL:  Well-developed, well-nourished gentleman in no acute distress.   HEENT:  Normocephalic, atraumatic.   HEART:  Regular with a 2/6 systolic murmur in the lower left sternal border.   LUNGS:  Clear without wheezes, rales or rhonchi.   EXTREMITIES:  Trace peripheral edema bilaterally.  Right femoral access site is clean, dry and intact without ecchymosis.  He has a 2+ right femoral pulse but a loud bruit over this artery.      Labs are pending.      ASSESSMENT AND PLAN:   1.  Renal artery stenosis with a 95% proximal left renal artery, now with a stent with excellent results.  He will be on aspirin and Plavix for 1 month, then aspirin indefinitely as well as his Pletal.  We have already been able to discontinue his clonidine, which has resolved some adverse effects and his blood pressure has dropped nicely into the 110s and 120s.  This may continue to come down.  I have asked them to call if he is consistently below the 120s, and we will slowly cut back meds further.   2.  Right femoral bruit, unfortunately I did not listen to his femorals prior to his procedure and I do not see documentation that Dr. Oropeza did either.  I have ordered a femoral artery ultrasound with possible compression and injection if need be for complication.  At this point, he has a warm foot, good distal pulses.  It does not need to happen today but will have this completed over the next week.   3.  Carotid artery disease with a greater than 70% left carotid stenosis and a 50%-70% right carotid stenosis in the context of him having minimal flow through his vertebral arteries historically.  I have asked for Dr. Oropeza's recommendations on this for the next steps,  whether this will be imaging or repeat angiogram.   4.  CKD.  We will repeat BMP today to assess for change in renal function with this intervention.   5.  Dyslipidemia, on Praluent and doing well on this.  Intolerant to statins.   6.  Hypokalemia of unclear etiology.  We will repeat BMP today, although it has been improving.      Thank you for allowing me to participate in this delightful patient's care.  We will continue to follow up on his vascular issues.  I will touch base with him in about a month just to make sure we have his meds fine-tuned, sooner with concerns.      ЕКАТЕРИНА Callaway PA-C             D: 2019   T: 2019   MT: JOHNNIE      Name:     NINFA CID   MRN:      [id number]        Account:      [id number]   :      1945           Service Date: 2019      Document: [id number]

## 2019-09-19 NOTE — NURSING NOTE
AVS reviewed with patient and wife. Called [facility], they are unable do femoral US today and they only do them Thursdays. Told pt I would discuss with SARATH Conrteras and then call him with plan. He and wife stated understanding. VIPIN Melendez 10:11 AM 09/19/19

## 2019-09-19 NOTE — TELEPHONE ENCOUNTER
Pt noted to have >70% L carotid stenosis with antegrade flow.  Pt is a vasculapath with recent renal artery stenting and hx of R CEA.  Last CT heat and neck was 5/14:     1. Noncontrast head CT demonstrates scattered encephalomalacia areas likely due to from prior ischemic insults.  2. About 80% diameter stenosis in bilateral internal carotid artery origins due to mixed soft and calcified plaques.  3. Hypoplastic left vertebral artery. Diminutive distal right vertebral artery.   4. Basilar artery appears extremely diminutive. This raises question of severe stenosis in the proximal basilar artery.     Per pt and wife he underwent R CEA as his vetebrals were nearly occluded.      Dr. Oropeza- recommendations on next steps?  CTA, MRA, angio?  No new cva sx.    Christiane Coffey PA-C 9/19/2019 10:11 AM

## 2019-09-19 NOTE — NURSING NOTE
Reviewed with Christiane Coffey, PA- pt was asymptomatic, had bruit at groin site. OK to wait until next Thursday, but if pt develops cold leg, pain or expanding bruise prior to that, he should go to ER for assessment.     Called scheduling, scheduled pt for  9/26/19 at 9:15am in [place]. Called pt, reviewed scheduled appt for 9/26/19 and signs and symptoms above to watch for. Reviewed if develops any of those symptoms prior to 9/26 he should go tot ER for assessment. Pt stated understanding. VIPIN Melendez 10:49 AM 09/19/19

## 2019-09-19 NOTE — LETTER
9/19/2019    Jonas West MD  [address] [facility] [address]    RE: Michele Vance       Dear Colleague,    I had the pleasure of seeing Michele Vance in the [facility].    [id number]  HPI and Plan:   See dictation    Orders this Visit:  Orders Placed This Encounter   Procedures     US Lower Extremity Arterial rt     Basic metabolic panel     No orders of the defined types were placed in this encounter.    Medications Discontinued During This Encounter   Medication Reason     cloNIDine (CATAPRES) 0.1 MG tablet          Encounter Diagnoses   Name Primary?     PAD (peripheral artery disease) (H) Yes     Renal hypertension        CURRENT MEDICATIONS:  Current Outpatient Medications   Medication Sig Dispense Refill     alirocumab (PRALUENT) 150 MG/ML injectable pen Inject 1 mL (150 mg) Subcutaneous every 14 days 6 mL 2     amitriptyline (ELAVIL) 25 MG tablet Take 1 tablet (25 mg) by mouth 2 times daily 180 tablet 3     amLODIPine (NORVASC) 10 MG tablet TAKE 1 TABLET EVERY EVENING 90 tablet 3     ASPIRIN PO Take 325 mg by mouth daily       carvedilol (COREG) 25 MG tablet Take 1 tablet (25 mg) by mouth 2 times daily (with meals) 180 tablet 3     cilostazol (PLETAL) 100 MG tablet TAKE 1 TABLET TWICE A  tablet 4     clopidogrel (PLAVIX) 75 MG tablet Take 1 tablet (75 mg) by mouth daily Starting tomorrow. 30 tablet 0     gabapentin (NEURONTIN) 300 MG capsule Take 1 capsule (300 mg) by mouth 3 times daily (Patient taking differently: Take 300 mg by mouth 2 times daily ) 210 capsule 4     isosorbide mononitrate (IMDUR) 60 MG 24 hr tablet Take 1 tablet (60 mg) by mouth daily 90 tablet 1     losartan (COZAAR) 100 MG tablet Take 1 tablet (100 mg) by mouth daily 90 tablet 3     metoclopramide (REGLAN) 10 MG tablet TAKE 1 TABLET FOUR TIMES A DAY (Patient taking differently: 4 times daily as needed Four times daily) 120 tablet 1     omeprazole (PRILOSEC) 40 MG DR capsule Take 1 capsule  "(40 mg) by mouth daily 90 capsule 3     polyethylene glycol (MIRALAX/GLYCOLAX) packet Take 1 packet by mouth daily        potassium chloride ER (K-DUR/KLOR-CON M) 10 MEQ CR tablet Take 1 tablet (10 mEq) by mouth 2 times daily 180 tablet 3     psyllium (METAMUCIL) 58.6 % POWD Take by mouth daily       STATIN NOT PRESCRIBED, INTENTIONAL, Pt has known rhabdomyolysis in the past, somewhat associated with statins.  Also wasn't able to tolerate Zetia. 1 each 0       ALLERGIES     Allergies   Allergen Reactions     Hmg-Coa-R Inhibitors Other (See Comments)     Rhabdo  Same as \"statins\"     Gemfibrozil      Resting thigh-calf pain     Sulfa Drugs      Reacted as a child     Zetia [Ezetimibe]      Muscle cramping       PAST MEDICAL HISTORY:  Past Medical History:   Diagnosis Date     Carotid stenosis     right endartectomy     Chronic kidney disease     stage 3     Coronary artery disease 3-2014    CABG x6     CVA (cerebral infarction)     balance problems, mild     Elevated CK      Esophageal reflux      Hypercholesteremia      Nonsenile cataract      Other and unspecified hyperlipidemia      PAD (peripheral artery disease) (H)      Rhabdomyolysis 2010     Unspecified essential hypertension        PAST SURGICAL HISTORY:  Past Surgical History:   Procedure Laterality Date     APPENDECTOMY  1974     BYPASS GRAFT ARTERY CORONARY  3/5/2014    Procedure: BYPASS GRAFT ARTERY CORONARY;  Median Sternotomy, Coronary Artery Bypass Graft X6 used Left internal mammary artery, Left and Right Greater Saphenous vein, on Pump oxygenator.;  Surgeon: Tim Montanez MD;  Location:  OR     CATARACT IOL, RT/LT Bilateral 2008    in [place]     cataracts       COLONOSCOPY  12/12/02    [facility]     COLONOSCOPY N/A 10/7/2014    Procedure: COMBINED COLONOSCOPY, SINGLE OR MULTIPLE BIOPSY/POLYPECTOMY BY BIOPSY;  Surgeon: Micheal Mora MD;  Location: [facility]     CV ANGIOGRAM LOWER EXTREMITY Bilateral 9/9/2019    Procedure: Lower " Extremity Angiogram Bilateral;  Surgeon: Julio Oropeza MD;  Location:  HEART CARDIAC CATH LAB     DENTAL SURGERY      TMJ, implants     ENDARTERECTOMY CAROTID      right carotid stent     ESOPHAGOSCOPY, GASTROSCOPY, DUODENOSCOPY (EGD), COMBINED Left 10/7/2014    Procedure: COMBINED ESOPHAGOSCOPY, GASTROSCOPY, DUODENOSCOPY (EGD), BIOPSY SINGLE OR MULTIPLE;  Surgeon: Micheal Mora MD;  Location:  GI     ESOPHAGOSCOPY, GASTROSCOPY, DUODENOSCOPY (EGD), COMBINED Left 10/7/2014    Procedure: COMBINED ESOPHAGOSCOPY, GASTROSCOPY, DUODENOSCOPY (EGD), REMOVE FOREIGN BODY;  Surgeon: Micheal Mora MD;  Location:  GI     HC CAPSULE ENDOSCOPY N/A 10/7/2014    Procedure: CAPSULE/PILL CAM ENDOSCOPY;  Surgeon: Micheal Mora MD;  Location:  GI      UGI ENDOSCOPY, SIMPLE EXAM  01/08/99    [facility]     INJECT EPIDURAL LUMBAR Right 5/8/2017    Procedure: INJECT EPIDURAL LUMBAR;  Lumbar transforaminal Epidural Steroid Injection right lumbar 4-5, and right  lumbar 5-Sacral 1;  Surgeon: Anthony Gonzalez MD;  Location: [facility] OR     INJECT JOINT SACROILIAC Bilateral 8/28/2017    Procedure: INJECT JOINT SACROILIAC;  sacroiliac joint injection bilateral;  Surgeon: Anthony Gonzalez MD;  Location:  OR     KNEE SURGERY  1976    left knee reconstruction     lasix  2001    both eyes     RELEASE CARPAL TUNNEL  1/13/2011    RELEASE CARPAL TUNNEL performed by JO MADRID at  OR     RELEASE CARPAL TUNNEL  1/20/2011    RELEASE CARPAL TUNNEL performed by JO MADRID at  OR       FAMILY HISTORY:  Family History   Problem Relation Age of Onset     Hypertension Mother      Eye Disorder Mother      Cerebrovascular Disease Father      Heart Disease Father      Lipids Father      Obesity Father      Cancer Sister      Heart Disease Sister      Glaucoma No family hx of      Macular Degeneration No family hx of      Kidney Disease No family hx of        SOCIAL HISTORY:  Social History      Socioeconomic History     Marital status:      Spouse name: Not on file     Number of children: Not on file     Years of education: Not on file     Highest education level: Not on file   Occupational History     Occupation:      Employer: RETIRED   Social Needs     Financial resource strain: Not on file     Food insecurity:     Worry: Not on file     Inability: Not on file     Transportation needs:     Medical: Not on file     Non-medical: Not on file   Tobacco Use     Smoking status: Former Smoker     Packs/day: 2.50     Years: 20.00     Pack years: 50.00     Types: Cigarettes     Last attempt to quit: 2000     Years since quittin.7     Smokeless tobacco: Never Used   Substance and Sexual Activity     Alcohol use: No     Alcohol/week: 0.0 oz     Drug use: No     Sexual activity: Yes     Partners: Female   Lifestyle     Physical activity:     Days per week: Not on file     Minutes per session: Not on file     Stress: Not on file   Relationships     Social connections:     Talks on phone: Not on file     Gets together: Not on file     Attends Hinduism service: Not on file     Active member of club or organization: Not on file     Attends meetings of clubs or organizations: Not on file     Relationship status: Not on file     Intimate partner violence:     Fear of current or ex partner: Not on file     Emotionally abused: Not on file     Physically abused: Not on file     Forced sexual activity: Not on file   Other Topics Concern      Service Not Asked     Blood Transfusions Not Asked     Caffeine Concern Not Asked     Occupational Exposure Not Asked     Hobby Hazards Not Asked     Sleep Concern Not Asked     Stress Concern Not Asked     Weight Concern Not Asked     Special Diet Not Asked     Back Care Not Asked     Exercise Yes     Comment: 3 times per week     Bike Helmet Not Asked     Seat Belt Not Asked     Self-Exams Not Asked     Parent/sibling w/ CABG, MI or angioplasty  "before 65F 55M? Not Asked   Social History Narrative    Moved from [place] in August, retired  for [employer].       Review of Systems:  Skin:  Negative     Eyes:  Positive for glasses  ENT:  Negative    Respiratory:  Negative dyspnea on exertion;shortness of breath  Cardiovascular:  Negative for;palpitations;chest pain;edema;lightheadedness;dizziness Positive for;fatigue;edema  Gastroenterology: Negative    Genitourinary:  Negative    Musculoskeletal:  Negative neck pain  Neurologic:    tremors  Psychiatric:  Negative sleep disturbances  Heme/Lymph/Imm:  Positive for allergies  Endocrine:  Negative      Physical Exam:  Vitals: /58   Pulse 64   Resp 12   Ht 1.727 m (5' 8\")   Wt 73.1 kg (161 lb 1.6 oz)   SpO2 96%   BMI 24.50 kg/m      Please refer to dictation for physical exam    Recent Lab Results:  LIPID RESULTS:  Lab Results   Component Value Date    CHOL 150 03/01/2019    HDL 49 03/01/2019    LDL 82 03/01/2019    TRIG 94 03/01/2019    CHOLHDLRATIO 5.2 (H) 07/23/2015       LIVER ENZYME RESULTS:  Lab Results   Component Value Date    AST Canceled, Test credited 04/03/2019    ALT Canceled, Test credited 04/03/2019       CBC RESULTS:  Lab Results   Component Value Date    WBC 5.8 09/09/2019    RBC 3.69 (L) 09/09/2019    HGB 12.0 (L) 09/09/2019    HCT 34.9 (L) 09/09/2019    MCV 95 09/09/2019    MCH 32.5 09/09/2019    MCHC 34.4 09/09/2019    RDW 13.8 09/09/2019     09/09/2019       BMP RESULTS:  Lab Results   Component Value Date     09/09/2019    POTASSIUM 3.7 09/09/2019    CHLORIDE 109 09/09/2019    CO2 29 09/09/2019    ANIONGAP 5 09/09/2019     (H) 09/09/2019    BUN 12 09/09/2019    CR 1.47 (H) 09/09/2019    GFRESTIMATED 47 (L) 09/09/2019    GFRESTBLACK 54 (L) 09/09/2019    RAHEEM 9.2 09/09/2019        A1C RESULTS:  Lab Results   Component Value Date    A1C 5.6 03/07/2014       INR RESULTS:  Lab Results   Component Value Date    INR 1.07 09/09/2019    INR 1.00 12/05/2014 "           CC  No referring provider defined for this encounter.        Service Date: 09/19/2019      PRIMARY CARDIOLOGIST:  Dr. Felipe for general Cardiology, Dr. Oropeza for vascular.      REASON FOR VISIT:  Hypertension, renal artery stenosis followup.      HISTORY OF PRESENT ILLNESS:  Mr. 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  Coronary artery disease with his anginal equivalent being dyspnea on exertion.  He had a 6-vessel CABG in 2014 that was a LIMA to the LAD, saphenous vein graft to the OM1 and OM2, saphenous vein graft to the PDA and saphenous vein graft to the D2.  He had bilateral venous harvesting of the legs.   2.  Peripheral arterial disease with history of both angioplasty and stenting of the legs.   3.  Severe carotid and vertebral disease with bilateral vertebral arteries nearly occluded and stenting of his right carotid.   4.  Hypertension.   5.  History of CVA post-CABG with residual memory issues.   6.  Dyslipidemia.  Intolerant of statins with documented myositis, on Praluent.   7.  Recent diagnosis renal artery stenosis, now status post renal artery stent for a 95% proximal lesion.      I had been following him and working on getting his blood pressure controlled, as it was markedly elevated.  As part of this workup he underwent a renal artery ultrasound and he was sent to Dr. Oropeza for evaluation.  He underwent his renal artery stent on 09/09 with excellent results.  With this, his home blood pressures have improved dramatically.  Prior to stenting they were running in the 130s-160, and after stenting even off of clonidine his blood pressures have been in the 110s-130.  With the discontinuation of clonidine, he has felt much better without as much dizziness, nausea or fatigue.  He has had no bleeding issues, and otherwise he feels back to himself or nearly back to himself.  He has very minimal shortness of breath.  He  denies chest pain.  He denies coldness or pain in his right leg.  He has not had any numbness or tingling.      SOCIAL HISTORY:  He comes in today with his wife, Ke.  They previously lived in [place] and are happy to be back living in [place].  He has a 50-pack-year history of smoking, quit in 2000.  No alcohol use.      PHYSICAL EXAMINATION:   GENERAL:  Well-developed, well-nourished gentleman in no acute distress.   HEENT:  Normocephalic, atraumatic.   HEART:  Regular with a 2/6 systolic murmur in the lower left sternal border.   LUNGS:  Clear without wheezes, rales or rhonchi.   EXTREMITIES:  Trace peripheral edema bilaterally.  Right femoral access site is clean, dry and intact without ecchymosis.  He has a 2+ right femoral pulse but a loud bruit over this artery.      Labs are pending.      ASSESSMENT AND PLAN:   1.  Renal artery stenosis with a 95% proximal left renal artery, now with a stent with excellent results.  He will be on aspirin and Plavix for 1 month, then aspirin indefinitely as well as his Pletal.  We have already been able to discontinue his clonidine, which has resolved some adverse effects and his blood pressure has dropped nicely into the 110s and 120s.  This may continue to come down.  I have asked them to call if he is consistently below the 120s, and we will slowly cut back meds further.   2.  Right femoral bruit, unfortunately I did not listen to his femorals prior to his procedure and I do not see documentation that Dr. Oropeza did either.  I have ordered a femoral artery ultrasound with possible compression and injection if need be for complication.  At this point, he has a warm foot, good distal pulses.  It does not need to happen today but will have this completed over the next week.   3.  Carotid artery disease with a greater than 70% left carotid stenosis and a 50%-70% right carotid stenosis in the context of him having minimal flow through his vertebral arteries historically.  I  have asked for Dr. Oropeza's recommendations on this for the next steps, whether this will be imaging or repeat angiogram.   4.  CKD.  We will repeat BMP today to assess for change in renal function with this intervention.   5.  Dyslipidemia, on Praluent and doing well on this.  Intolerant to statins.   6.  Hypokalemia of unclear etiology.  We will repeat BMP today, although it has been improving.      Thank you for allowing me to participate in this delightful patient's care.  We will continue to follow up on his vascular issues.  I will touch base with him in about a month just to make sure we have his meds fine-tuned, sooner with concerns.      ЕКАТЕРИНА Callaway PA-C             D: 2019   T: 2019   MT: JOHNNIE      Name:     NINFA CID   MRN:      [id number]        Account:      [id number]   :      1945           Service Date: 2019      Document: [id number]         Thank you for allowing me to participate in the care of your patient.      Sincerely,     Christiane Coffey PA-C     [facility]    cc:   No referring provider defined for this encounter.

## 2019-09-26 ENCOUNTER — HOSPITAL ENCOUNTER (OUTPATIENT)
Dept: ULTRASOUND IMAGING | Facility: CLINIC | Age: 74
Discharge: HOME | End: 2019-09-26
Attending: PHYSICIAN ASSISTANT | Admitting: PHYSICIAN ASSISTANT
Payer: MEDICARE

## 2019-09-26 DIAGNOSIS — I73.9 PAD (PERIPHERAL ARTERY DISEASE) (H): ICD-10-CM

## 2019-09-26 PROCEDURE — 93926 LOWER EXTREMITY STUDY: CPT | Mod: RT

## 2019-09-26 NOTE — TELEPHONE ENCOUNTER
Called and spoke with patient who is in agreement with plan. Also spoke to CT tech, who states they can include subclavians in CT head/neck if specified in the order comments.

## 2019-09-26 NOTE — TELEPHONE ENCOUNTER
I spoke with Dr. Oropeza.    Pt needs CTA of head neck down to subclavian arteries.    Pls contact radiology to see if CT head and neck will go down to subclavians, please order cta chest if need be.   Please let pt know he needs to schedule.    Christiane Coffey PA-C 9/26/2019 3:18 PM

## 2019-09-26 NOTE — TELEPHONE ENCOUNTER
----- Message from Felisha Dodson RN sent at 9/26/2019  3:30 PM CDT -----  Regarding: gen combs pt    ----- Message -----  From: Christiane Coffey PA-C  Sent: 9/26/2019   3:13 PM CDT  To: [facility] Nurse    Pls let pt know that his femoral artery looks ok where we went in.  Also see note on plan for CT to look at carotids.  Christiane Coffey PA-C 9/26/2019 3:13 PM

## 2019-09-29 ENCOUNTER — MYC REFILL (OUTPATIENT)
Dept: FAMILY MEDICINE | Facility: OTHER | Age: 74
End: 2019-09-29

## 2019-09-29 DIAGNOSIS — I73.9 PERIPHERAL VASCULAR DISEASE (H): ICD-10-CM

## 2019-09-30 RX ORDER — CILOSTAZOL 100 MG/1
100 TABLET ORAL 2 TIMES DAILY
Qty: 180 TABLET | Refills: 4 | Status: CANCELLED | OUTPATIENT
Start: 2019-09-30

## 2019-10-02 ENCOUNTER — HOSPITAL ENCOUNTER (OUTPATIENT)
Dept: CT IMAGING | Facility: CLINIC | Age: 74
Discharge: HOME | End: 2019-10-02
Attending: PHYSICIAN ASSISTANT | Admitting: PHYSICIAN ASSISTANT
Payer: MEDICARE

## 2019-10-02 DIAGNOSIS — I65.29 STENOSIS OF CAROTID ARTERY, UNSPECIFIED LATERALITY: ICD-10-CM

## 2019-10-02 PROCEDURE — 25000128 H RX IP 250 OP 636: Performed by: RADIOLOGY

## 2019-10-02 PROCEDURE — 25000125 ZZHC RX 250: Performed by: RADIOLOGY

## 2019-10-02 PROCEDURE — 70498 CT ANGIOGRAPHY NECK: CPT

## 2019-10-02 RX ORDER — IOPAMIDOL 755 MG/ML
500 INJECTION, SOLUTION INTRAVASCULAR ONCE
Status: COMPLETED | OUTPATIENT
Start: 2019-10-02 | End: 2019-10-02

## 2019-10-02 RX ADMIN — IOPAMIDOL 80 ML: 755 INJECTION, SOLUTION INTRAVENOUS at 09:28

## 2019-10-02 RX ADMIN — SODIUM CHLORIDE 100 ML: 9 INJECTION, SOLUTION INTRAVENOUS at 09:28

## 2019-10-02 NOTE — PROGRESS NOTES
Appropriate assistive devices provided during their visit. na (Yes, No, N/A) na (list device)    Exam table and/or cart  placed in the lowest position. y (Yes, No, N/A)    Brakes on tables/carts/wheelchairs used at all times. na (Yes, No, N/A)    Non slip footwear applied. y (Yes, No, NA)    Patient was accompanied by staff throughout visit. y (Yes, No, N/A)    Equipment safety straps used. na (Yes, No, N/A)    Assist with toileting. na (Yes, No, N/A)

## 2019-10-03 ENCOUNTER — TELEPHONE (OUTPATIENT)
Dept: CARDIOLOGY | Facility: CLINIC | Age: 74
End: 2019-10-03

## 2019-10-03 NOTE — TELEPHONE ENCOUNTER
CTA of carotids  Patient notified of results and plan of care.  Has follow up on 10-24-19 with Christiane and will review in more detail      ----- Message from Christiane Coffey PA-C sent at 10/2/2019  4:34 PM CDT -----  Arteries are not as narrowed as us of carotids suggested. No need for intervention or other change at this time.  Christiane Coffey PA-C 10/2/2019 4:34 PM

## 2019-10-07 ENCOUNTER — MYC REFILL (OUTPATIENT)
Dept: FAMILY MEDICINE | Facility: OTHER | Age: 74
End: 2019-10-07

## 2019-10-07 DIAGNOSIS — I73.9 PERIPHERAL VASCULAR DISEASE (H): ICD-10-CM

## 2019-10-07 RX ORDER — CILOSTAZOL 100 MG/1
100 TABLET ORAL 2 TIMES DAILY
Qty: 60 TABLET | Status: CANCELLED | OUTPATIENT
Start: 2019-10-07

## 2019-10-08 ENCOUNTER — MYC MEDICAL ADVICE (OUTPATIENT)
Dept: INTERNAL MEDICINE | Facility: CLINIC | Age: 74
End: 2019-10-08

## 2019-10-08 DIAGNOSIS — I73.9 PERIPHERAL VASCULAR DISEASE (H): ICD-10-CM

## 2019-10-08 RX ORDER — CILOSTAZOL 100 MG/1
100 TABLET ORAL 2 TIMES DAILY
Qty: 180 TABLET | Refills: 3 | Status: SHIPPED | OUTPATIENT
Start: 2019-10-08 | End: 2020-08-19

## 2019-10-24 ENCOUNTER — TELEPHONE (OUTPATIENT)
Dept: PHARMACY | Facility: CLINIC | Age: 74
End: 2019-10-24

## 2019-10-24 ENCOUNTER — OFFICE VISIT (OUTPATIENT)
Dept: CARDIOLOGY | Facility: CLINIC | Age: 74
End: 2019-10-24
Payer: MEDICARE

## 2019-10-24 VITALS
DIASTOLIC BLOOD PRESSURE: 62 MMHG | BODY MASS INDEX: 24.74 KG/M2 | OXYGEN SATURATION: 95 % | WEIGHT: 162.7 LBS | SYSTOLIC BLOOD PRESSURE: 105 MMHG | RESPIRATION RATE: 18 BRPM | HEART RATE: 67 BPM

## 2019-10-24 DIAGNOSIS — I70.1 RENAL ARTERY STENOSIS (H): ICD-10-CM

## 2019-10-24 DIAGNOSIS — I10 ESSENTIAL HYPERTENSION, BENIGN: ICD-10-CM

## 2019-10-24 DIAGNOSIS — I73.9 PAD (PERIPHERAL ARTERY DISEASE) (H): ICD-10-CM

## 2019-10-24 DIAGNOSIS — R06.09 DOE (DYSPNEA ON EXERTION): ICD-10-CM

## 2019-10-24 DIAGNOSIS — I10 BENIGN ESSENTIAL HTN: ICD-10-CM

## 2019-10-24 DIAGNOSIS — E87.6 HYPOKALEMIA: ICD-10-CM

## 2019-10-24 DIAGNOSIS — K59.00 CONSTIPATION, UNSPECIFIED CONSTIPATION TYPE: Primary | ICD-10-CM

## 2019-10-24 DIAGNOSIS — E78.5 HYPERLIPIDEMIA LDL GOAL <130: ICD-10-CM

## 2019-10-24 PROCEDURE — 99214 OFFICE O/P EST MOD 30 MIN: CPT | Performed by: PHYSICIAN ASSISTANT

## 2019-10-24 RX ORDER — POTASSIUM CHLORIDE 750 MG/1
10 TABLET, EXTENDED RELEASE ORAL 2 TIMES DAILY
Qty: 180 TABLET | Refills: 3 | Status: SHIPPED | OUTPATIENT
Start: 2019-10-24 | End: 2020-03-13 | Stop reason: DRUGHIGH

## 2019-10-24 RX ORDER — AMLODIPINE BESYLATE 10 MG/1
10 TABLET ORAL DAILY
Qty: 90 TABLET | Refills: 3 | Status: SHIPPED | OUTPATIENT
Start: 2019-10-24 | End: 2020-12-11

## 2019-10-24 RX ORDER — ISOSORBIDE MONONITRATE 60 MG/1
60 TABLET, EXTENDED RELEASE ORAL DAILY
Qty: 90 TABLET | Refills: 3 | Status: SHIPPED | OUTPATIENT
Start: 2019-10-24 | End: 2020-12-01

## 2019-10-24 RX ORDER — LOSARTAN POTASSIUM 100 MG/1
100 TABLET ORAL DAILY
Qty: 90 TABLET | Refills: 3 | Status: SHIPPED | OUTPATIENT
Start: 2019-10-24 | End: 2020-05-29

## 2019-10-24 RX ORDER — CARVEDILOL 25 MG/1
25 TABLET ORAL 2 TIMES DAILY WITH MEALS
Qty: 180 TABLET | Refills: 3 | Status: SHIPPED | OUTPATIENT
Start: 2019-10-24 | End: 2020-12-01

## 2019-10-24 ASSESSMENT — PAIN SCALES - GENERAL: PAINLEVEL: NO PAIN (0)

## 2019-10-24 NOTE — LETTER
10/24/2019      Jonas West MD  [address] [facility] [address]      RE: Michele Vance       Dear Colleague,    I had the pleasure of seeing Michele Vance in the [facility].    Service Date: 10/24/2019      CLINIC VISIT      PRIMARY CARDIOLOGIST:  Dr. Felipe for General Cardiology, Dr. Oropeza for Vascular and may switch to Dr. Oropeza long term depending on patient preference.      REASON FOR VISIT:  Hypertension, renal artery stenosis, coronary disease.      HISTORY OF PRESENT ILLNESS:  Mr. 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  Coronary disease with history of anginal equivalent being dyspnea on exertion.  A 6-vessel CAB in 2014.  This was a LIMA to the LAD, saphenous vein graft to the OM1 and OM2, saphenous vein graft to the PDA and saphenous vein graft to the D2.  He had bilateral venous harvesting of the legs.   2.  Peripheral arterial disease with history of both angioplasty and stenting of the legs.   3.  Severe carotid and vertebral disease with bilateral vertebral arteries nearly occluded, stenting of his right.   4.  Hypertension.   5.  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   6.  Dyslipidemia.  Intolerant to statins with documented myositis, on Praluent.   7.  Recent diagnosis of renal artery stenosis status post renal artery stent for a 95% proximal lesion in 09/2019.      He comes in today for followup and adjusting of blood pressure medications after his renal artery stenting.  We recently had been able to discontinue his clonidine over the phone and since then, he tells me he is feeling much better.  He overall has better energy and is not as short of breath.  He also does not seem as foggy.  He previously got dizzy around noon but this has also resolved.  His home blood pressures are ranging from the 120s to 140s, primarily around 130/60 when he just  sits down.  He does have complaint of overall coolness but no specific coldness in his extremities.  He denies chest pain, shortness of breath, orthopnea or PND.  Overall, he thinks he is getting better and better.      SOCIAL HISTORY:  He comes today with his wife, Ke.  They previously lived in [place] and are happy to be back in [place].  He has a 50-pack-year history of smoking, quit in 2000.  No alcohol use.      PHYSICAL EXAMINATION:   GENERAL:  Well-developed, well-nourished gentleman in no acute distress.   HEENT:  Normocephalic, atraumatic.   HEART:  Regular in rate and rhythm with a 1/6 systolic murmur heard best at lower left sternal border.   RESPIRATORY:  Lungs are clear without wheezes, rales or rhonchi.   EXTREMITIES:  With trace peripheral edema.   SKIN:  Warm and dry.      ASSESSMENT AND PLAN:   1.  Peripheral arterial disease status post renal artery stenting for a 95% proximal left renal artery with excellent results and improvement in blood pressure as well as creatinine.  He is now off Plavix appropriately and will remain on aspirin.  He takes Pletal for his other peripheral disease and we will continue this.   2.  Coronary artery disease without recurrent anginal symptoms with history of CABG, stable.   3.  Hypertension, excellent control.  We will continue current meds which still are several including losartan 100 mg daily, Imdur 60 mg daily, carvedilol 25 mg b.i.d. and amlodipine 10.  Given his overall coldness, if we can decrease another antihypertensice, I would consider decreasing carvedilol next   4.  Carotid artery disease with CTA completed showing patent right carotid stent with just a 50% in-stent stenosis, a left carotid stenosis of about 58%, an occluded left vertebral artery and a patent right vertebral artery, all which are fairly stable.  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  Appropriately on Praluent, aspirin and Pletal for management.   5.  Dyslipidemia.   Excellent results on Praluent, refilled today.   6.  Hypokalemia of unclear etiology.  We will keep him on potassium 10 mg b.i.d.      Thank you for allowing me to participate in this delightful patient's care.  He can follow up with Dr. Oropeza in about 6 months (this is their preference) in [facility].  He will get a lipid panel and BMP before that visit.  I am happy to answer any questions sooner should need be.      Thank you for allowing me to participate in this delightful patient's care.      ЕКАТЕРИНА Callaway PA-C             D: 10/24/2019   T: 10/24/2019   MT: SAULO      Name:     NINFA CID   MRN:      [id number]        Account:      [id number]   :      1945           Service Date: 10/24/2019      Document: [id number]           Outpatient Encounter Medications as of 10/24/2019   Medication Sig Dispense Refill     alirocumab (PRALUENT) 150 MG/ML injectable pen Inject 1 mL (150 mg) Subcutaneous every 14 days 6 mL 2     alirocumab (PRALUENT) 150 MG/ML injectable syringe Inject 1 mL (150 mg) Subcutaneous every 14 days 8 mL 3     amitriptyline (ELAVIL) 25 MG tablet Take 1 tablet (25 mg) by mouth 2 times daily 180 tablet 3     amLODIPine (NORVASC) 10 MG tablet Take 1 tablet (10 mg) by mouth daily 90 tablet 3     ASPIRIN PO Take 325 mg by mouth daily       carvedilol (COREG) 25 MG tablet Take 1 tablet (25 mg) by mouth 2 times daily (with meals) 180 tablet 3     cilostazol (PLETAL) 100 MG tablet Take 1 tablet (100 mg) by mouth 2 times daily 180 tablet 3     gabapentin (NEURONTIN) 300 MG capsule Take 1 capsule (300 mg) by mouth 3 times daily (Patient taking differently: Take 300 mg by mouth 2 times daily ) 210 capsule 4     isosorbide mononitrate (IMDUR) 60 MG 24 hr tablet Take 1 tablet (60 mg) by mouth daily 90 tablet 3     losartan (COZAAR) 100 MG tablet Take 1 tablet (100 mg) by mouth daily 90 tablet 3     omeprazole (PRILOSEC) 40 MG DR capsule Take 1 capsule (40 mg) by mouth  daily 90 capsule 3     polyethylene glycol (MIRALAX/GLYCOLAX) packet Take 1 packet by mouth daily        potassium chloride ER (K-DUR/KLOR-CON M) 10 MEQ CR tablet Take 1 tablet (10 mEq) by mouth 2 times daily 180 tablet 3     psyllium (METAMUCIL) 58.6 % POWD Take by mouth daily       STATIN NOT PRESCRIBED, INTENTIONAL, Pt has known rhabdomyolysis in the past, somewhat associated with statins.  Also wasn't able to tolerate Zetia. (Patient not taking: Reported on 10/24/2019) 1 each 0     [DISCONTINUED] amLODIPine (NORVASC) 10 MG tablet TAKE 1 TABLET EVERY EVENING 90 tablet 3     [DISCONTINUED] carvedilol (COREG) 25 MG tablet Take 1 tablet (25 mg) by mouth 2 times daily (with meals) 180 tablet 3     [DISCONTINUED] clopidogrel (PLAVIX) 75 MG tablet Take 1 tablet (75 mg) by mouth daily Starting tomorrow. (Patient not taking: Reported on 10/24/2019) 30 tablet 0     [DISCONTINUED] isosorbide mononitrate (IMDUR) 60 MG 24 hr tablet Take 1 tablet (60 mg) by mouth daily 90 tablet 1     [DISCONTINUED] losartan (COZAAR) 100 MG tablet Take 1 tablet (100 mg) by mouth daily 90 tablet 3     [DISCONTINUED] metoclopramide (REGLAN) 10 MG tablet TAKE 1 TABLET FOUR TIMES A DAY (Patient not taking: Reported on 10/24/2019) 120 tablet 1     [DISCONTINUED] potassium chloride ER (K-DUR/KLOR-CON M) 10 MEQ CR tablet Take 1 tablet (10 mEq) by mouth 2 times daily 180 tablet 3     No facility-administered encounter medications on file as of 10/24/2019.        Again, thank you for allowing me to participate in the care of your patient.      Sincerely,    Christaine Coffey PA-C     [facility]

## 2019-10-24 NOTE — PROGRESS NOTES
[id number]  HPI and Plan:   See dictation    Orders this Visit:  Orders Placed This Encounter   Procedures     Lipid Profile     ALT     Basic metabolic panel     GASTROENTEROLOGY ADULT REF CONSULT ONLY     Follow-Up with Cardiologist     Orders Placed This Encounter   Medications     isosorbide mononitrate (IMDUR) 60 MG 24 hr tablet     Sig: Take 1 tablet (60 mg) by mouth daily     Dispense:  90 tablet     Refill:  3     losartan (COZAAR) 100 MG tablet     Sig: Take 1 tablet (100 mg) by mouth daily     Dispense:  90 tablet     Refill:  3     potassium chloride ER (K-DUR/KLOR-CON M) 10 MEQ CR tablet     Sig: Take 1 tablet (10 mEq) by mouth 2 times daily     Dispense:  180 tablet     Refill:  3     amLODIPine (NORVASC) 10 MG tablet     Sig: Take 1 tablet (10 mg) by mouth daily     Dispense:  90 tablet     Refill:  3     carvedilol (COREG) 25 MG tablet     Sig: Take 1 tablet (25 mg) by mouth 2 times daily (with meals)     Dispense:  180 tablet     Refill:  3     alirocumab (PRALUENT) 150 MG/ML injectable syringe     Sig: Inject 1 mL (150 mg) Subcutaneous every 14 days     Dispense:  8 mL     Refill:  3     Order Specific Question:   Change Order Class:     Answer:   E-Prescribe [[id number]]     Order Specific Question:   Prescription     Answer:   Refill     Order Specific Question:   PA Completed?     Answer:   No     Order Specific Question:   Appeal Completed?     Answer:   No     Order Specific Question:   Financial Assistance Secured?     Answer:   No     Order Specific Question:   Free Drug Secured?     Answer:   No     Order Specific Question:   Restricted Drug?     Answer:   No     Order Specific Question:   Restricted Payer?     Answer:   Yes     Order Specific Question:   Pharmacy Preference:     Answer:   Not eligible for [facility]     Order Specific Question:   Has this patient been seen by a liaison in person?     Answer:   No     Medications Discontinued During This Encounter   Medication Reason     clopidogrel  (PLAVIX) 75 MG tablet      metoclopramide (REGLAN) 10 MG tablet      isosorbide mononitrate (IMDUR) 60 MG 24 hr tablet      losartan (COZAAR) 100 MG tablet Reorder     potassium chloride ER (K-DUR/KLOR-CON M) 10 MEQ CR tablet Reorder     amLODIPine (NORVASC) 10 MG tablet Reorder     carvedilol (COREG) 25 MG tablet Reorder         Encounter Diagnoses   Name Primary?     Constipation, unspecified constipation type Yes     ELAM (dyspnea on exertion)      Essential hypertension, benign      Hypokalemia      Benign essential HTN      Hyperlipidemia LDL goal <130      Renal artery stenosis (H)      PAD (peripheral artery disease) (H)        CURRENT MEDICATIONS:  Current Outpatient Medications   Medication Sig Dispense Refill     alirocumab (PRALUENT) 150 MG/ML injectable pen Inject 1 mL (150 mg) Subcutaneous every 14 days 6 mL 2     alirocumab (PRALUENT) 150 MG/ML injectable syringe Inject 1 mL (150 mg) Subcutaneous every 14 days 8 mL 3     amitriptyline (ELAVIL) 25 MG tablet Take 1 tablet (25 mg) by mouth 2 times daily 180 tablet 3     amLODIPine (NORVASC) 10 MG tablet Take 1 tablet (10 mg) by mouth daily 90 tablet 3     ASPIRIN PO Take 325 mg by mouth daily       carvedilol (COREG) 25 MG tablet Take 1 tablet (25 mg) by mouth 2 times daily (with meals) 180 tablet 3     cilostazol (PLETAL) 100 MG tablet Take 1 tablet (100 mg) by mouth 2 times daily 180 tablet 3     gabapentin (NEURONTIN) 300 MG capsule Take 1 capsule (300 mg) by mouth 3 times daily (Patient taking differently: Take 300 mg by mouth 2 times daily ) 210 capsule 4     isosorbide mononitrate (IMDUR) 60 MG 24 hr tablet Take 1 tablet (60 mg) by mouth daily 90 tablet 3     losartan (COZAAR) 100 MG tablet Take 1 tablet (100 mg) by mouth daily 90 tablet 3     omeprazole (PRILOSEC) 40 MG DR capsule Take 1 capsule (40 mg) by mouth daily 90 capsule 3     polyethylene glycol (MIRALAX/GLYCOLAX) packet Take 1 packet by mouth daily        potassium chloride ER  "(K-DUR/KLOR-CON M) 10 MEQ CR tablet Take 1 tablet (10 mEq) by mouth 2 times daily 180 tablet 3     psyllium (METAMUCIL) 58.6 % POWD Take by mouth daily       STATIN NOT PRESCRIBED, INTENTIONAL, Pt has known rhabdomyolysis in the past, somewhat associated with statins.  Also wasn't able to tolerate Zetia. (Patient not taking: Reported on 10/24/2019) 1 each 0       ALLERGIES     Allergies   Allergen Reactions     Hmg-Coa-R Inhibitors Other (See Comments)     Rhabdo  Same as \"statins\"     Gemfibrozil      Resting thigh-calf pain     Sulfa Drugs      Reacted as a child     Zetia [Ezetimibe]      Muscle cramping       PAST MEDICAL HISTORY:  Past Medical History:   Diagnosis Date     Carotid stenosis     right endartectomy     Chronic kidney disease     stage 3     Coronary artery disease 3-2014    CABG x6     CVA (cerebral infarction)     balance problems, mild     Elevated CK      Esophageal reflux      Hypercholesteremia      Nonsenile cataract      Other and unspecified hyperlipidemia      PAD (peripheral artery disease) (H)      Rhabdomyolysis 2010     Unspecified essential hypertension        PAST SURGICAL HISTORY:  Past Surgical History:   Procedure Laterality Date     APPENDECTOMY  1974     BYPASS GRAFT ARTERY CORONARY  3/5/2014    Procedure: BYPASS GRAFT ARTERY CORONARY;  Median Sternotomy, Coronary Artery Bypass Graft X6 used Left internal mammary artery, Left and Right Greater Saphenous vein, on Pump oxygenator.;  Surgeon: Tim Montanez MD;  Location:  OR     CATARACT IOL, RT/LT Bilateral 2008    in [place]     cataracts       COLONOSCOPY  12/12/02    [facility]     COLONOSCOPY N/A 10/7/2014    Procedure: COMBINED COLONOSCOPY, SINGLE OR MULTIPLE BIOPSY/POLYPECTOMY BY BIOPSY;  Surgeon: Micheal Mora MD;  Location:  GI     CV ANGIOGRAM LOWER EXTREMITY Bilateral 9/9/2019    Procedure: Lower Extremity Angiogram Bilateral;  Surgeon: Julio Oropeza MD;  Location:  HEART CARDIAC CATH LAB     " DENTAL SURGERY      TMJ, implants     ENDARTERECTOMY CAROTID      right carotid stent     ESOPHAGOSCOPY, GASTROSCOPY, DUODENOSCOPY (EGD), COMBINED Left 10/7/2014    Procedure: COMBINED ESOPHAGOSCOPY, GASTROSCOPY, DUODENOSCOPY (EGD), BIOPSY SINGLE OR MULTIPLE;  Surgeon: Micheal Mora MD;  Location:  GI     ESOPHAGOSCOPY, GASTROSCOPY, DUODENOSCOPY (EGD), COMBINED Left 10/7/2014    Procedure: COMBINED ESOPHAGOSCOPY, GASTROSCOPY, DUODENOSCOPY (EGD), REMOVE FOREIGN BODY;  Surgeon: Micheal Mora MD;  Location:  GI      CAPSULE ENDOSCOPY N/A 10/7/2014    Procedure: CAPSULE/PILL CAM ENDOSCOPY;  Surgeon: Micheal Mora MD;  Location: [facility] ENDOSCOPY, SIMPLE EXAM  01/08/99    [facility]     INJECT EPIDURAL LUMBAR Right 5/8/2017    Procedure: INJECT EPIDURAL LUMBAR;  Lumbar transforaminal Epidural Steroid Injection right lumbar 4-5, and right  lumbar 5-Sacral 1;  Surgeon: Anthony Gonzalez MD;  [facility]: [facility]     INJECT JOINT SACROILIAC Bilateral 8/28/2017    Procedure: INJECT JOINT SACROILIAC;  sacroiliac joint injection bilateral;  Surgeon: Anthony Gonzalez MD;  Location: [facility]     KNEE SURGERY  1976    left knee reconstruction     lasix  2001    both eyes     RELEASE CARPAL TUNNEL  1/13/2011    RELEASE CARPAL TUNNEL performed by JO MADRID at  OR     RELEASE CARPAL TUNNEL  1/20/2011    RELEASE CARPAL TUNNEL performed by JO MADRID at  OR       FAMILY HISTORY:  Family History   Problem Relation Age of Onset     Hypertension Mother      Eye Disorder Mother      Cerebrovascular Disease Father      Heart Disease Father      Lipids Father      Obesity Father      Cancer Sister      Heart Disease Sister      Glaucoma No family hx of      Macular Degeneration No family hx of      Kidney Disease No family hx of        SOCIAL HISTORY:  Social History     Socioeconomic History     Marital status:      Spouse name: None     Number of children: None     Years of  education: None     Highest education level: None   Occupational History     Occupation:      Employer: RETIRED   Social Needs     Financial resource strain: None     Food insecurity:     Worry: None     Inability: None     Transportation needs:     Medical: None     Non-medical: None   Tobacco Use     Smoking status: Former Smoker     Packs/day: 2.50     Years: 20.00     Pack years: 50.00     Types: Cigarettes     Last attempt to quit: 2000     Years since quittin.8     Smokeless tobacco: Never Used   Substance and Sexual Activity     Alcohol use: No     Alcohol/week: 0.0 standard drinks     Drug use: No     Sexual activity: Yes     Partners: Female   Lifestyle     Physical activity:     Days per week: None     Minutes per session: None     Stress: None   Relationships     Social connections:     Talks on phone: None     Gets together: None     Attends Voodoo service: None     Active member of club or organization: None     Attends meetings of clubs or organizations: None     Relationship status: None     Intimate partner violence:     Fear of current or ex partner: None     Emotionally abused: None     Physically abused: None     Forced sexual activity: None   Other Topics Concern      Service Not Asked     Blood Transfusions Not Asked     Caffeine Concern Not Asked     Occupational Exposure Not Asked     Hobby Hazards Not Asked     Sleep Concern Not Asked     Stress Concern Not Asked     Weight Concern Not Asked     Special Diet Not Asked     Back Care Not Asked     Exercise Yes     Comment: 3 times per week     Bike Helmet Not Asked     Seat Belt Not Asked     Self-Exams Not Asked     Parent/sibling w/ CABG, MI or angioplasty before 65F 55M? Not Asked   Social History Narrative    Moved from [place] in August, retired  for [employer].       Review of Systems:  Skin:  Negative     Eyes:  Negative    ENT:  Negative    Respiratory:  Positive for cough  Cardiovascular:   Negative    Gastroenterology: Positive for    Genitourinary:  Negative    Musculoskeletal:  Negative    Neurologic:  Negative    Psychiatric:  Positive for anxiety  Heme/Lymph/Imm:  Negative    Endocrine:  Negative      Physical Exam:  Vitals: /62   Pulse 67   Resp 18   Wt 73.8 kg (162 lb 11.2 oz)   SpO2 95%   BMI 24.74 kg/m     Please refer to dictation for physical exam    Recent Lab Results:  LIPID RESULTS:  Lab Results   Component Value Date    CHOL 150 03/01/2019    HDL 49 03/01/2019    LDL 82 03/01/2019    TRIG 94 03/01/2019    CHOLHDLRATIO 5.2 (H) 07/23/2015       LIVER ENZYME RESULTS:  Lab Results   Component Value Date    AST Canceled, Test credited 04/03/2019    ALT Canceled, Test credited 04/03/2019       CBC RESULTS:  Lab Results   Component Value Date    WBC 5.8 09/09/2019    RBC 3.69 (L) 09/09/2019    HGB 12.0 (L) 09/09/2019    HCT 34.9 (L) 09/09/2019    MCV 95 09/09/2019    MCH 32.5 09/09/2019    MCHC 34.4 09/09/2019    RDW 13.8 09/09/2019     09/09/2019       BMP RESULTS:  Lab Results   Component Value Date     09/19/2019    POTASSIUM 3.9 09/19/2019    CHLORIDE 107 09/19/2019    CO2 29 09/19/2019    ANIONGAP 5 09/19/2019     (H) 09/19/2019    BUN 12 09/19/2019    CR 1.29 (H) 09/19/2019    GFRESTIMATED 55 (L) 09/19/2019    GFRESTBLACK 63 09/19/2019    RAHEEM 9.3 09/19/2019        A1C RESULTS:  Lab Results   Component Value Date    A1C 5.6 03/07/2014       INR RESULTS:  Lab Results   Component Value Date    INR 1.07 09/09/2019    INR 1.00 12/05/2014           CC  No referring provider defined for this encounter.

## 2019-10-24 NOTE — LETTER
10/24/2019    Jonas West MD  [address] [facility] [address]    RE: Michele Vance       Dear Colleague,    I had the pleasure of seeing Michele Vance in the [facility].    [id number]  HPI and Plan:   See dictation    Orders this Visit:  Orders Placed This Encounter   Procedures     Lipid Profile     ALT     Basic metabolic panel     GASTROENTEROLOGY ADULT REF CONSULT ONLY     Follow-Up with Cardiologist     Orders Placed This Encounter   Medications     isosorbide mononitrate (IMDUR) 60 MG 24 hr tablet     Sig: Take 1 tablet (60 mg) by mouth daily     Dispense:  90 tablet     Refill:  3     losartan (COZAAR) 100 MG tablet     Sig: Take 1 tablet (100 mg) by mouth daily     Dispense:  90 tablet     Refill:  3     potassium chloride ER (K-DUR/KLOR-CON M) 10 MEQ CR tablet     Sig: Take 1 tablet (10 mEq) by mouth 2 times daily     Dispense:  180 tablet     Refill:  3     amLODIPine (NORVASC) 10 MG tablet     Sig: Take 1 tablet (10 mg) by mouth daily     Dispense:  90 tablet     Refill:  3     carvedilol (COREG) 25 MG tablet     Sig: Take 1 tablet (25 mg) by mouth 2 times daily (with meals)     Dispense:  180 tablet     Refill:  3     alirocumab (PRALUENT) 150 MG/ML injectable syringe     Sig: Inject 1 mL (150 mg) Subcutaneous every 14 days     Dispense:  8 mL     Refill:  3     Order Specific Question:   Change Order Class:     Answer:   E-Prescribe [[id number]]     Order Specific Question:   Prescription     Answer:   Refill     Order Specific Question:   PA Completed?     Answer:   No     Order Specific Question:   Appeal Completed?     Answer:   No     Order Specific Question:   Financial Assistance Secured?     Answer:   No     Order Specific Question:   Free Drug Secured?     Answer:   No     Order Specific Question:   Restricted Drug?     Answer:   No     Order Specific Question:   Restricted Payer?     Answer:   Yes     Order Specific Question:   Pharmacy Preference:      Answer:   Not eligible for [facility]     Order Specific Question:   Has this patient been seen by a liaison in person?     Answer:   No     Medications Discontinued During This Encounter   Medication Reason     clopidogrel (PLAVIX) 75 MG tablet      metoclopramide (REGLAN) 10 MG tablet      isosorbide mononitrate (IMDUR) 60 MG 24 hr tablet      losartan (COZAAR) 100 MG tablet Reorder     potassium chloride ER (K-DUR/KLOR-CON M) 10 MEQ CR tablet Reorder     amLODIPine (NORVASC) 10 MG tablet Reorder     carvedilol (COREG) 25 MG tablet Reorder         Encounter Diagnoses   Name Primary?     Constipation, unspecified constipation type Yes     ELAM (dyspnea on exertion)      Essential hypertension, benign      Hypokalemia      Benign essential HTN      Hyperlipidemia LDL goal <130      Renal artery stenosis (H)      PAD (peripheral artery disease) (H)        CURRENT MEDICATIONS:  Current Outpatient Medications   Medication Sig Dispense Refill     alirocumab (PRALUENT) 150 MG/ML injectable pen Inject 1 mL (150 mg) Subcutaneous every 14 days 6 mL 2     alirocumab (PRALUENT) 150 MG/ML injectable syringe Inject 1 mL (150 mg) Subcutaneous every 14 days 8 mL 3     amitriptyline (ELAVIL) 25 MG tablet Take 1 tablet (25 mg) by mouth 2 times daily 180 tablet 3     amLODIPine (NORVASC) 10 MG tablet Take 1 tablet (10 mg) by mouth daily 90 tablet 3     ASPIRIN PO Take 325 mg by mouth daily       carvedilol (COREG) 25 MG tablet Take 1 tablet (25 mg) by mouth 2 times daily (with meals) 180 tablet 3     cilostazol (PLETAL) 100 MG tablet Take 1 tablet (100 mg) by mouth 2 times daily 180 tablet 3     gabapentin (NEURONTIN) 300 MG capsule Take 1 capsule (300 mg) by mouth 3 times daily (Patient taking differently: Take 300 mg by mouth 2 times daily ) 210 capsule 4     isosorbide mononitrate (IMDUR) 60 MG 24 hr tablet Take 1 tablet (60 mg) by mouth daily 90 tablet 3     losartan (COZAAR) 100 MG tablet Take 1 tablet (100 mg) by mouth daily 90 tablet  "3     omeprazole (PRILOSEC) 40 MG DR capsule Take 1 capsule (40 mg) by mouth daily 90 capsule 3     polyethylene glycol (MIRALAX/GLYCOLAX) packet Take 1 packet by mouth daily        potassium chloride ER (K-DUR/KLOR-CON M) 10 MEQ CR tablet Take 1 tablet (10 mEq) by mouth 2 times daily 180 tablet 3     psyllium (METAMUCIL) 58.6 % POWD Take by mouth daily       STATIN NOT PRESCRIBED, INTENTIONAL, Pt has known rhabdomyolysis in the past, somewhat associated with statins.  Also wasn't able to tolerate Zetia. (Patient not taking: Reported on 10/24/2019) 1 each 0       ALLERGIES     Allergies   Allergen Reactions     Hmg-Coa-R Inhibitors Other (See Comments)     Rhabdo  Same as \"statins\"     Gemfibrozil      Resting thigh-calf pain     Sulfa Drugs      Reacted as a child     Zetia [Ezetimibe]      Muscle cramping       PAST MEDICAL HISTORY:  Past Medical History:   Diagnosis Date     Carotid stenosis     right endartectomy     Chronic kidney disease     stage 3     Coronary artery disease 3-2014    CABG x6     CVA (cerebral infarction)     balance problems, mild     Elevated CK      Esophageal reflux      Hypercholesteremia      Nonsenile cataract      Other and unspecified hyperlipidemia      PAD (peripheral artery disease) (H)      Rhabdomyolysis 2010     Unspecified essential hypertension        PAST SURGICAL HISTORY:  Past Surgical History:   Procedure Laterality Date     APPENDECTOMY  1974     BYPASS GRAFT ARTERY CORONARY  3/5/2014    Procedure: BYPASS GRAFT ARTERY CORONARY;  Median Sternotomy, Coronary Artery Bypass Graft X6 used Left internal mammary artery, Left and Right Greater Saphenous vein, on Pump oxygenator.;  Surgeon: Tim Montanez MD;  Location: [facility] OR     CATARACT IOL, RT/LT Bilateral 2008    in [place]     cataracts       COLONOSCOPY  12/12/02    [facility]     COLONOSCOPY N/A 10/7/2014    Procedure: COMBINED COLONOSCOPY, SINGLE OR MULTIPLE BIOPSY/POLYPECTOMY BY BIOPSY;  Surgeon: Morgan" MD Micheal;  Location:  GI     CV ANGIOGRAM LOWER EXTREMITY Bilateral 9/9/2019    Procedure: Lower Extremity Angiogram Bilateral;  Surgeon: Julio Oropeza MD;  Location:  HEART CARDIAC CATH LAB     DENTAL SURGERY      TMJ, implants     ENDARTERECTOMY CAROTID      right carotid stent     ESOPHAGOSCOPY, GASTROSCOPY, DUODENOSCOPY (EGD), COMBINED Left 10/7/2014    Procedure: COMBINED ESOPHAGOSCOPY, GASTROSCOPY, DUODENOSCOPY (EGD), BIOPSY SINGLE OR MULTIPLE;  Surgeon: Micheal Mora MD;  Location:  GI     ESOPHAGOSCOPY, GASTROSCOPY, DUODENOSCOPY (EGD), COMBINED Left 10/7/2014    Procedure: COMBINED ESOPHAGOSCOPY, GASTROSCOPY, DUODENOSCOPY (EGD), REMOVE FOREIGN BODY;  Surgeon: Micheal Mora MD;  Location:  GI      CAPSULE ENDOSCOPY N/A 10/7/2014    Procedure: CAPSULE/PILL CAM ENDOSCOPY;  Surgeon: Micheal Mora MD;  Location: [facility] UGI ENDOSCOPY, SIMPLE EXAM  01/08/99    [facility]     INJECT EPIDURAL LUMBAR Right 5/8/2017    Procedure: INJECT EPIDURAL LUMBAR;  Lumbar transforaminal Epidural Steroid Injection right lumbar 4-5, and right  lumbar 5-Sacral 1;  Surgeon: Anthony Gonzalez MD;  Location: [facility]     INJECT JOINT SACROILIAC Bilateral 8/28/2017    Procedure: INJECT JOINT SACROILIAC;  sacroiliac joint injection bilateral;  Surgeon: Anthony Gonzalez MD;  Location:  OR     KNEE SURGERY  1976    left knee reconstruction     lasix  2001    both eyes     RELEASE CARPAL TUNNEL  1/13/2011    RELEASE CARPAL TUNNEL performed by JO MADRID at  OR     RELEASE CARPAL TUNNEL  1/20/2011    RELEASE CARPAL TUNNEL performed by JO MADRID at  OR       FAMILY HISTORY:  Family History   Problem Relation Age of Onset     Hypertension Mother      Eye Disorder Mother      Cerebrovascular Disease Father      Heart Disease Father      Lipids Father      Obesity Father      Cancer Sister      Heart Disease Sister      Glaucoma No family hx of      Macular Degeneration No  family hx of      Kidney Disease No family hx of        SOCIAL HISTORY:  Social History     Socioeconomic History     Marital status:      Spouse name: None     Number of children: None     Years of education: None     Highest education level: None   Occupational History     Occupation:      Employer: RETIRED   Social Needs     Financial resource strain: None     Food insecurity:     Worry: None     Inability: None     Transportation needs:     Medical: None     Non-medical: None   Tobacco Use     Smoking status: Former Smoker     Packs/day: 2.50     Years: 20.00     Pack years: 50.00     Types: Cigarettes     Last attempt to quit: 2000     Years since quittin.8     Smokeless tobacco: Never Used   Substance and Sexual Activity     Alcohol use: No     Alcohol/week: 0.0 standard drinks     Drug use: No     Sexual activity: Yes     Partners: Female   Lifestyle     Physical activity:     Days per week: None     Minutes per session: None     Stress: None   Relationships     Social connections:     Talks on phone: None     Gets together: None     Attends Mosque service: None     Active member of club or organization: None     Attends meetings of clubs or organizations: None     Relationship status: None     Intimate partner violence:     Fear of current or ex partner: None     Emotionally abused: None     Physically abused: None     Forced sexual activity: None   Other Topics Concern      Service Not Asked     Blood Transfusions Not Asked     Caffeine Concern Not Asked     Occupational Exposure Not Asked     Hobby Hazards Not Asked     Sleep Concern Not Asked     Stress Concern Not Asked     Weight Concern Not Asked     Special Diet Not Asked     Back Care Not Asked     Exercise Yes     Comment: 3 times per week     Bike Helmet Not Asked     Seat Belt Not Asked     Self-Exams Not Asked     Parent/sibling w/ CABG, MI or angioplasty before 65F 55M? Not Asked   Social History Narrative     Moved from [place] in August, retired  for [employer].       Review of Systems:  Skin:  Negative     Eyes:  Negative    ENT:  Negative    Respiratory:  Positive for cough  Cardiovascular:  Negative    Gastroenterology: Positive for    Genitourinary:  Negative    Musculoskeletal:  Negative    Neurologic:  Negative    Psychiatric:  Positive for anxiety  Heme/Lymph/Imm:  Negative    Endocrine:  Negative      Physical Exam:  Vitals: /62   Pulse 67   Resp 18   Wt 73.8 kg (162 lb 11.2 oz)   SpO2 95%   BMI 24.74 kg/m      Please refer to dictation for physical exam    Recent Lab Results:  LIPID RESULTS:  Lab Results   Component Value Date    CHOL 150 03/01/2019    HDL 49 03/01/2019    LDL 82 03/01/2019    TRIG 94 03/01/2019    CHOLHDLRATIO 5.2 (H) 07/23/2015       LIVER ENZYME RESULTS:  Lab Results   Component Value Date    AST Canceled, Test credited 04/03/2019    ALT Canceled, Test credited 04/03/2019       CBC RESULTS:  Lab Results   Component Value Date    WBC 5.8 09/09/2019    RBC 3.69 (L) 09/09/2019    HGB 12.0 (L) 09/09/2019    HCT 34.9 (L) 09/09/2019    MCV 95 09/09/2019    MCH 32.5 09/09/2019    MCHC 34.4 09/09/2019    RDW 13.8 09/09/2019     09/09/2019       BMP RESULTS:  Lab Results   Component Value Date     09/19/2019    POTASSIUM 3.9 09/19/2019    CHLORIDE 107 09/19/2019    CO2 29 09/19/2019    ANIONGAP 5 09/19/2019     (H) 09/19/2019    BUN 12 09/19/2019    CR 1.29 (H) 09/19/2019    GFRESTIMATED 55 (L) 09/19/2019    GFRESTBLACK 63 09/19/2019    RAHEEM 9.3 09/19/2019        A1C RESULTS:  Lab Results   Component Value Date    A1C 5.6 03/07/2014       INR RESULTS:  Lab Results   Component Value Date    INR 1.07 09/09/2019    INR 1.00 12/05/2014           CC  No referring provider defined for this encounter.        Thank you for allowing me to participate in the care of your patient.      Sincerely,     YANE SánchezC     [facility]    cc:    No referring provider defined for this encounter.

## 2019-10-24 NOTE — PROGRESS NOTES
Service Date: 10/24/2019      CLINIC VISIT      PRIMARY CARDIOLOGIST:  Dr. Fleipe for General Cardiology, Dr. Oropeza for Vascular and may switch to Dr. Oropeza long term depending on patient preference.      REASON FOR VISIT:  Hypertension, renal artery stenosis, coronary disease.      HISTORY OF PRESENT ILLNESS:  Mr. 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  Coronary disease with history of anginal equivalent being dyspnea on exertion.  A 6-vessel CAB in 2014.  This was a LIMA to the LAD, saphenous vein graft to the OM1 and OM2, saphenous vein graft to the PDA and saphenous vein graft to the D2.  He had bilateral venous harvesting of the legs.   2.  Peripheral arterial disease with history of both angioplasty and stenting of the legs.   3.  Severe carotid and vertebral disease with bilateral vertebral arteries nearly occluded, stenting of his right.   4.  Hypertension.   5.  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   6.  Dyslipidemia.  Intolerant to statins with documented myositis, on Praluent.   7.  Recent diagnosis of renal artery stenosis status post renal artery stent for a 95% proximal lesion in 09/2019.      He comes in today for followup and adjusting of blood pressure medications after his renal artery stenting.  We recently had been able to discontinue his clonidine over the phone and since then, he tells me he is feeling much better.  He overall has better energy and is not as short of breath.  He also does not seem as foggy.  He previously got dizzy around noon but this has also resolved.  His home blood pressures are ranging from the 120s to 140s, primarily around 130/60 when he just sits down.  He does have complaint of overall coolness but no specific coldness in his extremities.  He denies chest pain, shortness of breath, orthopnea or PND.  Overall, he thinks he is getting better and better.       SOCIAL HISTORY:  He comes today with his wife, Ke.  They previously lived in [place] and are happy to be back in [place].  He has a 50-pack-year history of smoking, quit in 2000.  No alcohol use.      PHYSICAL EXAMINATION:   GENERAL:  Well-developed, well-nourished gentleman in no acute distress.   HEENT:  Normocephalic, atraumatic.   HEART:  Regular in rate and rhythm with a 1/6 systolic murmur heard best at lower left sternal border.   RESPIRATORY:  Lungs are clear without wheezes, rales or rhonchi.   EXTREMITIES:  With trace peripheral edema.   SKIN:  Warm and dry.      ASSESSMENT AND PLAN:   1.  Peripheral arterial disease status post renal artery stenting for a 95% proximal left renal artery with excellent results and improvement in blood pressure as well as creatinine.  He is now off Plavix appropriately and will remain on aspirin.  He takes Pletal for his other peripheral disease and we will continue this.   2.  Coronary artery disease without recurrent anginal symptoms with history of CABG, stable.   3.  Hypertension, excellent control.  We will continue current meds which still are several including losartan 100 mg daily, Imdur 60 mg daily, carvedilol 25 mg b.i.d. and amlodipine 10.  Given his overall coldness, if we can decrease another antihypertensice, I would consider decreasing carvedilol next   4.  Carotid artery disease with CTA completed showing patent right carotid stent with just a 50% in-stent stenosis, a left carotid stenosis of about 58%, an occluded left vertebral artery and a patent right vertebral artery, all which are fairly stable.  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  Appropriately on Praluent, aspirin and Pletal for management.   5.  Dyslipidemia.  Excellent results on Praluent, refilled today.   6.  Hypokalemia of unclear etiology.  We will keep him on potassium 10 mg b.i.d.      Thank you for allowing me to participate in this delightful patient's care.  He can  follow up with Dr. Oropeza in about 6 months (this is their preference) in [facility].  He will get a lipid panel and BMP before that visit.  I am happy to answer any questions sooner should need be.      Thank you for allowing me to participate in this delightful patient's care.      ЕКАТЕРИНА Callaway PA-C             D: 10/24/2019   T: 10/24/2019   MT: SAULO      Name:     NINFA CID   MRN:      [id number]        Account:      [id number]   :      1945           Service Date: 10/24/2019      Document: [id number]

## 2019-10-24 NOTE — TELEPHONE ENCOUNTER
PA in place thru 2/18/2020 - fills with [facility] home delivery due to inability for [facility] to mail - $28 copay:

## 2019-10-24 NOTE — PATIENT INSTRUCTIONS
Thanks for coming into [facility] clinic today.     We discussed: blood pressure looks great!     Medication changes: continue current medications.      Call [facility] and ask to be seen in the irritable bowel clinic specifically.       Follow up: with Dr. Oropeza in about 6 months with labs before that visit.       Please call the clinic at  [phone] with any questions or concerns and my nurses will be happy to help.     Please call [phone] for scheduling.       Reminder: Please bring in all current medications, over the counter supplements and vitamin bottles to your next appointment.

## 2019-11-27 ENCOUNTER — TRANSFERRED RECORDS (OUTPATIENT)
Dept: HEALTH INFORMATION MANAGEMENT | Facility: CLINIC | Age: 74
End: 2019-11-27

## 2019-11-27 LAB — TSH SERPL-ACNC: 0.82 UIU/ML (ref 0.36–5)

## 2019-11-29 DIAGNOSIS — G43.009 MIGRAINE WITHOUT AURA AND WITHOUT STATUS MIGRAINOSUS, NOT INTRACTABLE: ICD-10-CM

## 2019-11-29 NOTE — TELEPHONE ENCOUNTER
"Elavil  Last Written Prescription Date:  12/07/2018  Last Fill Quantity: 180,  # refills: 3   Last office visit: 7/16/2019 with prescribing provider:     Future Office Visit:    Prescription approved per [facility] Refill Protocol.    Requested Prescriptions   Pending Prescriptions Disp Refills     amitriptyline (ELAVIL) 25 MG tablet [Pharmacy Med Name: AMITRIPTYLINE  TABS 25MG] 180 tablet 4     Sig: TAKE 1 TABLET TWICE A DAY       Tricyclic Agents ( Annual appt and no PHQ9) Passed - 11/29/2019  2:32 PM        Passed - Blood Pressure under 140/90 in past 12 mos     BP Readings from Last 3 Encounters:   10/24/19 105/62   09/19/19 124/58   09/09/19 (!) 136/95                 Passed - Recent (12 mo) or future (30 days) visit within authorizing provider's specialty     Patient has had an office visit with the authorizing provider or a provider within the authorizing providers department within the previous 12 mos or has a future within next 30 days. See \"Patient Info\" tab in inbasket, or \"Choose Columns\" in Meds & Orders section of the refill encounter.              Passed - Medication is active on med list        Passed - Patient is age 18 or older          "

## 2020-01-09 DIAGNOSIS — K21.9 GASTROESOPHAGEAL REFLUX DISEASE WITHOUT ESOPHAGITIS: ICD-10-CM

## 2020-01-09 RX ORDER — OMEPRAZOLE 40 MG/1
CAPSULE, DELAYED RELEASE ORAL
Qty: 90 CAPSULE | Refills: 2 | Status: SHIPPED | OUTPATIENT
Start: 2020-01-09 | End: 2020-08-26

## 2020-01-10 NOTE — TELEPHONE ENCOUNTER
"Prilosec  Last Written Prescription Date:  12/7/18  Last Fill Quantity: 9 # refills: 3   Last office visit: 9/6/2019 with prescribing provider:     Future Office Visit:  NONE  Requested Prescriptions   Pending Prescriptions Disp Refills     omeprazole (PRILOSEC) 40 MG DR capsule [Pharmacy Med Name: OMEPRAZOLE DR CAPS 40MG] 90 capsule 4     Sig: TAKE 1 CAPSULE DAILY       PPI Protocol Passed - 1/9/2020  1:19 PM        Passed - Not on Clopidogrel (unless Pantoprazole ordered)        Passed - No diagnosis of osteoporosis on record        Passed - Recent (12 mo) or future (30 days) visit within the authorizing provider's specialty     Patient has had an office visit with the authorizing provider or a provider within the authorizing providers department within the previous 12 mos or has a future within next 30 days. See \"Patient Info\" tab in inbasket, or \"Choose Columns\" in Meds & Orders section of the refill encounter.            Passed - Medication is active on med list        Passed - Patient is age 18 or older      Prescription approved per [facility] Refill Protocol.  VIPIN Mcbride      "

## 2020-02-10 ENCOUNTER — OFFICE VISIT (OUTPATIENT)
Dept: INTERNAL MEDICINE | Facility: CLINIC | Age: 75
End: 2020-02-10
Payer: MEDICARE

## 2020-02-10 VITALS
RESPIRATION RATE: 16 BRPM | WEIGHT: 157 LBS | SYSTOLIC BLOOD PRESSURE: 116 MMHG | DIASTOLIC BLOOD PRESSURE: 54 MMHG | TEMPERATURE: 97.1 F | OXYGEN SATURATION: 98 % | HEIGHT: 68 IN | HEART RATE: 64 BPM | BODY MASS INDEX: 23.79 KG/M2

## 2020-02-10 DIAGNOSIS — G89.29 CHRONIC RIGHT SHOULDER PAIN: Primary | ICD-10-CM

## 2020-02-10 DIAGNOSIS — M25.511 CHRONIC RIGHT SHOULDER PAIN: Primary | ICD-10-CM

## 2020-02-10 PROCEDURE — 99213 OFFICE O/P EST LOW 20 MIN: CPT | Performed by: INTERNAL MEDICINE

## 2020-02-10 ASSESSMENT — PAIN SCALES - GENERAL: PAINLEVEL: MILD PAIN (2)

## 2020-02-10 ASSESSMENT — MIFFLIN-ST. JEOR: SCORE: 1431.65

## 2020-02-10 NOTE — PROGRESS NOTES
"Subjective     Michele Vance is a 74 year old male who presents to clinic today for the following health issues:    HPI   Chief Complaint   Patient presents with     Shoulder right     right shoulder pain, requesting referral for injection       Right shoulder pain, been going on for a few months. No injury or overuse.  Pain is under the shoulder.  Had injection with Dr Selina Mandel. Patient would like to go back there again, last one worked before.      May 2019 had a glenohumeral joint injection, helped some but now has recurrent symptoms.      Past Medical History:   Diagnosis Date     Carotid stenosis     right endartectomy     Chronic kidney disease     stage 3     Coronary artery disease 3-2014    CABG x6     CVA (cerebral infarction)     balance problems, mild     Elevated CK      Esophageal reflux      Hypercholesteremia      Nonsenile cataract      Other and unspecified hyperlipidemia      PAD (peripheral artery disease) (H)      Rhabdomyolysis 2010     Unspecified essential hypertension      Current Outpatient Medications   Medication     alirocumab (PRALUENT) 150 MG/ML injectable syringe     amitriptyline (ELAVIL) 25 MG tablet     amLODIPine (NORVASC) 10 MG tablet     ASPIRIN PO     carvedilol (COREG) 25 MG tablet     cilostazol (PLETAL) 100 MG tablet     gabapentin (NEURONTIN) 300 MG capsule     isosorbide mononitrate (IMDUR) 60 MG 24 hr tablet     linaclotide (LINZESS) 145 MCG capsule     losartan (COZAAR) 100 MG tablet     omeprazole (PRILOSEC) 40 MG DR capsule     potassium chloride ER (K-DUR/KLOR-CON M) 10 MEQ CR tablet     STATIN NOT PRESCRIBED, INTENTIONAL,     polyethylene glycol (MIRALAX/GLYCOLAX) packet     No current facility-administered medications for this visit.      Physical Exam  /54   Pulse 64   Temp 97.1  F (36.2  C) (Temporal)   Resp 16   Ht 1.727 m (5' 8\")   Wt 71.2 kg (157 lb)   SpO2 98%   BMI 23.87 kg/m    General Appearance-healthy, alert, no distress  Right shoulder " has decreased rom, limited abduction and flexion to 110 degrees, then painful,   Strength is 5/5 but limited by pain,  Internal rotation has lots of pain.     ASSESSMENT:  Chronic right shoulder pains, has had a glenohumeral injection last May, got some relief.  Continue to have pain the last few months, [facility] for [facility] new provider there to help with shoulder pain.  He want a fluoroscopically guided injection again.  They understand he will need a consult before the injection.  Pain is manageable til see per the patient.    Electronically signed by Jonas West MD

## 2020-02-13 ENCOUNTER — TRANSFERRED RECORDS (OUTPATIENT)
Dept: HEALTH INFORMATION MANAGEMENT | Facility: CLINIC | Age: 75
End: 2020-02-13

## 2020-02-14 ENCOUNTER — TELEPHONE (OUTPATIENT)
Dept: INTERNAL MEDICINE | Facility: CLINIC | Age: 75
End: 2020-02-14

## 2020-02-14 DIAGNOSIS — G89.29 CHRONIC RIGHT SHOULDER PAIN: Primary | ICD-10-CM

## 2020-02-14 DIAGNOSIS — M25.511 CHRONIC RIGHT SHOULDER PAIN: Primary | ICD-10-CM

## 2020-02-14 NOTE — TELEPHONE ENCOUNTER
Loren from [facility] calls to state Michele did not want to be referred to them, he wanted to be referred to pain clinic [facility].

## 2020-02-14 NOTE — TELEPHONE ENCOUNTER
Reason for Call: Request for an order or referral:    Order or referral being requested: [facility]     Date needed: at your convenience    Has the patient been seen by the PCP for this problem? YES    Additional comments: Pt was seen on 2/10 and referred to [facility]. He would like to stay with [facility]. Please place new order.     Phone number Patient/ spouse can be reached at:  Home number on file [phone] (home)    Best Time:  Any     Can we leave a detailed message on this number?  YES    Call taken on 2/14/2020 at 1:50 PM by Sera Gagnon

## 2020-02-25 ENCOUNTER — TRANSFERRED RECORDS (OUTPATIENT)
Dept: HEALTH INFORMATION MANAGEMENT | Facility: CLINIC | Age: 75
End: 2020-02-25

## 2020-03-01 ENCOUNTER — HEALTH MAINTENANCE LETTER (OUTPATIENT)
Age: 75
End: 2020-03-01

## 2020-03-04 ENCOUNTER — TELEPHONE (OUTPATIENT)
Dept: CARDIOLOGY | Facility: CLINIC | Age: 75
End: 2020-03-04

## 2020-03-04 NOTE — TELEPHONE ENCOUNTER
Prior Authorization Approval    Authorization Effective Date: 3/3/2020  Authorization Expiration Date: 3/3/2021  Medication: Praluent 150MG/ML Pen Injector (APPROVED)  Approved Dose/Quantity: 2ML/28 days  Reference #:     Insurance Company:  - Phone [phone] Fax [phone]  Expected CoPay:       CoPay Card Available:      Foundation Assistance Needed:    Which Pharmacy is filling the prescription (Not needed for infusion/clinic administered): [facility] - [address] [place] [address]  Pharmacy Notified:    Patient Notified:

## 2020-03-06 ENCOUNTER — TELEPHONE (OUTPATIENT)
Dept: CARDIOLOGY | Facility: CLINIC | Age: 75
End: 2020-03-06

## 2020-03-06 ENCOUNTER — HOSPITAL ENCOUNTER (EMERGENCY)
Facility: CLINIC | Age: 75
Discharge: HOME | End: 2020-03-06
Attending: EMERGENCY MEDICINE | Admitting: EMERGENCY MEDICINE
Payer: MEDICARE

## 2020-03-06 VITALS
RESPIRATION RATE: 16 BRPM | SYSTOLIC BLOOD PRESSURE: 145 MMHG | TEMPERATURE: 97.9 F | BODY MASS INDEX: 25.54 KG/M2 | HEART RATE: 63 BPM | OXYGEN SATURATION: 97 % | DIASTOLIC BLOOD PRESSURE: 74 MMHG | WEIGHT: 168 LBS

## 2020-03-06 DIAGNOSIS — E87.6 HYPOKALEMIA: ICD-10-CM

## 2020-03-06 DIAGNOSIS — I73.9 PAD (PERIPHERAL ARTERY DISEASE) (H): ICD-10-CM

## 2020-03-06 DIAGNOSIS — E78.5 HYPERLIPIDEMIA LDL GOAL <130: ICD-10-CM

## 2020-03-06 LAB
ALBUMIN SERPL-MCNC: 3.1 G/DL (ref 3.4–5)
ALP SERPL-CCNC: 227 U/L (ref 40–150)
ALT SERPL W P-5'-P-CCNC: 83 U/L (ref 0–70)
ALT SERPL W P-5'-P-CCNC: 88 U/L (ref 0–70)
ANION GAP SERPL CALCULATED.3IONS-SCNC: 6 MMOL/L (ref 3–14)
ANION GAP SERPL CALCULATED.3IONS-SCNC: 8 MMOL/L (ref 3–14)
AST SERPL W P-5'-P-CCNC: 45 U/L (ref 0–45)
BILIRUB SERPL-MCNC: 0.6 MG/DL (ref 0.2–1.3)
BUN SERPL-MCNC: 9 MG/DL (ref 7–30)
BUN SERPL-MCNC: 9 MG/DL (ref 7–30)
CALCIUM SERPL-MCNC: 8.6 MG/DL (ref 8.5–10.1)
CALCIUM SERPL-MCNC: 8.6 MG/DL (ref 8.5–10.1)
CHLORIDE SERPL-SCNC: 107 MMOL/L (ref 94–109)
CHLORIDE SERPL-SCNC: 108 MMOL/L (ref 94–109)
CHOLEST SERPL-MCNC: 161 MG/DL
CO2 SERPL-SCNC: 26 MMOL/L (ref 20–32)
CO2 SERPL-SCNC: 33 MMOL/L (ref 20–32)
CREAT SERPL-MCNC: 1.18 MG/DL (ref 0.66–1.25)
CREAT SERPL-MCNC: 1.35 MG/DL (ref 0.66–1.25)
GFR SERPL CREATININE-BSD FRML MDRD: 50 ML/MIN/{1.73_M2}
GFR SERPL CREATININE-BSD FRML MDRD: 61 ML/MIN/{1.73_M2}
GLUCOSE SERPL-MCNC: 104 MG/DL (ref 70–99)
GLUCOSE SERPL-MCNC: 175 MG/DL (ref 70–99)
HDLC SERPL-MCNC: 53 MG/DL
LDLC SERPL CALC-MCNC: 95 MG/DL
MAGNESIUM SERPL-MCNC: 1.7 MG/DL (ref 1.6–2.3)
NONHDLC SERPL-MCNC: 108 MG/DL
POTASSIUM SERPL-SCNC: 2.4 MMOL/L (ref 3.4–5.3)
POTASSIUM SERPL-SCNC: 2.6 MMOL/L (ref 3.4–5.3)
PROT SERPL-MCNC: 7.1 G/DL (ref 6.8–8.8)
SODIUM SERPL-SCNC: 141 MMOL/L (ref 133–144)
SODIUM SERPL-SCNC: 147 MMOL/L (ref 133–144)
TRIGL SERPL-MCNC: 65 MG/DL

## 2020-03-06 PROCEDURE — 25800030 ZZH RX IP 258 OP 636: Performed by: EMERGENCY MEDICINE

## 2020-03-06 PROCEDURE — 96365 THER/PROPH/DIAG IV INF INIT: CPT | Performed by: EMERGENCY MEDICINE

## 2020-03-06 PROCEDURE — 25000132 ZZH RX MED GY IP 250 OP 250 PS 637: Mod: GY | Performed by: EMERGENCY MEDICINE

## 2020-03-06 PROCEDURE — 80053 COMPREHEN METABOLIC PANEL: CPT | Performed by: EMERGENCY MEDICINE

## 2020-03-06 PROCEDURE — 99284 EMERGENCY DEPT VISIT MOD MDM: CPT | Mod: Z6 | Performed by: EMERGENCY MEDICINE

## 2020-03-06 PROCEDURE — 83735 ASSAY OF MAGNESIUM: CPT | Performed by: EMERGENCY MEDICINE

## 2020-03-06 PROCEDURE — 36415 COLL VENOUS BLD VENIPUNCTURE: CPT | Performed by: PHYSICIAN ASSISTANT

## 2020-03-06 PROCEDURE — 25000128 H RX IP 250 OP 636: Performed by: EMERGENCY MEDICINE

## 2020-03-06 PROCEDURE — 80048 BASIC METABOLIC PNL TOTAL CA: CPT | Performed by: PHYSICIAN ASSISTANT

## 2020-03-06 PROCEDURE — 99284 EMERGENCY DEPT VISIT MOD MDM: CPT | Mod: 25 | Performed by: EMERGENCY MEDICINE

## 2020-03-06 PROCEDURE — 96366 THER/PROPH/DIAG IV INF ADDON: CPT | Performed by: EMERGENCY MEDICINE

## 2020-03-06 PROCEDURE — 84460 ALANINE AMINO (ALT) (SGPT): CPT | Performed by: PHYSICIAN ASSISTANT

## 2020-03-06 PROCEDURE — 80061 LIPID PANEL: CPT | Performed by: PHYSICIAN ASSISTANT

## 2020-03-06 RX ORDER — POTASSIUM CHLORIDE 1500 MG/1
20-40 TABLET, EXTENDED RELEASE ORAL
Status: DISCONTINUED | OUTPATIENT
Start: 2020-03-06 | End: 2020-03-06 | Stop reason: HOSPADM

## 2020-03-06 RX ORDER — POTASSIUM CL/LIDO/0.9 % NACL 10MEQ/0.1L
10 INTRAVENOUS SOLUTION, PIGGYBACK (ML) INTRAVENOUS
Status: DISCONTINUED | OUTPATIENT
Start: 2020-03-06 | End: 2020-03-06 | Stop reason: HOSPADM

## 2020-03-06 RX ORDER — SODIUM CHLORIDE 9 MG/ML
1000 INJECTION, SOLUTION INTRAVENOUS CONTINUOUS
Status: DISCONTINUED | OUTPATIENT
Start: 2020-03-06 | End: 2020-03-06 | Stop reason: HOSPADM

## 2020-03-06 RX ADMIN — Medication 10 MEQ: at 13:37

## 2020-03-06 RX ADMIN — POTASSIUM CHLORIDE 40 MEQ: 1500 TABLET, EXTENDED RELEASE ORAL at 14:35

## 2020-03-06 RX ADMIN — SODIUM CHLORIDE 1000 ML: 9 INJECTION, SOLUTION INTRAVENOUS at 12:36

## 2020-03-06 RX ADMIN — POTASSIUM CHLORIDE 40 MEQ: 1500 TABLET, EXTENDED RELEASE ORAL at 12:35

## 2020-03-06 RX ADMIN — Medication 10 MEQ: at 12:38

## 2020-03-06 ASSESSMENT — ENCOUNTER SYMPTOMS
CHEST TIGHTNESS: 0
FEVER: 0
DIARRHEA: 0
ABDOMINAL PAIN: 0
JOINT SWELLING: 0
MYALGIAS: 1
ARTHRALGIAS: 0
NUMBNESS: 0
LIGHT-HEADEDNESS: 0
SHORTNESS OF BREATH: 0
ABDOMINAL DISTENTION: 0
PALPITATIONS: 0
TREMORS: 0
WEAKNESS: 0
DIFFICULTY URINATING: 0
FLANK PAIN: 0
BLOOD IN STOOL: 0
ACTIVITY CHANGE: 0
CHILLS: 0
APPETITE CHANGE: 0

## 2020-03-06 NOTE — ED AVS SNAPSHOT
[facility] Emergency Department  [address] [place] [address]  Phone:  [phone]  Fax:  [phone]                                    Michele Vance   MRN: [id number]    Department:  [facility] Emergency Department   Date of Visit:  3/6/2020           After Visit Summary Signature Page    I have received my discharge instructions, and my questions have been answered. I have discussed any challenges I see with this plan with the nurse or doctor.    ..........................................................................................................................................  Patient/Patient Representative Signature      ..........................................................................................................................................  Patient Representative Print Name and Relationship to Patient    ..................................................               ................................................  Date                                   Time    ..........................................................................................................................................  Reviewed by Signature/Title    ...................................................              ..............................................  Date                                               Time          22EPIC Rev 08/18

## 2020-03-06 NOTE — ED NOTES
Provider wants potassium 40meq po times one now and potassium 10meq IV times two given ( 10meq every hour times two) and then states will decide what she wants after that.

## 2020-03-06 NOTE — DISCHARGE INSTRUCTIONS
Increase your potassium to 20 mg twice daily.  Okay to to start tonight.    I will send a message to Dr. West about your ED visit.  I will have him send a new prescription to your pharmacy and also set you up to repeat your potassium level next week.    Return at anytime for concerns.    I hope you have a good weekend!!

## 2020-03-06 NOTE — TELEPHONE ENCOUNTER
Christiane Coffey's  patient and Dr. Oropeza Cardiologist recommended BMP and FLP in 6 months labs today drawn in [facility] [place] [facility] 2.4 Christiane Coffey off today  Contacted PM[facility] Dr. West he agrees with plan to send to ED for IV potassium.  Contacted wife Veronica via phone and agrees with plan of care and will take patient to ED.

## 2020-03-06 NOTE — ED PROVIDER NOTES
"  History     Chief Complaint   Patient presents with     Abnormal Labs     HPI   History per patient and medical records    Michele Vance is a 74 year old male who presents with abnormal labs. He had routine labs drawn for an upcoming cardiology appointment. He was noted to have a potassium of 2.4. He has had hypokalemia in the past and takes potasium 10 mEq tabs 2 times a day. He has recently started Linzess and has had increased formed bowel movements in the past 1 week. He reports that he noticed muscle cramping starting last week and has had occasional muscle cramps since that time. He denies loose stools or nausea and vomiting. He denies shortness of breath, chest pain, or palpitations.     Allergies:  Allergies   Allergen Reactions     Hmg-Coa-R Inhibitors Other (See Comments)     Rhabdo  Same as \"statins\"     Gemfibrozil      Resting thigh-calf pain     Sulfa Drugs      Reacted as a child     Zetia [Ezetimibe]      Muscle cramping       Problem List:    Patient Active Problem List    Diagnosis Date Noted     Renal artery stenosis (H) 09/05/2019     Priority: Medium     Added automatically from request for surgery [id number]       Carotid stenosis      Priority: Medium     right endartectomy       Essential hypertension with goal blood pressure less than 140/90 06/02/2016     Priority: Medium     PVD (peripheral vascular disease) (H) 07/22/2015     Priority: Medium     Chronic constipation 12/24/2014     Priority: Medium     Irritable bowel syndrome 12/24/2014     Priority: Medium     Health Care Home 09/11/2014     Priority: Medium     Status:  Unable to reach  Care Coordinator:  Erika Marcus    See Letters for [facility] Care Plan  Date:  September 11, 2014         Carotid artery occlusion with cerebral infarction (H) 08/06/2014     Priority: Medium     Dizziness 08/06/2014     Priority: Medium     Panlobular emphysema (H) 06/04/2014     Priority: Medium     Cerebral infarction due to occlusion or stenosis of " carotid artery 05/07/2014     Priority: Medium     Problem list name updated by automated process. Provider to review       Stroke, embolic (H) 04/25/2014     Priority: Medium     Mesenteric artery stenosis (H) 04/04/2014     Priority: Medium     2014: Asymptomatic SMA and MELI stenoses, meriting clinical FU       Insomnia 03/12/2014     Priority: Medium     Nutritional deficiency 03/12/2014     Priority: Medium     Pain 03/12/2014     Priority: Medium     Urinary retention 03/12/2014     Priority: Medium     S/P CABG x 6 03/06/2014     Priority: Medium     Left main coronary artery disease 03/04/2014     Priority: Medium     PAD (peripheral artery disease) (H) 03/04/2014     Priority: Medium     2/19/14: RUBEN 0.73 LLE       Arthritis 01/14/2013     Priority: Medium     Carotid bruit 01/14/2013     Priority: Medium     Carotid stenosis, bilateral 01/14/2013     Priority: Medium     Anemia 04/06/2012     Priority: Medium     CKD (chronic kidney disease) stage 3, GFR 30-59 ml/min (H) 04/06/2012     Priority: Medium     Advanced directives, counseling/discussion 04/05/2012     Priority: Medium     Impingement syndrome, shoulder, right 03/16/2011     Priority: Medium     Hypertension 11/22/2010     Priority: Medium     Hyperlipidemia LDL goal <130 11/22/2010     Priority: Medium     Myalgia and myositis 11/22/2010     Priority: Medium     GERD (gastroesophageal reflux disease) 11/22/2010     Priority: Medium        Past Medical History:    Past Medical History:   Diagnosis Date     Carotid stenosis      Chronic kidney disease      Coronary artery disease 3-2014     CVA (cerebral infarction)      Elevated CK      Esophageal reflux      Hypercholesteremia      Nonsenile cataract      Other and unspecified hyperlipidemia      PAD (peripheral artery disease) (H)      Rhabdomyolysis 2010     Unspecified essential hypertension        Past Surgical History:    Past Surgical History:   Procedure Laterality Date     APPENDECTOMY   1974     BYPASS GRAFT ARTERY CORONARY  3/5/2014    Procedure: BYPASS GRAFT ARTERY CORONARY;  Median Sternotomy, Coronary Artery Bypass Graft X6 used Left internal mammary artery, Left and Right Greater Saphenous vein, on Pump oxygenator.;  Surgeon: Tim Montanez MD;  Location: [facility] OR     CATARACT IOL, RT/LT Bilateral 2008    in [place]     cataracts       COLONOSCOPY  12/12/02    [facility]     COLONOSCOPY N/A 10/7/2014    Procedure: COMBINED COLONOSCOPY, SINGLE OR MULTIPLE BIOPSY/POLYPECTOMY BY BIOPSY;  Surgeon: Micheal Mora MD;  Location:  GI     CV ANGIOGRAM LOWER EXTREMITY Bilateral 9/9/2019    Procedure: Lower Extremity Angiogram Bilateral;  Surgeon: Julio Oropeza MD;  Location:  HEART CARDIAC CATH LAB     DENTAL SURGERY      TMJ, implants     ENDARTERECTOMY CAROTID      right carotid stent     ESOPHAGOSCOPY, GASTROSCOPY, DUODENOSCOPY (EGD), COMBINED Left 10/7/2014    Procedure: COMBINED ESOPHAGOSCOPY, GASTROSCOPY, DUODENOSCOPY (EGD), BIOPSY SINGLE OR MULTIPLE;  Surgeon: Micheal Mora MD;  Location:  GI     ESOPHAGOSCOPY, GASTROSCOPY, DUODENOSCOPY (EGD), COMBINED Left 10/7/2014    Procedure: COMBINED ESOPHAGOSCOPY, GASTROSCOPY, DUODENOSCOPY (EGD), REMOVE FOREIGN BODY;  Surgeon: Micheal Mora MD;  Location:  GI     HC CAPSULE ENDOSCOPY N/A 10/7/2014    Procedure: CAPSULE/PILL CAM ENDOSCOPY;  Surgeon: Micheal Mora MD;  Location:  GI      UGI ENDOSCOPY, SIMPLE EXAM  01/08/99    [facility]     INJECT EPIDURAL LUMBAR Right 5/8/2017    Procedure: INJECT EPIDURAL LUMBAR;  Lumbar transforaminal Epidural Steroid Injection right lumbar 4-5, and right  lumbar 5-Sacral 1;  Surgeon: Anthony Gonzalez MD;  Location: [facility] OR     INJECT JOINT SACROILIAC Bilateral 8/28/2017    Procedure: INJECT JOINT SACROILIAC;  sacroiliac joint injection bilateral;  Surgeon: Anthony Gonzalez MD;  Location: [facility] OR     KNEE SURGERY  1976    left knee reconstruction     lasix  2001     both eyes     RELEASE CARPAL TUNNEL  2011    RELEASE CARPAL TUNNEL performed by JO MADRID at  OR     RELEASE CARPAL TUNNEL  2011    RELEASE CARPAL TUNNEL performed by JO MADRID at  OR       Family History:    Family History   Problem Relation Age of Onset     Hypertension Mother      Eye Disorder Mother      Cerebrovascular Disease Father      Heart Disease Father      Lipids Father      Obesity Father      Cancer Sister      Heart Disease Sister      Glaucoma No family hx of      Macular Degeneration No family hx of      Kidney Disease No family hx of        Social History:  Marital Status:   [2]  Social History     Tobacco Use     Smoking status: Former Smoker     Packs/day: 2.50     Years: 20.00     Pack years: 50.00     Types: Cigarettes     Last attempt to quit: 2000     Years since quittin.1     Smokeless tobacco: Never Used   Substance Use Topics     Alcohol use: No     Alcohol/week: 0.0 standard drinks     Drug use: No        Medications:    alirocumab (PRALUENT) 150 MG/ML injectable syringe  amitriptyline (ELAVIL) 25 MG tablet  amLODIPine (NORVASC) 10 MG tablet  ASPIRIN PO  carvedilol (COREG) 25 MG tablet  cilostazol (PLETAL) 100 MG tablet  gabapentin (NEURONTIN) 300 MG capsule  isosorbide mononitrate (IMDUR) 60 MG 24 hr tablet  linaclotide (LINZESS) 145 MCG capsule  losartan (COZAAR) 100 MG tablet  omeprazole (PRILOSEC) 40 MG DR capsule  polyethylene glycol (MIRALAX/GLYCOLAX) packet  potassium chloride ER (K-DUR/KLOR-CON M) 10 MEQ CR tablet  STATIN NOT PRESCRIBED, INTENTIONAL,          Review of Systems   Constitutional: Negative for activity change, appetite change, chills and fever.   Respiratory: Negative for chest tightness and shortness of breath.    Cardiovascular: Negative for chest pain and palpitations.   Gastrointestinal: Negative for abdominal distention, abdominal pain, blood in stool and diarrhea.   Genitourinary: Negative for difficulty urinating  and flank pain.   Musculoskeletal: Positive for myalgias. Negative for arthralgias, gait problem and joint swelling.   Neurological: Negative for tremors, weakness, light-headedness and numbness.   All other systems reviewed and are negative.      Physical Exam   BP: (!) 155/71  Pulse: 60  Heart Rate: 56  Temp: 97.9  F (36.6  C)  Resp: 14  Weight: 76.2 kg (168 lb)  SpO2: 97 %      Physical Exam  Vitals signs reviewed.   Constitutional:       Appearance: Normal appearance.   HENT:      Head: Normocephalic and atraumatic.   Cardiovascular:      Rate and Rhythm: Normal rate and regular rhythm.      Pulses: Normal pulses.           Radial pulses are 2+ on the right side and 2+ on the left side.      Heart sounds: Murmur present. Crescendo  systolic murmur present with a grade of 3/6.   Pulmonary:      Effort: Pulmonary effort is normal.      Breath sounds: Normal breath sounds and air entry.   Musculoskeletal:      Right lower leg: No edema.      Left lower leg: No edema.   Skin:     General: Skin is warm and dry.      Capillary Refill: Capillary refill takes less than 2 seconds.   Neurological:      Mental Status: He is alert and oriented to person, place, and time.   Psychiatric:         Attention and Perception: Attention normal.         Mood and Affect: Mood normal.         Speech: Speech normal.         Behavior: Behavior normal.         ED Course (with Medical Decision Making)    Pt seen and examined by me.  RN and EPIC notes reviewed.      Patient was interviewed to gather HPI and ROS details. Physical exam was preformed and there were no acute findings at this time. We did redraw a CMP and magnesium to recheck prior to supplementation. He did receive potassium 10 mEq IV x2 doses as well as 40 mEq orally x 2.   He tolerated the replacement without any difficulty. We did consult with pharmacy to inquire about the possibility of Linzess contributing to his hypokalemia.  This was not an obvious issue.    I am going  to increase his potassium to 20 mg twice daily.  I will send a message to his primary care provider so he can have a recheck next week.       Results for orders placed or performed during the hospital encounter of 03/06/20 (from the past 24 hour(s))   Magnesium   Result Value Ref Range    Magnesium 1.7 1.6 - 2.3 mg/dL   Comprehensive metabolic panel   Result Value Ref Range    Sodium 141 133 - 144 mmol/L    Potassium 2.6 (LL) 3.4 - 5.3 mmol/L    Chloride 107 94 - 109 mmol/L    Carbon Dioxide 26 20 - 32 mmol/L    Anion Gap 8 3 - 14 mmol/L    Glucose 175 (H) 70 - 99 mg/dL    Urea Nitrogen 9 7 - 30 mg/dL    Creatinine 1.18 0.66 - 1.25 mg/dL    GFR Estimate 61 >60 mL/min/[1.73_m2]    GFR Estimate If Black 70 >60 mL/min/[1.73_m2]    Calcium 8.6 8.5 - 10.1 mg/dL    Bilirubin Total 0.6 0.2 - 1.3 mg/dL    Albumin 3.1 (L) 3.4 - 5.0 g/dL    Protein Total 7.1 6.8 - 8.8 g/dL    Alkaline Phosphatase 227 (H) 40 - 150 U/L    ALT 83 (H) 0 - 70 U/L    AST 45 0 - 45 U/L       Medications   potassium chloride ER (KLOR-CON M) CR tablet 20-40 mEq (40 mEq Oral Given 3/6/20 1435)   potassium chloride 10 mEq in 100 mL intermittent infusion with 10 mg lidocaine (0 mEq Intravenous Stopped 3/6/20 1437)   sodium chloride 0.9% infusion (0 mLs Intravenous Stopped 3/6/20 1447)       Assessments & Plan (with Medical Decision Making)  74 year old male with a history of hypokalemia referred from the clinic for hypokalemia found on routine lab work for an upcoming cardiology appointment. His redraw showed a potassium of 2.6. The only subjective complaint he had regarding this was occasional muscle cramping over the past 2 weeks. He did start Linzess recently for IBS, but has not had any increased loose stools. He tolerated the IV potassium replacement and was given PO supplementation. He was instructed to increase his home dose to 20 mEq twice a day. He was discharged to home in satisfactory condition.         I have reviewed the findings, diagnosis,  plan and need for follow up with the patient.    Discharge Medication List as of 3/6/2020  2:57 PM          Final diagnoses:   Hypokalemia     Disposition: Patient discharged home in stable condition.  Plan as above.  Return for concerns.     Note: Chart documentation done in part with Dragon Voice Recognition software. Although reviewed after completion, some word and grammatical errors may remain.     3/6/2020   [facility] EMERGENCY DEPARTMENT     Mercedes Salcido MD  03/07/20 0048

## 2020-03-11 ENCOUNTER — TELEPHONE (OUTPATIENT)
Dept: INTERNAL MEDICINE | Facility: CLINIC | Age: 75
End: 2020-03-11

## 2020-03-11 DIAGNOSIS — I10 ESSENTIAL HYPERTENSION, BENIGN: Primary | ICD-10-CM

## 2020-03-11 DIAGNOSIS — I10 ESSENTIAL HYPERTENSION, BENIGN: ICD-10-CM

## 2020-03-11 DIAGNOSIS — E87.6 HYPOKALEMIA: ICD-10-CM

## 2020-03-11 LAB
ANION GAP SERPL CALCULATED.3IONS-SCNC: 5 MMOL/L (ref 3–14)
BUN SERPL-MCNC: 18 MG/DL (ref 7–30)
CALCIUM SERPL-MCNC: 8.9 MG/DL (ref 8.5–10.1)
CHLORIDE SERPL-SCNC: 109 MMOL/L (ref 94–109)
CO2 SERPL-SCNC: 29 MMOL/L (ref 20–32)
CREAT SERPL-MCNC: 1.39 MG/DL (ref 0.66–1.25)
GFR SERPL CREATININE-BSD FRML MDRD: 50 ML/MIN/{1.73_M2}
GLUCOSE SERPL-MCNC: 132 MG/DL (ref 70–99)
POTASSIUM SERPL-SCNC: 2.8 MMOL/L (ref 3.4–5.3)
SODIUM SERPL-SCNC: 143 MMOL/L (ref 133–144)

## 2020-03-11 PROCEDURE — 80048 BASIC METABOLIC PNL TOTAL CA: CPT | Performed by: INTERNAL MEDICINE

## 2020-03-11 PROCEDURE — 36415 COLL VENOUS BLD VENIPUNCTURE: CPT | Performed by: INTERNAL MEDICINE

## 2020-03-11 NOTE — TELEPHONE ENCOUNTER
Patient's wife was informed that lab orders have been placed and he can do that at any time. She declined to schedule a lab appointment for him at this time.    Grady Martínez, CMA

## 2020-03-11 NOTE — TELEPHONE ENCOUNTER
Patient's wife calling back, there is a consent to communicate on file, gave message below and made a lab only appointment for patient on Friday.

## 2020-03-11 NOTE — TELEPHONE ENCOUNTER
Reason for Call:  Other call back    Detailed comments: Patient was in ED Friday and was told to follow up with lab work to check Potassium levels. Can PCP place orders for this?  Please call patients wife Michelle back to discuss.     Phone Number Patient can be reached at: Cell number on file:    Telephone Information:   Mobile [phone]       Best Time: any     Can we leave a detailed message on this number? YES    Call taken on 3/11/2020 at 11:05 AM by Kayla Bustamante

## 2020-03-11 NOTE — TELEPHONE ENCOUNTER
----- Message from Jonas West MD sent at 3/11/2020  3:40 PM CDT -----  Please call and let him know his potassium is still low at 2.8.  I would have him increase from 20 mEq potassium chloride twice a day to 40 mEq twice a day.  Recheck his potassium on Friday.

## 2020-03-13 ENCOUNTER — TELEPHONE (OUTPATIENT)
Dept: INTERNAL MEDICINE | Facility: CLINIC | Age: 75
End: 2020-03-13

## 2020-03-13 DIAGNOSIS — E87.6 HYPOKALEMIA: ICD-10-CM

## 2020-03-13 DIAGNOSIS — I10 ESSENTIAL HYPERTENSION, BENIGN: ICD-10-CM

## 2020-03-13 DIAGNOSIS — E87.6 HYPOKALEMIA: Primary | ICD-10-CM

## 2020-03-13 LAB — POTASSIUM SERPL-SCNC: 2.9 MMOL/L (ref 3.4–5.3)

## 2020-03-13 PROCEDURE — 36415 COLL VENOUS BLD VENIPUNCTURE: CPT | Performed by: INTERNAL MEDICINE

## 2020-03-13 PROCEDURE — 84132 ASSAY OF SERUM POTASSIUM: CPT | Performed by: INTERNAL MEDICINE

## 2020-03-13 RX ORDER — POTASSIUM CHLORIDE 750 MG/1
TABLET, EXTENDED RELEASE ORAL
COMMUNITY
Start: 2020-03-13 | End: 2020-03-23

## 2020-03-13 NOTE — TELEPHONE ENCOUNTER
Patient's wife (C2C on file) was informed that potassium is slightly better but still low at 2.9. He should increase to 50 mEq twice a day and recheck a potassium next week. Medication record is updated. Wife states that they have enough medication at home for now, they do not need a refill.    Please review and approve lab order if appropriate then close encounter.    Thank you.    Grady Martínez CMA

## 2020-03-13 NOTE — TELEPHONE ENCOUNTER
----- Message from Jonas West MD sent at 3/13/2020 10:48 AM CDT -----  Please call him and discuss potassium is slightly better but still low at 2.9.  He should increase to 50 mEq twice a day and recheck a potassium next week.  Please update his medication record

## 2020-03-18 DIAGNOSIS — E87.6 HYPOKALEMIA: ICD-10-CM

## 2020-03-18 LAB — POTASSIUM SERPL-SCNC: 3.9 MMOL/L (ref 3.4–5.3)

## 2020-03-18 PROCEDURE — 84132 ASSAY OF SERUM POTASSIUM: CPT | Performed by: INTERNAL MEDICINE

## 2020-03-18 PROCEDURE — 36415 COLL VENOUS BLD VENIPUNCTURE: CPT | Performed by: INTERNAL MEDICINE

## 2020-03-23 ENCOUNTER — MYC MEDICAL ADVICE (OUTPATIENT)
Dept: INTERNAL MEDICINE | Facility: CLINIC | Age: 75
End: 2020-03-23

## 2020-03-23 DIAGNOSIS — E87.6 HYPOKALEMIA: ICD-10-CM

## 2020-03-23 RX ORDER — POTASSIUM CHLORIDE 750 MG/1
50 TABLET, EXTENDED RELEASE ORAL 2 TIMES DAILY
Qty: 300 TABLET | Refills: 3 | Status: SHIPPED | OUTPATIENT
Start: 2020-03-23 | End: 2020-06-25

## 2020-03-31 ENCOUNTER — MYC MEDICAL ADVICE (OUTPATIENT)
Dept: INTERNAL MEDICINE | Facility: CLINIC | Age: 75
End: 2020-03-31

## 2020-03-31 DIAGNOSIS — E87.6 HYPOKALEMIA: ICD-10-CM

## 2020-03-31 DIAGNOSIS — I10 ESSENTIAL HYPERTENSION, BENIGN: Primary | ICD-10-CM

## 2020-04-02 DIAGNOSIS — E87.6 HYPOKALEMIA: ICD-10-CM

## 2020-04-02 DIAGNOSIS — I10 ESSENTIAL HYPERTENSION, BENIGN: ICD-10-CM

## 2020-04-02 LAB
ANION GAP SERPL CALCULATED.3IONS-SCNC: 7 MMOL/L (ref 3–14)
BUN SERPL-MCNC: 14 MG/DL (ref 7–30)
CALCIUM SERPL-MCNC: 8.9 MG/DL (ref 8.5–10.1)
CHLORIDE SERPL-SCNC: 109 MMOL/L (ref 94–109)
CO2 SERPL-SCNC: 28 MMOL/L (ref 20–32)
CREAT SERPL-MCNC: 1.33 MG/DL (ref 0.66–1.25)
GFR SERPL CREATININE-BSD FRML MDRD: 52 ML/MIN/{1.73_M2}
GLUCOSE SERPL-MCNC: 141 MG/DL (ref 70–99)
POTASSIUM SERPL-SCNC: 3.2 MMOL/L (ref 3.4–5.3)
SODIUM SERPL-SCNC: 144 MMOL/L (ref 133–144)

## 2020-04-02 PROCEDURE — 80048 BASIC METABOLIC PNL TOTAL CA: CPT | Performed by: INTERNAL MEDICINE

## 2020-04-02 PROCEDURE — 36415 COLL VENOUS BLD VENIPUNCTURE: CPT | Performed by: INTERNAL MEDICINE

## 2020-04-08 NOTE — TELEPHONE ENCOUNTER
*A PA is not needed for this medication, Brand name Viagra is covered. The pharmacy confirmed that they received a paid claim with a $11.00 copay*      Prior Authorization Not Needed per Insurance    Medication: sildenafil (VIAGRA) 100 MG tablet - PA NOT NEEDED   Insurance Company: [employer] - Phone [phone] Fax [phone]  Expected CoPay:      Pharmacy Filling the Rx: [facility] - [id number]  Pharmacy Notified: Yes  Patient Notified: Yes       Reason for call:  Patient reporting a symptom    Symptom or request: Has an appt next week - was wondering - she has a test in diagnostics tomorrow.  She would like to go in as less as possible. 6 months pregnant.  States she has symptoms similar to having preeclampsia.  Side pain by her liver that radiates back to where her right kidney is.  Wondering if she could have a urine test before appt?  [facility] at 8am.    Duration (how long have symptoms been present):     Have you been treated for this before? Yes    Additional comments:     Phone Number patient can be reached at:  Home number on file [phone] (home)    Best Time:      Can we leave a detailed message on this number:  YES    Call taken on 4/8/2020 at 3:05 PM by Rakel Rincon

## 2020-04-14 ENCOUNTER — TELEPHONE (OUTPATIENT)
Dept: CARDIOLOGY | Facility: CLINIC | Age: 75
End: 2020-04-14

## 2020-04-14 NOTE — TELEPHONE ENCOUNTER
Spoke with wife per request of schedulers. Confusion on need for upcoming visit with Dr. Oropeza. Thought it was secondary to March labs, which showed a low potasium. Informed her that Dr. West is following the low potassium, which was back to 3.2 on 4/2/20. Patient remains on 50 meq of kCL twice daily. Planning on rechecking by the end of April.   Agreed to 6 mo follow up with Dr. Oropeza. No labs or tests were pre ordered. Lipids were done in March.   Will have scheduling call back to schedule video visit.

## 2020-04-20 ENCOUNTER — MYC MEDICAL ADVICE (OUTPATIENT)
Dept: INTERNAL MEDICINE | Facility: CLINIC | Age: 75
End: 2020-04-20

## 2020-04-20 DIAGNOSIS — F51.01 PRIMARY INSOMNIA: ICD-10-CM

## 2020-04-20 RX ORDER — ZOLPIDEM TARTRATE 5 MG/1
5 TABLET ORAL
Qty: 90 TABLET | Refills: 1 | Status: SHIPPED | OUTPATIENT
Start: 2020-04-20 | End: 2020-08-19 | Stop reason: SINTOL

## 2020-05-07 ENCOUNTER — VIRTUAL VISIT (OUTPATIENT)
Dept: FAMILY MEDICINE | Facility: CLINIC | Age: 75
End: 2020-05-07
Payer: MEDICARE

## 2020-05-07 DIAGNOSIS — E87.6 HYPOKALEMIA: ICD-10-CM

## 2020-05-07 DIAGNOSIS — R42 DIZZINESS: Primary | ICD-10-CM

## 2020-05-07 DIAGNOSIS — I73.9 PERIPHERAL VASCULAR DISEASE (H): ICD-10-CM

## 2020-05-07 PROCEDURE — 99214 OFFICE O/P EST MOD 30 MIN: CPT | Mod: 95 | Performed by: FAMILY MEDICINE

## 2020-05-07 NOTE — PROGRESS NOTES
"Michele Vance is a 74 year old male who is being evaluated via a billable video visit.      The patient has been notified of following:     \"This video visit will be conducted via a call between you and your physician/provider. We have found that certain health care needs can be provided without the need for an in-person physical exam.  This service lets us provide the care you need with a video conversation.  If a prescription is necessary we can send it directly to your pharmacy.  If lab work is needed we can place an order for that and you can then stop by our lab to have the test done at a later time.    Video visits are billed at different rates depending on your insurance coverage.  Please reach out to your insurance provider with any questions.    If during the course of the call the physician/provider feels a video visit is not appropriate, you will not be charged for this service.\"    Patient has given verbal consent for Video visit? Yes    How would you like to obtain your AVS? Bria    Patient would like the video invitation sent by: Send to e-mail at: [email or web address]    Will anyone else be joining your video visit? no        Subjective     Michele Vance is a 74 year old male who presents to clinic today for the following health issues:    Dizziness, comes and goes, symptoms for the past month.     HPI        Video Start Time: 5:00 PM    Video link did not start on computer.  Visit converted to telephone visit.          History of dizziness with walking/actvity for the past 2 months that has remained unchanged.  Improves with rest after an hour or so.  Seen in the ER at around the time this issue started due to having hypokalemia.  Has since been started on incrementally higher dosing of potassium replacement.  Now is on 50 mEq twice daily.    There is no cause for his hypokalemia noted from his ER visit or since.  His last recheck of potassium was 1 month ago.  He is concerned that it is low " "again.  He is not so much worried about the dizziness     Reviewed and updated as needed this visit by Provider  Tobacco  Allergies  Meds  Problems  Med Hx  Surg Hx  Fam Hx         Review of Systems   ROS COMP: Constitutional, HEENT, cardiovascular, pulmonary, GI, , musculoskeletal, neuro, skin, endocrine and psych systems are negative, except as otherwise noted.      Objective    There were no vitals taken for this visit.  Estimated body mass index is 25.54 kg/m  as calculated from the following:    Height as of 2/10/20: 1.727 m (5' 8\").    Weight as of 3/6/20: 76.2 kg (168 lb).  Physical Exam     GENERAL: healthy, alert and no distress  RESP: no audible wheeze, cough.  Able to speak fully in complete sentences.  NEURO: Cranial nerves grossly intact, mentation intact and speech normal  PSYCH: mentation appears normal, affect normal/bright, judgement and insight intact, normal speech and appearance well-groomed  Remaining visit exam limited due to audio only telephone visit.              Assessment & Plan     ASSESSMENT/ORDERS:    ICD-10-CM    1. Dizziness  R42    2. Hypokalemia  E87.6 Basic metabolic panel   3. Peripheral vascular disease (H)  I73.9 MIGRAINE ACTION PLAN     PLAN:  1.  Will check potassium tomorrow with lab-only visit and adjust potassium dosing as indicated.  He will follow-up with his primary care provider in a few weeks afterwards to further discuss his dizziness.            Return in about 1 day (around 5/8/2020) for lab only visit.    Ron Torres MD  [facility]      Video-Visit Details    Type of service:  Video Visit    Video End Time:5:15 PM    Originating Location (pt. Location): Home    Distant Location (provider location):  [facility]     Platform used for Video Visit: Unable to complete video visit due to technical issues with our computer.    Return in about 1 day (around 5/8/2020) for lab only visit.       Ron Torres MD      "

## 2020-05-08 DIAGNOSIS — E87.6 HYPOKALEMIA: ICD-10-CM

## 2020-05-08 LAB
ANION GAP SERPL CALCULATED.3IONS-SCNC: 4 MMOL/L (ref 3–14)
BUN SERPL-MCNC: 9 MG/DL (ref 7–30)
CALCIUM SERPL-MCNC: 8.5 MG/DL (ref 8.5–10.1)
CHLORIDE SERPL-SCNC: 107 MMOL/L (ref 94–109)
CO2 SERPL-SCNC: 31 MMOL/L (ref 20–32)
CREAT SERPL-MCNC: 1.25 MG/DL (ref 0.66–1.25)
GFR SERPL CREATININE-BSD FRML MDRD: 56 ML/MIN/{1.73_M2}
GLUCOSE SERPL-MCNC: 122 MG/DL (ref 70–99)
POTASSIUM SERPL-SCNC: 3.2 MMOL/L (ref 3.4–5.3)
SODIUM SERPL-SCNC: 142 MMOL/L (ref 133–144)

## 2020-05-08 PROCEDURE — 36415 COLL VENOUS BLD VENIPUNCTURE: CPT | Performed by: FAMILY MEDICINE

## 2020-05-08 PROCEDURE — 80048 BASIC METABOLIC PNL TOTAL CA: CPT | Performed by: FAMILY MEDICINE

## 2020-05-08 NOTE — RESULT ENCOUNTER NOTE
Michele,  I talked with Dr. West, and he agreed with keeping with your current plan.  Let me or him know if you have any further questions.  Please let me know if you have any questions.    Sincerely,  Dr. Torres

## 2020-05-26 DIAGNOSIS — G43.009 MIGRAINE WITHOUT AURA AND WITHOUT STATUS MIGRAINOSUS, NOT INTRACTABLE: ICD-10-CM

## 2020-05-27 NOTE — TELEPHONE ENCOUNTER
Patient has been seen in the past 30 days,5/7/20, virtual visit with Dr. Torres.   Routing to Dr. Torres to advise Elavil refill for migraine headache.   VIPIN Mcbride

## 2020-05-28 DIAGNOSIS — I10 ESSENTIAL HYPERTENSION, BENIGN: ICD-10-CM

## 2020-05-28 NOTE — TELEPHONE ENCOUNTER
Reason for Call:  Medication or medication refill:    Do you use a [facility]?  Name of the pharmacy and phone number for the current request:  [facility] - [phone]    Name of the medication requested: losartan (COZAAR) 100 MG tablet    Other request: Wife states [facility] but [facility].     Can we leave a detailed message on this number? YES    Phone number patient can be reached at: Home number on file [phone] (home)    Best Time: Anytime     Call taken on 5/28/2020 at 12:08 PM by Miranda Seipiel Vaccari

## 2020-05-29 RX ORDER — LOSARTAN POTASSIUM 100 MG/1
100 TABLET ORAL DAILY
Qty: 90 TABLET | Refills: 3 | Status: SHIPPED | OUTPATIENT
Start: 2020-05-29 | End: 2020-12-10

## 2020-05-29 NOTE — TELEPHONE ENCOUNTER
COZAAR  Last Written Prescription Date:  10/24/19  Last Fill Quantity: 90,  # refills: 3   Last office visit: 2/10/2020 with prescribing provider:  Dr. West. Had virtual encounter with Dr. Torres 5/7/20 ( see comment below about K+)    Notes recorded by Ron Torres MD on 5/8/2020 at 12:12 PM CDT   Michele,   I talked with Dr. West, and he agreed with keeping with your current plan.  Let me or him know if you have any further questions.  Please let me know if you have any questions.     Sincerely,   Dr. Torres   Component      Latest Ref Rng & Units 3/6/2020 3/11/2020 4/2/2020 5/8/2020   Potassium      3.4 - 5.3 mmol/L 2.4 (LL) 2.8 (L) 3.2 (L) 3.2 (L)    Rn forwarding to Dr. West to see if he wants any Follow-up on that K+ ? And to approve or deny since lab is abnormal.  Reed,RN

## 2020-06-24 DIAGNOSIS — E87.6 HYPOKALEMIA: ICD-10-CM

## 2020-06-25 RX ORDER — POTASSIUM CHLORIDE 750 MG/1
TABLET, EXTENDED RELEASE ORAL
Qty: 300 TABLET | Refills: 11 | Status: SHIPPED | OUTPATIENT
Start: 2020-06-25 | End: 2021-06-08

## 2020-06-25 NOTE — TELEPHONE ENCOUNTER
Pending Prescriptions:                       Disp   Refills    KLOR-CON 10 MEQ CR tablet [Pharmacy Med Na*300 ta*11       Sig: TAKE 5 TABLETS TWICE A DAY    Last Written Prescription Date:  3/23/20  Last Fill Quantity: 300,  # refills: 3   Last office visit: 2/10/2020 with prescribing provider:  tae   Future Office Visit:      Routing refill request to provider for review/approval because:  Labs not current:  david Duncan RN on 6/25/2020 at 10:48 AM

## 2020-08-05 ENCOUNTER — APPOINTMENT (OUTPATIENT)
Dept: CT IMAGING | Facility: CLINIC | Age: 75
End: 2020-08-05
Attending: EMERGENCY MEDICINE
Payer: MEDICARE

## 2020-08-05 ENCOUNTER — HOSPITAL ENCOUNTER (EMERGENCY)
Facility: CLINIC | Age: 75
Discharge: LEFT BEFORE DISCHARGE | End: 2020-08-05
Attending: EMERGENCY MEDICINE | Admitting: EMERGENCY MEDICINE
Payer: MEDICARE

## 2020-08-05 ENCOUNTER — TELEPHONE (OUTPATIENT)
Dept: EMERGENCY MEDICINE | Facility: CLINIC | Age: 75
End: 2020-08-05

## 2020-08-05 ENCOUNTER — APPOINTMENT (OUTPATIENT)
Dept: MRI IMAGING | Facility: CLINIC | Age: 75
End: 2020-08-05
Attending: EMERGENCY MEDICINE
Payer: MEDICARE

## 2020-08-05 VITALS
HEART RATE: 73 BPM | DIASTOLIC BLOOD PRESSURE: 85 MMHG | WEIGHT: 150 LBS | HEIGHT: 68 IN | TEMPERATURE: 97.8 F | OXYGEN SATURATION: 97 % | BODY MASS INDEX: 22.73 KG/M2 | RESPIRATION RATE: 21 BRPM | SYSTOLIC BLOOD PRESSURE: 174 MMHG

## 2020-08-05 DIAGNOSIS — R53.1 LEFT-SIDED WEAKNESS: ICD-10-CM

## 2020-08-05 DIAGNOSIS — I65.23 BILATERAL CAROTID ARTERY STENOSIS: ICD-10-CM

## 2020-08-05 LAB
ANION GAP SERPL CALCULATED.3IONS-SCNC: 6 MMOL/L (ref 3–14)
APTT PPP: 28 SEC (ref 22–37)
BASOPHILS # BLD AUTO: 0 10E9/L (ref 0–0.2)
BASOPHILS NFR BLD AUTO: 0.4 %
BUN SERPL-MCNC: 15 MG/DL (ref 7–30)
CALCIUM SERPL-MCNC: 8.9 MG/DL (ref 8.5–10.1)
CHLORIDE SERPL-SCNC: 106 MMOL/L (ref 94–109)
CO2 SERPL-SCNC: 27 MMOL/L (ref 20–32)
CREAT SERPL-MCNC: 1.09 MG/DL (ref 0.66–1.25)
DIFFERENTIAL METHOD BLD: ABNORMAL
EOSINOPHIL NFR BLD AUTO: 3.7 %
ERYTHROCYTE [DISTWIDTH] IN BLOOD BY AUTOMATED COUNT: 13.8 % (ref 10–15)
GFR SERPL CREATININE-BSD FRML MDRD: 67 ML/MIN/{1.73_M2}
GLUCOSE BLDC GLUCOMTR-MCNC: 113 MG/DL (ref 70–99)
GLUCOSE SERPL-MCNC: 120 MG/DL (ref 70–99)
HCT VFR BLD AUTO: 36.6 % (ref 40–53)
HGB BLD-MCNC: 12.6 G/DL (ref 13.3–17.7)
IMM GRANULOCYTES # BLD: 0 10E9/L (ref 0–0.4)
IMM GRANULOCYTES NFR BLD: 0.2 %
INR PPP: 1.07 (ref 0.86–1.14)
LYMPHOCYTES # BLD AUTO: 2.2 10E9/L (ref 0.8–5.3)
LYMPHOCYTES NFR BLD AUTO: 39 %
MCH RBC QN AUTO: 33.3 PG (ref 26.5–33)
MCHC RBC AUTO-ENTMCNC: 34.4 G/DL (ref 31.5–36.5)
MCV RBC AUTO: 97 FL (ref 78–100)
MONOCYTES # BLD AUTO: 0.5 10E9/L (ref 0–1.3)
MONOCYTES NFR BLD AUTO: 9.5 %
NEUTROPHILS # BLD AUTO: 2.7 10E9/L (ref 1.6–8.3)
NEUTROPHILS NFR BLD AUTO: 47.2 %
NRBC # BLD AUTO: 0 10*3/UL
NRBC BLD AUTO-RTO: 0 /100
PLATELET # BLD AUTO: 251 10E9/L (ref 150–450)
POTASSIUM SERPL-SCNC: 3.4 MMOL/L (ref 3.4–5.3)
RBC # BLD AUTO: 3.78 10E12/L (ref 4.4–5.9)
SODIUM SERPL-SCNC: 139 MMOL/L (ref 133–144)
TROPONIN I SERPL-MCNC: 0.05 UG/L (ref 0–0.04)
WBC # BLD AUTO: 5.7 10E9/L (ref 4–11)

## 2020-08-05 PROCEDURE — 96361 HYDRATE IV INFUSION ADD-ON: CPT | Performed by: EMERGENCY MEDICINE

## 2020-08-05 PROCEDURE — 25000128 H RX IP 250 OP 636: Performed by: EMERGENCY MEDICINE

## 2020-08-05 PROCEDURE — 70553 MRI BRAIN STEM W/O & W/DYE: CPT

## 2020-08-05 PROCEDURE — 85730 THROMBOPLASTIN TIME PARTIAL: CPT | Performed by: EMERGENCY MEDICINE

## 2020-08-05 PROCEDURE — 85610 PROTHROMBIN TIME: CPT | Performed by: EMERGENCY MEDICINE

## 2020-08-05 PROCEDURE — 70498 CT ANGIOGRAPHY NECK: CPT

## 2020-08-05 PROCEDURE — 25000132 ZZH RX MED GY IP 250 OP 250 PS 637: Mod: GY | Performed by: EMERGENCY MEDICINE

## 2020-08-05 PROCEDURE — 99291 CRITICAL CARE FIRST HOUR: CPT | Mod: 25 | Performed by: EMERGENCY MEDICINE

## 2020-08-05 PROCEDURE — 84484 ASSAY OF TROPONIN QUANT: CPT | Performed by: EMERGENCY MEDICINE

## 2020-08-05 PROCEDURE — 96374 THER/PROPH/DIAG INJ IV PUSH: CPT | Mod: 59 | Performed by: EMERGENCY MEDICINE

## 2020-08-05 PROCEDURE — 93010 ELECTROCARDIOGRAM REPORT: CPT | Mod: Z6 | Performed by: EMERGENCY MEDICINE

## 2020-08-05 PROCEDURE — 80048 BASIC METABOLIC PNL TOTAL CA: CPT | Performed by: EMERGENCY MEDICINE

## 2020-08-05 PROCEDURE — 00000146 ZZHCL STATISTIC GLUCOSE BY METER IP

## 2020-08-05 PROCEDURE — 25000125 ZZHC RX 250: Performed by: EMERGENCY MEDICINE

## 2020-08-05 PROCEDURE — 25800030 ZZH RX IP 258 OP 636: Performed by: EMERGENCY MEDICINE

## 2020-08-05 PROCEDURE — 99285 EMERGENCY DEPT VISIT HI MDM: CPT | Mod: 25 | Performed by: EMERGENCY MEDICINE

## 2020-08-05 PROCEDURE — 70450 CT HEAD/BRAIN W/O DYE: CPT | Mod: XS

## 2020-08-05 PROCEDURE — 25500064 ZZH RX 255 OP 636: Performed by: EMERGENCY MEDICINE

## 2020-08-05 PROCEDURE — 93005 ELECTROCARDIOGRAM TRACING: CPT | Performed by: EMERGENCY MEDICINE

## 2020-08-05 PROCEDURE — 85025 COMPLETE CBC W/AUTO DIFF WBC: CPT | Performed by: EMERGENCY MEDICINE

## 2020-08-05 PROCEDURE — A9585 GADOBUTROL INJECTION: HCPCS | Performed by: EMERGENCY MEDICINE

## 2020-08-05 RX ORDER — ONDANSETRON 4 MG/1
4 TABLET, FILM COATED ORAL EVERY 6 HOURS PRN
COMMUNITY
Start: 2019-11-27 | End: 2020-08-19

## 2020-08-05 RX ORDER — ASPIRIN 325 MG
325 TABLET ORAL ONCE
Status: COMPLETED | OUTPATIENT
Start: 2020-08-05 | End: 2020-08-05

## 2020-08-05 RX ORDER — IOPAMIDOL 755 MG/ML
500 INJECTION, SOLUTION INTRAVASCULAR ONCE
Status: COMPLETED | OUTPATIENT
Start: 2020-08-05 | End: 2020-08-05

## 2020-08-05 RX ORDER — GADOBUTROL 604.72 MG/ML
7.5 INJECTION INTRAVENOUS ONCE
Status: COMPLETED | OUTPATIENT
Start: 2020-08-05 | End: 2020-08-05

## 2020-08-05 RX ORDER — CLOPIDOGREL BISULFATE 75 MG/1
75 TABLET ORAL DAILY
Qty: 30 TABLET | Refills: 0 | Status: SHIPPED | OUTPATIENT
Start: 2020-08-05 | End: 2020-08-19

## 2020-08-05 RX ORDER — CLOPIDOGREL BISULFATE 75 MG/1
75 TABLET ORAL ONCE
Status: COMPLETED | OUTPATIENT
Start: 2020-08-05 | End: 2020-08-05

## 2020-08-05 RX ADMIN — SODIUM CHLORIDE 500 ML: 9 INJECTION, SOLUTION INTRAVENOUS at 16:14

## 2020-08-05 RX ADMIN — SODIUM CHLORIDE 100 ML: 9 INJECTION, SOLUTION INTRAVENOUS at 15:08

## 2020-08-05 RX ADMIN — IOPAMIDOL 80 ML: 755 INJECTION, SOLUTION INTRAVENOUS at 15:08

## 2020-08-05 RX ADMIN — ASPIRIN 325 MG ORAL TABLET 325 MG: 325 PILL ORAL at 16:11

## 2020-08-05 RX ADMIN — GADOBUTROL 7 ML: 604.72 INJECTION INTRAVENOUS at 18:49

## 2020-08-05 RX ADMIN — CLOPIDOGREL BISULFATE 75 MG: 75 TABLET ORAL at 19:23

## 2020-08-05 ASSESSMENT — MIFFLIN-ST. JEOR: SCORE: 1399.9

## 2020-08-05 NOTE — ED NOTES
"Patient's wife reports, \"if he is going to be outpatient observation then we will have to leave\". Dr Adan updated.   "

## 2020-08-05 NOTE — ED AVS SNAPSHOT
[facility] Emergency Department  [address] [place] [address]  Phone:  [phone]  Fax:  [phone]                                    Michele Vance   MRN: [id number]    Department:  [facility] Emergency Department   Date of Visit:  8/5/2020           After Visit Summary Signature Page    I have received my discharge instructions, and my questions have been answered. I have discussed any challenges I see with this plan with the nurse or doctor.    ..........................................................................................................................................  Patient/Patient Representative Signature      ..........................................................................................................................................  Patient Representative Print Name and Relationship to Patient    ..................................................               ................................................  Date                                   Time    ..........................................................................................................................................  Reviewed by Signature/Title    ...................................................              ..............................................  Date                                               Time          22EPIC Rev 08/18

## 2020-08-05 NOTE — ED PROVIDER NOTES
"  History     Chief Complaint   Patient presents with     One-sided Weakness     HPI  Michele Vance is a 74 year old male who presents with complaints of left leg and left arm weakness that began around 1:45 AM today.  Patient states he noted that when he tried to stand his left leg was weak and was unable ambulate.  He also noted that his left arm was weak but states at this time they are improving.  Denies any associated headache or visual change.  No difficulty speech or swallowing.  No facial droop.  Patient does have a history of previous CVA.  States a year or so ago he had 6 vessel bypass and had a \"stroke while he was on the table.\"  He was eventually transferred to rehab and unfortunately had a second stroke at that time.  He states that the stroke caused him to be dizzy and he went through therapy for this.  Have a history of hypertension.  Denies recent illness.  Denies recent fall or head injury.  No fever or chills.  No chest pain or shortness of breath.  No cough.  No abdominal pain, nausea or vomiting.  No diarrhea or dysuria.  Denies paresthesias associated with the weakness.  He is on aspirin and Pletal.  Patient states that his symptoms are improving but he is not to baseline yet.    Allergies:  Allergies   Allergen Reactions     Hmg-Coa-R Inhibitors Other (See Comments)     Rhabdo  Same as \"statins\"     Gemfibrozil      Resting thigh-calf pain     Sulfa Drugs      Reacted as a child     Zetia [Ezetimibe]      Muscle cramping       Problem List:    Patient Active Problem List    Diagnosis Date Noted     Renal artery stenosis (H) 09/05/2019     Priority: Medium     Added automatically from request for surgery [id number]       Carotid stenosis      Priority: Medium     right endartectomy       Essential hypertension with goal blood pressure less than 140/90 06/02/2016     Priority: Medium     PVD (peripheral vascular disease) (H) 07/22/2015     Priority: Medium     Chronic constipation 12/24/2014     " Priority: Medium     Irritable bowel syndrome 12/24/2014     Priority: Medium     Health Care Home 09/11/2014     Priority: Medium     Status:  Unable to reach  Care Coordinator:  Erika Marcus    See Letters for [facility] Care Plan  Date:  September 11, 2014         Carotid artery occlusion with cerebral infarction (H) 08/06/2014     Priority: Medium     Dizziness 08/06/2014     Priority: Medium     Panlobular emphysema (H) 06/04/2014     Priority: Medium     Cerebral infarction due to occlusion or stenosis of carotid artery 05/07/2014     Priority: Medium     Problem list name updated by automated process. Provider to review       Stroke, embolic (H) 04/25/2014     Priority: Medium     Mesenteric artery stenosis (H) 04/04/2014     Priority: Medium     2014: Asymptomatic SMA and MELI stenoses, meriting clinical FU       Insomnia 03/12/2014     Priority: Medium     Nutritional deficiency 03/12/2014     Priority: Medium     Pain 03/12/2014     Priority: Medium     Urinary retention 03/12/2014     Priority: Medium     S/P CABG x 6 03/06/2014     Priority: Medium     Left main coronary artery disease 03/04/2014     Priority: Medium     PAD (peripheral artery disease) (H) 03/04/2014     Priority: Medium     2/19/14: RUBEN 0.73 LLE       Arthritis 01/14/2013     Priority: Medium     Carotid bruit 01/14/2013     Priority: Medium     Carotid stenosis, bilateral 01/14/2013     Priority: Medium     Anemia 04/06/2012     Priority: Medium     CKD (chronic kidney disease) stage 3, GFR 30-59 ml/min (H) 04/06/2012     Priority: Medium     Advanced directives, counseling/discussion 04/05/2012     Priority: Medium     Impingement syndrome, shoulder, right 03/16/2011     Priority: Medium     Hypertension 11/22/2010     Priority: Medium     Hyperlipidemia LDL goal <130 11/22/2010     Priority: Medium     Myalgia and myositis 11/22/2010     Priority: Medium     GERD (gastroesophageal reflux disease) 11/22/2010     Priority: Medium        Past  Medical History:    Past Medical History:   Diagnosis Date     Carotid stenosis      Chronic kidney disease      Coronary artery disease 3-2014     CVA (cerebral infarction)      Elevated CK      Esophageal reflux      Hypercholesteremia      Nonsenile cataract      Other and unspecified hyperlipidemia      PAD (peripheral artery disease) (H)      Rhabdomyolysis 2010     Unspecified essential hypertension        Past Surgical History:    Past Surgical History:   Procedure Laterality Date     APPENDECTOMY  1974     BYPASS GRAFT ARTERY CORONARY  3/5/2014    Procedure: BYPASS GRAFT ARTERY CORONARY;  Median Sternotomy, Coronary Artery Bypass Graft X6 used Left internal mammary artery, Left and Right Greater Saphenous vein, on Pump oxygenator.;  Surgeon: Tim Montanez MD;  Location: [facility] OR     CATARACT IOL, RT/LT Bilateral 2008    in [place]     cataracts       COLONOSCOPY  12/12/02    [facility]     COLONOSCOPY N/A 10/7/2014    Procedure: COMBINED COLONOSCOPY, SINGLE OR MULTIPLE BIOPSY/POLYPECTOMY BY BIOPSY;  Surgeon: Micheal Mora MD;  Location:  GI     CV ANGIOGRAM LOWER EXTREMITY Bilateral 9/9/2019    Procedure: Lower Extremity Angiogram Bilateral;  Surgeon: Julio Oropeza MD;  Location: [facility] CARDIAC CATH LAB     DENTAL SURGERY      TMJ, implants     ENDARTERECTOMY CAROTID      right carotid stent     ESOPHAGOSCOPY, GASTROSCOPY, DUODENOSCOPY (EGD), COMBINED Left 10/7/2014    Procedure: COMBINED ESOPHAGOSCOPY, GASTROSCOPY, DUODENOSCOPY (EGD), BIOPSY SINGLE OR MULTIPLE;  Surgeon: Micheal Mora MD;  Location:  GI     ESOPHAGOSCOPY, GASTROSCOPY, DUODENOSCOPY (EGD), COMBINED Left 10/7/2014    Procedure: COMBINED ESOPHAGOSCOPY, GASTROSCOPY, DUODENOSCOPY (EGD), REMOVE FOREIGN BODY;  Surgeon: Micheal Mora MD;  Location:  GI      CAPSULE ENDOSCOPY N/A 10/7/2014    Procedure: CAPSULE/PILL CAM ENDOSCOPY;  Surgeon: Micheal Mora MD;  Location: [facility] ENDOSCOPY, SIMPLE EXAM   99    [facility]     INJECT EPIDURAL LUMBAR Right 2017    Procedure: INJECT EPIDURAL LUMBAR;  Lumbar transforaminal Epidural Steroid Injection right lumbar 4-5, and right  lumbar 5-Sacral 1;  Surgeon: Anthony Gonzalez MD;  Location: [facility] OR     INJECT JOINT SACROILIAC Bilateral 2017    Procedure: INJECT JOINT SACROILIAC;  sacroiliac joint injection bilateral;  Surgeon: Anthony Gonzalez MD;  Location: [facility] OR     KNEE SURGERY  1976    left knee reconstruction     lasix      both eyes     RELEASE CARPAL TUNNEL  2011    RELEASE CARPAL TUNNEL performed by JO MADRID at [facility] OR     RELEASE CARPAL TUNNEL  2011    RELEASE CARPAL TUNNEL performed by JO MADRID at [facility] OR       Family History:    Family History   Problem Relation Age of Onset     Hypertension Mother      Eye Disorder Mother      Cerebrovascular Disease Father      Heart Disease Father      Lipids Father      Obesity Father      Cancer Sister      Heart Disease Sister      Glaucoma No family hx of      Macular Degeneration No family hx of      Kidney Disease No family hx of        Social History:  Marital Status:   [2]  Social History     Tobacco Use     Smoking status: Former Smoker     Packs/day: 2.50     Years: 20.00     Pack years: 50.00     Types: Cigarettes     Last attempt to quit: 2000     Years since quittin.6     Smokeless tobacco: Never Used   Substance Use Topics     Alcohol use: No     Alcohol/week: 0.0 standard drinks     Drug use: No        Medications:    alirocumab (PRALUENT) 150 MG/ML injectable syringe  amitriptyline (ELAVIL) 25 MG tablet  amLODIPine (NORVASC) 10 MG tablet  ASPIRIN PO  carvedilol (COREG) 25 MG tablet  cilostazol (PLETAL) 100 MG tablet  gabapentin (NEURONTIN) 300 MG capsule  isosorbide mononitrate (IMDUR) 60 MG 24 hr tablet  KLOR-CON 10 MEQ CR tablet  linaclotide (LINZESS) 145 MCG capsule  losartan (COZAAR) 100 MG tablet  omeprazole (PRILOSEC) 40 MG  "DR capsule  ondansetron (ZOFRAN) 4 MG tablet  STATIN NOT PRESCRIBED, INTENTIONAL,  zolpidem (AMBIEN) 5 MG tablet          Review of Systems all other systems are reviewed and are negative.    Physical Exam   BP: (!) 148/69  Pulse: 57  Heart Rate: 60  Temp: 97.8  F (36.6  C)  Resp: 16  Height: 172.7 cm (5' 8\")  Weight: 68 kg (150 lb)  SpO2: 98 %      Physical Exam general alert cooperative male in mild to moderate stress.  HEENT shows no obvious facial droop.  Pupils are equal react light accommodation.  Extraocular motions are intact.  Speech is clear.  Uvula is midline and gag is intact.  Neck reveals no bruits.  Lungs were clear except for basilar crackles.  Cardiac auscultation reveals a regular heartbeat without significant murmur.  Abdomen is benign.  Extremities reveal no edema.  He has a very subtle weakness with  strength on the left hand and with dorsi/plantarflexion of the feet.  Dorsiflexion of the great toe however is equal compared to the right.  Intact pulses and sensation.  DTRs are equal.  With ambulation he has a obvious limp which his wife states is not normal.  Also states that he is holding his left arm different than usual.    ED Course        Procedures               EKG Interpretation:      Interpreted by Myke Adan MD  Time reviewed: 15:40  Symptoms at time of EKG: Left-sided weakness  Rhythm: normal sinus   Rate: Normal  Axis: Normal  Ectopy: none  Conduction: right bundle branch block (complete)  ST Segments/ T Waves: Non-specific ST-T wave changes  Q Waves: none  Comparison to prior: No old EKG available    Clinical Impression: right bundle branch block                Critical Care time: 35 minutes including initial evaluation and repeat evaluations, review of the imaging with radiology, stroke neurologist, and cardiovascular surgeon.  Also discussion with hospitalist regarding admission.     The patient has stroke symptoms:         ED Stroke specific documentation           NIHSS " PDF     Patient last known well time: 13:45  ED Provider first to bedside at: 14:55  CT Results received at: 15:31    tPA:   Not given due to After review by stroke neurology..    If treating with tPA: Ensure SBP<185 and DBP<105 prior to treatment with IV tPA.  Administering IV tPA after treatment with IV labetalol, hydralazine, or nicardipine is reasonable once BP control is established.    Endovascular Retrieval:  Not initiated due to absence of proximal vessel occlusion    National Institutes of Health Stroke Scale (Baseline)  Time Performed: 14:55     Score    Level of consciousness: (0)   Alert, keenly responsive    LOC questions: (0)   Answers both questions correctly    LOC commands: (0)   Performs both tasks correctly    Best gaze: (0)   Normal    Visual: (0)   No visual loss    Facial palsy: (0)   Normal symmetrical movements    Motor arm (left): (1)   Drift    Motor arm (right): (0)   No drift    Motor leg (left): (1)   No drift    Motor leg (right): (0)   No drift    Limb ataxia: (0)   Absent    Sensory: (0)   Normal- no sensory loss    Best language: (0)   Normal- no aphasia    Dysarthria: (0)   Normal    Extinction and inattention: (0)   No abnormality        Total Score:  0        Stroke Mimics were considered (including migraine headache, seizure disorder, hypoglycemia (or hyperglycemia), head or spinal trauma, CNS infection, Toxin ingestion and shock state (e.g. sepsis) .                     Results for orders placed or performed during the hospital encounter of 08/05/20 (from the past 24 hour(s))   Glucose by meter   Result Value Ref Range    Glucose 113 (H) 70 - 99 mg/dL   CBC with platelets differential   Result Value Ref Range    WBC 5.7 4.0 - 11.0 10e9/L    RBC Count 3.78 (L) 4.4 - 5.9 10e12/L    Hemoglobin 12.6 (L) 13.3 - 17.7 g/dL    Hematocrit 36.6 (L) 40.0 - 53.0 %    MCV 97 78 - 100 fl    MCH 33.3 (H) 26.5 - 33.0 pg    MCHC 34.4 31.5 - 36.5 g/dL    RDW 13.8 10.0 - 15.0 %    Platelet Count  251 150 - 450 10e9/L    Diff Method Automated Method     % Neutrophils 47.2 %    % Lymphocytes 39.0 %    % Monocytes 9.5 %    % Eosinophils 3.7 %    % Basophils 0.4 %    % Immature Granulocytes 0.2 %    Nucleated RBCs 0 0 /100    Absolute Neutrophil 2.7 1.6 - 8.3 10e9/L    Absolute Lymphocytes 2.2 0.8 - 5.3 10e9/L    Absolute Monocytes 0.5 0.0 - 1.3 10e9/L    Absolute Basophils 0.0 0.0 - 0.2 10e9/L    Abs Immature Granulocytes 0.0 0 - 0.4 10e9/L    Absolute Nucleated RBC 0.0    Basic metabolic panel   Result Value Ref Range    Sodium 139 133 - 144 mmol/L    Potassium 3.4 3.4 - 5.3 mmol/L    Chloride 106 94 - 109 mmol/L    Carbon Dioxide 27 20 - 32 mmol/L    Anion Gap 6 3 - 14 mmol/L    Glucose 120 (H) 70 - 99 mg/dL    Urea Nitrogen 15 7 - 30 mg/dL    Creatinine 1.09 0.66 - 1.25 mg/dL    GFR Estimate 67 >60 mL/min/[1.73_m2]    GFR Estimate If Black 77 >60 mL/min/[1.73_m2]    Calcium 8.9 8.5 - 10.1 mg/dL   INR   Result Value Ref Range    INR 1.07 0.86 - 1.14   Partial thromboplastin time   Result Value Ref Range    PTT 28 22 - 37 sec   Troponin I   Result Value Ref Range    Troponin I ES 0.048 (H) 0.000 - 0.045 ug/L   CT Head w/o Contrast    Narrative    CT SCAN OF THE HEAD WITHOUT CONTRAST   8/5/2020 3:11 PM     HISTORY: Left-sided weakness in arm and leg    TECHNIQUE:  Axial images of the head and coronal reformations without  IV contrast material. Radiation dose for this scan was reduced using  automated exposure control, adjustment of the mA and/or kV according  to patient size, or iterative reconstruction technique.    COMPARISON: None.    FINDINGS:     Intracranial contents: Areas of encephalomalacia are seen in the right  and left cerebellum and in the right frontal lobe. There is also a  chronic lacunar type infarct in the left corona radiata. Small  cortical infarct is seen in the left frontal lobe cortex. No  hemorrhage. No mass effect. No definite acute infarcts.    Visualized orbits/sinuses/mastoids:  The  visualized portions of the  sinuses and mastoids appear normal.    Osseous structures/soft tissues:  There is no evidence of trauma.      Impression    IMPRESSION:   1. No bleed. No definite acute infarcts are identified.  2. Areas of encephalomalacia are seen in the right and left cerebellar  hemispheres, the right and left frontal lobes, and in the white matter  of the left fry radiata.      NOHEMY CLANCY MD   CTA Head Neck with Contrast    Narrative    CTA  HEAD AND NECK WITH CONTRAST  8/5/2020 3:26 PM     HISTORY: Left-sided weakness, arm and leg.    TECHNIQUE:  Precontrast localizing scans were followed by CT  angiography with an injection of 70 mL IV contrast with scans through  the head and neck.  Images were transferred to a separate 3-D  workstation where multiplanar reformations and 3-D images were  created.  Estimates of carotid stenoses are made relative to the  distal internal carotid artery diameters except as noted. Radiation  dose for this scan was reduced using automated exposure control,  adjustment of the mA and/or kV according to patient size, or iterative  reconstruction technique.    COMPARISON: CT angiogram dated 10/2/2019    FINDINGS: Estimates of stenosis at the carotid bifurcations are  relative to the distal internal carotids.    Arch: Calcified and noncalcified atherosclerotic plaque is seen in the  arch of the aorta.    Neck:  Right Carotid:  The right common carotid artery is normal.  A stent is  seen at the right carotid bifurcation. The stent is patent. There is a  severe stenosis in the stent. The residual lumen at the point of  maximum stenosis is 1.3 mm. The distal internal carotid measures 4.8  mm. The stenosis is calculated at 72%.    Left Carotid:  The left common carotid artery is unremarkable.   Calcified and noncalcified atherosclerotic plaque is seen at the left  carotid bifurcation. The minimum diameter at the point of maximum  stenosis is 1.7 mm. The distal internal  carotid measures 6.1 mm. The  stenosis is calculated at 73%.  The left internal carotid is negative.       Vertebrals:  The right vertebral artery is patent. It is dominant. The  proximal left vertebral is again noted to be occluded. There is  collateral filling of the left vertebral artery at the C4 level. The  left vertebral artery is a very small vessel.    Head:  Right Carotid:No occluded vessels are seen.    Left Carotid:  No occluded vessels are identified.    Basilar: Multiple areas of high-grade stenosis are again seen in the  basilar artery. These were also present on the previous study. There  is a fetal origin of both the right and left posterior cerebral  arteries.    Miscellaneous: None      Impression    IMPRESSION:   1. Right internal carotid artery stent. Moderate stenosis within the  stent. This is unchanged.  2. 73% stenosis within the right internal carotid stent. This is  increased when compared to the scan of 10/2/2019 when the stenosis was  calculated at 56%. 73% stenosis left carotid bifurcation. This is  increased since the previous scan when it was measured at 58%.  3. Left vertebral artery is again noted to be occluded. There is  collateral filling at the C4 level. The vessel is small distal to this  level.  4. Severe stenosis in the basilar artery. This is unchanged.  5. The anterior circulation is unremarkable. No occluded anterior or  middle cerebral artery vessels are identified. No high-grade stenosis.    NOHEMY CLANCY MD   MR Brain w/o & w Contrast    Narrative    MRI BRAIN WITHOUT AND WITH CONTRAST  8/5/2020 7:13 PM    HISTORY:  Left-sided weakness     TECHNIQUE:  Multiplanar, multisequence MRI of the brain without and  with 7 mL Gadavist    COMPARISON: CT studies dated 8/5/2020    FINDINGS:     Intracranial contents: There is diffuse parenchymal volume loss.   White matter changes are present in the cerebral hemispheres that are  consistent with small vessel ischemic disease in this  age patient.  Chronic areas of encephalomalacia are seen in the right and left  cerebellar hemispheres, the left corona radiata, in the right frontal  lobe no focal areas of restricted diffusion are identified. No acute  infarcts are seen  no hemorrhage or mass effect.  There are no  gadolinium enhancing lesions. The arteries at the base of the brain  and the dural venous sinuses appear patent.     Sella:  No significant abnormality accounting for technique.     Orbit: No significant abnormality accounting for technique.      Sinus/mastoids: No significant paranasal sinus mucosal disease. No  significant middle ear or mastoid effusion.    Bones/soft tissues: No aggressive osseous lesion involving the  calvarium, skull base, or visualized upper cervical spine.       Impression    IMPRESSION:    1. No acute pathology. No bleed, mass, or areas of acute infarction  are identified.  2. Chronic areas of encephalomalacia in the right and left cerebellum,  right frontal lobe, and left periventricular white matter.      NOHEMY CLANCY MD       Medications   0.9% sodium chloride BOLUS (0 mLs Intravenous Stopped 8/5/20 1714)   100 mL saline bag CT (100 mLs Intravenous Given 8/5/20 1508)   sodium chloride (PF) 0.9% PF flush 3 mL (3 mLs Intravenous Given 8/5/20 1507)   iopamidol (ISOVUE-370) solution 500 mL (80 mLs Intravenous Given 8/5/20 1508)   aspirin (ASA) tablet 325 mg (325 mg Oral Given 8/5/20 1611)   clopidogrel (PLAVIX) tablet 75 mg (75 mg Oral Given 8/5/20 1923)   gadobutrol (GADAVIST) injection 7.5 mL (7 mLs Intravenous Given 8/5/20 1849)     Code stroke was called.  Patient really went to CT for noncontrast CT of his head.  CTA if negative.  Recommendation of the stroke neurologist patient was given oral Plavix.  Hold his Pletal  Assessments & Plan (with Medical Decision Making)   Michele Vance is a 74 year old male who presents with complaints of left leg and left arm weakness that began around 1:45 AM today.  Patient  "states he noted that when he tried to stand his left leg was weak and was unable ambulate.  He also noted that his left arm was weak but states at this time they are improving.  Denies any associated headache or visual change.  No difficulty speech or swallowing.  No facial droop.  Patient does have a history of previous CVA.  States a year or so ago he had 6 vessel bypass and had a \"stroke while he was on the table.\"  He was eventually transferred to rehab and unfortunately had a second stroke at that time.  He states that the stroke caused him to be dizzy and he went through therapy for this.  Have a history of hypertension.  Denies recent illness.  Denies recent fall or head injury.  No fever or chills.  No chest pain or shortness of breath.  No cough.  No abdominal pain, nausea or vomiting.  No diarrhea or dysuria.  Denies paresthesias associated with the weakness.  He is on aspirin and Pletal.  Patient states that his symptoms are improving but he is not to baseline yet.  On presentation patient was mildly hypertensive but vitally stable.  Not hypoxic.  He had no facial droop or asymmetry.  No speech difficulty.  No bruits of the neck.  He did have subtle  weakness on the left compared to the right but he is right-hand dominant.  He did have very subtle plantar and dorsiflexion issue on the left leg and limp when he walked.  Exam is intact.  DTRs are equal.  A stroke was called.  He had a CT CTA of the head and neck.  They are reported above.  He does have significant vascular disease as noted..  Has an acute bleed or stroke on the CT.  Review with Dr. Luis from stroke neurology did not feel he was a TPA candidate but recommended keeping him on aspirin and switching him to Plavix from Pletal.  He did ask me to speak to vascular surgery to see if there was possibility of intervention.  Dr. Guaman from vascular surgery was kind enough to review the patient's imaging at this point did not feel that " emergent procedure is necessary but strongly recommend he follow-up in the clinic after he was neurologically stable.  Dr. Guaman did recommend a carotid duplex.  We will also get an MRI to see if he did have a stroke associated with this.  I discussed this with Kenneth (Dr. Felder) from the hospitalist service.  He will assume care on admission.  However when the patient his wife heard that it may be an observation admission the stated they would not stay if that were the case.  This is not covered by their insurance and he cannot afford to stay.  We are awaiting results of an MRI that will be done at 6 PM.  After further consultation with Dr. Felder will have the hospitalist come see the patient after those results are available to see if he can be admitted for a full admission versus observation.    Dr. Beasley from the hospital service did come and see the patient.  He still felt he would be an observation patient.  His MRI did not show acute stroke.  He still has some left-sided weakness.  At this point he will sign out AGAINST MEDICAL ADVICE.  We did give him Plavix and he will continue to take that daily in addition to his aspirin.  He will stop his Pletal.  Recommend he follow-up with neurology in the near future for reassessment.  Unfortunately there is no availability at our facility in the near future.  His wife states that they have a previous vascular surgeon that they have seen in [place] and they would like to follow-up with him.  I informed them if they were unable to get in with them in a timely fashion they could have Dr. West contact the [facility] to have follow-up with Dr. Guaman who did review his records here today.  They understand if he has been of his left-sided weakness or other new concerning symptoms he needs to return to the emergency room.  Information on weakness of uncertain cause is provided.  I have reviewed the nursing notes.    I have reviewed the findings, diagnosis, plan and  need for follow up with the patient.       New Prescriptions    No medications on file       Final diagnoses:   Left-sided weakness   Bilateral carotid artery stenosis       8/5/2020   [facility] EMERGENCY DEPARTMENT     Myke Adan MD  08/05/20 2027

## 2020-08-05 NOTE — ED NOTES
Patient to CT 1:1 with RN on monitor. He is now back in room 15. He remains A&O x4. Spouse now in room.

## 2020-08-06 ENCOUNTER — TELEPHONE (OUTPATIENT)
Dept: CARDIOLOGY | Facility: CLINIC | Age: 75
End: 2020-08-06

## 2020-08-06 NOTE — TELEPHONE ENCOUNTER
"  ED for acute condition Discharge Protocol    \"Hi, my name is Sunitha Franks RN, a registered nurse, and I am calling from [facility].  I am calling to follow up and see how things are going for you after your recent emergency visit.\"    Tell me how you are doing now that you are home?\" He is doing fine at home. MRI showed NO stroke. MORE blockage in his carotids.      Discharge Instructions    \"Let's review your discharge instructions.  What is/are the follow-up recommendations?  Pt. Response: He was to follow-up with the vascular surgeon or Dr. Brett HERNANDEZ.    \"Has an appointment with your primary care provider been scheduled?\"  Wife made an appt with his vascular surgeon on Monday.  \"The vascular surgeon's name is Dr. Oropeza, down in [place]. He was the one who worked with Mcihele after his last stroke. \"    Medications    \"Tell me what changed about your medicines when you discharged?\"    They started Michele on Plavix and ASA, took him off Pletal    \"What questions do you have about your medications?\"   Wife is in the pharmacy waiting to  Michele's meds. She said she will call next week or send a My Chart, if the vascualr surgeon feels he needs to FU with Dr. carney. They will see what the Surgeon recommends for his care.        Call Summary    \"What questions or concerns do you have about your recent visit and your follow-up care?\"     none    \"If you have questions or things don't continue to improve, we encourage you contact us through the main clinic number (give number).  Even if the clinic is not open, triage nurses are available 24/7 to help you.     We would like you to know that our clinic has extended hours (provide information).  We also have urgent care (provide details on closest location and hours/contact info)\"    \"Thank you for your time and take care!\"  VIPIN Mcbride                  "

## 2020-08-06 NOTE — ED NOTES
Patient is back on cardiac BP and oximetry. He denies weakness in left arm or leg. He is standing bedside to void into urinal. RN in room for safety.

## 2020-08-06 NOTE — ED NOTES
Discharge reviewed with patient and wife. They are aware patient is leaving AMA. He denies pain and denies weakness in either extremity. He is declining offer for wheelchair.

## 2020-08-06 NOTE — TELEPHONE ENCOUNTER
Spoke with patient's wife, reviewed recent ER visit. She states the left leg and left arm weakness started yesterday afternoon, she called 911 as patient could not stand from chair. The weakness resolved within 2 hours and he has had no issues since. She states he is feeling well today. She would like for him to see Dr. Oropeza and for Dr. Oropeza to review recent imaging. Will see him in clinic Monday 8/10/20.  Kassandra Lanza RN  [facility]

## 2020-08-06 NOTE — TELEPHONE ENCOUNTER
Please set up a time for a visit or virtual visit with this pt and Dr. West Thursday or Friday this week for ED f/u TIA, per Dr. Myke Adan. Please call pt with the date and time. Thanks

## 2020-08-06 NOTE — ED NOTES
Patient and wife signed AMA form, but are reporting that the Hospitalist told them they are being discharged. Dr Adan notified.

## 2020-08-06 NOTE — DISCHARGE INSTRUCTIONS
You are leaving AGAINST MEDICAL ADVICE.  If you have recurrent or new concerning symptoms you should return to the emergency room for reassessment.  Stop your Pletal .  Continue the aspirin and add the Plavix daily.  Your doctor should contact you to arrange either in person or follow-up through a virtual visit.  Have him arrange follow-up with neurology. If you are unable to arrange follow-up with the previous vascular surgeon have them arrange follow-up with Dr. Guaman at [facility].

## 2020-08-07 ENCOUNTER — TELEPHONE (OUTPATIENT)
Dept: CARDIOLOGY | Facility: CLINIC | Age: 75
End: 2020-08-07

## 2020-08-10 ENCOUNTER — OFFICE VISIT (OUTPATIENT)
Dept: CARDIOLOGY | Facility: CLINIC | Age: 75
End: 2020-08-10
Payer: MEDICARE

## 2020-08-10 VITALS
HEIGHT: 68 IN | OXYGEN SATURATION: 97 % | DIASTOLIC BLOOD PRESSURE: 75 MMHG | WEIGHT: 150.3 LBS | SYSTOLIC BLOOD PRESSURE: 138 MMHG | BODY MASS INDEX: 22.78 KG/M2 | HEART RATE: 60 BPM

## 2020-08-10 DIAGNOSIS — Z86.73 HISTORY OF TIA (TRANSIENT ISCHEMIC ATTACK) AND STROKE: Primary | ICD-10-CM

## 2020-08-10 DIAGNOSIS — Z98.890 HISTORY OF STENT INSERTION OF RENAL ARTERY: ICD-10-CM

## 2020-08-10 DIAGNOSIS — I73.9 PAD (PERIPHERAL ARTERY DISEASE) (H): ICD-10-CM

## 2020-08-10 DIAGNOSIS — I70.213 INTERMITTENT CLAUDICATION OF BOTH LOWER EXTREMITIES DUE TO ATHEROSCLEROSIS (H): ICD-10-CM

## 2020-08-10 DIAGNOSIS — I70.1 RENAL ARTERY STENOSIS (H): ICD-10-CM

## 2020-08-10 PROCEDURE — 99214 OFFICE O/P EST MOD 30 MIN: CPT | Performed by: INTERNAL MEDICINE

## 2020-08-10 ASSESSMENT — MIFFLIN-ST. JEOR: SCORE: 1401.26

## 2020-08-10 NOTE — PROGRESS NOTES
Service Date: 08/10/2020      REASON FOR CONSULTATION:  Post-ER visit followup.      HISTORY OF PRESENT ILLNESS:  The patient is a very pleasant 74-year-old gentleman who is closely followed by my colleagues, Dr. Stevie Felipe, and SARATH Callaway.  I have seen him previously for resistant hypertension due to severe left renal artery stenosis.  I saw him last September.  At that time he underwent left renal artery stenting.  He said his blood pressure has since been well controlled.      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      He had a CTA of the head and neck last fall, which showed patent right internal carotid artery stent with residual moderate in-stent restenosis.  The diameter of the stent at that time was calculated at 56%.  There is also moderate left internal carotid artery stenosis with stenosis measuring 58%.      The patient was seen at [facility] Emergency room last Thursday.  He presented with left leg and arm weakness.  The symptoms came on suddenly.  He was unable to ambulate.  Therefore, he called 911.  He had intracranial imaging in the Emergency Department, including CT and MR.  The CT of the head was negative for acute bleed or infarct.  CTA of the head and neck showed moderate to severe stenosis within the right internal carotid artery stent.  The stenosis was 73%, which increased when compared to his last CTA in 10/2019.  The stenosis at that time was 56%.  The left carotid bifurcation also had 73% stenosis, which was up from 58% a year earlier.  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  Also noted was severe stenosis of the basilar artery, which was also  unchanged.  An MRI of the brain was negative for acute pathology.      He was observed in the emergency department with resolution of his symptoms.  He was asked to stay overnight for observation, but declined and left AMA, per the records.  He was placed on aspirin and Plavix.  His Pletal was discontinued.  He was asked to followup with us as well as Neurology.  He has not seen a neurologist yet.      IMPRESSION, REPORT, PLAN:   1.  Probable recent TIA.   2.  Known carotid artery disease, status post prior right ICA stenting in 2014, now with moderate severe residual stenosis.   3.  Moderate severe left ICA stenosis.   4.  History of severe renal artery stenosis, status post stenting.   5.  History of resistant hypertension, resolved.   6.  Severe peripheral arterial disease with prior bilateral SFA angioplasty and stenting and stable claudication.   7.  Hyperlipidemia.   8.  Coronary artery disease, status post previous CABG x6.      Mr. Barone blood pressure has been well controlled since his left renal artery stenting, approximately a year ago.  He also remains stable from a cardiac point of view.  Unfortunately, he appears to have suffered a TIA last week.  CTA of the neck is suggesting progression of his right ICA in-stent restenosis as well as progression of his left carotid artery stenosis.  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      Clinically, his presentation is consistent with TIA.  He is on an appropriate medical program right now, which includes dual-antiplatelet therapy.  However, he has not been seen by Neurology.  I recommended that he sees a neurologist as soon as possible.  Given progression of the right ICA in-stent stenosis, he might benefit from revascularization in light of his recent symptoms.  However, we will defer this to Neurology.  If Neurology feels that he would benefit from revascularization, then we will  refer him to our Vascular Surgery colleagues.      For now, he will continue his current medical program, which in addition to the dual-antiplatelet therapy includes PCSK9 inhibitor and a beta-blocker.      I will have him undergo surveillance of the left renal artery ultrasound to follow up on the status of his renal artery stent.  We will also perform ABIs with exercise to up on the extent of his arterial insufficiency.      I appreciate the opportunity to participate in his care.         NEHA SANDERS MD             D: 08/10/2020   T: 08/10/2020   MT: RAIN      Name:     NINFA CID   MRN:      [id number]        Account:      [id number]   :      1945           Service Date: 08/10/2020      Document: [id number]

## 2020-08-10 NOTE — LETTER
8/10/2020      Jonas West MD  [address] [address]      RE: Michele Mier       Dear Colleague,    I had the pleasure of seeing Michele Vance in the [facility].    Service Date: 08/10/2020      REASON FOR CONSULTATION:  Post-ER visit followup.      HISTORY OF PRESENT ILLNESS:  The patient is a very pleasant 74-year-old gentleman who is closely followed by my colleagues, Dr. Stevie Felipe, and SARATH Callaway.  I have seen him previously for resistant hypertension due to severe left renal artery stenosis.  I saw him last September.  At that time he underwent left renal artery stenting.  He said his blood pressure has since been well controlled.      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      He had a CTA of the head and neck last fall, which showed patent right internal carotid artery stent with residual moderate in-stent restenosis.  The diameter of the stent at that time was calculated at 56%.  There is also moderate left internal carotid artery stenosis with stenosis measuring 58%.      The patient was seen at [facility] Emergency room last Thursday.  He presented with left leg and arm weakness.  The symptoms came on suddenly.  He was unable to ambulate.  Therefore, he called 911.  He had intracranial imaging in the Emergency Department, including CT and MR.  The CT of the head was negative for acute bleed or infarct.  CTA of the head and neck showed moderate to severe stenosis within the right internal carotid artery stent.  The stenosis was 73%, which increased when compared to his last CTA in 10/2019.  The stenosis at that time was 56%.  The left carotid  bifurcation also had 73% stenosis, which was up from 58% a year earlier.  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  Also noted was severe stenosis of the basilar artery, which was also unchanged.  An MRI of the brain was negative for acute pathology.      He was observed in the emergency department with resolution of his symptoms.  He was asked to stay overnight for observation, but declined and left AMA, per the records.  He was placed on aspirin and Plavix.  His Pletal was discontinued.  He was asked to followup with us as well as Neurology.  He has not seen a neurologist yet.      IMPRESSION, REPORT, PLAN:   1.  Probable recent TIA.   2.  Known carotid artery disease, status post prior right ICA stenting in 2014, now with moderate severe residual stenosis.   3.  Moderate severe left ICA stenosis.   4.  History of severe renal artery stenosis, status post stenting.   5.  History of resistant hypertension, resolved.   6.  Severe peripheral arterial disease with prior bilateral SFA angioplasty and stenting and stable claudication.   7.  Hyperlipidemia.   8.  Coronary artery disease, status post previous CABG x6.      Mr. Barone blood pressure has been well controlled since his left renal artery stenting, approximately a year ago.  He also remains stable from a cardiac point of view.  Unfortunately, he appears to have suffered a TIA last week.  CTA of the neck is suggesting progression of his right ICA in-stent restenosis as well as progression of his left carotid artery stenosis.  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      Clinically, his presentation is consistent with TIA.  He is on an appropriate medical program right now, which includes dual-antiplatelet therapy.  However, he has not been seen by Neurology.  I recommended that he sees a neurologist as soon as possible.  Given progression of the right  ICA in-stent stenosis, he might benefit from revascularization in light of his recent symptoms.  However, we will defer this to Neurology.  If Neurology feels that he would benefit from revascularization, then we will refer him to our Vascular Surgery colleagues.      For now, he will continue his current medical program, which in addition to the dual-antiplatelet therapy includes PCSK9 inhibitor and a beta-blocker.      I will have him undergo surveillance of the left renal artery ultrasound to follow up on the status of his renal artery stent.  We will also perform ABIs with exercise to up on the extent of his arterial insufficiency.      I appreciate the opportunity to participate in his care.         NEHA SANDERS MD             D: 08/10/2020   T: 08/10/2020   MT: RAIN      Name:     NINFA CID   MRN:      [id number]        Account:      [id number]   :      1945           Service Date: 08/10/2020      Document: [id number]         Outpatient Encounter Medications as of 8/10/2020   Medication Sig Dispense Refill     alirocumab (PRALUENT) 150 MG/ML injectable syringe Inject 1 mL (150 mg) Subcutaneous every 14 days 8 mL 3     amitriptyline (ELAVIL) 25 MG tablet TAKE 1 TABLET TWICE A DAY (Patient taking differently: Take 50 mg by mouth At Bedtime ) 180 tablet 0     amLODIPine (NORVASC) 10 MG tablet Take 1 tablet (10 mg) by mouth daily 90 tablet 3     ASPIRIN PO Take 325 mg by mouth daily       carvedilol (COREG) 25 MG tablet Take 1 tablet (25 mg) by mouth 2 times daily (with meals) 180 tablet 3     clopidogrel (PLAVIX) 75 MG tablet Take 1 tablet (75 mg) by mouth daily 30 tablet 0     gabapentin (NEURONTIN) 300 MG capsule Take 1 capsule (300 mg) by mouth 3 times daily (Patient taking differently: Take 300 mg by mouth 2 times daily ) 210 capsule 4     isosorbide mononitrate (IMDUR) 60 MG 24 hr tablet Take 1 tablet (60 mg) by mouth daily 90 tablet 3     KLOR-CON 10 MEQ CR tablet TAKE 5 TABLETS TWICE A DAY  300 tablet 11     linaclotide (LINZESS) 145 MCG capsule Take 145 mcg by mouth every morning (before breakfast)       losartan (COZAAR) 100 MG tablet Take 1 tablet (100 mg) by mouth daily 90 tablet 3     omeprazole (PRILOSEC) 40 MG DR capsule TAKE 1 CAPSULE DAILY 90 capsule 2     cilostazol (PLETAL) 100 MG tablet Take 1 tablet (100 mg) by mouth 2 times daily (Patient not taking: Reported on 8/10/2020) 180 tablet 3     ondansetron (ZOFRAN) 4 MG tablet Take 4 mg by mouth every 6 hours as needed for nausea        STATIN NOT PRESCRIBED, INTENTIONAL, Pt has known rhabdomyolysis in the past, somewhat associated with statins.  Also wasn't able to tolerate Zetia. (Patient not taking: Reported on 8/10/2020) 1 each 0     zolpidem (AMBIEN) 5 MG tablet Take 1 tablet (5 mg) by mouth nightly as needed for sleep (Patient not taking: Reported on 8/10/2020) 90 tablet 1     No facility-administered encounter medications on file as of 8/10/2020.        Again, thank you for allowing me to participate in the care of your patient.      Sincerely,    Julio Oropeza MD, MD     [facility]

## 2020-08-10 NOTE — LETTER
8/10/2020    Jonas West MD  [address] [facility] [address]    RE: Michele Vance       Dear Colleague,    I had the pleasure of seeing Michele Vance in the [facility].    HPI and Plan:   See dictation    Orders Placed This Encounter   Procedures     US RUBEN Doppler with Exercise Bilateral     US Renal Complete w Doppler Complete     US Carotid Bilateral     NEUROLOGY ADULT REFERRAL     Follow-Up with Cardiologist       No orders of the defined types were placed in this encounter.      There are no discontinued medications.      Encounter Diagnoses   Name Primary?     Renal artery stenosis (H)      PAD (peripheral artery disease) (H)      History of TIA (transient ischemic attack) and stroke Yes     Intermittent claudication of both lower extremities due to atherosclerosis (H)      History of stent insertion of renal artery        CURRENT MEDICATIONS:  Current Outpatient Medications   Medication Sig Dispense Refill     alirocumab (PRALUENT) 150 MG/ML injectable syringe Inject 1 mL (150 mg) Subcutaneous every 14 days 8 mL 3     amitriptyline (ELAVIL) 25 MG tablet TAKE 1 TABLET TWICE A DAY (Patient taking differently: Take 50 mg by mouth At Bedtime ) 180 tablet 0     amLODIPine (NORVASC) 10 MG tablet Take 1 tablet (10 mg) by mouth daily 90 tablet 3     ASPIRIN PO Take 325 mg by mouth daily       carvedilol (COREG) 25 MG tablet Take 1 tablet (25 mg) by mouth 2 times daily (with meals) 180 tablet 3     clopidogrel (PLAVIX) 75 MG tablet Take 1 tablet (75 mg) by mouth daily 30 tablet 0     gabapentin (NEURONTIN) 300 MG capsule Take 1 capsule (300 mg) by mouth 3 times daily (Patient taking differently: Take 300 mg by mouth 2 times daily ) 210 capsule 4     isosorbide mononitrate (IMDUR) 60 MG 24 hr tablet Take 1 tablet (60 mg) by mouth daily 90 tablet 3     KLOR-CON 10 MEQ CR tablet TAKE 5 TABLETS TWICE A  tablet 11     linaclotide (LINZESS) 145 MCG capsule Take 145 mcg by  "mouth every morning (before breakfast)       losartan (COZAAR) 100 MG tablet Take 1 tablet (100 mg) by mouth daily 90 tablet 3     omeprazole (PRILOSEC) 40 MG DR capsule TAKE 1 CAPSULE DAILY 90 capsule 2     cilostazol (PLETAL) 100 MG tablet Take 1 tablet (100 mg) by mouth 2 times daily (Patient not taking: Reported on 8/10/2020) 180 tablet 3     ondansetron (ZOFRAN) 4 MG tablet Take 4 mg by mouth every 6 hours as needed for nausea        STATIN NOT PRESCRIBED, INTENTIONAL, Pt has known rhabdomyolysis in the past, somewhat associated with statins.  Also wasn't able to tolerate Zetia. (Patient not taking: Reported on 8/10/2020) 1 each 0     zolpidem (AMBIEN) 5 MG tablet Take 1 tablet (5 mg) by mouth nightly as needed for sleep (Patient not taking: Reported on 8/10/2020) 90 tablet 1       ALLERGIES     Allergies   Allergen Reactions     Hmg-Coa-R Inhibitors Other (See Comments)     Rhabdo  Same as \"statins\"     Gemfibrozil      Resting thigh-calf pain     Sulfa Drugs      Reacted as a child     Zetia [Ezetimibe]      Muscle cramping       PAST MEDICAL HISTORY:  Past Medical History:   Diagnosis Date     Carotid stenosis     right endartectomy     Chronic kidney disease     stage 3     Coronary artery disease 3-2014    CABG x6     CVA (cerebral infarction)     balance problems, mild     Elevated CK      Esophageal reflux      Hypercholesteremia      Nonsenile cataract      Other and unspecified hyperlipidemia      PAD (peripheral artery disease) (H)      Rhabdomyolysis 2010     Unspecified essential hypertension        PAST SURGICAL HISTORY:  Past Surgical History:   Procedure Laterality Date     APPENDECTOMY  1974     BYPASS GRAFT ARTERY CORONARY  3/5/2014    Procedure: BYPASS GRAFT ARTERY CORONARY;  Median Sternotomy, Coronary Artery Bypass Graft X6 used Left internal mammary artery, Left and Right Greater Saphenous vein, on Pump oxygenator.;  Surgeon: Tim Montanez MD;  Location: [facility] OR     CATARACT IOL, RT/LT " Bilateral 2008    in [place]     cataracts       COLONOSCOPY  12/12/02    [facility]     COLONOSCOPY N/A 10/7/2014    Procedure: COMBINED COLONOSCOPY, SINGLE OR MULTIPLE BIOPSY/POLYPECTOMY BY BIOPSY;  Surgeon: Micheal Mora MD;  Location:  GI     CV ANGIOGRAM LOWER EXTREMITY Bilateral 9/9/2019    Procedure: Lower Extremity Angiogram Bilateral;  Surgeon: Julio Oropeza MD;  Location: [facility] CARDIAC CATH LAB     DENTAL SURGERY      TMJ, implants     ENDARTERECTOMY CAROTID      right carotid stent     ESOPHAGOSCOPY, GASTROSCOPY, DUODENOSCOPY (EGD), COMBINED Left 10/7/2014    Procedure: COMBINED ESOPHAGOSCOPY, GASTROSCOPY, DUODENOSCOPY (EGD), BIOPSY SINGLE OR MULTIPLE;  Surgeon: Micheal Mora MD;  Location:  GI     ESOPHAGOSCOPY, GASTROSCOPY, DUODENOSCOPY (EGD), COMBINED Left 10/7/2014    Procedure: COMBINED ESOPHAGOSCOPY, GASTROSCOPY, DUODENOSCOPY (EGD), REMOVE FOREIGN BODY;  Surgeon: Micheal Mora MD;  Location:  GI      CAPSULE ENDOSCOPY N/A 10/7/2014    Procedure: CAPSULE/PILL CAM ENDOSCOPY;  Surgeon: Micheal Mora MD;  Location:  GI      UGI ENDOSCOPY, SIMPLE EXAM  01/08/99    [facility]     INJECT EPIDURAL LUMBAR Right 5/8/2017    Procedure: INJECT EPIDURAL LUMBAR;  Lumbar transforaminal Epidural Steroid Injection right lumbar 4-5, and right  lumbar 5-Sacral 1;  Surgeon: Anthony Gonzalez MD;  Location: [facility]     INJECT JOINT SACROILIAC Bilateral 8/28/2017    Procedure: INJECT JOINT SACROILIAC;  sacroiliac joint injection bilateral;  Surgeon: Anthony Gonzalez MD;  Location:  OR     KNEE SURGERY  1976    left knee reconstruction     lasix  2001    both eyes     RELEASE CARPAL TUNNEL  1/13/2011    RELEASE CARPAL TUNNEL performed by JO MADRID at  OR     RELEASE CARPAL TUNNEL  1/20/2011    RELEASE CARPAL TUNNEL performed by OJ MADRID at  OR       FAMILY HISTORY:  Family History   Problem Relation Age of Onset     Hypertension Mother       Eye Disorder Mother      Cerebrovascular Disease Father      Heart Disease Father      Lipids Father      Obesity Father      Cancer Sister      Heart Disease Sister      Glaucoma No family hx of      Macular Degeneration No family hx of      Kidney Disease No family hx of        SOCIAL HISTORY:  Social History     Socioeconomic History     Marital status:      Spouse name: None     Number of children: None     Years of education: None     Highest education level: None   Occupational History     Occupation:      Employer: RETIRED   Social Needs     Financial resource strain: None     Food insecurity     Worry: None     Inability: None     Transportation needs     Medical: None     Non-medical: None   Tobacco Use     Smoking status: Former Smoker     Packs/day: 2.50     Years: 20.00     Pack years: 50.00     Types: Cigarettes     Last attempt to quit: 2000     Years since quittin.6     Smokeless tobacco: Never Used   Substance and Sexual Activity     Alcohol use: No     Alcohol/week: 0.0 standard drinks     Drug use: No     Sexual activity: Yes     Partners: Female   Lifestyle     Physical activity     Days per week: None     Minutes per session: None     Stress: None   Relationships     Social connections     Talks on phone: None     Gets together: None     Attends Yarsani service: None     Active member of club or organization: None     Attends meetings of clubs or organizations: None     Relationship status: None     Intimate partner violence     Fear of current or ex partner: None     Emotionally abused: None     Physically abused: None     Forced sexual activity: None   Other Topics Concern      Service Not Asked     Blood Transfusions Not Asked     Caffeine Concern Not Asked     Occupational Exposure Not Asked     Hobby Hazards Not Asked     Sleep Concern Not Asked     Stress Concern Not Asked     Weight Concern Not Asked     Special Diet Not Asked     Back Care Not  "Asked     Exercise Yes     Comment: 3 times per week     Bike Helmet Not Asked     Seat Belt Not Asked     Self-Exams Not Asked     Parent/sibling w/ CABG, MI or angioplasty before 65F 55M? Not Asked   Social History Narrative    Moved from [place] in August, retired  for [employer].       Review of Systems:  Skin:  Negative       Eyes:  Negative      ENT:  Negative      Respiratory:  Positive for cough     Cardiovascular:  Negative dizziness;Positive for;edema    Gastroenterology: Positive for   IBS  Genitourinary:  Negative      Musculoskeletal:  Negative      Neurologic:  Negative      Psychiatric:  Positive for anxiety    Heme/Lymph/Imm:  Negative      Endocrine:  Negative        Physical Exam:  Vitals: /75 (BP Location: Left arm, Patient Position: Sitting, Cuff Size: Adult Regular)   Pulse 60   Ht 1.727 m (5' 8\")   Wt 68.2 kg (150 lb 4.8 oz)   SpO2 97%   BMI 22.85 kg/m      Constitutional:  in no acute distress        Skin:  warm and dry to the touch          Head:  normocephalic        Eyes:  conjunctivae and lids unremarkable        Lymph:      ENT:  no pallor or cyanosis        Neck:  no carotid bruit;JVP normal        Respiratory:  clear to auscultation         Cardiac: regular rhythm;no murmurs, gallops or rubs detected       systolic murmur            2+ 2+           2+ 1+                    GI:  abdomen soft;non-tender        Extremities and Muscular Skeletal:  no edema              Neurological:  no gross motor deficits        Psych:  Alert and Oriented x 3        CC  Christiane Coffey PA-C  [address]  [place] [address]                Thank you for allowing me to participate in the care of your patient.      Sincerely,     Julio Oropeza MD, MD     [facility]    cc:   Christiane Coffey PA-C  [address]  [place] [address]        "

## 2020-08-10 NOTE — PROGRESS NOTES
HPI and Plan:   See dictation    Orders Placed This Encounter   Procedures     US RUBEN Doppler with Exercise Bilateral     US Renal Complete w Doppler Complete     US Carotid Bilateral     NEUROLOGY ADULT REFERRAL     Follow-Up with Cardiologist       No orders of the defined types were placed in this encounter.      There are no discontinued medications.      Encounter Diagnoses   Name Primary?     Renal artery stenosis (H)      PAD (peripheral artery disease) (H)      History of TIA (transient ischemic attack) and stroke Yes     Intermittent claudication of both lower extremities due to atherosclerosis (H)      History of stent insertion of renal artery        CURRENT MEDICATIONS:  Current Outpatient Medications   Medication Sig Dispense Refill     alirocumab (PRALUENT) 150 MG/ML injectable syringe Inject 1 mL (150 mg) Subcutaneous every 14 days 8 mL 3     amitriptyline (ELAVIL) 25 MG tablet TAKE 1 TABLET TWICE A DAY (Patient taking differently: Take 50 mg by mouth At Bedtime ) 180 tablet 0     amLODIPine (NORVASC) 10 MG tablet Take 1 tablet (10 mg) by mouth daily 90 tablet 3     ASPIRIN PO Take 325 mg by mouth daily       carvedilol (COREG) 25 MG tablet Take 1 tablet (25 mg) by mouth 2 times daily (with meals) 180 tablet 3     clopidogrel (PLAVIX) 75 MG tablet Take 1 tablet (75 mg) by mouth daily 30 tablet 0     gabapentin (NEURONTIN) 300 MG capsule Take 1 capsule (300 mg) by mouth 3 times daily (Patient taking differently: Take 300 mg by mouth 2 times daily ) 210 capsule 4     isosorbide mononitrate (IMDUR) 60 MG 24 hr tablet Take 1 tablet (60 mg) by mouth daily 90 tablet 3     KLOR-CON 10 MEQ CR tablet TAKE 5 TABLETS TWICE A  tablet 11     linaclotide (LINZESS) 145 MCG capsule Take 145 mcg by mouth every morning (before breakfast)       losartan (COZAAR) 100 MG tablet Take 1 tablet (100 mg) by mouth daily 90 tablet 3     omeprazole (PRILOSEC) 40 MG DR capsule TAKE 1 CAPSULE DAILY 90 capsule 2      "cilostazol (PLETAL) 100 MG tablet Take 1 tablet (100 mg) by mouth 2 times daily (Patient not taking: Reported on 8/10/2020) 180 tablet 3     ondansetron (ZOFRAN) 4 MG tablet Take 4 mg by mouth every 6 hours as needed for nausea        STATIN NOT PRESCRIBED, INTENTIONAL, Pt has known rhabdomyolysis in the past, somewhat associated with statins.  Also wasn't able to tolerate Zetia. (Patient not taking: Reported on 8/10/2020) 1 each 0     zolpidem (AMBIEN) 5 MG tablet Take 1 tablet (5 mg) by mouth nightly as needed for sleep (Patient not taking: Reported on 8/10/2020) 90 tablet 1       ALLERGIES     Allergies   Allergen Reactions     Hmg-Coa-R Inhibitors Other (See Comments)     Rhabdo  Same as \"statins\"     Gemfibrozil      Resting thigh-calf pain     Sulfa Drugs      Reacted as a child     Zetia [Ezetimibe]      Muscle cramping       PAST MEDICAL HISTORY:  Past Medical History:   Diagnosis Date     Carotid stenosis     right endartectomy     Chronic kidney disease     stage 3     Coronary artery disease 3-2014    CABG x6     CVA (cerebral infarction)     balance problems, mild     Elevated CK      Esophageal reflux      Hypercholesteremia      Nonsenile cataract      Other and unspecified hyperlipidemia      PAD (peripheral artery disease) (H)      Rhabdomyolysis 2010     Unspecified essential hypertension        PAST SURGICAL HISTORY:  Past Surgical History:   Procedure Laterality Date     APPENDECTOMY  1974     BYPASS GRAFT ARTERY CORONARY  3/5/2014    Procedure: BYPASS GRAFT ARTERY CORONARY;  Median Sternotomy, Coronary Artery Bypass Graft X6 used Left internal mammary artery, Left and Right Greater Saphenous vein, on Pump oxygenator.;  Surgeon: Tim Montanez MD;  Location: [facility] OR     CATARACT IOL, RT/LT Bilateral 2008    in [place]     cataracts       COLONOSCOPY  12/12/02    [facility]     COLONOSCOPY N/A 10/7/2014    Procedure: COMBINED COLONOSCOPY, SINGLE OR MULTIPLE BIOPSY/POLYPECTOMY BY " BIOPSY;  Surgeon: Micheal Mora MD;  Location:  GI     CV ANGIOGRAM LOWER EXTREMITY Bilateral 9/9/2019    Procedure: Lower Extremity Angiogram Bilateral;  Surgeon: Julio Oropeza MD;  Location: [facility] CARDIAC CATH LAB     DENTAL SURGERY      TMJ, implants     ENDARTERECTOMY CAROTID      right carotid stent     ESOPHAGOSCOPY, GASTROSCOPY, DUODENOSCOPY (EGD), COMBINED Left 10/7/2014    Procedure: COMBINED ESOPHAGOSCOPY, GASTROSCOPY, DUODENOSCOPY (EGD), BIOPSY SINGLE OR MULTIPLE;  Surgeon: Micheal Mora MD;  Location:  GI     ESOPHAGOSCOPY, GASTROSCOPY, DUODENOSCOPY (EGD), COMBINED Left 10/7/2014    Procedure: COMBINED ESOPHAGOSCOPY, GASTROSCOPY, DUODENOSCOPY (EGD), REMOVE FOREIGN BODY;  Surgeon: Micheal Mora MD;  Location:  GI      CAPSULE ENDOSCOPY N/A 10/7/2014    Procedure: CAPSULE/PILL CAM ENDOSCOPY;  Surgeon: Micheal Mora MD;  Location: [facility] UGI ENDOSCOPY, SIMPLE EXAM  01/08/99    [facility]     INJECT EPIDURAL LUMBAR Right 5/8/2017    Procedure: INJECT EPIDURAL LUMBAR;  Lumbar transforaminal Epidural Steroid Injection right lumbar 4-5, and right  lumbar 5-Sacral 1;  Surgeon: Anthony Gonzalez MD;  Location: [facility]     INJECT JOINT SACROILIAC Bilateral 8/28/2017    Procedure: INJECT JOINT SACROILIAC;  sacroiliac joint injection bilateral;  Surgeon: Anthony Gonzalez MD;  Location:  OR     KNEE SURGERY  1976    left knee reconstruction     lasix  2001    both eyes     RELEASE CARPAL TUNNEL  1/13/2011    RELEASE CARPAL TUNNEL performed by JO MADRID at  OR     RELEASE CARPAL TUNNEL  1/20/2011    RELEASE CARPAL TUNNEL performed by JO MADRID at  OR       FAMILY HISTORY:  Family History   Problem Relation Age of Onset     Hypertension Mother      Eye Disorder Mother      Cerebrovascular Disease Father      Heart Disease Father      Lipids Father      Obesity Father      Cancer Sister      Heart Disease Sister      Glaucoma No family hx of       Macular Degeneration No family hx of      Kidney Disease No family hx of        SOCIAL HISTORY:  Social History     Socioeconomic History     Marital status:      Spouse name: None     Number of children: None     Years of education: None     Highest education level: None   Occupational History     Occupation:      Employer: RETIRED   Social Needs     Financial resource strain: None     Food insecurity     Worry: None     Inability: None     Transportation needs     Medical: None     Non-medical: None   Tobacco Use     Smoking status: Former Smoker     Packs/day: 2.50     Years: 20.00     Pack years: 50.00     Types: Cigarettes     Last attempt to quit: 2000     Years since quittin.6     Smokeless tobacco: Never Used   Substance and Sexual Activity     Alcohol use: No     Alcohol/week: 0.0 standard drinks     Drug use: No     Sexual activity: Yes     Partners: Female   Lifestyle     Physical activity     Days per week: None     Minutes per session: None     Stress: None   Relationships     Social connections     Talks on phone: None     Gets together: None     Attends Uatsdin service: None     Active member of club or organization: None     Attends meetings of clubs or organizations: None     Relationship status: None     Intimate partner violence     Fear of current or ex partner: None     Emotionally abused: None     Physically abused: None     Forced sexual activity: None   Other Topics Concern      Service Not Asked     Blood Transfusions Not Asked     Caffeine Concern Not Asked     Occupational Exposure Not Asked     Hobby Hazards Not Asked     Sleep Concern Not Asked     Stress Concern Not Asked     Weight Concern Not Asked     Special Diet Not Asked     Back Care Not Asked     Exercise Yes     Comment: 3 times per week     Bike Helmet Not Asked     Seat Belt Not Asked     Self-Exams Not Asked     Parent/sibling w/ CABG, MI or angioplasty before 65F 55M? Not Asked   Social  "History Narrative    Moved from [place] in August, retired  for [employer].       Review of Systems:  Skin:  Negative       Eyes:  Negative      ENT:  Negative      Respiratory:  Positive for cough     Cardiovascular:  Negative dizziness;Positive for;edema    Gastroenterology: Positive for   IBS  Genitourinary:  Negative      Musculoskeletal:  Negative      Neurologic:  Negative      Psychiatric:  Positive for anxiety    Heme/Lymph/Imm:  Negative      Endocrine:  Negative        Physical Exam:  Vitals: /75 (BP Location: Left arm, Patient Position: Sitting, Cuff Size: Adult Regular)   Pulse 60   Ht 1.727 m (5' 8\")   Wt 68.2 kg (150 lb 4.8 oz)   SpO2 97%   BMI 22.85 kg/m      Constitutional:  in no acute distress        Skin:  warm and dry to the touch          Head:  normocephalic        Eyes:  conjunctivae and lids unremarkable        Lymph:      ENT:  no pallor or cyanosis        Neck:  no carotid bruit;JVP normal        Respiratory:  clear to auscultation         Cardiac: regular rhythm;no murmurs, gallops or rubs detected       systolic murmur            2+ 2+           2+ 1+                    GI:  abdomen soft;non-tender        Extremities and Muscular Skeletal:  no edema              Neurological:  no gross motor deficits        Psych:  Alert and Oriented x 3        CC  Christiane Coffey, PA-C  [address]  [place] [id number]              "

## 2020-08-18 ENCOUNTER — PRE VISIT (OUTPATIENT)
Dept: NEUROLOGY | Facility: CLINIC | Age: 75
End: 2020-08-18

## 2020-08-18 NOTE — TELEPHONE ENCOUNTER
FUTURE VISIT INFORMATION      FUTURE VISIT INFORMATION:    Date: 8/21/2020    Time: 245pm    [place] [facility]ERR[place] INFORMATION:    Referring provider:  Dr. Oropeza     Referring providers clinic:  [facility]     Reason for visit/diagnosis  TIA    RECORDS REQUESTED FROM:       Clinic name Comments Records Status Imaging Status   Internal Dr. Oropeza-8/10/2020    ED Visit 8/5/2020    CTA Head 10/2/2019, 8/5/2020    MR Brain 8/5/2020 Epic PACS

## 2020-08-19 ENCOUNTER — OFFICE VISIT (OUTPATIENT)
Dept: INTERNAL MEDICINE | Facility: CLINIC | Age: 75
End: 2020-08-19
Payer: MEDICARE

## 2020-08-19 VITALS
OXYGEN SATURATION: 99 % | RESPIRATION RATE: 16 BRPM | BODY MASS INDEX: 22.35 KG/M2 | DIASTOLIC BLOOD PRESSURE: 64 MMHG | SYSTOLIC BLOOD PRESSURE: 118 MMHG | TEMPERATURE: 98.1 F | HEART RATE: 60 BPM | WEIGHT: 147 LBS

## 2020-08-19 DIAGNOSIS — I25.10 ASCVD (ARTERIOSCLEROTIC CARDIOVASCULAR DISEASE): ICD-10-CM

## 2020-08-19 DIAGNOSIS — I73.9 PAD (PERIPHERAL ARTERY DISEASE) (H): ICD-10-CM

## 2020-08-19 DIAGNOSIS — I65.23 CAROTID STENOSIS, BILATERAL: Primary | ICD-10-CM

## 2020-08-19 DIAGNOSIS — G45.9 TIA (TRANSIENT ISCHEMIC ATTACK): ICD-10-CM

## 2020-08-19 PROCEDURE — 99214 OFFICE O/P EST MOD 30 MIN: CPT | Performed by: INTERNAL MEDICINE

## 2020-08-19 RX ORDER — ONDANSETRON 4 MG/1
TABLET, FILM COATED ORAL PRN
COMMUNITY
End: 2021-06-07

## 2020-08-19 RX ORDER — CLOPIDOGREL BISULFATE 75 MG/1
75 TABLET ORAL DAILY
Qty: 30 TABLET | Refills: 3 | Status: SHIPPED | OUTPATIENT
Start: 2020-08-19 | End: 2020-12-11

## 2020-08-19 ASSESSMENT — PAIN SCALES - GENERAL: PAINLEVEL: NO PAIN (0)

## 2020-08-19 NOTE — PROGRESS NOTES
Subjective     Michele Vance is a 74 year old male who presents to clinic today for the following health issues:    HPI       ED/UC Followup:    Facility:  [facility]  Date of visit: 8/5/20  Reason for visit: left sided weakness  Bilateral carotid artery stenosis  Current Status: STABLE       August 5th had a left side paralysis with TIA.  Plan to see neurology to see if needs vascular surgeon. Has virtual appt on Friday this week.      Some dizziness, has to be careful.      Losing weight.  On Linzess, down 10-15 pounds.  Eating meals, no change in appetite, doesn't have much, forces to eat.            Past Medical History:   Diagnosis Date     Carotid stenosis     right endartectomy     Chronic kidney disease     stage 3     Coronary artery disease 3-2014    CABG x6     CVA (cerebral infarction)     balance problems, mild     Elevated CK      Esophageal reflux      Hypercholesteremia      Nonsenile cataract      Other and unspecified hyperlipidemia      PAD (peripheral artery disease) (H)      Rhabdomyolysis 2010     Unspecified essential hypertension      Current Outpatient Medications   Medication     alirocumab (PRALUENT) 150 MG/ML injectable syringe     amitriptyline (ELAVIL) 25 MG tablet     amLODIPine (NORVASC) 10 MG tablet     ASPIRIN PO     carvedilol (COREG) 25 MG tablet     clopidogrel (PLAVIX) 75 MG tablet     gabapentin (NEURONTIN) 300 MG capsule     isosorbide mononitrate (IMDUR) 60 MG 24 hr tablet     KLOR-CON 10 MEQ CR tablet     linaclotide (LINZESS) 145 MCG capsule     losartan (COZAAR) 100 MG tablet     omeprazole (PRILOSEC) 40 MG DR capsule     ondansetron (ZOFRAN) 4 MG tablet     STATIN NOT PRESCRIBED, INTENTIONAL,     cilostazol (PLETAL) 100 MG tablet     No current facility-administered medications for this visit.      Social History     Tobacco Use     Smoking status: Former Smoker     Packs/day: 2.50     Years: 20.00     Pack years: 50.00     Types: Cigarettes     Last attempt to quit:  2000     Years since quittin.6     Smokeless tobacco: Never Used   Substance Use Topics     Alcohol use: No     Alcohol/week: 0.0 standard drinks     Drug use: No         Review of Systems   CONSTITUTIONAL: NEGATIVE for fever, chills, change in weight  INTEGUMENTARY/SKIN: NEGATIVE for worrisome rashes, moles or lesions  EYES: NEGATIVE for vision changes or irritation  ENT/MOUTH: NEGATIVE for ear, mouth and throat problems  RESP: NEGATIVE for significant cough or SOB  CV: NEGATIVE for chest pain, palpitations or peripheral edema  GI: NEGATIVE for nausea, abdominal pain, heartburn, or change in bowel habits  : NEGATIVE for frequency, dysuria, or hematuria  MUSCULOSKELETAL: NEGATIVE for significant arthralgias or myalgia  NEURO: dizziness at times  ENDOCRINE: NEGATIVE for temperature intolerance, skin/hair changes  HEME: NEGATIVE for bleeding problems  PSYCHIATRIC: NEGATIVE for changes in mood or affect      Objective    /64   Pulse 60   Temp 98.1  F (36.7  C) (Temporal)   Resp 16   Wt 66.7 kg (147 lb)   SpO2 99%   BMI 22.35 kg/m    Body mass index is 22.35 kg/m .  Physical Exam   NAD  Gait is stable          Assessment & Plan     Carotid stenosis, bilateral  Patient has bilateral carotid stenosis he had a stent in the right carotid.  He is obstructed in his posterior circulation.  He had a TIA involving the left side of his body which has resolved.  He has been on cardiac and peripheral arterial disease.  He is on appropriate medications of Praluent first cholesterol, blood pressure pills, aspirin and now Plavix.  We will extend his Plavix until revascularization can be done.  He plans to see neurology in 2 days and will likely be referred to vascular surgery for treatment options.  - clopidogrel (PLAVIX) 75 MG tablet; Take 1 tablet (75 mg) by mouth daily    ASCVD (arteriosclerotic cardiovascular disease)  Follows with cardiology is currently stable.  No chest pain.    PAD (peripheral artery  disease) (H)  Known PAD but stable.    TIA (transient ischemic attack)  TIA appears to have resolved he does have some dizziness his blood pressure is controlled recommended the patient be careful and monitor for symptoms if they do not resolve should return to the emergency room immediately.             No follow-ups on file.    Jonas West MD  [facility]

## 2020-08-20 ENCOUNTER — HOSPITAL ENCOUNTER (OUTPATIENT)
Dept: ULTRASOUND IMAGING | Facility: CLINIC | Age: 75
Discharge: HOME | End: 2020-08-20
Attending: INTERNAL MEDICINE | Admitting: INTERNAL MEDICINE
Payer: MEDICARE

## 2020-08-20 DIAGNOSIS — I70.213 INTERMITTENT CLAUDICATION OF BOTH LOWER EXTREMITIES DUE TO ATHEROSCLEROSIS (H): ICD-10-CM

## 2020-08-20 PROCEDURE — 93924 LWR XTR VASC STDY BILAT: CPT

## 2020-08-21 ENCOUNTER — VIRTUAL VISIT (OUTPATIENT)
Dept: NEUROLOGY | Facility: CLINIC | Age: 75
End: 2020-08-21
Attending: INTERNAL MEDICINE
Payer: MEDICARE

## 2020-08-21 ENCOUNTER — HOSPITAL ENCOUNTER (OUTPATIENT)
Dept: ULTRASOUND IMAGING | Facility: CLINIC | Age: 75
Discharge: HOME | End: 2020-08-21
Attending: INTERNAL MEDICINE | Admitting: INTERNAL MEDICINE
Payer: MEDICARE

## 2020-08-21 DIAGNOSIS — I65.23 CAROTID STENOSIS, BILATERAL: Primary | ICD-10-CM

## 2020-08-21 DIAGNOSIS — I70.1 RENAL ARTERY STENOSIS (H): ICD-10-CM

## 2020-08-21 PROCEDURE — 76770 US EXAM ABDO BACK WALL COMP: CPT

## 2020-08-21 NOTE — PROGRESS NOTES
[facility] Neurology Consultation     Michele Vance MRN# [id number]   Age: 74 year old YOB: 1945     Requesting physician: Jonas Foster     Reason for Consultation: TIA      History of Presenting Symptoms:   Michele Vance is a 74 year old male who presents today for evaluation of TIA.  He has been followed in the past through the stroke clinic and with neuroimmunology (5/7/2014, and 4/22/2014 respectively).  The patient has pertinent history of CAD s/p CABG x6, carotid stenosis, mesenteric artery stenosis, PAD.  He has had negative w/up for multiple sclerosis and does not have this disorder.  The patient has had multiple infarctions of varying vascular distributions (b/l cerebellar hemispheres, deep L-frontal lobe/IC, R-frontal cortical/juxtacoritcal) which are thought to be due to embolic source.  In 2006, the patient lost vision of lower quadrant of his right eye.  2/2014 carotid US showed R-carotid stenosis greater or equal to 70%, L-carotid stenosis 50-69%.  He had a full w/up through stroke clinic (ziopatch, CTA head and neck, repeat MRI brain, visual field mapping with dilated fundoscopy, SLP) in 2014 and upon follow up 8/6/2014, the patient was reported to have SVT on ziopatch, and his CTA showed b/l IC stenosis (80% right, diminutive basilar artery, hypoplastic left vertebral artery.  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  The patient followed last with Dr. Cuellar on 9/20/2016 where it was indicated his carotid US were relatively unchanged form the year prior, and there was no evidence of right carotid stent re-stenosis with mention of his left carotid stenosis being asymptomatic and stable. He was stable on  ASA alone and was to follow up in one year with carotid US.  The patient was following with a doctor in [place] for further management of his carotid stenosis.    On 8/6/2020, the patient presented to [facility] ED with complaints of left leg and arm weakness, causing him to be unable to ambulate. Symptoms occurred at 1345 on that same day and NIHSS at 1455 was 0.  CTA revealed moderate stenosis w/in the stent (73% w/in the stent, changed compared to 56% in 10/2/2019 imaging), as well as 73% stenosis of left carotid artery bifurcation, changed since 10/2/2019 scan measuring 58% stenosis.  MRI did not show new infarct.  Stroke neurologist recommendations were to continue ASA and switch from Pletal to Plavix.  Vascular surgeon, Dr. Guaman, indicated that after imaging review, the patient did not require immediate intervention and that the patient would benefit from Carotid Duplex in the future.  The patient left AMA.    Today, the patient does notice being dizzy when he stands up.  When turning his head leftward he does feel off-balance, dizziness.  When turning his head right-deluna, he does notice a feeling of apprehension with dizziness.  When staying still and not moving quickly, his dizziness will silvestre.  He has had no repeats of left or right sided weakness. He has no new visual deficits.  He has no swallowing difficulties.  He has prior deficits of inability to form words that he used to know, (knows the word, not the meaning, only for a few specific words at a time).  He is able to write well.       Past Medical History:     Patient Active Problem List   Diagnosis     Hypertension     Hyperlipidemia LDL goal <130     Myalgia and myositis     GERD (gastroesophageal reflux disease)     Impingement syndrome, shoulder, right     Advanced directives, counseling/discussion     Anemia     CKD (chronic kidney disease) stage 3, GFR 30-59 ml/min (H)     Arthritis     Carotid bruit     Carotid stenosis,  bilateral     Left main coronary artery disease     PAD (peripheral artery disease) (H)     S/P CABG x 6     Insomnia     Nutritional deficiency     Pain     Urinary retention     Mesenteric artery stenosis (H)     Stroke, embolic (H)     Cerebral infarction due to occlusion or stenosis of carotid artery     Carotid artery occlusion with cerebral infarction (H)     Dizziness     Health Care Home     Chronic constipation     Irritable bowel syndrome     PVD (peripheral vascular disease) (H)     Essential hypertension with goal blood pressure less than 140/90     Panlobular emphysema (H)     Carotid stenosis     Renal artery stenosis (H)     Past Medical History:   Diagnosis Date     Carotid stenosis     right endartectomy     Chronic kidney disease     stage 3     Coronary artery disease 3-2014    CABG x6     CVA (cerebral infarction)     balance problems, mild     Elevated CK      Esophageal reflux      Hypercholesteremia      Nonsenile cataract      Other and unspecified hyperlipidemia      PAD (peripheral artery disease) (H)      Rhabdomyolysis 2010     Unspecified essential hypertension       Past Surgical History:     Past Surgical History:   Procedure Laterality Date     APPENDECTOMY  1974     BYPASS GRAFT ARTERY CORONARY  3/5/2014    Procedure: BYPASS GRAFT ARTERY CORONARY;  Median Sternotomy, Coronary Artery Bypass Graft X6 used Left internal mammary artery, Left and Right Greater Saphenous vein, on Pump oxygenator.;  Surgeon: Tim Montanez MD;  Location:  OR     CATARACT IOL, RT/LT Bilateral 2008    in [place]     cataracts       COLONOSCOPY  12/12/02    [facility]     COLONOSCOPY N/A 10/7/2014    Procedure: COMBINED COLONOSCOPY, SINGLE OR MULTIPLE BIOPSY/POLYPECTOMY BY BIOPSY;  Surgeon: Micheal Mora MD;  Location:  GI     CV ANGIOGRAM LOWER EXTREMITY Bilateral 9/9/2019    Procedure: Lower Extremity Angiogram Bilateral;  Surgeon: Julio Oropeza MD;  Location:  HEART CARDIAC CATH  LAB     DENTAL SURGERY      TMJ, implants     ENDARTERECTOMY CAROTID      right carotid stent     ESOPHAGOSCOPY, GASTROSCOPY, DUODENOSCOPY (EGD), COMBINED Left 10/7/2014    Procedure: COMBINED ESOPHAGOSCOPY, GASTROSCOPY, DUODENOSCOPY (EGD), BIOPSY SINGLE OR MULTIPLE;  Surgeon: Micheal Mora MD;  Location:  GI     ESOPHAGOSCOPY, GASTROSCOPY, DUODENOSCOPY (EGD), COMBINED Left 10/7/2014    Procedure: COMBINED ESOPHAGOSCOPY, GASTROSCOPY, DUODENOSCOPY (EGD), REMOVE FOREIGN BODY;  Surgeon: Micheal Mora MD;  Location:  GI      CAPSULE ENDOSCOPY N/A 10/7/2014    Procedure: CAPSULE/PILL CAM ENDOSCOPY;  Surgeon: Micheal Mora MD;  Location: [facility] UGI ENDOSCOPY, SIMPLE EXAM  99    [facility]     INJECT EPIDURAL LUMBAR Right 2017    Procedure: INJECT EPIDURAL LUMBAR;  Lumbar transforaminal Epidural Steroid Injection right lumbar 4-5, and right  lumbar 5-Sacral 1;  Surgeon: Anthony Gonzalez MD;  Location: [facility] OR     INJECT JOINT SACROILIAC Bilateral 2017    Procedure: INJECT JOINT SACROILIAC;  sacroiliac joint injection bilateral;  Surgeon: Anthony Gonzalez MD;  Location: [facility] OR     KNEE SURGERY  1976    left knee reconstruction     lasix      both eyes     RELEASE CARPAL TUNNEL  2011    RELEASE CARPAL TUNNEL performed by JO MADRID at  OR     RELEASE CARPAL TUNNEL  2011    RELEASE CARPAL TUNNEL performed by JO MADRID at  OR        Social History:     Social History     Tobacco Use     Smoking status: Former Smoker     Packs/day: 2.50     Years: 20.00     Pack years: 50.00     Types: Cigarettes     Last attempt to quit: 2000     Years since quittin.6     Smokeless tobacco: Never Used   Substance Use Topics     Alcohol use: No     Alcohol/week: 0.0 standard drinks     Drug use: No        Family History:     Family History   Problem Relation Age of Onset     Hypertension Mother      Eye Disorder Mother      Cerebrovascular Disease  "Father      Heart Disease Father      Lipids Father      Obesity Father      Cancer Sister      Heart Disease Sister      Glaucoma No family hx of      Macular Degeneration No family hx of      Kidney Disease No family hx of         Medications:     Current Outpatient Medications   Medication Sig     alirocumab (PRALUENT) 150 MG/ML injectable syringe Inject 1 mL (150 mg) Subcutaneous every 14 days     amitriptyline (ELAVIL) 25 MG tablet TAKE 1 TABLET TWICE A DAY (Patient taking differently: Take 50 mg by mouth At Bedtime )     amLODIPine (NORVASC) 10 MG tablet Take 1 tablet (10 mg) by mouth daily     ASPIRIN PO Take 325 mg by mouth daily     carvedilol (COREG) 25 MG tablet Take 1 tablet (25 mg) by mouth 2 times daily (with meals)     clopidogrel (PLAVIX) 75 MG tablet Take 1 tablet (75 mg) by mouth daily     gabapentin (NEURONTIN) 300 MG capsule Take 1 capsule (300 mg) by mouth 3 times daily (Patient taking differently: Take 300 mg by mouth 2 times daily )     isosorbide mononitrate (IMDUR) 60 MG 24 hr tablet Take 1 tablet (60 mg) by mouth daily     KLOR-CON 10 MEQ CR tablet TAKE 5 TABLETS TWICE A DAY     linaclotide (LINZESS) 145 MCG capsule Take 145 mcg by mouth every morning (before breakfast)     losartan (COZAAR) 100 MG tablet Take 1 tablet (100 mg) by mouth daily     omeprazole (PRILOSEC) 40 MG DR capsule TAKE 1 CAPSULE DAILY     ondansetron (ZOFRAN) 4 MG tablet Take by mouth as needed for nausea     STATIN NOT PRESCRIBED, INTENTIONAL, Pt has known rhabdomyolysis in the past, somewhat associated with statins.  Also wasn't able to tolerate Zetia.      Allergies:     Allergies   Allergen Reactions     Hmg-Coa-R Inhibitors Other (See Comments)     Rhabdo  Same as \"statins\"     Gemfibrozil      Resting thigh-calf pain     Sulfa Drugs      Reacted as a child     Zetia [Ezetimibe]      Muscle cramping        Review of Systems:   A comprehensive 10 point review of systems (constitutional, ENT, cardiac, peripheral " vascular, lymphatic, respiratory, GI, , Musculoskeletal, skin, Neurological) was performed and found to be negative except as described in this note.      Physical Exam:   Telephone visit: Unable to view the patient  - Patient indicated that when turning head left, he developed sustained dizziness.  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  He still is dizzy, and can become dizzy with head turning with sustained positioning in either R or L directions.  Ultimately, given his increase in stenosis in both carotids with his prior TIA and ongoing dizziness with sustained positioning, he likely has symptomatic R-carotid stenosis requiring further intervention thorough neuro-endovascular.  I will refer the patient back to Dr. Cuellar's clinic for further evaluation and possible surgical intervention.  At this time, the patient is well managed prophylactically with Plavix, ASA, (no statin due to rhabdomyolysis), and blood pressure management.     Plan:  Neuro-endovascular referral       Follow up in Neurology clinic as needed should new concerns arise.    JOSE Rivera D.O.   of Neurology      Greater than 50% of the total time (30 min) in this patient encounter was spent on counseling and/or coordination of care. We reviewed diagnostic results, impressions, and discussed other possible tests if symptoms do not improve. We discussed the implications of the diagnosis, as well as risks and benefits of management options. We  reviewed treatment instructions and our scheduled follow-up as specified in the discharge plan. We also discussed the importance of compliance with the chosen course of treatment. The patient is in agreement with this plan and has no further questions.

## 2020-08-21 NOTE — PROGRESS NOTES
"Michele Vance is a 74 year old male who is being evaluated via a billable telephone visit.      The patient has been notified of following:     \"This telephone visit will be conducted via a call between you and your physician/provider. We have found that certain health care needs can be provided without the need for a physical exam.  This service lets us provide the care you need with a short phone conversation.  If a prescription is necessary we can send it directly to your pharmacy.  If lab work is needed we can place an order for that and you can then stop by our lab to have the test done at a later time.    Telephone visits are billed at different rates depending on your insurance coverage. During this emergency period, for some insurers they may be billed the same as an in-person visit.  Please reach out to your insurance provider with any questions.    If during the course of the call the physician/provider feels a telephone visit is not appropriate, you will not be charged for this service.\"    Patient has given verbal consent for Telephone visit?  Yes    What phone number would you like to be contacted at? phone    How would you like to obtain your AVS? Bria    Phone call duration: 30 minutes    Eloy Rivera, DO      "

## 2020-08-21 NOTE — LETTER
8/21/2020       RE: Michele Vance  [address]  [facility] [address]     Dear Colleague,    Thank you for referring your patient, Michele Vance, to the [facility] NEUROLOGY at [facility]. Please see a copy of my visit note below.    [facility] Neurology Consultation     Michele Vanec MRN# [id number]   Age: 74 year old YOB: 1945     Requesting physician: Jonas Foster     Reason for Consultation: TIA      History of Presenting Symptoms:   Michele Vance is a 74 year old male who presents today for evaluation of TIA.  He has been followed in the past through the stroke clinic and with neuroimmunology (5/7/2014, and 4/22/2014 respectively).  The patient has pertinent history of CAD s/p CABG x6, carotid stenosis, mesenteric artery stenosis, PAD.  He has had negative w/up for multiple sclerosis and does not have this disorder.  The patient has had multiple infarctions of varying vascular distributions (b/l cerebellar hemispheres, deep L-frontal lobe/IC, R-frontal cortical/juxtacoritcal) which are thought to be due to embolic source.  In 2006, the patient lost vision of lower quadrant of his right eye.  2/2014 carotid US showed R-carotid stenosis greater or equal to 70%, L-carotid stenosis 50-69%.  He had a full w/up through stroke clinic (ziopatch, CTA head and neck, repeat MRI brain, visual field mapping with dilated fundoscopy, SLP) in 2014 and upon follow up 8/6/2014, the patient was reported to have SVT on ziopatch, and his CTA showed b/l IC stenosis (80% right, diminutive basilar artery, hypoplastic left vertebral artery.  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  The patient  followed last with Dr. Cuellar on 9/20/2016 where it was indicated his carotid US were relatively unchanged form the year prior, and there was no evidence of right carotid stent re-stenosis with mention of his left carotid stenosis being asymptomatic and stable. He was stable on ASA alone and was to follow up in one year with carotid US.  The patient was following with a doctor in [place] for further management of his carotid stenosis.    On 8/6/2020, the patient presented to [facility] ED with complaints of left leg and arm weakness, causing him to be unable to ambulate. Symptoms occurred at 1345 on that same day and NIHSS at 1455 was 0.  CTA revealed moderate stenosis w/in the stent (73% w/in the stent, changed compared to 56% in 10/2/2019 imaging), as well as 73% stenosis of left carotid artery bifurcation, changed since 10/2/2019 scan measuring 58% stenosis.  MRI did not show new infarct.  Stroke neurologist recommendations were to continue ASA and switch from Pletal to Plavix.  Vascular surgeon, Dr. Guaman, indicated that after imaging review, the patient did not require immediate intervention and that the patient would benefit from Carotid Duplex in the future.  The patient left AMA.    Today, the patient does notice being dizzy when he stands up.  When turning his head leftward he does feel off-balance, dizziness.  When turning his head right-deluna, he does notice a feeling of apprehension with dizziness.  When staying still and not moving quickly, his dizziness will silvestre.  He has had no repeats of left or right sided weakness. He has no new visual deficits.  He has no swallowing difficulties.  He has prior deficits of inability to form words that he used to know, (knows the word, not the meaning, only for a few specific words at a time).  He is able to write well.       Past Medical History:     Patient Active Problem List   Diagnosis     Hypertension     Hyperlipidemia LDL goal <130     Myalgia  and myositis     GERD (gastroesophageal reflux disease)     Impingement syndrome, shoulder, right     Advanced directives, counseling/discussion     Anemia     CKD (chronic kidney disease) stage 3, GFR 30-59 ml/min (H)     Arthritis     Carotid bruit     Carotid stenosis, bilateral     Left main coronary artery disease     PAD (peripheral artery disease) (H)     S/P CABG x 6     Insomnia     Nutritional deficiency     Pain     Urinary retention     Mesenteric artery stenosis (H)     Stroke, embolic (H)     Cerebral infarction due to occlusion or stenosis of carotid artery     Carotid artery occlusion with cerebral infarction (H)     Dizziness     Health Care Home     Chronic constipation     Irritable bowel syndrome     PVD (peripheral vascular disease) (H)     Essential hypertension with goal blood pressure less than 140/90     Panlobular emphysema (H)     Carotid stenosis     Renal artery stenosis (H)     Past Medical History:   Diagnosis Date     Carotid stenosis     right endartectomy     Chronic kidney disease     stage 3     Coronary artery disease 3-2014    CABG x6     CVA (cerebral infarction)     balance problems, mild     Elevated CK      Esophageal reflux      Hypercholesteremia      Nonsenile cataract      Other and unspecified hyperlipidemia      PAD (peripheral artery disease) (H)      Rhabdomyolysis 2010     Unspecified essential hypertension       Past Surgical History:     Past Surgical History:   Procedure Laterality Date     APPENDECTOMY  1974     BYPASS GRAFT ARTERY CORONARY  3/5/2014    Procedure: BYPASS GRAFT ARTERY CORONARY;  Median Sternotomy, Coronary Artery Bypass Graft X6 used Left internal mammary artery, Left and Right Greater Saphenous vein, on Pump oxygenator.;  Surgeon: Tim Montanez MD;  Location: [facility] OR     CATARACT IOL, RT/LT Bilateral 2008    in [place]     cataracts       COLONOSCOPY  12/12/02    [facility]     COLONOSCOPY N/A 10/7/2014    Procedure: COMBINED  COLONOSCOPY, SINGLE OR MULTIPLE BIOPSY/POLYPECTOMY BY BIOPSY;  Surgeon: Micheal Mora MD;  Location:  GI     CV ANGIOGRAM LOWER EXTREMITY Bilateral 2019    Procedure: Lower Extremity Angiogram Bilateral;  Surgeon: Julio Oropeza MD;  Location:  HEART CARDIAC CATH LAB     DENTAL SURGERY      TMJ, implants     ENDARTERECTOMY CAROTID      right carotid stent     ESOPHAGOSCOPY, GASTROSCOPY, DUODENOSCOPY (EGD), COMBINED Left 10/7/2014    Procedure: COMBINED ESOPHAGOSCOPY, GASTROSCOPY, DUODENOSCOPY (EGD), BIOPSY SINGLE OR MULTIPLE;  Surgeon: Micheal Mora MD;  Location:  GI     ESOPHAGOSCOPY, GASTROSCOPY, DUODENOSCOPY (EGD), COMBINED Left 10/7/2014    Procedure: COMBINED ESOPHAGOSCOPY, GASTROSCOPY, DUODENOSCOPY (EGD), REMOVE FOREIGN BODY;  Surgeon: Micheal Mora MD;  Location:  GI      CAPSULE ENDOSCOPY N/A 10/7/2014    Procedure: CAPSULE/PILL CAM ENDOSCOPY;  Surgeon: Micheal Mora MD;  Location: [facility] UGI ENDOSCOPY, SIMPLE EXAM  99    [facility]     INJECT EPIDURAL LUMBAR Right 2017    Procedure: INJECT EPIDURAL LUMBAR;  Lumbar transforaminal Epidural Steroid Injection right lumbar 4-5, and right  lumbar 5-Sacral 1;  Surgeon: Anthony Gonzalez MD;  Location: [facility] OR     INJECT JOINT SACROILIAC Bilateral 2017    Procedure: INJECT JOINT SACROILIAC;  sacroiliac joint injection bilateral;  Surgeon: Anthony Gonzalez MD;  Location:  OR     KNEE SURGERY  1976    left knee reconstruction     lasix      both eyes     RELEASE CARPAL TUNNEL  2011    RELEASE CARPAL TUNNEL performed by JO MADRID at  OR     RELEASE CARPAL TUNNEL  2011    RELEASE CARPAL TUNNEL performed by JO MADRID at  OR        Social History:     Social History     Tobacco Use     Smoking status: Former Smoker     Packs/day: 2.50     Years: 20.00     Pack years: 50.00     Types: Cigarettes     Last attempt to quit: 2000     Years since quittin.6      Smokeless tobacco: Never Used   Substance Use Topics     Alcohol use: No     Alcohol/week: 0.0 standard drinks     Drug use: No        Family History:     Family History   Problem Relation Age of Onset     Hypertension Mother      Eye Disorder Mother      Cerebrovascular Disease Father      Heart Disease Father      Lipids Father      Obesity Father      Cancer Sister      Heart Disease Sister      Glaucoma No family hx of      Macular Degeneration No family hx of      Kidney Disease No family hx of         Medications:     Current Outpatient Medications   Medication Sig     alirocumab (PRALUENT) 150 MG/ML injectable syringe Inject 1 mL (150 mg) Subcutaneous every 14 days     amitriptyline (ELAVIL) 25 MG tablet TAKE 1 TABLET TWICE A DAY (Patient taking differently: Take 50 mg by mouth At Bedtime )     amLODIPine (NORVASC) 10 MG tablet Take 1 tablet (10 mg) by mouth daily     ASPIRIN PO Take 325 mg by mouth daily     carvedilol (COREG) 25 MG tablet Take 1 tablet (25 mg) by mouth 2 times daily (with meals)     clopidogrel (PLAVIX) 75 MG tablet Take 1 tablet (75 mg) by mouth daily     gabapentin (NEURONTIN) 300 MG capsule Take 1 capsule (300 mg) by mouth 3 times daily (Patient taking differently: Take 300 mg by mouth 2 times daily )     isosorbide mononitrate (IMDUR) 60 MG 24 hr tablet Take 1 tablet (60 mg) by mouth daily     KLOR-CON 10 MEQ CR tablet TAKE 5 TABLETS TWICE A DAY     linaclotide (LINZESS) 145 MCG capsule Take 145 mcg by mouth every morning (before breakfast)     losartan (COZAAR) 100 MG tablet Take 1 tablet (100 mg) by mouth daily     omeprazole (PRILOSEC) 40 MG DR capsule TAKE 1 CAPSULE DAILY     ondansetron (ZOFRAN) 4 MG tablet Take by mouth as needed for nausea     STATIN NOT PRESCRIBED, INTENTIONAL, Pt has known rhabdomyolysis in the past, somewhat associated with statins.  Also wasn't able to tolerate Zetia.      Allergies:     Allergies   Allergen Reactions     Hmg-Coa-R Inhibitors Other (See  "Comments)     Rhabdo  Same as \"statins\"     Gemfibrozil      Resting thigh-calf pain     Sulfa Drugs      Reacted as a child     Zetia [Ezetimibe]      Muscle cramping        Review of Systems:   A comprehensive 10 point review of systems (constitutional, ENT, cardiac, peripheral vascular, lymphatic, respiratory, GI, , Musculoskeletal, skin, Neurological) was performed and found to be negative except as described in this note.      Physical Exam:   Telephone visit: Unable to view the patient  - Patient indicated that when turning head left, he developed sustained dizziness.  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  He still is dizzy, and can become dizzy with head turning with sustained positioning in either R or L directions.  Ultimately, given his increase in stenosis in both carotids with his prior TIA and ongoing dizziness with sustained positioning, he likely has symptomatic R-carotid stenosis requiring further intervention thorough neuro-endovascular.  I will refer the patient back to Dr. Cuellar's clinic for further evaluation and possible surgical intervention.  At this time, the patient is well managed prophylactically with Plavix, ASA, (no statin due to rhabdomyolysis), and blood pressure management.     Plan:  Neuro-endovascular referral     Follow up in Neurology clinic as needed should new concerns arise.    Greater than 50% of the total time (30 min) in this patient encounter was spent on counseling and/or coordination of care. " We reviewed diagnostic results, impressions, and discussed other possible tests if symptoms do not improve. We discussed the implications of the diagnosis, as well as risks and benefits of management options. We reviewed treatment instructions and our scheduled follow-up as specified in the discharge plan. We also discussed the importance of compliance with the chosen course of treatment. The patient is in agreement with this plan and has no further questions.    Again, thank you for allowing me to participate in the care of your patient.  Sincerely,    JOSE Rivera D.O.   of Neurology

## 2020-08-24 ENCOUNTER — MYC MEDICAL ADVICE (OUTPATIENT)
Dept: INTERNAL MEDICINE | Facility: CLINIC | Age: 75
End: 2020-08-24

## 2020-08-26 ENCOUNTER — TELEPHONE (OUTPATIENT)
Dept: NEUROSURGERY | Facility: CLINIC | Age: 75
End: 2020-08-26

## 2020-08-26 DIAGNOSIS — K21.9 GASTROESOPHAGEAL REFLUX DISEASE WITHOUT ESOPHAGITIS: ICD-10-CM

## 2020-08-26 RX ORDER — OMEPRAZOLE 40 MG/1
CAPSULE, DELAYED RELEASE ORAL
Qty: 90 CAPSULE | Refills: 2 | Status: SHIPPED | OUTPATIENT
Start: 2020-08-26 | End: 2021-06-08

## 2020-08-26 NOTE — TELEPHONE ENCOUNTER
"Prescription approved per RN refill protocol.    prilosec  Last Written Prescription Date:  1/9/2020  Last Fill Quantity: 90,  # refills: 2   Last office visit: 8/19/2020 with prescribing provider:  Brett   Future Office Visit:   Next 5 appointments (look out 90 days)    Aug 28, 2020  4:00 PM CDT  Telephone Visit with Shola Cuellar MD  [facility] ([facility]) [address] [place] [id number] [phone]           Requested Prescriptions   Pending Prescriptions Disp Refills     omeprazole (PRILOSEC) 40 MG DR capsule [Pharmacy Med Name: OMEPRAZOLE DR CAPS 40MG] 90 capsule 3     Sig: TAKE 1 CAPSULE DAILY       PPI Protocol Failed - 8/26/2020  1:24 PM        Failed - Not on Clopidogrel (unless Pantoprazole ordered)        Passed - No diagnosis of osteoporosis on record        Passed - Recent (12 mo) or future (30 days) visit within the authorizing provider's specialty     Patient has had an office visit with the authorizing provider or a provider within the authorizing providers department within the previous 12 mos or has a future within next 30 days. See \"Patient Info\" tab in inbasket, or \"Choose Columns\" in Meds & Orders section of the refill encounter.              Passed - Medication is active on med list        Passed - Patient is age 18 or older           Sabra Acevedo RN on 8/26/2020 at 4:51 PM    "

## 2020-08-26 NOTE — TELEPHONE ENCOUNTER
RECORDS RECEIVED FROM: Internal   Date of Appt: 8/28/20   NOTES (FOR ALL VISITS) STATUS DETAILS   OFFICE NOTE from referring provider Internal Dr Rivera @ [facility] Neurology:  8/21/20     OFFICE NOTE from other specialist Internal Dr Ole Thurman @  [facility]:  4/17/17    Dr Cuellar @ [facility] Neurosurgery:  9/20/16  9/22/15  9/23/14    Dr Brown @ [facility] Neurology:  8/6/14   DISCHARGE SUMMARY from hospital Internal [facility]:  8/6/14-8/9/14   DISCHARGE REPORT from the ER Internal [facility]:  8/5/20   OPERATIVE REPORT N/A    MEDICATION LIST Internal    IMAGING  (FOR ALL VISITS)     EMG N/A    EEG N/A    LUMBAR PUNCTURE N/A    CARMELINA SCAN N/A    DEXA SCAN *NEUROSURGERY* N/A    ULTRASOUND (CAROTID BILAT) *VASCULAR* [facility]:  US Carotid Bilat 9/11/19  US Carotid Bilat 5/31/18  US Carotid Bilat 9/20/16  US Carotid Bilat 9/22/15   MRI (HEAD, NECK, SPINE) [facility]:  MRI Brain 8/5/20  MRI Lumbar Spine 4/6/17   XRAY (SPINE) *NEUROSURGERY* N/A    CT (HEAD, NECK, SPINE) [facility]:  CTA Head Neck 8/5/20  CT Head 8/5/20  CTA Head Neck 10/2/19

## 2020-08-27 ENCOUNTER — TELEPHONE (OUTPATIENT)
Dept: CARDIOLOGY | Facility: CLINIC | Age: 75
End: 2020-08-27

## 2020-08-27 DIAGNOSIS — I73.9 PAD (PERIPHERAL ARTERY DISEASE) (H): ICD-10-CM

## 2020-08-27 DIAGNOSIS — I70.1 RENAL ARTERY STENOSIS (H): Primary | ICD-10-CM

## 2020-08-27 NOTE — TELEPHONE ENCOUNTER
Renal US and RUBEN reviewed by Dr. Oropeza. Renal stenosis stable, non-compressible right RUBEN. Will follow-up in 1 year. Left detailed voicemail for patient reviewing these results and recommendations. Direct call back information provided.  Kassandra Lanza RN  [facility]

## 2020-08-28 ENCOUNTER — VIRTUAL VISIT (OUTPATIENT)
Dept: NEUROSURGERY | Facility: CLINIC | Age: 75
End: 2020-08-28
Payer: MEDICARE

## 2020-08-28 ENCOUNTER — PRE VISIT (OUTPATIENT)
Dept: NEUROSURGERY | Facility: CLINIC | Age: 75
End: 2020-08-28

## 2020-08-28 ENCOUNTER — TELEPHONE (OUTPATIENT)
Dept: NEUROLOGY | Facility: CLINIC | Age: 75
End: 2020-08-28

## 2020-08-28 DIAGNOSIS — I65.23 BILATERAL CAROTID ARTERY STENOSIS: Primary | ICD-10-CM

## 2020-08-28 NOTE — LETTER
8/28/2020     RE: Michele Vance  [address]  [facility] [address]     Dear Colleague,    Thank you for referring your patient, Michele Vance, to the [facility] NEUROSURGERY at [facility]. Please see a copy of my visit note below.    See dictated note. Appears to have symptomatic restenosis of right carotid stent. Will set up for angiogram with intent to do balloon angioplasty (regular vs cutting) within the next week.  ANI Cuellar MD    Service Date: 08/28/2020      TELEPHONE VISIT We spoke to Mr. Vance as part of a telephone visit in  Cerebrovascular Clinic on 08/28/2020.  As you know, he has an extensive vascular history including revascularization of the right lower extremity, CABG and a right carotid stent placement.  He is about 6 years out from the right carotid stent placement.  We determined that the right carotid artery was symptomatic because it was supplying his basilar artery through the posterior communicating artery.  He had a mid-basilar artery occlusion and we believed he presented with posterior circulation symptoms back then.  He has developed progressive stenosis in both internal carotid arteries including in-stent stenosis on the right side.  Earlier this month, he presented to the Emergency Department with a right hemispheric TIA involving left-sided weakness.  This resolved within about 4-5 hours of onset.  An MRI did not show any acute strokes.  CTA of the neck showed in-stent stenosis of greater than 70% on the right side and greater than 70% stenosis in the left ICA.  He has not had any symptoms suggestive of left hemispheric TIAs or strokes.  He was on aspirin and cilostazol prior to this event and he was switched to aspirin and Plavix, and he is tolerating this combination so far.      Currently, he feels well.  He has not had any additional episodes over the past 3 weeks.      Over the phone, his phonation, speech and language all seem  normal.      REVIEW OF STUDIES:  We went over his CTA of the neck from earlier this month with him and his wife.  He has the above stated findings.  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      IMPRESSION AND PLAN:  Mr. Cid seems to have symptomatic restenosis of the right internal carotid artery.  We believe that balloon angioplasty of this in-stent stenosis is the best treatment.  We discussed the use of standard carotid balloon versus a cutting balloon.  We can determine which type of balloon angioplasty to perform depending on the degree of calcification as well as the degree of stenosis.  We discussed the risks of stroke, arterial injury, groin hematoma and need for retreatment.  He inquired about treating the left carotid artery stenosis as well.  We will hold off on this since he is asymptomatic.  We can reevaluate this side in the future and we can consider enrollment in CREST-2.  Because he is symptomatic on the right side, we will schedule him expeditiously.  We will aim for some time in the next week.  Please do not hesitate to contact us with questions.         DURGA SLADE MD      cc:   Jonas West MD   [address] [place] [address]        D: 2020   T: 2020   MT: enzo    Name:     NINFA CID   MRN:      [id number]        Account:      [id number]   :      1945           Service Date: 2020    Document: [id number]

## 2020-08-28 NOTE — LETTER
8/28/2020    RE: Michele Vance  [address]  [facility] [address]     See dictated note. Appears to have symptomatic restenosis of right carotid stent. Will set up for angiogram with intent to do balloon angioplasty (regular vs cutting) within the next week.  ANI Cuellar MD    Service Date: 08/28/2020      TELEPHONE VISIT We spoke to Mr. Vance as part of a telephone visit in  Cerebrovascular Clinic on 08/28/2020.  As you know, he has an extensive vascular history including revascularization of the right lower extremity, CABG and a right carotid stent placement.  He is about 6 years out from the right carotid stent placement.  We determined that the right carotid artery was symptomatic because it was supplying his basilar artery through the posterior communicating artery.  He had a mid-basilar artery occlusion and we believed he presented with posterior circulation symptoms back then.  He has developed progressive stenosis in both internal carotid arteries including in-stent stenosis on the right side.  Earlier this month, he presented to the Emergency Department with a right hemispheric TIA involving left-sided weakness.  This resolved within about 4-5 hours of onset.  An MRI did not show any acute strokes.  CTA of the neck showed in-stent stenosis of greater than 70% on the right side and greater than 70% stenosis in the left ICA.  He has not had any symptoms suggestive of left hemispheric TIAs or strokes.  He was on aspirin and cilostazol prior to this event and he was switched to aspirin and Plavix, and he is tolerating this combination so far.      Currently, he feels well.  He has not had any additional episodes over the past 3 weeks.      Over the phone, his phonation, speech and language all seem normal.      REVIEW OF STUDIES:  We went over his CTA of the neck from earlier this month with him and his wife.  He has the above stated findings.  In addition, he has unchanged left vertebral artery  occlusion as well as the mid basilar occlusion.      IMPRESSION AND PLAN:  Mr. Cid seems to have symptomatic restenosis of the right internal carotid artery.  We believe that balloon angioplasty of this in-stent stenosis is the best treatment.  We discussed the use of standard carotid balloon versus a cutting balloon.  We can determine which type of balloon angioplasty to perform depending on the degree of calcification as well as the degree of stenosis.  We discussed the risks of stroke, arterial injury, groin hematoma and need for retreatment.  He inquired about treating the left carotid artery stenosis as well.  We will hold off on this since he is asymptomatic.  We can reevaluate this side in the future and we can consider enrollment in CREST-2.  Because he is symptomatic on the right side, we will schedule him expeditiously.  We will aim for some time in the next week.  Please do not hesitate to contact us with questions.         DURGA SLADE MD      cc:   Jonas West MD   [address] [place] [address]       D: 2020   T: 2020   MT: enzo    Name:     NINFA CID   MRN:      [id number]        Account:      [id number]   :      1945           Service Date: 2020    Document: [id number]

## 2020-08-28 NOTE — TELEPHONE ENCOUNTER
Called to inform patient that Dr. Cuellar was called away for an emergency. He will call this evening. Patient verbalized understanding. He will be home and available until 9 PM. Michelle Hall RN 8/28/2020 4:07 PM

## 2020-08-28 NOTE — PROGRESS NOTES
See dictated note. Appears to have symptomatic restenosis of right carotid stent. Will set up for angiogram with intent to do balloon angioplasty (regular vs cutting) within the next week.  ANI Cuellar MD

## 2020-08-29 NOTE — PATIENT INSTRUCTIONS
We will arrange for a cerebral angiogram with the intention of doing an angioplasty of the narrowing in your right carotid stent. We will try to schedule this within the next week. Orders have been placed. Our team will contact you with details.      Stay on aspirin and Plavix for now.

## 2020-08-31 ENCOUNTER — PATIENT OUTREACH (OUTPATIENT)
Dept: NEUROLOGY | Facility: CLINIC | Age: 75
End: 2020-08-31

## 2020-08-31 DIAGNOSIS — I65.23 BILATERAL CAROTID ARTERY STENOSIS: Primary | ICD-10-CM

## 2020-08-31 DIAGNOSIS — I65.29 CAROTID STENOSIS: ICD-10-CM

## 2020-08-31 DIAGNOSIS — I65.29 CAROTID STENOSIS: Primary | ICD-10-CM

## 2020-08-31 PROCEDURE — U0003 INFECTIOUS AGENT DETECTION BY NUCLEIC ACID (DNA OR RNA); SEVERE ACUTE RESPIRATORY SYNDROME CORONAVIRUS 2 (SARS-COV-2) (CORONAVIRUS DISEASE [COVID-19]), AMPLIFIED PROBE TECHNIQUE, MAKING USE OF HIGH THROUGHPUT TECHNOLOGIES AS DESCRIBED BY CMS-2020-01-R: HCPCS | Performed by: NEUROLOGICAL SURGERY

## 2020-08-31 NOTE — PROGRESS NOTES
See orders.  Osteoarthritis Pain R small finger.  Rest. Ice or warm compress prn. Use the more effective modality.  Tylenol or Advil 1-2 tabs. TID prn pain w/ food. Side-effects discussed.  Tart Cherry juice concentrate 2 tbsps. In 1 glass water after dinner.  Good for aches & pains, wellness & better sleep.  Low-salt, low-fat diet.  Atorvastatin 10 mg./day after dinner. Skip dose on day of taking Tylenol or Advil.   recommended the Mediterranean diet.  Increase physical activity./ Exercise 30 min./day or more.  Eat well-balanced meals w/ 8 servings of vegetables & fruits/day on time.  Eat whole grains & nuts like walnuts, pecans, slivered almonds.  Eat fish 3x/week or more. Do not zavala. Better to boil or bake.  Extra-virgin olive oil 1 tsp./day mixed w/ food.  Sleep 8 hrs./night.  Use car seatbelts regularly.  Have CO & smoke detectors in the home.  # Discussed COVID-19 precautions.  Stay at home as much as possible.  Wear a mask outside.  Avoid touching the eyes, nose, mouth, face.  Social-distancing 6 feet away from others.  Frequent handwashing w/ soap & water or may use hand sanitizers.  RTC after 1-3 mos. Or earlier prn.   Service Date: 08/28/2020      TELEPHONE VISIT We spoke to Mr. Vance as part of a telephone visit in  [facility] on 08/28/2020.  As you know, he has an extensive vascular history including revascularization of the right lower extremity, CABG and a right carotid stent placement.  He is about 6 years out from the right carotid stent placement.  We determined that the right carotid artery was symptomatic because it was supplying his basilar artery through the posterior communicating artery.  He had a mid-basilar artery occlusion and we believed he presented with posterior circulation symptoms back then.  He has developed progressive stenosis in both internal carotid arteries including in-stent stenosis on the right side.  Earlier this month, he presented to the Emergency Department with a right hemispheric TIA involving left-sided weakness.  This resolved within about 4-5 hours of onset.  An MRI did not show any acute strokes.  CTA of the neck showed in-stent stenosis of greater than 70% on the right side and greater than 70% stenosis in the left ICA.  He has not had any symptoms suggestive of left hemispheric TIAs or strokes.  He was on aspirin and cilostazol prior to this event and he was switched to aspirin and Plavix, and he is tolerating this combination so far.      Currently, he feels well.  He has not had any additional episodes over the past 3 weeks.      Over the phone, his phonation, speech and language all seem normal.      REVIEW OF STUDIES:  We went over his CTA of the neck from earlier this month with him and his wife.  He has the above stated findings.  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      IMPRESSION AND PLAN:  Mr. Vance seems to have symptomatic restenosis of the right internal carotid artery.  We believe that balloon angioplasty of this in-stent stenosis is the best treatment.  We discussed the use of standard carotid  balloon versus a cutting balloon.  We can determine which type of balloon angioplasty to perform depending on the degree of calcification as well as the degree of stenosis.  We discussed the risks of stroke, arterial injury, groin hematoma and need for retreatment.  He inquired about treating the left carotid artery stenosis as well.  We will hold off on this since he is asymptomatic.  We can reevaluate this side in the future and we can consider enrollment in CREST-2.  Because he is symptomatic on the right side, we will schedule him expeditiously.  We will aim for some time in the next week.  Please do not hesitate to contact us with questions.      cc:   Jonas West MD   [address] [place] [address]         DURGA SLADE MD             D: 2020   T: 2020   MT: enzo      Name:     NINFA CID   MRN:      [id number]        Account:      [id number]   :      1945           Service Date: 2020      Document: [id number]

## 2020-08-31 NOTE — PATIENT INSTRUCTIONS
You are scheduled for a cerebral angiogram with intention of angioplasty on 9/3/20 at 12:30 PM.     Please follow these instructions:    * Prior to your procedure you will be contacted by COVID-19 Testing Scheduling to obtain COVID-19 testing at an [facility] facility within 48-72 hours of your scheduled procedure. If you have not been contacted within 5 days of your procedure, please reach out to the scheduling team at [phone] to arrange testing. You will also need to obtain blood work at time of COVID testing (which will be ordered by Dr. Live, Dr. Conti or Dr. Lew), which you may schedule at any [facility] location. As discussed contact [facility] to schedule a lab appointment for blood work.     * Continue taking aspirin and Plavix.     * You should arrive at the Gold waiting room at the [facility] at 11:00 AM. The address is [address] [place] [address]. The phone number is [phone]. A map is enclosed.    * Do not eat after 4:30 AM; you may drink clear liquids (includes water, Jell-O, clear broth, apple juice or any non-carbonated beverage that you can see through) until 10:30 AM.     * You may take your medications with a sip of water the morning of the procedure.     * If you are treated you will stay overnight at the hospital for observation after the procedure. If you are not treated you will be discharged the same day. You must have a  home and someone that can stay with you through the night.     If you have questions regarding your procedure, please contact me at [phone], option [phone].    If you need to cancel, reschedule or have procedure scheduling related questions, please call Kelly at [phone].    Thank you,  Justin Hall RN, CNRN  Stroke & Endovascular Care Coordinator

## 2020-08-31 NOTE — PROGRESS NOTES
Informed patients wife, Michelle, of procedure instructions. Confirmed date and time. Verbalized understanding. All questions answered. Michelle will call to schedule labs and COVID at [facility]. Patient instructions sent via [facility]. Michelle has my contact information and was encouraged to call with questions/concerns. Michelle Hall RN 8/31/2020 11:26 AM

## 2020-09-01 DIAGNOSIS — I65.23 BILATERAL CAROTID ARTERY STENOSIS: ICD-10-CM

## 2020-09-01 LAB
ANION GAP SERPL CALCULATED.3IONS-SCNC: 4 MMOL/L (ref 3–14)
APTT PPP: 28 SEC (ref 22–37)
BUN SERPL-MCNC: 17 MG/DL (ref 7–30)
CALCIUM SERPL-MCNC: 9.2 MG/DL (ref 8.5–10.1)
CHLORIDE SERPL-SCNC: 106 MMOL/L (ref 94–109)
CO2 SERPL-SCNC: 28 MMOL/L (ref 20–32)
CREAT SERPL-MCNC: 1.27 MG/DL (ref 0.66–1.25)
ERYTHROCYTE [DISTWIDTH] IN BLOOD BY AUTOMATED COUNT: 13.7 % (ref 10–15)
GFR SERPL CREATININE-BSD FRML MDRD: 55 ML/MIN/{1.73_M2}
GLUCOSE SERPL-MCNC: 106 MG/DL (ref 70–99)
HCT VFR BLD AUTO: 39.8 % (ref 40–53)
HGB BLD-MCNC: 13.2 G/DL (ref 13.3–17.7)
INR PPP: 1.08 (ref 0.86–1.14)
MCH RBC QN AUTO: 32.8 PG (ref 26.5–33)
MCHC RBC AUTO-ENTMCNC: 33.2 G/DL (ref 31.5–36.5)
MCV RBC AUTO: 99 FL (ref 78–100)
PLATELET # BLD AUTO: 281 10E9/L (ref 150–450)
POTASSIUM SERPL-SCNC: 4.4 MMOL/L (ref 3.4–5.3)
RBC # BLD AUTO: 4.02 10E12/L (ref 4.4–5.9)
SODIUM SERPL-SCNC: 138 MMOL/L (ref 133–144)
WBC # BLD AUTO: 5.8 10E9/L (ref 4–11)

## 2020-09-01 PROCEDURE — 85730 THROMBOPLASTIN TIME PARTIAL: CPT | Performed by: PSYCHIATRY & NEUROLOGY

## 2020-09-01 PROCEDURE — 85027 COMPLETE CBC AUTOMATED: CPT | Performed by: PSYCHIATRY & NEUROLOGY

## 2020-09-01 PROCEDURE — 36415 COLL VENOUS BLD VENIPUNCTURE: CPT | Performed by: PSYCHIATRY & NEUROLOGY

## 2020-09-01 PROCEDURE — 85610 PROTHROMBIN TIME: CPT | Performed by: PSYCHIATRY & NEUROLOGY

## 2020-09-01 PROCEDURE — 80048 BASIC METABOLIC PNL TOTAL CA: CPT | Performed by: PSYCHIATRY & NEUROLOGY

## 2020-09-02 LAB
SARS-COV-2 RNA SPEC QL NAA+PROBE: NOT DETECTED
SPECIMEN SOURCE: NORMAL

## 2020-09-03 ENCOUNTER — APPOINTMENT (OUTPATIENT)
Dept: INTERVENTIONAL RADIOLOGY/VASCULAR | Facility: CLINIC | Age: 75
DRG: 316 | End: 2020-09-03
Attending: NEUROLOGICAL SURGERY
Payer: MEDICARE

## 2020-09-03 ENCOUNTER — APPOINTMENT (OUTPATIENT)
Dept: MEDSURG UNIT | Facility: CLINIC | Age: 75
DRG: 316 | End: 2020-09-03
Attending: NEUROLOGICAL SURGERY
Payer: MEDICARE

## 2020-09-03 ENCOUNTER — HOSPITAL ENCOUNTER (INPATIENT)
Facility: CLINIC | Age: 75
LOS: 1 days | Discharge: HOME | DRG: 316 | End: 2020-09-04
Attending: NEUROLOGICAL SURGERY | Admitting: NEUROLOGICAL SURGERY
Payer: MEDICARE

## 2020-09-03 DIAGNOSIS — Z98.62 STATUS POST BALLOON ANGIOPLASTY OF PULMONARY ARTERY BRANCHES: Primary | ICD-10-CM

## 2020-09-03 DIAGNOSIS — I65.23 BILATERAL CAROTID ARTERY STENOSIS: ICD-10-CM

## 2020-09-03 LAB
ALBUMIN SERPL-MCNC: 3.1 G/DL (ref 3.4–5)
ALP SERPL-CCNC: 133 U/L (ref 40–150)
ALT SERPL W P-5'-P-CCNC: 44 U/L (ref 0–70)
ANION GAP SERPL CALCULATED.3IONS-SCNC: 7 MMOL/L (ref 3–14)
APTT PPP: 28 SEC (ref 22–37)
AST SERPL W P-5'-P-CCNC: 22 U/L (ref 0–45)
BILIRUB SERPL-MCNC: 0.6 MG/DL (ref 0.2–1.3)
BUN SERPL-MCNC: 15 MG/DL (ref 7–30)
CALCIUM SERPL-MCNC: 8.5 MG/DL (ref 8.5–10.1)
CHLORIDE SERPL-SCNC: 113 MMOL/L (ref 94–109)
CO2 SERPL-SCNC: 22 MMOL/L (ref 20–32)
CREAT SERPL-MCNC: 1.05 MG/DL (ref 0.66–1.25)
CREAT SERPL-MCNC: 1.3 MG/DL (ref 0.66–1.25)
ERYTHROCYTE [DISTWIDTH] IN BLOOD BY AUTOMATED COUNT: 13.7 % (ref 10–15)
GFR SERPL CREATININE-BSD FRML MDRD: 54 ML/MIN/{1.73_M2}
GFR SERPL CREATININE-BSD FRML MDRD: 70 ML/MIN/{1.73_M2}
GLUCOSE BLDC GLUCOMTR-MCNC: 65 MG/DL (ref 70–99)
GLUCOSE SERPL-MCNC: 83 MG/DL (ref 70–99)
HCT VFR BLD AUTO: 37.1 % (ref 40–53)
HGB BLD-MCNC: 12.4 G/DL (ref 13.3–17.7)
INR PPP: 1.09 (ref 0.86–1.14)
LMWH PPP CHRO-ACNC: 0.64 IU/ML
MAGNESIUM SERPL-MCNC: 1.7 MG/DL (ref 1.6–2.3)
MCH RBC QN AUTO: 33.2 PG (ref 26.5–33)
MCHC RBC AUTO-ENTMCNC: 33.4 G/DL (ref 31.5–36.5)
MCV RBC AUTO: 100 FL (ref 78–100)
PHOSPHATE SERPL-MCNC: 3.8 MG/DL (ref 2.5–4.5)
PLATELET # BLD AUTO: 256 10E9/L (ref 150–450)
POTASSIUM SERPL-SCNC: 3.5 MMOL/L (ref 3.4–5.3)
PROT SERPL-MCNC: 6.6 G/DL (ref 6.8–8.8)
RBC # BLD AUTO: 3.73 10E12/L (ref 4.4–5.9)
SODIUM SERPL-SCNC: 142 MMOL/L (ref 133–144)
WBC # BLD AUTO: 6.7 10E9/L (ref 4–11)

## 2020-09-03 PROCEDURE — C1725 CATH, TRANSLUMIN NON-LASER: HCPCS

## 2020-09-03 PROCEDURE — 82565 ASSAY OF CREATININE: CPT | Performed by: PSYCHIATRY & NEUROLOGY

## 2020-09-03 PROCEDURE — 27210887 ZZH ACCESSORY CR6

## 2020-09-03 PROCEDURE — 27210845 ZZH DEVICE INFLATION CR5

## 2020-09-03 PROCEDURE — 00000146 ZZHCL STATISTIC GLUCOSE BY METER IP

## 2020-09-03 PROCEDURE — 85610 PROTHROMBIN TIME: CPT | Performed by: PSYCHIATRY & NEUROLOGY

## 2020-09-03 PROCEDURE — 36224 PLACE CATH CAROTD ART: CPT

## 2020-09-03 PROCEDURE — 27210804 ZZH SHEATH CR3

## 2020-09-03 PROCEDURE — 93010 ELECTROCARDIOGRAM REPORT: CPT | Performed by: INTERNAL MEDICINE

## 2020-09-03 PROCEDURE — 25800030 ZZH RX IP 258 OP 636: Performed by: PSYCHIATRY & NEUROLOGY

## 2020-09-03 PROCEDURE — 83735 ASSAY OF MAGNESIUM: CPT | Performed by: STUDENT IN AN ORGANIZED HEALTH CARE EDUCATION/TRAINING PROGRAM

## 2020-09-03 PROCEDURE — 85027 COMPLETE CBC AUTOMATED: CPT | Performed by: PSYCHIATRY & NEUROLOGY

## 2020-09-03 PROCEDURE — 93005 ELECTROCARDIOGRAM TRACING: CPT

## 2020-09-03 PROCEDURE — 61630 BALO ANGIOPLASTY ICR PERQ: CPT

## 2020-09-03 PROCEDURE — 25000128 H RX IP 250 OP 636: Performed by: NEUROLOGICAL SURGERY

## 2020-09-03 PROCEDURE — 85730 THROMBOPLASTIN TIME PARTIAL: CPT | Performed by: PSYCHIATRY & NEUROLOGY

## 2020-09-03 PROCEDURE — 40000141 ZZH STATISTIC PERIPHERAL IV START W/O US GUIDANCE

## 2020-09-03 PROCEDURE — 27211225 ZZH WIRE DEVICE NEURO PROTECTION CR22

## 2020-09-03 PROCEDURE — 80053 COMPREHEN METABOLIC PANEL: CPT | Performed by: STUDENT IN AN ORGANIZED HEALTH CARE EDUCATION/TRAINING PROGRAM

## 2020-09-03 PROCEDURE — 25000132 ZZH RX MED GY IP 250 OP 250 PS 637: Mod: GY | Performed by: INTERNAL MEDICINE

## 2020-09-03 PROCEDURE — 03WY3DZ REVISION OF INTRALUMINAL DEVICE IN UPPER ARTERY, PERCUTANEOUS APPROACH: ICD-10-PCS | Performed by: NEUROLOGICAL SURGERY

## 2020-09-03 PROCEDURE — 27211044 ZZH SHEATH CR9

## 2020-09-03 PROCEDURE — 20000004 ZZH R&B ICU UMMC

## 2020-09-03 PROCEDURE — 99152 MOD SED SAME PHYS/QHP 5/>YRS: CPT

## 2020-09-03 PROCEDURE — 40000172 ZZH STATISTIC PROCEDURE PREP ONLY

## 2020-09-03 PROCEDURE — 85520 HEPARIN ASSAY: CPT | Performed by: PSYCHIATRY & NEUROLOGY

## 2020-09-03 PROCEDURE — 25000128 H RX IP 250 OP 636: Performed by: PSYCHIATRY & NEUROLOGY

## 2020-09-03 PROCEDURE — 25000125 ZZHC RX 250: Performed by: PSYCHIATRY & NEUROLOGY

## 2020-09-03 PROCEDURE — C1887 CATHETER, GUIDING: HCPCS

## 2020-09-03 PROCEDURE — 25000128 H RX IP 250 OP 636: Performed by: INTERNAL MEDICINE

## 2020-09-03 PROCEDURE — 25500064 ZZH RX 255 OP 636: Performed by: NEUROLOGICAL SURGERY

## 2020-09-03 PROCEDURE — 27210738 ZZH ACCESSORY CR2

## 2020-09-03 PROCEDURE — C1769 GUIDE WIRE: HCPCS

## 2020-09-03 PROCEDURE — 27210888 ZZH ACCESSORY CR7

## 2020-09-03 PROCEDURE — 84100 ASSAY OF PHOSPHORUS: CPT | Performed by: STUDENT IN AN ORGANIZED HEALTH CARE EDUCATION/TRAINING PROGRAM

## 2020-09-03 PROCEDURE — 99153 MOD SED SAME PHYS/QHP EA: CPT

## 2020-09-03 PROCEDURE — 27210889 ZZH ACCESSORY CR8

## 2020-09-03 RX ORDER — GLYCOPYRROLATE 0.2 MG/ML
.1-.2 INJECTION, SOLUTION INTRAMUSCULAR; INTRAVENOUS
Status: COMPLETED | OUTPATIENT
Start: 2020-09-03 | End: 2020-09-03

## 2020-09-03 RX ORDER — NALOXONE HYDROCHLORIDE 0.4 MG/ML
.1-.4 INJECTION, SOLUTION INTRAMUSCULAR; INTRAVENOUS; SUBCUTANEOUS
Status: DISCONTINUED | OUTPATIENT
Start: 2020-09-03 | End: 2020-09-03 | Stop reason: HOSPADM

## 2020-09-03 RX ORDER — POTASSIUM CHLORIDE 1.5 G/1.58G
20-40 POWDER, FOR SOLUTION ORAL
Status: DISCONTINUED | OUTPATIENT
Start: 2020-09-03 | End: 2020-09-04 | Stop reason: HOSPADM

## 2020-09-03 RX ORDER — POTASSIUM CHLORIDE 7.45 MG/ML
10 INJECTION INTRAVENOUS
Status: DISCONTINUED | OUTPATIENT
Start: 2020-09-03 | End: 2020-09-04 | Stop reason: HOSPADM

## 2020-09-03 RX ORDER — POTASSIUM CHLORIDE 750 MG/1
20-40 TABLET, EXTENDED RELEASE ORAL
Status: DISCONTINUED | OUTPATIENT
Start: 2020-09-03 | End: 2020-09-04 | Stop reason: HOSPADM

## 2020-09-03 RX ORDER — DEXTROSE MONOHYDRATE 25 G/50ML
25-50 INJECTION, SOLUTION INTRAVENOUS
Status: DISCONTINUED | OUTPATIENT
Start: 2020-09-03 | End: 2020-09-04 | Stop reason: HOSPADM

## 2020-09-03 RX ORDER — POTASSIUM CL/LIDO/0.9 % NACL 10MEQ/0.1L
10 INTRAVENOUS SOLUTION, PIGGYBACK (ML) INTRAVENOUS
Status: DISCONTINUED | OUTPATIENT
Start: 2020-09-03 | End: 2020-09-04 | Stop reason: HOSPADM

## 2020-09-03 RX ORDER — HEPARIN SODIUM 1000 [USP'U]/ML
500-6000 INJECTION, SOLUTION INTRAVENOUS; SUBCUTANEOUS
Status: DISCONTINUED | OUTPATIENT
Start: 2020-09-03 | End: 2020-09-03 | Stop reason: HOSPADM

## 2020-09-03 RX ORDER — HEPARIN SODIUM 200 [USP'U]/100ML
1 INJECTION, SOLUTION INTRAVENOUS CONTINUOUS PRN
Status: DISCONTINUED | OUTPATIENT
Start: 2020-09-03 | End: 2020-09-03 | Stop reason: HOSPADM

## 2020-09-03 RX ORDER — ONDANSETRON 4 MG/1
4 TABLET, ORALLY DISINTEGRATING ORAL EVERY 6 HOURS PRN
Status: DISCONTINUED | OUTPATIENT
Start: 2020-09-03 | End: 2020-09-04 | Stop reason: HOSPADM

## 2020-09-03 RX ORDER — MAGNESIUM SULFATE HEPTAHYDRATE 40 MG/ML
2 INJECTION, SOLUTION INTRAVENOUS DAILY PRN
Status: DISCONTINUED | OUTPATIENT
Start: 2020-09-03 | End: 2020-09-04 | Stop reason: HOSPADM

## 2020-09-03 RX ORDER — IODIXANOL 320 MG/ML
150 INJECTION, SOLUTION INTRAVASCULAR ONCE
Status: COMPLETED | OUTPATIENT
Start: 2020-09-03 | End: 2020-09-03

## 2020-09-03 RX ORDER — PROCHLORPERAZINE MALEATE 5 MG
5 TABLET ORAL EVERY 6 HOURS PRN
Status: DISCONTINUED | OUTPATIENT
Start: 2020-09-03 | End: 2020-09-04 | Stop reason: HOSPADM

## 2020-09-03 RX ORDER — ONDANSETRON 2 MG/ML
4 INJECTION INTRAMUSCULAR; INTRAVENOUS EVERY 6 HOURS PRN
Status: DISCONTINUED | OUTPATIENT
Start: 2020-09-03 | End: 2020-09-04 | Stop reason: HOSPADM

## 2020-09-03 RX ORDER — NOREPINEPHRINE BITARTRATE 0.06 MG/ML
.03-.4 INJECTION, SOLUTION INTRAVENOUS CONTINUOUS PRN
Status: DISCONTINUED | OUTPATIENT
Start: 2020-09-03 | End: 2020-09-03 | Stop reason: HOSPADM

## 2020-09-03 RX ORDER — NICOTINE POLACRILEX 4 MG
15-30 LOZENGE BUCCAL
Status: DISCONTINUED | OUTPATIENT
Start: 2020-09-03 | End: 2020-09-04 | Stop reason: HOSPADM

## 2020-09-03 RX ORDER — NICOTINE POLACRILEX 4 MG
15-30 LOZENGE BUCCAL
Status: DISCONTINUED | OUTPATIENT
Start: 2020-09-03 | End: 2020-09-03 | Stop reason: HOSPADM

## 2020-09-03 RX ORDER — SODIUM CHLORIDE 9 MG/ML
INJECTION, SOLUTION INTRAVENOUS CONTINUOUS
Status: DISCONTINUED | OUTPATIENT
Start: 2020-09-03 | End: 2020-09-04 | Stop reason: HOSPADM

## 2020-09-03 RX ORDER — PROCHLORPERAZINE 25 MG
12.5 SUPPOSITORY, RECTAL RECTAL EVERY 12 HOURS PRN
Status: DISCONTINUED | OUTPATIENT
Start: 2020-09-03 | End: 2020-09-04 | Stop reason: HOSPADM

## 2020-09-03 RX ORDER — FENTANYL CITRATE 50 UG/ML
25-50 INJECTION, SOLUTION INTRAMUSCULAR; INTRAVENOUS EVERY 5 MIN PRN
Status: DISCONTINUED | OUTPATIENT
Start: 2020-09-03 | End: 2020-09-03 | Stop reason: HOSPADM

## 2020-09-03 RX ORDER — DEXTROSE MONOHYDRATE 25 G/50ML
25-50 INJECTION, SOLUTION INTRAVENOUS
Status: DISCONTINUED | OUTPATIENT
Start: 2020-09-03 | End: 2020-09-03 | Stop reason: HOSPADM

## 2020-09-03 RX ORDER — LIDOCAINE 40 MG/G
CREAM TOPICAL
Status: DISCONTINUED | OUTPATIENT
Start: 2020-09-03 | End: 2020-09-03 | Stop reason: HOSPADM

## 2020-09-03 RX ORDER — MAGNESIUM SULFATE HEPTAHYDRATE 40 MG/ML
4 INJECTION, SOLUTION INTRAVENOUS EVERY 4 HOURS PRN
Status: DISCONTINUED | OUTPATIENT
Start: 2020-09-03 | End: 2020-09-04 | Stop reason: HOSPADM

## 2020-09-03 RX ORDER — POTASSIUM CHLORIDE 29.8 MG/ML
20 INJECTION INTRAVENOUS
Status: DISCONTINUED | OUTPATIENT
Start: 2020-09-03 | End: 2020-09-04 | Stop reason: HOSPADM

## 2020-09-03 RX ORDER — SODIUM CHLORIDE 9 MG/ML
INJECTION, SOLUTION INTRAVENOUS CONTINUOUS
Status: DISCONTINUED | OUTPATIENT
Start: 2020-09-03 | End: 2020-09-03 | Stop reason: HOSPADM

## 2020-09-03 RX ORDER — FLUMAZENIL 0.1 MG/ML
0.2 INJECTION, SOLUTION INTRAVENOUS
Status: DISCONTINUED | OUTPATIENT
Start: 2020-09-03 | End: 2020-09-03 | Stop reason: HOSPADM

## 2020-09-03 RX ADMIN — LIDOCAINE HYDROCHLORIDE 7 ML: 10 INJECTION, SOLUTION EPIDURAL; INFILTRATION; INTRACAUDAL; PERINEURAL at 15:58

## 2020-09-03 RX ADMIN — MIDAZOLAM 0.5 MG: 1 INJECTION INTRAMUSCULAR; INTRAVENOUS at 15:45

## 2020-09-03 RX ADMIN — HEPARIN SODIUM 4500 UNITS: 1000 INJECTION INTRAVENOUS; SUBCUTANEOUS at 15:15

## 2020-09-03 RX ADMIN — SODIUM CHLORIDE: 9 INJECTION, SOLUTION INTRAVENOUS at 12:18

## 2020-09-03 RX ADMIN — MIDAZOLAM 1 MG: 1 INJECTION INTRAMUSCULAR; INTRAVENOUS at 14:30

## 2020-09-03 RX ADMIN — POTASSIUM CHLORIDE 20 MEQ: 750 TABLET, EXTENDED RELEASE ORAL at 19:54

## 2020-09-03 RX ADMIN — MAGNESIUM SULFATE IN WATER 2 G: 40 INJECTION, SOLUTION INTRAVENOUS at 20:37

## 2020-09-03 RX ADMIN — HEPARIN SODIUM 4 BAG: 200 INJECTION, SOLUTION INTRAVENOUS at 15:58

## 2020-09-03 RX ADMIN — IODIXANOL 75 ML: 320 INJECTION, SOLUTION INTRAVASCULAR at 16:30

## 2020-09-03 RX ADMIN — GLYCOPYRROLATE 0.4 MG: 0.2 INJECTION INTRAMUSCULAR; INTRAVENOUS at 15:30

## 2020-09-03 RX ADMIN — FENTANYL CITRATE 50 MCG: 50 INJECTION, SOLUTION INTRAMUSCULAR; INTRAVENOUS at 14:30

## 2020-09-03 RX ADMIN — FENTANYL CITRATE 25 MCG: 50 INJECTION, SOLUTION INTRAMUSCULAR; INTRAVENOUS at 15:45

## 2020-09-03 ASSESSMENT — VISUAL ACUITY
OU: GLASSES;BASELINE
OU: GLASSES
OU: GLASSES;BASELINE
OU: GLASSES
OU: GLASSES

## 2020-09-03 ASSESSMENT — MIFFLIN-ST. JEOR: SCORE: 1381.29

## 2020-09-03 ASSESSMENT — ACTIVITIES OF DAILY LIVING (ADL): ADLS_ACUITY_SCORE: 15

## 2020-09-03 NOTE — PROGRESS NOTES
1250  Prep is complete for procedure.  Michele is here for Cerebral Angiogram with possible Intervention.  States he feels well, but does have pain in right groin area that radiates down to knee.  States that it feel achy & nagging in nature.  Started approx 1 week ago & has been taking Tylenol at home with some relief.  He does use a cane at times for walking. His wife will be taking this along with his glasses & wallet.  Neuro check is intact except for some diminished hearing bilaterally from aging.  Wife,  Justin, will be waiting in the Gold Waiting area.  CTRN

## 2020-09-03 NOTE — IR NOTE
Patient Name: Michele Vance  Medical Record Number: [id number]  Today's Date: 9/3/2020    Procedure: cerebral angiogram with balloon angioplasty  Proceduralist: Maria T Cuellar Quinn    Procedure Start: 1430  Procedure end: 1600  Sedation medications administered: 75mcg fentanyl, 1.5mg versed     Report given to:  RN 4A  : n/a    Other Notes: Pt arrived to IR room 3 from 2A. Consent reviewed. Pt denies any questions or concerns regarding procedure. Pt positioned supine and monitored per protocol. Pt tolerated procedure without any noted complications. Pt transferred back to  for recovery.

## 2020-09-03 NOTE — PROGRESS NOTES
[facility], [place]     Endovascular Surgical Neuroradiology Pre-Procedure Note      HPI:  Michele Vance is a 75 year old man with significant vascular history including 6 vessel CABG in 2014, R ICA stent after stroke in 2014.  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  Since 2014 patient has been on  and doing well, in August of 2020 he had 5 hour episode of left sided weakness, MRI brain was negative for acute stroke however CTA showed R ICA in stent stenosis of 70%, and about L ICA stenosis of 70%.  Patient is currently taking  mg qday and plavix 75 mg qday.   Patient here balloon angioplasty of previous right ICA stent given his TIA symptoms in August of this year.  He will be admitted to neuro ICU for overnight observation after balloon angioplasty of the restenotic right ICA.           Medical History:  Past Medical History:   Diagnosis Date     Carotid stenosis     right endartectomy     Chronic kidney disease     stage 3     Coronary artery disease 3-2014    CABG x6     CVA (cerebral infarction)     balance problems, mild     Elevated CK      Esophageal reflux      Hypercholesteremia      Nonsenile cataract      Other and unspecified hyperlipidemia      PAD (peripheral artery disease) (H)      Rhabdomyolysis 2010     Unspecified essential hypertension        Surgical History:  Past Surgical History:   Procedure Laterality Date     APPENDECTOMY  1974     BYPASS GRAFT ARTERY CORONARY  3/5/2014    Procedure: BYPASS GRAFT ARTERY CORONARY;  Median Sternotomy, Coronary Artery Bypass Graft X6 used Left internal mammary artery, Left and Right Greater Saphenous vein, on Pump oxygenator.;  Surgeon: Tim Montanez MD;  Location: [facility] OR     CATARACT IOL, RT/LT Bilateral 2008    in [place]     cataracts       COLONOSCOPY  12/12/02    [facility]     COLONOSCOPY N/A 10/7/2014    Procedure: COMBINED COLONOSCOPY, SINGLE OR MULTIPLE BIOPSY/POLYPECTOMY BY BIOPSY;  Surgeon: Micheal Mora MD;  Location:  GI     CV ANGIOGRAM LOWER EXTREMITY Bilateral 9/9/2019    Procedure: Lower Extremity Angiogram Bilateral;  Surgeon: Julio Oropeza MD;  Location: [facility] CARDIAC CATH LAB     DENTAL SURGERY      TMJ, implants     ENDARTERECTOMY CAROTID      right carotid stent     ESOPHAGOSCOPY, GASTROSCOPY, DUODENOSCOPY (EGD), COMBINED Left 10/7/2014    Procedure: COMBINED ESOPHAGOSCOPY, GASTROSCOPY, DUODENOSCOPY (EGD), BIOPSY SINGLE OR MULTIPLE;  Surgeon: Micheal Mora MD;  Location:  GI     ESOPHAGOSCOPY, GASTROSCOPY, DUODENOSCOPY (EGD), COMBINED Left 10/7/2014    Procedure: COMBINED ESOPHAGOSCOPY, GASTROSCOPY, DUODENOSCOPY (EGD), REMOVE FOREIGN BODY;  Surgeon: Micheal Mora MD;  Location:  GI      CAPSULE ENDOSCOPY N/A 10/7/2014    Procedure: CAPSULE/PILL CAM ENDOSCOPY;  Surgeon: Micheal Mora MD;  Location: [facility] ENDOSCOPY, SIMPLE EXAM  01/08/99    [facility]     INJECT EPIDURAL LUMBAR Right 5/8/2017    Procedure: INJECT EPIDURAL LUMBAR;  Lumbar transforaminal Epidural Steroid Injection right lumbar 4-5, and right  lumbar 5-Sacral 1;  Surgeon: Anthony Gonzalez MD;  Location:  OR     INJECT JOINT SACROILIAC Bilateral 8/28/2017    Procedure: INJECT JOINT SACROILIAC;  sacroiliac joint injection bilateral;  Surgeon: Anthony Gonzalez MD;  Location:  OR     KNEE SURGERY  1976    left knee reconstruction     lasix  2001    both eyes     RELEASE CARPAL TUNNEL  1/13/2011    RELEASE CARPAL TUNNEL performed by JO MADRID at  OR     RELEASE CARPAL TUNNEL  1/20/2011    RELEASE CARPAL TUNNEL performed by JO MADRID at  OR       Family History:  Family History   Problem Relation Age of Onset     Hypertension Mother      Eye Disorder Mother      Cerebrovascular Disease Father      Heart Disease Father       Lipids Father      Obesity Father      Cancer Sister      Heart Disease Sister      Glaucoma No family hx of      Macular Degeneration No family hx of      Kidney Disease No family hx of        Social History:  Social History     Socioeconomic History     Marital status:      Spouse name: Not on file     Number of children: Not on file     Years of education: Not on file     Highest education level: Not on file   Occupational History     Occupation:      Employer: RETIRED   Social Needs     Financial resource strain: Not on file     Food insecurity     Worry: Not on file     Inability: Not on file     Transportation needs     Medical: Not on file     Non-medical: Not on file   Tobacco Use     Smoking status: Former Smoker     Packs/day: 2.50     Years: 20.00     Pack years: 50.00     Types: Cigarettes     Last attempt to quit: 2000     Years since quittin.6     Smokeless tobacco: Never Used   Substance and Sexual Activity     Alcohol use: No     Alcohol/week: 0.0 standard drinks     Drug use: No     Sexual activity: Yes     Partners: Female   Lifestyle     Physical activity     Days per week: Not on file     Minutes per session: Not on file     Stress: Not on file   Relationships     Social connections     Talks on phone: Not on file     Gets together: Not on file     Attends Rastafarian service: Not on file     Active member of club or organization: Not on file     Attends meetings of clubs or organizations: Not on file     Relationship status: Not on file     Intimate partner violence     Fear of current or ex partner: Not on file     Emotionally abused: Not on file     Physically abused: Not on file     Forced sexual activity: Not on file   Other Topics Concern      Service Not Asked     Blood Transfusions Not Asked     Caffeine Concern Not Asked     Occupational Exposure Not Asked     Hobby Hazards Not Asked     Sleep Concern Not Asked     Stress Concern Not Asked     Weight  "Concern Not Asked     Special Diet Not Asked     Back Care Not Asked     Exercise Yes     Comment: 3 times per week     Bike Helmet Not Asked     Seat Belt Not Asked     Self-Exams Not Asked     Parent/sibling w/ CABG, MI or angioplasty before 65F 55M? Not Asked   Social History Narrative    Moved from [place] in August, retired  for [employer].       Allergies:  Allergies   Allergen Reactions     Hmg-Coa-R Inhibitors Other (See Comments)     Rhabdo  Same as \"statins\"     Gemfibrozil      Resting thigh-calf pain     Sulfa Drugs      Reacted as a child     Zetia [Ezetimibe]      Muscle cramping       Is there a contrast allergy?  No    Medications:  Medications Prior to Admission   Medication Sig Dispense Refill Last Dose     alirocumab (PRALUENT) 150 MG/ML injectable syringe Inject 1 mL (150 mg) Subcutaneous every 14 days 8 mL 3 Past Week at Unknown time     amitriptyline (ELAVIL) 25 MG tablet TAKE 1 TABLET TWICE A DAY (Patient taking differently: Take 50 mg by mouth At Bedtime ) 180 tablet 0 9/2/2020 at 2100     amLODIPine (NORVASC) 10 MG tablet Take 1 tablet (10 mg) by mouth daily 90 tablet 3 9/2/2020 at 2000     ASPIRIN PO Take 325 mg by mouth daily   9/2/2020 at 2000     carvedilol (COREG) 25 MG tablet Take 1 tablet (25 mg) by mouth 2 times daily (with meals) 180 tablet 3 9/3/2020 at 0700     clopidogrel (PLAVIX) 75 MG tablet Take 1 tablet (75 mg) by mouth daily 30 tablet 3 9/3/2020 at 0700     gabapentin (NEURONTIN) 300 MG capsule Take 1 capsule (300 mg) by mouth 3 times daily (Patient taking differently: Take 300 mg by mouth 2 times daily ) 210 capsule 4 9/3/2020 at 0700     isosorbide mononitrate (IMDUR) 60 MG 24 hr tablet Take 1 tablet (60 mg) by mouth daily 90 tablet 3 9/3/2020 at 0700     KLOR-CON 10 MEQ CR tablet TAKE 5 TABLETS TWICE A  tablet 11 9/3/2020 at 0700     linaclotide (LINZESS) 145 MCG capsule Take 145 mcg by mouth every morning (before breakfast)   9/3/2020 at 0630     " losartan (COZAAR) 100 MG tablet Take 1 tablet (100 mg) by mouth daily 90 tablet 3 9/3/2020 at 0700     omeprazole (PRILOSEC) 40 MG DR capsule TAKE 1 CAPSULE DAILY 90 capsule 2 9/3/2020 at 0700     ondansetron (ZOFRAN) 4 MG tablet Take by mouth as needed for nausea   More than a month at Unknown time     STATIN NOT PRESCRIBED, INTENTIONAL, Pt has known rhabdomyolysis in the past, somewhat associated with statins.  Also wasn't able to tolerate Zetia. 1 each 0    .    ROS:  The 10 point Review of Systems is negative other than noted in the HPI or here.     PHYSICAL EXAMINATION  Vital Signs:  B/P: 171/71,  T: 97.8,  P: 51,  R: 20    Cardio:  rrr   Pulmonary:  ctba  Abdomen: soft/ NT/ND    Neurologic  AAO X 3, no pronator drift, 5/5 in all 4  Extremities        LABS  (most recent Cr, BUN, GFR, PLT, INR, PTT within the past 7 days):  Recent Labs   Lab 09/01/20  1059   CR 1.27*   BUN 17   GFRESTIMATED 55*   GFRESTBLACK 64      INR 1.08   PTT 28           ASSESSMENT:  R ICA in stent stenosis, here for balloon angioplasty.     PLAN:   Proceed with DSA and balloon angioplasty      PRE-PROCEDURE SEDATION ASSESSMENT     Pre-Procedure Sedation Assessment done at 1400.    Expected Level:  Moderate Sedation    Indication:  Sedation is required to allow for neurointerventional procedure.    Consent obtained from patient after discussing the risks, benefits and alternatives.     PO Intake:  Appropriately NPO for procedure    ASA Class:  Class 3 - SEVERE SYSTEMIC DISEASE, DEFINITE FUNCTIONAL LIMITATIONS.    Mallampati:  Grade 2:  Soft palate, base of uvula, tonsillar pillars, and portion of posterior pharyngeal wall visible    History and physical reviewed and no updates needed. I have reviewed the lab findings, diagnostic data, medications, and the plan for sedation. I have determined this patient to be an appropriate candidate for the planned sedation and procedure and have reassessed the patient IMMEDIATELY PRIOR to  sedation and procedure.    Patient was discussed with the Attending, Dr. Cuellar, who agrees with the plan.    Kassandra Live MD  Neuroendovascular Fellow  Pager: [phone]  09/03/2020 11:49 AM      I have reviewed the history. I have seen and examined the patient myself and agree with the assessment and plan above.  ANI Cuellar MD

## 2020-09-03 NOTE — PROGRESS NOTES
Admitted/transferred from: IR  Reason for admission/transfer: Neuro ICU monitoring overnight  2 RN skin assessment: completed by Caitlin MOTA RN and Sabra FIORE RN  Result of skin assessment and interventions/actions: skin intact, sheath intact in right groin  Height, weight, drug calc weight: Done  Patient belongings (see Flowsheet)  MDRO education added to care planN/A        Patient admitted to 4A for close monitoring post IR procedure. Patient is Q1H neuros x4, and groin checks Q1H until sheath is pulled. Patient is neuro intact. NS running at 75. Will continue to monitor and assess neuro status and groin & CMS checks.     Frequent ectopy (PVCs) and RBBB - lytes and EKG ordered per NCC.    IVF - discontinued, patient has drank two bottles of water post op.    mL - urine output 125. Patient would like to wait and try again.    ?

## 2020-09-04 VITALS
RESPIRATION RATE: 20 BRPM | OXYGEN SATURATION: 98 % | DIASTOLIC BLOOD PRESSURE: 76 MMHG | TEMPERATURE: 98.3 F | HEART RATE: 64 BPM | WEIGHT: 149.69 LBS | BODY MASS INDEX: 22.69 KG/M2 | SYSTOLIC BLOOD PRESSURE: 184 MMHG | HEIGHT: 68 IN

## 2020-09-04 DIAGNOSIS — I65.23 BILATERAL CAROTID ARTERY STENOSIS: Primary | ICD-10-CM

## 2020-09-04 PROBLEM — Z98.62 H/O CAROTID ANGIOPLASTY: Status: ACTIVE | Noted: 2020-09-04

## 2020-09-04 LAB
INTERPRETATION ECG - MUSE: NORMAL
MAGNESIUM SERPL-MCNC: 2 MG/DL (ref 1.6–2.3)
POTASSIUM SERPL-SCNC: 2.7 MMOL/L (ref 3.4–5.3)

## 2020-09-04 PROCEDURE — 25000132 ZZH RX MED GY IP 250 OP 250 PS 637: Mod: GY | Performed by: INTERNAL MEDICINE

## 2020-09-04 PROCEDURE — 25000132 ZZH RX MED GY IP 250 OP 250 PS 637: Mod: GY | Performed by: NEUROLOGICAL SURGERY

## 2020-09-04 PROCEDURE — 25000128 H RX IP 250 OP 636: Performed by: INTERNAL MEDICINE

## 2020-09-04 PROCEDURE — 25000132 ZZH RX MED GY IP 250 OP 250 PS 637: Mod: GY | Performed by: PSYCHIATRY & NEUROLOGY

## 2020-09-04 PROCEDURE — 36415 COLL VENOUS BLD VENIPUNCTURE: CPT | Performed by: NEUROLOGICAL SURGERY

## 2020-09-04 PROCEDURE — 84132 ASSAY OF SERUM POTASSIUM: CPT | Performed by: NEUROLOGICAL SURGERY

## 2020-09-04 PROCEDURE — 25000128 H RX IP 250 OP 636: Performed by: STUDENT IN AN ORGANIZED HEALTH CARE EDUCATION/TRAINING PROGRAM

## 2020-09-04 PROCEDURE — 83735 ASSAY OF MAGNESIUM: CPT | Performed by: NEUROLOGICAL SURGERY

## 2020-09-04 PROCEDURE — 25000132 ZZH RX MED GY IP 250 OP 250 PS 637: Mod: GY | Performed by: STUDENT IN AN ORGANIZED HEALTH CARE EDUCATION/TRAINING PROGRAM

## 2020-09-04 RX ORDER — HYDRALAZINE HYDROCHLORIDE 20 MG/ML
10-20 INJECTION INTRAMUSCULAR; INTRAVENOUS
Status: DISCONTINUED | OUTPATIENT
Start: 2020-09-04 | End: 2020-09-04 | Stop reason: HOSPADM

## 2020-09-04 RX ORDER — ASPIRIN 81 MG/1
325 TABLET, CHEWABLE ORAL DAILY
Status: DISCONTINUED | OUTPATIENT
Start: 2020-09-04 | End: 2020-09-04

## 2020-09-04 RX ORDER — GABAPENTIN 300 MG/1
300 CAPSULE ORAL 3 TIMES DAILY
Status: DISCONTINUED | OUTPATIENT
Start: 2020-09-04 | End: 2020-09-04 | Stop reason: HOSPADM

## 2020-09-04 RX ORDER — ASPIRIN 325 MG
325 TABLET ORAL DAILY
Qty: 180 TABLET | Refills: 1 | Status: SHIPPED | OUTPATIENT
Start: 2020-09-04 | End: 2021-04-05

## 2020-09-04 RX ORDER — LABETALOL 20 MG/4 ML (5 MG/ML) INTRAVENOUS SYRINGE
20 EVERY 10 MIN PRN
Status: DISCONTINUED | OUTPATIENT
Start: 2020-09-04 | End: 2020-09-04 | Stop reason: HOSPADM

## 2020-09-04 RX ORDER — ASPIRIN 325 MG
325 TABLET ORAL DAILY
Status: DISCONTINUED | OUTPATIENT
Start: 2020-09-04 | End: 2020-09-04 | Stop reason: HOSPADM

## 2020-09-04 RX ORDER — CLOPIDOGREL BISULFATE 75 MG/1
75 TABLET ORAL DAILY
Status: DISCONTINUED | OUTPATIENT
Start: 2020-09-04 | End: 2020-09-04 | Stop reason: HOSPADM

## 2020-09-04 RX ORDER — AMLODIPINE BESYLATE 10 MG/1
10 TABLET ORAL ONCE
Status: COMPLETED | OUTPATIENT
Start: 2020-09-04 | End: 2020-09-04

## 2020-09-04 RX ORDER — NALOXONE HYDROCHLORIDE 0.4 MG/ML
.1-.4 INJECTION, SOLUTION INTRAMUSCULAR; INTRAVENOUS; SUBCUTANEOUS
Status: DISCONTINUED | OUTPATIENT
Start: 2020-09-04 | End: 2020-09-04 | Stop reason: HOSPADM

## 2020-09-04 RX ORDER — LOSARTAN POTASSIUM 50 MG/1
50 TABLET ORAL ONCE
Status: COMPLETED | OUTPATIENT
Start: 2020-09-04 | End: 2020-09-04

## 2020-09-04 RX ADMIN — AMLODIPINE BESYLATE 10 MG: 10 TABLET ORAL at 09:03

## 2020-09-04 RX ADMIN — POTASSIUM CHLORIDE 40 MEQ: 750 TABLET, EXTENDED RELEASE ORAL at 08:58

## 2020-09-04 RX ADMIN — LOSARTAN POTASSIUM 50 MG: 50 TABLET, FILM COATED ORAL at 08:57

## 2020-09-04 RX ADMIN — HYDRALAZINE HYDROCHLORIDE 10 MG: 20 INJECTION INTRAMUSCULAR; INTRAVENOUS at 06:11

## 2020-09-04 RX ADMIN — POTASSIUM CHLORIDE 40 MEQ: 750 TABLET, EXTENDED RELEASE ORAL at 06:11

## 2020-09-04 RX ADMIN — MAGNESIUM SULFATE IN WATER 2 G: 40 INJECTION, SOLUTION INTRAVENOUS at 06:17

## 2020-09-04 RX ADMIN — LINACLOTIDE 145 MCG: 145 CAPSULE, GELATIN COATED ORAL at 09:03

## 2020-09-04 RX ADMIN — GABAPENTIN 300 MG: 300 CAPSULE ORAL at 08:57

## 2020-09-04 RX ADMIN — HYDRALAZINE HYDROCHLORIDE 10 MG: 20 INJECTION INTRAMUSCULAR; INTRAVENOUS at 10:39

## 2020-09-04 RX ADMIN — CLOPIDOGREL BISULFATE 75 MG: 75 TABLET ORAL at 08:57

## 2020-09-04 RX ADMIN — ASPIRIN 325 MG ORAL TABLET 325 MG: 325 PILL ORAL at 10:16

## 2020-09-04 ASSESSMENT — ACTIVITIES OF DAILY LIVING (ADL)
ADLS_ACUITY_SCORE: 15

## 2020-09-04 ASSESSMENT — MIFFLIN-ST. JEOR: SCORE: 1393.5

## 2020-09-04 NOTE — PLAN OF CARE
Major Shift Events:    Neurologically intact;  HR 50s-70s, NSR w/ frequent PVCs and rBBB - Magnesium and Potassium each replaced prior to midnight and again @ 0600 after recheck.  SBP range 100-180, MD phone paged with 190/70 BP - hydralazine given;  Unable to void completely, straight cathed at 2230 and 0330 for PVR > 300;  Groin checks done as ordered, frequency now Q4 - WDL;  Had BM;     Plan:   Give second dose of Potassium supplement at 0800. Continue neuro checks Q2 hours, Groin site checks Q4hr, Discharge today.    For vital signs and complete assessments, please see documentation flowsheets.     Problem: Urinary Elimination Impairment  Goal: Urinary Elimination Impairment Goal: Effective Urinary Elimination and Continence  Outcome: Declining

## 2020-09-04 NOTE — DISCHARGE SUMMARY
[facility], [place]    Neurology Discharge Summary    Date of Admission: 9/3/2020  Date of Discharge: September 4, 2020    Disposition: Discharged to home  Primary Care Physician: Jonas West     Admission Diagnosis:   Symptomatic right carotid in-stent stenosis    Discharge Diagnosis:   Same    Problem Leading to Hospitalization (from [facility]):     Michele Vance is a 75 year old man with significant vascular history including 6 vessel CABG in 2014, R ICA stent after stroke in 2014.  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  Since 2014 patient has been on  and doing well, in August of 2020 he had 5 hour episode of left sided weakness, MRI brain was negative for acute stroke however CTA showed R ICA in stent stenosis of 70%, and about L ICA stenosis of 70%.  Patient is currently taking  mg qday and plavix 75 mg qday.   Patient here balloon angioplasty of previous right ICA stent given his TIA symptoms in August of this year.  He will be admitted to neuro ICU for overnight observation after balloon angioplasty.      Please see H&P dated for further details about presentation.    Brief Hospital Course:   Patient did well post angioplasty, no changes in neurological exam, was admitted under watch of neuroICU team, vitals remained stable.  Plan for him to continue  and plavix 75 mg qday for now.  He will follow up in neurosurgery clinic with Dr Cuellar in 1 month [ I will send message to [facility] scheduling pool] and he will need carotid US prior to that visit.            PERTINENT INVESTIGATIONS    Labs  Lab Results   Component Value Date    WBC 6.7 09/03/2020     Lab Results   Component Value Date    RBC 3.73 09/03/2020     Lab Results   Component Value Date    HGB 12.4 09/03/2020     Lab Results   Component Value Date    HCT 37.1 09/03/2020     No  components found for: MCT  Lab Results   Component Value Date     09/03/2020     Lab Results   Component Value Date    MCH 33.2 09/03/2020     Lab Results   Component Value Date    MCHC 33.4 09/03/2020     Lab Results   Component Value Date    RDW 13.7 09/03/2020     Lab Results   Component Value Date     09/03/2020     Last Basic Metabolic Panel:  Lab Results   Component Value Date     09/03/2020      Lab Results   Component Value Date    POTASSIUM 2.7 09/04/2020     Lab Results   Component Value Date    CHLORIDE 113 09/03/2020     Lab Results   Component Value Date    RAHEEM 8.5 09/03/2020     Lab Results   Component Value Date    CO2 22 09/03/2020     Lab Results   Component Value Date    BUN 15 09/03/2020     Lab Results   Component Value Date    CR 1.05 09/03/2020     Lab Results   Component Value Date    GLC 83 09/03/2020             PHYSICAL EXAMINATION  General:  patient lying in bed without any acute distress    HEENT:  normocephalic/atraumatic  Cardio:  RRR  Pulmonary:  no respiratory distress  Abdomen: soft non tender  Extremities: pulses intact  Skin:  no lesions     Neurologic  Mental Status:  alert, oriented x 3, follows commands, speech clear and fluent, naming and repetition normal  Cranial Nerves:  visual fields intact, EOMI with normal smooth pursuit, hearing not formally tested but intact to conversation, no dysarthria, shoulder shrug equal bilaterally, tongue protrusion midline, no facial droop. Sensory equal bilaterally in v/1/2/3 distribution  Motor:  no abnormal movements, able to move all limbs antigravity spontaneously with no signs of hemiparesis observed, no pronator drift  5/5 in all 4 extremities         Additional recommendations and follow up:       Review of your medicines      CONTINUE these medicines which may have CHANGED, or have new prescriptions. If we are uncertain of the size of tablets/capsules you have at home, strength may be listed as something that might  have changed.      Dose / Directions   amitriptyline 25 MG tablet  Commonly known as:  ELAVIL  This may have changed:      how much to take    when to take this  Used for:  Migraine without aura and without status migrainosus, not intractable      TAKE 1 TABLET TWICE A DAY  Quantity:  180 tablet  Refills:  0     * ASPIRIN PO  This may have changed:  Another medication with the same name was added. Make sure you understand how and when to take each.      Dose:  325 mg  Take 325 mg by mouth daily  Refills:  0     * aspirin 325 MG tablet  Commonly known as:  ASA  This may have changed:  You were already taking a medication with the same name, and this prescription was added. Make sure you understand how and when to take each.      Dose:  325 mg  Take 1 tablet (325 mg) by mouth daily  Quantity:  180 tablet  Refills:  1     gabapentin 300 MG capsule  Commonly known as:  NEURONTIN  This may have changed:  when to take this  Used for:  Arthritis      Dose:  300 mg  Take 1 capsule (300 mg) by mouth 3 times daily  Quantity:  210 capsule  Refills:  4         * This list has 2 medication(s) that are the same as other medications prescribed for you. Read the directions carefully, and ask your doctor or other care provider to review them with you.            CONTINUE these medicines which have NOT CHANGED      Dose / Directions   alirocumab 150 MG/ML injectable syringe  Commonly known as:  Praluent  Used for:  Hyperlipidemia LDL goal <130      Dose:  150 mg  Inject 1 mL (150 mg) Subcutaneous every 14 days  Quantity:  8 mL  Refills:  3     amLODIPine 10 MG tablet  Commonly known as:  NORVASC  Used for:  Essential hypertension, benign      Dose:  10 mg  Take 1 tablet (10 mg) by mouth daily  Quantity:  90 tablet  Refills:  3     carvedilol 25 MG tablet  Commonly known as:  COREG  Used for:  Benign essential HTN      Dose:  25 mg  Take 1 tablet (25 mg) by mouth 2 times daily (with meals)  Quantity:  180 tablet  Refills:  3      clopidogrel 75 MG tablet  Commonly known as:  Plavix  Used for:  Carotid stenosis, bilateral      Dose:  75 mg  Take 1 tablet (75 mg) by mouth daily  Quantity:  30 tablet  Refills:  3     isosorbide mononitrate 60 MG 24 hr tablet  Commonly known as:  IMDUR  Used for:  ELAM (dyspnea on exertion)      Dose:  60 mg  Take 1 tablet (60 mg) by mouth daily  Quantity:  90 tablet  Refills:  3     KLOR-CON 10 MEQ CR tablet  Used for:  Hypokalemia  Generic drug:  potassium chloride ER      TAKE 5 TABLETS TWICE A DAY  Quantity:  300 tablet  Refills:  11     linaclotide 145 MCG capsule  Commonly known as:  LINZESS      Dose:  145 mcg  Take 145 mcg by mouth every morning (before breakfast)  Refills:  0     losartan 100 MG tablet  Commonly known as:  COZAAR  Used for:  Essential hypertension, benign      Dose:  100 mg  Take 1 tablet (100 mg) by mouth daily  Quantity:  90 tablet  Refills:  3     omeprazole 40 MG DR capsule  Commonly known as:  priLOSEC  Used for:  Gastroesophageal reflux disease without esophagitis      TAKE 1 CAPSULE DAILY  Quantity:  90 capsule  Refills:  2     ondansetron 4 MG tablet  Commonly known as:  ZOFRAN      Take by mouth as needed for nausea  Refills:  0        STOP taking    STATIN NOT PRESCRIBED  Commonly known as:  INTENTIONAL              Where to get your medicines      These medications were sent to [facility] - [address]   [address] [place] [address]    Phone:  [phone]     aspirin 325 MG tablet          Reason for your hospital stay    Right internal carotid artery balloon angioplasty     Adult [facility] Follow-up and recommended labs and tests    Follow up with primary care provider, Jonas West, within 7 days for hospital follow- up.  No follow up labs or test are needed.      Appointments on [place] and/or [facility] (with [facility] provider or service). Call [phone] if you haven't heard regarding these appointments  within 7 days of discharge.     Discharge Instructions     Activity    Your activity upon discharge: activity as tolerated     Diet    Follow this diet upon discharge: Regular     Discharge instructions:  What you may eat or drink after angiogram:  -- There are no changes in what you should eat or drink after this test except that you should drink at least six to eight 8-ounce glasses of fluid each day for 2 days to flush the contrast (dye) out of your body unless you are on a limited fluids diet.    Moving around after angiogram:  -- After your test: Rest 48 hours and follow the special activity instructions listed.    Special activity restrictions:  -- Limit physical activity for 48 hours.  Rest on a sofa or bed as much as possible.  -- No strenuous activity.  -- No long walks.  -- Avoid climbing stairs if possible.  If you must climb stairs, do it slowly.    Lifting:  -- Do not lift more than 10 pounds for 48 hours.   -- Do not lift more than 20 pounds for 7 days. (A full gallon of water weighs about 8 pounds)    Sexual activity:  -- You can resume sexual activity in 2 days.  Bathing/Swimming:  -- You may shower in 24 hours .  -- You may NOT take a tub bath, swim, or sit in water for 5 days.    The groin puncture site:  Care:  -- Keep the area clean and dry except for during daily shower.  -- Clean the site daily using soap and water while standing in the shower.  Pat dry thoroughly.  -- Cover the groin site with a bandaid until the skin has closed.   -- When you sneeze, cough or laugh, hold pressure on the puncture site.  -- If you must strain when having a bowel movement, hold gentle pressure to the puncture site.    Anesthesia or sedation information:  You have received medication for your procedure that made you sleepy:  -- You may be sleepy, feel less coordinated or feel weak.  To prevent injury, be careful when you walk, bathe, cook or use electrical devices/tools.  -- You may NOT drive or operate  mechanical equipment until 24 hours after your procedure.  You also must stop taking narcotic pain medications before driving.  -- A responsible adult should remain with you for at least 24 hours after your procedure.  You should stay at home and rest today.  -- This may cause you to react differently to alcohol.  Do not drink alcohol for at least 24 hours after your procedure.  -- This may cause you to not think clearly.  Do not make important decisions for 24 hours after your procedure.  -- To prevent burns, do not smoke.    Local anesthesia:  -- You had local anesthesia (numbing medicine) for your procedure.  The numbness should go away a few hours after the procedure.    Your medicines:  -- It is important that you take the medicines on your list.  Work with your health care provider or pharmacist if you have questions about your medicine.    Return to work:  -- You can return to work in 24 hours if your work does not stop you from following your care instructions (such as lifting).  If your work does not allow you to follow the instructions, you should return to work in 48 hours.        Kassandra Live MD  Neuroendovascular Fellow  Pager: [phone]  09/04/2020 10:08 AM

## 2020-09-04 NOTE — PLAN OF CARE
Discharged to:home at 11:30am  Belongings: sent home with pt and wife  STERLING (After Visit Summary) discussed with:  pt and significant other      Pt BP running in 180s around 10am, discussed with MD, gave PRN hydralazine with recheck 140/65 at 1115, per MD will discharge home and take remaining BP medications at home.   K level 2.7 this AM, replaced per protocol, per MD okay to discharge home without recheck and resume home regimen.

## 2020-09-06 ENCOUNTER — PATIENT OUTREACH (OUTPATIENT)
Dept: CARE COORDINATION | Facility: CLINIC | Age: 75
End: 2020-09-06

## 2020-09-08 ENCOUNTER — MYC MEDICAL ADVICE (OUTPATIENT)
Dept: INTERNAL MEDICINE | Facility: CLINIC | Age: 75
End: 2020-09-08

## 2020-09-08 NOTE — PROGRESS NOTES
[facility]: Post-Discharge Note  SITUATION                                                      Admission:    Admission Date: 09/03/20   Reason for Admission: Right carotid stent balloon angioplasty  Discharge:   Discharge Date: 09/04/20  Discharge Diagnosis: Right carotid stent balloon angioplasty  Discharge Service: neurology  Discharge Plan:  neurology pcp    BACKGROUND                                                      Michele Vance is a 75 year old male with significant vascular history including 6 vessel CABG in 2014, R ICA stent after stroke in 2014.  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  Since 2014 patient has been on  and doing well, in August of 2020 he had 5 hour episode of left sided weakness, MRI brain was negative for acute stroke however CTA showed R ICA in stent stenosis of 70%, and about L ICA stenosis of 70%.  Patient is currently taking  mg qday and plavix 75 mg qday.   Patient here balloon angioplasty of previous right ICA stent given his TIA symptoms in August of this year.  He will be admitted to neuro ICU for overnight observation after balloon angioplasty.      Please see H&P dated for further details about presentation.    ASSESSMENT      Discharge Assessment  Patient reports symptoms are: Improved  Does the patient have all of their medications?: Yes  Does patient know what their new medications are for?: Yes  Does patient have a follow-up appointment scheduled?: Yes  Does patient have any other questions or concerns?: No(pt did mention he had some problems getting to sleep)    Post-op  Did the patient have surgery or a procedure: Yes  Incision: healing  Drainage: No  Bleeding: none  Fever: No  Chills: No  Redness: No  Warmth: No  Swelling: No  Incision site pain: No  Closure: other  Eating & Drinking: eating and drinking  without complaints/concerns  PO Intake: regular diet  Bowel Function: normal  Urinary Status: voiding without complaint/concerns        PLAN                                                      Outpatient Plan:      No future appointments.        Sera Dorado

## 2020-09-09 ENCOUNTER — TELEPHONE (OUTPATIENT)
Dept: NEUROSURGERY | Facility: CLINIC | Age: 75
End: 2020-09-09

## 2020-09-09 ENCOUNTER — MYC MEDICAL ADVICE (OUTPATIENT)
Dept: NEUROSURGERY | Facility: CLINIC | Age: 75
End: 2020-09-09

## 2020-09-09 NOTE — TELEPHONE ENCOUNTER
Endovascular Surgical Neuroradiology - Post Discharge Call    Admit: 9/3/20    Discharge: 9/4/20    MD/Service: Dr. Cuellar/[facility]     Procedure:  Right carotid stent balloon angioplasty (RFA puncture)    Plan:   * Continue 325 mg aspirin and 75 mg Plavix daily until follow-up.   * Follow-up with Dr. Cuellar in 1 month with carotid ultrasound prior to appointment.        See [employer] message. Message sent to scheduling to contact patient to make appointment. Michelle Hall RN 9/9/2020 11:06 AM

## 2020-09-15 ENCOUNTER — MYC MEDICAL ADVICE (OUTPATIENT)
Dept: NEUROSURGERY | Facility: CLINIC | Age: 75
End: 2020-09-15

## 2020-09-15 DIAGNOSIS — I79.8 OTHER DISORDERS OF ARTERIES, ARTERIOLES AND CAPILLARIES IN DISEASES CLASSIFIED ELSEWHERE (H): ICD-10-CM

## 2020-09-15 DIAGNOSIS — R10.31 GROIN PAIN, RIGHT: Primary | ICD-10-CM

## 2020-09-15 NOTE — TELEPHONE ENCOUNTER
THOMAS for patient or wife to call. [employer] message also sent. Michelle Hall RN 9/15/2020 2:50 PM

## 2020-09-16 NOTE — TELEPHONE ENCOUNTER
[facility] Call Center    Phone Message    May a detailed message be left on voicemail: yes     Reason for Call: Other: Patient is returning Justin call please call  either-  [phone] or [phone]  Patient states he is available all day today and will wait to hear from Justin.     Please advise.       Action Taken: Other:  NEUROLOGY    Travel Screening: Not Applicable

## 2020-09-16 NOTE — TELEPHONE ENCOUNTER
"Spoke with patient who states he has hard walnut size lump in right groin and pain, described as achy,  that runs from inside of leg down to foot. Symptoms have been present since 9/3/20 procedure. No worsening in symptoms. Pain is only present after he is up walking around for a bit. Symptoms resolve after sitting. Denies bleeding, swelling, redness, warmth, change in color, sensation, movement. Bruising still present at puncture site - \"off orange/dark yellow\" in color.   Tylenol with relief.    Per Dr. Conti obtain RLE/groin arterial US. Patient informed and will call [facility] to schedule for tomorrow. Michelle Hall RN 9/16/2020 12:34 PM           "

## 2020-09-17 ENCOUNTER — HOSPITAL ENCOUNTER (OUTPATIENT)
Dept: ULTRASOUND IMAGING | Facility: CLINIC | Age: 75
Discharge: HOME | End: 2020-09-17
Attending: NEUROLOGICAL SURGERY | Admitting: NEUROLOGICAL SURGERY
Payer: MEDICARE

## 2020-09-17 DIAGNOSIS — R10.31 GROIN PAIN, RIGHT: ICD-10-CM

## 2020-09-17 DIAGNOSIS — I79.8 OTHER DISORDERS OF ARTERIES, ARTERIOLES AND CAPILLARIES IN DISEASES CLASSIFIED ELSEWHERE (H): ICD-10-CM

## 2020-09-17 PROCEDURE — 93926 LOWER EXTREMITY STUDY: CPT | Mod: RT

## 2020-09-21 NOTE — TELEPHONE ENCOUNTER
ULTRASOUND LOWER EXTREMITY LIMITED  9/17/2020 1:35 PM   IMPRESSION: Probable mild granulation tissue or inflammatory changes at the palpable skin puncture site in the right groin. No evidence for pseudoaneurysm, hematoma, or abscess.     LVMM with above results. Instructed to call if symptoms do not improve or worsen. encouraged to return call with questions. Michelle Hall RN 9/21/2020 11:07 AM     Consent to communicate signed 2/22/19

## 2020-09-23 DIAGNOSIS — M19.90 ARTHRITIS: ICD-10-CM

## 2020-09-23 RX ORDER — GABAPENTIN 300 MG/1
300 CAPSULE ORAL
Qty: 180 CAPSULE | Refills: 1 | Status: SHIPPED | OUTPATIENT
Start: 2020-09-23 | End: 2021-03-11

## 2020-09-30 ENCOUNTER — HOSPITAL ENCOUNTER (OUTPATIENT)
Dept: ULTRASOUND IMAGING | Facility: CLINIC | Age: 75
Discharge: HOME | End: 2020-09-30
Attending: NEUROLOGICAL SURGERY | Admitting: NEUROLOGICAL SURGERY
Payer: MEDICARE

## 2020-09-30 DIAGNOSIS — I65.23 BILATERAL CAROTID ARTERY STENOSIS: ICD-10-CM

## 2020-09-30 PROCEDURE — 93880 EXTRACRANIAL BILAT STUDY: CPT

## 2020-10-01 ENCOUNTER — MYC MEDICAL ADVICE (OUTPATIENT)
Dept: INTERNAL MEDICINE | Facility: CLINIC | Age: 75
End: 2020-10-01

## 2020-10-01 DIAGNOSIS — G43.009 MIGRAINE WITHOUT AURA AND WITHOUT STATUS MIGRAINOSUS, NOT INTRACTABLE: ICD-10-CM

## 2020-10-01 NOTE — PROCEDURES
[facility]      Endovascular Surgical Neuroradiology Post-Procedure Note    Pre-Procedure Diagnosis:  Symptomatic right carotid in-stent stenosis  Post-Procedure Diagnosis:  Same    Procedure(s):   Extracranial arterial angioplasty and stenting (balloon angioplasty only of in-stent stenosis)    Findings:  > 50% in-stent stenosis in Right ICA    Plan:  ICU overnight, dual antiplatelet therapy    Primary Surgeon:  Dr. Shola Cuellar  Fellow:  Maria T Conti    Prior to the start of the procedure and with procedural staff participation, I verbally confirmed: the patient s identity using two indicators, relevant allergies, that the procedure was appropriate and matched the consent or emergent situation, and that the correct equipment/implants were available. Immediately prior to starting the procedure I conducted the Time Out with the procedural staff and re-confirmed the patient s name, procedure, and site/side. (The Joint Commission universal protocol was followed.)  Yes    PRU value: Not applicable    Anesthesia:  Conscious Sedation  Medications:  Fentanyl, Midazolam, Robinul 0.2 mg IV, Heparin 6000 units IV  Puncture site:  Right Femoral Artery    Fluoroscopy time (minutes):  48.8  Radiation dose (mGy):  782    Contrast amount (mL):  80     Estimated blood loss (mL):  20    Closure:  Manual    Disposition:  Will be followed in hospital by the Neuro Endovascular team.        Sedation Post-Procedure Summary    Sedatives: Midazolam 1.5 mg, Fentanyl 75 mcg    Vital signs and pulse oximetry were monitored and remained stable throughout the procedure, and sedation was maintained until the procedure was complete.  The patient was monitored by staff until sedation discharge criteria were met.    Patient tolerance:  Patient tolerated the procedure well with no immediate complications.    Time of sedation in minutes:  90 minutes from beginning to end of physician one to one  monitoring.    Shola Cuellar MD  Pager:  [phone]

## 2020-10-08 ENCOUNTER — VIRTUAL VISIT (OUTPATIENT)
Dept: NEUROSURGERY | Facility: CLINIC | Age: 75
End: 2020-10-08
Payer: MEDICARE

## 2020-10-08 DIAGNOSIS — I65.23 BILATERAL CAROTID ARTERY STENOSIS: Primary | ICD-10-CM

## 2020-10-08 DIAGNOSIS — Z86.79 HISTORY OF PERIPHERAL ARTERIAL DISEASE: ICD-10-CM

## 2020-10-08 PROCEDURE — 99024 POSTOP FOLLOW-UP VISIT: CPT | Mod: 95 | Performed by: NEUROLOGICAL SURGERY

## 2020-10-08 NOTE — LETTER
"10/8/2020       RE: Michele Vance  [address] [facility] [address]     Dear Colleague,    Thank you for referring your patient, Michele Vance, to the [facility] [place] at [facility]. Please see a copy of my visit note below.    Michele Vance is a 75 year old male who is being evaluated via a billable telephone visit.      The patient has been notified of following:     \"This telephone visit will be conducted via a call between you and your physician/provider. We have found that certain health care needs can be provided without the need for a physical exam.  This service lets us provide the care you need with a short phone conversation.  If a prescription is necessary we can send it directly to your pharmacy.  If lab work is needed we can place an order for that and you can then stop by our lab to have the test done at a later time.    Telephone visits are billed at different rates depending on your insurance coverage. During this emergency period, for some insurers they may be billed the same as an in-person visit.  Please reach out to your insurance provider with any questions.    If during the course of the call the physician/provider feels a telephone visit is not appropriate, you will not be charged for this service.\"    Patient has given verbal consent for Telephone visit?  yes    What phone number would you like to be contacted at? [phone]    How would you like to obtain your AVS? [employer]    Phone call duration: 15 minutes    BOAZ Miller        Service Date: 10/08/2020      2020      Jonas West MD   [address] [place] [address]  [id number]       RE: Michele Vance   MRN: [id number]   : 1945      Dear Dr. West:      We spoke to Mr. Vance as part of a telephone visit in [facility] on 10/08/2020.  As you know, we revascularized his right carotid stent with a balloon angioplasty " about a month ago for symptomatic in-stent stenosis.  He tolerated this procedure well, and he remains on aspirin and Plavix.  He developed a mass in the right groin at the puncture site, which we evaluated with an ultrasound and found not to be a pseudoaneurysm.      He has been recovering reasonably well.  The right groin mass has shrunk considerably and is now pea-sized.  He has not had any symptoms suggestive of TIAs or strokes since the procedure.  He remains on aspirin and Plavix.  He has been having radiating pain down the right thigh and leg along the posterior aspect.  He finds that sitting worsens the discomfort.  He has not had any new problems with his walking.  At baseline, he cannot walk more than half a mile.  He has an extensive history of peripheral artery disease with prior balloon angioplasty of his right superficial femoral artery.  He denies any paresthesias on the right lower extremity.      Over the phone, he seemed to be in good spirits.  Speech, language and low phonation all seem to be at baseline and were normal.      REVIEW OF STUDIES:  We went over his carotid ultrasound from 09/30/2020.  The peak velocity in the right ICA is 163 cm/sec with a ratio of 2.0.  On the left side, peak velocity is 300 with a ratio of 3.9.  We know from his angiogram that we did as part of the balloon angioplasty, that there is about 70% stenosis at the origin of the left internal carotid artery and this correlates with these ultrasound values on the left side.  We also know from the angiogram that there is minimal residual stenosis following angioplasty on the right side.      IMPRESSION AND PLAN:  We are pleased to hear that he is doing well.  The right lower extremity symptoms are not classic for vascular insufficiency or from the groin hematoma/pseudoaneurysm.  They are also not classic for femoral neuropathy, as the pain is radiating down the posterior aspect of the thigh and leg.  He had ABIs checked  about 2 months ago.  He has not had any vascular surgical followup for his peripheral artery disease in some time.  We will see if we can establish followup at [facility] with one of our vascular surgeons.  We will continue Plavix and aspirin together for 2 more months (3 months total) and then discontinue the Plavix.  He should remain on aspirin thereafter indefinitely.  We will repeat a carotid ultrasound in 6 months and follow up with him by phone afterwards.  Please do not hesitate to contact us with questions.      Sincerely,            MD DURGA Askew MD             D: 10/08/2020   T: 10/08/2020   MT: JOHNNIE      Name:     NINFA CID   MRN:      [id number]        Account:      [id number]   :      1945           Service Date: 10/08/2020      Document: [id number]        Again, thank you for allowing me to participate in the care of your patient.      Sincerely,    Durga Cuellar MD

## 2020-10-08 NOTE — PATIENT INSTRUCTIONS
Stay on Plavix and aspirin for 2 more months then discontinue the Plavix and stay on aspirin only thereafter    We will refer you to vascular surgery regarding your lower extremity symptoms. (not ordered). We will check if you can see someone at [facility]    Repeat carotid ultrasound at [facility] in six months. (ordered)    Phone follow up in 6 months after carotid ultrasound

## 2020-10-08 NOTE — PROGRESS NOTES
"Michele Vance is a 75 year old male who is being evaluated via a billable telephone visit.      The patient has been notified of following:     \"This telephone visit will be conducted via a call between you and your physician/provider. We have found that certain health care needs can be provided without the need for a physical exam.  This service lets us provide the care you need with a short phone conversation.  If a prescription is necessary we can send it directly to your pharmacy.  If lab work is needed we can place an order for that and you can then stop by our lab to have the test done at a later time.    Telephone visits are billed at different rates depending on your insurance coverage. During this emergency period, for some insurers they may be billed the same as an in-person visit.  Please reach out to your insurance provider with any questions.    If during the course of the call the physician/provider feels a telephone visit is not appropriate, you will not be charged for this service.\"    Patient has given verbal consent for Telephone visit?  yes    What phone number would you like to be contacted at? [phone]    [facility]    Phone call duration: 15 minutes    Michael Styles EMT      "

## 2020-10-08 NOTE — LETTER
10/8/2020       RE: Michele Vance  [address]  [facility] [address]     Dear Colleague,    Thank you for referring your patient, Michele Vance, to the [facility] [place] [facility]. Please see a copy of my visit note below.    Service Date: 10/08/2020     Jonas West MD   [address] [place]  [address]       RE: Michele Vance   MRN: [id number]   : 1945      Dear Dr. West:      We spoke to Mr. Vance as part of a telephone visit in [facility]brovascular Clinic on 10/08/2020.  As you know, we revascularized his right carotid stent with a balloon angioplasty about a month ago for symptomatic in-stent stenosis.  He tolerated this procedure well, and he remains on aspirin and Plavix.  He developed a mass in the right groin at the puncture site, which we evaluated with an ultrasound and found not to be a pseudoaneurysm.      He has been recovering reasonably well.  The right groin mass has shrunk considerably and is now pea-sized.  He has not had any symptoms suggestive of TIAs or strokes since the procedure.  He remains on aspirin and Plavix.  He has been having radiating pain down the right thigh and leg along the posterior aspect.  He finds that sitting worsens the discomfort.  He has not had any new problems with his walking.  At baseline, he cannot walk more than half a mile.  He has an extensive history of peripheral artery disease with prior balloon angioplasty of his right superficial femoral artery.  He denies any paresthesias on the right lower extremity.      Over the phone, he seemed to be in good spirits.  Speech, language and low phonation all seem to be at baseline and were normal.      REVIEW OF STUDIES:  We went over his carotid ultrasound from 2020.  The peak velocity in the right ICA is 163 cm/sec with a ratio of 2.0.  On the left side, peak velocity is 300 with a ratio of 3.9.  We know from his  angiogram that we did as part of the balloon angioplasty, that there is about 70% stenosis at the origin of the left internal carotid artery and this correlates with these ultrasound values on the left side.  We also know from the angiogram that there is minimal residual stenosis following angioplasty on the right side.      IMPRESSION AND PLAN:  We are pleased to hear that he is doing well.  The right lower extremity symptoms are not classic for vascular insufficiency or from the groin hematoma/pseudoaneurysm.  They are also not classic for femoral neuropathy, as the pain is radiating down the posterior aspect of the thigh and leg.  He had ABIs checked about 2 months ago.  He has not had any vascular surgical followup for his peripheral artery disease in some time.  We will see if we can establish followup at [facility] with one of our vascular surgeons.  We will continue Plavix and aspirin together for 2 more months (3 months total) and then discontinue the Plavix.  He should remain on aspirin thereafter indefinitely.  We will repeat a carotid ultrasound in 6 months and follow up with him by phone afterwards.  Please do not hesitate to contact us with questions.      Again, thank you for allowing me to participate in the care of your patient.  Sincerely,         DURGA SLADE MD  D: 10/08/2020   T: 10/08/2020   MT: JOHNNIE      Name:     NINFA CID   MRN:      [id number]        Account:      [id number]   :      1945           Service Date: 10/08/2020      Document: [id number]

## 2020-10-09 NOTE — PROGRESS NOTES
Service Date: 10/08/2020      2020      Jonas West MD   [address] [place]  [address]       RE: Michele Vance   MRN: [id number]   : 1945      Dear Dr. West:      We spoke to Mr. Vance as part of a telephone visit in C[facility]brovascular Clinic on 10/08/2020.  As you know, we revascularized his right carotid stent with a balloon angioplasty about a month ago for symptomatic in-stent stenosis.  He tolerated this procedure well, and he remains on aspirin and Plavix.  He developed a mass in the right groin at the puncture site, which we evaluated with an ultrasound and found not to be a pseudoaneurysm.      He has been recovering reasonably well.  The right groin mass has shrunk considerably and is now pea-sized.  He has not had any symptoms suggestive of TIAs or strokes since the procedure.  He remains on aspirin and Plavix.  He has been having radiating pain down the right thigh and leg along the posterior aspect.  He finds that sitting worsens the discomfort.  He has not had any new problems with his walking.  At baseline, he cannot walk more than half a mile.  He has an extensive history of peripheral artery disease with prior balloon angioplasty of his right superficial femoral artery.  He denies any paresthesias on the right lower extremity.      Over the phone, he seemed to be in good spirits.  Speech, language and low phonation all seem to be at baseline and were normal.      REVIEW OF STUDIES:  We went over his carotid ultrasound from 2020.  The peak velocity in the right ICA is 163 cm/sec with a ratio of 2.0.  On the left side, peak velocity is 300 with a ratio of 3.9.  We know from his angiogram that we did as part of the balloon angioplasty, that there is about 70% stenosis at the origin of the left internal carotid artery and this correlates with these ultrasound values on the left side.  We also know from the angiogram that there is minimal residual stenosis following  angioplasty on the right side.      IMPRESSION AND PLAN:  We are pleased to hear that he is doing well.  The right lower extremity symptoms are not classic for vascular insufficiency or from the groin hematoma/pseudoaneurysm.  They are also not classic for femoral neuropathy, as the pain is radiating down the posterior aspect of the thigh and leg.  He had ABIs checked about 2 months ago.  He has not had any vascular surgical followup for his peripheral artery disease in some time.  We will see if we can establish followup at [facility] with one of our vascular surgeons.  We will continue Plavix and aspirin together for 2 more months (3 months total) and then discontinue the Plavix.  He should remain on aspirin thereafter indefinitely.  We will repeat a carotid ultrasound in 6 months and follow up with him by phone afterwards.  Please do not hesitate to contact us with questions.      Sincerely,            MD DURGA Askew MD             D: 10/08/2020   T: 10/08/2020   MT: JOHNNIE      Name:     NINFA CID   MRN:      [id number]        Account:      [id number]   :      1945           Service Date: 10/08/2020      Document: [id number]

## 2020-10-13 ENCOUNTER — TELEPHONE (OUTPATIENT)
Dept: OTHER | Facility: CLINIC | Age: 75
End: 2020-10-13

## 2020-10-15 ENCOUNTER — TELEPHONE (OUTPATIENT)
Dept: VASCULAR SURGERY | Facility: CLINIC | Age: 75
End: 2020-10-15

## 2020-10-15 NOTE — TELEPHONE ENCOUNTER
----- Message from Rocio Andrews RN sent at 10/13/2020  9:37 AM CDT -----  Regarding: Please call and schedule  Hi,    Please call and schedule Michele for a new patient visit with Dr. Roca for a carotid artery stenosis. He is referred by Dr. Coyne.    Thanks!    Rocio

## 2020-10-21 ENCOUNTER — TELEPHONE (OUTPATIENT)
Dept: VASCULAR SURGERY | Facility: CLINIC | Age: 75
End: 2020-10-21

## 2020-10-21 NOTE — TELEPHONE ENCOUNTER
Per email from Rocio Spencer M:   ABBE with pt to schedule consultation with Dr. Felipa NAVA. No studies needed. Left my name and phone number: [phone]

## 2020-11-16 DIAGNOSIS — I10 BENIGN ESSENTIAL HTN: ICD-10-CM

## 2020-11-16 DIAGNOSIS — R06.09 DOE (DYSPNEA ON EXERTION): ICD-10-CM

## 2020-11-16 DIAGNOSIS — E78.5 HYPERLIPIDEMIA LDL GOAL <130: ICD-10-CM

## 2020-12-01 ENCOUNTER — MYC REFILL (OUTPATIENT)
Dept: CARDIOLOGY | Facility: CLINIC | Age: 75
End: 2020-12-01

## 2020-12-01 DIAGNOSIS — R06.09 DOE (DYSPNEA ON EXERTION): ICD-10-CM

## 2020-12-01 DIAGNOSIS — E78.5 HYPERLIPIDEMIA LDL GOAL <130: ICD-10-CM

## 2020-12-01 RX ORDER — ISOSORBIDE MONONITRATE 60 MG/1
60 TABLET, EXTENDED RELEASE ORAL DAILY
Qty: 90 TABLET | Refills: 3 | Status: SHIPPED | OUTPATIENT
Start: 2020-12-01 | End: 2021-11-01

## 2020-12-04 DIAGNOSIS — I73.9 PERIPHERAL VASCULAR DISEASE (H): ICD-10-CM

## 2020-12-04 RX ORDER — CILOSTAZOL 100 MG/1
TABLET ORAL
Qty: 180 TABLET | Refills: 3 | Status: SHIPPED | OUTPATIENT
Start: 2020-12-04 | End: 2021-04-21

## 2020-12-04 NOTE — TELEPHONE ENCOUNTER
"Routing refill request to provider for review/approval because:  Drug not active on patient's medication list    Requested Prescriptions   Pending Prescriptions Disp Refills     cilostazol (PLETAL) 100 MG tablet [Pharmacy Med Name: CILOSTAZOL TABS 100MG] 180 tablet 3     Sig: TAKE 1 TABLET TWICE A DAY     Last office visit: 8/19/2020 with prescribing provider:     Future Office Visit:      Platelet Inhibitors Failed - 12/4/2020  3:33 PM        Failed - Normal HGB on file in past 12 months     Recent Labs   Lab Test 09/03/20  1135   HGB 12.4*           Failed - Medication is active on med list        Passed - Normal Platelets on file in past 12 months     Recent Labs   Lab Test 09/03/20  1135              Passed - Recent (12 mo) or future (30 days) visit within the authorizing provider's specialty     Patient has had an office visit with the authorizing provider or a provider within the authorizing providers department within the previous 12 mos or has a future within next 30 days. See \"Patient Info\" tab in inbasket, or \"Choose Columns\" in Meds & Orders section of the refill encounter.            Passed - Patient is age 18 or older        Passed - Normal serum creatinine on file in past 12 months     Recent Labs   Lab Test 09/03/20  1728   CR 1.05       Ok to refill medication if creatinine is low           Erin Aden RN      "

## 2020-12-10 DIAGNOSIS — I10 ESSENTIAL HYPERTENSION, BENIGN: ICD-10-CM

## 2020-12-10 RX ORDER — LOSARTAN POTASSIUM 100 MG/1
100 TABLET ORAL DAILY
Qty: 90 TABLET | Refills: 3 | Status: SHIPPED | OUTPATIENT
Start: 2020-12-10 | End: 2021-10-20

## 2020-12-11 ENCOUNTER — OFFICE VISIT (OUTPATIENT)
Dept: INTERNAL MEDICINE | Facility: CLINIC | Age: 75
End: 2020-12-11
Payer: MEDICARE

## 2020-12-11 VITALS
TEMPERATURE: 99.1 F | HEIGHT: 69 IN | SYSTOLIC BLOOD PRESSURE: 124 MMHG | HEART RATE: 78 BPM | WEIGHT: 162 LBS | DIASTOLIC BLOOD PRESSURE: 56 MMHG | OXYGEN SATURATION: 98 % | RESPIRATION RATE: 16 BRPM | BODY MASS INDEX: 23.99 KG/M2

## 2020-12-11 DIAGNOSIS — Z23 NEED FOR PNEUMOCOCCAL VACCINATION: ICD-10-CM

## 2020-12-11 DIAGNOSIS — Z23 NEED FOR PROPHYLACTIC VACCINATION AND INOCULATION AGAINST INFLUENZA: ICD-10-CM

## 2020-12-11 DIAGNOSIS — I10 ESSENTIAL HYPERTENSION, BENIGN: ICD-10-CM

## 2020-12-11 DIAGNOSIS — L57.0 ACTINIC KERATOSIS: Primary | ICD-10-CM

## 2020-12-11 DIAGNOSIS — L85.3 DRY SKIN: ICD-10-CM

## 2020-12-11 DIAGNOSIS — L82.1 SEBORRHEIC KERATOSES: ICD-10-CM

## 2020-12-11 PROCEDURE — 90670 PCV13 VACCINE IM: CPT | Performed by: INTERNAL MEDICINE

## 2020-12-11 PROCEDURE — 17000 DESTRUCT PREMALG LESION: CPT | Mod: 59 | Performed by: INTERNAL MEDICINE

## 2020-12-11 PROCEDURE — G0008 ADMIN INFLUENZA VIRUS VAC: HCPCS | Performed by: INTERNAL MEDICINE

## 2020-12-11 PROCEDURE — 17003 DESTRUCT PREMALG LES 2-14: CPT | Performed by: INTERNAL MEDICINE

## 2020-12-11 PROCEDURE — 99213 OFFICE O/P EST LOW 20 MIN: CPT | Mod: 25 | Performed by: INTERNAL MEDICINE

## 2020-12-11 PROCEDURE — 90662 IIV NO PRSV INCREASED AG IM: CPT | Performed by: INTERNAL MEDICINE

## 2020-12-11 PROCEDURE — G0009 ADMIN PNEUMOCOCCAL VACCINE: HCPCS | Performed by: INTERNAL MEDICINE

## 2020-12-11 RX ORDER — AMLODIPINE BESYLATE 10 MG/1
10 TABLET ORAL DAILY
Qty: 90 TABLET | Refills: 3 | Status: SHIPPED | OUTPATIENT
Start: 2020-12-11 | End: 2021-10-29

## 2020-12-11 ASSESSMENT — MIFFLIN-ST. JEOR: SCORE: 1457.27

## 2020-12-11 NOTE — PROGRESS NOTES
"Subjective     Michele Vance is a 75 year old male who presents to clinic today for the following health issues:    HPI         Concern - brown spots on head and back  Onset: 4 months  Description: brown spots on side of right head and on back. Irritated and will sometimes bleed       skin spots on the head and back, itches on him, been there a month.      Patient is itching at times, been there about a month.  Some of these are getting bigger.  Up in his scalp has been there long.    Past Medical History:   Diagnosis Date     Carotid stenosis     right endartectomy     Chronic kidney disease     stage 3     Coronary artery disease 3-2014    CABG x6     CVA (cerebral infarction)     balance problems, mild     Elevated CK      Esophageal reflux      Hypercholesteremia      Nonsenile cataract      Other and unspecified hyperlipidemia      PAD (peripheral artery disease) (H)      Rhabdomyolysis 2010     Unspecified essential hypertension      Current Outpatient Medications   Medication     alirocumab (PRALUENT) 150 MG/ML injectable syringe     amitriptyline (ELAVIL) 25 MG tablet     amLODIPine (NORVASC) 10 MG tablet     aspirin (ASA) 325 MG tablet     carvedilol (COREG) 25 MG tablet     cilostazol (PLETAL) 100 MG tablet     gabapentin (NEURONTIN) 300 MG capsule     isosorbide mononitrate (IMDUR) 60 MG 24 hr tablet     KLOR-CON 10 MEQ CR tablet     linaclotide (LINZESS) 145 MCG capsule     losartan (COZAAR) 100 MG tablet     omeprazole (PRILOSEC) 40 MG DR capsule     ondansetron (ZOFRAN) 4 MG tablet     No current facility-administered medications for this visit.      .              Review of Systems         Objective    /56   Pulse 78   Temp 99.1  F (37.3  C) (Temporal)   Resp 16   Ht 1.74 m (5' 8.5\")   Wt 73.5 kg (162 lb)   SpO2 98%   BMI 24.27 kg/m    There is no height or weight on file to calculate BMI.  Physical Exam   No acute distress  Back-multiple seborrheic keratoses and some dryness in the mid " back  Scalp on the left side is a large seborrheic keratoses, greater than 2 inches, right side has a 1 inch seborrheic keratoses on the crown of the top of his head he does have some actinic keratoses 3 on the top and one on the left forehead    Procedure note-cryotherapy  Verbal consent  Areas on the left forehead and 3 spots on the crown of the head were treated with liquid nitrogen 3 rounds for 3 seconds each.  Aftercare instruction was given to the patient.        Assessment & Plan     Michele was seen today for derm problem.    Diagnoses and all orders for this visit:    Actinic keratosis  -     DESTRUCT PREMALIGNANT LESION, FIRST  -     DESTRUCT PREMALIGNANT LESION, 2-14    Essential hypertension, benign  -     amLODIPine (NORVASC) 10 MG tablet; Take 1 tablet (10 mg) by mouth daily    Seborrheic keratoses    Dry skin    Patient here for spots on his skin, the lesions on his back and on the temple are seborrheic keratoses and he is reassured that these are benign.    He does have actinic keratoses on the left forehead and the crown of his head there were 4 lesions in total and these were treated with liquid nitrogen.  He is told these are premalignant and should be followed closely.    Dry skin on his back he can try over-the-counter moisturizing lotion to it limit his showers    Hypertension is doing well we will refill his amlodipine.            No follow-ups on file.    Jonas West MD  [facility]

## 2021-01-12 ENCOUNTER — HOSPITAL ENCOUNTER (OUTPATIENT)
Dept: CT IMAGING | Facility: CLINIC | Age: 76
Discharge: HOME | End: 2021-01-12
Attending: PHYSICIAN ASSISTANT | Admitting: PHYSICIAN ASSISTANT
Payer: MEDICARE

## 2021-01-12 DIAGNOSIS — R10.9 ABDOMINAL PAIN, UNSPECIFIED ABDOMINAL LOCATION: ICD-10-CM

## 2021-01-12 LAB
CREAT BLD-MCNC: 1.5 MG/DL (ref 0.66–1.25)
GFR SERPL CREATININE-BSD FRML MDRD: 46 ML/MIN/{1.73_M2}

## 2021-01-12 PROCEDURE — 82565 ASSAY OF CREATININE: CPT

## 2021-01-12 PROCEDURE — 74177 CT ABD & PELVIS W/CONTRAST: CPT

## 2021-01-12 PROCEDURE — 250N000009 HC RX 250: Performed by: RADIOLOGY

## 2021-01-12 PROCEDURE — 250N000011 HC RX IP 250 OP 636: Performed by: RADIOLOGY

## 2021-01-12 RX ORDER — IOPAMIDOL 755 MG/ML
500 INJECTION, SOLUTION INTRAVASCULAR ONCE
Status: COMPLETED | OUTPATIENT
Start: 2021-01-12 | End: 2021-01-12

## 2021-01-12 RX ADMIN — IOPAMIDOL 79 ML: 755 INJECTION, SOLUTION INTRAVENOUS at 14:05

## 2021-01-12 RX ADMIN — SODIUM CHLORIDE 60 ML: 9 INJECTION, SOLUTION INTRAVENOUS at 14:06

## 2021-02-09 ENCOUNTER — TELEPHONE (OUTPATIENT)
Dept: NEUROSURGERY | Facility: CLINIC | Age: 76
End: 2021-02-09

## 2021-02-09 NOTE — TELEPHONE ENCOUNTER
Patient is scheduled with Dr. Cuellar on 04/13/21 for a telephone visit and will get imaging scheduled prior to that at [facility]

## 2021-02-17 DIAGNOSIS — G43.009 MIGRAINE WITHOUT AURA AND WITHOUT STATUS MIGRAINOSUS, NOT INTRACTABLE: ICD-10-CM

## 2021-03-04 ENCOUNTER — IMMUNIZATION (OUTPATIENT)
Dept: FAMILY MEDICINE | Facility: CLINIC | Age: 76
End: 2021-03-04
Payer: MEDICARE

## 2021-03-04 PROCEDURE — 91300 PR COVID VAC PFIZER DIL RECON 30 MCG/0.3 ML IM: CPT

## 2021-03-04 PROCEDURE — 0001A PR COVID VAC PFIZER DIL RECON 30 MCG/0.3 ML IM: CPT

## 2021-03-11 DIAGNOSIS — M19.90 ARTHRITIS: ICD-10-CM

## 2021-03-11 RX ORDER — GABAPENTIN 300 MG/1
CAPSULE ORAL
Qty: 180 CAPSULE | Refills: 3 | Status: SHIPPED | OUTPATIENT
Start: 2021-03-11 | End: 2022-04-15

## 2021-03-11 NOTE — TELEPHONE ENCOUNTER
Gabapentin      Last Written Prescription Date:  09/23/2020  Last Fill Quantity: 180,   # refills: 1  Last Office Visit: 12/11/2020  Future Office visit:   None  Routing refill request to provider for review/approval because:  Drug not on the [facility], P or [facility] refill protocol or controlled substance    Charlotte Olguin RN

## 2021-03-18 ENCOUNTER — TELEPHONE (OUTPATIENT)
Dept: CARDIOLOGY | Facility: CLINIC | Age: 76
End: 2021-03-18

## 2021-03-18 NOTE — TELEPHONE ENCOUNTER
PA Initiation    Medication: Praluent 150MG/ML auto-injectors (PENDING)  Insurance Company: [employer] - Phone [phone] Fax [phone]  Pharmacy Filling the Rx: [facility] - [place], [place] - [address] [place] [address]  Filling Pharmacy Phone:    Filling Pharmacy Fax:    Start Date: 3/18/2021    Thank you,    Miley Wen [facility]  Specialty Pharmacy Clinic [facility] [address] [address]  Ph: [phone] Fax: [phone]  [email or web address]

## 2021-03-24 NOTE — TELEPHONE ENCOUNTER
Prior Authorization Approval    Authorization Effective Date: 3/23/2021  Authorization Expiration Date: 3/23/2022  Medication: Praluent 150MG/ML auto-injectors (APPROVED)  Approved Dose/Quantity: 28 days  Reference #:     Insurance Company:  - Phone [phone] Fax [phone]  Expected CoPay:       CoPay Card Available:      Foundation Assistance Needed:    Which Pharmacy is filling the prescription (Not needed for infusion/clinic administered): [facility] - [address]  Pharmacy Notified: Yes  Patient Notified: Yes      PA Renewal approved:        Thank you,    Miley Wen h-T  Specialty Pharmacy Clinic [facility] Surgery [address] [address]  Ph: [phone] Fax: [phone]  [email or web address]

## 2021-03-25 ENCOUNTER — IMMUNIZATION (OUTPATIENT)
Dept: FAMILY MEDICINE | Facility: CLINIC | Age: 76
End: 2021-03-25
Attending: FAMILY MEDICINE
Payer: MEDICARE

## 2021-03-25 PROCEDURE — 0002A PR COVID VAC PFIZER DIL RECON 30 MCG/0.3 ML IM: CPT

## 2021-03-25 PROCEDURE — 91300 PR COVID VAC PFIZER DIL RECON 30 MCG/0.3 ML IM: CPT

## 2021-04-05 ENCOUNTER — APPOINTMENT (OUTPATIENT)
Dept: MRI IMAGING | Facility: CLINIC | Age: 76
End: 2021-04-05
Attending: EMERGENCY MEDICINE
Payer: MEDICARE

## 2021-04-05 ENCOUNTER — APPOINTMENT (OUTPATIENT)
Dept: CT IMAGING | Facility: CLINIC | Age: 76
End: 2021-04-05
Attending: EMERGENCY MEDICINE
Payer: MEDICARE

## 2021-04-05 ENCOUNTER — HOSPITAL ENCOUNTER (EMERGENCY)
Facility: CLINIC | Age: 76
Discharge: HOME | End: 2021-04-05
Attending: EMERGENCY MEDICINE | Admitting: EMERGENCY MEDICINE
Payer: MEDICARE

## 2021-04-05 VITALS
SYSTOLIC BLOOD PRESSURE: 150 MMHG | OXYGEN SATURATION: 95 % | HEART RATE: 77 BPM | RESPIRATION RATE: 20 BRPM | DIASTOLIC BLOOD PRESSURE: 70 MMHG

## 2021-04-05 DIAGNOSIS — I65.23 BILATERAL CAROTID ARTERY STENOSIS: ICD-10-CM

## 2021-04-05 DIAGNOSIS — G45.9 TIA (TRANSIENT ISCHEMIC ATTACK): ICD-10-CM

## 2021-04-05 DIAGNOSIS — Z98.62 H/O CAROTID ANGIOPLASTY: ICD-10-CM

## 2021-04-05 DIAGNOSIS — M62.81 GENERALIZED MUSCLE WEAKNESS: ICD-10-CM

## 2021-04-05 LAB
ALBUMIN SERPL-MCNC: 3.3 G/DL (ref 3.4–5)
ALBUMIN UR-MCNC: NEGATIVE MG/DL
ALP SERPL-CCNC: 86 U/L (ref 40–150)
ALT SERPL W P-5'-P-CCNC: 27 U/L (ref 0–70)
ANION GAP SERPL CALCULATED.3IONS-SCNC: 7 MMOL/L (ref 3–14)
APPEARANCE UR: CLEAR
AST SERPL W P-5'-P-CCNC: 21 U/L (ref 0–45)
BASOPHILS # BLD AUTO: 0 10E9/L (ref 0–0.2)
BASOPHILS NFR BLD AUTO: 0.2 %
BILIRUB SERPL-MCNC: 0.8 MG/DL (ref 0.2–1.3)
BILIRUB UR QL STRIP: NEGATIVE
BUN SERPL-MCNC: 18 MG/DL (ref 7–30)
CALCIUM SERPL-MCNC: 8.8 MG/DL (ref 8.5–10.1)
CHLORIDE SERPL-SCNC: 100 MMOL/L (ref 94–109)
CO2 SERPL-SCNC: 22 MMOL/L (ref 20–32)
COLOR UR AUTO: NORMAL
CREAT SERPL-MCNC: 1.42 MG/DL (ref 0.66–1.25)
DIFFERENTIAL METHOD BLD: ABNORMAL
EOSINOPHIL # BLD AUTO: 0.1 10E9/L (ref 0–0.7)
EOSINOPHIL NFR BLD AUTO: 1 %
ERYTHROCYTE [DISTWIDTH] IN BLOOD BY AUTOMATED COUNT: 12.8 % (ref 10–15)
GFR SERPL CREATININE-BSD FRML MDRD: 48 ML/MIN/{1.73_M2}
GLUCOSE SERPL-MCNC: 102 MG/DL (ref 70–99)
GLUCOSE UR STRIP-MCNC: NEGATIVE MG/DL
HCT VFR BLD AUTO: 35.3 % (ref 40–53)
HGB BLD-MCNC: 12.8 G/DL (ref 13.3–17.7)
HGB UR QL STRIP: NEGATIVE
IMM GRANULOCYTES # BLD: 0.1 10E9/L (ref 0–0.4)
IMM GRANULOCYTES NFR BLD: 0.4 %
KETONES UR STRIP-MCNC: NEGATIVE MG/DL
LEUKOCYTE ESTERASE UR QL STRIP: NEGATIVE
LYMPHOCYTES # BLD AUTO: 0.6 10E9/L (ref 0.8–5.3)
LYMPHOCYTES NFR BLD AUTO: 5.3 %
MCH RBC QN AUTO: 33.7 PG (ref 26.5–33)
MCHC RBC AUTO-ENTMCNC: 36.3 G/DL (ref 31.5–36.5)
MCV RBC AUTO: 93 FL (ref 78–100)
MONOCYTES # BLD AUTO: 0.5 10E9/L (ref 0–1.3)
MONOCYTES NFR BLD AUTO: 4.8 %
NEUTROPHILS # BLD AUTO: 10 10E9/L (ref 1.6–8.3)
NEUTROPHILS NFR BLD AUTO: 88.3 %
NITRATE UR QL: NEGATIVE
NRBC # BLD AUTO: 0 10*3/UL
NRBC BLD AUTO-RTO: 0 /100
PH UR STRIP: 7 PH (ref 5–7)
PLATELET # BLD AUTO: 278 10E9/L (ref 150–450)
POTASSIUM SERPL-SCNC: 4 MMOL/L (ref 3.4–5.3)
PROT SERPL-MCNC: 7.2 G/DL (ref 6.8–8.8)
RBC # BLD AUTO: 3.8 10E12/L (ref 4.4–5.9)
SODIUM SERPL-SCNC: 129 MMOL/L (ref 133–144)
SOURCE: NORMAL
SP GR UR STRIP: 1.02 (ref 1–1.03)
TROPONIN I SERPL-MCNC: 0.05 UG/L (ref 0–0.04)
TROPONIN I SERPL-MCNC: 0.06 UG/L (ref 0–0.04)
TSH SERPL DL<=0.005 MIU/L-ACNC: 0.52 MU/L (ref 0.4–4)
UROBILINOGEN UR STRIP-MCNC: 0 MG/DL (ref 0–2)
WBC # BLD AUTO: 11.3 10E9/L (ref 4–11)

## 2021-04-05 PROCEDURE — 250N000009 HC RX 250: Performed by: EMERGENCY MEDICINE

## 2021-04-05 PROCEDURE — 70551 MRI BRAIN STEM W/O DYE: CPT | Mod: ME

## 2021-04-05 PROCEDURE — 81003 URINALYSIS AUTO W/O SCOPE: CPT | Performed by: EMERGENCY MEDICINE

## 2021-04-05 PROCEDURE — 99285 EMERGENCY DEPT VISIT HI MDM: CPT | Mod: 25 | Performed by: EMERGENCY MEDICINE

## 2021-04-05 PROCEDURE — 84443 ASSAY THYROID STIM HORMONE: CPT | Performed by: EMERGENCY MEDICINE

## 2021-04-05 PROCEDURE — 93005 ELECTROCARDIOGRAM TRACING: CPT | Performed by: EMERGENCY MEDICINE

## 2021-04-05 PROCEDURE — 80053 COMPREHEN METABOLIC PANEL: CPT | Performed by: EMERGENCY MEDICINE

## 2021-04-05 PROCEDURE — 84484 ASSAY OF TROPONIN QUANT: CPT | Performed by: EMERGENCY MEDICINE

## 2021-04-05 PROCEDURE — 250N000011 HC RX IP 250 OP 636: Performed by: EMERGENCY MEDICINE

## 2021-04-05 PROCEDURE — 96361 HYDRATE IV INFUSION ADD-ON: CPT | Performed by: EMERGENCY MEDICINE

## 2021-04-05 PROCEDURE — 258N000003 HC RX IP 258 OP 636: Performed by: EMERGENCY MEDICINE

## 2021-04-05 PROCEDURE — 70450 CT HEAD/BRAIN W/O DYE: CPT | Mod: ME,XS

## 2021-04-05 PROCEDURE — 96360 HYDRATION IV INFUSION INIT: CPT | Mod: 59 | Performed by: EMERGENCY MEDICINE

## 2021-04-05 PROCEDURE — 93010 ELECTROCARDIOGRAM REPORT: CPT | Performed by: EMERGENCY MEDICINE

## 2021-04-05 PROCEDURE — 85025 COMPLETE CBC W/AUTO DIFF WBC: CPT | Performed by: EMERGENCY MEDICINE

## 2021-04-05 PROCEDURE — 70498 CT ANGIOGRAPHY NECK: CPT | Mod: ME

## 2021-04-05 RX ORDER — SODIUM CHLORIDE 9 MG/ML
INJECTION, SOLUTION INTRAVENOUS CONTINUOUS
Status: DISCONTINUED | OUTPATIENT
Start: 2021-04-05 | End: 2021-04-05 | Stop reason: HOSPADM

## 2021-04-05 RX ORDER — IOPAMIDOL 755 MG/ML
500 INJECTION, SOLUTION INTRAVASCULAR ONCE
Status: COMPLETED | OUTPATIENT
Start: 2021-04-05 | End: 2021-04-05

## 2021-04-05 RX ADMIN — SODIUM CHLORIDE 100 ML: 9 INJECTION, SOLUTION INTRAVENOUS at 11:17

## 2021-04-05 RX ADMIN — SODIUM CHLORIDE 1000 ML: 9 INJECTION, SOLUTION INTRAVENOUS at 12:03

## 2021-04-05 RX ADMIN — IOPAMIDOL 70 ML: 755 INJECTION, SOLUTION INTRAVENOUS at 11:17

## 2021-04-05 NOTE — ED NOTES
Patient placed back on Cardiac/BP/Oximetry. His wife is on the phone with Medicare as they are concerned about their out of pocket expense related to OBS.

## 2021-04-05 NOTE — DISCHARGE INSTRUCTIONS
Your work-up today showed no new stroke.  There were signs of old stroke on your MRI    After talking with neurology, they suspect that your symptoms earlier this morning may have been a flareup of your old stroke due to atrial fibrillation and/or high blood pressure    Need to continue all your blood pressure medicine as previously prescribed    You should continue taking your Pletal    You will STOP taking aspirin every day.    You will START taking 81 mg aspirin every other day    You will need to start taking Eliquis for presumed atrial fibrillation    Return promptly to the emergency room if any new or worsening.  Otherwise an appointment was made for you to see Dr. West on Wednesday, 4/7/2021    I am sorry for the long wait and unclear information regarding insurance coverage.  I hope this will all work out and they can figure out the appropriate regimens in your medication and follow-up care

## 2021-04-05 NOTE — ED PROVIDER NOTES
"  History     Chief Complaint   Patient presents with     Generalized Weakness     HPI  Michele Vance is a 75 year old male who presents to the emergency room via EMS with generalized weakness.  He said when he woke up this morning he could hardly get out of bed.  He felt like he was stumbling around and into the walls.  He says right now he feels fine.  Has had symptoms like this before but said when he was checked out that \"they could not find anything\".  He has not had any changes in his medications or any new medications added to his regimen.  He did not take his blood pressure medicine this morning.  Denies any headache, blurred vision, chest pain, shortness of breath, nausea or vomiting.    Allergies:  Allergies   Allergen Reactions     Hmg-Coa-R Inhibitors Other (See Comments)     Rhabdo  Same as \"statins\"     Gemfibrozil      Resting thigh-calf pain     Sulfa Drugs      Reacted as a child     Zetia [Ezetimibe]      Muscle cramping       Problem List:    Patient Active Problem List    Diagnosis Date Noted     H/O carotid angioplasty 09/04/2020     Priority: Medium     Status post balloon angioplasty of pulmonary artery branches 09/03/2020     Priority: Medium     Renal artery stenosis (H) 09/05/2019     Priority: Medium     Added automatically from request for surgery [id number]       Carotid stenosis      Priority: Medium     right endartectomy       Essential hypertension with goal blood pressure less than 140/90 06/02/2016     Priority: Medium     PVD (peripheral vascular disease) (H) 07/22/2015     Priority: Medium     Chronic constipation 12/24/2014     Priority: Medium     Irritable bowel syndrome 12/24/2014     Priority: Medium     Health Care Home 09/11/2014     Priority: Medium     Status:  Unable to reach  Care Coordinator:  Erika Marcus    See Letters for [facility] Care Plan  Date:  September 11, 2014         Carotid artery occlusion with cerebral infarction (H) 08/06/2014     Priority: Medium     " Dizziness 08/06/2014     Priority: Medium     Panlobular emphysema (H) 06/04/2014     Priority: Medium     Cerebral infarction due to occlusion or stenosis of carotid artery 05/07/2014     Priority: Medium     Problem list name updated by automated process. Provider to review       Stroke, embolic (H) 04/25/2014     Priority: Medium     Mesenteric artery stenosis (H) 04/04/2014     Priority: Medium     2014: Asymptomatic SMA and MELI stenoses, meriting clinical FU       Insomnia 03/12/2014     Priority: Medium     Nutritional deficiency 03/12/2014     Priority: Medium     Pain 03/12/2014     Priority: Medium     Urinary retention 03/12/2014     Priority: Medium     S/P CABG x 6 03/06/2014     Priority: Medium     Left main coronary artery disease 03/04/2014     Priority: Medium     PAD (peripheral artery disease) (H) 03/04/2014     Priority: Medium     2/19/14: RUBEN 0.73 LLE       Arthritis 01/14/2013     Priority: Medium     Carotid bruit 01/14/2013     Priority: Medium     Carotid stenosis, bilateral 01/14/2013     Priority: Medium     Anemia 04/06/2012     Priority: Medium     CKD (chronic kidney disease) stage 3, GFR 30-59 ml/min 04/06/2012     Priority: Medium     Advanced directives, counseling/discussion 04/05/2012     Priority: Medium     Impingement syndrome, shoulder, right 03/16/2011     Priority: Medium     Hypertension 11/22/2010     Priority: Medium     Hyperlipidemia LDL goal <130 11/22/2010     Priority: Medium     Myalgia and myositis 11/22/2010     Priority: Medium     GERD (gastroesophageal reflux disease) 11/22/2010     Priority: Medium        Past Medical History:    Past Medical History:   Diagnosis Date     Carotid stenosis      Chronic kidney disease      Coronary artery disease 3-2014     CVA (cerebral infarction)      Elevated CK      Esophageal reflux      Hypercholesteremia      Nonsenile cataract      Other and unspecified hyperlipidemia      PAD (peripheral artery disease) (H)       Rhabdomyolysis 2010     Unspecified essential hypertension        Past Surgical History:    Past Surgical History:   Procedure Laterality Date     APPENDECTOMY  1974     BYPASS GRAFT ARTERY CORONARY  3/5/2014    Procedure: BYPASS GRAFT ARTERY CORONARY;  Median Sternotomy, Coronary Artery Bypass Graft X6 used Left internal mammary artery, Left and Right Greater Saphenous vein, on Pump oxygenator.;  Surgeon: Tim Montanez MD;  Location: [facility]     CATARACT IOL, RT/LT Bilateral 2008    in [place]     cataracts       COLONOSCOPY  12/12/02    [facility]     COLONOSCOPY N/A 10/7/2014    Procedure: COMBINED COLONOSCOPY, SINGLE OR MULTIPLE BIOPSY/POLYPECTOMY BY BIOPSY;  Surgeon: Micheal Mora MD;  [facility]     CV ANGIOGRAM LOWER EXTREMITY Bilateral 9/9/2019    Procedure: Lower Extremity Angiogram Bilateral;  Surgeon: Julio Oropeza MD;  Location: [facility] CARDIAC CATH LAB     DENTAL SURGERY      TMJ, implants     ENDARTERECTOMY CAROTID      right carotid stent     ESOPHAGOSCOPY, GASTROSCOPY, DUODENOSCOPY (EGD), COMBINED Left 10/7/2014    Procedure: COMBINED ESOPHAGOSCOPY, GASTROSCOPY, DUODENOSCOPY (EGD), BIOPSY SINGLE OR MULTIPLE;  Surgeon: Micheal Mora MD;  Location: [facility]     ESOPHAGOSCOPY, GASTROSCOPY, DUODENOSCOPY (EGD), COMBINED Left 10/7/2014    Procedure: COMBINED ESOPHAGOSCOPY, GASTROSCOPY, DUODENOSCOPY (EGD), REMOVE FOREIGN BODY;  Surgeon: Micheal Mora MD;  Location: [facility]     HC CAPSULE ENDOSCOPY N/A 10/7/2014    Procedure: CAPSULE/PILL CAM ENDOSCOPY;  Surgeon: Micheal Mora MD;  Location: [facility]     INJECT EPIDURAL LUMBAR Right 5/8/2017    Procedure: INJECT EPIDURAL LUMBAR;  Lumbar transforaminal Epidural Steroid Injection right lumbar 4-5, and right  lumbar 5-Sacral 1;  Surgeon: Anthony Gonzalez MD;  Location: [facility]     INJECT JOINT SACROILIAC Bilateral 8/28/2017    Procedure: INJECT JOINT SACROILIAC;  sacroiliac joint injection bilateral;  Surgeon: Anthony Gonzalez,  MD;  Location: [facility]     IR CAROTID CEREBRAL ANGIOGRAM BILATERAL  9/3/2020     KNEE SURGERY  1976    left knee reconstruction     lasix      both eyes     RELEASE CARPAL TUNNEL  2011    RELEASE CARPAL TUNNEL performed by JO MADRID at  OR     RELEASE CARPAL TUNNEL  2011    RELEASE CARPAL TUNNEL performed by JO MADRID at  OR     [facility] ENDOSCOPY, SIMPLE EXAM  99    [facility]       Family History:    Family History   Problem Relation Age of Onset     Hypertension Mother      Eye Disorder Mother      Cerebrovascular Disease Father      Heart Disease Father      Lipids Father      Obesity Father      Cancer Sister      Heart Disease Sister      Glaucoma No family hx of      Macular Degeneration No family hx of      Kidney Disease No family hx of        Social History:  Marital Status:   [2]  Social History     Tobacco Use     Smoking status: Former Smoker     Packs/day: 2.50     Years: 20.00     Pack years: 50.00     Types: Cigarettes     Quit date: 2000     Years since quittin.2     Smokeless tobacco: Never Used   Substance Use Topics     Alcohol use: No     Alcohol/week: 0.0 standard drinks     Drug use: No        Medications:    alirocumab (PRALUENT) 150 MG/ML injectable syringe  amitriptyline (ELAVIL) 25 MG tablet  amLODIPine (NORVASC) 10 MG tablet  aspirin (ASA) 325 MG tablet  carvedilol (COREG) 25 MG tablet  cilostazol (PLETAL) 100 MG tablet  gabapentin (NEURONTIN) 300 MG capsule  isosorbide mononitrate (IMDUR) 60 MG 24 hr tablet  KLOR-CON 10 MEQ CR tablet  losartan (COZAAR) 100 MG tablet  omeprazole (PRILOSEC) 40 MG DR capsule  linaclotide (LINZESS) 145 MCG capsule  ondansetron (ZOFRAN) 4 MG tablet          Review of Systems   All other systems reviewed and are negative.      Physical Exam   BP: (!) 180/67  Pulse: 81  Resp: 20  SpO2: 95 %      Physical Exam  Vitals signs and nursing note reviewed.   Constitutional:       General: He is not in  acute distress.     Appearance: He is not diaphoretic.   HENT:      Head: Normocephalic and atraumatic.      Right Ear: Tympanic membrane normal.      Left Ear: Tympanic membrane normal.   Eyes:      General: No scleral icterus.     Extraocular Movements: Extraocular movements intact.      Pupils: Pupils are equal, round, and reactive to light.   Neck:      Musculoskeletal: Normal range of motion.   Cardiovascular:      Rate and Rhythm: Normal rate and regular rhythm.      Heart sounds: Normal heart sounds.   Pulmonary:      Effort: Pulmonary effort is normal. No respiratory distress.      Breath sounds: Normal breath sounds.   Abdominal:      General: Bowel sounds are normal.      Palpations: Abdomen is soft.      Tenderness: There is no abdominal tenderness.   Skin:     General: Skin is warm.      Findings: No rash.   Neurological:      General: No focal deficit present.      Mental Status: He is alert and oriented to person, place, and time.      Cranial Nerves: No cranial nerve deficit.   Psychiatric:         Mood and Affect: Mood normal.         Behavior: Behavior normal.         ED Course        Procedures               EKG Interpretation:      Interpreted by Rosa Maria Torres DO  Time reviewed: 1030  Symptoms at time of EKG: None   Rhythm: normal sinus   Rate: Normal and 78 bpm  Axis: Left Axis Deviation  Ectopy: none  Conduction: right bundle branch block (complete) and left anterior fasciclar block  ST Segments/ T Waves: No acute ischemic changes  Q Waves: none  Comparison to prior: Unchanged    Clinical Impression: no acute changes and non-specific EKG                Critical Care time:  none               Results for orders placed or performed during the hospital encounter of 04/05/21 (from the past 24 hour(s))   CBC with platelets differential   Result Value Ref Range    WBC 11.3 (H) 4.0 - 11.0 10e9/L    RBC Count 3.80 (L) 4.4 - 5.9 10e12/L    Hemoglobin 12.8 (L) 13.3 - 17.7 g/dL    Hematocrit 35.3  (L) 40.0 - 53.0 %    MCV 93 78 - 100 fl    MCH 33.7 (H) 26.5 - 33.0 pg    MCHC 36.3 31.5 - 36.5 g/dL    RDW 12.8 10.0 - 15.0 %    Platelet Count 278 150 - 450 10e9/L    Diff Method Automated Method     % Neutrophils 88.3 %    % Lymphocytes 5.3 %    % Monocytes 4.8 %    % Eosinophils 1.0 %    % Basophils 0.2 %    % Immature Granulocytes 0.4 %    Nucleated RBCs 0 0 /100    Absolute Neutrophil 10.0 (H) 1.6 - 8.3 10e9/L    Absolute Lymphocytes 0.6 (L) 0.8 - 5.3 10e9/L    Absolute Monocytes 0.5 0.0 - 1.3 10e9/L    Absolute Eosinophils 0.1 0.0 - 0.7 10e9/L    Absolute Basophils 0.0 0.0 - 0.2 10e9/L    Abs Immature Granulocytes 0.1 0 - 0.4 10e9/L    Absolute Nucleated RBC 0.0    Comprehensive metabolic panel   Result Value Ref Range    Sodium 129 (L) 133 - 144 mmol/L    Potassium 4.0 3.4 - 5.3 mmol/L    Chloride 100 94 - 109 mmol/L    Carbon Dioxide 22 20 - 32 mmol/L    Anion Gap 7 3 - 14 mmol/L    Glucose 102 (H) 70 - 99 mg/dL    Urea Nitrogen 18 7 - 30 mg/dL    Creatinine 1.42 (H) 0.66 - 1.25 mg/dL    GFR Estimate 48 (L) >60 mL/min/[1.73_m2]    GFR Estimate If Black 56 (L) >60 mL/min/[1.73_m2]    Calcium 8.8 8.5 - 10.1 mg/dL    Bilirubin Total 0.8 0.2 - 1.3 mg/dL    Albumin 3.3 (L) 3.4 - 5.0 g/dL    Protein Total 7.2 6.8 - 8.8 g/dL    Alkaline Phosphatase 86 40 - 150 U/L    ALT 27 0 - 70 U/L    AST 21 0 - 45 U/L   Troponin I   Result Value Ref Range    Troponin I ES 0.056 (H) 0.000 - 0.045 ug/L   TSH with free T4 reflex   Result Value Ref Range    TSH 0.52 0.40 - 4.00 mU/L   CT Head w/o Contrast    Narrative    CT OF THE HEAD WITHOUT CONTRAST 4/5/2021 11:27 AM     COMPARISON: Brain MRI 8/5/2020    HISTORY: Transient ischemic attack (TIA).    TECHNIQUE: 5 mm thick axial CT images of the head were acquired  without IV contrast material.    FINDINGS: Small areas of chronic encephalomalacia at the high  anterolateral aspect of the right frontal lobe, high posteromedial  left frontal lobe, left basal ganglia and cerebellar  hemispheres  bilaterally again noted without change consistent with multiple  chronic ischemic infarcts. There is mild diffuse cerebral volume loss.  There are subtle patchy areas of decreased density in the cerebral  white matter bilaterally that are consistent with sequela of chronic  small vessel ischemic disease.     The ventricles and basal cisterns are within normal limits in  configuration given the degree of cerebral volume loss.  There is no  midline shift. There are no extra-axial fluid collections.    No intracranial hemorrhage, mass or recent infarct.    The visualized paranasal sinuses are well-aerated. There is no  mastoiditis. There are no fractures of the visualized bones.      Impression    IMPRESSION:   1. Presumed small chronic ischemic infarcts of the cerebral and  cerebellar hemispheres bilaterally again noted without change.  2. Diffuse cerebral volume loss and cerebral white matter changes  consistent with chronic small vessel ischemic disease.   3. No evidence for acute intracranial pathology.        Radiation dose for this scan was reduced using automated exposure  control, adjustment of the mA and/or kV according to patient size, or  iterative reconstruction technique.    TAB JONES MD   CTA Head Neck with Contrast    Narrative    CT ANGIOGRAM OF THE HEAD AND NECK WITHOUT AND WITH CONTRAST  4/5/2021  11:30 AM     COMPARISON: Head and neck CT angiogram 8/5/2020    HISTORY: Stroke/TIA, assess intracranial arteries. Stroke/TIA, assess  extracranial arteries.    TECHNIQUE:  Precontrast localizing scans were followed by CT  angiography with an injection of 70 mL Isovue 370  nonionic  intravenous contrast material with scans through the head and neck.   Images were transferred to a separate 3-D workstation where  multiplanar reformations and 3-D images were created.  Estimates of  carotid stenoses are made relative to the distal internal carotid  artery diameters except as noted.       FINDINGS:  There is patchy airspace opacity in the posterior aspects  of the right upper and right lower lobes, suggesting pneumonia.    Neck CTA: Mild atherosclerotic narrowing at the origin of the left  common carotid artery again noted. The common carotid arteries  bilaterally are otherwise patent without vascular narrowing. A  metallic stent extending from the right common carotid artery into the  right internal carotid artery is again noted. In-stent patency has  been restored since the comparison CTA by a stent ballooning performed  on 9/3/2020. Mild atherosclerotic narrowing (less than 50% luminal  diameter stenosis) at the origin of the left internal carotid artery  is again noted without change. The cervical internal carotid arteries  bilaterally are otherwise patent and unremarkable. The normal-sized  right vertebral and congenitally tiny left vertebral arteries remain  patent and unremarkable.    Head CTA: There is calcified plaque of the intracranial distal  internal carotid arteries on both sides that does not result in any  significant vascular narrowing. The basilar artery remains tiny and  irregular in contour; the posterior cerebral arteries bilaterally are  supplied predominantly by their respective posterior communicating  arteries. The bilateral anterior cerebral, bilateral middle cerebral  and bilateral posterior cerebral arteries are patent and unremarkable.  The anterior communicating and bilateral posterior communicating  arteries are patent.      Impression    IMPRESSION:  1. Interval balloon angioplasty of the right internal carotid artery  stent restoring patency.  2. Mild atherosclerotic narrowing (less than 50% luminal diameter  stenosis) at the origin of the left internal carotid artery again  noted without change.  3. Tiny basilar artery again noted.  4. Otherwise, normal neck and head CTA. No evidence for acute  abnormality.      Radiation dose for this scan was reduced using  automated exposure  control, adjustment of the mA and/or kV according to patient size, or  iterative reconstruction technique.    TAB JONES MD   MR Brain w/o Contrast    Narrative    MRI OF THE BRAIN WITHOUT CONTRAST 4/5/2021 1:24 PM     COMPARISON: Head CT 4/5/2021.    HISTORY:  Transient ischemic attack (TIA). Mental status change,  unknown cause.    TECHNIQUE:   Brain: Axial diffusion-weighted with ADC map, T2-weighted with fat  saturation, T1-weighted and turboFLAIR and sagittal T1-weighted images  of the brain were obtained without intravenous contrast.     FINDINGS: There is moderate diffuse cerebral volume loss. There are  numerous scattered focal and patchy periventricular areas of abnormal  T2 signal hyperintensity in the cerebral white matter bilaterally that  are consistent with sequela of chronic small vessel ischemic disease.  Small areas of chronic encephalomalacia at the anterolateral right  frontal lobe, high posteromedial left frontal lobe, left basal ganglia  and in the cerebellar hemispheres bilaterally consistent with chronic  ischemic infarcts again noted.    The ventricles and basal cisterns are within normal limits in  configuration given the degree of cerebral volume loss.  There is no  midline shift.  There are no extra-axial fluid collections.  There is  no evidence for stroke or acute intracranial hemorrhage.    There is no sinusitis or mastoiditis.      Impression    IMPRESSION:  Diffuse cerebral volume loss and cerebral white matter  changes consistent with chronic small vessel ischemic disease. Small  chronic ischemic infarcts scattered throughout the cerebral and  cerebellar hemispheres bilaterally are again noted.    TAB JONES MD   UA reflex to Microscopic and Culture    Specimen: Midstream Urine   Result Value Ref Range    Color Urine Straw     Appearance Urine Clear     Glucose Urine Negative NEG^Negative mg/dL    Bilirubin Urine Negative NEG^Negative    Ketones Urine  Negative NEG^Negative mg/dL    Specific Gravity Urine 1.016 1.003 - 1.035    Blood Urine Negative NEG^Negative    pH Urine 7.0 5.0 - 7.0 pH    Protein Albumin Urine Negative NEG^Negative mg/dL    Urobilinogen mg/dL 0.0 0.0 - 2.0 mg/dL    Nitrite Urine Negative NEG^Negative    Leukocyte Esterase Urine Negative NEG^Negative    Source Midstream Urine    Troponin I (second draw)   Result Value Ref Range    Troponin I ES 0.052 (H) 0.000 - 0.045 ug/L       Medications   0.9% sodium chloride BOLUS (0 mLs Intravenous Stopped 4/5/21 1455)     Followed by   sodium chloride 0.9% infusion (has no administration in time range)   Saline 100mL Bag (100 mLs Intravenous Given 4/5/21 1117)   iopamidol (ISOVUE-370) solution 500 mL (70 mLs Intravenous Given 4/5/21 1117)   sodium chloride (PF) 0.9% PF flush 3 mL (3 mLs Intravenous Given 4/5/21 1117)       Assessments & Plan (with Medical Decision Making)  Michele is a 75-year-old male who presents to the emergency room via EMS for generalized weakness.  Noticed when he got up this morning that he could not hardly get out of bed and was stumbling around walking.  Has had symptoms like this previously and does have a history of CVA.  See history focused physical exam as above.  On arrival, patient is alert, calm, no acute distress.  His vital signs are stable and he is afebrile.  NIH stroke scale was 0.  Based on the patient's symptoms of generalized weakness and no complaint of a focal neuro finding, a stroke eval and stroke code was not called.  Patient says that he feels fine now.  He is agreeable to proceed with work-up while in our emergency room.  Ordered for peripheral IV to give IV fluids and check lab work, will proceed with head CT and CTA due to the patient's known history of CVA and carotid stenosis.  I am concerned the patient may have experienced a TIA, but since it has been 5 hours since symptom onset and his symptoms have completely resolved, will proceed with work-up and  contact neurology if any new or acute findings.  Lab results as above.  Initial troponin was slightly elevated at 0.056.  When patient presented with similar symptoms several months ago he also had an elevated troponin at 0.048.  Also notes that the patient has an elevated creatinine, which could be the cause of this elevated troponin.  Does not have any acute chest pain or shortness of breath, do not have concerns at this time for ACS.  An EKG was also obtained and not show any acute changes.  Will order a second troponin and continue to monitor.  Remainder of labs as above.  White blood cell count was mildly elevated beyond upper limit of normal at 11.3 and he does have a neutrophilia, but is afebrile and does not have any other source of suspected infection.  Mildly anemic with a hemoglobin of 12.8.  Hyponatremia with a sodium of 129.  Patient was given normal saline while in the department.  Head CT and CTA results as above.  Patient has undergone stenting of the internal carotid and appears to have less than 50% narrowing of the left internal carotid.  No acute findings.  Since MRI is available, and want to evaluate for signs of CVA on MRI, will proceed with this.  Patient's wife is also arrived and is at the bedside.  I discussed the work-up thus far and the current findings.  She and Michele would prefer to not stay in the hospital since their insurance would not cover an observation stay.  Since we can reasonably get the MRI done in the emergency room today, and still have a second troponin pending, will continue our work-up in the department before making a disposition decision.  They are agreeable to this.  MRI results as above.  No evidence of acute infarct.  I did call and speak with Dr. Sarkar, neurology on-call for stroke, who reviewed the patient's case and believes that symptoms may have been a recurrence of his previous CVA due to uncontrolled hypertension, but also has concern for possible A. fib.   Though patient does not have an official diagnosis that I can see on his chart, Dr. Sarkar noted paroxysmal atrial fibrillation on the patient's diagnoses from 2014.  He is not on any anticoagulation but does take Pletal and aspirin 325 mg status post carotid stent.  Neurology recommends anticoagulation but would leave the aspirin dosing up to cardiology due to the patient's cardiac risk factors.  Called and spoke with Dr. Cali, cardiology on-call.  Reviewed the patient's case and current medication regimen, since the patient will need to be anticoagulated asked about what the appropriate medication would be.  Patient does not currently follow with a cardiologist and has only been following his primary provider and neurology.  Since the patient does not wish to stay for an observation hospitalization, and trying to work out appropriate management on an outpatient basis.  Cardiology recommends the patient be started on Eliquis and may take 81 mg aspirin every other day.  After working with the billing and patient's insurance, wife had made multiple phone calls, they still insist that he cannot stay for observation stay.  After briefly discussing with the hospitalist the patient would not qualify for inpatient and would need to be registered under observation.  Patient and his wife insist that they cannot stay and will not stay.  After my consultations with neurology and cardiology, discussed the recommendations of starting Eliquis for anticoagulation due to suspected underlying atrial fibrillation, even though did not see A. jose a on EKG or cardiac monitoring.  He will discontinue his 325 mg aspirin and replace that with 81 mg aspirin every other day.  We will continue the Pletal.  Should follow-up closely with neurosurgery since they did internal carotid angioplasty, and should follow-up with his primary provider for atrial fibrillation.  Cardiology did not feel that the patient warranted a specific referral to cardiology  for management of A. fib.  I also strongly encouraged the patient and his wife to return promptly to the emergency room if any new or concerning symptoms.  Both understand and agree.  Patient is ambulating without any difficulty and has not had any return of his symptoms while in the department despite prolonged stay and extensive work-up.  Both patient and his wife feel comfortable with the plan for discharge.  He is discharged in no acute distress         I have reviewed the nursing notes.    I have reviewed the findings, diagnosis, plan and need for follow up with the patient.       Discharge Medication List as of 4/5/2021  4:31 PM      START taking these medications    Details   apixaban ANTICOAGULANT (ELIQUIS ANTICOAGULANT) 5 MG tablet Take 1 tablet (5 mg) by mouth 2 times daily for 14 days, Disp-28 tablet, R-0, E-Prescribe             Final diagnoses:   Generalized muscle weakness   TIA (transient ischemic attack)   Bilateral carotid artery stenosis   H/O carotid angioplasty       4/5/2021   [facility] EMERGENCY DEPT     Rosa Maria Torres DO  04/06/21 1935

## 2021-04-05 NOTE — ED TRIAGE NOTES
Patient woke at 0400 with generalized weakness and dizziness. He denies weakness or numbness in one extremity.

## 2021-04-08 ENCOUNTER — HOSPITAL ENCOUNTER (OUTPATIENT)
Dept: ULTRASOUND IMAGING | Facility: CLINIC | Age: 76
Discharge: HOME | End: 2021-04-08
Attending: NEUROLOGICAL SURGERY | Admitting: NEUROLOGICAL SURGERY
Payer: MEDICARE

## 2021-04-08 DIAGNOSIS — I65.23 BILATERAL CAROTID ARTERY STENOSIS: ICD-10-CM

## 2021-04-08 PROCEDURE — 93880 EXTRACRANIAL BILAT STUDY: CPT

## 2021-04-09 ENCOUNTER — HOSPITAL ENCOUNTER (OUTPATIENT)
Dept: GENERAL RADIOLOGY | Facility: CLINIC | Age: 76
Discharge: HOME | End: 2021-04-09
Attending: INTERNAL MEDICINE | Admitting: INTERNAL MEDICINE
Payer: MEDICARE

## 2021-04-09 ENCOUNTER — OFFICE VISIT (OUTPATIENT)
Dept: INTERNAL MEDICINE | Facility: CLINIC | Age: 76
End: 2021-04-09
Payer: MEDICARE

## 2021-04-09 VITALS
RESPIRATION RATE: 16 BRPM | WEIGHT: 157 LBS | SYSTOLIC BLOOD PRESSURE: 126 MMHG | HEIGHT: 69 IN | BODY MASS INDEX: 23.25 KG/M2 | HEART RATE: 66 BPM | TEMPERATURE: 97.7 F | OXYGEN SATURATION: 97 % | DIASTOLIC BLOOD PRESSURE: 58 MMHG

## 2021-04-09 DIAGNOSIS — R93.89 ABNORMAL CT SCAN, NECK: ICD-10-CM

## 2021-04-09 DIAGNOSIS — I48.0 PAROXYSMAL ATRIAL FIBRILLATION (H): ICD-10-CM

## 2021-04-09 DIAGNOSIS — I63.10 CEREBROVASCULAR ACCIDENT (CVA) DUE TO EMBOLISM OF PRECEREBRAL ARTERY (H): Primary | ICD-10-CM

## 2021-04-09 DIAGNOSIS — R91.8 LUNG INFILTRATE: ICD-10-CM

## 2021-04-09 PROCEDURE — 99214 OFFICE O/P EST MOD 30 MIN: CPT | Performed by: INTERNAL MEDICINE

## 2021-04-09 PROCEDURE — 71046 X-RAY EXAM CHEST 2 VIEWS: CPT

## 2021-04-09 ASSESSMENT — MIFFLIN-ST. JEOR: SCORE: 1434.59

## 2021-04-09 ASSESSMENT — PAIN SCALES - GENERAL: PAINLEVEL: NO PAIN (0)

## 2021-04-09 NOTE — PROGRESS NOTES
Assessment & Plan   Problem List Items Addressed This Visit     Stroke, embolic (H) - Primary    Relevant Orders    Leadless EKG Monitor 8 to 14 Days      Other Visit Diagnoses     Abnormal CT scan, neck        Relevant Orders    XR Chest 2 Views    Paroxysmal atrial fibrillation (H)             Patient who had some weakness could not get him out of his bed, he is wife called the paramedics.  He was better in the emergency room still has some mild balance issues.  Does not want to see physical therapy.  In the emergency room they felt he was better except his blood pressure was high.  Reviewed with neurology and cardiology there was report of paroxysmal atrial fibrillation in 2014 so they put him on Eliquis in case he had an embolic TIA.  MRI did not show anything.  CT angiogram was okay there was question of pneumonia on his neck CT scan with a little bit of an infiltrate seen on my read.  I will check a chest x-ray as he has no fevers or other him symptoms of pneumonia.    Working to get a Zio patch to evaluate for atrial fibrillation.  Would not want to be on Eliquis long-term if he truly does not have A. fib    Recommend he work with physical therapy but he will hold off he will work on rehabbing himself.  He is using a cane currently and can go up and down the stairs to his bedroom.  Follow-up in the future as needed.  He will see neurosurgery to discuss his previous carotid disease.  This looked okay on his CT angiogram.  I would like to see him back in 4 to 6 weeks to discuss a Zio patch and going off the blood thinner.    ER note reviewed in detail.            Return in about 4 weeks (around 5/7/2021) for Follow up this diagnosis.    Jonas West MD  [facility]    Gene Bautista is a 75 year old who presents for the following health issues     HPI     ED/UC Followup:    Facility:  [facility]  Date of visit: 4/5/21  Reason for visit: Generalized weakness  Current Status:  Follow up     Had problems getting up the other day, was unsteady, wife had to help him.  Some difficulty manuvering and confusion slightly. Affected his balance mostly.  Better once in the ER.      Multiple testing with MRI, CTA and no CVA seen.  BP was high hadn't had morning pills.       Now cognition has cleared up. Balance is getting better each day.      BP is fine now.      Telephone calls to cardiology and neurology, question of possible a fib so started on Eliquis. zio    Ct scan of the neck, question of pneumonia, mild cough, no fevers.       Past Medical History:   Diagnosis Date     Carotid stenosis     right endartectomy     Chronic kidney disease     stage 3     Coronary artery disease 3-    CABG x6     CVA (cerebral infarction)     balance problems, mild     Elevated CK      Esophageal reflux      Hypercholesteremia      Nonsenile cataract      Other and unspecified hyperlipidemia      PAD (peripheral artery disease) (H)      Rhabdomyolysis      Unspecified essential hypertension      Current Outpatient Medications   Medication     alirocumab (PRALUENT) 150 MG/ML injectable syringe     amitriptyline (ELAVIL) 25 MG tablet     amLODIPine (NORVASC) 10 MG tablet     apixaban ANTICOAGULANT (ELIQUIS ANTICOAGULANT) 5 MG tablet     aspirin (ASPIRIN) 81 MG EC tablet     carvedilol (COREG) 25 MG tablet     cilostazol (PLETAL) 100 MG tablet     gabapentin (NEURONTIN) 300 MG capsule     isosorbide mononitrate (IMDUR) 60 MG 24 hr tablet     KLOR-CON 10 MEQ CR tablet     losartan (COZAAR) 100 MG tablet     omeprazole (PRILOSEC) 40 MG DR capsule     ondansetron (ZOFRAN) 4 MG tablet     No current facility-administered medications for this visit.      Social History     Tobacco Use     Smoking status: Former Smoker     Packs/day: 2.50     Years: 20.00     Pack years: 50.00     Types: Cigarettes     Quit date: 2000     Years since quittin.2     Smokeless tobacco: Never Used   Substance Use Topics      "Alcohol use: No     Alcohol/week: 0.0 standard drinks     Drug use: No     Review of Systems         Objective    /58   Pulse 66   Temp 97.7  F (36.5  C) (Temporal)   Resp 16   Ht 1.74 m (5' 8.5\")   Wt 71.2 kg (157 lb)   SpO2 97%   BMI 23.52 kg/m    Body mass index is 23.52 kg/m .  Physical Exam   Heart is regular, lungs are clear other than a few mild crackles at the bases  Extremities without edema  Neurologically is grossly intact    Reviewed his MRI, CT angiogram and labs.              "

## 2021-04-13 ENCOUNTER — VIRTUAL VISIT (OUTPATIENT)
Dept: NEUROSURGERY | Facility: CLINIC | Age: 76
End: 2021-04-13
Payer: MEDICARE

## 2021-04-13 DIAGNOSIS — I65.23 BILATERAL CAROTID ARTERY STENOSIS: Primary | ICD-10-CM

## 2021-04-13 PROCEDURE — 99442 PR PHYSICIAN TELEPHONE EVALUATION 11-20 MIN: CPT | Mod: 95 | Performed by: NEUROLOGICAL SURGERY

## 2021-04-13 RX ORDER — AZITHROMYCIN 250 MG/1
TABLET, FILM COATED ORAL
Qty: 6 TABLET | Refills: 0 | Status: SHIPPED | OUTPATIENT
Start: 2021-04-13 | End: 2021-07-02

## 2021-04-13 NOTE — LETTER
2021       RE: Michele Vance  [address] [facility] [address]     Dear Colleague,    Thank you for referring your patient, Michele Vance, to the [facility] NEUROSURGERY CLINIC [place] [facility]. Please see a copy of my visit note below.    Michele is a 75 year old who is being evaluated via a billable telephone visit.      What phone number would you like to be contacted at? [phone]  How would you like to obtain your AVS? [employer]  Phone call duration: 15 minutes    Started on Eliquis. Had afib after CABG in . On a Zio patch. On Pletal and baby aspirin currently. No obvious TIA or strokes. CTA shows right carotid stent is patent with minimal residual stenosis. Left ICA has mild irregular stenosis, stable. Asked him to hold Pletal for now. Once a decision has been made about long term anticoagulation, we can determine if he should be on Pletal/Eliquis, Aspirin/Eliquis, Pletal/Aspirin. Repeat carotid ultrasound in 6 months in [place].  ANI Cuellar MD      Service Date: 2021      Jonas West MD   [address] [place] [id number]       RE: Michele Vance    MRN: [id number]    : 1945        Dear Dr. West:      We spoke to Mr. Vance as part of a telephone visit in [facility] on 2021.  He is accompanied by his wife.  As you know, we have been following him for right carotid stenosis, treated originally with a stent back in .  He developed symptomatic in-stent stenosis in the summer of  and we performed a balloon angioplasty of the stent in 2020.  He tolerated the procedure well and we maintained him on aspirin and Plavix for a total of 3 months.  He was then placed on aspirin and cilostazol, with the cilostazol added for his peripheral arterial disease.  He has not had any symptoms suggestive of strokes or TIA since his carotid angioplasty in 2020.  He was seen in the emergency  department in [place] earlier this month for generalized weakness.  His troponins were very mildly elevated.  He was started on Eliquis because of his history of atrial fibrillation.  He had a short duration of atrial fibrillation following his CABG in 2014.  His aspirin was reduced from 325 mg to 81 mg.  Consequently, he is on Eliquis, cilostazol, and a baby aspirin.  He is currently on ZIO Patch.        Over the phone, he sounds well.  He denies any weakness today.  He denies any sensory changes or problems with his speech/language.      His phonation, speech and language all seem normal.      REVIEW OF STUDIES:  We went over his carotid ultrasound from 04/08/2021 with him.  This shows peak velocity in the right ICA of 110 cm/sec with a ratio of 1.5.  On the left side, peak velocity is 162 with a ratio of 2.1.      He also had a CTA of the neck on 04/05/2021 from the Emergency Department.  We reviewed this and he has minimal residual stenosis of the right ICA stent.  On the left side, he has about 50% stenosis.      IMPRESSION AND PLAN:  His imaging looks favorable with regards to his carotid with his bilateral carotid arteries.  The main issue from our standpoint is managing his anticoagulant/antiplatelet regimen.  If he requires anticoagulation, we do not recommend dual antiplatelet therapy in addition.  He should be on either cilostazol or aspirin along with Eliquis.  If Eliquis is not needed, he can go back on the cilostazol and aspirin combination as before.  Because of his history of in-stent stenosis and because of the recent change in his medications, we will repeat a carotid ultrasound in 6 months.  This can be done in [place], and we follow up with him afterwards.  Please do not hesitate to contact us with questions.      Sincerely,        Shola Cuellar MD       D: 04/19/2021   T: 04/19/2021   MT: lu      Name:     NINFA CID   MRN:      [id number]        Account:      [id number]    :      1945           Service Date: 2021      Document: [id number]

## 2021-04-13 NOTE — LETTER
2021      RE: Michele Vance  [address] [place] [address]       Michele is a 75 year old who is being evaluated via a billable telephone visit.      What phone number would you like to be contacted at? [phone]  How would you like to obtain your AVS? [email or web address]  Phone call duration: 15 minutes    Started on Eliquis. Had afib after CABG in . On a Zio patch. On Pletal and baby aspirin currently. No obvious TIA or strokes. CTA shows right carotid stent is patent with minimal residual stenosis. Left ICA has mild irregular stenosis, stable. Asked him to hold Pletal for now. Once a decision has been made about long term anticoagulation, we can determine if he should be on Pletal/Eliquis, Aspirin/Eliquis, Pletal/Aspirin. Repeat carotid ultrasound in 6 months in [place].  ANI Cuellar MD      Service Date: 2021      Jonas West MD   [address] [place] [address]       RE: Michele Vance    MRN: [id number]    : 1945              Dear Dr. West:      We spoke to Mr. Vance as part of a telephone visit in Cerebrovascular Clinic on 2021.  He is accompanied by his wife.  As you know, we have been following him for right carotid stenosis, treated originally with a stent back in .  He developed symptomatic in-stent stenosis in the summer of  and we performed a balloon angioplasty of the stent in 2020.  He tolerated the procedure well and we maintained him on aspirin and Plavix for a total of 3 months.  He was then placed on aspirin and cilostazol, with the cilostazol added for his peripheral arterial disease.  He has not had any symptoms suggestive of strokes or TIA since his carotid angioplasty in 2020.  He was seen in the emergency department in [place] earlier this month for generalized weakness.  His troponins were very mildly elevated.  He was started on Eliquis because of his history of atrial fibrillation.  He had a short duration of atrial  fibrillation following his CABG in .  His aspirin was reduced from 325 mg to 81 mg.  Consequently, he is on Eliquis, cilostazol, and a baby aspirin.  He is currently on ZIO Patch.        Over the phone, he sounds well.  He denies any weakness today.  He denies any sensory changes or problems with his speech/language.      His phonation, speech and language all seem normal.      REVIEW OF STUDIES:  We went over his carotid ultrasound from 2021 with him.  This shows peak velocity in the right ICA of 110 cm/sec with a ratio of 1.5.  On the left side, peak velocity is 162 with a ratio of 2.1.      He also had a CTA of the neck on 2021 from the Emergency Department.  We reviewed this and he has minimal residual stenosis of the right ICA stent.  On the left side, he has about 50% stenosis.      IMPRESSION AND PLAN:  His imaging looks favorable with regards to his carotid with his bilateral carotid arteries.  The main issue from our standpoint is managing his anticoagulant/antiplatelet regimen.  If he requires anticoagulation, we do not recommend dual antiplatelet therapy in addition.  He should be on either cilostazol or aspirin along with Eliquis.  If Eliquis is not needed, he can go back on the cilostazol and aspirin combination as before.  Because of his history of in-stent stenosis and because of the recent change in his medications, we will repeat a carotid ultrasound in 6 months.  This can be done in [place], and we follow up with him afterwards.  Please do not hesitate to contact us with questions.      Sincerely,        MD DURGA Saeed MD             D: 2021   T: 2021   MT: lu      Name:     NINFA CID   MRN:      [id number]        Account:      [id number]   :      1945           Service Date: 2021      Document: [id number]        Durga Cuellar MD

## 2021-04-13 NOTE — PROGRESS NOTES
Michele is a 75 year old who is being evaluated via a billable telephone visit.      What phone number would you like to be contacted at? [phone]  How would you like to obtain your AVS? [email or web address]  Phone call duration: 15 minutes    Started on Eliquis. Had afib after CABG in 2014. On a Zio patch. On Pletal and baby aspirin currently. No obvious TIA or strokes. CTA shows right carotid stent is patent with minimal residual stenosis. Left ICA has mild irregular stenosis, stable. Asked him to hold Pletal for now. Once a decision has been made about long term anticoagulation, we can determine if he should be on Pletal/Eliquis, Aspirin/Eliquis, Pletal/Aspirin. Repeat carotid ultrasound in 6 months in [place].  ANI Cuellar MD

## 2021-04-14 ENCOUNTER — HOSPITAL ENCOUNTER (OUTPATIENT)
Dept: CARDIOLOGY | Facility: CLINIC | Age: 76
Discharge: HOME | End: 2021-04-14
Attending: INTERNAL MEDICINE | Admitting: INTERNAL MEDICINE
Payer: MEDICARE

## 2021-04-14 DIAGNOSIS — I63.10 CEREBROVASCULAR ACCIDENT (CVA) DUE TO EMBOLISM OF PRECEREBRAL ARTERY (H): ICD-10-CM

## 2021-04-14 PROCEDURE — 93246 EXT ECG>7D<15D RECORDING: CPT

## 2021-04-17 ENCOUNTER — HEALTH MAINTENANCE LETTER (OUTPATIENT)
Age: 76
End: 2021-04-17

## 2021-04-19 NOTE — PROGRESS NOTES
Service Date: 2021      Jonas West MD   [address] [place] [address]       RE: Michele Vance    MRN: [id number]    : 1945              Dear Dr. West:      We spoke to Mr. Vance as part of a telephone visit in C[facility]brovascular Clinic on 2021.  He is accompanied by his wife.  As you know, we have been following him for right carotid stenosis, treated originally with a stent back in .  He developed symptomatic in-stent stenosis in the summer of  and we performed a balloon angioplasty of the stent in 2020.  He tolerated the procedure well and we maintained him on aspirin and Plavix for a total of 3 months.  He was then placed on aspirin and cilostazol, with the cilostazol added for his peripheral arterial disease.  He has not had any symptoms suggestive of strokes or TIA since his carotid angioplasty in 2020.  He was seen in the emergency department in [place] earlier this month for generalized weakness.  His troponins were very mildly elevated.  He was started on Eliquis because of his history of atrial fibrillation.  He had a short duration of atrial fibrillation following his CABG in .  His aspirin was reduced from 325 mg to 81 mg.  Consequently, he is on Eliquis, cilostazol, and a baby aspirin.  He is currently on ZIO Patch.        Over the phone, he sounds well.  He denies any weakness today.  He denies any sensory changes or problems with his speech/language.      His phonation, speech and language all seem normal.      REVIEW OF STUDIES:  We went over his carotid ultrasound from 2021 with him.  This shows peak velocity in the right ICA of 110 cm/sec with a ratio of 1.5.  On the left side, peak velocity is 162 with a ratio of 2.1.      He also had a CTA of the neck on 2021 from the Emergency Department.  We reviewed this and he has minimal residual stenosis of the right ICA stent.  On the left side, he has about 50% stenosis.      IMPRESSION AND  PLAN:  His imaging looks favorable with regards to his carotid with his bilateral carotid arteries.  The main issue from our standpoint is managing his anticoagulant/antiplatelet regimen.  If he requires anticoagulation, we do not recommend dual antiplatelet therapy in addition.  He should be on either cilostazol or aspirin along with Eliquis.  If Eliquis is not needed, he can go back on the cilostazol and aspirin combination as before.  Because of his history of in-stent stenosis and because of the recent change in his medications, we will repeat a carotid ultrasound in 6 months.  This can be done in [place], and we follow up with him afterwards.  Please do not hesitate to contact us with questions.      Sincerely,        MD DURGA Saeed MD             D: 2021   T: 2021   MT: lu      Name:     NINFA CID   MRN:      [id number]        Account:      [id number]   :      1945           Service Date: 2021      Document: [id number]

## 2021-04-19 NOTE — PATIENT INSTRUCTIONS
Stop Pletal for now. Stay on Eliquis and aspirin for now. Once a decision has been made about whether you need Eliquis, we can determine which combination of medications you need to reduce your stroke risk.     Repeat carotid ultrasound in 6 months at [place] (ordered)    Follow up by phone after carotid ultrasound in 6 months.    If you have any questions please contact me at [phone], option 3.    Justin Hall RN, CNRN  Stroke & Endovascular Care Coordinator    Thank you for choosing [facility] for your health care needs.

## 2021-04-21 ENCOUNTER — TELEPHONE (OUTPATIENT)
Dept: NEUROLOGY | Facility: CLINIC | Age: 76
End: 2021-04-21

## 2021-04-21 NOTE — TELEPHONE ENCOUNTER
Follow-up on 4/20/21 visit with Dr. Cuellar:    Per AVS: Stop Pletal for now. Stay on Eliquis and aspirin for now. Once a decision has been made about whether you need Eliquis, we can determine which combination of medications you need to reduce your stroke risk.     Repeat carotid ultrasound in 6 months at [place].    Spoke with regarding plan above. Patient confirmed that he has discontinued Pletal. Will remain on Eliquis and aspirin. Patient has no questions at this time. Patient has RN contact number. Michelle Hall RN 4/21/2021 4:38 PM

## 2021-05-11 ENCOUNTER — HOSPITAL ENCOUNTER (OUTPATIENT)
Dept: CT IMAGING | Facility: CLINIC | Age: 76
Discharge: HOME | End: 2021-05-11
Attending: INTERNAL MEDICINE | Admitting: INTERNAL MEDICINE
Payer: MEDICARE

## 2021-05-11 DIAGNOSIS — R91.8 LUNG INFILTRATE: ICD-10-CM

## 2021-05-11 LAB
CREAT BLD-MCNC: 1.4 MG/DL (ref 0.66–1.25)
GFR SERPL CREATININE-BSD FRML MDRD: 50 ML/MIN/{1.73_M2}

## 2021-05-11 PROCEDURE — 82565 ASSAY OF CREATININE: CPT

## 2021-05-11 PROCEDURE — 71260 CT THORAX DX C+: CPT | Mod: ME

## 2021-05-11 PROCEDURE — 250N000009 HC RX 250: Performed by: INTERNAL MEDICINE

## 2021-05-11 PROCEDURE — 250N000011 HC RX IP 250 OP 636: Performed by: INTERNAL MEDICINE

## 2021-05-11 RX ORDER — IOPAMIDOL 755 MG/ML
500 INJECTION, SOLUTION INTRAVASCULAR ONCE
Status: COMPLETED | OUTPATIENT
Start: 2021-05-11 | End: 2021-05-11

## 2021-05-11 RX ADMIN — IOPAMIDOL 75 ML: 755 INJECTION, SOLUTION INTRAVENOUS at 10:41

## 2021-05-11 RX ADMIN — SODIUM CHLORIDE 75 ML: 9 INJECTION, SOLUTION INTRAVENOUS at 10:39

## 2021-06-06 ENCOUNTER — MYC MEDICAL ADVICE (OUTPATIENT)
Dept: INTERNAL MEDICINE | Facility: CLINIC | Age: 76
End: 2021-06-06

## 2021-06-06 DIAGNOSIS — R11.0 NAUSEA: Primary | ICD-10-CM

## 2021-06-06 DIAGNOSIS — K21.9 GASTROESOPHAGEAL REFLUX DISEASE WITHOUT ESOPHAGITIS: ICD-10-CM

## 2021-06-06 DIAGNOSIS — E87.6 HYPOKALEMIA: ICD-10-CM

## 2021-06-07 RX ORDER — ONDANSETRON 4 MG/1
4 TABLET, FILM COATED ORAL PRN
Qty: 90 TABLET | Refills: 0 | Status: SHIPPED | OUTPATIENT
Start: 2021-06-07 | End: 2024-01-27

## 2021-06-08 RX ORDER — OMEPRAZOLE 40 MG/1
CAPSULE, DELAYED RELEASE ORAL
Qty: 90 CAPSULE | Refills: 0 | Status: SHIPPED | OUTPATIENT
Start: 2021-06-08 | End: 2021-08-17

## 2021-06-08 RX ORDER — POTASSIUM CHLORIDE 750 MG/1
TABLET, EXTENDED RELEASE ORAL
Qty: 300 TABLET | Refills: 3 | Status: SHIPPED | OUTPATIENT
Start: 2021-06-08 | End: 2021-08-17

## 2021-06-08 NOTE — TELEPHONE ENCOUNTER
Prescription approved per [facility] Refill Protocol.    AFSHIN SotllN, RN  [facility]

## 2021-06-17 RX ORDER — CARVEDILOL 25 MG/1
25 TABLET ORAL 2 TIMES DAILY WITH MEALS
Qty: 180 TABLET | Refills: 3 | OUTPATIENT
Start: 2021-06-17

## 2021-06-17 RX ORDER — ISOSORBIDE MONONITRATE 60 MG/1
60 TABLET, EXTENDED RELEASE ORAL DAILY
Qty: 90 TABLET | Refills: 3 | OUTPATIENT
Start: 2021-06-17

## 2021-06-25 ENCOUNTER — HOSPITAL ENCOUNTER (EMERGENCY)
Facility: CLINIC | Age: 76
Discharge: HOME | End: 2021-06-25
Attending: EMERGENCY MEDICINE | Admitting: EMERGENCY MEDICINE
Payer: MEDICARE

## 2021-06-25 ENCOUNTER — APPOINTMENT (OUTPATIENT)
Dept: GENERAL RADIOLOGY | Facility: CLINIC | Age: 76
End: 2021-06-25
Attending: EMERGENCY MEDICINE
Payer: MEDICARE

## 2021-06-25 ENCOUNTER — NURSE TRIAGE (OUTPATIENT)
Dept: INTERNAL MEDICINE | Facility: CLINIC | Age: 76
End: 2021-06-25

## 2021-06-25 VITALS
RESPIRATION RATE: 42 BRPM | SYSTOLIC BLOOD PRESSURE: 143 MMHG | WEIGHT: 160 LBS | OXYGEN SATURATION: 96 % | HEIGHT: 68 IN | BODY MASS INDEX: 24.25 KG/M2 | HEART RATE: 64 BPM | TEMPERATURE: 97.7 F | DIASTOLIC BLOOD PRESSURE: 64 MMHG

## 2021-06-25 DIAGNOSIS — J18.9 PNEUMONIA OF RIGHT LOWER LOBE DUE TO INFECTIOUS ORGANISM: ICD-10-CM

## 2021-06-25 LAB
ANION GAP SERPL CALCULATED.3IONS-SCNC: 6 MMOL/L (ref 3–14)
BASOPHILS # BLD AUTO: 0 10E9/L (ref 0–0.2)
BASOPHILS NFR BLD AUTO: 0.2 %
BUN SERPL-MCNC: 15 MG/DL (ref 7–30)
CALCIUM SERPL-MCNC: 9.1 MG/DL (ref 8.5–10.1)
CHLORIDE SERPL-SCNC: 97 MMOL/L (ref 94–109)
CO2 SERPL-SCNC: 22 MMOL/L (ref 20–32)
CREAT SERPL-MCNC: 1.35 MG/DL (ref 0.66–1.25)
DIFFERENTIAL METHOD BLD: ABNORMAL
EOSINOPHIL # BLD AUTO: 0.2 10E9/L (ref 0–0.7)
EOSINOPHIL NFR BLD AUTO: 2.8 %
ERYTHROCYTE [DISTWIDTH] IN BLOOD BY AUTOMATED COUNT: 13.1 % (ref 10–15)
GFR SERPL CREATININE-BSD FRML MDRD: 51 ML/MIN/{1.73_M2}
GLUCOSE SERPL-MCNC: 107 MG/DL (ref 70–99)
HCT VFR BLD AUTO: 32.2 % (ref 40–53)
HGB BLD-MCNC: 11.3 G/DL (ref 13.3–17.7)
IMM GRANULOCYTES # BLD: 0 10E9/L (ref 0–0.4)
IMM GRANULOCYTES NFR BLD: 0.3 %
LABORATORY COMMENT REPORT: NORMAL
LACTATE BLD-SCNC: 0.7 MMOL/L (ref 0.7–2)
LYMPHOCYTES # BLD AUTO: 0.9 10E9/L (ref 0.8–5.3)
LYMPHOCYTES NFR BLD AUTO: 14.3 %
MCH RBC QN AUTO: 33 PG (ref 26.5–33)
MCHC RBC AUTO-ENTMCNC: 35.1 G/DL (ref 31.5–36.5)
MCV RBC AUTO: 94 FL (ref 78–100)
MONOCYTES # BLD AUTO: 0.6 10E9/L (ref 0–1.3)
MONOCYTES NFR BLD AUTO: 9.5 %
NEUTROPHILS # BLD AUTO: 4.4 10E9/L (ref 1.6–8.3)
NEUTROPHILS NFR BLD AUTO: 72.9 %
NRBC # BLD AUTO: 0 10*3/UL
NRBC BLD AUTO-RTO: 0 /100
PLATELET # BLD AUTO: 290 10E9/L (ref 150–450)
POTASSIUM SERPL-SCNC: 5.3 MMOL/L (ref 3.4–5.3)
RBC # BLD AUTO: 3.42 10E12/L (ref 4.4–5.9)
SARS-COV-2 RNA RESP QL NAA+PROBE: NEGATIVE
SODIUM SERPL-SCNC: 125 MMOL/L (ref 133–144)
SPECIMEN SOURCE: NORMAL
WBC # BLD AUTO: 6.1 10E9/L (ref 4–11)

## 2021-06-25 PROCEDURE — 999N000123 HC STATISTIC OXYGEN O2DAILY TECH TIME

## 2021-06-25 PROCEDURE — 87040 BLOOD CULTURE FOR BACTERIA: CPT | Performed by: EMERGENCY MEDICINE

## 2021-06-25 PROCEDURE — 999N000157 HC STATISTIC RCP TIME EA 10 MIN

## 2021-06-25 PROCEDURE — U0005 INFEC AGEN DETEC AMPLI PROBE: HCPCS | Performed by: EMERGENCY MEDICINE

## 2021-06-25 PROCEDURE — U0003 INFECTIOUS AGENT DETECTION BY NUCLEIC ACID (DNA OR RNA); SEVERE ACUTE RESPIRATORY SYNDROME CORONAVIRUS 2 (SARS-COV-2) (CORONAVIRUS DISEASE [COVID-19]), AMPLIFIED PROBE TECHNIQUE, MAKING USE OF HIGH THROUGHPUT TECHNOLOGIES AS DESCRIBED BY CMS-2020-01-R: HCPCS | Performed by: EMERGENCY MEDICINE

## 2021-06-25 PROCEDURE — 99284 EMERGENCY DEPT VISIT MOD MDM: CPT | Mod: 25 | Performed by: EMERGENCY MEDICINE

## 2021-06-25 PROCEDURE — 250N000011 HC RX IP 250 OP 636: Performed by: EMERGENCY MEDICINE

## 2021-06-25 PROCEDURE — 96365 THER/PROPH/DIAG IV INF INIT: CPT | Performed by: EMERGENCY MEDICINE

## 2021-06-25 PROCEDURE — 83605 ASSAY OF LACTIC ACID: CPT | Performed by: EMERGENCY MEDICINE

## 2021-06-25 PROCEDURE — C9803 HOPD COVID-19 SPEC COLLECT: HCPCS | Performed by: EMERGENCY MEDICINE

## 2021-06-25 PROCEDURE — 71045 X-RAY EXAM CHEST 1 VIEW: CPT

## 2021-06-25 PROCEDURE — 94640 AIRWAY INHALATION TREATMENT: CPT

## 2021-06-25 PROCEDURE — 80048 BASIC METABOLIC PNL TOTAL CA: CPT | Performed by: EMERGENCY MEDICINE

## 2021-06-25 PROCEDURE — 85025 COMPLETE CBC W/AUTO DIFF WBC: CPT | Performed by: EMERGENCY MEDICINE

## 2021-06-25 PROCEDURE — 250N000009 HC RX 250: Performed by: EMERGENCY MEDICINE

## 2021-06-25 PROCEDURE — 99284 EMERGENCY DEPT VISIT MOD MDM: CPT | Performed by: EMERGENCY MEDICINE

## 2021-06-25 PROCEDURE — 999N000156 HC STATISTIC RCP CONSULT EA 30 MIN

## 2021-06-25 RX ORDER — IPRATROPIUM BROMIDE AND ALBUTEROL SULFATE 2.5; .5 MG/3ML; MG/3ML
3 SOLUTION RESPIRATORY (INHALATION) ONCE
Status: COMPLETED | OUTPATIENT
Start: 2021-06-25 | End: 2021-06-25

## 2021-06-25 RX ORDER — BENZONATATE 200 MG/1
200 CAPSULE ORAL 3 TIMES DAILY PRN
Qty: 30 CAPSULE | Refills: 0 | Status: SHIPPED | OUTPATIENT
Start: 2021-06-25 | End: 2021-09-10

## 2021-06-25 RX ORDER — CEFTRIAXONE 1 G/1
1 INJECTION, POWDER, FOR SOLUTION INTRAMUSCULAR; INTRAVENOUS ONCE
Status: COMPLETED | OUTPATIENT
Start: 2021-06-25 | End: 2021-06-25

## 2021-06-25 RX ORDER — CEFDINIR 300 MG/1
300 CAPSULE ORAL 2 TIMES DAILY
Qty: 20 CAPSULE | Refills: 0 | Status: SHIPPED | OUTPATIENT
Start: 2021-06-25 | End: 2021-07-09

## 2021-06-25 RX ORDER — ALBUTEROL SULFATE 90 UG/1
2 AEROSOL, METERED RESPIRATORY (INHALATION) EVERY 4 HOURS PRN
Qty: 8 G | Refills: 0 | Status: SHIPPED | OUTPATIENT
Start: 2021-06-25 | End: 2024-01-27

## 2021-06-25 RX ADMIN — CEFTRIAXONE SODIUM 1 G: 1 INJECTION, POWDER, FOR SOLUTION INTRAMUSCULAR; INTRAVENOUS at 13:18

## 2021-06-25 RX ADMIN — IPRATROPIUM BROMIDE AND ALBUTEROL SULFATE 3 ML: .5; 3 SOLUTION RESPIRATORY (INHALATION) at 12:11

## 2021-06-25 ASSESSMENT — MIFFLIN-ST. JEOR: SCORE: 1440.26

## 2021-06-25 NOTE — TELEPHONE ENCOUNTER
Wife calling,  present.  States he woke up with bad deep chest cough about 2 am.  She gave him Robitussin.  He was then able to sit up and get some sleep.  He woke up this am complaining of blurry vision.  Has history of TIA's, stents.  Wife states he looks bad/weak.  No other symptoms.  Not exposed to COVID and has had both COVID immunizations.  Had abnormal chest CT scan 5/11/21.  History of TIA's and stroke.  Advised ER now.  911 if needed.  Wife agrees with plan.  Cassie Morejon RN

## 2021-06-25 NOTE — PROGRESS NOTES
..    S- Respiratory Care Consultation    Michele Vance    Age: 75 year old      Date of Admission:  6/25/2021    Reason for consult: Cough,                Level of consult: Consult and follow for daily recommendations           Chief complaint:   Assessment:       Patient is on room air SpO2 = 95%.  Frequent loose congested non productive cough.  Smoker for 20 years 2-3 ppd quit 20 years ago.  No known respiratory diagnosis         History of Present Illness:   Patient has been coughing for two days  Nebulizer given with no change in dyspnea or cough        Assessment:   Lungs:   Coarse rhonchi in lower lobes   Wheezing pre and post neb treatment unchanged   RESPIRATORY:  Oxygenation = room air SpO2 =94%    Does patient have home oxygen No  Home oxygen deliver device :none    Cough: frequent loose congested for 2 days     History is obtained from the patient      MRI:  MRI Thoracic Spine with and without Contrast No results found for this or any previous visit.  X-Ray:  XR CHEST PORT 1 VIEW   6/25/2021 12:05 PM      HISTORY: wheezing cough     COMPARISON: Chest CT 5/11/2021 and chest radiograph 4/19/2021.                                                                      IMPRESSION: New right midlung infiltrate suspicious for pneumonia.  Previous right lower lung infiltrates have decreased. No pleural  effusion. Normal heart size and pulmonary vascularity. Sternotomy with  mediastinal clips.     JENNIFER HUGHES MD      ABG:  pH Arterial (pH)   Date Value   03/07/2014 7.43     pO2 Arterial (mm Hg)   Date Value   03/07/2014 96     pCO2 Arterial (mm Hg)   Date Value   03/07/2014 36     Bicarbonate Arterial (mmol/L)   Date Value   03/07/2014 24       Pulmonary function testing:  No flowsheet data found.                      Past Medical History:     Past Medical History:   Diagnosis Date     Carotid stenosis     right endartectomy     Chronic kidney disease     stage 3     Coronary artery disease 3-2014    CABG x6      CVA (cerebral infarction)     balance problems, mild     Elevated CK      Esophageal reflux      Hypercholesteremia      Nonsenile cataract      Other and unspecified hyperlipidemia      PAD (peripheral artery disease) (H)      Rhabdomyolysis 2010     Unspecified essential hypertension              Past Surgical History:     Past Surgical History:   Procedure Laterality Date     APPENDECTOMY  1974     BYPASS GRAFT ARTERY CORONARY  3/5/2014    Procedure: BYPASS GRAFT ARTERY CORONARY;  Median Sternotomy, Coronary Artery Bypass Graft X6 used Left internal mammary artery, Left and Right Greater Saphenous vein, on Pump oxygenator.;  Surgeon: Tim Montanez MD;  Location: [facility]     CATARACT IOL, RT/LT Bilateral 2008    in [place]     cataracts       COLONOSCOPY  12/12/02    [facility]     COLONOSCOPY N/A 10/7/2014    Procedure: COMBINED COLONOSCOPY, SINGLE OR MULTIPLE BIOPSY/POLYPECTOMY BY BIOPSY;  Surgeon: Micheal Mora MD;  Location: [facility] GI     CV LOWER EXTREMITY ANGIOGRAM BILATERAL Bilateral 9/9/2019    Procedure: Lower Extremity Angiogram Bilateral;  Surgeon: Julio Oropeza MD;  Location: [facility] CARDIAC CATH LAB     DENTAL SURGERY      TMJ, implants     ENDARTERECTOMY CAROTID      right carotid stent     ESOPHAGOSCOPY, GASTROSCOPY, DUODENOSCOPY (EGD), COMBINED Left 10/7/2014    Procedure: COMBINED ESOPHAGOSCOPY, GASTROSCOPY, DUODENOSCOPY (EGD), BIOPSY SINGLE OR MULTIPLE;  Surgeon: Micheal Mora MD;  [facility]     ESOPHAGOSCOPY, GASTROSCOPY, DUODENOSCOPY (EGD), COMBINED Left 10/7/2014    Procedure: COMBINED ESOPHAGOSCOPY, GASTROSCOPY, DUODENOSCOPY (EGD), REMOVE FOREIGN BODY;  Surgeon: Micheal Mora MD;  [facility]     HC CAPSULE ENDOSCOPY N/A 10/7/2014    Procedure: CAPSULE/PILL CAM ENDOSCOPY;  Surgeon: Micheal Mora MD;  Location: [facility]     INJECT EPIDURAL LUMBAR Right 5/8/2017    Procedure: INJECT EPIDURAL LUMBAR;  Lumbar transforaminal Epidural Steroid Injection right lumbar  4-5, and right  lumbar 5-Sacral 1;  Surgeon: Anthony Gonzalez MD;  [facility]     INJECT JOINT SACROILIAC Bilateral 2017    Procedure: INJECT JOINT SACROILIAC;  sacroiliac joint injection bilateral;  Surgeon: Anthony Gonzalez MD;  [facility]     IR CAROTID CEREBRAL ANGIOGRAM BILATERAL  9/3/2020     KNEE SURGERY  1976    left knee reconstruction     lasix      both eyes     RELEASE CARPAL TUNNEL  2011    RELEASE CARPAL TUNNEL performed by JO MADRID at  OR     RELEASE CARPAL TUNNEL  2011    RELEASE CARPAL TUNNEL performed by JO MADRID at  OR     [facility] UGI ENDOSCOPY, SIMPLE EXAM  99    [facility]             Social History:     Social History     Socioeconomic History     Marital status:      Spouse name: Not on file     Number of children: Not on file     Years of education: Not on file     Highest education level: Not on file   Occupational History     Occupation:      Employer: RETIRED   Social Needs     Financial resource strain: Not on file     Food insecurity     Worry: Not on file     Inability: Not on file     Transportation needs     Medical: Not on file     Non-medical: Not on file   Tobacco Use     Smoking status: Former Smoker     Packs/day: 2.50     Years: 20.00     Pack years: 50.00     Types: Cigarettes     Quit date: 2000     Years since quittin.4     Smokeless tobacco: Never Used   Substance and Sexual Activity     Alcohol use: No     Alcohol/week: 0.0 standard drinks     Drug use: No     Sexual activity: Yes     Partners: Female   Lifestyle     Physical activity     Days per week: Not on file     Minutes per session: Not on file     Stress: Not on file   Relationships     Social connections     Talks on phone: Not on file     Gets together: Not on file     Attends Christianity service: Not on file     Active member of club or organization: Not on file     Attends meetings of clubs or organizations:  Not on file     Relationship status: Not on file     Intimate partner violence     Fear of current or ex partner: Not on file     Emotionally abused: Not on file     Physically abused: Not on file     Forced sexual activity: Not on file   Other Topics Concern      Service Not Asked     Blood Transfusions Not Asked     Caffeine Concern Not Asked     Occupational Exposure Not Asked     Hobby Hazards Not Asked     Sleep Concern Not Asked     Stress Concern Not Asked     Weight Concern Not Asked     Special Diet Not Asked     Back Care Not Asked     Exercise Yes     Comment: 3 times per week     Bike Helmet Not Asked     Seat Belt Not Asked     Self-Exams Not Asked     Parent/sibling w/ CABG, MI or angioplasty before 65F 55M? Not Asked   Social History Narrative    Moved from [place] in August, retired  for "[employer].     Exposures:  unknown         Family History:     Family History   Problem Relation Age of Onset     Hypertension Mother      Eye Disorder Mother      Cerebrovascular Disease Father      Heart Disease Father      Lipids Father      Obesity Father      Cancer Sister      Heart Disease Sister      Glaucoma No family hx of      Macular Degeneration No family hx of      Kidney Disease No family hx of      Family history              Allergies:   This patient is allergic to is allergic to statins [hmg-coa-r inhibitors]; gemfibrozil; sulfa drugs; and zetia [ezetimibe].          Medications:     No current facility-administered medications for this encounter.      Current Outpatient Medications   Medication Sig     alirocumab (PRALUENT) 150 MG/ML injectable syringe Inject 1 mL (150 mg) Subcutaneous every 14 days     amitriptyline (ELAVIL) 25 MG tablet TAKE 1 TABLET TWICE A DAY (Patient taking differently: Take 50 mg by mouth At Bedtime )     amLODIPine (NORVASC) 10 MG tablet Take 1 tablet (10 mg) by mouth daily     aspirin (ASPIRIN) 81 MG EC tablet Take 81 mg by mouth every other day      azithromycin (ZITHROMAX) 250 MG tablet Two tablets first day, then one tablet daily for four days.     carvedilol (COREG) 25 MG tablet Take 1 tablet (25 mg) by mouth 2 times daily (with meals)     gabapentin (NEURONTIN) 300 MG capsule TAKE 1 CAPSULE TWICE A DAY (Patient taking differently: Take 300 mg by mouth 2 times daily )     isosorbide mononitrate (IMDUR) 60 MG 24 hr tablet Take 1 tablet (60 mg) by mouth daily     losartan (COZAAR) 100 MG tablet Take 1 tablet (100 mg) by mouth daily     omeprazole (PRILOSEC) 40 MG DR capsule TAKE 1 CAPSULE DAILY     ondansetron (ZOFRAN) 4 MG tablet Take 1 tablet (4 mg) by mouth as needed for nausea     potassium chloride ER (KLOR-CON M) 10 MEQ CR tablet TAKE 5 TABLETS TWICE A DAY                  Recommendations:   Recommendations:   Pulmonary function testing at stable state for pulmonary diagnosis  MDI and nebulizer treatments at home prn                   Result data:     Lab Results   Component Value Date    PH 7.43 03/07/2014    PH 7.38 03/06/2014    PH 7.39 03/06/2014    PO2 96 03/07/2014    PO2 77 (L) 03/06/2014    PO2 88 03/06/2014    PCO2 36 03/07/2014    PCO2 32 (L) 03/06/2014    PCO2 38 03/06/2014    HCO3 24 03/07/2014    HCO3 19 (L) 03/06/2014    HCO3 23 03/06/2014             Kristina Riedel, RT R- RCP will follow

## 2021-06-25 NOTE — DISCHARGE INSTRUCTIONS
Return to the emergency department if new or worsening symptoms such as increasing shortness of breath, fatigue, generalized weakness or high fevers.  Start the antibiotics tonight.  You were given a dose through your IV here.  You may use the Tessalon Perles for cough.  I hope you better quickly.

## 2021-06-25 NOTE — ED PROVIDER NOTES
"  History     Chief Complaint   Patient presents with     Dizziness     Cough     HPI  Michele Vance is a 75 year old male who presents to the ER secondary to non productive cough and intermittent dizziness that started last night. covid vaccine series completed in march.  The cough has been moderate to severe and associated with wheezing.  Is nonproductive.  He feels like he cannot get any phlegm up.  He does not have a known history of COPD or underlying lung disease.  No fever, vomiting, malaise, headache, body aches, change in taste or smell.  He quit smoking 20 years ago but had smoked for at least 20 years.    Allergies:  Allergies   Allergen Reactions     Statins [Hmg-Coa-R Inhibitors] Other (See Comments)     Rhabdo  Same as \"statins\"     Gemfibrozil      Resting thigh-calf pain     Sulfa Drugs      Reacted as a child     Zetia [Ezetimibe]      Muscle cramping       Problem List:    Patient Active Problem List    Diagnosis Date Noted     H/O carotid angioplasty 09/04/2020     Priority: Medium     Status post balloon angioplasty of pulmonary artery branches 09/03/2020     Priority: Medium     Renal artery stenosis (H) 09/05/2019     Priority: Medium     Added automatically from request for surgery [id number]       Carotid stenosis      Priority: Medium     right endartectomy       Essential hypertension with goal blood pressure less than 140/90 06/02/2016     Priority: Medium     PVD (peripheral vascular disease) (H) 07/22/2015     Priority: Medium     Chronic constipation 12/24/2014     Priority: Medium     Irritable bowel syndrome 12/24/2014     Priority: Medium     Health Care Home 09/11/2014     Priority: Medium     Status:  Unable to reach  Care Coordinator:  Erika Marcus    See Letters for [facility] Care Plan  Date:  September 11, 2014         Carotid artery occlusion with cerebral infarction (H) 08/06/2014     Priority: Medium     Dizziness 08/06/2014     Priority: Medium     Panlobular emphysema (H) " 06/04/2014     Priority: Medium     Cerebral infarction due to occlusion or stenosis of carotid artery 05/07/2014     Priority: Medium     Problem list name updated by automated process. Provider to review       Stroke, embolic (H) 04/25/2014     Priority: Medium     Mesenteric artery stenosis (H) 04/04/2014     Priority: Medium     2014: Asymptomatic SMA and MELI stenoses, meriting clinical FU       Insomnia 03/12/2014     Priority: Medium     Nutritional deficiency 03/12/2014     Priority: Medium     Pain 03/12/2014     Priority: Medium     Urinary retention 03/12/2014     Priority: Medium     S/P CABG x 6 03/06/2014     Priority: Medium     Left main coronary artery disease 03/04/2014     Priority: Medium     PAD (peripheral artery disease) (H) 03/04/2014     Priority: Medium     2/19/14: RUBEN 0.73 LLE       Arthritis 01/14/2013     Priority: Medium     Carotid bruit 01/14/2013     Priority: Medium     Carotid stenosis, bilateral 01/14/2013     Priority: Medium     Anemia 04/06/2012     Priority: Medium     CKD (chronic kidney disease) stage 3, GFR 30-59 ml/min 04/06/2012     Priority: Medium     Advanced directives, counseling/discussion 04/05/2012     Priority: Medium     Impingement syndrome, shoulder, right 03/16/2011     Priority: Medium     Hypertension 11/22/2010     Priority: Medium     Hyperlipidemia LDL goal <130 11/22/2010     Priority: Medium     Myalgia and myositis 11/22/2010     Priority: Medium     GERD (gastroesophageal reflux disease) 11/22/2010     Priority: Medium        Past Medical History:    Past Medical History:   Diagnosis Date     Carotid stenosis      Chronic kidney disease      Coronary artery disease 3-2014     CVA (cerebral infarction)      Elevated CK      Esophageal reflux      Hypercholesteremia      Nonsenile cataract      Other and unspecified hyperlipidemia      PAD (peripheral artery disease) (H)      Rhabdomyolysis 2010     Unspecified essential hypertension        Past  Surgical History:    Past Surgical History:   Procedure Laterality Date     APPENDECTOMY  1974     BYPASS GRAFT ARTERY CORONARY  3/5/2014    Procedure: BYPASS GRAFT ARTERY CORONARY;  Median Sternotomy, Coronary Artery Bypass Graft X6 used Left internal mammary artery, Left and Right Greater Saphenous vein, on Pump oxygenator.;  Surgeon: Tim Montanez MD;  Location: [facility] OR     CATARACT IOL, RT/LT Bilateral 2008    in [place]     cataracts       COLONOSCOPY  12/12/02    [facility]     COLONOSCOPY N/A 10/7/2014    Procedure: COMBINED COLONOSCOPY, SINGLE OR MULTIPLE BIOPSY/POLYPECTOMY BY BIOPSY;  Surgeon: Micheal Mora MD;  Location: [facility]     CV LOWER EXTREMITY ANGIOGRAM BILATERAL Bilateral 9/9/2019    Procedure: Lower Extremity Angiogram Bilateral;  Surgeon: Julio Oropeza MD;  Location:  [facility] CARDIAC CATH LAB     DENTAL SURGERY      TMJ, implants     ENDARTERECTOMY CAROTID      right carotid stent     ESOPHAGOSCOPY, GASTROSCOPY, DUODENOSCOPY (EGD), COMBINED Left 10/7/2014    Procedure: COMBINED ESOPHAGOSCOPY, GASTROSCOPY, DUODENOSCOPY (EGD), BIOPSY SINGLE OR MULTIPLE;  Surgeon: Micheal Mora MD;  Location: [facility]     ESOPHAGOSCOPY, GASTROSCOPY, DUODENOSCOPY (EGD), COMBINED Left 10/7/2014    Procedure: COMBINED ESOPHAGOSCOPY, GASTROSCOPY, DUODENOSCOPY (EGD), REMOVE FOREIGN BODY;  Surgeon: Micheal Mora MD;  Location: [facility]     HC CAPSULE ENDOSCOPY N/A 10/7/2014    Procedure: CAPSULE/PILL CAM ENDOSCOPY;  Surgeon: Micheal Mora MD;  Location: [facility]     INJECT EPIDURAL LUMBAR Right 5/8/2017    Procedure: INJECT EPIDURAL LUMBAR;  Lumbar transforaminal Epidural Steroid Injection right lumbar 4-5, and right  lumbar 5-Sacral 1;  Surgeon: Anthony Gonzalez MD;  [facility]     INJECT JOINT SACROILIAC Bilateral 8/28/2017    Procedure: INJECT JOINT SACROILIAC;  sacroiliac joint injection bilateral;  Surgeon: Anthony Gonzalez MD;  [facility]     IR CAROTID CEREBRAL ANGIOGRAM  BILATERAL  9/3/2020     KNEE SURGERY  1976    left knee reconstruction     lasix      both eyes     RELEASE CARPAL TUNNEL  2011    RELEASE CARPAL TUNNEL performed by JO MADRID at  OR     RELEASE CARPAL TUNNEL  2011    RELEASE CARPAL TUNNEL performed by JO MADRID at  OR     [facility] UGI ENDOSCOPY, SIMPLE EXAM  99    [facility]       Family History:    Family History   Problem Relation Age of Onset     Hypertension Mother      Eye Disorder Mother      Cerebrovascular Disease Father      Heart Disease Father      Lipids Father      Obesity Father      Cancer Sister      Heart Disease Sister      Glaucoma No family hx of      Macular Degeneration No family hx of      Kidney Disease No family hx of        Social History:  Marital Status:   [2]  Social History     Tobacco Use     Smoking status: Former Smoker     Packs/day: 2.50     Years: 20.00     Pack years: 50.00     Types: Cigarettes     Quit date: 2000     Years since quittin.4     Smokeless tobacco: Never Used   Substance Use Topics     Alcohol use: No     Alcohol/week: 0.0 standard drinks     Drug use: No        Medications:    albuterol (PROAIR HFA/PROVENTIL HFA/VENTOLIN HFA) 108 (90 Base) MCG/ACT inhaler  benzonatate (TESSALON) 200 MG capsule  cefdinir (OMNICEF) 300 MG capsule  alirocumab (PRALUENT) 150 MG/ML injectable syringe  amitriptyline (ELAVIL) 25 MG tablet  amLODIPine (NORVASC) 10 MG tablet  aspirin (ASPIRIN) 81 MG EC tablet  azithromycin (ZITHROMAX) 250 MG tablet  carvedilol (COREG) 25 MG tablet  gabapentin (NEURONTIN) 300 MG capsule  isosorbide mononitrate (IMDUR) 60 MG 24 hr tablet  losartan (COZAAR) 100 MG tablet  omeprazole (PRILOSEC) 40 MG DR capsule  ondansetron (ZOFRAN) 4 MG tablet  potassium chloride ER (KLOR-CON M) 10 MEQ CR tablet          Review of Systems   All other systems reviewed and are negative.      Physical Exam   BP: (!) 162/85  Pulse: 65  Temp: 97.7  F (36.5  " C)  Resp: 19  Height: 172.7 cm (5' 8\")  Weight: 72.6 kg (160 lb)  SpO2: 99 %      Physical Exam  Vitals signs and nursing note reviewed.   Constitutional:       General: He is not in acute distress.     Appearance: He is well-developed. He is not diaphoretic.   HENT:      Head: Normocephalic and atraumatic.      Right Ear: External ear normal.      Left Ear: External ear normal.      Nose: Nose normal. No congestion or rhinorrhea.      Mouth/Throat:      Mouth: Mucous membranes are moist.      Pharynx: No oropharyngeal exudate or posterior oropharyngeal erythema.   Eyes:      General: No scleral icterus.     Extraocular Movements: Extraocular movements intact.      Pupils: Pupils are equal, round, and reactive to light.   Neck:      Musculoskeletal: Normal range of motion and neck supple.   Pulmonary:      Breath sounds: Wheezing present.   Skin:     General: Skin is warm and dry.      Findings: No rash.   Neurological:      Mental Status: He is alert and oriented to person, place, and time.   Psychiatric:         Mood and Affect: Mood normal.         Thought Content: Thought content normal.         ED Course        Procedures             Results for orders placed or performed during the hospital encounter of 06/25/21 (from the past 24 hour(s))   CBC with platelets differential   Result Value Ref Range    WBC 6.1 4.0 - 11.0 10e9/L    RBC Count 3.42 (L) 4.4 - 5.9 10e12/L    Hemoglobin 11.3 (L) 13.3 - 17.7 g/dL    Hematocrit 32.2 (L) 40.0 - 53.0 %    MCV 94 78 - 100 fl    MCH 33.0 26.5 - 33.0 pg    MCHC 35.1 31.5 - 36.5 g/dL    RDW 13.1 10.0 - 15.0 %    Platelet Count 290 150 - 450 10e9/L    Diff Method Automated Method     % Neutrophils 72.9 %    % Lymphocytes 14.3 %    % Monocytes 9.5 %    % Eosinophils 2.8 %    % Basophils 0.2 %    % Immature Granulocytes 0.3 %    Nucleated RBCs 0 0 /100    Absolute Neutrophil 4.4 1.6 - 8.3 10e9/L    Absolute Lymphocytes 0.9 0.8 - 5.3 10e9/L    Absolute Monocytes 0.6 0.0 - 1.3 " 10e9/L    Absolute Eosinophils 0.2 0.0 - 0.7 10e9/L    Absolute Basophils 0.0 0.0 - 0.2 10e9/L    Abs Immature Granulocytes 0.0 0 - 0.4 10e9/L    Absolute Nucleated RBC 0.0    Basic metabolic panel   Result Value Ref Range    Sodium 125 (L) 133 - 144 mmol/L    Potassium 5.3 3.4 - 5.3 mmol/L    Chloride 97 94 - 109 mmol/L    Carbon Dioxide 22 20 - 32 mmol/L    Anion Gap 6 3 - 14 mmol/L    Glucose 107 (H) 70 - 99 mg/dL    Urea Nitrogen 15 7 - 30 mg/dL    Creatinine 1.35 (H) 0.66 - 1.25 mg/dL    GFR Estimate 51 (L) >60 mL/min/[1.73_m2]    GFR Estimate If Black 59 (L) >60 mL/min/[1.73_m2]    Calcium 9.1 8.5 - 10.1 mg/dL   XR Chest Port 1 View    Narrative    XR CHEST PORT 1 VIEW   6/25/2021 12:05 PM     HISTORY: wheezing cough    COMPARISON: Chest CT 5/11/2021 and chest radiograph 4/19/2021.      Impression    IMPRESSION: New right midlung infiltrate suspicious for pneumonia.  Previous right lower lung infiltrates have decreased. No pleural  effusion. Normal heart size and pulmonary vascularity. Sternotomy with  mediastinal clips.    JENNIFER HUGHES MD   Lactic acid whole blood   Result Value Ref Range    Lactic Acid 0.7 0.7 - 2.0 mmol/L       Medications   cefTRIAXone (ROCEPHIN) 1 g vial to attach to  mL bag for ADULTS or NS 50 mL bag for PEDS (1 g Intravenous New Bag 6/25/21 1318)   ipratropium - albuterol 0.5 mg/2.5 mg/3 mL (DUONEB) neb solution 3 mL (3 mLs Nebulization Given 6/25/21 1211)       Assessments & Plan (with Medical Decision Making)  Cough with new infiltrate on chest x-ray.  He was given a DuoNeb breathing treatment which did not really help.  He is continue to have coughing.  His vital signs look good and his white blood cell count is normal.  He was given a dose of IV Rocephin.  I plan to discharge him home on Omnicef and Tessalon Perles.  Also give him an albuterol MDI that he can try.  Return to ER precautions follow-up precautions discussed.  The patient is in agreement with the plan.     I  have reviewed the nursing notes.    I have reviewed the findings, diagnosis, plan and need for follow up with the patient.      New Prescriptions    ALBUTEROL (PROAIR HFA/PROVENTIL HFA/VENTOLIN HFA) 108 (90 BASE) MCG/ACT INHALER    Inhale 2 puffs into the lungs every 4 hours as needed for shortness of breath / dyspnea or wheezing    BENZONATATE (TESSALON) 200 MG CAPSULE    Take 1 capsule (200 mg) by mouth 3 times daily as needed for cough    CEFDINIR (OMNICEF) 300 MG CAPSULE    Take 1 capsule (300 mg) by mouth 2 times daily       Final diagnoses:   Pneumonia of right lower lobe due to infectious organism       6/25/2021   [facility] EMERGENCY DEPT     Mikie John MD  06/25/21 [id number]

## 2021-06-30 ENCOUNTER — MYC MEDICAL ADVICE (OUTPATIENT)
Dept: INTERNAL MEDICINE | Facility: CLINIC | Age: 76
End: 2021-06-30

## 2021-07-01 LAB
BACTERIA SPEC CULT: NO GROWTH
BACTERIA SPEC CULT: NO GROWTH
SPECIMEN SOURCE: NORMAL
SPECIMEN SOURCE: NORMAL

## 2021-07-02 ENCOUNTER — APPOINTMENT (OUTPATIENT)
Dept: ULTRASOUND IMAGING | Facility: CLINIC | Age: 76
End: 2021-07-02
Attending: FAMILY MEDICINE
Payer: MEDICARE

## 2021-07-02 ENCOUNTER — APPOINTMENT (OUTPATIENT)
Dept: GENERAL RADIOLOGY | Facility: CLINIC | Age: 76
End: 2021-07-02
Attending: FAMILY MEDICINE
Payer: MEDICARE

## 2021-07-02 ENCOUNTER — HOSPITAL ENCOUNTER (EMERGENCY)
Facility: CLINIC | Age: 76
Discharge: HOME | End: 2021-07-02
Attending: FAMILY MEDICINE | Admitting: FAMILY MEDICINE
Payer: MEDICARE

## 2021-07-02 ENCOUNTER — MYC MEDICAL ADVICE (OUTPATIENT)
Dept: INTERNAL MEDICINE | Facility: CLINIC | Age: 76
End: 2021-07-02

## 2021-07-02 VITALS
SYSTOLIC BLOOD PRESSURE: 136 MMHG | OXYGEN SATURATION: 98 % | RESPIRATION RATE: 16 BRPM | DIASTOLIC BLOOD PRESSURE: 64 MMHG | TEMPERATURE: 97.5 F | HEART RATE: 63 BPM | BODY MASS INDEX: 24.33 KG/M2 | WEIGHT: 160 LBS

## 2021-07-02 DIAGNOSIS — E87.1 HYPONATREMIA: ICD-10-CM

## 2021-07-02 DIAGNOSIS — K80.20 GALLSTONES: ICD-10-CM

## 2021-07-02 LAB
ALBUMIN SERPL-MCNC: 3.4 G/DL (ref 3.4–5)
ALP SERPL-CCNC: 105 U/L (ref 40–150)
ALT SERPL W P-5'-P-CCNC: 21 U/L (ref 0–70)
ANION GAP SERPL CALCULATED.3IONS-SCNC: 7 MMOL/L (ref 3–14)
AST SERPL W P-5'-P-CCNC: 12 U/L (ref 0–45)
BASOPHILS # BLD AUTO: 0 10E9/L (ref 0–0.2)
BASOPHILS NFR BLD AUTO: 0.2 %
BILIRUB SERPL-MCNC: 0.4 MG/DL (ref 0.2–1.3)
BUN SERPL-MCNC: 13 MG/DL (ref 7–30)
CALCIUM SERPL-MCNC: 8.9 MG/DL (ref 8.5–10.1)
CHLORIDE SERPL-SCNC: 91 MMOL/L (ref 94–109)
CO2 SERPL-SCNC: 23 MMOL/L (ref 20–32)
CREAT SERPL-MCNC: 1.31 MG/DL (ref 0.66–1.25)
DIFFERENTIAL METHOD BLD: ABNORMAL
EOSINOPHIL # BLD AUTO: 0.2 10E9/L (ref 0–0.7)
EOSINOPHIL NFR BLD AUTO: 3 %
ERYTHROCYTE [DISTWIDTH] IN BLOOD BY AUTOMATED COUNT: 12.4 % (ref 10–15)
GFR SERPL CREATININE-BSD FRML MDRD: 53 ML/MIN/{1.73_M2}
GLUCOSE SERPL-MCNC: 111 MG/DL (ref 70–99)
HCT VFR BLD AUTO: 32.3 % (ref 40–53)
HGB BLD-MCNC: 11.7 G/DL (ref 13.3–17.7)
IMM GRANULOCYTES # BLD: 0 10E9/L (ref 0–0.4)
IMM GRANULOCYTES NFR BLD: 0.5 %
LYMPHOCYTES # BLD AUTO: 1.3 10E9/L (ref 0.8–5.3)
LYMPHOCYTES NFR BLD AUTO: 22.1 %
MCH RBC QN AUTO: 33.2 PG (ref 26.5–33)
MCHC RBC AUTO-ENTMCNC: 36.2 G/DL (ref 31.5–36.5)
MCV RBC AUTO: 92 FL (ref 78–100)
MONOCYTES # BLD AUTO: 0.8 10E9/L (ref 0–1.3)
MONOCYTES NFR BLD AUTO: 14.6 %
NEUTROPHILS # BLD AUTO: 3.4 10E9/L (ref 1.6–8.3)
NEUTROPHILS NFR BLD AUTO: 59.6 %
NRBC # BLD AUTO: 0 10*3/UL
NRBC BLD AUTO-RTO: 0 /100
NT-PROBNP SERPL-MCNC: 213 PG/ML (ref 0–900)
PLATELET # BLD AUTO: 334 10E9/L (ref 150–450)
POTASSIUM SERPL-SCNC: 4.9 MMOL/L (ref 3.4–5.3)
PROT SERPL-MCNC: 7.3 G/DL (ref 6.8–8.8)
RBC # BLD AUTO: 3.52 10E12/L (ref 4.4–5.9)
SODIUM SERPL-SCNC: 121 MMOL/L (ref 133–144)
TROPONIN I SERPL-MCNC: 0.04 UG/L (ref 0–0.04)
WBC # BLD AUTO: 5.8 10E9/L (ref 4–11)

## 2021-07-02 PROCEDURE — 99285 EMERGENCY DEPT VISIT HI MDM: CPT | Mod: 25 | Performed by: FAMILY MEDICINE

## 2021-07-02 PROCEDURE — 84484 ASSAY OF TROPONIN QUANT: CPT | Performed by: FAMILY MEDICINE

## 2021-07-02 PROCEDURE — 76705 ECHO EXAM OF ABDOMEN: CPT

## 2021-07-02 PROCEDURE — 85025 COMPLETE CBC W/AUTO DIFF WBC: CPT | Performed by: FAMILY MEDICINE

## 2021-07-02 PROCEDURE — 99282 EMERGENCY DEPT VISIT SF MDM: CPT | Performed by: FAMILY MEDICINE

## 2021-07-02 PROCEDURE — 71045 X-RAY EXAM CHEST 1 VIEW: CPT

## 2021-07-02 PROCEDURE — 83880 ASSAY OF NATRIURETIC PEPTIDE: CPT | Performed by: FAMILY MEDICINE

## 2021-07-02 PROCEDURE — 80053 COMPREHEN METABOLIC PANEL: CPT | Performed by: FAMILY MEDICINE

## 2021-07-02 NOTE — DISCHARGE INSTRUCTIONS
1.  I want you to  some Gatorade or Powerade and drink 120 ounce bottle daily over the next few days.  Also drink other fluids along with this.  This should hopefully clear your low sodium levels.

## 2021-07-02 NOTE — TELEPHONE ENCOUNTER
Spoke with pt and his wife. Pt was diagnosed with pneumonia on 6/25/21. Given medication but not feeling better. Worsening shortness of breath and weakness. Advised seeking care in the ER. Wife is in agreement with this.     Sabra Acevedo, RN, BSN

## 2021-07-02 NOTE — ED PROVIDER NOTES
"  History     Chief Complaint   Patient presents with     Shortness of Breath     HPI  Michele Vance is a 75 year old male who presents with worsening shortness of breath, abdominal bloating and just not feeling well.  Patient was diagnosed with a pneumonia a week ago here in the emergency department and sent home on Omnicef.  Patient is also given albuterol and Tessalon Perles.  Patient feels like his breathing is continuing to get worse, worse with activity and movement.  Patient continues to have a worsening cough that is more of a dry cough.  Patient states he feels lightheaded and dizzy when he stands.  Denies any new fevers or chills.  Denies any dysuria.  Patient states whenever he eats he feels very bloated and so has not been able to eat much.    Allergies:  Allergies   Allergen Reactions     Statins [Hmg-Coa-R Inhibitors] Other (See Comments)     Rhabdo  Same as \"statins\"     Gemfibrozil      Resting thigh-calf pain     Sulfa Drugs      Reacted as a child     Zetia [Ezetimibe]      Muscle cramping       Problem List:    Patient Active Problem List    Diagnosis Date Noted     H/O carotid angioplasty 09/04/2020     Priority: Medium     Status post balloon angioplasty of pulmonary artery branches 09/03/2020     Priority: Medium     Renal artery stenosis (H) 09/05/2019     Priority: Medium     Added automatically from request for surgery [id number]       Carotid stenosis      Priority: Medium     right endartectomy       Essential hypertension with goal blood pressure less than 140/90 06/02/2016     Priority: Medium     PVD (peripheral vascular disease) (H) 07/22/2015     Priority: Medium     Chronic constipation 12/24/2014     Priority: Medium     Irritable bowel syndrome 12/24/2014     Priority: Medium     Health Care Home 09/11/2014     Priority: Medium     Status:  Unable to reach  Care Coordinator:  Erika Marcus    See Letters for [facility] Care Plan  Date:  September 11, 2014         Carotid artery occlusion " with cerebral infarction (H) 08/06/2014     Priority: Medium     Dizziness 08/06/2014     Priority: Medium     Panlobular emphysema (H) 06/04/2014     Priority: Medium     Cerebral infarction due to occlusion or stenosis of carotid artery 05/07/2014     Priority: Medium     Problem list name updated by automated process. Provider to review       Stroke, embolic (H) 04/25/2014     Priority: Medium     Mesenteric artery stenosis (H) 04/04/2014     Priority: Medium     2014: Asymptomatic SMA and MELI stenoses, meriting clinical FU       Insomnia 03/12/2014     Priority: Medium     Nutritional deficiency 03/12/2014     Priority: Medium     Pain 03/12/2014     Priority: Medium     Urinary retention 03/12/2014     Priority: Medium     S/P CABG x 6 03/06/2014     Priority: Medium     Left main coronary artery disease 03/04/2014     Priority: Medium     PAD (peripheral artery disease) (H) 03/04/2014     Priority: Medium     2/19/14: RUBEN 0.73 LLE       Arthritis 01/14/2013     Priority: Medium     Carotid bruit 01/14/2013     Priority: Medium     Carotid stenosis, bilateral 01/14/2013     Priority: Medium     Anemia 04/06/2012     Priority: Medium     CKD (chronic kidney disease) stage 3, GFR 30-59 ml/min 04/06/2012     Priority: Medium     Advanced directives, counseling/discussion 04/05/2012     Priority: Medium     Impingement syndrome, shoulder, right 03/16/2011     Priority: Medium     Hypertension 11/22/2010     Priority: Medium     Hyperlipidemia LDL goal <130 11/22/2010     Priority: Medium     Myalgia and myositis 11/22/2010     Priority: Medium     GERD (gastroesophageal reflux disease) 11/22/2010     Priority: Medium        Past Medical History:    Past Medical History:   Diagnosis Date     Carotid stenosis      Chronic kidney disease      Coronary artery disease 3-2014     CVA (cerebral infarction)      Elevated CK      Esophageal reflux      Hypercholesteremia      Nonsenile cataract      Other and unspecified  hyperlipidemia      PAD (peripheral artery disease) (H)      Rhabdomyolysis 2010     Unspecified essential hypertension        Past Surgical History:    Past Surgical History:   Procedure Laterality Date     APPENDECTOMY  1974     BYPASS GRAFT ARTERY CORONARY  3/5/2014    Procedure: BYPASS GRAFT ARTERY CORONARY;  Median Sternotomy, Coronary Artery Bypass Graft X6 used Left internal mammary artery, Left and Right Greater Saphenous vein, on Pump oxygenator.;  Surgeon: Tim Montanez MD;  Location: [facility] OR     CATARACT IOL, RT/LT Bilateral 2008    in [place]     cataracts       COLONOSCOPY  12/12/02    [facility]     COLONOSCOPY N/A 10/7/2014    Procedure: COMBINED COLONOSCOPY, SINGLE OR MULTIPLE BIOPSY/POLYPECTOMY BY BIOPSY;  Surgeon: Micheal Mora MD;  Location: [facility] GI     CV LOWER EXTREMITY ANGIOGRAM BILATERAL Bilateral 9/9/2019    Procedure: Lower Extremity Angiogram Bilateral;  Surgeon: Julio Oropeza MD;  Location: [facility] CARDIAC CATH LAB     DENTAL SURGERY      TMJ, implants     ENDARTERECTOMY CAROTID      right carotid stent     ESOPHAGOSCOPY, GASTROSCOPY, DUODENOSCOPY (EGD), COMBINED Left 10/7/2014    Procedure: COMBINED ESOPHAGOSCOPY, GASTROSCOPY, DUODENOSCOPY (EGD), BIOPSY SINGLE OR MULTIPLE;  Surgeon: Micheal Mora MD;  Location: [facility] GI     ESOPHAGOSCOPY, GASTROSCOPY, DUODENOSCOPY (EGD), COMBINED Left 10/7/2014    Procedure: COMBINED ESOPHAGOSCOPY, GASTROSCOPY, DUODENOSCOPY (EGD), REMOVE FOREIGN BODY;  Surgeon: Micheal Mora MD;  Location: [facility] GI     HC CAPSULE ENDOSCOPY N/A 10/7/2014    Procedure: CAPSULE/PILL CAM ENDOSCOPY;  Surgeon: Micheal Mora MD;  Location: [facility]     INJECT EPIDURAL LUMBAR Right 5/8/2017    Procedure: INJECT EPIDURAL LUMBAR;  Lumbar transforaminal Epidural Steroid Injection right lumbar 4-5, and right  lumbar 5-Sacral 1;  Surgeon: Anthony Gonzalez MD;  Location: [facility]     INJECT JOINT SACROILIAC Bilateral 8/28/2017    Procedure: INJECT JOINT SACROILIAC;   sacroiliac joint injection bilateral;  Surgeon: Anthony Gonzalez MD;  Location: [facility] OR     IR CAROTID CEREBRAL ANGIOGRAM BILATERAL  9/3/2020     KNEE SURGERY  1976    left knee reconstruction     lasix      both eyes     RELEASE CARPAL TUNNEL  2011    RELEASE CARPAL TUNNEL performed by JO MADRID at  OR     RELEASE CARPAL TUNNEL  2011    RELEASE CARPAL TUNNEL performed by JO MADRID at  OR     [facility] UGI ENDOSCOPY, SIMPLE EXAM  99    [facility]       Family History:    Family History   Problem Relation Age of Onset     Hypertension Mother      Eye Disorder Mother      Cerebrovascular Disease Father      Heart Disease Father      Lipids Father      Obesity Father      Cancer Sister      Heart Disease Sister      Glaucoma No family hx of      Macular Degeneration No family hx of      Kidney Disease No family hx of        Social History:  Marital Status:   [2]  Social History     Tobacco Use     Smoking status: Former Smoker     Packs/day: 2.50     Years: 20.00     Pack years: 50.00     Types: Cigarettes     Quit date: 2000     Years since quittin.5     Smokeless tobacco: Never Used   Substance Use Topics     Alcohol use: No     Alcohol/week: 0.0 standard drinks     Drug use: No        Medications:    cefdinir (OMNICEF) 300 MG capsule  albuterol (PROAIR HFA/PROVENTIL HFA/VENTOLIN HFA) 108 (90 Base) MCG/ACT inhaler  alirocumab (PRALUENT) 150 MG/ML injectable syringe  amitriptyline (ELAVIL) 25 MG tablet  amLODIPine (NORVASC) 10 MG tablet  aspirin (ASPIRIN) 81 MG EC tablet  benzonatate (TESSALON) 200 MG capsule  carvedilol (COREG) 25 MG tablet  gabapentin (NEURONTIN) 300 MG capsule  isosorbide mononitrate (IMDUR) 60 MG 24 hr tablet  losartan (COZAAR) 100 MG tablet  omeprazole (PRILOSEC) 40 MG DR capsule  ondansetron (ZOFRAN) 4 MG tablet  potassium chloride ER (KLOR-CON M) 10 MEQ CR tablet          Review of Systems   All other systems reviewed and are  negative.      Physical Exam   BP: 136/64  Pulse: 63  Temp: 97.5  F (36.4  C)  Resp: 16  Weight: 72.6 kg (160 lb)  SpO2: 98 %      Physical Exam  Vitals signs and nursing note reviewed.   Constitutional:       General: He is not in acute distress.     Appearance: He is well-developed. He is not diaphoretic.   HENT:      Head: Normocephalic and atraumatic.      Nose: Nose normal.      Mouth/Throat:      Pharynx: No oropharyngeal exudate.   Eyes:      General: No scleral icterus.     Conjunctiva/sclera: Conjunctivae normal.      Pupils: Pupils are equal, round, and reactive to light.   Neck:      Musculoskeletal: Normal range of motion.   Cardiovascular:      Rate and Rhythm: Normal rate and regular rhythm.      Heart sounds: Normal heart sounds. No murmur. No friction rub.   Pulmonary:      Effort: Pulmonary effort is normal. No respiratory distress.      Breath sounds: Normal breath sounds. No wheezing or rales.   Abdominal:      General: Bowel sounds are normal. There is no distension.      Palpations: Abdomen is soft. There is no mass.      Tenderness: There is abdominal tenderness (Ruq/rlq/epigastric). There is no guarding or rebound.   Musculoskeletal: Normal range of motion.         General: No tenderness.   Skin:     General: Skin is warm.      Findings: No rash.   Neurological:      Mental Status: He is alert and oriented to person, place, and time.   Psychiatric:         Judgment: Judgment normal.         ED Course        Procedures      Results for orders placed or performed during the hospital encounter of 07/02/21 (from the past 24 hour(s))   CBC with platelets differential   Result Value Ref Range    WBC 5.8 4.0 - 11.0 10e9/L    RBC Count 3.52 (L) 4.4 - 5.9 10e12/L    Hemoglobin 11.7 (L) 13.3 - 17.7 g/dL    Hematocrit 32.3 (L) 40.0 - 53.0 %    MCV 92 78 - 100 fl    MCH 33.2 (H) 26.5 - 33.0 pg    MCHC 36.2 31.5 - 36.5 g/dL    RDW 12.4 10.0 - 15.0 %    Platelet Count 334 150 - 450 10e9/L    Diff Method  Automated Method     % Neutrophils 59.6 %    % Lymphocytes 22.1 %    % Monocytes 14.6 %    % Eosinophils 3.0 %    % Basophils 0.2 %    % Immature Granulocytes 0.5 %    Nucleated RBCs 0 0 /100    Absolute Neutrophil 3.4 1.6 - 8.3 10e9/L    Absolute Lymphocytes 1.3 0.8 - 5.3 10e9/L    Absolute Monocytes 0.8 0.0 - 1.3 10e9/L    Absolute Eosinophils 0.2 0.0 - 0.7 10e9/L    Absolute Basophils 0.0 0.0 - 0.2 10e9/L    Abs Immature Granulocytes 0.0 0 - 0.4 10e9/L    Absolute Nucleated RBC 0.0    Comprehensive metabolic panel   Result Value Ref Range    Sodium 121 (L) 133 - 144 mmol/L    Potassium 4.9 3.4 - 5.3 mmol/L    Chloride 91 (L) 94 - 109 mmol/L    Carbon Dioxide 23 20 - 32 mmol/L    Anion Gap 7 3 - 14 mmol/L    Glucose 111 (H) 70 - 99 mg/dL    Urea Nitrogen 13 7 - 30 mg/dL    Creatinine 1.31 (H) 0.66 - 1.25 mg/dL    GFR Estimate 53 (L) >60 mL/min/[1.73_m2]    GFR Estimate If Black 61 >60 mL/min/[1.73_m2]    Calcium 8.9 8.5 - 10.1 mg/dL    Bilirubin Total 0.4 0.2 - 1.3 mg/dL    Albumin 3.4 3.4 - 5.0 g/dL    Protein Total 7.3 6.8 - 8.8 g/dL    Alkaline Phosphatase 105 40 - 150 U/L    ALT 21 0 - 70 U/L    AST 12 0 - 45 U/L   Troponin I   Result Value Ref Range    Troponin I ES 0.042 0.000 - 0.045 ug/L   Nt probnp inpatient (BNP)   Result Value Ref Range    N-Terminal Pro BNP Inpatient 213 0 - 900 pg/mL   Abdomen US, limited (RUQ only)    Narrative    US ABDOMEN LIMITED 7/2/2021 11:48 AM    CLINICAL HISTORY: ruq abd pain, sob, bloating, ruq abd pain, sob,  bloating  TECHNIQUE: Limited abdominal ultrasound.    COMPARISON: CT of the abdomen and pelvis dated 1/12/2021    FINDINGS:    GALLBLADDER: Poor visualization secondary to bowel gas. Multiple  shadowing gallstones are noted in the gallbladder fossa. Negative  sonographic Ferraro sign.    BILE DUCTS: There is no intrahepatic biliary dilatation. The common  duct measures 5mm.    LIVER: Poorly visualized. The main portal vein is patent with normal  hepatopedal  flow.    RIGHT KIDNEY: No hydronephrosis.    PANCREAS: Not visualized secondary to overlying bowel gas.    No ascites.     The aorta is not visualized.      Impression    IMPRESSION:  1.  Markedly limited evaluation secondary to overlying bowel gas.  2.  Cholelithiasis.    JHONATHAN ODOM MD          SYSTEM ID:  [id number]   XR Chest Port 1 View    Narrative    CHEST PORTABLE ONE VIEW   7/2/2021 11:52 AM     HISTORY: Recent pneumonia, worsening symptoms.    COMPARISON: Chest x-rays dated 6/25/2021.    FINDINGS:  Sternal cerclage wires and mediastinal vascular clips  indicate probable prior history of coronary artery bypass grafting.  The lungs are otherwise grossly clear. No infiltrate, effusion, or  pneumothorax. No acute osseous fracture is identified. Heart is normal  in size.      Impression    IMPRESSION: Chronic changes of the chest are stable in appearance. No  evidence of acute cardiopulmonary disease is seen. No evidence for  pneumonia is identified.    BERNIE LEW MD          SYSTEM ID:  [id number]       Medications - No data to display     Repeat chest x-ray shows resolution of the pneumonia.  Labs were mostly unremarkable this patient sodium as still trending down.  Wonder if some of his symptoms are more related to this.  Encouraged him to increase his fluid intake with more electrolyte drinks here over the weekend.  Patient did have some upper abdominal pain and was complaining of some bloating so I did do an ultrasound that did show some gallstones.  There do not appear to be any complications with this but this could be explaining some of his symptoms.  Recommend follow-up with surgery about this.  Patient is safe to be discharged home.    Assessments & Plan (with Medical Decision Making)  Hyponatremia, gallstones     I have reviewed the nursing notes.    I have reviewed the findings, diagnosis, plan and need for follow up with the patient.      Discharge Medication List as of 7/2/2021 12:46 PM           Final diagnoses:   Hyponatremia   Gallstones       7/2/2021   [facility] EMERGENCY DEPT     Artem Dyer MD  07/02/21 [id number]

## 2021-07-02 NOTE — ED TRIAGE NOTES
Pt diagnosed with Pneumonia a week ago, has been taking abx, was doing better, then got worse last couple days.  Pt states, gas pain when he eats

## 2021-07-08 ENCOUNTER — OFFICE VISIT (OUTPATIENT)
Dept: SURGERY | Facility: CLINIC | Age: 76
End: 2021-07-08
Payer: MEDICARE

## 2021-07-08 ENCOUNTER — TELEPHONE (OUTPATIENT)
Dept: SURGERY | Facility: OTHER | Age: 76
End: 2021-07-08

## 2021-07-08 VITALS
SYSTOLIC BLOOD PRESSURE: 104 MMHG | TEMPERATURE: 97.5 F | WEIGHT: 164 LBS | DIASTOLIC BLOOD PRESSURE: 60 MMHG | BODY MASS INDEX: 24.94 KG/M2

## 2021-07-08 DIAGNOSIS — K80.50 BILIARY COLIC: Primary | ICD-10-CM

## 2021-07-08 DIAGNOSIS — Z11.59 ENCOUNTER FOR SCREENING FOR OTHER VIRAL DISEASES: ICD-10-CM

## 2021-07-08 PROCEDURE — 99204 OFFICE O/P NEW MOD 45 MIN: CPT | Performed by: SPECIALIST

## 2021-07-08 NOTE — TELEPHONE ENCOUNTER
Type of surgery: CHOLECYSTECTOMY, LAPAROSCOPIC (N/A)   Location of surgery: [facility]  Date and time of surgery: 7-16-21  Surgeon: Supriya  Pre-Op Appt Date: 7-12-21  Post-Op Appt Date: 7-22-21   Packet sent out: No  Pre-cert/Authorization completed:    Date:

## 2021-07-08 NOTE — LETTER
7/8/2021         RE: Michele Vance  [address]  [facility] [address]        Dear Colleague,    Thank you for referring your patient, Michele Vance, to the [facility]. Please see a copy of my visit note below.    Consult requested by Dr. Dyer    Reason for consultation gallstones    HPI:   Patient is a 75-year-old white male who was recently treated for pneumonia and will finish his course of antibiotics began feeling worse with upper abdominal pain.  There is associated nausea but 90 vomiting fevers or chills.  He was seen in the ER was found have significant right upper quadrant tenderness.  Subsequent ultrasound revealed multiple gallstones which she has now referred.  He has a longstanding history of fatty food intolerance that give him abdominal bloating with nausea and generalized upper abdominal pain.  He was diagnosed many years ago with IBS but no one could come with a more firm diagnosis.  He also has had similar episodes of right upper quadrant pain.  His wife put it recently put him on a low-fat diet and he feels much better with resolution of his symptoms.  His presumed ammonia subsequently resolved.  He now presents to me for evaluation for gallstones.    Past Medical History:   Diagnosis Date     Carotid stenosis     right endartectomy     Chronic kidney disease     stage 3     Coronary artery disease 3-2014    CABG x6     CVA (cerebral infarction)     balance problems, mild     Elevated CK      Esophageal reflux      Hypercholesteremia      Nonsenile cataract      Other and unspecified hyperlipidemia      PAD (peripheral artery disease) (H)      Rhabdomyolysis 2010     Unspecified essential hypertension      Past Surgical History:   Procedure Laterality Date     APPENDECTOMY  1974     BYPASS GRAFT ARTERY CORONARY  3/5/2014    Procedure: BYPASS GRAFT ARTERY CORONARY;  Median Sternotomy, Coronary Artery Bypass Graft X6 used Left internal mammary artery, Left and  Right Greater Saphenous vein, on Pump oxygenator.;  Surgeon: Tim Montanez MD;  Location: [facility] OR     CATARACT IOL, RT/LT Bilateral 2008    in [place]     cataracts       COLONOSCOPY  12/12/02    [facility]     COLONOSCOPY N/A 10/7/2014    Procedure: COMBINED COLONOSCOPY, SINGLE OR MULTIPLE BIOPSY/POLYPECTOMY BY BIOPSY;  Surgeon: Micheal Mora MD;  Location: [facility] GI     CV LOWER EXTREMITY ANGIOGRAM BILATERAL Bilateral 9/9/2019    Procedure: Lower Extremity Angiogram Bilateral;  Surgeon: Julio Oropeza MD;  Location: [facility] CARDIAC CATH LAB     DENTAL SURGERY      TMJ, implants     ENDARTERECTOMY CAROTID      right carotid stent     ESOPHAGOSCOPY, GASTROSCOPY, DUODENOSCOPY (EGD), COMBINED Left 10/7/2014    Procedure: COMBINED ESOPHAGOSCOPY, GASTROSCOPY, DUODENOSCOPY (EGD), BIOPSY SINGLE OR MULTIPLE;  Surgeon: Micheal Mora MD;  Location: [facility]     ESOPHAGOSCOPY, GASTROSCOPY, DUODENOSCOPY (EGD), COMBINED Left 10/7/2014    Procedure: COMBINED ESOPHAGOSCOPY, GASTROSCOPY, DUODENOSCOPY (EGD), REMOVE FOREIGN BODY;  Surgeon: Micheal Mora MD;  Location: [facility]     HC CAPSULE ENDOSCOPY N/A 10/7/2014    Procedure: CAPSULE/PILL CAM ENDOSCOPY;  Surgeon: Micheal Mora MD;  Location: [facility]     INJECT EPIDURAL LUMBAR Right 5/8/2017    Procedure: INJECT EPIDURAL LUMBAR;  Lumbar transforaminal Epidural Steroid Injection right lumbar 4-5, and right  lumbar 5-Sacral 1;  Surgeon: Anthony Gonzalez MD;  [facility]     INJECT JOINT SACROILIAC Bilateral 8/28/2017    Procedure: INJECT JOINT SACROILIAC;  sacroiliac joint injection bilateral;  Surgeon: Anthony Gonzalez MD;  [facility]     IR CAROTID CEREBRAL ANGIOGRAM BILATERAL  9/3/2020     KNEE SURGERY  1976    left knee reconstruction     lasix  2001    both eyes     RELEASE CARPAL TUNNEL  1/13/2011    RELEASE CARPAL TUNNEL performed by JO MADRID at  OR     RELEASE CARPAL TUNNEL  1/20/2011    RELEASE CARPAL TUNNEL performed by  "JO MADRID at  OR     [facility] UGI ENDOSCOPY, SIMPLE EXAM  99    [facility]     Current Outpatient Medications   Medication     albuterol (PROAIR HFA/PROVENTIL HFA/VENTOLIN HFA) 108 (90 Base) MCG/ACT inhaler     alirocumab (PRALUENT) 150 MG/ML injectable syringe     amitriptyline (ELAVIL) 25 MG tablet     amLODIPine (NORVASC) 10 MG tablet     aspirin (ASPIRIN) 81 MG EC tablet     benzonatate (TESSALON) 200 MG capsule     carvedilol (COREG) 25 MG tablet     cefdinir (OMNICEF) 300 MG capsule     gabapentin (NEURONTIN) 300 MG capsule     isosorbide mononitrate (IMDUR) 60 MG 24 hr tablet     losartan (COZAAR) 100 MG tablet     omeprazole (PRILOSEC) 40 MG DR capsule     ondansetron (ZOFRAN) 4 MG tablet     potassium chloride ER (KLOR-CON M) 10 MEQ CR tablet     No current facility-administered medications for this visit.         Allergies   Allergen Reactions     Statins [Hmg-Coa-R Inhibitors] Other (See Comments)     Rhabdo  Same as \"statins\"     Gemfibrozil      Resting thigh-calf pain     Sulfa Drugs      Reacted as a child     Zetia [Ezetimibe]      Muscle cramping     Social History     Socioeconomic History     Marital status:      Spouse name: Not on file     Number of children: Not on file     Years of education: Not on file     Highest education level: Not on file   Occupational History     Occupation:      Employer: RETIRED   Social Needs     Financial resource strain: Not on file     Food insecurity     Worry: Not on file     Inability: Not on file     Transportation needs     Medical: Not on file     Non-medical: Not on file   Tobacco Use     Smoking status: Former Smoker     Packs/day: 2.50     Years: 20.00     Pack years: 50.00     Types: Cigarettes     Quit date: 2000     Years since quittin.5     Smokeless tobacco: Never Used   Substance and Sexual Activity     Alcohol use: No     Alcohol/week: 0.0 standard drinks     Drug use: No     Sexual activity: " Yes     Partners: Female   Lifestyle     Physical activity     Days per week: Not on file     Minutes per session: Not on file     Stress: Not on file   Relationships     Social connections     Talks on phone: Not on file     Gets together: Not on file     Attends Scientologist service: Not on file     Active member of club or organization: Not on file     Attends meetings of clubs or organizations: Not on file     Relationship status: Not on file     Intimate partner violence     Fear of current or ex partner: Not on file     Emotionally abused: Not on file     Physically abused: Not on file     Forced sexual activity: Not on file   Other Topics Concern      Service Not Asked     Blood Transfusions Not Asked     Caffeine Concern Not Asked     Occupational Exposure Not Asked     Hobby Hazards Not Asked     Sleep Concern Not Asked     Stress Concern Not Asked     Weight Concern Not Asked     Special Diet Not Asked     Back Care Not Asked     Exercise Yes     Comment: 3 times per week     Bike Helmet Not Asked     Seat Belt Not Asked     Self-Exams Not Asked     Parent/sibling w/ CABG, MI or angioplasty before 65F 55M? Not Asked   Social History Narrative    Moved from [place] in August, retired  for [employer].     Family History   Problem Relation Age of Onset     Hypertension Mother      Eye Disorder Mother      Cerebrovascular Disease Father      Heart Disease Father      Lipids Father      Obesity Father      Cancer Sister      Heart Disease Sister      Glaucoma No family hx of      Macular Degeneration No family hx of      Kidney Disease No family hx of       ROS: 10 point ROS neg other than the symptoms noted above in the HPI.    PE:  B/P: 104/60, T: 97.5, P: Data Unavailable, R: Data Unavailable  General: well developed, well nourished thin WM who appears their stated age  HEENT: NC/AT, EOMI, (-)icterus, (-)injection  Neck: Supple, No JVD  Chest: CTA  Heart: S1, S2, (-)m/r/g  Abd: Soft, non  distended, right upper quadrant and epigastric tenderness to deep palpation., no masses  Ext; Warm, no edema  Psych: AAOx3  Neuro: No focal deficits    Lab Results   Component Value Date    WBC 5.8 07/02/2021     Lab Results   Component Value Date    RBC 3.52 07/02/2021     Lab Results   Component Value Date    HGB 11.7 07/02/2021     Lab Results   Component Value Date    HCT 32.3 07/02/2021     No components found for: MCT  Lab Results   Component Value Date    MCV 92 07/02/2021     Lab Results   Component Value Date    MCH 33.2 07/02/2021     Lab Results   Component Value Date    MCHC 36.2 07/02/2021     Lab Results   Component Value Date    RDW 12.4 07/02/2021     Lab Results   Component Value Date     07/02/2021       Liver Function Studies -   Recent Labs   Lab Test 07/02/21  1110   PROTTOTAL 7.3   ALBUMIN 3.4   BILITOTAL 0.4   ALKPHOS 105   AST 12   ALT 21     US -   US ABDOMEN LIMITED 7/2/2021 11:48 AM     CLINICAL HISTORY: ruq abd pain, sob, bloating, ruq abd pain, sob,  bloating  TECHNIQUE: Limited abdominal ultrasound.     COMPARISON: CT of the abdomen and pelvis dated 1/12/2021     FINDINGS:     GALLBLADDER: Poor visualization secondary to bowel gas. Multiple  shadowing gallstones are noted in the gallbladder fossa. Negative  sonographic Ferraro sign.     BILE DUCTS: There is no intrahepatic biliary dilatation. The common  duct measures 5mm.     LIVER: Poorly visualized. The main portal vein is patent with normal  hepatopedal flow.     RIGHT KIDNEY: No hydronephrosis.     PANCREAS: Not visualized secondary to overlying bowel gas.     No ascites.      The aorta is not visualized.                                                                      IMPRESSION:  1.  Markedly limited evaluation secondary to overlying bowel gas.  2.  Cholelithiasis.     JHONATHAN ODOM MD          Impression/plan:  This is a 75-year-old gentleman with probable biliary colic versus symptomatic cholelithiasis.  I also  suspect this was the cause of his symptoms going back many years.  I discussed these findings with the patient and his wife and expressed understanding.  I discussed the options of continuing a low-fat diet versus cholecystectomy.  I discussion with the patient is what the plan at this time is for laparoscopic cholecystectomy.   The procedure, risks, benefits, and alternatives were discussed and the patient agrees to proceed.  He will be scheduled the near future.  He knows to remain on a low-fat diet in the meantime.    Anthony Epps MD, FACS              Again, thank you for allowing me to participate in the care of your patient.        Sincerely,        Anthony Epps MD

## 2021-07-08 NOTE — PROGRESS NOTES
Consult requested by Dr. Dyer    Reason for consultation gallstones    HPI:   Patient is a 75-year-old white male who was recently treated for pneumonia and will finish his course of antibiotics began feeling worse with upper abdominal pain.  There is associated nausea but 90 vomiting fevers or chills.  He was seen in the ER was found have significant right upper quadrant tenderness.  Subsequent ultrasound revealed multiple gallstones which she has now referred.  He has a longstanding history of fatty food intolerance that give him abdominal bloating with nausea and generalized upper abdominal pain.  He was diagnosed many years ago with IBS but no one could come with a more firm diagnosis.  He also has had similar episodes of right upper quadrant pain.  His wife put it recently put him on a low-fat diet and he feels much better with resolution of his symptoms.  His presumed ammonia subsequently resolved.  He now presents to me for evaluation for gallstones.    Past Medical History:   Diagnosis Date     Carotid stenosis     right endartectomy     Chronic kidney disease     stage 3     Coronary artery disease 3-2014    CABG x6     CVA (cerebral infarction)     balance problems, mild     Elevated CK      Esophageal reflux      Hypercholesteremia      Nonsenile cataract      Other and unspecified hyperlipidemia      PAD (peripheral artery disease) (H)      Rhabdomyolysis 2010     Unspecified essential hypertension      Past Surgical History:   Procedure Laterality Date     APPENDECTOMY  1974     BYPASS GRAFT ARTERY CORONARY  3/5/2014    Procedure: BYPASS GRAFT ARTERY CORONARY;  Median Sternotomy, Coronary Artery Bypass Graft X6 used Left internal mammary artery, Left and Right Greater Saphenous vein, on Pump oxygenator.;  Surgeon: Tim Montanez MD;  Location: [facility] OR     CATARACT IOL, RT/LT Bilateral 2008    in [place]     cataracts       COLONOSCOPY  12/12/02    [facility]     COLONOSCOPY N/A 10/7/2014     Procedure: COMBINED COLONOSCOPY, SINGLE OR MULTIPLE BIOPSY/POLYPECTOMY BY BIOPSY;  Surgeon: Micheal Mora MD;  Location: [facility]     CV LOWER EXTREMITY ANGIOGRAM BILATERAL Bilateral 9/9/2019    Procedure: Lower Extremity Angiogram Bilateral;  Surgeon: Julio Oropeza MD;  [facility]     DENTAL SURGERY      TMJ, implants     ENDARTERECTOMY CAROTID      right carotid stent     ESOPHAGOSCOPY, GASTROSCOPY, DUODENOSCOPY (EGD), COMBINED Left 10/7/2014    Procedure: COMBINED ESOPHAGOSCOPY, GASTROSCOPY, DUODENOSCOPY (EGD), BIOPSY SINGLE OR MULTIPLE;  Surgeon: Micheal Mora MD;  Location: [facility]     ESOPHAGOSCOPY, GASTROSCOPY, DUODENOSCOPY (EGD), COMBINED Left 10/7/2014    Procedure: COMBINED ESOPHAGOSCOPY, GASTROSCOPY, DUODENOSCOPY (EGD), REMOVE FOREIGN BODY;  Surgeon: Micheal Mora MD;  Location: [facility]     HC CAPSULE ENDOSCOPY N/A 10/7/2014    Procedure: CAPSULE/PILL CAM ENDOSCOPY;  Surgeon: Micheal Mora MD;  Location: [facility]     INJECT EPIDURAL LUMBAR Right 5/8/2017    Procedure: INJECT EPIDURAL LUMBAR;  Lumbar transforaminal Epidural Steroid Injection right lumbar 4-5, and right  lumbar 5-Sacral 1;  Surgeon: Anthony Gonzalez MD;  [facility]     INJECT JOINT SACROILIAC Bilateral 8/28/2017    Procedure: INJECT JOINT SACROILIAC;  sacroiliac joint injection bilateral;  Surgeon: Anthony Gonzalez MD;  [facility]     IR CAROTID CEREBRAL ANGIOGRAM BILATERAL  9/3/2020     KNEE SURGERY  1976    left knee reconstruction     lasix  2001    both eyes     RELEASE CARPAL TUNNEL  1/13/2011    RELEASE CARPAL TUNNEL performed by JO MADRID at  OR     RELEASE CARPAL TUNNEL  1/20/2011    RELEASE CARPAL TUNNEL performed by JO MADRID at  OR     [facility] UGI ENDOSCOPY, SIMPLE EXAM  01/08/99    [facility]     Current Outpatient Medications   Medication     albuterol (PROAIR HFA/PROVENTIL HFA/VENTOLIN HFA) 108 (90 Base) MCG/ACT inhaler     alirocumab (PRALUENT)  "150 MG/ML injectable syringe     amitriptyline (ELAVIL) 25 MG tablet     amLODIPine (NORVASC) 10 MG tablet     aspirin (ASPIRIN) 81 MG EC tablet     benzonatate (TESSALON) 200 MG capsule     carvedilol (COREG) 25 MG tablet     cefdinir (OMNICEF) 300 MG capsule     gabapentin (NEURONTIN) 300 MG capsule     isosorbide mononitrate (IMDUR) 60 MG 24 hr tablet     losartan (COZAAR) 100 MG tablet     omeprazole (PRILOSEC) 40 MG DR capsule     ondansetron (ZOFRAN) 4 MG tablet     potassium chloride ER (KLOR-CON M) 10 MEQ CR tablet     No current facility-administered medications for this visit.         Allergies   Allergen Reactions     Statins [Hmg-Coa-R Inhibitors] Other (See Comments)     Rhabdo  Same as \"statins\"     Gemfibrozil      Resting thigh-calf pain     Sulfa Drugs      Reacted as a child     Zetia [Ezetimibe]      Muscle cramping     Social History     Socioeconomic History     Marital status:      Spouse name: Not on file     Number of children: Not on file     Years of education: Not on file     Highest education level: Not on file   Occupational History     Occupation:      Employer: RETIRED   Social Needs     Financial resource strain: Not on file     Food insecurity     Worry: Not on file     Inability: Not on file     Transportation needs     Medical: Not on file     Non-medical: Not on file   Tobacco Use     Smoking status: Former Smoker     Packs/day: 2.50     Years: 20.00     Pack years: 50.00     Types: Cigarettes     Quit date: 2000     Years since quittin.5     Smokeless tobacco: Never Used   Substance and Sexual Activity     Alcohol use: No     Alcohol/week: 0.0 standard drinks     Drug use: No     Sexual activity: Yes     Partners: Female   Lifestyle     Physical activity     Days per week: Not on file     Minutes per session: Not on file     Stress: Not on file   Relationships     Social connections     Talks on phone: Not on file     Gets together: Not on file     " Attends Christian service: Not on file     Active member of club or organization: Not on file     Attends meetings of clubs or organizations: Not on file     Relationship status: Not on file     Intimate partner violence     Fear of current or ex partner: Not on file     Emotionally abused: Not on file     Physically abused: Not on file     Forced sexual activity: Not on file   Other Topics Concern      Service Not Asked     Blood Transfusions Not Asked     Caffeine Concern Not Asked     Occupational Exposure Not Asked     Hobby Hazards Not Asked     Sleep Concern Not Asked     Stress Concern Not Asked     Weight Concern Not Asked     Special Diet Not Asked     Back Care Not Asked     Exercise Yes     Comment: 3 times per week     Bike Helmet Not Asked     Seat Belt Not Asked     Self-Exams Not Asked     Parent/sibling w/ CABG, MI or angioplasty before 65F 55M? Not Asked   Social History Narrative    Moved from [place] in August, retired  for [employer]     Family History   Problem Relation Age of Onset     Hypertension Mother      Eye Disorder Mother      Cerebrovascular Disease Father      Heart Disease Father      Lipids Father      Obesity Father      Cancer Sister      Heart Disease Sister      Glaucoma No family hx of      Macular Degeneration No family hx of      Kidney Disease No family hx of       ROS: 10 point ROS neg other than the symptoms noted above in the HPI.    PE:  B/P: 104/60, T: 97.5, P: Data Unavailable, R: Data Unavailable  General: well developed, well nourished thin WM who appears their stated age  HEENT: NC/AT, EOMI, (-)icterus, (-)injection  Neck: Supple, No JVD  Chest: CTA  Heart: S1, S2, (-)m/r/g  Abd: Soft, non distended, right upper quadrant and epigastric tenderness to deep palpation., no masses  Ext; Warm, no edema  Psych: AAOx3  Neuro: No focal deficits    Lab Results   Component Value Date    WBC 5.8 07/02/2021     Lab Results   Component Value Date    RBC 3.52  07/02/2021     Lab Results   Component Value Date    HGB 11.7 07/02/2021     Lab Results   Component Value Date    HCT 32.3 07/02/2021     No components found for: MCT  Lab Results   Component Value Date    MCV 92 07/02/2021     Lab Results   Component Value Date    MCH 33.2 07/02/2021     Lab Results   Component Value Date    MCHC 36.2 07/02/2021     Lab Results   Component Value Date    RDW 12.4 07/02/2021     Lab Results   Component Value Date     07/02/2021       Liver Function Studies -   Recent Labs   Lab Test 07/02/21  1110   PROTTOTAL 7.3   ALBUMIN 3.4   BILITOTAL 0.4   ALKPHOS 105   AST 12   ALT 21     US -   US ABDOMEN LIMITED 7/2/2021 11:48 AM     CLINICAL HISTORY: ruq abd pain, sob, bloating, ruq abd pain, sob,  bloating  TECHNIQUE: Limited abdominal ultrasound.     COMPARISON: CT of the abdomen and pelvis dated 1/12/2021     FINDINGS:     GALLBLADDER: Poor visualization secondary to bowel gas. Multiple  shadowing gallstones are noted in the gallbladder fossa. Negative  sonographic Ferraro sign.     BILE DUCTS: There is no intrahepatic biliary dilatation. The common  duct measures 5mm.     LIVER: Poorly visualized. The main portal vein is patent with normal  hepatopedal flow.     RIGHT KIDNEY: No hydronephrosis.     PANCREAS: Not visualized secondary to overlying bowel gas.     No ascites.      The aorta is not visualized.                                                                      IMPRESSION:  1.  Markedly limited evaluation secondary to overlying bowel gas.  2.  Cholelithiasis.     JHONATHAN ODOM MD          Impression/plan:  This is a 75-year-old gentleman with probable biliary colic versus symptomatic cholelithiasis.  I also suspect this was the cause of his symptoms going back many years.  I discussed these findings with the patient and his wife and expressed understanding.  I discussed the options of continuing a low-fat diet versus cholecystectomy.  I discussion with the patient is  what the plan at this time is for laparoscopic cholecystectomy.   The procedure, risks, benefits, and alternatives were discussed and the patient agrees to proceed.  He will be scheduled the near future.  He knows to remain on a low-fat diet in the meantime.    Anthony Epps MD, FACS

## 2021-07-09 RX ORDER — CILOSTAZOL 100 MG/1
100 TABLET ORAL 2 TIMES DAILY
COMMUNITY
Start: 2020-12-04 | End: 2021-09-27

## 2021-07-12 ENCOUNTER — OFFICE VISIT (OUTPATIENT)
Dept: INTERNAL MEDICINE | Facility: CLINIC | Age: 76
End: 2021-07-12
Payer: MEDICARE

## 2021-07-12 VITALS
OXYGEN SATURATION: 98 % | WEIGHT: 165 LBS | RESPIRATION RATE: 16 BRPM | SYSTOLIC BLOOD PRESSURE: 118 MMHG | DIASTOLIC BLOOD PRESSURE: 64 MMHG | BODY MASS INDEX: 25.09 KG/M2 | TEMPERATURE: 96.9 F | HEART RATE: 62 BPM

## 2021-07-12 DIAGNOSIS — I25.10 ASCVD (ARTERIOSCLEROTIC CARDIOVASCULAR DISEASE): ICD-10-CM

## 2021-07-12 DIAGNOSIS — Z11.59 ENCOUNTER FOR SCREENING FOR OTHER VIRAL DISEASES: ICD-10-CM

## 2021-07-12 DIAGNOSIS — I10 ESSENTIAL HYPERTENSION, BENIGN: ICD-10-CM

## 2021-07-12 DIAGNOSIS — J43.1 PANLOBULAR EMPHYSEMA (H): ICD-10-CM

## 2021-07-12 DIAGNOSIS — K80.20 GALLSTONES: ICD-10-CM

## 2021-07-12 DIAGNOSIS — N18.31 STAGE 3A CHRONIC KIDNEY DISEASE (H): ICD-10-CM

## 2021-07-12 DIAGNOSIS — E87.1 HYPONATREMIA: ICD-10-CM

## 2021-07-12 DIAGNOSIS — Z01.818 PREOP GENERAL PHYSICAL EXAM: Primary | ICD-10-CM

## 2021-07-12 PROBLEM — I70.1 RENAL ARTERY STENOSIS (H): Status: RESOLVED | Noted: 2019-09-05 | Resolved: 2021-07-12

## 2021-07-12 LAB
ANION GAP SERPL CALCULATED.3IONS-SCNC: 6 MMOL/L (ref 3–14)
BUN SERPL-MCNC: 18 MG/DL (ref 7–30)
CALCIUM SERPL-MCNC: 9.1 MG/DL (ref 8.5–10.1)
CHLORIDE BLD-SCNC: 95 MMOL/L (ref 94–109)
CO2 SERPL-SCNC: 22 MMOL/L (ref 20–32)
CREAT SERPL-MCNC: 1.52 MG/DL (ref 0.66–1.25)
GFR SERPL CREATININE-BSD FRML MDRD: 44 ML/MIN/1.73M2
GLUCOSE BLD-MCNC: 115 MG/DL (ref 70–99)
POTASSIUM BLD-SCNC: 4.8 MMOL/L (ref 3.4–5.3)
SARS-COV-2 RNA RESP QL NAA+PROBE: NEGATIVE
SODIUM SERPL-SCNC: 123 MMOL/L (ref 133–144)

## 2021-07-12 PROCEDURE — 80048 BASIC METABOLIC PNL TOTAL CA: CPT | Performed by: INTERNAL MEDICINE

## 2021-07-12 PROCEDURE — U0003 INFECTIOUS AGENT DETECTION BY NUCLEIC ACID (DNA OR RNA); SEVERE ACUTE RESPIRATORY SYNDROME CORONAVIRUS 2 (SARS-COV-2) (CORONAVIRUS DISEASE [COVID-19]), AMPLIFIED PROBE TECHNIQUE, MAKING USE OF HIGH THROUGHPUT TECHNOLOGIES AS DESCRIBED BY CMS-2020-01-R: HCPCS

## 2021-07-12 PROCEDURE — U0005 INFEC AGEN DETEC AMPLI PROBE: HCPCS

## 2021-07-12 PROCEDURE — 36415 COLL VENOUS BLD VENIPUNCTURE: CPT | Performed by: INTERNAL MEDICINE

## 2021-07-12 PROCEDURE — 99214 OFFICE O/P EST MOD 30 MIN: CPT | Performed by: INTERNAL MEDICINE

## 2021-07-12 ASSESSMENT — PAIN SCALES - GENERAL: PAINLEVEL: NO PAIN (0)

## 2021-07-12 NOTE — PATIENT INSTRUCTIONS

## 2021-07-12 NOTE — H&P (VIEW-ONLY)
[address] [address]  Phone: [phone]  Primary Provider: Jonas West  Pre-op Performing Provider: JONAS WEST    PREOPERATIVE EVALUATION:  Today's date: 7/12/2021    Michele Vance is a 75 year old male who presents for a preoperative evaluation.    Surgical Information:  Surgery/Procedure: cholecystectomy, laparoscopic  Surgery Location: [facility]  Surgeon: Dr. Epps  Surgery Date: 7/16/21  Time of Surgery: 7:30 am  Where patient plans to recover: At home with family  Fax number for surgical facility: Note does not need to be faxed, will be available electronically in Epic.    Type of Anesthesia Anticipated: General    Assessment & Plan     The proposed surgical procedure is considered INTERMEDIATE risk.    Problem List Items Addressed This Visit     None      Visit Diagnoses     Preop general physical exam    -  Primary    Relevant Orders    Basic metabolic panel  (Ca, Cl, CO2, Creat, Gluc, K, Na, BUN)    Essential hypertension, benign        ASCVD (arteriosclerotic cardiovascular disease)        Hyponatremia        Relevant Orders    Basic metabolic panel  (Ca, Cl, CO2, Creat, Gluc, K, Na, BUN)    Gallstones        Relevant Orders    Basic metabolic panel  (Ca, Cl, CO2, Creat, Gluc, K, Na, BUN)        Patient has gallstones.  He is going to have his gallbladder removed.  He has had a typical symptoms on and off for a while.  Question of some irritable bowel but may be from his gallbladder disease.  He also had a recent pneumonia was treated.    He has chronic heart disease and carotid stenosis which been treated with CABG and an endarterectomy.  Recently had an ultrasound of his carotids was normal.  EKG shows a chronic right bundle branch block.  He does have some risk with this chronic heart disease but he stable.  He will take his Coreg on the morning of surgery.  His amlodipine and Imdur.  His blood pressure has been stable he will hold  his losartan.    He had recently some hyponatremia possibly from the lung disease and we will recheck his sodium today.  He drinks quite a bit of fluids but this has been stable for the last couple years.       Risks and Recommendations:  The patient has the following additional risks and recommendations for perioperative complications:   - No identified additional risk factors other than previously addressed    Medication Instructions:   - aspirin: Discontinue aspirin 7-10 days prior to procedure to reduce bleeding risk. It should be resumed postoperatively.    - ACE/ARB: HOLD due to exceptional risk of hypotension during surgery.     RECOMMENDATION:  APPROVAL GIVEN to proceed with proposed procedure, without further diagnostic evaluation.    Review of external notes as documented above surgical and ER notes                   Subjective     HPI related to upcoming procedure: having some atypical symptoms of nausea, stomach problems.  ER found stones on the ultrasound, plan to have gallbladder removed.     Preop Questions 7/12/2021   1. Have you ever had a heart attack or stroke? YES - CABG 2014, carotid endartectomy    2. Have you ever had surgery on your heart or blood vessels, such as a stent placement, a coronary artery bypass, or surgery on an artery in your head, neck, heart, or legs? YES - balloon of carotid,    3. Do you have chest pain with activity? No   4. Do you have a history of  heart failure? No   5. Do you currently have a cold, bronchitis or symptoms of other infection? No   6. Do you have a cough, shortness of breath, or wheezing? No   7. Do you or anyone in your family have previous history of blood clots? No   8. Do you or does anyone in your family have a serious bleeding problem such as prolonged bleeding following surgeries or cuts? No   9. Have you ever had problems with anemia or been told to take iron pills? No   10. Have you had any abnormal blood loss such as black, tarry or bloody  stools? No   11. Have you ever had a blood transfusion? No   12. Are you willing to have a blood transfusion if it is medically needed before, during, or after your surgery? Yes   13. Have you or any of your relatives ever had problems with anesthesia? No   14. Do you have sleep apnea, excessive snoring or daytime drowsiness? No   15. Do you have any artifical heart valves or other implanted medical devices like a pacemaker, defibrillator, or continuous glucose monitor? No   16. Do you have artificial joints? No   17. Are you allergic to latex? No       Health Care Directive:  Patient does not have a Health Care Directive or Living Will: Discussed advance care planning with patient; however, patient declined at this time.will bring it in.     Preoperative Review of :   reviewed - just gabapentin      Status of Chronic Conditions:  CAD - Patient has a longstanding history of moderate-severe CAD. Patient denies recent chest pain or NTG use, denies exercise induced dyspnea or PND. Last Stress test 2018, EKG April 2021.     HYPERTENSION - Patient has longstanding history of HTN , currently denies any symptoms referable to elevated blood pressure. Specifically denies chest pain, palpitations, dyspnea, orthopnea, PND or peripheral edema. Blood pressure readings have been in normal range. Current medication regimen is as listed below. Patient denies any side effects of medication.       Review of Systems  Constitutional, neuro, ENT, endocrine, pulmonary, cardiac, gastrointestinal, genitourinary, musculoskeletal, integument and psychiatric systems are negative, except as otherwise noted.    Patient Active Problem List    Diagnosis Date Noted     Biliary colic 07/08/2021     Priority: Medium     Added automatically from request for surgery [id number]       H/O carotid angioplasty 09/04/2020     Priority: Medium     Status post balloon angioplasty of pulmonary artery branches 09/03/2020     Priority: Medium     Renal  artery stenosis (H) 09/05/2019     Priority: Medium     Added automatically from request for surgery [id number]       Carotid stenosis      Priority: Medium     right endartectomy       Essential hypertension with goal blood pressure less than 140/90 06/02/2016     Priority: Medium     PVD (peripheral vascular disease) (H) 07/22/2015     Priority: Medium     Chronic constipation 12/24/2014     Priority: Medium     Irritable bowel syndrome 12/24/2014     Priority: Medium     Health Care Home 09/11/2014     Priority: Medium     Status:  Unable to reach  Care Coordinator:  Erika Marcus    See Letters for [facility] Care Plan  Date:  September 11, 2014         Carotid artery occlusion with cerebral infarction (H) 08/06/2014     Priority: Medium     Dizziness 08/06/2014     Priority: Medium     Panlobular emphysema (H) 06/04/2014     Priority: Medium     Cerebral infarction due to occlusion or stenosis of carotid artery 05/07/2014     Priority: Medium     Problem list name updated by automated process. Provider to review       Stroke, embolic (H) 04/25/2014     Priority: Medium     Mesenteric artery stenosis (H) 04/04/2014     Priority: Medium     2014: Asymptomatic SMA and MELI stenoses, meriting clinical FU       Insomnia 03/12/2014     Priority: Medium     Nutritional deficiency 03/12/2014     Priority: Medium     Pain 03/12/2014     Priority: Medium     Urinary retention 03/12/2014     Priority: Medium     S/P CABG x 6 03/06/2014     Priority: Medium     Left main coronary artery disease 03/04/2014     Priority: Medium     PAD (peripheral artery disease) (H) 03/04/2014     Priority: Medium     2/19/14: RUBEN 0.73 LLE       Arthritis 01/14/2013     Priority: Medium     Carotid bruit 01/14/2013     Priority: Medium     Carotid stenosis, bilateral 01/14/2013     Priority: Medium     Anemia 04/06/2012     Priority: Medium     CKD (chronic kidney disease) stage 3, GFR 30-59 ml/min 04/06/2012     Priority: Medium     Advanced  directives, counseling/discussion 04/05/2012     Priority: Medium     Impingement syndrome, shoulder, right 03/16/2011     Priority: Medium     Hypertension 11/22/2010     Priority: Medium     Hyperlipidemia LDL goal <130 11/22/2010     Priority: Medium     Myalgia and myositis 11/22/2010     Priority: Medium     GERD (gastroesophageal reflux disease) 11/22/2010     Priority: Medium      Past Medical History:   Diagnosis Date     Carotid stenosis     right endartectomy     Chronic kidney disease     stage 3     Coronary artery disease 3-2014    CABG x6     CVA (cerebral infarction)     balance problems, mild     Elevated CK      Esophageal reflux      Hypercholesteremia      Nonsenile cataract      Other and unspecified hyperlipidemia      PAD (peripheral artery disease) (H)      Rhabdomyolysis 2010     Unspecified essential hypertension      Past Surgical History:   Procedure Laterality Date     APPENDECTOMY  1974     BYPASS GRAFT ARTERY CORONARY  3/5/2014    Procedure: BYPASS GRAFT ARTERY CORONARY;  Median Sternotomy, Coronary Artery Bypass Graft X6 used Left internal mammary artery, Left and Right Greater Saphenous vein, on Pump oxygenator.;  Surgeon: Tim Montanez MD;  Location: [facility] OR     CATARACT IOL, RT/LT Bilateral 2008    in [place]     cataracts       COLONOSCOPY  12/12/02    [facility]     COLONOSCOPY N/A 10/7/2014    Procedure: COMBINED COLONOSCOPY, SINGLE OR MULTIPLE BIOPSY/POLYPECTOMY BY BIOPSY;  Surgeon: Micheal Mora MD;  Location:  GI     CV LOWER EXTREMITY ANGIOGRAM BILATERAL Bilateral 9/9/2019    Procedure: Lower Extremity Angiogram Bilateral;  Surgeon: Julio Oropeza MD;  Location:  [facility] CARDIAC CATH LAB     DENTAL SURGERY      TMJ, implants     ENDARTERECTOMY CAROTID      right carotid stent     ESOPHAGOSCOPY, GASTROSCOPY, DUODENOSCOPY (EGD), COMBINED Left 10/7/2014    Procedure: COMBINED ESOPHAGOSCOPY, GASTROSCOPY, DUODENOSCOPY (EGD), BIOPSY SINGLE OR MULTIPLE;   Surgeon: Micheal Mora MD;  Location: [facility] GI     ESOPHAGOSCOPY, GASTROSCOPY, DUODENOSCOPY (EGD), COMBINED Left 10/7/2014    Procedure: COMBINED ESOPHAGOSCOPY, GASTROSCOPY, DUODENOSCOPY (EGD), REMOVE FOREIGN BODY;  Surgeon: Micheal Mora MD;  Location: [facility] GI     HC CAPSULE ENDOSCOPY N/A 10/7/2014    Procedure: CAPSULE/PILL CAM ENDOSCOPY;  Surgeon: Micheal Mora MD;  Location: [facility] GI     INJECT EPIDURAL LUMBAR Right 5/8/2017    Procedure: INJECT EPIDURAL LUMBAR;  Lumbar transforaminal Epidural Steroid Injection right lumbar 4-5, and right  lumbar 5-Sacral 1;  Surgeon: Anthony Gonzalez MD;  [facility]     INJECT JOINT SACROILIAC Bilateral 8/28/2017    Procedure: INJECT JOINT SACROILIAC;  sacroiliac joint injection bilateral;  Surgeon: Anthony Gonzalez MD;  Location: [facility]     IR CAROTID CEREBRAL ANGIOGRAM BILATERAL  9/3/2020     KNEE SURGERY  1976    left knee reconstruction     lasix  2001    both eyes     RELEASE CARPAL TUNNEL  1/13/2011    RELEASE CARPAL TUNNEL performed by JO MADRID at  OR     RELEASE CARPAL TUNNEL  1/20/2011    RELEASE CARPAL TUNNEL performed by JO MADRID at  OR     [facility] UGI ENDOSCOPY, SIMPLE EXAM  01/08/99    [facility]     Current Outpatient Medications   Medication Sig Dispense Refill     albuterol (PROAIR HFA/PROVENTIL HFA/VENTOLIN HFA) 108 (90 Base) MCG/ACT inhaler Inhale 2 puffs into the lungs every 4 hours as needed for shortness of breath / dyspnea or wheezing 8 g 0     alirocumab (PRALUENT) 150 MG/ML injectable syringe Inject 1 mL (150 mg) Subcutaneous every 14 days 8 mL 3     amitriptyline (ELAVIL) 25 MG tablet TAKE 1 TABLET TWICE A DAY (Patient taking differently: Take 50 mg by mouth At Bedtime ) 180 tablet 2     amLODIPine (NORVASC) 10 MG tablet Take 1 tablet (10 mg) by mouth daily 90 tablet 3     aspirin (ASPIRIN) 81 MG EC tablet Take 81 mg by mouth every other day       benzonatate (TESSALON) 200 MG capsule Take 1 capsule (200  "mg) by mouth 3 times daily as needed for cough 30 capsule 0     carvedilol (COREG) 25 MG tablet Take 1 tablet (25 mg) by mouth 2 times daily (with meals) 180 tablet 3     cilostazol (PLETAL) 100 MG tablet        gabapentin (NEURONTIN) 300 MG capsule TAKE 1 CAPSULE TWICE A DAY (Patient taking differently: Take 300 mg by mouth 2 times daily ) 180 capsule 3     isosorbide mononitrate (IMDUR) 60 MG 24 hr tablet Take 1 tablet (60 mg) by mouth daily 90 tablet 3     losartan (COZAAR) 100 MG tablet Take 1 tablet (100 mg) by mouth daily 90 tablet 3     omeprazole (PRILOSEC) 40 MG DR capsule TAKE 1 CAPSULE DAILY 90 capsule 0     ondansetron (ZOFRAN) 4 MG tablet Take 1 tablet (4 mg) by mouth as needed for nausea 90 tablet 0     potassium chloride ER (KLOR-CON M) 10 MEQ CR tablet TAKE 5 TABLETS TWICE A  tablet 3       Allergies   Allergen Reactions     Statins [Hmg-Coa-R Inhibitors] Other (See Comments)     Rhabdo  Same as \"statins\"     Gemfibrozil      Resting thigh-calf pain     Sulfa Drugs      Reacted as a child     Zetia [Ezetimibe]      Muscle cramping        Social History     Tobacco Use     Smoking status: Former Smoker     Packs/day: 2.50     Years: 20.00     Pack years: 50.00     Types: Cigarettes     Quit date: 2000     Years since quittin.5     Smokeless tobacco: Never Used   Substance Use Topics     Alcohol use: No     Alcohol/week: 0.0 standard drinks       History   Drug Use No         Objective     /64   Pulse 62   Temp 96.9  F (36.1  C) (Temporal)   Resp 16   Wt 74.8 kg (165 lb)   SpO2 98%   BMI 25.09 kg/m      Physical Exam  GENERAL APPEARANCE: healthy, alert and no distress     HENT: ear canals and TM's normal and nose and mouth without ulcers or lesions     NECK: no adenopathy, no asymmetry, masses, or scars and thyroid normal to palpation     RESP: lungs clear to auscultation - no rales, rhonchi or wheezes     CV: regular rates and rhythm, normal S1 S2, no S3 or S4 and no " murmur, click or rub     ABDOMEN:  soft, nontender, no HSM or masses and bowel sounds normal     MS: extremities normal- no gross deformities noted, no evidence of inflammation in joints, FROM in all extremities.     SKIN: no suspicious lesions or rashes     NEURO: Normal strength and tone, sensory exam grossly normal, mentation intact and speech normal     PSYCH: mentation appears normal. and affect normal/bright     LYMPHATICS: No cervical adenopathy    Recent Labs   Lab Test 07/02/21  1110 06/25/21  1147 09/03/20  1728 09/03/20  1135 09/01/20  1059 09/01/20  1059   HGB 11.7* 11.3*   < > 12.4*  --  13.2*    290   < > 256  --  281   INR  --   --   --  1.09  --  1.08   * 125*  --   --    < > 138   POTASSIUM 4.9 5.3  --   --    < > 4.4   CR 1.31* 1.35*  --  1.30*  --  1.27*    < > = values in this interval not displayed.        Diagnostics:  Labs pending at this time.  Results will be reviewed when available.   No EKG this visit, completed in the last 90 days.chronic RBBB     Revised Cardiac Risk Index (RCRI):  The patient has the following serious cardiovascular risks for perioperative complications:   - No serious cardiac risks = 0 points     RCRI Interpretation: 1 point: Class II (low risk - 0.9% complication rate)         Signed Electronically by: Jonas West MD  Copy of this evaluation report is provided to requesting physician.

## 2021-07-13 ENCOUNTER — TELEPHONE (OUTPATIENT)
Dept: INTERNAL MEDICINE | Facility: CLINIC | Age: 76
End: 2021-07-13

## 2021-07-13 NOTE — TELEPHONE ENCOUNTER
----- Message from Jonas West MD sent at 7/12/2021 10:27 AM CDT -----  Please call him since sodium is still low at 123.   Should reduce fluids he is drinking to 1.5 liter a day or less, recheck on Thursday, basic ordered.  Chest ct this week to evaluate lungs.

## 2021-07-14 ENCOUNTER — HOSPITAL ENCOUNTER (OUTPATIENT)
Dept: CT IMAGING | Facility: CLINIC | Age: 76
Discharge: HOME | End: 2021-07-14
Attending: INTERNAL MEDICINE | Admitting: INTERNAL MEDICINE
Payer: MEDICARE

## 2021-07-14 DIAGNOSIS — E87.1 HYPONATREMIA: ICD-10-CM

## 2021-07-14 DIAGNOSIS — J43.1 PANLOBULAR EMPHYSEMA (H): ICD-10-CM

## 2021-07-14 PROCEDURE — 71250 CT THORAX DX C-: CPT

## 2021-07-15 ENCOUNTER — LAB (OUTPATIENT)
Dept: LAB | Facility: CLINIC | Age: 76
End: 2021-07-15
Payer: MEDICARE

## 2021-07-15 DIAGNOSIS — J43.1 PANLOBULAR EMPHYSEMA (H): ICD-10-CM

## 2021-07-15 DIAGNOSIS — E87.1 HYPONATREMIA: ICD-10-CM

## 2021-07-15 LAB
ANION GAP SERPL CALCULATED.3IONS-SCNC: 6 MMOL/L (ref 3–14)
BUN SERPL-MCNC: 19 MG/DL (ref 7–30)
CALCIUM SERPL-MCNC: 9 MG/DL (ref 8.5–10.1)
CHLORIDE BLD-SCNC: 102 MMOL/L (ref 94–109)
CO2 SERPL-SCNC: 24 MMOL/L (ref 20–32)
CREAT SERPL-MCNC: 1.57 MG/DL (ref 0.66–1.25)
GFR SERPL CREATININE-BSD FRML MDRD: 43 ML/MIN/1.73M2
GLUCOSE BLD-MCNC: 135 MG/DL (ref 70–99)
POTASSIUM BLD-SCNC: 4.7 MMOL/L (ref 3.4–5.3)
SODIUM SERPL-SCNC: 132 MMOL/L (ref 133–144)

## 2021-07-15 PROCEDURE — 80048 BASIC METABOLIC PNL TOTAL CA: CPT

## 2021-07-15 PROCEDURE — 36415 COLL VENOUS BLD VENIPUNCTURE: CPT

## 2021-07-16 ENCOUNTER — ANESTHESIA EVENT (OUTPATIENT)
Dept: SURGERY | Facility: CLINIC | Age: 76
End: 2021-07-16
Payer: MEDICARE

## 2021-07-16 ENCOUNTER — ANESTHESIA (OUTPATIENT)
Dept: SURGERY | Facility: CLINIC | Age: 76
End: 2021-07-16
Payer: MEDICARE

## 2021-07-16 ENCOUNTER — HOSPITAL ENCOUNTER (OUTPATIENT)
Facility: CLINIC | Age: 76
Discharge: HOME | End: 2021-07-16
Attending: SPECIALIST | Admitting: SPECIALIST
Payer: MEDICARE

## 2021-07-16 VITALS
TEMPERATURE: 96.8 F | RESPIRATION RATE: 16 BRPM | HEART RATE: 74 BPM | SYSTOLIC BLOOD PRESSURE: 133 MMHG | HEIGHT: 68 IN | BODY MASS INDEX: 25.01 KG/M2 | WEIGHT: 165 LBS | OXYGEN SATURATION: 94 % | DIASTOLIC BLOOD PRESSURE: 60 MMHG

## 2021-07-16 DIAGNOSIS — K80.50 BILIARY COLIC: ICD-10-CM

## 2021-07-16 DIAGNOSIS — G89.18 POST-OP PAIN: Primary | ICD-10-CM

## 2021-07-16 PROCEDURE — 250N000025 HC SEVOFLURANE, PER MIN: Performed by: SPECIALIST

## 2021-07-16 PROCEDURE — 250N000011 HC RX IP 250 OP 636: Performed by: SPECIALIST

## 2021-07-16 PROCEDURE — 88304 TISSUE EXAM BY PATHOLOGIST: CPT | Mod: TC | Performed by: SPECIALIST

## 2021-07-16 PROCEDURE — 360N000076 HC SURGERY LEVEL 3, PER MIN: Performed by: SPECIALIST

## 2021-07-16 PROCEDURE — 710N000010 HC RECOVERY PHASE 1, LEVEL 2, PER MIN: Performed by: SPECIALIST

## 2021-07-16 PROCEDURE — 250N000011 HC RX IP 250 OP 636: Performed by: NURSE ANESTHETIST, CERTIFIED REGISTERED

## 2021-07-16 PROCEDURE — 250N000009 HC RX 250: Performed by: SPECIALIST

## 2021-07-16 PROCEDURE — 999N000141 HC STATISTIC PRE-PROCEDURE NURSING ASSESSMENT: Performed by: SPECIALIST

## 2021-07-16 PROCEDURE — 250N000013 HC RX MED GY IP 250 OP 250 PS 637: Performed by: SPECIALIST

## 2021-07-16 PROCEDURE — 258N000003 HC RX IP 258 OP 636: Performed by: NURSE ANESTHETIST, CERTIFIED REGISTERED

## 2021-07-16 PROCEDURE — 710N000012 HC RECOVERY PHASE 2, PER MINUTE: Performed by: SPECIALIST

## 2021-07-16 PROCEDURE — 47562 LAPAROSCOPIC CHOLECYSTECTOMY: CPT | Performed by: SPECIALIST

## 2021-07-16 PROCEDURE — 370N000017 HC ANESTHESIA TECHNICAL FEE, PER MIN: Performed by: SPECIALIST

## 2021-07-16 PROCEDURE — 250N000009 HC RX 250: Performed by: NURSE ANESTHETIST, CERTIFIED REGISTERED

## 2021-07-16 PROCEDURE — 272N000001 HC OR GENERAL SUPPLY STERILE: Performed by: SPECIALIST

## 2021-07-16 RX ORDER — OXYCODONE HYDROCHLORIDE 5 MG/1
5-10 TABLET ORAL EVERY 6 HOURS PRN
Qty: 10 TABLET | Refills: 0 | Status: SHIPPED | OUTPATIENT
Start: 2021-07-16 | End: 2021-09-10

## 2021-07-16 RX ORDER — ONDANSETRON 2 MG/ML
INJECTION INTRAMUSCULAR; INTRAVENOUS PRN
Status: DISCONTINUED | OUTPATIENT
Start: 2021-07-16 | End: 2021-07-16

## 2021-07-16 RX ORDER — FENTANYL CITRATE 50 UG/ML
50 INJECTION, SOLUTION INTRAMUSCULAR; INTRAVENOUS EVERY 5 MIN PRN
Status: DISCONTINUED | OUTPATIENT
Start: 2021-07-16 | End: 2021-07-16 | Stop reason: HOSPADM

## 2021-07-16 RX ORDER — SODIUM CHLORIDE, SODIUM LACTATE, POTASSIUM CHLORIDE, CALCIUM CHLORIDE 600; 310; 30; 20 MG/100ML; MG/100ML; MG/100ML; MG/100ML
INJECTION, SOLUTION INTRAVENOUS CONTINUOUS
Status: DISCONTINUED | OUTPATIENT
Start: 2021-07-16 | End: 2021-07-16 | Stop reason: HOSPADM

## 2021-07-16 RX ORDER — ONDANSETRON 4 MG/1
4 TABLET, ORALLY DISINTEGRATING ORAL EVERY 30 MIN PRN
Status: DISCONTINUED | OUTPATIENT
Start: 2021-07-16 | End: 2021-07-16 | Stop reason: HOSPADM

## 2021-07-16 RX ORDER — DEXAMETHASONE SODIUM PHOSPHATE 4 MG/ML
INJECTION, SOLUTION INTRA-ARTICULAR; INTRALESIONAL; INTRAMUSCULAR; INTRAVENOUS; SOFT TISSUE PRN
Status: DISCONTINUED | OUTPATIENT
Start: 2021-07-16 | End: 2021-07-16

## 2021-07-16 RX ORDER — CEFAZOLIN SODIUM 2 G/100ML
2 INJECTION, SOLUTION INTRAVENOUS
Status: COMPLETED | OUTPATIENT
Start: 2021-07-16 | End: 2021-07-16

## 2021-07-16 RX ORDER — ONDANSETRON 2 MG/ML
4 INJECTION INTRAMUSCULAR; INTRAVENOUS EVERY 30 MIN PRN
Status: DISCONTINUED | OUTPATIENT
Start: 2021-07-16 | End: 2021-07-16 | Stop reason: HOSPADM

## 2021-07-16 RX ORDER — LIDOCAINE HYDROCHLORIDE 20 MG/ML
INJECTION, SOLUTION INFILTRATION; PERINEURAL PRN
Status: DISCONTINUED | OUTPATIENT
Start: 2021-07-16 | End: 2021-07-16

## 2021-07-16 RX ORDER — METOPROLOL TARTRATE 1 MG/ML
1-2 INJECTION, SOLUTION INTRAVENOUS EVERY 5 MIN PRN
Status: DISCONTINUED | OUTPATIENT
Start: 2021-07-16 | End: 2021-07-16 | Stop reason: HOSPADM

## 2021-07-16 RX ORDER — OXYCODONE AND ACETAMINOPHEN 5; 325 MG/1; MG/1
2 TABLET ORAL
Status: COMPLETED | OUTPATIENT
Start: 2021-07-16 | End: 2021-07-16

## 2021-07-16 RX ORDER — MEPERIDINE HYDROCHLORIDE 25 MG/ML
12.5 INJECTION INTRAMUSCULAR; INTRAVENOUS; SUBCUTANEOUS
Status: DISCONTINUED | OUTPATIENT
Start: 2021-07-16 | End: 2021-07-16 | Stop reason: HOSPADM

## 2021-07-16 RX ORDER — BUPIVACAINE HYDROCHLORIDE AND EPINEPHRINE 2.5; 5 MG/ML; UG/ML
INJECTION, SOLUTION INFILTRATION; PERINEURAL PRN
Status: DISCONTINUED | OUTPATIENT
Start: 2021-07-16 | End: 2021-07-16 | Stop reason: HOSPADM

## 2021-07-16 RX ORDER — LIDOCAINE 40 MG/G
CREAM TOPICAL
Status: DISCONTINUED | OUTPATIENT
Start: 2021-07-16 | End: 2021-07-16 | Stop reason: HOSPADM

## 2021-07-16 RX ORDER — HYDROMORPHONE HYDROCHLORIDE 1 MG/ML
0.4 INJECTION, SOLUTION INTRAMUSCULAR; INTRAVENOUS; SUBCUTANEOUS EVERY 5 MIN PRN
Status: DISCONTINUED | OUTPATIENT
Start: 2021-07-16 | End: 2021-07-16 | Stop reason: HOSPADM

## 2021-07-16 RX ORDER — FENTANYL CITRATE 50 UG/ML
INJECTION, SOLUTION INTRAMUSCULAR; INTRAVENOUS PRN
Status: DISCONTINUED | OUTPATIENT
Start: 2021-07-16 | End: 2021-07-16

## 2021-07-16 RX ORDER — GLYCOPYRROLATE 0.2 MG/ML
INJECTION, SOLUTION INTRAMUSCULAR; INTRAVENOUS PRN
Status: DISCONTINUED | OUTPATIENT
Start: 2021-07-16 | End: 2021-07-16

## 2021-07-16 RX ORDER — PROPOFOL 10 MG/ML
INJECTION, EMULSION INTRAVENOUS PRN
Status: DISCONTINUED | OUTPATIENT
Start: 2021-07-16 | End: 2021-07-16

## 2021-07-16 RX ORDER — CEFAZOLIN SODIUM 2 G/100ML
2 INJECTION, SOLUTION INTRAVENOUS SEE ADMIN INSTRUCTIONS
Status: DISCONTINUED | OUTPATIENT
Start: 2021-07-16 | End: 2021-07-16 | Stop reason: HOSPADM

## 2021-07-16 RX ORDER — EPHEDRINE SULFATE 50 MG/ML
INJECTION, SOLUTION INTRAMUSCULAR; INTRAVENOUS; SUBCUTANEOUS PRN
Status: DISCONTINUED | OUTPATIENT
Start: 2021-07-16 | End: 2021-07-16

## 2021-07-16 RX ORDER — ESMOLOL HYDROCHLORIDE 10 MG/ML
INJECTION INTRAVENOUS PRN
Status: DISCONTINUED | OUTPATIENT
Start: 2021-07-16 | End: 2021-07-16

## 2021-07-16 RX ADMIN — SODIUM CHLORIDE, POTASSIUM CHLORIDE, SODIUM LACTATE AND CALCIUM CHLORIDE: 600; 310; 30; 20 INJECTION, SOLUTION INTRAVENOUS at 06:51

## 2021-07-16 RX ADMIN — PHENYLEPHRINE HYDROCHLORIDE 50 MCG: 10 INJECTION INTRAVENOUS at 07:30

## 2021-07-16 RX ADMIN — Medication 5 MG: at 07:47

## 2021-07-16 RX ADMIN — ROCURONIUM BROMIDE 40 MG: 10 INJECTION INTRAVENOUS at 07:31

## 2021-07-16 RX ADMIN — Medication 5 MG: at 07:52

## 2021-07-16 RX ADMIN — GLYCOPYRROLATE 0.2 MG: 0.2 INJECTION, SOLUTION INTRAMUSCULAR; INTRAVENOUS at 07:54

## 2021-07-16 RX ADMIN — GLYCOPYRROLATE 0.2 MG: 0.2 INJECTION, SOLUTION INTRAMUSCULAR; INTRAVENOUS at 07:48

## 2021-07-16 RX ADMIN — PHENYLEPHRINE HYDROCHLORIDE 100 MCG: 10 INJECTION INTRAVENOUS at 07:47

## 2021-07-16 RX ADMIN — CEFAZOLIN SODIUM 2 G: 2 INJECTION, SOLUTION INTRAVENOUS at 07:27

## 2021-07-16 RX ADMIN — LIDOCAINE HYDROCHLORIDE 60 MG: 20 INJECTION, SOLUTION INFILTRATION; PERINEURAL at 07:30

## 2021-07-16 RX ADMIN — FENTANYL CITRATE 25 MCG: 50 INJECTION, SOLUTION INTRAMUSCULAR; INTRAVENOUS at 08:35

## 2021-07-16 RX ADMIN — PHENYLEPHRINE HYDROCHLORIDE 100 MCG: 10 INJECTION INTRAVENOUS at 07:34

## 2021-07-16 RX ADMIN — PHENYLEPHRINE HYDROCHLORIDE 50 MCG: 10 INJECTION INTRAVENOUS at 07:44

## 2021-07-16 RX ADMIN — DEXAMETHASONE SODIUM PHOSPHATE 5 MG: 4 INJECTION, SOLUTION INTRA-ARTICULAR; INTRALESIONAL; INTRAMUSCULAR; INTRAVENOUS; SOFT TISSUE at 07:39

## 2021-07-16 RX ADMIN — ONDANSETRON 4 MG: 2 INJECTION INTRAMUSCULAR; INTRAVENOUS at 08:13

## 2021-07-16 RX ADMIN — OXYCODONE HYDROCHLORIDE AND ACETAMINOPHEN 1 TABLET: 5; 325 TABLET ORAL at 09:55

## 2021-07-16 RX ADMIN — PROPOFOL 120 MG: 10 INJECTION, EMULSION INTRAVENOUS at 07:30

## 2021-07-16 RX ADMIN — Medication 10 MG: at 07:49

## 2021-07-16 RX ADMIN — ROCURONIUM BROMIDE 10 MG: 10 INJECTION INTRAVENOUS at 08:00

## 2021-07-16 RX ADMIN — ESMOLOL HYDROCHLORIDE 20 MG: 10 INJECTION, SOLUTION INTRAVENOUS at 08:08

## 2021-07-16 RX ADMIN — Medication 0.1 ML: at 07:53

## 2021-07-16 RX ADMIN — Medication 5 MG: at 07:44

## 2021-07-16 RX ADMIN — Medication 0.1 ML: at 07:51

## 2021-07-16 RX ADMIN — FENTANYL CITRATE 50 MCG: 50 INJECTION, SOLUTION INTRAMUSCULAR; INTRAVENOUS at 07:29

## 2021-07-16 RX ADMIN — FENTANYL CITRATE 50 MCG: 50 INJECTION, SOLUTION INTRAMUSCULAR; INTRAVENOUS at 08:05

## 2021-07-16 RX ADMIN — SUGAMMADEX 200 MG: 100 INJECTION, SOLUTION INTRAVENOUS at 08:34

## 2021-07-16 RX ADMIN — Medication 0.1 ML: at 07:49

## 2021-07-16 ASSESSMENT — LIFESTYLE VARIABLES: TOBACCO_USE: 1

## 2021-07-16 ASSESSMENT — MIFFLIN-ST. JEOR: SCORE: 1462.94

## 2021-07-16 ASSESSMENT — COPD QUESTIONNAIRES: COPD: 1

## 2021-07-16 NOTE — ANESTHESIA PREPROCEDURE EVALUATION
Anesthesia Pre-Procedure Evaluation    Patient: Michele Vance   MRN: [id number] : 1945        Preoperative Diagnosis: Biliary colic [K80.50]   Procedure : Procedure(s):  CHOLECYSTECTOMY, LAPAROSCOPIC     Past Medical History:   Diagnosis Date     Carotid stenosis     right endartectomy     Chronic kidney disease     stage 3     Coronary artery disease 3-    CABG x6     CVA (cerebral infarction)     balance problems, mild     Elevated CK      Esophageal reflux      Hypercholesteremia      Nonsenile cataract      Other and unspecified hyperlipidemia      PAD (peripheral artery disease) (H)      Rhabdomyolysis      Unspecified essential hypertension       Past Surgical History:   Procedure Laterality Date     APPENDECTOMY  1974     BYPASS GRAFT ARTERY CORONARY  3/5/2014    Procedure: BYPASS GRAFT ARTERY CORONARY;  Median Sternotomy, Coronary Artery Bypass Graft X6 used Left internal mammary artery, Left and Right Greater Saphenous vein, on Pump oxygenator.;  Surgeon: Tim Montanez MD;  Location: [facility] OR     CATARACT IOL, RT/LT Bilateral     in [place]     cataracts       COLONOSCOPY  02    [facility]     COLONOSCOPY N/A 10/7/2014    Procedure: COMBINED COLONOSCOPY, SINGLE OR MULTIPLE BIOPSY/POLYPECTOMY BY BIOPSY;  Surgeon: Micheal Mora MD;  Location:  GI     CV LOWER EXTREMITY ANGIOGRAM BILATERAL Bilateral 2019    Procedure: Lower Extremity Angiogram Bilateral;  Surgeon: Julio Oropeza MD;  Location: [facility] CARDIAC CATH LAB     DENTAL SURGERY      TMJ, implants     ENDARTERECTOMY CAROTID      right carotid stent     ESOPHAGOSCOPY, GASTROSCOPY, DUODENOSCOPY (EGD), COMBINED Left 10/7/2014    Procedure: COMBINED ESOPHAGOSCOPY, GASTROSCOPY, DUODENOSCOPY (EGD), BIOPSY SINGLE OR MULTIPLE;  Surgeon: Micheal Mora MD;  Location:  GI     ESOPHAGOSCOPY, GASTROSCOPY, DUODENOSCOPY (EGD), COMBINED Left 10/7/2014    Procedure: COMBINED ESOPHAGOSCOPY, GASTROSCOPY,  "DUODENOSCOPY (EGD), REMOVE FOREIGN BODY;  Surgeon: Micheal Mora MD;  Location: [facility] GI     HC CAPSULE ENDOSCOPY N/A 10/7/2014    Procedure: CAPSULE/PILL CAM ENDOSCOPY;  Surgeon: Micheal Mora MD;  Location: [facility] GI     INJECT EPIDURAL LUMBAR Right 2017    Procedure: INJECT EPIDURAL LUMBAR;  Lumbar transforaminal Epidural Steroid Injection right lumbar 4-5, and right  lumbar 5-Sacral 1;  Surgeon: Anthony Gonzalez MD;  [facility]     INJECT JOINT SACROILIAC Bilateral 2017    Procedure: INJECT JOINT SACROILIAC;  sacroiliac joint injection bilateral;  Surgeon: Anthony Gonzalez MD;  Location: [facility]     IR CAROTID CEREBRAL ANGIOGRAM BILATERAL  9/3/2020     KNEE SURGERY      left knee reconstruction     lasix      both eyes     RELEASE CARPAL TUNNEL  2011    RELEASE CARPAL TUNNEL performed by JO MADRID at  OR     RELEASE CARPAL TUNNEL  2011    RELEASE CARPAL TUNNEL performed by JO MADRID at  OR     [facility] UGI ENDOSCOPY, SIMPLE EXAM  99    [facility]      Allergies   Allergen Reactions     Statins [Hmg-Coa-R Inhibitors] Other (See Comments)     Rhabdo  Same as \"statins\"     Gemfibrozil      Resting thigh-calf pain     Sulfa Drugs      Reacted as a child     Zetia [Ezetimibe]      Muscle cramping      Social History     Tobacco Use     Smoking status: Former Smoker     Packs/day: 2.50     Years: 20.00     Pack years: 50.00     Types: Cigarettes     Quit date: 2000     Years since quittin.5     Smokeless tobacco: Never Used   Substance Use Topics     Alcohol use: No     Alcohol/week: 0.0 standard drinks      Wt Readings from Last 1 Encounters:   21 74.8 kg (165 lb)        Anesthesia Evaluation   Pt has had prior anesthetic. Type: General and MAC.    No history of anesthetic complications       ROS/MED HX  ENT/Pulmonary: Comment: - Panlobular emphysema     (+) tobacco use, Past use, COPD, recent URI, resolved,     Neurologic:   "   (+) CVA, TIA,     Cardiovascular:     (+) hypertension--CAD -CABG-date: 2014. -    METS/Exercise Tolerance:     Hematologic:       Musculoskeletal:       GI/Hepatic:     (+) GERD, Asymptomatic on medication,     Renal/Genitourinary:     (+) renal disease, type: CRI,     Endo:       Psychiatric/Substance Use:       Infectious Disease:       Malignancy:       Other:            Physical Exam    Airway  airway exam normal      Mallampati: I   TM distance: > 3 FB   Neck ROM: full   Mouth opening: > 3 cm    Respiratory Devices and Support         Dental         B=Bridge, C=Chipped, L=Loose, M=Missing    Cardiovascular   cardiovascular exam normal          Pulmonary   pulmonary exam normal                OUTSIDE LABS:  CBC:   Lab Results   Component Value Date    WBC 5.8 07/02/2021    WBC 6.1 06/25/2021    HGB 11.7 (L) 07/02/2021    HGB 11.3 (L) 06/25/2021    HCT 32.3 (L) 07/02/2021    HCT 32.2 (L) 06/25/2021     07/02/2021     06/25/2021     BMP:   Lab Results   Component Value Date     (L) 07/15/2021     (L) 07/12/2021    POTASSIUM 4.7 07/15/2021    POTASSIUM 4.8 07/12/2021    CHLORIDE 102 07/15/2021    CHLORIDE 95 07/12/2021    CO2 24 07/15/2021    CO2 22 07/12/2021    BUN 19 07/15/2021    BUN 18 07/12/2021    CR 1.57 (H) 07/15/2021    CR 1.52 (H) 07/12/2021     (H) 07/15/2021     (H) 07/12/2021     COAGS:   Lab Results   Component Value Date    PTT 28 09/03/2020    INR 1.09 09/03/2020    FIBR 288 03/05/2014     POC:   Lab Results   Component Value Date    BGM 65 (L) 09/03/2020     HEPATIC:   Lab Results   Component Value Date    ALBUMIN 3.4 07/02/2021    PROTTOTAL 7.3 07/02/2021    ALT 21 07/02/2021    AST 12 07/02/2021    ALKPHOS 105 07/02/2021    BILITOTAL 0.4 07/02/2021     OTHER:   Lab Results   Component Value Date    PH 7.43 03/07/2014    LACT 0.7 06/25/2021    A1C 5.6 03/07/2014    RAHEEM 9.0 07/15/2021    PHOS 3.8 09/03/2020    MAG 2.0 09/04/2020    LIPASE 110 05/14/2014     AMYLASE 90 05/14/2014    TSH 0.52 04/05/2021    T4 1.43 05/14/2014    CRP <2.9 02/21/2019    SED 47 (H) 06/20/2014       Anesthesia Plan    ASA Status:  3      Anesthesia Type: General.     - Airway: ETT   Induction: Intravenous.   Maintenance: Inhalation.        Consents    Anesthesia Plan(s) and associated risks, benefits, and realistic alternatives discussed. Questions answered and patient/representative(s) expressed understanding.     - Discussed with:  Patient      - Extended Intubation/Ventilatory Support Discussed: No.      - Patient is DNR/DNI Status: No    Use of blood products discussed: Yes.     Postoperative Care    Pain management: IV analgesics.   PONV prophylaxis: Ondansetron (or other 5HT-3), Dexamethasone or Solumedrol     Comments:                MIAH Hendricks CRNA

## 2021-07-16 NOTE — DISCHARGE INSTRUCTIONS
[facility]    Home Care Following Gallbladder Surgery    Dr. ANI Epps      Care of the Incision:    Remove gauze dressing (if present) after 24 hours and then ok to shower.     Surgical glue was used on your 4 incisions so keep it dry for 24 hours.  Then you may shower but don t submerge under water for at least 2 weeks.  Gently pat your incision dry with a freshly laundered towel.    Do not touch your incision with bare hands or pick at scabs. Do not apply ointment on your incisions.    Leave your incision open to air.  Cover it only if draining, clothing rubs or irritates it.    Activity:    Gradually increase your activity.  Walk short distances several times each day and increase the distance as your strength allows.    To promote circulation, do not cross your legs while sitting.    Return to work will be determined by the type of work you do and should be discussed with your physician.    Do not drive or operate equipment while taking prescription pain medicines.  You may drive 1 week after surgery if you have stopped taking prescription pain medicines and can react quickly enough to make an emergency stop if necessary.    Diet:    Return to the diet you were on before surgery.    Drink plenty of water.    Avoid foods that cause constipation.      REMEMBER--most prescription pain pills cause constipation.  Walking, extra fluids, and increased fiber (fresh fruits and vegetables, etc.) are natural remedies for constipation.  You can also take mineral oil, 1-2 Tablespoons per day.  If still constipated you may try a stool softener such as Colace or Miralax.    Call Your Physician if You Have:    Redness, increased swelling or cloudy drainage from your incision.    A temperature of more than 101 degrees F.    Worsening pain in your incision not relieved by your prescription pain pills and/or a short rest.    Jaundice (yellowing of skin or eyes)    Abdominal distention (stomach getting very  large)    Swelling in your legs    Productive cough    Burning with urination    Any questions or concerns about your recovery, please call [facility] Business hours [phone]    After hours [phone] [facility] Nurse Advice Line (24 hours a day)    Follow-up Care:    Make an appointment 2-3 week after your surgery if not already made.  Call [phone].  [facility] Same-Day Surgery   Adult Discharge Orders & Instructions     For 24 hours after surgery    1. Get plenty of rest.  A responsible adult must stay with you for at least 24 hours after you leave the hospital.   2. Do not drive or use heavy equipment.  If you have weakness or tingling, don't drive or use heavy equipment until this feeling goes away.  3. Do not drink alcohol.  4. Avoid strenuous or risky activities.  Ask for help when climbing stairs.   5. You may feel lightheaded.  If so, sit for a few minutes before standing.  Have someone help you get up.   6. You may have a slight fever. Call the doctor if your fever is over 100 F (37.7 C) (taken under the tongue) or lasts longer than 24 hours.  7. You may have a dry mouth, a sore throat, muscle aches or trouble sleeping.  These should go away after 24 hours.  8. Do not make important or legal decisions.  We don t expect you to have any problems from the surgery or treatment you had today. Just in case, here s what to do if you have pain, upset stomach (nausea), bleeding or infection:  Pain:  Take medicines your doctor has prescribed or over-the-counter medicine they have suggested. Resting and using ice packs can help, too. For surgery on an arm or leg, raise it on a pillow to ease swelling. Call your doctor if these methods don t work.  Copyright Zaheer Mccann, Licensed under [employer]  Upset stomach (nausea):  Take anti-nausea medicine approved by your doctor. Drink clear liquids like apple juice, ginger ale, broth or 7-Up. Be sure to drink enough fluids.  Rest can help, too. Move to normal foods when you re ready. Bleeding:  In the first 24 hours, you may see a little blood on your dressing, about the size of a quarter. You don t need to worry about this much blood, but if the blood spot keeps getting bigger:    Put pressure on the wound if you can, AND    Call your doctor.  Copyright Currently, Licensed under [employer]  Fever/Infection: Please call your doctor if you have any of these signs:    Redness    Swelling    Wound feels warm    Pain gets worse    Bad-smelling fluid leaks from wound    Fever or chills  Call your doctor for any of the followin.  It has been over 8 to 10 hours since surgery and you are still not able to urinate (pass water).    2.  Headache for over 24 hours.       [facility] Nurse advice line: [phone] (24 hr line)

## 2021-07-16 NOTE — OR NURSING
Verbal report received from Kaylynn TORRES RN and Jude DE LUNA. Phase 1 recovery assumed by Catherine LEW. Pt post-op general anesthesia for lap elizabeth per Dr.R. Epps.

## 2021-07-16 NOTE — ANESTHESIA POSTPROCEDURE EVALUATION
Patient: Michele Vance    Procedure(s):  CHOLECYSTECTOMY, LAPAROSCOPIC    Diagnosis:Biliary colic [K80.50]  Diagnosis Additional Information: No value filed.    Anesthesia Type:  General    Note:  Disposition: Outpatient   Postop Pain Control: Uneventful            Sign Out: Well controlled pain   PONV: No   Neuro/Psych: Uneventful            Sign Out: Acceptable/Baseline neuro status   Airway/Respiratory: Uneventful            Sign Out: Acceptable/Baseline resp. status   CV/Hemodynamics: Uneventful            Sign Out: Acceptable CV status   Other NRE: NONE   DID A NON-ROUTINE EVENT OCCUR? No    Event details/Postop Comments:  Pt was happy with anesthesia care.  No complications.  I will follow up with the pt if needed.           Last vitals:  Vitals:    07/16/21 1000 07/16/21 1015 07/16/21 1025   BP: 129/56 133/60    Pulse:      Resp: 16 16    Temp: 96.8  F (36  C)     SpO2:  93% 94%       Last vitals prior to Anesthesia Care Transfer:  CRNA VITALS  7/16/2021 0811 - 7/16/2021 0911      7/16/2021             Pulse:  76    SpO2:  (!) 86 %          Electronically Signed By: MIAH Hendricks CRNA  July 16, 2021  11:20 AM

## 2021-07-16 NOTE — ANESTHESIA PROCEDURE NOTES
Airway       Patient location during procedure: OR       Procedure Start/Stop Times: 7/16/2021 7:33 AM  Staff -        Performed By: CRNAIndications and Patient Condition       Indications for airway management: viktoria-procedural        Final Airway Details       Final airway type: endotracheal airway       Successful airway: ETT - single  Endotracheal Airway Details        ETT size (mm): 7.5       Cuffed: yes       Successful intubation technique: video laryngoscopy       VL Blade Size: Glidescope 4       Grade View of Cords: 1       Adjucts: stylet       Position: Center       Measured from: gums/teeth       Secured at (cm): 23    Post intubation assessment        Placement verified by: capnometry, equal breath sounds and chest rise        Number of attempts at approach: 1       Number of other approaches attempted: 0       Secured with: silk tape       Ease of procedure: easy       Dentition: Intact and Unchanged

## 2021-07-16 NOTE — OP NOTE
Procedure Date: 07/16/2021    PREOPERATIVE DIAGNOSIS:  Biliary colic.    POSTOPERATIVE DIAGNOSIS:  Biliary colic.    PROCEDURE PERFORMED:  Laparoscopic cholecystectomy.    SURGEON:  Anthony Epps MD, [facility]    ANESTHESIA:  General endotracheal tube.    INDICATIONS FOR PROCEDURE:  This is a 75-year-old gentleman who presented with multiple episodes of epigastric and right upper quadrant pain.  On ultrasound, he had multiple gallstones and for this reason, elected to take him to the operating room for laparoscopic cholecystectomy.    OPERATIVE FINDINGS:  Included a gallbladder with adhesions and multiple stones.    DETAILS OF PROCEDURE:  The patient was taken to the operating room and placed on the table in supine position.  After induction of general anesthesia and abdomen prepped and draped in sterile fashion a timeout was performed confirming the day, the patient as well as the procedure to be performed. A supraumbilical incision was then made.  A Visiport was then inserted into the abdomen under direct visualization.  The abdomen was then insufflated to 15 mmHg pressure.  There was no evidence of any injury from the Visiport insertion.  Two right upper quadrant 5 mm trocars and 11 mm subxiphoid trocar placed.  The gallbladder was grasped.  There were multiple adhesions to the base of the gallbladder.  These were taken down using [place] and ConMed dissectors.  After taking this down the peritoneum over the medial and lateral aspects of the gallbladder was opened up using a ConMed dissector.  During this dissection, high up on the gallbladder the cystic artery was identified.  It was then clipped and transected.  We continued to dissect down to the base of the gallbladder and a window was created into the base of the gallbladder, clearly identifying the cystic duct.  The cystic duct was then clipped and transected.  The gallbladder was removed from the liver bed using cautery and the gallbladder was removed, the  abdomen using an EndoCatch bag.  The area was copiously irrigated.  All fluid was suctioned out.  The liver bed was inspected without evidence of any bleeding.  The subxiphoid trocar was removed.  The fascia was closed using a cone PMI needle and #1 PDS.  The 2 right upper quadrant 5 mm trocars were removed without evidence of any bleeding.  The supraumbilical trocar was removed.  The fascia was closed using a figure-of-eight #1 PDS.  All skin incisions were then closed using 3-0 Vicryl and Exofin glue.  The patient was then taken from the operating to recovery in stable condition to be sent home.    Anthony Epps MD, FACS        D: 2021   T: 2021   MT: alf    Name:     NINFA CID  MRN:      [id number]        Account:        [id number]   :      1945           Procedure Date: 2021     Document: [id number]

## 2021-07-16 NOTE — BRIEF OP NOTE
[facility]    Brief Operative Note    Pre-operative diagnosis: Biliary colic [K80.50]  Post-operative diagnosis Same as pre-operative diagnosis    Procedure: Procedure(s):  CHOLECYSTECTOMY, LAPAROSCOPIC  Surgeon: Surgeon(s) and Role:     * Anthony Epps MD - Primary  Anesthesia: General   Estimated blood loss: Less than 10 ml  Drains: None  Specimens:   ID Type Source Tests Collected by Time Destination   1 : gallbladder and contents Tissue Gallbladder SURGICAL PATHOLOGY EXAM Anthony Epps MD 7/16/2021  8:09 AM      Findings:   Gallbladder with adhesions and multiple stones.  Complications: None.  Implants: * No implants in log *    Anthony Epps MD, FACS    #[id number]

## 2021-07-16 NOTE — ANESTHESIA CARE TRANSFER NOTE
Patient: Michele Vance    Procedure(s):  CHOLECYSTECTOMY, LAPAROSCOPIC    Diagnosis: Biliary colic [K80.50]  Diagnosis Additional Information: No value filed.    Anesthesia Type:   General     Note:    Oropharynx: oropharynx clear of all foreign objects and spontaneously breathing  Level of Consciousness: drowsy  Oxygen Supplementation: face mask    Independent Airway: airway patency satisfactory and stable  Dentition: dentition unchanged  Vital Signs Stable: post-procedure vital signs reviewed and stable  Report to RN Given: handoff report given  Patient transferred to: PACU    Handoff Report: Identifed the Patient, Identified the Reponsible Provider, Reviewed the pertinent medical history, Discussed the surgical course, Reviewed Intra-OP anesthesia mangement and issues during anesthesia, Set expectations for post-procedure period and Allowed opportunity for questions and acknowledgement of understanding      Vitals: (Last set prior to Anesthesia Care Transfer)  CRNA VITALS  7/16/2021 0811 - 7/16/2021 0911      7/16/2021             Pulse:  76    SpO2:  (!) 86 %        Electronically Signed By: MIAH Hendricks CRNA  July 16, 2021  11:20 AM

## 2021-07-19 ENCOUNTER — APPOINTMENT (OUTPATIENT)
Dept: CT IMAGING | Facility: CLINIC | Age: 76
End: 2021-07-19
Attending: EMERGENCY MEDICINE
Payer: MEDICARE

## 2021-07-19 ENCOUNTER — HOSPITAL ENCOUNTER (EMERGENCY)
Facility: CLINIC | Age: 76
Discharge: HOME | End: 2021-07-19
Attending: EMERGENCY MEDICINE | Admitting: EMERGENCY MEDICINE
Payer: MEDICARE

## 2021-07-19 ENCOUNTER — NURSE TRIAGE (OUTPATIENT)
Dept: NURSING | Facility: CLINIC | Age: 76
End: 2021-07-19

## 2021-07-19 ENCOUNTER — APPOINTMENT (OUTPATIENT)
Dept: GENERAL RADIOLOGY | Facility: CLINIC | Age: 76
End: 2021-07-19
Attending: EMERGENCY MEDICINE
Payer: MEDICARE

## 2021-07-19 VITALS
TEMPERATURE: 98.5 F | OXYGEN SATURATION: 93 % | RESPIRATION RATE: 10 BRPM | HEART RATE: 75 BPM | DIASTOLIC BLOOD PRESSURE: 66 MMHG | SYSTOLIC BLOOD PRESSURE: 149 MMHG | WEIGHT: 165 LBS | BODY MASS INDEX: 25.09 KG/M2

## 2021-07-19 DIAGNOSIS — D64.9 ANEMIA, UNSPECIFIED TYPE: ICD-10-CM

## 2021-07-19 DIAGNOSIS — E87.1 HYPONATREMIA: ICD-10-CM

## 2021-07-19 DIAGNOSIS — K59.09 OTHER CONSTIPATION: ICD-10-CM

## 2021-07-19 DIAGNOSIS — M62.81 GENERALIZED MUSCLE WEAKNESS: ICD-10-CM

## 2021-07-19 LAB
ALBUMIN SERPL-MCNC: 3.1 G/DL (ref 3.4–5)
ALBUMIN UR-MCNC: NEGATIVE MG/DL
ALP SERPL-CCNC: 96 U/L (ref 40–150)
ALT SERPL W P-5'-P-CCNC: 59 U/L (ref 0–70)
ANION GAP SERPL CALCULATED.3IONS-SCNC: 6 MMOL/L (ref 3–14)
APPEARANCE UR: CLEAR
AST SERPL W P-5'-P-CCNC: 34 U/L (ref 0–45)
BASOPHILS # BLD AUTO: 0 10E3/UL (ref 0–0.2)
BASOPHILS NFR BLD AUTO: 0 %
BILIRUB SERPL-MCNC: 0.4 MG/DL (ref 0.2–1.3)
BILIRUB UR QL STRIP: NEGATIVE
BUN SERPL-MCNC: 25 MG/DL (ref 7–30)
CALCIUM SERPL-MCNC: 9.1 MG/DL (ref 8.5–10.1)
CHLORIDE BLD-SCNC: 102 MMOL/L (ref 94–109)
CO2 SERPL-SCNC: 23 MMOL/L (ref 20–32)
COLOR UR AUTO: NORMAL
CREAT SERPL-MCNC: 1.46 MG/DL (ref 0.66–1.25)
EOSINOPHIL # BLD AUTO: 0.4 10E3/UL (ref 0–0.7)
EOSINOPHIL NFR BLD AUTO: 4 %
ERYTHROCYTE [DISTWIDTH] IN BLOOD BY AUTOMATED COUNT: 13.2 % (ref 10–15)
GFR SERPL CREATININE-BSD FRML MDRD: 47 ML/MIN/1.73M2
GLUCOSE BLD-MCNC: 115 MG/DL (ref 70–99)
GLUCOSE UR STRIP-MCNC: NEGATIVE MG/DL
HCT VFR BLD AUTO: 28.2 % (ref 40–53)
HGB BLD-MCNC: 9.5 G/DL (ref 13.3–17.7)
HGB UR QL STRIP: NEGATIVE
IMM GRANULOCYTES # BLD: 0 10E3/UL
IMM GRANULOCYTES NFR BLD: 0 %
KETONES UR STRIP-MCNC: NEGATIVE MG/DL
LEUKOCYTE ESTERASE UR QL STRIP: NEGATIVE
LIPASE SERPL-CCNC: 50 U/L (ref 73–393)
LYMPHOCYTES # BLD AUTO: 1.6 10E3/UL (ref 0.8–5.3)
LYMPHOCYTES NFR BLD AUTO: 16 %
MCH RBC QN AUTO: 33 PG (ref 26.5–33)
MCHC RBC AUTO-ENTMCNC: 33.7 G/DL (ref 31.5–36.5)
MCV RBC AUTO: 98 FL (ref 78–100)
MONOCYTES # BLD AUTO: 0.8 10E3/UL (ref 0–1.3)
MONOCYTES NFR BLD AUTO: 9 %
NEUTROPHILS # BLD AUTO: 6.7 10E3/UL (ref 1.6–8.3)
NEUTROPHILS NFR BLD AUTO: 71 %
NITRATE UR QL: NEGATIVE
NRBC # BLD AUTO: 0 10E3/UL
NRBC BLD AUTO-RTO: 0 /100
PATH REPORT.COMMENTS IMP SPEC: NORMAL
PATH REPORT.COMMENTS IMP SPEC: NORMAL
PATH REPORT.FINAL DX SPEC: NORMAL
PATH REPORT.GROSS SPEC: NORMAL
PATH REPORT.MICROSCOPIC SPEC OTHER STN: NORMAL
PATH REPORT.RELEVANT HX SPEC: NORMAL
PH UR STRIP: 7 [PH] (ref 5–7)
PHOTO IMAGE: NORMAL
PLATELET # BLD AUTO: 239 10E3/UL (ref 150–450)
POTASSIUM BLD-SCNC: 4.8 MMOL/L (ref 3.4–5.3)
PROT SERPL-MCNC: 6.5 G/DL (ref 6.8–8.8)
RBC # BLD AUTO: 2.88 10E6/UL (ref 4.4–5.9)
RBC URINE: <1 /HPF
SODIUM SERPL-SCNC: 131 MMOL/L (ref 133–144)
SP GR UR STRIP: 1.01 (ref 1–1.03)
SQUAMOUS EPITHELIAL: <1 /HPF
UROBILINOGEN UR STRIP-MCNC: NORMAL MG/DL
WBC # BLD AUTO: 9.5 10E3/UL (ref 4–11)
WBC URINE: <1 /HPF

## 2021-07-19 PROCEDURE — 85025 COMPLETE CBC W/AUTO DIFF WBC: CPT | Performed by: EMERGENCY MEDICINE

## 2021-07-19 PROCEDURE — 99284 EMERGENCY DEPT VISIT MOD MDM: CPT | Performed by: EMERGENCY MEDICINE

## 2021-07-19 PROCEDURE — 70450 CT HEAD/BRAIN W/O DYE: CPT

## 2021-07-19 PROCEDURE — 36415 COLL VENOUS BLD VENIPUNCTURE: CPT | Performed by: EMERGENCY MEDICINE

## 2021-07-19 PROCEDURE — G1004 CDSM NDSC: HCPCS

## 2021-07-19 PROCEDURE — 250N000011 HC RX IP 250 OP 636: Performed by: EMERGENCY MEDICINE

## 2021-07-19 PROCEDURE — 250N000009 HC RX 250: Performed by: EMERGENCY MEDICINE

## 2021-07-19 PROCEDURE — 99285 EMERGENCY DEPT VISIT HI MDM: CPT | Mod: 25 | Performed by: EMERGENCY MEDICINE

## 2021-07-19 PROCEDURE — 71046 X-RAY EXAM CHEST 2 VIEWS: CPT

## 2021-07-19 PROCEDURE — 88304 TISSUE EXAM BY PATHOLOGIST: CPT | Mod: 26 | Performed by: PATHOLOGY

## 2021-07-19 PROCEDURE — 82040 ASSAY OF SERUM ALBUMIN: CPT | Performed by: EMERGENCY MEDICINE

## 2021-07-19 PROCEDURE — 81001 URINALYSIS AUTO W/SCOPE: CPT | Performed by: EMERGENCY MEDICINE

## 2021-07-19 PROCEDURE — 83690 ASSAY OF LIPASE: CPT | Performed by: EMERGENCY MEDICINE

## 2021-07-19 PROCEDURE — 36592 COLLECT BLOOD FROM PICC: CPT | Performed by: EMERGENCY MEDICINE

## 2021-07-19 RX ORDER — IOPAMIDOL 755 MG/ML
500 INJECTION, SOLUTION INTRAVASCULAR ONCE
Status: COMPLETED | OUTPATIENT
Start: 2021-07-19 | End: 2021-07-19

## 2021-07-19 RX ADMIN — IOPAMIDOL 81 ML: 755 INJECTION, SOLUTION INTRAVENOUS at 10:55

## 2021-07-19 RX ADMIN — SODIUM CHLORIDE 60 ML: 9 INJECTION, SOLUTION INTRAVENOUS at 10:55

## 2021-07-19 NOTE — TELEPHONE ENCOUNTER
Spoke to wife.  Patient is little weak and little confused and shuffling.  Is tolerating regular diet.  No fevers no chills.  He did have a little low sodium the beginning of July.  She is concerned that it might be the case as he was this way previously.  Not sure if he is passing gas.  We will have him go to the ER to be evaluated.  Spoke to Dr. John he is aware of the patient's pending arrival.

## 2021-07-19 NOTE — TELEPHONE ENCOUNTER
RN Triage:    Spoke with wife, Michelle, about 75 yr old Michele.    Wife reports:    Pt had gallbladder surgery 7/16/21 as an outpatient.    Yesterday started feeling a little worse.      Stretching out the pain pills.  One oxycodone yesterday at 4:00 pm.  Tylenol before bedtime.    Urinating fine, but no BM since before surgery.    Tried Doreen lax and metamucil yesterday.      Pt is drinking plenty of fluids as usual Gatorade and water.      Weaker and somewhat disoriented since yesterday.    Taking very small steps when walking compared to 2 days ago.    Getting confused with the TV remote.      Wife concerned about sodium level.  At pre-op 7/5/21 sodium was 123.  Pt was told to reduce fluids to 51 oz and sodium bumped up to 132.   Now patient is not restricting fluids.    Able to get up and dress himself.    Took a small fall coming out of the bathroom Saturday night when he stumbled over a  laundry basket and scraped forehead mildly on plastic shower chair.  Denies hard blow/fall to head.    Hx of balance issues, but is able to walk with assistance.    Pt has follow up on 7/22/21.  Wife is wondering if he should come to office for Labs today.    PLAN:  Will consult with PCP or covering MD for level of care:  ED or OV with PCP approval.  Second level triage.  Please advise.    Henny Barone RN  [facility]      Reason for Disposition    Constipation    Patient sounds very sick or weak to the triager    Acting confused (e.g., disoriented, slurred speech) or excessively sleepy    Patient sounds very sick or weak to the triager    Additional Information    Negative: Sounds like a life-threatening emergency to the triager    Negative: Chest pain    Negative: Difficulty breathing    Negative: Surgical incision symptoms and questions    Negative: Discomfort (pain, burning or stinging) when passing urine and male    Negative: Discomfort (pain, burning or stinging) when passing urine and female    Negative:  Abdomen pain is the main symptom and adult male    Negative: Abdomen pain is the main symptom and adult female    Negative: Rectal bleeding or blood in stool is the main symptom    Negative: Difficult to awaken or acting confused (disoriented, slurred speech) and has diabetes    Negative: Difficult to awaken or acting confused (disoriented, slurred speech) and new onset    Negative: Weakness of the face, arm, or leg on one side of the body and new onset    Negative: Numbness of the face, arm, or leg on one side of the body and new onset    Negative: Loss of speech or garbled speech and new onset    Negative: Difficulty breathing and bluish (or gray) lips or face    Negative: Shock suspected (e.g., cold/pale/clammy skin, too weak to stand, low BP, rapid pulse)    Negative: Seeing or hearing or feeling things that are not there (i.e., auditory, visual, or tactile hallucinations)    Negative: Followed a head injury    Negative: Drug overdose suspected    Negative: Sounds like a life-threatening emergency to the triager    Negative: Alcohol use, abuse or dependence: question or problem related to    Negative: Drug abuse or dependence: question or problem related to    Negative: Headache or vomiting    Negative: Stiff neck (can't touch chin to chest)    Negative: Very strange or paranoid behavior    Negative: Fever > 100.4 F (38.0 C)    Protocols used: POST-OP SYMPTOMS AND [id number]-YA, CONSTIPATION-A-OH, CONFUSION - DELIRIUM-A-OH

## 2021-07-19 NOTE — ED TRIAGE NOTES
"Patient with worsening generalized weakness since lap elizabeth last Friday. Patient fell at home Saturday night. Wife states he has been \"foggy\".  "

## 2021-07-19 NOTE — ED PROVIDER NOTES
"  History     Chief Complaint   Patient presents with     Generalized Weakness     HPI  Michele Vance is a 75 year old male who presents to the emergency department secondary to generalized weakness.  He had laparoscopic cholecystectomy with Dr. Epps on July 16th, 3 days ago.  He had hyponatremia prior to that so they put off surgery until that was improved with fluid restriction.  His wife called in today and it was thought that may be his cause of confusion was because of hyponatremia because he has had that in the past.  He also recently had a fall 2 days ago, Saturday evening when he got up to use the restroom.  He walks with a cane and has been shuffling more, generalized weakness.  He hit his head on a shower chair but seemed more like a \"minor fall\".  No loc, no n/v/d.  He has had constipation.  No melena, hematochezia. He feels he is not confused. No fever.  He has had generalized abdominal discomfort. No dysuria or hematuria.     Allergies:  Allergies   Allergen Reactions     Statins [Hmg-Coa-R Inhibitors] Other (See Comments)     Rhabdo  Same as \"statins\"     Gemfibrozil      Resting thigh-calf pain     Sulfa Drugs      Reacted as a child     Zetia [Ezetimibe]      Muscle cramping       Problem List:    Patient Active Problem List    Diagnosis Date Noted     Biliary colic 07/08/2021     Priority: Medium     Added automatically from request for surgery [id number]       H/O carotid angioplasty 09/04/2020     Priority: Medium     Status post balloon angioplasty of pulmonary artery branches 09/03/2020     Priority: Medium     Carotid stenosis      Priority: Medium     right endartectomy       Essential hypertension with goal blood pressure less than 140/90 06/02/2016     Priority: Medium     Chronic constipation 12/24/2014     Priority: Medium     Irritable bowel syndrome 12/24/2014     Priority: Medium     Health Care Home 09/11/2014     Priority: Medium     Status:  Unable to reach  Care Coordinator:  " Erika Marcus    See Letters for [facility] Care Plan  Date:  September 11, 2014         Carotid artery occlusion with cerebral infarction (H) 08/06/2014     Priority: Medium     Dizziness 08/06/2014     Priority: Medium     Panlobular emphysema (H) 06/04/2014     Priority: Medium     Cerebral infarction due to occlusion or stenosis of carotid artery 05/07/2014     Priority: Medium     Problem list name updated by automated process. Provider to review       Stroke, embolic (H) 04/25/2014     Priority: Medium     Insomnia 03/12/2014     Priority: Medium     Nutritional deficiency 03/12/2014     Priority: Medium     Pain 03/12/2014     Priority: Medium     Urinary retention 03/12/2014     Priority: Medium     S/P CABG x 6 03/06/2014     Priority: Medium     Left main coronary artery disease 03/04/2014     Priority: Medium     Arthritis 01/14/2013     Priority: Medium     Carotid bruit 01/14/2013     Priority: Medium     Carotid stenosis, bilateral 01/14/2013     Priority: Medium     Anemia 04/06/2012     Priority: Medium     CKD (chronic kidney disease) stage 3, GFR 30-59 ml/min 04/06/2012     Priority: Medium     Advanced directives, counseling/discussion 04/05/2012     Priority: Medium     Impingement syndrome, shoulder, right 03/16/2011     Priority: Medium     Hypertension 11/22/2010     Priority: Medium     Hyperlipidemia LDL goal <130 11/22/2010     Priority: Medium     Myalgia and myositis 11/22/2010     Priority: Medium     GERD (gastroesophageal reflux disease) 11/22/2010     Priority: Medium        Past Medical History:    Past Medical History:   Diagnosis Date     Carotid stenosis      Chronic kidney disease      Coronary artery disease 3-2014     CVA (cerebral infarction)      Elevated CK      Esophageal reflux      Hypercholesteremia      Nonsenile cataract      Other and unspecified hyperlipidemia      PAD (peripheral artery disease) (H)      Rhabdomyolysis 2010     Unspecified essential hypertension        Past  Surgical History:    Past Surgical History:   Procedure Laterality Date     APPENDECTOMY  1974     BYPASS GRAFT ARTERY CORONARY  3/5/2014    Procedure: BYPASS GRAFT ARTERY CORONARY;  Median Sternotomy, Coronary Artery Bypass Graft X6 used Left internal mammary artery, Left and Right Greater Saphenous vein, on Pump oxygenator.;  Surgeon: Tim Montanez MD;  Location: [facility]     CATARACT IOL, RT/LT Bilateral 2008    in [place]     cataracts       COLONOSCOPY  12/12/02    [facility]     COLONOSCOPY N/A 10/7/2014    Procedure: COMBINED COLONOSCOPY, SINGLE OR MULTIPLE BIOPSY/POLYPECTOMY BY BIOPSY;  Surgeon: Micheal Mora MD;  Location: [facility] GI     CV LOWER EXTREMITY ANGIOGRAM BILATERAL Bilateral 9/9/2019    Procedure: Lower Extremity Angiogram Bilateral;  Surgeon: Julio Oropeza MD;  Location:  [facility]     DENTAL SURGERY      TMJ, implants     ENDARTERECTOMY CAROTID      right carotid stent     ESOPHAGOSCOPY, GASTROSCOPY, DUODENOSCOPY (EGD), COMBINED Left 10/7/2014    Procedure: COMBINED ESOPHAGOSCOPY, GASTROSCOPY, DUODENOSCOPY (EGD), BIOPSY SINGLE OR MULTIPLE;  Surgeon: Micheal Mora MD;  Location: [facility]     ESOPHAGOSCOPY, GASTROSCOPY, DUODENOSCOPY (EGD), COMBINED Left 10/7/2014    Procedure: COMBINED ESOPHAGOSCOPY, GASTROSCOPY, DUODENOSCOPY (EGD), REMOVE FOREIGN BODY;  Surgeon: Micheal Mora MD;  Location: [facility]     HC CAPSULE ENDOSCOPY N/A 10/7/2014    Procedure: CAPSULE/PILL CAM ENDOSCOPY;  Surgeon: Micheal Mora MD;  Location: [facility]     INJECT EPIDURAL LUMBAR Right 5/8/2017    Procedure: INJECT EPIDURAL LUMBAR;  Lumbar transforaminal Epidural Steroid Injection right lumbar 4-5, and right  lumbar 5-Sacral 1;  Surgeon: Anthony Gonzalez MD;  Location: [facility]     INJECT JOINT SACROILIAC Bilateral 8/28/2017    Procedure: INJECT JOINT SACROILIAC;  sacroiliac joint injection bilateral;  Surgeon: Anthony Gonzalez MD;  Location: [facility]     IR CAROTID CEREBRAL ANGIOGRAM  BILATERAL  9/3/2020     KNEE SURGERY  1976    left knee reconstruction     lasix      both eyes     RELEASE CARPAL TUNNEL  2011    RELEASE CARPAL TUNNEL performed by JO MADRID at  OR     RELEASE CARPAL TUNNEL  2011    RELEASE CARPAL TUNNEL performed by JO MADRID at  OR     [facility] ENDOSCOPY, SIMPLE EXAM  99    [facility]       Family History:    Family History   Problem Relation Age of Onset     Hypertension Mother      Eye Disorder Mother      Cerebrovascular Disease Father      Heart Disease Father      Lipids Father      Obesity Father      Cancer Sister      Heart Disease Sister      Glaucoma No family hx of      Macular Degeneration No family hx of      Kidney Disease No family hx of        Social History:  Marital Status:   [2]  Social History     Tobacco Use     Smoking status: Former Smoker     Packs/day: 2.50     Years: 20.00     Pack years: 50.00     Types: Cigarettes     Quit date: 2000     Years since quittin.5     Smokeless tobacco: Never Used   Substance Use Topics     Alcohol use: No     Alcohol/week: 0.0 standard drinks     Drug use: No        Medications:    albuterol (PROAIR HFA/PROVENTIL HFA/VENTOLIN HFA) 108 (90 Base) MCG/ACT inhaler  alirocumab (PRALUENT) 150 MG/ML injectable syringe  amitriptyline (ELAVIL) 25 MG tablet  amLODIPine (NORVASC) 10 MG tablet  aspirin (ASPIRIN) 81 MG EC tablet  benzonatate (TESSALON) 200 MG capsule  carvedilol (COREG) 25 MG tablet  cilostazol (PLETAL) 100 MG tablet  diphenhydrAMINE-acetaminophen (TYLENOL PM)  MG tablet  gabapentin (NEURONTIN) 300 MG capsule  isosorbide mononitrate (IMDUR) 60 MG 24 hr tablet  losartan (COZAAR) 100 MG tablet  omeprazole (PRILOSEC) 40 MG DR capsule  oxyCODONE (ROXICODONE) 5 MG tablet  potassium chloride ER (KLOR-CON M) 10 MEQ CR tablet  ondansetron (ZOFRAN) 4 MG tablet          Review of Systems   All other systems reviewed and are negative.      Physical Exam    BP: (!) 150/61  Pulse: 70  Temp: 98.5  F (36.9  C)  Resp: 20  Weight: 74.8 kg (165 lb)  SpO2: 97 %      Physical Exam  Vitals and nursing note reviewed.   Constitutional:       General: He is not in acute distress.     Appearance: He is well-developed. He is not diaphoretic.   HENT:      Head: Normocephalic and atraumatic.      Right Ear: External ear normal.      Left Ear: External ear normal.      Nose: Nose normal. No congestion or rhinorrhea.      Mouth/Throat:      Mouth: Mucous membranes are moist.   Eyes:      General: No scleral icterus.        Right eye: No discharge.         Left eye: No discharge.      Extraocular Movements: Extraocular movements intact.      Comments: Conjunctival pallor   Cardiovascular:      Rate and Rhythm: Normal rate and regular rhythm.      Heart sounds: No murmur heard.   No friction rub.   Pulmonary:      Effort: Pulmonary effort is normal.      Breath sounds: Normal breath sounds. No stridor. No wheezing, rhonchi or rales.   Abdominal:      Tenderness: There is abdominal tenderness.      Comments: Mild diffuse tenderness to palpation   Musculoskeletal:         General: No swelling or deformity. Normal range of motion.      Cervical back: Normal range of motion and neck supple.      Right lower leg: No edema.      Left lower leg: No edema.   Skin:     General: Skin is warm and dry.      Findings: No rash.   Neurological:      General: No focal deficit present.      Mental Status: He is alert and oriented to person, place, and time.      Cranial Nerves: No cranial nerve deficit.      Sensory: No sensory deficit.      Coordination: Coordination normal.      Comments: Good  strength.  Normal finger-to-nose.  Normal sensation throughout.  No facial droop, slurred speech.  He is oriented x2.   Psychiatric:         Mood and Affect: Mood normal.         Thought Content: Thought content normal.         ED Course        Procedures                Results for orders placed or  performed during the hospital encounter of 07/19/21 (from the past 24 hour(s))   CBC with platelets differential    Narrative    The following orders were created for panel order CBC with platelets differential.  Procedure                               Abnormality         Status                     ---------                               -----------         ------                     CBC with platelets and d...[[id number]]  Abnormal            Final result                 Please view results for these tests on the individual orders.   Comprehensive metabolic panel   Result Value Ref Range    Sodium 131 (L) 133 - 144 mmol/L    Potassium 4.8 3.4 - 5.3 mmol/L    Chloride 102 94 - 109 mmol/L    Carbon Dioxide (CO2) 23 20 - 32 mmol/L    Anion Gap 6 3 - 14 mmol/L    Urea Nitrogen 25 7 - 30 mg/dL    Creatinine 1.46 (H) 0.66 - 1.25 mg/dL    Calcium 9.1 8.5 - 10.1 mg/dL    Glucose 115 (H) 70 - 99 mg/dL    Alkaline Phosphatase 96 40 - 150 U/L    AST 34 0 - 45 U/L    ALT 59 0 - 70 U/L    Protein Total 6.5 (L) 6.8 - 8.8 g/dL    Albumin 3.1 (L) 3.4 - 5.0 g/dL    Bilirubin Total 0.4 0.2 - 1.3 mg/dL    GFR Estimate 47 (L) >60 mL/min/1.73m2   Lipase   Result Value Ref Range    Lipase 50 (L) 73 - 393 U/L   CBC with platelets and differential   Result Value Ref Range    WBC Count 9.5 4.0 - 11.0 10e3/uL    RBC Count 2.88 (L) 4.40 - 5.90 10e6/uL    Hemoglobin 9.5 (L) 13.3 - 17.7 g/dL    Hematocrit 28.2 (L) 40.0 - 53.0 %    MCV 98 78 - 100 fL    MCH 33.0 26.5 - 33.0 pg    MCHC 33.7 31.5 - 36.5 g/dL    RDW 13.2 10.0 - 15.0 %    Platelet Count 239 150 - 450 10e3/uL    % Neutrophils 71 %    % Lymphocytes 16 %    % Monocytes 9 %    % Eosinophils 4 %    % Basophils 0 %    % Immature Granulocytes 0 %    NRBCs per 100 WBC 0 <1 /100    Absolute Neutrophils 6.7 1.6 - 8.3 10e3/uL    Absolute Lymphocytes 1.6 0.8 - 5.3 10e3/uL    Absolute Monocytes 0.8 0.0 - 1.3 10e3/uL    Absolute Eosinophils 0.4 0.0 - 0.7 10e3/uL    Absolute Basophils 0.0 0.0 -  0.2 10e3/uL    Absolute Immature Granulocytes 0.0 <=0.0 10e3/uL    Absolute NRBCs 0.0 10e3/uL   Head CT w/o contrast    Narrative    CT SCAN OF THE HEAD WITHOUT CONTRAST  July 19, 2021 11:09 AM     HISTORY: Confusion. Weakness.    TECHNIQUE: Axial images of the head and coronal reformations without  IV contrast material.  Radiation dose for this scan was reduced using  automated exposure control, adjustment of the mA and/or kV according  to patient size, or iterative reconstruction technique.    COMPARISON: 04/05/2021.    FINDINGS: Old right frontal lobe and old left frontal periventricular  white matter infarcts are again noted along with cerebral atrophy.  There are also old bilateral cerebellar infarcts and old left medial  frontal lobe infarct. Minimal patchy white matter changes are also  noted without mass effect. There is no evidence for intracranial  hemorrhage, mass effect, acute infarct, or skull fracture. Visualized  paranasal sinuses and mastoid air cells are clear. Vascular  calcifications noted.      Impression    IMPRESSION: Chronic changes. No evidence for intracranial hemorrhage  or any acute process.   CT Abdomen Pelvis w Contrast    Narrative    CT ABDOMEN PELVIS W CONTRAST 7/19/2021 11:09 AM    CLINICAL HISTORY: Generalized weakness. History of laparoscopic  cholecystectomy on 7/16/2021.    TECHNIQUE: CT scan of the abdomen and pelvis was performed following  injection of IV contrast. Multiplanar reformats were obtained. Dose  reduction techniques were used.  CONTRAST: 81mL, Isovue 370    COMPARISON: Ultrasound 7/2/2021    FINDINGS:   LOWER CHEST: Normal.    HEPATOBILIARY: Cholecystectomy.    PANCREAS: Normal.    SPLEEN: Normal.    ADRENAL GLANDS: Normal.    KIDNEYS/BLADDER: Normal.    BOWEL: Considerable retained fecal debris throughout the colon.    PELVIC ORGANS: Normal.    ADDITIONAL FINDINGS: Atherosclerosis. Bubbles of gas in the  subcutaneous tissues of the right upper quadrant consistent  with the  history of laparoscopic cholecystectomy 3 days ago.    MUSCULOSKELETAL: Degenerative bony changes. Old superior endplate  wedge compression deformities at T11, T12 and L2.      Impression    IMPRESSION:   1.  Cholecystectomy. Bubbles of gas in the subcutaneous tissues of the  right upper quadrant consistent with the history of laparoscopic  cholecystectomy 3 days ago.  2.  Considerable retained fecal debris throughout the colon. No  intestinal obstruction.  3.  Atherosclerosis.  4.  Degenerative bony changes, old wedge compression deformities.    CORAL WELLS MD         SYSTEM ID:  [id number]   Chest XR,  PA & LAT    Narrative    XR CHEST 2 VW 7/19/2021 11:10 AM    HISTORY: Weakness. History of pneumonia.    COMPARISON: CT 7/14/2021      Impression    IMPRESSION:Prior median sternotomy. Normal cardiomediastinal  silhouette. No acute infiltrate or pulmonary edema. No pneumothorax,  pneumoperitoneum or pleural effusion.    CORAL WELLS MD         SYSTEM ID:  [id number]   UA with Microscopic reflex to Culture    Specimen: Urine, Midstream   Result Value Ref Range    Color Urine Straw Colorless, Straw, Light Yellow, Yellow    Appearance Urine Clear Clear    Glucose Urine Negative Negative mg/dL    Bilirubin Urine Negative Negative    Ketones Urine Negative Negative mg/dL    Specific Gravity Urine 1.008 1.003 - 1.035    Blood Urine Negative Negative    pH Urine 7.0 5.0 - 7.0    Protein Albumin Urine Negative Negative mg/dL    Urobilinogen Urine Normal Normal, 2.0 mg/dL    Nitrite Urine Negative Negative    Leukocyte Esterase Urine Negative Negative    RBC Urine <1 <=2 /HPF    WBC Urine <1 <=5 /HPF    Squamous Epithelials Urine <1 <=1 /HPF    Narrative    Urine Culture not indicated       Medications   iopamidol (ISOVUE-370) solution 500 mL (81 mLs Intravenous Given 7/19/21 1055)   sodium chloride (PF) 0.9% PF flush 3 mL (3 mLs Intravenous Given 7/19/21 1055)   Saline 100mL Bag (60 mLs  Intravenous Given 7/19/21 1055)       Assessments & Plan (with Medical Decision Making)  Generalized weakness -uncertain etiology.  Hemoglobin is down to 9.5 from 11.  No evidence for hemorrhage at this point.  Sodium is 131 and unlikely to be the cause.  No focal neurologic findings.  Head CT is negative.  Urinalysis is normal.  We will need to have recheck of his hemoglobin this week.  He has an appointment on Thursday which I think is adequate.  Abdominal pain post operatively -most likely this is related to constipation.  CT abdomen pelvis is unremarkable for acute processes except fecal retention.  History of hyponatremia -looks good now.  Fall with head injury two days ago -head CT is negative for acute processes.  According to wife the fall was not very significant.  Unlikely because of his generalized weakness.  Constipation -patient has had long-term issues with his bowels and has been prescribed MiraLAX which he has been taking.  He has instructions on how to take it for problems such as this.  He is currently not having significant abdominal pain.  He has not had any rectal pain no melena or hematochezia.  He will continue to use his MiraLAX.  Return to ER precautions were discussed regarding this.  I advised him if he has a large melanotic stool we will need to reevaluate.       I have reviewed the nursing notes.    I have reviewed the findings, diagnosis, plan and need for follow up with the patient.      New Prescriptions    No medications on file       Final diagnoses:   Other constipation   Generalized muscle weakness   Anemia, unspecified type   Hyponatremia       7/19/2021   [facility] EMERGENCY DEPT     Mikie John MD  07/19/21 [id number]       Mikie John MD  07/19/21 [id number]

## 2021-07-22 ENCOUNTER — OFFICE VISIT (OUTPATIENT)
Dept: SURGERY | Facility: CLINIC | Age: 76
End: 2021-07-22
Payer: MEDICARE

## 2021-07-22 VITALS
SYSTOLIC BLOOD PRESSURE: 110 MMHG | BODY MASS INDEX: 23.57 KG/M2 | WEIGHT: 155 LBS | DIASTOLIC BLOOD PRESSURE: 62 MMHG | TEMPERATURE: 97 F

## 2021-07-22 DIAGNOSIS — I73.9 PVD (PERIPHERAL VASCULAR DISEASE) (H): ICD-10-CM

## 2021-07-22 DIAGNOSIS — D64.9 ANEMIA, UNSPECIFIED TYPE: ICD-10-CM

## 2021-07-22 DIAGNOSIS — Z95.9 S/P ARTERIAL STENT: ICD-10-CM

## 2021-07-22 DIAGNOSIS — Z98.890 POST-OPERATIVE STATE: Primary | ICD-10-CM

## 2021-07-22 LAB
ERYTHROCYTE [DISTWIDTH] IN BLOOD BY AUTOMATED COUNT: 13.2 % (ref 10–15)
HCT VFR BLD AUTO: 29.4 % (ref 40–53)
HGB BLD-MCNC: 10.1 G/DL (ref 13.3–17.7)
MCH RBC QN AUTO: 33.1 PG (ref 26.5–33)
MCHC RBC AUTO-ENTMCNC: 34.4 G/DL (ref 31.5–36.5)
MCV RBC AUTO: 96 FL (ref 78–100)
PLATELET # BLD AUTO: 273 10E3/UL (ref 150–450)
RBC # BLD AUTO: 3.05 10E6/UL (ref 4.4–5.9)
WBC # BLD AUTO: 8.6 10E3/UL (ref 4–11)

## 2021-07-22 PROCEDURE — 36415 COLL VENOUS BLD VENIPUNCTURE: CPT | Performed by: SPECIALIST

## 2021-07-22 PROCEDURE — 99024 POSTOP FOLLOW-UP VISIT: CPT | Performed by: SPECIALIST

## 2021-07-22 PROCEDURE — 85027 COMPLETE CBC AUTOMATED: CPT | Performed by: SPECIALIST

## 2021-07-22 NOTE — LETTER
7/22/2021         RE: Michele Vance  [address]  [facility] [address]        Dear Colleague,    Thank you for referring your patient, Michele Vance, to the [facility]. Please see a copy of my visit note below.    F/U for lap cholecystectomy      Subjective:  Patient feels much better now.  His preop symptoms are resolved.  He can eat without issues.  He did go to the ER postop for some confusion.  At that time hemoglobin had drifted down to 9 5 usually runs around 11.  His wife would like a repeat hemoglobin.      In addition, the wife tells me he had some arterial stents placed 4 years ago in his lower extremities.  They have not been followed.  They are originally done for rest pain.  He has not had any symptoms but she would like to have them reimaged.      Objective:  B/P: 110/62, T: 97, P: Data Unavailable, R: Data Unavailable  Abd: soft, non tender, non distended,     Path:  A. Gallbladder, laparoscopic cholecystectomy:  -Chronic cholecystitis and cholelithiasis.   Electronically signed by Rory     Assessment/plan:  Patient status post laparoscopic cholecystectomy.  Doing well.  All preop symptoms have resolved.  We will get a repeat hemoglobin to ensure it is stable and not drop further.  He did have a CT did not reveal any signs of active bleeding in the ER.    In addition, he does have a known PVD and stent placed.  These are not been followed.  He is currently asymptomatic.  The plan is just had a lower extremity arterial duplex to evaluate the status of stents.  He will follow-up after the ultrasound.    Anthony Epps MD, FACS      Again, thank you for allowing me to participate in the care of your patient.        Sincerely,        Anthony Epps MD

## 2021-07-22 NOTE — PROGRESS NOTES
F/U for lap cholecystectomy      Subjective:  Patient feels much better now.  His preop symptoms are resolved.  He can eat without issues.  He did go to the ER postop for some confusion.  At that time hemoglobin had drifted down to 9 5 usually runs around 11.  His wife would like a repeat hemoglobin.      In addition, the wife tells me he had some arterial stents placed 4 years ago in his lower extremities.  They have not been followed.  They are originally done for rest pain.  He has not had any symptoms but she would like to have them reimaged.      Objective:  B/P: 110/62, T: 97, P: Data Unavailable, R: Data Unavailable  Abd: soft, non tender, non distended,     Path:  A. Gallbladder, laparoscopic cholecystectomy:  -Chronic cholecystitis and cholelithiasis.   Electronically signed by Rory     Assessment/plan:  Patient status post laparoscopic cholecystectomy.  Doing well.  All preop symptoms have resolved.  We will get a repeat hemoglobin to ensure it is stable and not drop further.  He did have a CT did not reveal any signs of active bleeding in the ER.    In addition, he does have a known PVD and stent placed.  These are not been followed.  He is currently asymptomatic.  The plan is just had a lower extremity arterial duplex to evaluate the status of stents.  He will follow-up after the ultrasound.    Anthony Epps MD, FACS

## 2021-08-05 ENCOUNTER — HOSPITAL ENCOUNTER (OUTPATIENT)
Dept: ULTRASOUND IMAGING | Facility: CLINIC | Age: 76
End: 2021-08-05
Attending: SPECIALIST
Payer: MEDICARE

## 2021-08-05 DIAGNOSIS — I73.9 PVD (PERIPHERAL VASCULAR DISEASE) (H): ICD-10-CM

## 2021-08-05 DIAGNOSIS — Z95.9 S/P ARTERIAL STENT: ICD-10-CM

## 2021-08-05 PROCEDURE — 93922 UPR/L XTREMITY ART 2 LEVELS: CPT

## 2021-08-05 PROCEDURE — 93925 LOWER EXTREMITY STUDY: CPT

## 2021-08-15 DIAGNOSIS — E87.6 HYPOKALEMIA: ICD-10-CM

## 2021-08-15 DIAGNOSIS — K21.9 GASTROESOPHAGEAL REFLUX DISEASE WITHOUT ESOPHAGITIS: ICD-10-CM

## 2021-08-17 RX ORDER — POTASSIUM CHLORIDE 750 MG/1
TABLET, EXTENDED RELEASE ORAL
Qty: 300 TABLET | Refills: 0 | Status: SHIPPED | OUTPATIENT
Start: 2021-08-17 | End: 2021-10-11

## 2021-08-17 RX ORDER — OMEPRAZOLE 40 MG/1
CAPSULE, DELAYED RELEASE ORAL
Qty: 90 CAPSULE | Refills: 3 | Status: SHIPPED | OUTPATIENT
Start: 2021-08-17 | End: 2022-09-28

## 2021-08-17 NOTE — TELEPHONE ENCOUNTER
"  Requested Prescriptions   Pending Prescriptions Disp Refills     omeprazole (PRILOSEC) 40 MG DR capsule [Pharmacy Med Name: OMEPRAZOLE DR CAPS 40MG] 90 capsule 3     Sig: TAKE 1 CAPSULE DAILY   7/12/2021    PPI Protocol Passed - 8/15/2021 10:38 AM        Passed - Not on Clopidogrel (unless Pantoprazole ordered)        Passed - No diagnosis of osteoporosis on record        Passed - Recent (12 mo) or future (30 days) visit within the authorizing provider's specialty     Patient has had an office visit with the authorizing provider or a provider within the authorizing providers department within the previous 12 mos or has a future within next 30 days. See \"Patient Info\" tab in inbasket, or \"Choose Columns\" in Meds & Orders section of the refill encounter.              Passed - Medication is active on med list        Passed - Patient is age 18 or older      Prescription approved per [facility] Refill Protocol.       potassium chloride ER (KLOR-CON M) 10 MEQ CR tablet [Pharmacy Med Name: POTASSIUM CHLORIDE ER (DISP) TABS 10MEQ] 300 tablet 11     Sig: TAKE 5 TABLETS TWICE A DAY       Potassium Supplements Protocol Passed - 8/15/2021 10:38 AM        Passed - Recent (12 mo) or future (30 days) visit within the authorizing provider's department     Patient has had an office visit with the authorizing provider or a provider within the authorizing providers department within the previous 12 mos or has a future within next 30 days. See \"Patient Info\" tab in inbasket, or \"Choose Columns\" in Meds & Orders section of the refill encounter.              Passed - Medication is active on med list        Passed - Patient is age 18 or older        Passed - Normal serum potassium in past 12 months     Recent Labs   Lab Test 07/19/21  1016   POTASSIUM 4.8                     Prescription approved per [facility] Refill Protocol.  Erin Aden RN      "

## 2021-08-23 ENCOUNTER — OFFICE VISIT (OUTPATIENT)
Dept: SURGERY | Facility: CLINIC | Age: 76
End: 2021-08-23
Payer: MEDICARE

## 2021-08-23 VITALS
DIASTOLIC BLOOD PRESSURE: 70 MMHG | SYSTOLIC BLOOD PRESSURE: 122 MMHG | BODY MASS INDEX: 24.91 KG/M2 | TEMPERATURE: 97.1 F | WEIGHT: 163.8 LBS

## 2021-08-23 DIAGNOSIS — Z95.9 S/P ARTERIAL STENT: Primary | ICD-10-CM

## 2021-08-23 DIAGNOSIS — I73.9 PVD (PERIPHERAL VASCULAR DISEASE) (H): ICD-10-CM

## 2021-08-23 PROCEDURE — 99213 OFFICE O/P EST LOW 20 MIN: CPT | Mod: 24 | Performed by: SPECIALIST

## 2021-08-23 NOTE — PROGRESS NOTES
F/U for US -       Subjective:  Patient is here for follow-up of his arterial studies.  No complaints.  He does have a history of left lower extremity stents.  Can climb stairs without difficulty.    Objective:  B/P: 122/70, T: 97.1, P: Data Unavailable, R: Data Unavailable  Ext; Warm. B/l Fem pulses, no distal pulses    US -   US LOWER EXTREMITY ARTERIAL DUPLEX BILATERAL   8/5/2021 12:12 PM      HISTORY: Status post arterial stent. PVD (peripheral vascular disease)  (H). History of right SFA-popliteal recanalization.     COMPARISON: None.     FINDINGS: Color Doppler and spectral waveform analysis was performed  throughout the lower extremities bilaterally.     Peak systolic velocities and waveforms are as follows:  Right lower extremity:   Common iliac 211 cm/s, biphasic  External iliac 115 cm/s, triphasic   cm/s, biphasic  Profunda femoral 90 cm/s, biphasic  Proximal  cm/s, biphasic  Mid  cm/s, biphasic  Distal SFA 89 cm/s, biphasic  Popliteal 94 cm/s, biphasic  Anterior tibial 68 cm/s, biphasic  Posterior tibial 88 cm/s, biphasic  Perineal 47 cm/s, biphasic     Left lower extremity:   Common iliac 418 cm/s, biphasic  External iliac 138 cm/s, triphasic   cm/s, triphasic  PFA 39 cm/s, biphasic  Proximal SFA 90 cm/s, biphasic  Mid SFA 60 cm/s, biphasic  Distal SFA 63 cm/s, biphasic  Popliteal 84 cm/s, biphasic  Anterior tibial 33 cm/s, biphasic  Posterior tibial 39 cm/s, monophasic     RUBEN -       ULTRASOUND RUBEN DOPPLER NO EXERCISE, 1-2 LEVELS, BILAT August 5, 2021  12:11 PM      HISTORY: Status post arterial stent. PVD (peripheral vascular disease)  (H).     COMPARISON: ABIs 8/20/2020.     FINDINGS:  Right RUBEN:   DP: 1.04.  PT: 0.94.     Left RUBEN:   DP: 0.55.   PT: 0.66.     Waveforms: Triphasic in the right pedal vessels, biphasic to triphasic  in the left pedal vessels.                                                                      IMPRESSION:   Right lower extremity: Resting RUBEN  is normal, it is 1.04, previously  it could not be calculated due to noncompressible vessels.     Left lower extremity: Resting RUBEN is 0.66, previously was 0.71.  Findings are suggestive of moderate arterial insufficiency.        RUBEN CRITERIA:  >0.95 Normal  0.90 - 0.94 Mild  0.5 - 0.89 Moderate  0.2 - 0.49 Severe  <0.2 Critical     PHILIP COMER MD                                                                       IMPRESSION:   Right lower extremity: No iliac inflow stenosis. Brisk flow in the  common femoral artery. Mild-moderate stenosis in proximal SFA. Brisk  flow in the popliteal and infrapopliteal vessels.     Left lower extremity: Common iliac velocities are elevated, but this  may reflect vessel tortuosity, rather than true stenosis, given the  brisk multiphasic waveforms in the majority of the remainder of the  left leg. No evidence of significant stenosis in common femoral,  superficial femoral, or popliteal arteries. Mild infrapopliteal  atherosclerotic disease, specifically the posterior tibial artery has  a slightly dampened monophasic waveform.      PHILIP OCMER MD       Assessment/plan:  Patient is status post a bilateral lower extremity arterial stents.  He is asymptomatic at this time and can walk without difficulty.  There is some infrapopliteal disease on the left.  The stents appear to be widely patent.  There is a slight decrease in his left side RUBEN began that is asymptomatic and may represent progression of his infrapopliteal disease.  The plan at this time is just for observation and should he develop leg pain he will be reevaluated otherwise he will follow-up with me in 1 year.    Anthony Epps MD, FACS

## 2021-08-23 NOTE — LETTER
8/23/2021         RE: Michele Vance  [address]  [facility] [id number]        Dear Colleague,    Thank you for referring your patient, Michele Vance, to the [facility]. Please see a copy of my visit note below.    F/U for US -       Subjective:  Patient is here for follow-up of his arterial studies.  No complaints.  He does have a history of left lower extremity stents.  Can climb stairs without difficulty.    Objective:  B/P: 122/70, T: 97.1, P: Data Unavailable, R: Data Unavailable  Ext; Warm. B/l Fem pulses, no distal pulses    US -   US LOWER EXTREMITY ARTERIAL DUPLEX BILATERAL   8/5/2021 12:12 PM      HISTORY: Status post arterial stent. PVD (peripheral vascular disease)  (H). History of right SFA-popliteal recanalization.     COMPARISON: None.     FINDINGS: Color Doppler and spectral waveform analysis was performed  throughout the lower extremities bilaterally.     Peak systolic velocities and waveforms are as follows:  Right lower extremity:   Common iliac 211 cm/s, biphasic  External iliac 115 cm/s, triphasic   cm/s, biphasic  Profunda femoral 90 cm/s, biphasic  Proximal  cm/s, biphasic  Mid  cm/s, biphasic  Distal SFA 89 cm/s, biphasic  Popliteal 94 cm/s, biphasic  Anterior tibial 68 cm/s, biphasic  Posterior tibial 88 cm/s, biphasic  Perineal 47 cm/s, biphasic     Left lower extremity:   Common iliac 418 cm/s, biphasic  External iliac 138 cm/s, triphasic   cm/s, triphasic  PFA 39 cm/s, biphasic  Proximal SFA 90 cm/s, biphasic  Mid SFA 60 cm/s, biphasic  Distal SFA 63 cm/s, biphasic  Popliteal 84 cm/s, biphasic  Anterior tibial 33 cm/s, biphasic  Posterior tibial 39 cm/s, monophasic     RUBEN -       ULTRASOUND RUBEN DOPPLER NO EXERCISE, 1-2 LEVELS, BILAT August 5, 2021  12:11 PM      HISTORY: Status post arterial stent. PVD (peripheral vascular disease)  (H).     COMPARISON: ABIs 8/20/2020.     FINDINGS:  Right RUBEN:   DP: 1.04.  PT:  0.94.     Left RUBEN:   DP: 0.55.   PT: 0.66.     Waveforms: Triphasic in the right pedal vessels, biphasic to triphasic  in the left pedal vessels.                                                                      IMPRESSION:   Right lower extremity: Resting RUBEN is normal, it is 1.04, previously  it could not be calculated due to noncompressible vessels.     Left lower extremity: Resting RUBEN is 0.66, previously was 0.71.  Findings are suggestive of moderate arterial insufficiency.        RUBEN CRITERIA:  >0.95 Normal  0.90 - 0.94 Mild  0.5 - 0.89 Moderate  0.2 - 0.49 Severe  <0.2 Critical     PHILIP COMER MD                                                                       IMPRESSION:   Right lower extremity: No iliac inflow stenosis. Brisk flow in the  common femoral artery. Mild-moderate stenosis in proximal SFA. Brisk  flow in the popliteal and infrapopliteal vessels.     Left lower extremity: Common iliac velocities are elevated, but this  may reflect vessel tortuosity, rather than true stenosis, given the  brisk multiphasic waveforms in the majority of the remainder of the  left leg. No evidence of significant stenosis in common femoral,  superficial femoral, or popliteal arteries. Mild infrapopliteal  atherosclerotic disease, specifically the posterior tibial artery has  a slightly dampened monophasic waveform.      PHILIP COMER MD       Assessment/plan:  Patient is status post a bilateral lower extremity arterial stents.  He is asymptomatic at this time and can walk without difficulty.  There is some infrapopliteal disease on the left.  The stents appear to be widely patent.  There is a slight decrease in his left side RUBEN began that is asymptomatic and may represent progression of his infrapopliteal disease.  The plan at this time is just for observation and should he develop leg pain he will be reevaluated otherwise he will follow-up with me in 1 year.    Anthony Epps MD, FACS          Again, thank  you for allowing me to participate in the care of your patient.        Sincerely,        Anthony Epps MD

## 2021-08-31 ENCOUNTER — APPOINTMENT (OUTPATIENT)
Dept: CT IMAGING | Facility: CLINIC | Age: 76
DRG: 641 | End: 2021-08-31
Attending: FAMILY MEDICINE
Payer: MEDICARE

## 2021-08-31 ENCOUNTER — APPOINTMENT (OUTPATIENT)
Dept: GENERAL RADIOLOGY | Facility: CLINIC | Age: 76
DRG: 641 | End: 2021-08-31
Attending: FAMILY MEDICINE
Payer: MEDICARE

## 2021-08-31 ENCOUNTER — APPOINTMENT (OUTPATIENT)
Dept: CT IMAGING | Facility: CLINIC | Age: 76
DRG: 641 | End: 2021-08-31
Attending: EMERGENCY MEDICINE
Payer: MEDICARE

## 2021-08-31 ENCOUNTER — APPOINTMENT (OUTPATIENT)
Dept: GENERAL RADIOLOGY | Facility: CLINIC | Age: 76
DRG: 641 | End: 2021-08-31
Attending: EMERGENCY MEDICINE
Payer: MEDICARE

## 2021-08-31 ENCOUNTER — HOSPITAL ENCOUNTER (INPATIENT)
Facility: CLINIC | Age: 76
LOS: 4 days | Discharge: HOME | DRG: 641 | End: 2021-09-04
Attending: FAMILY MEDICINE | Admitting: PEDIATRICS
Payer: MEDICARE

## 2021-08-31 DIAGNOSIS — M54.50 ACUTE MIDLINE LOW BACK PAIN WITHOUT SCIATICA: ICD-10-CM

## 2021-08-31 DIAGNOSIS — I10 ESSENTIAL HYPERTENSION WITH GOAL BLOOD PRESSURE LESS THAN 140/90: ICD-10-CM

## 2021-08-31 DIAGNOSIS — M54.50 ACUTE MIDLINE LOW BACK PAIN WITHOUT SCIATICA: Primary | ICD-10-CM

## 2021-08-31 DIAGNOSIS — R29.6 FALLS FREQUENTLY: ICD-10-CM

## 2021-08-31 DIAGNOSIS — Z11.52 ENCOUNTER FOR SCREENING LABORATORY TESTING FOR COVID-19 VIRUS: ICD-10-CM

## 2021-08-31 DIAGNOSIS — Z95.1 S/P CABG X 6: ICD-10-CM

## 2021-08-31 DIAGNOSIS — G47.00 INSOMNIA, UNSPECIFIED TYPE: ICD-10-CM

## 2021-08-31 DIAGNOSIS — S09.93XA FACIAL INJURY, INITIAL ENCOUNTER: ICD-10-CM

## 2021-08-31 DIAGNOSIS — I63.239 CEREBRAL INFARCTION DUE TO OCCLUSION OF CAROTID ARTERY, UNSPECIFIED BLOOD VESSEL LATERALITY (H): ICD-10-CM

## 2021-08-31 DIAGNOSIS — M75.41 IMPINGEMENT SYNDROME, SHOULDER, RIGHT: ICD-10-CM

## 2021-08-31 DIAGNOSIS — E87.1 HYPONATREMIA: ICD-10-CM

## 2021-08-31 DIAGNOSIS — I63.10 CEREBROVASCULAR ACCIDENT (CVA) DUE TO EMBOLISM OF PRECEREBRAL ARTERY (H): ICD-10-CM

## 2021-08-31 DIAGNOSIS — R26.9 ABNORMAL GAIT: ICD-10-CM

## 2021-08-31 LAB
ALBUMIN SERPL-MCNC: 3.3 G/DL (ref 3.4–5)
ALBUMIN UR-MCNC: 30 MG/DL
ALP SERPL-CCNC: 88 U/L (ref 40–150)
ALT SERPL W P-5'-P-CCNC: 24 U/L (ref 0–70)
ANION GAP SERPL CALCULATED.3IONS-SCNC: 7 MMOL/L (ref 3–14)
APPEARANCE UR: CLEAR
APTT PPP: 31 SECONDS (ref 22–38)
AST SERPL W P-5'-P-CCNC: 19 U/L (ref 0–45)
BASOPHILS # BLD AUTO: 0 10E3/UL (ref 0–0.2)
BASOPHILS NFR BLD AUTO: 0 %
BILIRUB SERPL-MCNC: 0.3 MG/DL (ref 0.2–1.3)
BILIRUB UR QL STRIP: NEGATIVE
BUN SERPL-MCNC: 18 MG/DL (ref 7–30)
CALCIUM SERPL-MCNC: 8.4 MG/DL (ref 8.5–10.1)
CHLORIDE BLD-SCNC: 92 MMOL/L (ref 94–109)
CO2 SERPL-SCNC: 21 MMOL/L (ref 20–32)
COLOR UR AUTO: ABNORMAL
CREAT SERPL-MCNC: 1.24 MG/DL (ref 0.66–1.25)
EOSINOPHIL # BLD AUTO: 0.2 10E3/UL (ref 0–0.7)
EOSINOPHIL NFR BLD AUTO: 3 %
ERYTHROCYTE [DISTWIDTH] IN BLOOD BY AUTOMATED COUNT: 11.8 % (ref 10–15)
GFR SERPL CREATININE-BSD FRML MDRD: 57 ML/MIN/1.73M2
GLUCOSE BLD-MCNC: 92 MG/DL (ref 70–99)
GLUCOSE UR STRIP-MCNC: NEGATIVE MG/DL
HCT VFR BLD AUTO: 28.1 % (ref 40–53)
HGB BLD-MCNC: 10.4 G/DL (ref 13.3–17.7)
HGB UR QL STRIP: NEGATIVE
IMM GRANULOCYTES # BLD: 0 10E3/UL
IMM GRANULOCYTES NFR BLD: 1 %
INR PPP: 1.1 (ref 0.85–1.15)
KETONES UR STRIP-MCNC: NEGATIVE MG/DL
LEUKOCYTE ESTERASE UR QL STRIP: NEGATIVE
LYMPHOCYTES # BLD AUTO: 1.3 10E3/UL (ref 0.8–5.3)
LYMPHOCYTES NFR BLD AUTO: 21 %
MAGNESIUM SERPL-MCNC: 1.7 MG/DL (ref 1.6–2.3)
MCH RBC QN AUTO: 33.3 PG (ref 26.5–33)
MCHC RBC AUTO-ENTMCNC: 37 G/DL (ref 31.5–36.5)
MCV RBC AUTO: 90 FL (ref 78–100)
MONOCYTES # BLD AUTO: 0.3 10E3/UL (ref 0–1.3)
MONOCYTES NFR BLD AUTO: 5 %
NEUTROPHILS # BLD AUTO: 4.3 10E3/UL (ref 1.6–8.3)
NEUTROPHILS NFR BLD AUTO: 70 %
NITRATE UR QL: NEGATIVE
NRBC # BLD AUTO: 0 10E3/UL
NRBC BLD AUTO-RTO: 0 /100
OSMOLALITY SERPL: 249 MMOL/KG (ref 280–301)
OSMOLALITY UR: 215 MMOL/KG (ref 100–1200)
PH UR STRIP: 7 [PH] (ref 5–7)
PLATELET # BLD AUTO: 270 10E3/UL (ref 150–450)
POTASSIUM BLD-SCNC: 5.2 MMOL/L (ref 3.4–5.3)
PROT SERPL-MCNC: 6.7 G/DL (ref 6.8–8.8)
RBC # BLD AUTO: 3.12 10E6/UL (ref 4.4–5.9)
RBC URINE: 1 /HPF
SARS-COV-2 RNA RESP QL NAA+PROBE: NEGATIVE
SODIUM SERPL-SCNC: 120 MMOL/L (ref 133–144)
SODIUM SERPL-SCNC: 127 MMOL/L (ref 133–144)
SODIUM UR-SCNC: 31 MMOL/L
SP GR UR STRIP: 1 (ref 1–1.03)
TROPONIN I SERPL-MCNC: 0.08 UG/L (ref 0–0.04)
TROPONIN I SERPL-MCNC: 0.09 UG/L (ref 0–0.04)
TROPONIN I SERPL-MCNC: 0.09 UG/L (ref 0–0.04)
UROBILINOGEN UR STRIP-MCNC: NORMAL MG/DL
WBC # BLD AUTO: 6.1 10E3/UL (ref 4–11)
WBC URINE: 0 /HPF

## 2021-08-31 PROCEDURE — 85025 COMPLETE CBC W/AUTO DIFF WBC: CPT | Performed by: FAMILY MEDICINE

## 2021-08-31 PROCEDURE — 120N000001 HC R&B MED SURG/OB

## 2021-08-31 PROCEDURE — 72125 CT NECK SPINE W/O DYE: CPT | Mod: ME

## 2021-08-31 PROCEDURE — 250N000013 HC RX MED GY IP 250 OP 250 PS 637: Performed by: NURSE PRACTITIONER

## 2021-08-31 PROCEDURE — 81001 URINALYSIS AUTO W/SCOPE: CPT | Performed by: FAMILY MEDICINE

## 2021-08-31 PROCEDURE — C9803 HOPD COVID-19 SPEC COLLECT: HCPCS | Performed by: FAMILY MEDICINE

## 2021-08-31 PROCEDURE — 84300 ASSAY OF URINE SODIUM: CPT | Performed by: EMERGENCY MEDICINE

## 2021-08-31 PROCEDURE — 73502 X-RAY EXAM HIP UNI 2-3 VIEWS: CPT

## 2021-08-31 PROCEDURE — 71045 X-RAY EXAM CHEST 1 VIEW: CPT

## 2021-08-31 PROCEDURE — 258N000003 HC RX IP 258 OP 636: Performed by: EMERGENCY MEDICINE

## 2021-08-31 PROCEDURE — 93005 ELECTROCARDIOGRAM TRACING: CPT | Performed by: FAMILY MEDICINE

## 2021-08-31 PROCEDURE — 84295 ASSAY OF SERUM SODIUM: CPT | Performed by: NURSE PRACTITIONER

## 2021-08-31 PROCEDURE — 85730 THROMBOPLASTIN TIME PARTIAL: CPT | Performed by: FAMILY MEDICINE

## 2021-08-31 PROCEDURE — 96360 HYDRATION IV INFUSION INIT: CPT | Performed by: FAMILY MEDICINE

## 2021-08-31 PROCEDURE — 85610 PROTHROMBIN TIME: CPT | Performed by: FAMILY MEDICINE

## 2021-08-31 PROCEDURE — 96361 HYDRATE IV INFUSION ADD-ON: CPT | Performed by: FAMILY MEDICINE

## 2021-08-31 PROCEDURE — 83930 ASSAY OF BLOOD OSMOLALITY: CPT | Performed by: EMERGENCY MEDICINE

## 2021-08-31 PROCEDURE — 99223 1ST HOSP IP/OBS HIGH 75: CPT | Mod: AI | Performed by: NURSE PRACTITIONER

## 2021-08-31 PROCEDURE — 99285 EMERGENCY DEPT VISIT HI MDM: CPT | Mod: 25 | Performed by: FAMILY MEDICINE

## 2021-08-31 PROCEDURE — 72192 CT PELVIS W/O DYE: CPT | Mod: MG

## 2021-08-31 PROCEDURE — 99285 EMERGENCY DEPT VISIT HI MDM: CPT | Performed by: FAMILY MEDICINE

## 2021-08-31 PROCEDURE — 84484 ASSAY OF TROPONIN QUANT: CPT | Performed by: EMERGENCY MEDICINE

## 2021-08-31 PROCEDURE — 82040 ASSAY OF SERUM ALBUMIN: CPT | Performed by: FAMILY MEDICINE

## 2021-08-31 PROCEDURE — 84484 ASSAY OF TROPONIN QUANT: CPT | Performed by: FAMILY MEDICINE

## 2021-08-31 PROCEDURE — 84484 ASSAY OF TROPONIN QUANT: CPT | Performed by: NURSE PRACTITIONER

## 2021-08-31 PROCEDURE — 72131 CT LUMBAR SPINE W/O DYE: CPT | Mod: ME

## 2021-08-31 PROCEDURE — 36415 COLL VENOUS BLD VENIPUNCTURE: CPT | Performed by: NURSE PRACTITIONER

## 2021-08-31 PROCEDURE — 250N000011 HC RX IP 250 OP 636: Performed by: HOSPITALIST

## 2021-08-31 PROCEDURE — 258N000003 HC RX IP 258 OP 636: Performed by: NURSE PRACTITIONER

## 2021-08-31 PROCEDURE — 87635 SARS-COV-2 COVID-19 AMP PRB: CPT | Performed by: EMERGENCY MEDICINE

## 2021-08-31 PROCEDURE — 70450 CT HEAD/BRAIN W/O DYE: CPT | Mod: ME

## 2021-08-31 PROCEDURE — 83735 ASSAY OF MAGNESIUM: CPT | Performed by: NURSE PRACTITIONER

## 2021-08-31 PROCEDURE — 36415 COLL VENOUS BLD VENIPUNCTURE: CPT | Performed by: FAMILY MEDICINE

## 2021-08-31 PROCEDURE — 83935 ASSAY OF URINE OSMOLALITY: CPT | Performed by: EMERGENCY MEDICINE

## 2021-08-31 PROCEDURE — 36415 COLL VENOUS BLD VENIPUNCTURE: CPT | Performed by: EMERGENCY MEDICINE

## 2021-08-31 RX ORDER — ONDANSETRON 4 MG/1
4 TABLET, ORALLY DISINTEGRATING ORAL EVERY 6 HOURS PRN
Status: DISCONTINUED | OUTPATIENT
Start: 2021-08-31 | End: 2021-09-04 | Stop reason: HOSPADM

## 2021-08-31 RX ORDER — ACETAMINOPHEN 650 MG/1
650 SUPPOSITORY RECTAL EVERY 4 HOURS PRN
Status: DISCONTINUED | OUTPATIENT
Start: 2021-08-31 | End: 2021-09-03

## 2021-08-31 RX ORDER — AMLODIPINE BESYLATE 5 MG/1
10 TABLET ORAL DAILY
Status: DISCONTINUED | OUTPATIENT
Start: 2021-09-01 | End: 2021-09-04 | Stop reason: HOSPADM

## 2021-08-31 RX ORDER — LIDOCAINE 40 MG/G
CREAM TOPICAL
Status: DISCONTINUED | OUTPATIENT
Start: 2021-08-31 | End: 2021-09-04 | Stop reason: HOSPADM

## 2021-08-31 RX ORDER — SODIUM CHLORIDE 9 MG/ML
INJECTION, SOLUTION INTRAVENOUS CONTINUOUS
Status: DISCONTINUED | OUTPATIENT
Start: 2021-08-31 | End: 2021-09-02

## 2021-08-31 RX ORDER — LANOLIN ALCOHOL/MO/W.PET/CERES
3 CREAM (GRAM) TOPICAL
Status: DISCONTINUED | OUTPATIENT
Start: 2021-08-31 | End: 2021-09-04 | Stop reason: HOSPADM

## 2021-08-31 RX ORDER — PROCHLORPERAZINE 25 MG
12.5 SUPPOSITORY, RECTAL RECTAL EVERY 12 HOURS PRN
Status: DISCONTINUED | OUTPATIENT
Start: 2021-08-31 | End: 2021-09-04 | Stop reason: HOSPADM

## 2021-08-31 RX ORDER — POTASSIUM CHLORIDE 750 MG/1
10 TABLET, EXTENDED RELEASE ORAL 2 TIMES DAILY
Status: DISCONTINUED | OUTPATIENT
Start: 2021-08-31 | End: 2021-08-31

## 2021-08-31 RX ORDER — PANTOPRAZOLE SODIUM 40 MG/1
40 TABLET, DELAYED RELEASE ORAL DAILY
Status: DISCONTINUED | OUTPATIENT
Start: 2021-09-01 | End: 2021-09-04 | Stop reason: HOSPADM

## 2021-08-31 RX ORDER — PROCHLORPERAZINE MALEATE 5 MG
5 TABLET ORAL EVERY 6 HOURS PRN
Status: DISCONTINUED | OUTPATIENT
Start: 2021-08-31 | End: 2021-09-04 | Stop reason: HOSPADM

## 2021-08-31 RX ORDER — ACETAMINOPHEN 325 MG/1
650 TABLET ORAL EVERY 4 HOURS PRN
Status: DISCONTINUED | OUTPATIENT
Start: 2021-08-31 | End: 2021-09-03

## 2021-08-31 RX ORDER — BENZONATATE 100 MG/1
200 CAPSULE ORAL 3 TIMES DAILY PRN
Status: DISCONTINUED | OUTPATIENT
Start: 2021-08-31 | End: 2021-09-04 | Stop reason: HOSPADM

## 2021-08-31 RX ORDER — ISOSORBIDE MONONITRATE 30 MG/1
60 TABLET, EXTENDED RELEASE ORAL DAILY
Status: DISCONTINUED | OUTPATIENT
Start: 2021-09-01 | End: 2021-09-04 | Stop reason: HOSPADM

## 2021-08-31 RX ORDER — POTASSIUM CHLORIDE 750 MG/1
50 TABLET, EXTENDED RELEASE ORAL 2 TIMES DAILY
Status: DISCONTINUED | OUTPATIENT
Start: 2021-08-31 | End: 2021-09-04 | Stop reason: HOSPADM

## 2021-08-31 RX ORDER — CILOSTAZOL 50 MG/1
100 TABLET ORAL 2 TIMES DAILY
Status: DISCONTINUED | OUTPATIENT
Start: 2021-08-31 | End: 2021-09-04 | Stop reason: HOSPADM

## 2021-08-31 RX ORDER — LOSARTAN POTASSIUM 50 MG/1
100 TABLET ORAL DAILY
Status: DISCONTINUED | OUTPATIENT
Start: 2021-08-31 | End: 2021-09-04 | Stop reason: HOSPADM

## 2021-08-31 RX ORDER — GABAPENTIN 300 MG/1
300 CAPSULE ORAL 2 TIMES DAILY
Status: DISCONTINUED | OUTPATIENT
Start: 2021-08-31 | End: 2021-09-04 | Stop reason: HOSPADM

## 2021-08-31 RX ORDER — ASPIRIN 81 MG/1
81 TABLET ORAL EVERY OTHER DAY
Status: DISCONTINUED | OUTPATIENT
Start: 2021-09-01 | End: 2021-09-04 | Stop reason: HOSPADM

## 2021-08-31 RX ORDER — HYDRALAZINE HYDROCHLORIDE 20 MG/ML
10 INJECTION INTRAMUSCULAR; INTRAVENOUS ONCE
Status: COMPLETED | OUTPATIENT
Start: 2021-08-31 | End: 2021-08-31

## 2021-08-31 RX ORDER — ONDANSETRON 2 MG/ML
4 INJECTION INTRAMUSCULAR; INTRAVENOUS EVERY 6 HOURS PRN
Status: DISCONTINUED | OUTPATIENT
Start: 2021-08-31 | End: 2021-09-04 | Stop reason: HOSPADM

## 2021-08-31 RX ORDER — CARVEDILOL 25 MG/1
25 TABLET ORAL 2 TIMES DAILY WITH MEALS
Status: DISCONTINUED | OUTPATIENT
Start: 2021-08-31 | End: 2021-09-04 | Stop reason: HOSPADM

## 2021-08-31 RX ORDER — SODIUM CHLORIDE 9 MG/ML
INJECTION, SOLUTION INTRAVENOUS CONTINUOUS
Status: DISCONTINUED | OUTPATIENT
Start: 2021-08-31 | End: 2021-08-31

## 2021-08-31 RX ADMIN — AMITRIPTYLINE HYDROCHLORIDE 50 MG: 25 TABLET, FILM COATED ORAL at 21:05

## 2021-08-31 RX ADMIN — GABAPENTIN 300 MG: 300 CAPSULE ORAL at 20:40

## 2021-08-31 RX ADMIN — SODIUM CHLORIDE 125 ML/HR: 9 INJECTION, SOLUTION INTRAVENOUS at 12:27

## 2021-08-31 RX ADMIN — SODIUM CHLORIDE 1000 ML: 9 INJECTION, SOLUTION INTRAVENOUS at 08:39

## 2021-08-31 RX ADMIN — LOSARTAN POTASSIUM 100 MG: 50 TABLET, FILM COATED ORAL at 17:54

## 2021-08-31 RX ADMIN — CARVEDILOL 25 MG: 25 TABLET, FILM COATED ORAL at 17:54

## 2021-08-31 RX ADMIN — CILOSTAZOL 100 MG: 50 TABLET ORAL at 20:40

## 2021-08-31 RX ADMIN — ACETAMINOPHEN 650 MG: 325 TABLET, FILM COATED ORAL at 20:39

## 2021-08-31 RX ADMIN — HYDRALAZINE HYDROCHLORIDE 10 MG: 20 INJECTION INTRAMUSCULAR; INTRAVENOUS at 21:05

## 2021-08-31 RX ADMIN — SODIUM CHLORIDE: 9 INJECTION, SOLUTION INTRAVENOUS at 09:59

## 2021-08-31 RX ADMIN — SODIUM CHLORIDE: 9 INJECTION, SOLUTION INTRAVENOUS at 18:34

## 2021-08-31 RX ADMIN — POTASSIUM CHLORIDE 50 MEQ: 750 TABLET, EXTENDED RELEASE ORAL at 20:40

## 2021-08-31 ASSESSMENT — MIFFLIN-ST. JEOR: SCORE: 1498.76

## 2021-08-31 ASSESSMENT — ACTIVITIES OF DAILY LIVING (ADL)
ADLS_ACUITY_SCORE: 18
ADLS_ACUITY_SCORE: 20

## 2021-08-31 NOTE — H&P
[facility]    History and Physical - Hospitalist Service       Date of Admission:  8/31/2021    Assessment & Plan      Michele Vance is a 76 year old male admitted on 8/31/2021. He presented to the emergency department by EMS with 24-hour history of gait difficulty, with 2 falls that occurred earlier this morning.  Patient initially fell getting up onto the bed, but then got up again to use the bathroom and fell in the bathroom.  He has visible injuries to the right side of the face.   Fortunately the CT imaging of his head, neck and lumbar spine did not show any acute abnormality.  Patient was able to stand and take a couple of lateral steps but when he tried to go forward he was unstable and cannot ambulate without assistance.  Patient work-up included a chest x-ray which was clear, EKG that showed a bundle branch block which is unchanged from previous, a second troponin that was improved from the first, and a Covid swab which is negative.  CBC stable for patient, slight anemia with hgb 10.4. INR 1.10. Urine negative for infection. Troponin slightly elevated at 0.09, stable on repeat troponin at 0.086. Patient with noted hyponatremia, sodium at 120. Osmolality at 249. Urine osmolality 215, sodium urine 31. At his advanced age and with his significant comorbid chronic medical problems, he is at high risk for deterioration and an adverse outcome.  For these reasons, hospitalization is considered medically necessary to expedite diagnostic evaluation and initiate appropriate treatment.  Based on the presently available information, hospitalization for at least 2 midnights is anticipated.        Hyponatremia  Assessment: Patient with hyponatremia, seems to be chronic although he has not had an evaluation for it.  He had hyponatremia with each visit to the ER in April, June and July. Prior to his cholecystectomy his sodium was low and his surgery was postponed, he was told to drink  gatorade for this. He has been drinking a large amount of gatorade and other fluids to try to stop the low sodium from happening again. Patient presented as above for falls, gait changes, stroke like symptoms. Sodium at 120. Osmolality at 249. Urine osmolality 215, sodium urine 31. Patient received 1 L NS bolus in the ER. The goal of therapy is to increase the serum sodium by 4 to 6 mEq/L over 24 hours. Raising the serum sodium by 4 to 6 mEq/L should generally alleviate symptoms and prevent osmotic demyelination syndrome   Plan:   - We will continue normal saline at 75 ml/ hour  - May need to have a fluid restriction   - Check sodium every 6 hours  - Recheck osmolality/ urine sodium and osmolality tomorrow if sodium has not corrected      Hypokalemia  Assessment: Chronic, patient is on potassium replacement at 50 mEq BID, using the 10 Farhat tabs. Unclear etiology, patient is not on diuretics. 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   Plan: Monitor, replace as needed and continue home medications.       Abnormal gait    Falls frequently    Facial injury, initial encounter    Acute midline low back pain without sciatica  Assessment: Noted on admission. No acute needs. Patient lives with his wife who provides 24 hour care.   Plan: Follow, fall risk assessment. Physical therapy evaluation.         Hyperlipidemia LDL goal <130  Assessment: Noted, chronic.   Plan: Continue home meds.      GERD (gastroesophageal reflux disease)  Assessment: Noted, chronic.   Plan: Continue home meds.      CKD (chronic kidney disease) stage 3, GFR 30-59 ml/min  Assessment: Noted, chronic. Creatinine 1.24, GFR 57.   Plan: Continue home meds. Recheck labs daily.       S/P CABG x 6    Stroke, embolic (H)    Essential hypertension with goal blood pressure less than 140/90  Assessment: Noted, chronic.   Plan: Continue home meds.       Diet:   Cardiac Diet  DVT Prophylaxis: Pneumatic Compression Devices - patient high fall risk and not on blood  thinners at home  Burton Catheter: Not present  Central Lines: None  Code Status:   Full Code    Clinically Significant Risk Factors Present on Admission         # Hyponatremia: Na = 120 mmol/L (Ref range: 133 - 144 mmol/L) on admission, will monitor as appropriate  # Hypocalcemia: Ca = 8.4 mg/dL (Ref range: 8.5 - 10.1 mg/dL) and/or iCa = N/A on admission, will replace as needed     # Platelet Defect: home medication list includes an antiplatelet medication      Disposition Plan   Expected discharge: 09/04/2021 recommended to prior living arrangement once hyponatremia resolved. .     The patient's care was discussed with the Attending Physician, Dr. Felder, Bedside Nurse, Care Coordinator/, Patient and Patient's Family.    Felicia Vigil, [facility]  Securely message with the Vocera Web Console (learn more here)  Text page via [facility] Paging/Directory      ______________________________________________________________________    Chief Complaint   Fall, Head Laceration    History is obtained from the patient, electronic health record and emergency department physician    History of Present Illness   Michele Vance is a 76 year old male who presented to the emergency department by EMS with 24-hour history of gait difficulty, with 2 falls that occurred earlier this morning.  Patient initially fell getting up onto the bed, but then got up again to use the bathroom and fell in the bathroom.  He has visible injuries to the right side of the face.  Patient complains of low back and right hip pain.  He denies any neck pain.  There is no loss of consciousness.  Patient lives with his wife who heard him fall.  He has a history of vasculopathy and is status post carotid angioplasty due to carotid artery occlusion, status post CABG x6, chronic kidney disease, hypertension, hyperlipidemia, previous embolic stroke, history of urinary retention, biliary colic, COPD.   He is on  aspirin and Pletal as anticoagulants. Fortunately the CT imaging of his head, neck and lumbar spine did not show any acute abnormality.  Does have some previous degenerative changes and compression fractures.  His hip x-ray did not show any acute fracture.  Patient had difficulty ambulating or even spreading his legs to urinate.  Did review the pelvis question if there is a fracture on plain x-ray but a CT did not confirm this.  Patient was able to stand and take a couple of lateral steps but when he tried to go forward he was unstable and cannot ambulate without assistance.  Patient work-up included a chest x-ray which was clear, EKG that showed a bundle branch block which is unchanged from previous, a second troponin that was improved from the first, and a Covid swab which is negative.  CBC stable for patient, slight anemia with hgb 10.4. INR 1.10. Urine negative for infection. Troponin slightly elevated at 0.09, stable on repeat troponin at 0.086. Patient with noted hyponatremia, sodium at 120. Osmolality at 249. Urine osmolality 215, sodium urine 31.       Review of Systems    CONSTITUTIONAL: NEGATIVE for fever, chills, change in weight  ENT/MOUTH: NEGATIVE for ear, mouth and throat problems  RESP: NEGATIVE for significant cough or SOB  CV: NEGATIVE for chest pain, palpitations or peripheral edema  GI: NEGATIVE for nausea, abdominal pain, heartburn, or change in bowel habits  MUSCULOSKELETAL: NEGATIVE for significant arthralgias or myalgia  NEURO: POSITIVE for coordination, gait disturbance and weakness   PSYCHIATRIC: NEGATIVE for changes in mood or affect  ROS otherwise negative    Past Medical History    I have reviewed this patient's medical history and updated it with pertinent information if needed.   Past Medical History:   Diagnosis Date     Carotid stenosis     right endartectomy     Chronic kidney disease     stage 3     Coronary artery disease 3-2014    CABG x6     CVA (cerebral infarction)     balance  problems, mild     Elevated CK      Esophageal reflux      Hypercholesteremia      Nonsenile cataract      Other and unspecified hyperlipidemia      PAD (peripheral artery disease) (H)      Rhabdomyolysis 2010     Unspecified essential hypertension        Past Surgical History   I have reviewed this patient's surgical history and updated it with pertinent information if needed.  Past Surgical History:   Procedure Laterality Date     APPENDECTOMY  1974     BYPASS GRAFT ARTERY CORONARY  3/5/2014    Procedure: BYPASS GRAFT ARTERY CORONARY;  Median Sternotomy, Coronary Artery Bypass Graft X6 used Left internal mammary artery, Left and Right Greater Saphenous vein, on Pump oxygenator.;  Surgeon: Tim Montanez MD;  Location: [facility] OR     CATARACT IOL, RT/LT Bilateral 2008    in [place]     cataracts       COLONOSCOPY  12/12/02    [facility]     COLONOSCOPY N/A 10/7/2014    Procedure: COMBINED COLONOSCOPY, SINGLE OR MULTIPLE BIOPSY/POLYPECTOMY BY BIOPSY;  Surgeon: Micheal Mora MD;  Location:  GI     CV LOWER EXTREMITY ANGIOGRAM BILATERAL Bilateral 9/9/2019    Procedure: Lower Extremity Angiogram Bilateral;  Surgeon: Julio Oropeza MD;  Location: [facility] CARDIAC CATH LAB     DENTAL SURGERY      TMJ, implants     ENDARTERECTOMY CAROTID      right carotid stent     ESOPHAGOSCOPY, GASTROSCOPY, DUODENOSCOPY (EGD), COMBINED Left 10/7/2014    Procedure: COMBINED ESOPHAGOSCOPY, GASTROSCOPY, DUODENOSCOPY (EGD), BIOPSY SINGLE OR MULTIPLE;  Surgeon: Micheal Mora MD;  Location:  GI     ESOPHAGOSCOPY, GASTROSCOPY, DUODENOSCOPY (EGD), COMBINED Left 10/7/2014    Procedure: COMBINED ESOPHAGOSCOPY, GASTROSCOPY, DUODENOSCOPY (EGD), REMOVE FOREIGN BODY;  Surgeon: Micheal Mora MD;  Location:  GI     HC CAPSULE ENDOSCOPY N/A 10/7/2014    Procedure: CAPSULE/PILL CAM ENDOSCOPY;  Surgeon: Micheal Mora MD;  Location:  GI     INJECT EPIDURAL LUMBAR Right 5/8/2017    Procedure: INJECT EPIDURAL LUMBAR;  Lumbar  transforaminal Epidural Steroid Injection right lumbar 4-5, and right  lumbar 5-Sacral 1;  Surgeon: Anthony Gonzalez MD;  Location: [facility] OR     INJECT JOINT SACROILIAC Bilateral 2017    Procedure: INJECT JOINT SACROILIAC;  sacroiliac joint injection bilateral;  Surgeon: Anthony Gonzalez MD;  Location: [facility] OR     IR CAROTID CEREBRAL ANGIOGRAM BILATERAL  9/3/2020     KNEE SURGERY      left knee reconstruction     LAPAROSCOPIC CHOLECYSTECTOMY N/A 2021    Procedure: CHOLECYSTECTOMY, LAPAROSCOPIC;  Surgeon: Anthony Epps MD;  Location: [facility] OR     lasix      both eyes     RELEASE CARPAL TUNNEL  2011    RELEASE CARPAL TUNNEL performed by JO MADRID at  OR     RELEASE CARPAL TUNNEL  2011    RELEASE CARPAL TUNNEL performed by JO MADRID at  OR     RUST UGI ENDOSCOPY, SIMPLE EXAM  99    [facility]       Social History   I have reviewed this patient's social history and updated it with pertinent information if needed.  Social History     Tobacco Use     Smoking status: Former Smoker     Packs/day: 2.50     Years: 20.00     Pack years: 50.00     Types: Cigarettes     Quit date: 2000     Years since quittin.6     Smokeless tobacco: Never Used   Substance Use Topics     Alcohol use: No     Alcohol/week: 0.0 standard drinks     Drug use: No       Family History   I have reviewed this patient's family history and updated it with pertinent information if needed.  Family History   Problem Relation Age of Onset     Hypertension Mother      Eye Disorder Mother      Cerebrovascular Disease Father      Heart Disease Father      Lipids Father      Obesity Father      Cancer Sister      Heart Disease Sister      Glaucoma No family hx of      Macular Degeneration No family hx of      Kidney Disease No family hx of        Prior to Admission Medications   Prior to Admission Medications   Prescriptions Last Dose Informant Patient Reported? Taking?    albuterol (PROAIR HFA/PROVENTIL HFA/VENTOLIN HFA) 108 (90 Base) MCG/ACT inhaler  Spouse/Significant Other No No   Sig: Inhale 2 puffs into the lungs every 4 hours as needed for shortness of breath / dyspnea or wheezing   alirocumab (PRALUENT) 150 MG/ML injectable syringe Past Week at 0800 Spouse/Significant Other No Yes   Sig: Inject 1 mL (150 mg) Subcutaneous every 14 days   amLODIPine (NORVASC) 10 MG tablet 8/30/2021 at 2000 Spouse/Significant Other No Yes   Sig: Take 1 tablet (10 mg) by mouth daily   amitriptyline (ELAVIL) 25 MG tablet 8/30/2021 at 2000 Spouse/Significant Other No Yes   Sig: TAKE 1 TABLET TWICE A DAY   Patient taking differently: Take 50 mg by mouth At Bedtime    aspirin (ASPIRIN) 81 MG EC tablet 8/30/2021 at 2000 Spouse/Significant Other Yes Yes   Sig: Take 81 mg by mouth every other day   benzonatate (TESSALON) 200 MG capsule Unknown at Unknown time Spouse/Significant Other No No   Sig: Take 1 capsule (200 mg) by mouth 3 times daily as needed for cough   carvedilol (COREG) 25 MG tablet 8/30/2021 at 2000 Spouse/Significant Other No Yes   Sig: Take 1 tablet (25 mg) by mouth 2 times daily (with meals)   cilostazol (PLETAL) 100 MG tablet 8/30/2021 at 2000 Spouse/Significant Other Yes Yes   Sig: Take 100 mg by mouth 2 times daily    diphenhydrAMINE-acetaminophen (TYLENOL PM)  MG tablet 8/30/2021 at 2100 Spouse/Significant Other Yes Yes   Sig: Take 2 tablets by mouth nightly as needed for sleep   gabapentin (NEURONTIN) 300 MG capsule 8/30/2021 at 2000 Spouse/Significant Other No Yes   Sig: TAKE 1 CAPSULE TWICE A DAY   Patient taking differently: Take 300 mg by mouth 2 times daily    isosorbide mononitrate (IMDUR) 60 MG 24 hr tablet 8/30/2021 at 0800 Spouse/Significant Other No Yes   Sig: Take 1 tablet (60 mg) by mouth daily   losartan (COZAAR) 100 MG tablet 8/30/2021 at 0800 Spouse/Significant Other No Yes   Sig: Take 1 tablet (100 mg) by mouth daily   omeprazole (PRILOSEC) 40 MG DR capsule  "8/30/2021 at 0800  No Yes   Sig: TAKE 1 CAPSULE DAILY   ondansetron (ZOFRAN) 4 MG tablet 8/30/2021 at 2100 Spouse/Significant Other No Yes   Sig: Take 1 tablet (4 mg) by mouth as needed for nausea   oxyCODONE (ROXICODONE) 5 MG tablet  Spouse/Significant Other No No   Sig: Take 1-2 tablets (5-10 mg) by mouth every 6 hours as needed for moderate to severe pain   potassium chloride ER (KLOR-CON M) 10 MEQ CR tablet 8/30/2021 at 2000  No Yes   Sig: TAKE 5 TABLETS TWICE A DAY      Facility-Administered Medications: None     Allergies   Allergies   Allergen Reactions     Statins [Hmg-Coa-R Inhibitors] Other (See Comments)     Rhabdo  Same as \"statins\"     Gemfibrozil      Resting thigh-calf pain     Sulfa Drugs      Reacted as a child     Zetia [Ezetimibe]      Muscle cramping       Physical Exam   Vital Signs: Temp: 97.6  F (36.4  C) Temp src: Oral BP: (!) 151/119 Pulse: 63   Resp: 22 SpO2: 90 % O2 Device: None (Room air)    Weight: 175 lbs 6.4 oz      Constitutional: awake, alert, cooperative, no apparent distress. Eye laceration intact, no bleeding noted.   Eyes: Lids and lashes normal, pupils equal, round and reactive to light  NOSE: Normal without discharge.  ENT: Normocephalic, without obvious abnormality, atraumatic, oral pharynx with moist mucous membranes   Respiratory: No respiratory distress, lungs clear  Cardiovascular:  Normal apical impulse, regular rate and rhythm   GI: Normal bowel sounds, soft, non-distended, non-tender   Skin: normal skin color, texture, turgor  Musculoskeletal: There is no redness, warmth, or swelling of the joints.   Neurologic: Awake, alert, oriented to name, place and time. No focal findings.   Psychiatric:  No evidence of agitation. Forgetful.       Data   Data reviewed today: I reviewed all medications, new labs and imaging results over the last 24 hours.      Recent Labs   Lab 08/31/21  1319 08/31/21  0658   WBC  --  6.1   HGB  --  10.4*   MCV  --  90   PLT  --  270   INR  --  1.10 "   NA  --  120*   POTASSIUM  --  5.2   CHLORIDE  --  92*   CO2  --  21   BUN  --  18   CR  --  1.24   ANIONGAP  --  7   RAHEEM  --  8.4*   GLC  --  92   ALBUMIN  --  3.3*   PROTTOTAL  --  6.7*   BILITOTAL  --  0.3   ALKPHOS  --  88   ALT  --  24   AST  --  19   TROPONIN 0.086* 0.090*     Recent Results (from the past 24 hour(s))   Lumbar spine CT w/o contrast    Narrative    CT OF THE LUMBAR SPINE WITHOUT CONTRAST     8/31/2021 7:23 AM     COMPARISON: CT of the abdomen and pelvis 7/19/2021    HISTORY: Low back pain, trauma     TECHNIQUE: Axial images of the lumbar spine were acquired without  intravenous contrast. Multiplanar reformations were created.      FINDINGS:   Chronic T11, T12 and L2 superior endplate compressions again noted. No  acute fracture. No posterior metastatic subluxation. Mild/moderate  bilateral L4-5 and moderate to severe bilateral L5-S1 neural foraminal  stenoses again noted. No high-grade spinal canal stenosis.    Grossly normal paraspinal soft tissues.     CONCLUSION:  1.  No acute fracture or post-traumatic subluxation.  2.  Chronic T11, T12 and L2 superior endplate compressions.  3.  Moderate to severe bilateral L5-S1 neural foraminal stenoses.    Radiation dose for this scan was reduced using automated exposure  control, adjustment of the mA and/or kV according to patient size, or  iterative reconstruction technique    YURI OWENS MD         SYSTEM ID:  [id number]   Cervical spine CT w/o contrast    Narrative    CT OF THE CERVICAL SPINE WITHOUT CONTRAST   8/31/2021 7:24 AM     COMPARISON: Chest CT 7/14/2021    HISTORY: Neck trauma (Age >= 65y); fall, gait disturbance; rule out  fracture     TECHNIQUE: Axial images of the cervical spine were acquired without  intravenous contrast. Multiplanar reformations were created.      FINDINGS: Mild chronic T2 in T3 superior endplate compressions. These  were present on the comparison chest CT. No acute fracture. No  posttraumatic subluxation. Chronic  degenerative change results in  multilevel moderate neural foraminal stenoses. No high-grade spinal  canal stenosis.    Grossly normal lung apices and neck soft tissues.      Impression    IMPRESSION:  1.  No acute fracture. No posttraumatic subluxation.  2.  Mild chronic T2 and T3 superior endplate compressions.      Radiation dose for this scan was reduced using automated exposure  control, adjustment of the mA and/or kV according to patient size, or  iterative reconstruction technique    YURI OWENS MD         SYSTEM ID:  [id number]   CT Head w/o Contrast    Narrative    CT OF THE HEAD WITHOUT CONTRAST   8/31/2021 7:25 AM     COMPARISON: Head CT 7/19/2021    HISTORY:  Head trauma, minor (Age >= 65y)     TECHNIQUE:  Axial CT images of the head from the skull base to the  vertex were acquired without IV contrast.    FINDINGS:   INTRACRANIAL CONTENTS: No intracranial hemorrhage, extraaxial  collection, or mass effect.  No CT evidence of acute infarct.    Chronic infarcts in the right frontal lobe, left periventricular  parenchyma and cerebellar hemispheres are again identified. Moderate  generalized volume loss.    VISUALIZED ORBITS/SINUSES/MASTOIDS: No significant orbital  abnormality.  No significant paranasal sinus mucosal disease. No  significant middle ear or mastoid effusion.    OSSEOUS STRUCTURES/SOFT TISSUES: Right facial hematoma. No skull  fracture.      Impression    IMPRESSION:  1.  Right facial hematoma. No skull fracture.  2.  No acute intracranial abnormality.  3.  Chronic ischemic changes as noted above    Radiation dose for this scan was reduced using automated exposure  control, adjustment of the mA and/or kV according to patient size, or  iterative reconstruction technique    YURI OWENS MD         SYSTEM ID:  [id number]   XR Pelvis and Hip Right 1 View    Narrative    PELVIS AND RIGHT HIP, ONE VIEW   8/31/2021 7:31 AM     HISTORY:  Fall in the bathroom, right-sided hip pain.    FINDINGS: Lower  lumbar spine degenerative change.      Impression    IMPRESSION: Mild right hip joint space narrowing. No fracture  identified.     CHERELLE THORPE MD         SYSTEM ID:  [id number]   CT Pelvis Bone wo Contrast    Narrative    CT PELVIS BONE WITHOUT CONTRAST8/31/2021 10:36 AM     HISTORY: Fall, pain.    TECHNIQUE:  Axial images with reconstructions. No IV contrast.  Radiation dose for this scan was reduced using automated exposure  control, adjustment of the mA and/or kV according to patient size, or  iterative reconstruction technique.    COMPARISON:  None    FINDINGS:      Bones, joints: Osteopenia. Lower lumbar spine degenerative change.  Mild right hip arthropathy.    Additional findings: Bladder distention. Mild prostate enlargement. At  the lateral aspect of the right hip there is subcutaneous edema versus  contusion.      Impression    IMPRESSION:  1. No fracture identified.   2. Additional findings discussed above.    XR Chest Port 1 View    Narrative    XR CHEST PORT 1 VIEW 8/31/2021 1:33 PM    HISTORY: Fall, pain    COMPARISON: Chest x-ray 7/19/2021      Impression    IMPRESSION: Sternotomy with postoperative changes in the mediastinum.  Cardiac silhouette at the upper limits of normal, with normal  pulmonary vasculature. No consolidative opacities. No pleural  effusion. No pneumothorax.    MAME ZHANG MD         SYSTEM ID:  [id number]

## 2021-08-31 NOTE — ED TRIAGE NOTES
Presents to ED from home via EMS after falling earlier this morning in the bathroom. Patient has history of 3 strokes and does not recall falling but does not believe he lost consciousness. Laceration to the right eye and hematoma to the left forehead. Also dried blood to above the right ear. EMS put patient in [facility].

## 2021-08-31 NOTE — PROGRESS NOTES
"S-(situation): Patient arrives to room 251 via cart from ED    B-(background): weakness, hyponatremia    A-(assessment): lethargic.  Confused to time and situation.  Weakness noted.  IVF's infusing.  BP (!) 180/58   Pulse 83   Temp 98.6  F (37  C) (Oral)   Resp 20   Ht 1.727 m (5' 8\")   Wt 78.9 kg (174 lb)   SpO2 90%   BMI 26.46 kg/m    Skin tear on left forearm, cleansed and mepilex applied.  Laceration to face.  Fall precautions in place.  Bed alarm on.    R-(recommendations): Orders reviewed with pt and wife. Will monitor patient per MD orders.     Inpatient nursing criteria listed below were met:    Health care directives status obtained and documented: Yes  SCD's Documented: Yes  Skin issues/needs documented:Yes  Isolation addressed and Signage used: NA  Fall Prevention: Care plan updated Yes Education given and documented Yes  Care Plan initiated and Co-Morbidities added: Yes  Education Assessment documented:Yes  Admission Education Documented: Yes  If present CAUTI/CLABI Education done: NA  New medication patient education completed and documented (Possible Side Effects of Common Medications handout): Yes  Allergies Reviewed: Yes  Admission Medication Reconciliation completed: Yes  Home medications if not able to send immediately home with family stored here: NA  Reminder note placed in discharge instructions regarding home meds: NA  Individualized care needs/preferences addressed and charted: Yes          "

## 2021-08-31 NOTE — ED NOTES
ED Nursing criteria listed below was addressed during verbal handoff:     Abnormal vitals: No  Abnormal results: Yes  Med Reconciliation completed: Yes  Meds given in ED: No  Any Overdue Meds: No  Core Measures: Yes  Isolation: No  Special needs: No  Skin assessment: Yes    Observation Patient  Education provided: Yes

## 2021-08-31 NOTE — ED PROVIDER NOTES
[facility] ED Provider Note   Patient: Michele Vance  MRN #:  [id number]  Date of Visit: August 31, 2021    CC:     Chief Complaint   Patient presents with     Fall     HPI:  Michele Vance is a 76 year old male who presented to the emergency department by EMS with 24-hour history of gait difficulty, with 2 falls that occurred earlier this morning.  Patient initially fell getting up onto the bed, but then got up again to use the bathroom and fell in the bathroom.  He has visible injuries to the right side of the face.  Patient complains of low back and right hip pain.  He denies any neck pain.  There is no loss of consciousness.  Patient lives with his wife who heard him fall.  He has a history of vasculopathy and is status post carotid angioplasty due to carotid artery occlusion, status post CABG x6, chronic kidney disease, hypertension, hyperlipidemia, previous embolic stroke, history of urinary retention, biliary colic, COPD.  Patient was able to get up with some assistance, and was able to walk down some stairs with some assistance.  Upon arrival in the emergency department, patient complains of low back pain as the area of pain that is causing him most discomfort.  With movement of the lower extremities, he reports pain in the right hip.  He has some visible injuries to the right side of the face but denies any neck pain.  Patient was able to report that he had fallen in the bathroom.  He is on aspirin and Pletal as anticoagulants.  EMS reports that the wife had reported difficulty with ambulation over the last 24 hours.  Patient has had a recent gallbladder surgery in the last couple of months.  He has a history of previous strokes.    Problem List:  Patient Active Problem List    Diagnosis Date Noted     Biliary colic 07/08/2021     Priority: Medium     Added automatically from request for surgery [id number]       H/O carotid angioplasty  09/04/2020     Priority: Medium     Status post balloon angioplasty of pulmonary artery branches 09/03/2020     Priority: Medium     Carotid stenosis      Priority: Medium     right endartectomy       Essential hypertension with goal blood pressure less than 140/90 06/02/2016     Priority: Medium     Chronic constipation 12/24/2014     Priority: Medium     Irritable bowel syndrome 12/24/2014     Priority: Medium     Health Care Home 09/11/2014     Priority: Medium     Status:  Unable to reach  Care Coordinator:  Erika Marcus    See Letters for [facility] Care Plan  Date:  September 11, 2014         Carotid artery occlusion with cerebral infarction (H) 08/06/2014     Priority: Medium     Dizziness 08/06/2014     Priority: Medium     Panlobular emphysema (H) 06/04/2014     Priority: Medium     Cerebral infarction due to occlusion or stenosis of carotid artery 05/07/2014     Priority: Medium     Problem list name updated by automated process. Provider to review       Stroke, embolic (H) 04/25/2014     Priority: Medium     Insomnia 03/12/2014     Priority: Medium     Nutritional deficiency 03/12/2014     Priority: Medium     Pain 03/12/2014     Priority: Medium     Urinary retention 03/12/2014     Priority: Medium     S/P CABG x 6 03/06/2014     Priority: Medium     Left main coronary artery disease 03/04/2014     Priority: Medium     Arthritis 01/14/2013     Priority: Medium     Carotid bruit 01/14/2013     Priority: Medium     Carotid stenosis, bilateral 01/14/2013     Priority: Medium     Anemia 04/06/2012     Priority: Medium     CKD (chronic kidney disease) stage 3, GFR 30-59 ml/min 04/06/2012     Priority: Medium     Advanced directives, counseling/discussion 04/05/2012     Priority: Medium     Impingement syndrome, shoulder, right 03/16/2011     Priority: Medium     Hypertension 11/22/2010     Priority: Medium     Hyperlipidemia LDL goal <130 11/22/2010     Priority: Medium     Myalgia and myositis 11/22/2010      "Priority: Medium     GERD (gastroesophageal reflux disease) 11/22/2010     Priority: Medium       Past Medical History:   Diagnosis Date     Carotid stenosis      Chronic kidney disease      Coronary artery disease 3-2014     CVA (cerebral infarction)      Elevated CK      Esophageal reflux      Hypercholesteremia      Nonsenile cataract      Other and unspecified hyperlipidemia      PAD (peripheral artery disease) (H)      Rhabdomyolysis 2010     Unspecified essential hypertension        MEDS: albuterol (PROAIR HFA/PROVENTIL HFA/VENTOLIN HFA) 108 (90 Base) MCG/ACT inhaler  alirocumab (PRALUENT) 150 MG/ML injectable syringe  amitriptyline (ELAVIL) 25 MG tablet  amLODIPine (NORVASC) 10 MG tablet  aspirin (ASPIRIN) 81 MG EC tablet  benzonatate (TESSALON) 200 MG capsule  carvedilol (COREG) 25 MG tablet  cilostazol (PLETAL) 100 MG tablet  diphenhydrAMINE-acetaminophen (TYLENOL PM)  MG tablet  gabapentin (NEURONTIN) 300 MG capsule  isosorbide mononitrate (IMDUR) 60 MG 24 hr tablet  losartan (COZAAR) 100 MG tablet  omeprazole (PRILOSEC) 40 MG DR capsule  ondansetron (ZOFRAN) 4 MG tablet  oxyCODONE (ROXICODONE) 5 MG tablet  potassium chloride ER (KLOR-CON M) 10 MEQ CR tablet        ALLERGIES:    Allergies   Allergen Reactions     Statins [Hmg-Coa-R Inhibitors] Other (See Comments)     Rhabdo  Same as \"statins\"     Gemfibrozil      Resting thigh-calf pain     Sulfa Drugs      Reacted as a child     Zetia [Ezetimibe]      Muscle cramping       Past Surgical History:   Procedure Laterality Date     APPENDECTOMY  1974     BYPASS GRAFT ARTERY CORONARY  3/5/2014    Procedure: BYPASS GRAFT ARTERY CORONARY;  Median Sternotomy, Coronary Artery Bypass Graft X6 used Left internal mammary artery, Left and Right Greater Saphenous vein, on Pump oxygenator.;  Surgeon: Tim Montanez MD;  Location: [facility] OR     CATARACT IOL, RT/LT Bilateral 2008    in [place]     cataracts       COLONOSCOPY  12/12/02    [facility]     " COLONOSCOPY N/A 10/7/2014    Procedure: COMBINED COLONOSCOPY, SINGLE OR MULTIPLE BIOPSY/POLYPECTOMY BY BIOPSY;  Surgeon: Micheal Mora MD;  Location: [facility] GI     CV LOWER EXTREMITY ANGIOGRAM BILATERAL Bilateral 9/9/2019    Procedure: Lower Extremity Angiogram Bilateral;  Surgeon: Julio Oropeza MD;  Location:  HEART CARDIAC CATH LAB     DENTAL SURGERY      TMJ, implants     ENDARTERECTOMY CAROTID      right carotid stent     ESOPHAGOSCOPY, GASTROSCOPY, DUODENOSCOPY (EGD), COMBINED Left 10/7/2014    Procedure: COMBINED ESOPHAGOSCOPY, GASTROSCOPY, DUODENOSCOPY (EGD), BIOPSY SINGLE OR MULTIPLE;  Surgeon: Micheal Mora MD;  Location: [facility] GI     ESOPHAGOSCOPY, GASTROSCOPY, DUODENOSCOPY (EGD), COMBINED Left 10/7/2014    Procedure: COMBINED ESOPHAGOSCOPY, GASTROSCOPY, DUODENOSCOPY (EGD), REMOVE FOREIGN BODY;  Surgeon: Micheal Mora MD;  Location: [facility] GI     HC CAPSULE ENDOSCOPY N/A 10/7/2014    Procedure: CAPSULE/PILL CAM ENDOSCOPY;  Surgeon: Micheal Mora MD;  Location: [facility] GI     INJECT EPIDURAL LUMBAR Right 5/8/2017    Procedure: INJECT EPIDURAL LUMBAR;  Lumbar transforaminal Epidural Steroid Injection right lumbar 4-5, and right  lumbar 5-Sacral 1;  Surgeon: Anthony Gonzalez MD;  Location: [facility] OR     INJECT JOINT SACROILIAC Bilateral 8/28/2017    Procedure: INJECT JOINT SACROILIAC;  sacroiliac joint injection bilateral;  Surgeon: Anthony Gonzalez MD;  Location: [facility] OR     IR CAROTID CEREBRAL ANGIOGRAM BILATERAL  9/3/2020     KNEE SURGERY  1976    left knee reconstruction     LAPAROSCOPIC CHOLECYSTECTOMY N/A 7/16/2021    Procedure: CHOLECYSTECTOMY, LAPAROSCOPIC;  Surgeon: Anthony Epps MD;  Location: [facility] OR     lasix  2001    both eyes     RELEASE CARPAL TUNNEL  1/13/2011    RELEASE CARPAL TUNNEL performed by JO MADRID at  OR     RELEASE CARPAL TUNNEL  1/20/2011    RELEASE CARPAL TUNNEL performed by JO MADRID at  OR     [facility] ENDOSCOPY, SIMPLE EXAM  01/08/99    [facility]       Social History     Tobacco Use     Smoking status: Former Smoker     Packs/day: 2.50     Years: 20.00     Pack years: 50.00     Types: Cigarettes     Quit date: 2000     Years since quittin.6     Smokeless tobacco: Never Used   Substance Use Topics     Alcohol use: No     Alcohol/week: 0.0 standard drinks     Drug use: No         Review of Systems   Except as noted in HPI, all other systems were reviewed and are negative    Physical Exam     Vitals were reviewed  Patient Vitals for the past 12 hrs:   BP Temp Temp src Pulse Resp SpO2 Weight   21 0637 (!) 176/71 97.6  F (36.4  C) Oral 63 20 94 % 79.6 kg (175 lb 6.4 oz)     GENERAL APPEARANCE: Alert, oriented to person, place and year.  GCS 15  HEAD: No palpable scalp injuries.  FACE: Right-sided facial contusion and ecchymosis.  There is no active bleeding although there is some dried blood along the right lateral orbit.  EYES: Pupils are equal; extraocular muscles are intact  HENT: normal external exam; no hemotympanum  NECK: no adenopathy or asymmetry; c-collar is in place  RESP: normal respiratory effort; clear breath sounds bilaterally  CV: regular rate and rhythm; grade 3/6 murmur  ABD: soft, protuberant, no tenderness; no rebound or guarding; bowel sounds are normal  MS: no gross deformities noted; normal muscle tone.  Patient has difficulty raising the right leg due to right-sided hip pain; no shortening or external rotation of the hips.  EXT: No calf tenderness or pitting edema  SKIN: no worrisome rash  NEURO: no facial droop; no focal deficits, speech is normal  PSYCH: normal mood and affect      Available Lab/Imaging Results     Results for orders placed or performed during the hospital encounter of 21 (from the past 24 hour(s))   UA with Microscopic reflex to Culture    Specimen: Urine, NOS   Result Value Ref Range    Color Urine Straw Colorless, Straw, Light Yellow, Yellow    Appearance Urine Clear Clear     Glucose Urine Negative Negative mg/dL    Bilirubin Urine Negative Negative    Ketones Urine Negative Negative mg/dL    Specific Gravity Urine 1.005 1.003 - 1.035    Blood Urine Negative Negative    pH Urine 7.0 5.0 - 7.0    Protein Albumin Urine 30  (A) Negative mg/dL    Urobilinogen Urine Normal Normal, 2.0 mg/dL    Nitrite Urine Negative Negative    Leukocyte Esterase Urine Negative Negative    RBC Urine 1 <=2 /HPF    WBC Urine 0 <=5 /HPF    Narrative    Urine Culture not indicated   CBC with platelets differential    Narrative    The following orders were created for panel order CBC with platelets differential.  Procedure                               Abnormality         Status                     ---------                               -----------         ------                     CBC with platelets and d...[[id number]]                      In process                   Please view results for these tests on the individual orders.              Impression     Final diagnoses:   Falls frequently   Abnormal gait   Facial injury, initial encounter   Acute midline low back pain         ED Course & Medical Decision Making   Michele Vance is a 76 year old male who presented to the emergency department by EMS for evaluations of injuries that he sustained this morning after falling.  Patient apparently fell earlier this morning but he fell into bed.  He then got up to go to the bathroom and fell while in the bathroom.  He was incontinent of urine.  EMS was dispatched to the house, and they report that he was able to ambulate down some steps with some help.  He reported no loss of consciousness but has some visible injuries to the right side of the face.  He complained of low back pain as well as right hip pain.  Patient is on aspirin and Pletal.  Vital signs reveal a temp of 97.6, blood pressure 176/71, heart rate of 83, respiratory rate of 20 with 94% oxygen saturation.  Patient has a normal GCS of 15.  Exam reveals  right-sided facial ecchymosis, with minimal bleeding from the right lateral eye.  Patient does not have any midline spinal tenderness but does complain of back pain.  He also has right hip pain.  He has some difficulty with active elevation of the right leg.    With reported new onset or worsening gait abnormality in the last day, I proceeded with ordering CT scan of the head, C-spine and lumbar spine; additionally right hip x-ray was also ordered.  He did not complain of any cervical spine tenderness but he may have distracting injuries.  Patient also has a strong smell of urine and was incontinent.  We will try to rule out UTI.    7:04 AM: Initial orders were written, and the patient will be signed out to Dr. Adan at the change of shift.           Disclaimer: This note consists of words and symbols derived from keyboarding and dictation using voice recognition software.  As a result, there may be errors that have gone undetected.  Please consider this when interpreting information found in this note.       Miquel Valdes MD  08/31/21 [id number]

## 2021-08-31 NOTE — ED PROVIDER NOTES
She is a 76-year-old male who presents to emergency room with blood 24 hours of problems ambulating.  He had 2 falls 1 where his wife assist him into bed the second 1 where he fell unwitnessed but hit the right side of his body.  He did have a laceration of his face.  He has a complex medical history.  Please see Dr. Valdes's note for the initial history of present illness, exam, and work-up.  Patient was signed out to me awaiting results of CT imaging of his head, neck, lumbar spine and x-ray of his hip.  Fortunately the CT imaging of his head, neck and lumbar spine did not show any acute abnormality.  Does have some previous degenerative changes and compression fractures.  His hip x-ray did not show any acute fracture.  Patient had difficulty ambulating or even spreading his legs to urinate.  Did review the pelvis question if there is a fracture on plain x-ray but a CT did not confirm this.  Patient was able to stand and take a couple of lateral steps but when he tried to go forward he was unstable and cannot ambulate without assistance.  Patient work-up included a chest x-ray which was clear, EKG that showed a bundle branch block which is unchanged from previous, a second troponin that was improved from the first, and a Covid swab which is pending.  I spoke to Dr. Felder from the hospitalist service and he will assume care on admission.  Patient may need transitional care after hospitalization as I do not think he will be able to ambulate on his own.  He has had multiple small infarcts in the past.  His CT today did not show any acute but it symptoms persist consider MRI.     Myke Adan MD  08/31/21 [id number]

## 2021-09-01 LAB
ANION GAP SERPL CALCULATED.3IONS-SCNC: 6 MMOL/L (ref 3–14)
BUN SERPL-MCNC: 12 MG/DL (ref 7–30)
CALCIUM SERPL-MCNC: 8.2 MG/DL (ref 8.5–10.1)
CHLORIDE BLD-SCNC: 102 MMOL/L (ref 94–109)
CO2 SERPL-SCNC: 21 MMOL/L (ref 20–32)
CREAT SERPL-MCNC: 0.94 MG/DL (ref 0.66–1.25)
ERYTHROCYTE [DISTWIDTH] IN BLOOD BY AUTOMATED COUNT: 12.4 % (ref 10–15)
GFR SERPL CREATININE-BSD FRML MDRD: 79 ML/MIN/1.73M2
GLUCOSE BLD-MCNC: 107 MG/DL (ref 70–99)
HCT VFR BLD AUTO: 27.9 % (ref 40–53)
HGB BLD-MCNC: 9.9 G/DL (ref 13.3–17.7)
MAGNESIUM SERPL-MCNC: 1.9 MG/DL (ref 1.6–2.3)
MCH RBC QN AUTO: 32.9 PG (ref 26.5–33)
MCHC RBC AUTO-ENTMCNC: 35.5 G/DL (ref 31.5–36.5)
MCV RBC AUTO: 93 FL (ref 78–100)
OSMOLALITY SERPL: 260 MMOL/KG (ref 280–301)
PLATELET # BLD AUTO: 251 10E3/UL (ref 150–450)
POTASSIUM BLD-SCNC: 4.1 MMOL/L (ref 3.4–5.3)
RBC # BLD AUTO: 3.01 10E6/UL (ref 4.4–5.9)
SODIUM SERPL-SCNC: 127 MMOL/L (ref 133–144)
SODIUM SERPL-SCNC: 128 MMOL/L (ref 133–144)
SODIUM SERPL-SCNC: 129 MMOL/L (ref 133–144)
WBC # BLD AUTO: 7.7 10E3/UL (ref 4–11)

## 2021-09-01 PROCEDURE — 36415 COLL VENOUS BLD VENIPUNCTURE: CPT | Performed by: NURSE PRACTITIONER

## 2021-09-01 PROCEDURE — 85027 COMPLETE CBC AUTOMATED: CPT | Performed by: NURSE PRACTITIONER

## 2021-09-01 PROCEDURE — 84295 ASSAY OF SERUM SODIUM: CPT | Performed by: NURSE PRACTITIONER

## 2021-09-01 PROCEDURE — 120N000001 HC R&B MED SURG/OB

## 2021-09-01 PROCEDURE — 99233 SBSQ HOSP IP/OBS HIGH 50: CPT | Performed by: NURSE PRACTITIONER

## 2021-09-01 PROCEDURE — 83735 ASSAY OF MAGNESIUM: CPT | Performed by: NURSE PRACTITIONER

## 2021-09-01 PROCEDURE — 258N000003 HC RX IP 258 OP 636: Performed by: NURSE PRACTITIONER

## 2021-09-01 PROCEDURE — 999N000147 HC STATISTIC PT IP EVAL DEFER: Performed by: PHYSICAL THERAPIST

## 2021-09-01 PROCEDURE — 250N000013 HC RX MED GY IP 250 OP 250 PS 637: Performed by: NURSE PRACTITIONER

## 2021-09-01 PROCEDURE — 83930 ASSAY OF BLOOD OSMOLALITY: CPT | Performed by: NURSE PRACTITIONER

## 2021-09-01 RX ADMIN — SODIUM CHLORIDE: 9 INJECTION, SOLUTION INTRAVENOUS at 21:17

## 2021-09-01 RX ADMIN — CILOSTAZOL 100 MG: 50 TABLET ORAL at 21:16

## 2021-09-01 RX ADMIN — ACETAMINOPHEN 650 MG: 325 TABLET, FILM COATED ORAL at 09:08

## 2021-09-01 RX ADMIN — POTASSIUM CHLORIDE 50 MEQ: 750 TABLET, EXTENDED RELEASE ORAL at 21:15

## 2021-09-01 RX ADMIN — GABAPENTIN 300 MG: 300 CAPSULE ORAL at 21:16

## 2021-09-01 RX ADMIN — ACETAMINOPHEN 650 MG: 325 TABLET, FILM COATED ORAL at 21:26

## 2021-09-01 RX ADMIN — LOSARTAN POTASSIUM 100 MG: 50 TABLET, FILM COATED ORAL at 09:08

## 2021-09-01 RX ADMIN — CARVEDILOL 25 MG: 25 TABLET, FILM COATED ORAL at 19:00

## 2021-09-01 RX ADMIN — ASPIRIN 81 MG: 81 TABLET ORAL at 09:07

## 2021-09-01 RX ADMIN — POTASSIUM CHLORIDE 50 MEQ: 750 TABLET, EXTENDED RELEASE ORAL at 09:07

## 2021-09-01 RX ADMIN — AMLODIPINE BESYLATE 10 MG: 5 TABLET ORAL at 09:07

## 2021-09-01 RX ADMIN — AMITRIPTYLINE HYDROCHLORIDE 50 MG: 25 TABLET, FILM COATED ORAL at 21:16

## 2021-09-01 RX ADMIN — PANTOPRAZOLE SODIUM 40 MG: 40 TABLET, DELAYED RELEASE ORAL at 09:08

## 2021-09-01 RX ADMIN — CILOSTAZOL 100 MG: 50 TABLET ORAL at 09:07

## 2021-09-01 RX ADMIN — ISOSORBIDE MONONITRATE 60 MG: 30 TABLET, EXTENDED RELEASE ORAL at 09:07

## 2021-09-01 RX ADMIN — CARVEDILOL 25 MG: 25 TABLET, FILM COATED ORAL at 09:08

## 2021-09-01 RX ADMIN — SODIUM CHLORIDE: 9 INJECTION, SOLUTION INTRAVENOUS at 07:50

## 2021-09-01 RX ADMIN — GABAPENTIN 300 MG: 300 CAPSULE ORAL at 09:07

## 2021-09-01 ASSESSMENT — ACTIVITIES OF DAILY LIVING (ADL)
ADLS_ACUITY_SCORE: 18
ADLS_ACUITY_SCORE: 18
ADLS_ACUITY_SCORE: 22
ADLS_ACUITY_SCORE: 18
DEPENDENT_IADLS:: CLEANING;COOKING;LAUNDRY;SHOPPING;MEAL PREPARATION;MEDICATION MANAGEMENT;TRANSPORTATION

## 2021-09-01 NOTE — PLAN OF CARE
S-(situation): Physical therapy and occupation therapy evaluation per MD order    B-(background): Patient is a 76 year old male, admitted from the ED due to 2 falls at home and changes in gait, patient found to have hyponatremia, hypokalemia. Patient with a previous medical history of CKD, HTN, hyperlipidemia, IBS, GERD, CVA, COPD, panlobular emphysema, former smoker, carotid angioplasty, CABG x 6.    A-(assessment): Per morning rounds patient is being acutely managed for hyponatremia and  Hypokalemia.    R-(recommendations): PT to hold evaluation until sodium levels are improved. PT to screen for OT needs when evaluation is completed.    Thank you for your referral.  Tonia Lawrence, PT, DPT, ATC, [facility]  O: [phone]  E: [email or web address]

## 2021-09-01 NOTE — PLAN OF CARE
"BP (!) 177/58   Pulse 81   Temp 97.2  F (36.2  C) (Oral)   Resp 18   Ht 1.727 m (5' 8\")   Wt 78.9 kg (174 lb)   SpO2 95%   BMI 26.46 kg/m    Pt still has generalized weakness.  Activity with 2 assist.  Sleeps between cares. Tylenol for general aches. Hip pain with movement.   AM Na-129, next lab draw at 1200 Na-128. 1800 Na-129.  NS @ 75 ml/hr.  Appetite fair. Urinary urgency this am but pt has no urge to void tonight, waiting for urine specimen. Wife assists with urinal.  Urinary incontinent brief on.    "

## 2021-09-01 NOTE — PLAN OF CARE
"Pt disorientated to time and situation, BP (!) 155/57   Pulse 92   Temp 99.4  F (37.4  C) (Oral)   Resp 18   Ht 1.727 m (5' 8\")   Wt 78.9 kg (174 lb)   SpO2 95%   BMI 26.46 kg/m    Hydrazaline given 1 time for high BP in 180's x2, Tylenol given for some pain and for low grade fever 100.1. Mepilex in place to forearm, ice applied to face for increase in swelling to right eye. Will continue to monitor POC.   "

## 2021-09-01 NOTE — CONSULTS
Care Management Initial Consult    General Information  Assessment completed with: Patient, Spouse or significant other, Michelle  Type of CM/SW Visit: Initial Assessment    Primary Care Provider verified and updated as needed: Yes   Readmission within the last 30 days:           Communication Assessment  Patient's communication style: spoken language (English or Bilingual)    Hearing Difficulty or Deaf: no   Wear Glasses or Blind: yes    Cognitive  Cognitive/Neuro/Behavioral: .WDL except  Level of Consciousness: alert  Arousal Level: opens eyes spontaneously  Orientation: disoriented to, time, situation  Mood/Behavior: calm, cooperative  Best Language: 0 - No aphasia  Speech: clear    Living Environment:   People in home: spouse, other (see comments) (Mother in law )  MichelleHERBERT   Current living Arrangements: house      Able to return to prior arrangements: yes       Family/Social Support:  Care provided by: self, spouse/significant other  Provides care for: no one  Marital Status:   Wife  Michelle        Description of Support System: Supportive, Involved    Support Assessment: Adequate family and caregiver support, Adequate social supports    Current Resources:   Patient receiving home care services: No     Community Resources: None  Equipment currently used at home: walker, rolling  Supplies currently used at home:      Employment/Financial:  Employment Status: retired        Financial Concerns: No concerns identified           Lifestyle & Psychosocial Needs:  Social Determinants of Health     Tobacco Use: Medium Risk     Smoking Tobacco Use: Former Smoker     Smokeless Tobacco Use: Never Used   Alcohol Use:      Frequency of Alcohol Consumption:      Average Number of Drinks:      Frequency of Binge Drinking:    Financial Resource Strain:      Difficulty of Paying Living Expenses:    Food Insecurity:      Worried About Running Out of Food in the Last Year:      Ran Out of Food in the Last Year:     Transportation Needs:      Lack of Transportation (Medical):      Lack of Transportation (Non-Medical):    Physical Activity:      Days of Exercise per Week:      Minutes of Exercise per Session:    Stress:      Feeling of Stress :    Social Connections:      Frequency of Communication with Friends and Family:      Frequency of Social Gatherings with Friends and Family:      Attends Protestant Services:      Active Member of Clubs or Organizations:      Attends Club or Organization Meetings:      Marital Status:    Intimate Partner Violence:      Fear of Current or Ex-Partner:      Emotionally Abused:      Physically Abused:      Sexually Abused:    Depression: Not at risk     PHQ-2 Score: 0   Housing Stability:      Unable to Pay for Housing in the Last Year:      Number of Places Lived in the Last Year:      Unstable Housing in the Last Year:        Functional Status:  Prior to admission patient needed assistance:   Dependent ADLs:: Independent  Dependent IADLs:: Cleaning, Cooking, Laundry, Shopping, Meal Preparation, Medication Management, Transportation  Assesssment of Functional Status: Not at baseline with ADL Functioning, Not at baseline with mobility, Not at  functional baseline    Mental Health Status:  Mental Health Status: No Current Concerns       Chemical Dependency Status:  Chemical Dependency Status: No Current Concerns         Additional Information:    Referral received d/t pt's high risk of readmission. Writer met w/ patient and spouse, Michelle at bedside. Michele did not actively participate in conversation as pt was lethargic. Per spouse, her and Michele live in a private home. Michelle is also the primary caregiver for her mother who also lives in the home. At baseline, Michele is independent w/ his ADL's. Michelle does provide assistance w/ cooking, laundry, cleaning, driving, shopping and medication set-up and admin. He occasionally will use a walker w/ ambulation. Spouse reports that his current  state is a significant change from his baseline.     Writer and spouse discussed discharge planning, TCU vs home w/ HC services. PT cedric on hold for today. Spouse would prefer to have patient at home but understands that he may need a TCU prior. If TCU is recommended she would prefer a facility near their home in [place] but understands that bed availability has been difficult and is willing to expand into the [facility] if needed.    In the event patient is able to discharge to home w/ HC services she would prefer [facility] (Phone: [phone]). Spouse is able to make accommodations to have patient live on the main level to eliminate the use of stairs. Spouse can be w/ patient 24/7.     PLAN: Home w/ HC services vs TCU. PT cedric pending.     [facility]  Care Management  [phone]

## 2021-09-02 ENCOUNTER — APPOINTMENT (OUTPATIENT)
Dept: MRI IMAGING | Facility: CLINIC | Age: 76
DRG: 641 | End: 2021-09-02
Attending: NURSE PRACTITIONER
Payer: MEDICARE

## 2021-09-02 LAB
ANION GAP SERPL CALCULATED.3IONS-SCNC: 5 MMOL/L (ref 3–14)
BUN SERPL-MCNC: 13 MG/DL (ref 7–30)
CALCIUM SERPL-MCNC: 8.5 MG/DL (ref 8.5–10.1)
CHLORIDE BLD-SCNC: 108 MMOL/L (ref 94–109)
CK SERPL-CCNC: 78 U/L (ref 30–300)
CO2 SERPL-SCNC: 19 MMOL/L (ref 20–32)
CREAT SERPL-MCNC: 1.09 MG/DL (ref 0.66–1.25)
ERYTHROCYTE [DISTWIDTH] IN BLOOD BY AUTOMATED COUNT: 12.8 % (ref 10–15)
GFR SERPL CREATININE-BSD FRML MDRD: 66 ML/MIN/1.73M2
GLUCOSE BLD-MCNC: 113 MG/DL (ref 70–99)
HCT VFR BLD AUTO: 26.6 % (ref 40–53)
HGB BLD-MCNC: 9.4 G/DL (ref 13.3–17.7)
MAGNESIUM SERPL-MCNC: 1.9 MG/DL (ref 1.6–2.3)
MCH RBC QN AUTO: 33.2 PG (ref 26.5–33)
MCHC RBC AUTO-ENTMCNC: 35.3 G/DL (ref 31.5–36.5)
MCV RBC AUTO: 94 FL (ref 78–100)
OSMOLALITY SERPL: 275 MMOL/KG (ref 280–301)
OSMOLALITY UR: 250 MMOL/KG (ref 100–1200)
PHOSPHATE SERPL-MCNC: 2.8 MG/DL (ref 2.5–4.5)
PLATELET # BLD AUTO: 233 10E3/UL (ref 150–450)
POTASSIUM BLD-SCNC: 4.8 MMOL/L (ref 3.4–5.3)
RBC # BLD AUTO: 2.83 10E6/UL (ref 4.4–5.9)
SODIUM SERPL-SCNC: 130 MMOL/L (ref 133–144)
SODIUM SERPL-SCNC: 132 MMOL/L (ref 133–144)
SODIUM SERPL-SCNC: 132 MMOL/L (ref 133–144)
SODIUM SERPL-SCNC: 133 MMOL/L (ref 133–144)
SODIUM UR-SCNC: 65 MMOL/L
TSH SERPL DL<=0.005 MIU/L-ACNC: 0.95 MU/L (ref 0.4–4)
WBC # BLD AUTO: 6.4 10E3/UL (ref 4–11)

## 2021-09-02 PROCEDURE — 84100 ASSAY OF PHOSPHORUS: CPT | Performed by: NURSE PRACTITIONER

## 2021-09-02 PROCEDURE — 255N000002 HC RX 255 OP 636: Performed by: NURSE PRACTITIONER

## 2021-09-02 PROCEDURE — 82550 ASSAY OF CK (CPK): CPT | Performed by: NURSE PRACTITIONER

## 2021-09-02 PROCEDURE — 84295 ASSAY OF SERUM SODIUM: CPT | Performed by: NURSE PRACTITIONER

## 2021-09-02 PROCEDURE — 99232 SBSQ HOSP IP/OBS MODERATE 35: CPT | Performed by: NURSE PRACTITIONER

## 2021-09-02 PROCEDURE — 36415 COLL VENOUS BLD VENIPUNCTURE: CPT | Performed by: NURSE PRACTITIONER

## 2021-09-02 PROCEDURE — 83930 ASSAY OF BLOOD OSMOLALITY: CPT | Performed by: NURSE PRACTITIONER

## 2021-09-02 PROCEDURE — 84443 ASSAY THYROID STIM HORMONE: CPT | Performed by: NURSE PRACTITIONER

## 2021-09-02 PROCEDURE — 85027 COMPLETE CBC AUTOMATED: CPT | Performed by: NURSE PRACTITIONER

## 2021-09-02 PROCEDURE — 258N000003 HC RX IP 258 OP 636: Performed by: NURSE PRACTITIONER

## 2021-09-02 PROCEDURE — 83735 ASSAY OF MAGNESIUM: CPT | Performed by: NURSE PRACTITIONER

## 2021-09-02 PROCEDURE — 250N000013 HC RX MED GY IP 250 OP 250 PS 637: Performed by: NURSE PRACTITIONER

## 2021-09-02 PROCEDURE — 72158 MRI LUMBAR SPINE W/O & W/DYE: CPT | Mod: ME

## 2021-09-02 PROCEDURE — 120N000001 HC R&B MED SURG/OB

## 2021-09-02 PROCEDURE — A9585 GADOBUTROL INJECTION: HCPCS | Performed by: NURSE PRACTITIONER

## 2021-09-02 PROCEDURE — 99207 PR CDG-MDM COMPONENT: MEETS MODERATE - DOWN CODED: CPT | Performed by: NURSE PRACTITIONER

## 2021-09-02 PROCEDURE — 83935 ASSAY OF URINE OSMOLALITY: CPT | Performed by: NURSE PRACTITIONER

## 2021-09-02 PROCEDURE — 84300 ASSAY OF URINE SODIUM: CPT | Performed by: NURSE PRACTITIONER

## 2021-09-02 RX ORDER — GADOBUTROL 604.72 MG/ML
7.5 INJECTION INTRAVENOUS ONCE
Status: COMPLETED | OUTPATIENT
Start: 2021-09-02 | End: 2021-09-02

## 2021-09-02 RX ADMIN — CARVEDILOL 25 MG: 25 TABLET, FILM COATED ORAL at 18:58

## 2021-09-02 RX ADMIN — POTASSIUM CHLORIDE 50 MEQ: 750 TABLET, EXTENDED RELEASE ORAL at 21:10

## 2021-09-02 RX ADMIN — GABAPENTIN 300 MG: 300 CAPSULE ORAL at 08:07

## 2021-09-02 RX ADMIN — CILOSTAZOL 100 MG: 50 TABLET ORAL at 08:09

## 2021-09-02 RX ADMIN — SODIUM CHLORIDE: 9 INJECTION, SOLUTION INTRAVENOUS at 10:19

## 2021-09-02 RX ADMIN — CILOSTAZOL 100 MG: 50 TABLET ORAL at 21:13

## 2021-09-02 RX ADMIN — ISOSORBIDE MONONITRATE 60 MG: 30 TABLET, EXTENDED RELEASE ORAL at 08:07

## 2021-09-02 RX ADMIN — AMLODIPINE BESYLATE 10 MG: 5 TABLET ORAL at 08:07

## 2021-09-02 RX ADMIN — PANTOPRAZOLE SODIUM 40 MG: 40 TABLET, DELAYED RELEASE ORAL at 08:07

## 2021-09-02 RX ADMIN — POTASSIUM CHLORIDE 50 MEQ: 750 TABLET, EXTENDED RELEASE ORAL at 08:09

## 2021-09-02 RX ADMIN — GABAPENTIN 300 MG: 300 CAPSULE ORAL at 21:12

## 2021-09-02 RX ADMIN — GADOBUTROL 7.5 ML: 604.72 INJECTION INTRAVENOUS at 15:36

## 2021-09-02 RX ADMIN — ACETAMINOPHEN 650 MG: 325 TABLET, FILM COATED ORAL at 03:37

## 2021-09-02 RX ADMIN — CARVEDILOL 25 MG: 25 TABLET, FILM COATED ORAL at 08:08

## 2021-09-02 RX ADMIN — LOSARTAN POTASSIUM 100 MG: 50 TABLET, FILM COATED ORAL at 08:08

## 2021-09-02 RX ADMIN — AMITRIPTYLINE HYDROCHLORIDE 50 MG: 25 TABLET, FILM COATED ORAL at 21:11

## 2021-09-02 ASSESSMENT — ACTIVITIES OF DAILY LIVING (ADL)
ADLS_ACUITY_SCORE: 22
ADLS_ACUITY_SCORE: 18
ADLS_ACUITY_SCORE: 20
ADLS_ACUITY_SCORE: 22
ADLS_ACUITY_SCORE: 18
ADLS_ACUITY_SCORE: 18

## 2021-09-02 NOTE — PROGRESS NOTES
" Patient returned from CT, moved to bed via slide board and 4 staff. Patient tolerated the transfer, but then turned to his left side and was verbally uncomfortable, wincing in pain. PT was 1 of the staff helping us to move and turn him. PT deferred today due to pain levels. Notified wife of 1500cc fluid restriction now in place, explained and broght \"water pitcher\" visual to the room to draw on/fill in for each shift.  "

## 2021-09-02 NOTE — PROGRESS NOTES
[facility]    Medicine Progress Note - Hospitalist Service       Date of Admission:  8/31/2021    Assessment & Plan              Michele Vance is a 76 year old male admitted on 8/31/2021. He presented to the emergency department by EMS with 24-hour history of gait difficulty, with 2 falls that occurred earlier this morning.  Patient initially fell getting up onto the bed, but then got up again to use the bathroom and fell in the bathroom.  He has visible injuries to the right side of the face.   Fortunately the CT imaging of his head, neck and lumbar spine did not show any acute abnormality.  Patient was able to stand and take a couple of lateral steps but when he tried to go forward he was unstable and cannot ambulate without assistance.  Patient work-up included a chest x-ray which was clear, EKG that showed a bundle branch block which is unchanged from previous, a second troponin that was improved from the first, and a Covid swab which is negative.  CBC stable for patient, slight anemia with hgb 10.4. INR 1.10. Urine negative for infection. Troponin slightly elevated at 0.09, stable on repeat troponin at 0.086. Patient with noted hyponatremia, sodium at 120. Osmolality at 249. Urine osmolality 215, sodium urine 31. At his advanced age and with his significant comorbid chronic medical problems, he is at high risk for deterioration and an adverse outcome.  For these reasons, hospitalization is considered medically necessary to expedite diagnostic evaluation and initiate appropriate treatment.  Based on the presently available information, hospitalization for at least 2 midnights is anticipated.        Hyponatremia  Assessment: Patient with hyponatremia, seems to be chronic although he has not had an evaluation for it.  He had hyponatremia with each visit to the ER in April, June and July. Prior to his cholecystectomy his sodium was low and his surgery was postponed, he was told to  drink gatorade for this. He has been drinking a large amount of gatorade and other fluids to try to stop the low sodium from happening again. Patient presented as above for falls, gait changes, stroke like symptoms. Sodium at 120. Osmolality at 249. Urine osmolality 215, sodium urine 31. Patient received 1 L NS bolus in the ER. The goal of therapy is to increase the serum sodium by 4 to 6 mEq/L over 24 hours. Raising the serum sodium by 4 to 6 mEq/L should generally alleviate symptoms and prevent osmotic demyelination syndrome 9/2-Sodium improved to 133 today. Strength mildly improved from yesterday but still requiring heavy assist of 2 with ambulation. Typically able to ambulate independently per wife with no assistive devices.   Plan:   - Will stop normal saline and continue to monitor sodium  - Start 1500 mL/day fluid restriction  - May need to have a fluid restriction   - Check sodium tomorrow morning  - Recheck urine sodium was 65 and urine osmolality 250  - Will check cortisol and ACTH tomorrow morning  - TSH normal   - Continue to monitor       Hypokalemia  Assessment: Chronic, patient is on potassium replacement at 50 mEq BID, using the 10 Farhat tabs. Unclear etiology, patient is not on diuretics. 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 9/2-Potassium stable at 4.8  Plan: Monitor, replace as needed and continue home medications.       Abnormal gait    Falls frequently    Facial injury, initial encounter    Acute midline low back pain without sciatica  Assessment: Noted on admission. No acute needs. Patient lives with his wife who provides 24 hour care. 9/2-Patient with ongoing weakness to bilateral lower extremities. Strength improved today compared to yesterday. Able to lift knees independently.   Plan:   - Physical therapy evaluation pending  - Will order MRI of lumbar spine  - Continue to monitor       Hyperlipidemia LDL goal <130  Assessment: Noted, chronic.   Plan: Continue home meds.      GERD  (gastroesophageal reflux disease)  Assessment: Noted, chronic.   Plan: Continue home meds.      CKD (chronic kidney disease) stage 3, GFR 30-59 ml/min  Assessment: Noted, chronic. Creatinine 1.09, GFR 66.   Plan: Continue home meds. Recheck labs daily.       S/P CABG x 6    Stroke, embolic (H)    Essential hypertension with goal blood pressure less than 140/90  Assessment: Noted, chronic.   Plan: Continue home meds.       Diet: Combination Diet Low Saturated Fat Na <2400mg Diet    DVT Prophylaxis: Pneumatic Compression Devices  Burton Catheter: Not present  Central Lines: None  Code Status: No CPR- Do NOT Intubate      Disposition Plan   Expected discharge: 09/04/2021   recommended to yet to be determined pending PT eval once safe disposition plan/ TCU bed available and sodium stable.     The patient's care was discussed with the Attending Physician, Dr. Felder, Bedside Nurse, Patient and Patient's Family.    Kenneth Gallagher NP  Hospitalist Service  [facility]  Securely message with the Vocera Web Console (learn more here)  Text page via [employer] Paging/Directory      Clinically Significant Risk Factors Present on Admission               ______________________________________________________________________    Interval History   Patient seen sitting up in bed with no new complaints. Notes generalized aches and pains. Denies shortness of breath and patient is maintaining oxygen saturations above 90% on room air.  Denies nausea and is tolerating oral intake. Patient is afebrile and hemodynamically stable. Sodium improved to 133. Patient alert and oriented x4. Bilateral lower extremity weakness improved from yesterday. Continues to require heavy assist to ambulate.     Data reviewed today: I reviewed all medications, new labs and imaging results over the last 24 hours.    Physical Exam   Vital Signs: Temp: 97.1  F (36.2  C) Temp src: Oral BP: (!) 146/59 Pulse: 75   Resp: 16 SpO2: 93 % O2  Device: None (Room air)    Weight: 174 lbs 0 oz  Constitutional: awake, alert, cooperative, no apparent distress, and appears stated age  Eyes: Lids and lashes normal; PERRLA; contusion and laceration noted to right periorbital area intact  Respiratory: No increased work of breathing, good air exchange, clear to auscultation bilaterally, no crackles or wheezing  Cardiovascular: regular rate and rhythm and normal S1 and S2  GI: normal bowel sounds, non-distended and non-tender  Skin: normal skin color, texture, turgor  Musculoskeletal: no lower extremity pitting edema present  Neurologic: Awake, alert, oriented to name, place and time.  Cranial nerves II-XII are grossly intact.  Motor is 5 out of 5 bilaterally in upper extremities; 3/5 in lower extremities.  Cerebellar finger to nose, heel to shin intact.  Sensory is intact.    Data   Recent Labs   Lab 09/02/21  1137 09/02/21  0611 09/02/21  0000 09/01/21  0625 08/31/21  1939 08/31/21  1319 08/31/21  0658   WBC  --  6.4  --  7.7  --   --  6.1   HGB  --  9.4*  --  9.9*  --   --  10.4*   MCV  --  94  --  93  --   --  90   PLT  --  233  --  251  --   --  270   INR  --   --   --   --   --   --  1.10    132*  132* 130* 129*  129* 127*  --  120*   POTASSIUM  --  4.8  --  4.1  --   --  5.2   CHLORIDE  --  108  --  102  --   --  92*   CO2  --  19*  --  21  --   --  21   BUN  --  13  --  12  --   --  18   CR  --  1.09  --  0.94  --   --  1.24   ANIONGAP  --  5  --  6  --   --  7   RAHEEM  --  8.5  --  8.2*  --   --  8.4*   GLC  --  113*  --  107*  --   --  92   ALBUMIN  --   --   --   --   --   --  3.3*   PROTTOTAL  --   --   --   --   --   --  6.7*   BILITOTAL  --   --   --   --   --   --  0.3   ALKPHOS  --   --   --   --   --   --  88   ALT  --   --   --   --   --   --  24   AST  --   --   --   --   --   --  19   TROPONIN  --   --   --   --  0.084* 0.086* 0.090*     No results found for this or any previous visit (from the past 24 hour(s)).  Medications        amitriptyline  50 mg Oral At Bedtime     amLODIPine  10 mg Oral Daily     aspirin  81 mg Oral Every Other Day     carvedilol  25 mg Oral BID w/meals     cilostazol  100 mg Oral BID     gabapentin  300 mg Oral BID     isosorbide mononitrate  60 mg Oral Daily     losartan  100 mg Oral Daily     pantoprazole  40 mg Oral Daily     potassium chloride ER  50 mEq Oral BID     sodium chloride (PF)  3 mL Intracatheter Q8H

## 2021-09-02 NOTE — PROGRESS NOTES
Care Management Follow Up    Length of Stay (days): 2    Expected Discharge Date: 09/04/2021     Concerns to be Addressed: discharge planning       Patient plan of care discussed at interdisciplinary rounds: Yes    Anticipated Discharge Disposition: Transitional Care  Disposition Comments: TCU placement on discharge  Anticipated Discharge Services: skilled nursing, PT/OT  Anticipated Discharge DME: None    Patient/family educated on Medicare website which has current facility and service quality ratings: yes  Education Provided on the Discharge Plan: yes   Patient/Family in Agreement with the Plan: yes    Referrals Placed by CM/SW: Internal Clinic Care Coordination, Post Acute Facilities  Private pay costs discussed: TBD    Additional Information:  CM had lengthy conversation with Pt's wife Michelle today regarding discharge options. Michelle had hoped Pt would be showing signs of improvement in his mobility enough to return home on discharge. Today-wife is leaning more towards TCU placement.     Michelle is very open to obtaining needed DME equipment, ramps, adaptive bars and bringing in outside help to assist her if TCU does not appear to be a plausible option due to limited TCU beds.     CM to review therapy evals when available.     Plan: TCU placement on discharge     JOANNE Escobar

## 2021-09-02 NOTE — PLAN OF CARE
"VS: BP (!) 146/59   Pulse 75   Temp 97.1  F (36.2  C) (Oral)   Resp 16   Ht 1.727 m (5' 8\")   Wt 78.9 kg (174 lb)   SpO2 93%   BMI 26.46 kg/m    PAIN: Denies  IV: NS 75ml/hr  COGNITION: Alert and Orientated x4. However, very flat affect. Appeared confused with breakfast tray. But able to follow commands and is appropriate.  CV: Murmur present  RESP: Lungs are clear. On room air. Denies SOB.  GI: Denies nausea. Bowels are active. Good appetite this AM. Didn't eat much for lunch.   : Intermittent incontinence and frequency, urine output 800ml.  SKIN: Abrasion to right and left forehead. Skin tear to left forearm. Scattered bruises/scrapes.  MOBILITY: Heavy Assist of 2.  POC: MRI today, PT/OT Evals. Continue to monitor Na+.  "

## 2021-09-02 NOTE — PROGRESS NOTES
S-(situation): Patient with falls 8-. Order for PT received for falls and weakness    B-(background): Morning PT unable to arouse patient this morning so followed up with patient at 1520. Kenneth Gallagher NP and his wife were present in his room. He had been to get and MRI and was not available. We agreed PT would try at 1550 to assess. Pt back in room and PT assisted with slide transfer. Assessed ability to participate in PT this afternoon.     A-(assessment): Patient's wife, PT and nurse agreed he would not be able to participate in PT this afternoon based on pain with movement and being tired.     R-(recommendations): He will be inpatient 9-3-2021 and will schedule PT evaluation for that day.

## 2021-09-02 NOTE — PLAN OF CARE
S-(situation): End of shift note    B-(background): hyponatremia    A-(assessment): Vital signs stable. Afebrile. Lung sounds CTA B/L.  No O2 needed overnight. Appeared confused once overnight with an episode of incontinence. Other times, pt alert and using the urinal. Pt complaining of generalized pain, controlled with tylenol and repositioning. Bowel sounds normoactive. Voiding adequately. SCDs on. R hip bruised, R head lac bruising, open to air. IVF infusing at 75 ml/hr. Na up to 130. Able to make needs known and uses call light appropriately.     R-(recommendations): Continue to monitor per care plan.

## 2021-09-03 ENCOUNTER — APPOINTMENT (OUTPATIENT)
Dept: PHYSICAL THERAPY | Facility: CLINIC | Age: 76
DRG: 641 | End: 2021-09-03
Attending: NURSE PRACTITIONER
Payer: MEDICARE

## 2021-09-03 LAB
ANION GAP SERPL CALCULATED.3IONS-SCNC: 5 MMOL/L (ref 3–14)
BUN SERPL-MCNC: 13 MG/DL (ref 7–30)
CALCIUM SERPL-MCNC: 9 MG/DL (ref 8.5–10.1)
CHLORIDE BLD-SCNC: 109 MMOL/L (ref 94–109)
CO2 SERPL-SCNC: 20 MMOL/L (ref 20–32)
CORTICOSTER 1H P 250 UG ACTH SERPL-SCNC: 28.8 UG/DL (ref 20–150)
CORTIS SERPL-MCNC: 10.5 UG/DL (ref 4–22)
CORTIS SERPL-MCNC: 8.3 UG/DL (ref 4–22)
CREAT SERPL-MCNC: 1.23 MG/DL (ref 0.66–1.25)
ERYTHROCYTE [DISTWIDTH] IN BLOOD BY AUTOMATED COUNT: 12.6 % (ref 10–15)
GFR SERPL CREATININE-BSD FRML MDRD: 57 ML/MIN/1.73M2
GLUCOSE BLD-MCNC: 122 MG/DL (ref 70–99)
HCT VFR BLD AUTO: 27.1 % (ref 40–53)
HGB BLD-MCNC: 9.6 G/DL (ref 13.3–17.7)
MAGNESIUM SERPL-MCNC: 1.9 MG/DL (ref 1.6–2.3)
MCH RBC QN AUTO: 33.1 PG (ref 26.5–33)
MCHC RBC AUTO-ENTMCNC: 35.4 G/DL (ref 31.5–36.5)
MCV RBC AUTO: 93 FL (ref 78–100)
OSMOLALITY SERPL: 276 MMOL/KG (ref 280–301)
PLATELET # BLD AUTO: 264 10E3/UL (ref 150–450)
POTASSIUM BLD-SCNC: 4.7 MMOL/L (ref 3.4–5.3)
RBC # BLD AUTO: 2.9 10E6/UL (ref 4.4–5.9)
SODIUM SERPL-SCNC: 134 MMOL/L (ref 133–144)
WBC # BLD AUTO: 7.4 10E3/UL (ref 4–11)

## 2021-09-03 PROCEDURE — 82024 ASSAY OF ACTH: CPT | Performed by: NURSE PRACTITIONER

## 2021-09-03 PROCEDURE — 36415 COLL VENOUS BLD VENIPUNCTURE: CPT | Performed by: NURSE PRACTITIONER

## 2021-09-03 PROCEDURE — 250N000011 HC RX IP 250 OP 636: Performed by: NURSE PRACTITIONER

## 2021-09-03 PROCEDURE — 82533 TOTAL CORTISOL: CPT | Performed by: NURSE PRACTITIONER

## 2021-09-03 PROCEDURE — 250N000013 HC RX MED GY IP 250 OP 250 PS 637: Performed by: NURSE PRACTITIONER

## 2021-09-03 PROCEDURE — 99233 SBSQ HOSP IP/OBS HIGH 50: CPT | Performed by: NURSE PRACTITIONER

## 2021-09-03 PROCEDURE — 85014 HEMATOCRIT: CPT | Performed by: NURSE PRACTITIONER

## 2021-09-03 PROCEDURE — 80400 ACTH STIMULATION PANEL: CPT | Performed by: NURSE PRACTITIONER

## 2021-09-03 PROCEDURE — 83930 ASSAY OF BLOOD OSMOLALITY: CPT | Performed by: NURSE PRACTITIONER

## 2021-09-03 PROCEDURE — 97162 PT EVAL MOD COMPLEX 30 MIN: CPT | Mod: GP | Performed by: PHYSICAL THERAPIST

## 2021-09-03 PROCEDURE — 120N000001 HC R&B MED SURG/OB

## 2021-09-03 PROCEDURE — 83735 ASSAY OF MAGNESIUM: CPT | Performed by: NURSE PRACTITIONER

## 2021-09-03 PROCEDURE — 80048 BASIC METABOLIC PNL TOTAL CA: CPT | Performed by: NURSE PRACTITIONER

## 2021-09-03 PROCEDURE — 97530 THERAPEUTIC ACTIVITIES: CPT | Mod: GP | Performed by: PHYSICAL THERAPIST

## 2021-09-03 RX ORDER — NALOXONE HYDROCHLORIDE 0.4 MG/ML
0.4 INJECTION, SOLUTION INTRAMUSCULAR; INTRAVENOUS; SUBCUTANEOUS
Status: DISCONTINUED | OUTPATIENT
Start: 2021-09-03 | End: 2021-09-04 | Stop reason: HOSPADM

## 2021-09-03 RX ORDER — LIDOCAINE 4 G/G
1 PATCH TOPICAL
Status: DISCONTINUED | OUTPATIENT
Start: 2021-09-03 | End: 2021-09-04 | Stop reason: HOSPADM

## 2021-09-03 RX ORDER — ACETAMINOPHEN 325 MG/1
975 TABLET ORAL 3 TIMES DAILY
Status: DISCONTINUED | OUTPATIENT
Start: 2021-09-03 | End: 2021-09-04 | Stop reason: HOSPADM

## 2021-09-03 RX ORDER — NALOXONE HYDROCHLORIDE 0.4 MG/ML
0.2 INJECTION, SOLUTION INTRAMUSCULAR; INTRAVENOUS; SUBCUTANEOUS
Status: DISCONTINUED | OUTPATIENT
Start: 2021-09-03 | End: 2021-09-04 | Stop reason: HOSPADM

## 2021-09-03 RX ORDER — COSYNTROPIN 0.25 MG/ML
250 INJECTION, POWDER, FOR SOLUTION INTRAMUSCULAR; INTRAVENOUS ONCE
Status: COMPLETED | OUTPATIENT
Start: 2021-09-03 | End: 2021-09-03

## 2021-09-03 RX ADMIN — CILOSTAZOL 100 MG: 50 TABLET ORAL at 08:42

## 2021-09-03 RX ADMIN — LOSARTAN POTASSIUM 100 MG: 50 TABLET, FILM COATED ORAL at 08:42

## 2021-09-03 RX ADMIN — GABAPENTIN 300 MG: 300 CAPSULE ORAL at 08:43

## 2021-09-03 RX ADMIN — ISOSORBIDE MONONITRATE 60 MG: 30 TABLET, EXTENDED RELEASE ORAL at 08:42

## 2021-09-03 RX ADMIN — LIDOCAINE 1 PATCH: 560 PATCH PERCUTANEOUS; TOPICAL; TRANSDERMAL at 12:00

## 2021-09-03 RX ADMIN — AMITRIPTYLINE HYDROCHLORIDE 50 MG: 25 TABLET, FILM COATED ORAL at 21:02

## 2021-09-03 RX ADMIN — GABAPENTIN 300 MG: 300 CAPSULE ORAL at 21:02

## 2021-09-03 RX ADMIN — CILOSTAZOL 100 MG: 50 TABLET ORAL at 21:02

## 2021-09-03 RX ADMIN — CARVEDILOL 25 MG: 25 TABLET, FILM COATED ORAL at 18:22

## 2021-09-03 RX ADMIN — AMLODIPINE BESYLATE 10 MG: 5 TABLET ORAL at 08:43

## 2021-09-03 RX ADMIN — COSYNTROPIN 250 MCG: 0.25 INJECTION, POWDER, LYOPHILIZED, FOR SOLUTION INTRAMUSCULAR; INTRAVENOUS at 12:33

## 2021-09-03 RX ADMIN — POTASSIUM CHLORIDE 50 MEQ: 750 TABLET, EXTENDED RELEASE ORAL at 21:01

## 2021-09-03 RX ADMIN — ACETAMINOPHEN 975 MG: 325 TABLET, FILM COATED ORAL at 11:59

## 2021-09-03 RX ADMIN — ASPIRIN 81 MG: 81 TABLET ORAL at 08:43

## 2021-09-03 RX ADMIN — CARVEDILOL 25 MG: 25 TABLET, FILM COATED ORAL at 08:42

## 2021-09-03 RX ADMIN — ACETAMINOPHEN 975 MG: 325 TABLET, FILM COATED ORAL at 21:01

## 2021-09-03 RX ADMIN — POTASSIUM CHLORIDE 50 MEQ: 750 TABLET, EXTENDED RELEASE ORAL at 08:41

## 2021-09-03 RX ADMIN — PANTOPRAZOLE SODIUM 40 MG: 40 TABLET, DELAYED RELEASE ORAL at 08:43

## 2021-09-03 ASSESSMENT — ACTIVITIES OF DAILY LIVING (ADL)
ADLS_ACUITY_SCORE: 20
ADLS_ACUITY_SCORE: 20
ADLS_ACUITY_SCORE: 18
ADLS_ACUITY_SCORE: 20
ADLS_ACUITY_SCORE: 18
ADLS_ACUITY_SCORE: 18

## 2021-09-03 NOTE — PROGRESS NOTES
09/03/21 1430   Quick Adds   Type of Visit Initial PT Evaluation       Present no   Living Environment   People in home spouse   Current Living Arrangements house   Home Accessibility stairs to enter home;stairs within home   Number of Stairs, Main Entrance 3   Stair Railings, Main Entrance railings on both sides of stairs   Number of Stairs, Within Home, Primary greater than 10 stairs   Stair Railings, Within Home, Primary railing on right side (ascending)  (wall on left side)   Transportation Anticipated family or friend will provide  (crossover)   Living Environment Comments wife involved and able to assist with care needs. loft bedroom with stairs to access, able to stay on the main level. step over tub.   Self-Care   Usual Activity Tolerance moderate   Current Activity Tolerance fair   Regular Exercise No   Equipment Currently Used at Home shower chair;raised toilet seat   Activity/Exercise/Self-Care Comment walks in the home for some activity. has a 4WW available at home.    Disability/Function   Hearing Difficulty or Deaf no   Wear Glasses or Blind yes   Vision Management glasses   Concentrating, Remembering or Making Decisions Difficulty yes   Concentration Management wife assists   Difficulty Communicating no   Difficulty Eating/Swallowing no   Walking or Climbing Stairs Difficulty no   Dressing/Bathing Difficulty no   Toileting issues no   Doing Errands Independently Difficulty (such as shopping) no   Errands Management wife completes   Fall history within last six months yes   Number of times patient has fallen within last six months 3   Change in Functional Status Since Onset of Current Illness/Injury yes   General Information   Onset of Illness/Injury or Date of Surgery 08/31/21   Referring Physician Kenneth Gallagher NP   Patient/Family Therapy Goals Statement (PT) pain control, home with wife vs. TCU   Pertinent History of Current Problem (include personal factors and/or  comorbidities that impact the POC) Patient is a 76 year old male, admitted from the ED due to 2 falls at home and changes in gait, patient found to have hyponatremia, hypokalemia. Patient with a previous medical history of CKD, HTN, hyperlipidemia, IBS, GERD, CVA, COPD, panlobular emphysema, former smoker, carotid angioplasty, CABG x 6.   Existing Precautions/Restrictions fall   Weight-Bearing Status - LUE full weight-bearing   Weight-Bearing Status - RUE full weight-bearing   Weight-Bearing Status - LLE full weight-bearing   Weight-Bearing Status - RLE full weight-bearing   General Observations PT orders: eval and treat falls, mobility. Activity orders: up with assist   Cognition   Orientation Status (Cognition) oriented to;person   Affect/Mental Status (Cognition) confused;flat/blunted affect   Follows Commands (Cognition) follows two-step commands;50-74% accuracy;initiation impaired;delayed response/completion;increased processing time needed   Safety Deficit (Cognition) moderate deficit;awareness of need for assistance;insight into deficits/self-awareness;problem-solving   Pain Assessment   Patient Currently in Pain   (0/10 at rest, severe with activity)   Posture    Posture Forward head position;Protracted shoulders;Kyphosis   Range of Motion (ROM)   ROM Quick Adds ROM WFL   Strength   Manual Muscle Testing Quick Adds Able to perform R SLR;Able to perform L SLR   Strength Comments pain with initiation of movement and right side more painful than left. hip adduction 3+/5, abduction 3+/5, hip flexion right 3+/5 and left 4-/5   Bed Mobility   Bed Mobility supine-sit;sit-supine;rolling left   Rolling Left [place] (Bed Mobility) moderate assist (50% patient effort)   Supine-Sit [place] (Bed Mobility) moderate assist (50% patient effort)   Sit-Supine [place] (Bed Mobility) moderate assist (50% patient effort)   Impairments Contributing to Impaired Bed Mobility pain   Transfers   Transfers sit-stand  transfer   Transfer Safety Concerns Noted decreased step length   Impairments Contributing to Impaired Transfers pain   Sit-Stand Transfer   Sit-Stand [place] (Transfers) contact guard;verbal cues   Assistive Device (Sit-Stand Transfers) walker, front-wheeled   Gait/Stairs (Locomotion)   [place] Level (Gait) contact guard;verbal cues   Assistive Device (Gait) walker, front-wheeled   Distance in Feet (Required for LE Total Joints) 125'   Pattern (Gait) step-through   Deviations/Abnormal Patterns (Gait) stride length decreased;gait speed decreased;festinating/shuffling;base of support, narrow   Balance   Balance Comments no LOB, however reliance on walker for pain control and security   Sensory Examination   Sensory Perception patient reports no sensory changes   Coordination   Coordination no deficits were identified   Muscle Tone   Muscle Tone no deficits were identified   Clinical Impression   Criteria for Skilled Therapeutic Intervention yes, treatment indicated   PT Diagnosis (PT) impaired mobility, impaired gait, impaired balance, muscle weakness   Influenced by the following impairments fall, medical status   Functional limitations due to impairments pain, fear, use of walker for functional mobility    Clinical Presentation Evolving/Changing   Clinical Presentation Rationale pain control, tolerance to intervention, participation and IND improved with mobilization and education   Clinical Decision Making (Complexity) moderate complexity   Therapy Frequency (PT) Daily   Predicted Duration of Therapy Intervention (days/wks) 2-3 days   Planned Therapy Interventions (PT) stair training;patient/family education;transfer training   Anticipated Equipment Needs at Discharge (PT) walker, rolling  (front wheeled)   Risk & Benefits of therapy have been explained evaluation/treatment results reviewed;participants included;patient   Clinical Impression Comments Patient presents with the above functional impairments  due to pain and non-operative insufficiency fracture of right sacral wing and right iliac bone. Patient and wife receptive to education, able to progress mobility with education and intervention. Anticipate patient to progress towards discharge home with wife's help with continued intervention. He would benefit from front wheeled walker at home for safe discharge mobility, he would also benefit from HHPT to address functional mobility, safety and set up in the home.   PT Discharge Planning    PT Discharge Recommendation (DC Rec) home with assist;home with home care physical therapy   PT Rationale for DC Rec Home with wife who is able to physically assist and care for patient, she is making modifications to the home currently. Wife participated in care giver trainning and made good progress towards safe discharge home.    PT Brief overview of current status  MOD assist bed mobility, CGA sit to/from stand with walker, ambulated 120' walker and CGA. caregiver training for bed mobility and transfers   Total Evaluation Time   Total Evaluation Time (Minutes) 25     Thank you for your referral.  Tonia Lawrence, PT, DPT, ATC, LAT    [facility]  O: [phone]  E: [email or web address]

## 2021-09-03 NOTE — PLAN OF CARE
Temp: 97.1  F (36.2  C) Temp src: Oral BP: 130/55 Pulse: 76   Resp: 18 SpO2: 93 % O2 Device: None (Room air)      Neuro: A/O x 4, pleasantly confused at times  Pulm: lung sounds clear  CV: apical pulse regular  GI: bowel sounds normoactive x4  : adequate urine output per external cath  Skin: bruise to right eye, foam dressing to scab on left forearm CDI   Lines/Tubes/Drains: peripheral IV x2  Drips: saline locked    Mag protocol- recheck AM  K+ protocol- recheck AM  Scheduled tylenol and lido patch ordered with adequate relief of pain to low back  Cortisol, ACTH labs pending, cosyntropin given     Plan: monitor pain, mobility, vitals, and pending labs

## 2021-09-03 NOTE — PROGRESS NOTES
Care Management Follow Up    Length of Stay (days): 3    Expected Discharge Date: 2021     Concerns to be Addressed: discharge planning       Patient plan of care discussed at interdisciplinary rounds: Yes    Anticipated Discharge Disposition: Home with Home Care    Disposition Comments: Return home with family. Pt and wife accepting of referral to [facility]-PT/OT    Anticipated Discharge Services: PT/OT  Anticipated Discharge DME: None    Patient/family educated on Medicare website which has current facility and service quality ratings: yes  Education Provided on the Discharge Plan:  yes  Patient/Family in Agreement with the Plan: yes    Referrals Placed by CM/SW: Internal Clinic Care Coordination, Homecare  Private pay costs discussed: Not applicable    Additional Information:  CM reviewed PT evaluation today. Met with Pt and Wife Michelle to discuss recommendations from therapy to return home with Home Care.    Wife Michelle verbalized feeling very pleased with the Pt's progress and stated that she was happy the Pt did not require TCU at this time.     Home Care services were discussed at length. Pt and Michelle stated they were open to receiving PT/OT on discharge. Agency choice discussed, Medicare certified home care list provided. Pt requested [facility]. CM placed referral. Brochure and printed information provided to Pt and wife.     Michelle stated she would assist with transportation at time of discharge and is very motivated to be able to assist the Pt with all needed cares at time of transfer to home.     Questions answered to Pt and Michelle's satisfaction.     Plan: Discharge to home when medically stable.     *Referral to [facility] (Phone: [phone])  -PT/OT    JOANNE Escobar       HOME CARE HAND OFF  Patient Name: Michele Vance    MRN: [id number]    : 1945    Patient Zip Code: [address]    Admit Diagnosis: Abnormal gait [R26.9]  Falls  frequently [R29.6]  Facial injury, initial encounter [S09.93XA]  Acute midline low back pain without sciatica [M54.5]  Hyponatremia [E87.1]      Services Pt Needs at Home: PT, OT    Discharge Support: Family/Friend Support    Living Arrangements: With spouse     or Address Other Than Pt: Yes: Wife--Michelle # [phone]    Wound Care: No    Anticipate DC Date: 9/4/2021

## 2021-09-03 NOTE — PROGRESS NOTES
[facility]    Medicine Progress Note - Hospitalist Service       Date of Admission:  8/31/2021    Assessment & Plan           Michele Vance is a 76 year old male admitted on 8/31/2021. He presented to the emergency department by EMS with 24-hour history of gait difficulty, with 2 falls that occurred earlier this morning.  Patient initially fell getting up onto the bed, but then got up again to use the bathroom and fell in the bathroom.  He has visible injuries to the right side of the face.   Fortunately the CT imaging of his head, neck and lumbar spine did not show any acute abnormality.  Patient was able to stand and take a couple of lateral steps but when he tried to go forward he was unstable and cannot ambulate without assistance.  Patient work-up included a chest x-ray which was clear, EKG that showed a bundle branch block which is unchanged from previous, a second troponin that was improved from the first, and a Covid swab which is negative.  CBC stable for patient, slight anemia with hgb 10.4. INR 1.10. Urine negative for infection. Troponin slightly elevated at 0.09, stable on repeat troponin at 0.086. Patient with noted hyponatremia, sodium at 120. Osmolality at 249. Urine osmolality 215, sodium urine 31. At his advanced age and with his significant comorbid chronic medical problems, he is at high risk for deterioration and an adverse outcome.  For these reasons, hospitalization is considered medically necessary to expedite diagnostic evaluation and initiate appropriate treatment.  Based on the presently available information, hospitalization for at least 2 midnights is anticipated.        Hyponatremia  Assessment: Patient with hyponatremia, seems to be chronic although he has not had an evaluation for it.  He had hyponatremia with each visit to the ER in April, June and July. Prior to his cholecystectomy his sodium was low and his surgery was postponed, he was told to  drink gatorade for this. He has been drinking a large amount of gatorade and other fluids to try to stop the low sodium from happening again. Patient presented as above for falls, gait changes, stroke like symptoms. Sodium at 120. Osmolality at 249. Urine osmolality 215, sodium urine 31. Patient received 1 L NS bolus in the ER. The goal of therapy is to increase the serum sodium by 4 to 6 mEq/L over 24 hours. Raising the serum sodium by 4 to 6 mEq/L should generally alleviate symptoms and prevent osmotic demyelination syndrome 9/3-Sodium remains stable at 134 today.   Plan:   - Will continue 1500 mL/day fluid restriction  - Check sodium tomorrow morning  - Recheck urine sodium was 65 and urine osmolality 250  - Will check cortisol and ACTH tomorrow morning  - TSH normal   - Continue to monitor       Hypokalemia  Assessment: Chronic, patient is on potassium replacement at 50 mEq BID, using the 10 Farhat tabs. Unclear etiology, patient is not on diuretics. 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 9/3-Potassium stable   Plan: Monitor, replace as needed and continue home medications.       Abnormal gait    Falls frequently    Facial injury, initial encounter    Acute midline low back pain without sciatica    Insufficiency fracture, right sacral wing and right iliac bone  Assessment: Noted on admission. No acute needs. Patient lives with his wife who provides 24 hour care. 9/3-Patient with significant lower back pain with any movement. Limited movement of lower extremities secondary to pain more than weakness. MRI lumbar spine done yesterday showed insufficiency fracture of right sacral wing and right iliac bone. Discussed findings with Dr. Butler, on call for orthopedic surgery, who noted no surgical intervention warranted and recommended pain control and ambulation. Patient denies pain at rest.   Plan:   - Physical therapy evaluation ongoing  - Will start scheduled Tylenol 975 mg TID and Lidoderm patch  - Oxycodone 2.5-5  mg every 4 hours as needed for pain  - Continue to monitor   - Likely will need TCU placement at discharge, SW following       Hyperlipidemia LDL goal <130  Assessment: Noted, chronic.   Plan: Continue home meds.      GERD (gastroesophageal reflux disease)  Assessment: Noted, chronic.   Plan: Continue home meds.      CKD (chronic kidney disease) stage 3, GFR 30-59 ml/min  Assessment: Noted, chronic. Creatinine 1.23, GFR 57.   Plan: Continue home meds. Recheck labs daily.       S/P CABG x 6    Stroke, embolic (H)    Essential hypertension with goal blood pressure less than 140/90  Assessment: Noted, chronic.   Plan: Continue home meds.         Diet: Combination Diet Low Saturated Fat Na <2400mg Diet  Fluid restriction 1500 ML FLUID    DVT Prophylaxis: Pneumatic Compression Devices  Burton Catheter: Not present  Central Lines: None  Code Status: No CPR- Do NOT Intubate      Disposition Plan   Expected discharge: 09/04/2021   recommended to transitional care unit once adequate pain management/ tolerating PO medications and safe disposition plan/ TCU bed available.     The patient's care was discussed with the Attending Physician, Dr. Torres, Patient and Patient's Family.    Kenneth Gallagher NP  Hospitalist Service  [facility]  Securely message with the Vocera Web Console (learn more here)  Text page via [facility] Paging/Directory      Clinically Significant Risk Factors Present on Admission               ______________________________________________________________________    Interval History   Patient seen lying in bed. Sleepy this morning but arouses to voice. Some confusion noted in the mornings last two days. Otherwise has been alert and oriented x4. Denies shortness of breath and is maintaining oxygen saturations above 90% on room air. Denies nausea and is tolerating oral intake. Patient is afebrile and hemodynamically stable.     Data reviewed today: I reviewed all medications, new labs  and imaging results over the last 24 hours.     Physical Exam   Vital Signs: Temp: 98  F (36.7  C) Temp src: Oral BP: (!) 167/57 Pulse: 76   Resp: 20 SpO2: 96 % O2 Device: None (Room air)    Weight: 174 lbs 0 oz  Constitutional: awake, alert, cooperative, no apparent distress, and appears stated age  Eyes: Lids and lashes normal; PERRLA; contusion and laceration noted to right periorbital area intact  Respiratory: No increased work of breathing, good air exchange, clear to auscultation bilaterally, no crackles or wheezing  Cardiovascular: regular rate and rhythm and normal S1 and S2  GI: normal bowel sounds, non-distended and non-tender  Skin: normal skin color, texture, turgor  Musculoskeletal: no lower extremity pitting edema present  Neurologic: Awake, alert, oriented to name, place and time.  Cranial nerves II-XII are grossly intact.  Motor is 5 out of 5 bilaterally in upper extremities; 3/5 in lower extremities.  Cerebellar finger to nose, heel to shin intact.  Sensory is intact.    Data   Recent Labs   Lab 09/03/21  0613 09/02/21  1137 09/02/21  0611 09/01/21  0625 08/31/21  1939 08/31/21  1319 08/31/21  0658   WBC 7.4  --  6.4 7.7  --   --  6.1   HGB 9.6*  --  9.4* 9.9*  --   --  10.4*   MCV 93  --  94 93  --   --  90     --  233 251  --   --  270   INR  --   --   --   --   --   --  1.10    133 132*  132* 129*  129* 127*  --  120*   POTASSIUM 4.7  --  4.8 4.1  --   --  5.2   CHLORIDE 109  --  108 102  --   --  92*   CO2 20  --  19* 21  --   --  21   BUN 13  --  13 12  --   --  18   CR 1.23  --  1.09 0.94  --   --  1.24   ANIONGAP 5  --  5 6  --   --  7   RAHEEM 9.0  --  8.5 8.2*  --   --  8.4*   *  --  113* 107*  --   --  92   ALBUMIN  --   --   --   --   --   --  3.3*   PROTTOTAL  --   --   --   --   --   --  6.7*   BILITOTAL  --   --   --   --   --   --  0.3   ALKPHOS  --   --   --   --   --   --  88   ALT  --   --   --   --   --   --  24   AST  --   --   --   --   --   --  19   TROPONIN   --   --   --   --  0.084* 0.086* 0.090*     Recent Results (from the past 24 hour(s))   MR Lumbar Spine w/o & w Contrast    Narrative    MRI LUMBAR SPINE WITHOUT AND WITH CONTRAST   9/2/2021 4:09 PM     HISTORY: Motor neuron disease, frequent falls, gait instability, acute  midline low back pain.    TECHNIQUE: Multiplanar multisequence MRI of the lumbar spine without  and with 7.5mL Gadavist     COMPARISON: MRI of the lumbar spine dated 4/6/2017. CT of the lumbar  spine 8/31/2021. Lumbar spine radiographs 4/6/2017.     FINDINGS: Nomenclature is based on 5 lumbar vertebral bodies. Again  seen are chronic T12 and L2 compression deformities with mild L2  vertebral body height loss and mild to moderate T12 vertebral body  height loss. The appearance is unchanged. On the localizer images,  previously seen chronic-appearing T11 superior endplate compression  deformity is partially visualized. No other significant vertebral body  height loss. Unchanged minimal retrolisthesis of L2 on L3, L3 on L4  and L5 on S1. Sagittal alignment is otherwise normal. Modic type I  degenerative endplate signal changes at L2-L3 and L3-L4. Presumed  intraosseous hemangioma in the L5 vertebral body, unchanged. Normal  appearance of the distal spinal cord with conus terminating at the L2  inferior endplate level. Partial visualization of new T2/STIR  hyperintense, T1 hypointense, enhancing serpiginous signal  abnormalities involving the right sacral wing and the right iliac bone  (series 9 image 40, series 5 image 1) concerning for nondisplaced  fractures. These may represent recent insufficiency fractures. The  visualized paraspinous soft tissues appear grossly unremarkable.    Segmental analysis:  T12-L1: Normal disc height and signal. No herniation. Normal facets.  No significant spinal canal or neural foraminal stenosis. Overall, no  significant change.    L1-L2: Minimal disc height loss. Shallow symmetric disc bulge. Mild  facet  arthropathy. No spinal canal stenosis. No significant neural  foraminal stenosis. Overall, unchanged.    L2-L3: Disc desiccation with mild to moderate disc height loss. Disc  bulge slightly eccentric to the left. Mild facet arthropathy. Mild  ligamentum flavum thickening. No significant spinal canal stenosis.  Mild left neural foraminal stenosis. Minimal right neural foraminal  stenosis. Overall, no change.    L3-L4: Disc desiccation with mild disc height loss. Symmetric disc  bulge. Mild facet arthropathy. Mild ligamentum flavum thickening. No  significant spinal canal stenosis. Minimal right lateral recess  encroachment. Mild bilateral neural foraminal stenosis. Overall, no  significant change.    L4-L5: Disc desiccation with minimal disc height loss. Disc bulge  slightly eccentric to the right with posterolateral endplate  osteophytes and moderate to severe bilateral facet arthropathy.  Ligamentum flavum thickening. Mild right lateral recess stenosis and  mild overall spinal canal stenosis, as before. Mild-to-moderate  bilateral neural foraminal stenosis. Overall, no significant change.    L5-S1: Moderate disc height loss. Symmetric disc bulge with posterior  endplate osteophytic ridging, more pronounced on the left, and  moderate facet arthropathy. No significant spinal canal stenosis.  Moderate to severe left neural foraminal stenosis and moderate right  neural foraminal stenosis. Overall, the findings do not appear  significantly changed.      Impression    IMPRESSION:  1. New serpiginous/linear signal abnormalities and enhancement  involving the right sacral wing and right iliac bone, concerning for  insufficiency fractures, potentially recent.  2. No significant change in multilevel degenerative findings in the  lumbar spine, as described.  3. Unchanged chronic T12 and L2 compression deformities.    HAROON WAGNER MD         SYSTEM ID:  [id number]     Medications       acetaminophen  975 mg Oral TID      amitriptyline  50 mg Oral At Bedtime     amLODIPine  10 mg Oral Daily     aspirin  81 mg Oral Every Other Day     carvedilol  25 mg Oral BID w/meals     cilostazol  100 mg Oral BID     cosyntropin  250 mcg Intravenous Once     gabapentin  300 mg Oral BID     isosorbide mononitrate  60 mg Oral Daily     lidocaine  1 patch Transdermal Q24H     lidocaine   Transdermal Q8H     losartan  100 mg Oral Daily     pantoprazole  40 mg Oral Daily     potassium chloride ER  50 mEq Oral BID     sodium chloride (PF)  3 mL Intracatheter Q8H

## 2021-09-03 NOTE — PLAN OF CARE
Occupational Therapy    S-(situation): OT order received and attempted evaluation.    B-(background): Per Epic chart review: Patient is a 76 year old male, admitted from the ED due to 2 falls at home and changes in gait, patient found to have hyponatremia, hypokalemia. Patient with a previous medical history of CKD, HTN, hyperlipidemia, IBS, GERD, CVA, COPD, panlobular emphysema, former smoker, carotid angioplasty, CABG x 6.       A-(assessment): Patient was in a procedure during attempt for OT evaluation.  Along with staffing issues this date, unable to return to complete.    R-(recommendations): Will attempt OT eval on 9/3/21    Thank you for the OT referral,    LISA Stephens/L  [facility]  [phone]  [email or web address]

## 2021-09-03 NOTE — PLAN OF CARE
S-(situation): Pt not appropriate for OT evaluation this AM.     B-(background): Patient is a 76 year old male, admitted from the ED due to 2 falls at home and changes in gait, patient found to have hyponatremia, hypokalemia. Patient with a previous medical history of CKD, HTN, hyperlipidemia, IBS, GERD, CVA, COPD, panlobular emphysema, former smoker, carotid angioplasty, CABG x 6.       A-(assessment): Pt with severe pain this morning, further medical workup indicated prior to OOB activity.     R-(recommendations): Will hold OT evaluation and initiate when appropriate.

## 2021-09-04 ENCOUNTER — APPOINTMENT (OUTPATIENT)
Dept: OCCUPATIONAL THERAPY | Facility: CLINIC | Age: 76
DRG: 641 | End: 2021-09-04
Payer: MEDICARE

## 2021-09-04 ENCOUNTER — APPOINTMENT (OUTPATIENT)
Dept: PHYSICAL THERAPY | Facility: CLINIC | Age: 76
DRG: 641 | End: 2021-09-04
Payer: MEDICARE

## 2021-09-04 VITALS
SYSTOLIC BLOOD PRESSURE: 143 MMHG | DIASTOLIC BLOOD PRESSURE: 61 MMHG | TEMPERATURE: 97.5 F | RESPIRATION RATE: 18 BRPM | HEART RATE: 79 BPM | BODY MASS INDEX: 26.37 KG/M2 | WEIGHT: 174 LBS | OXYGEN SATURATION: 94 % | HEIGHT: 68 IN

## 2021-09-04 LAB
ANION GAP SERPL CALCULATED.3IONS-SCNC: 5 MMOL/L (ref 3–14)
BUN SERPL-MCNC: 19 MG/DL (ref 7–30)
CALCIUM SERPL-MCNC: 9.2 MG/DL (ref 8.5–10.1)
CHLORIDE BLD-SCNC: 108 MMOL/L (ref 94–109)
CO2 SERPL-SCNC: 21 MMOL/L (ref 20–32)
CORTICOSTER 30M P 250 UG ACTH SERPL-SCNC: 23.2 UG/DL (ref 20–150)
CREAT SERPL-MCNC: 1.39 MG/DL (ref 0.66–1.25)
ERYTHROCYTE [DISTWIDTH] IN BLOOD BY AUTOMATED COUNT: 12.8 % (ref 10–15)
GFR SERPL CREATININE-BSD FRML MDRD: 49 ML/MIN/1.73M2
GLUCOSE BLD-MCNC: 92 MG/DL (ref 70–99)
HCT VFR BLD AUTO: 26.5 % (ref 40–53)
HGB BLD-MCNC: 9.5 G/DL (ref 13.3–17.7)
MAGNESIUM SERPL-MCNC: 2 MG/DL (ref 1.6–2.3)
MCH RBC QN AUTO: 34.1 PG (ref 26.5–33)
MCHC RBC AUTO-ENTMCNC: 35.8 G/DL (ref 31.5–36.5)
MCV RBC AUTO: 95 FL (ref 78–100)
OSMOLALITY SERPL: 279 MMOL/KG (ref 280–301)
PLATELET # BLD AUTO: 292 10E3/UL (ref 150–450)
POTASSIUM BLD-SCNC: 5 MMOL/L (ref 3.4–5.3)
RBC # BLD AUTO: 2.79 10E6/UL (ref 4.4–5.9)
SODIUM SERPL-SCNC: 134 MMOL/L (ref 133–144)
WBC # BLD AUTO: 6.5 10E3/UL (ref 4–11)

## 2021-09-04 PROCEDURE — 83930 ASSAY OF BLOOD OSMOLALITY: CPT | Performed by: NURSE PRACTITIONER

## 2021-09-04 PROCEDURE — 97530 THERAPEUTIC ACTIVITIES: CPT | Mod: GP | Performed by: PHYSICAL THERAPIST

## 2021-09-04 PROCEDURE — 250N000013 HC RX MED GY IP 250 OP 250 PS 637: Performed by: NURSE PRACTITIONER

## 2021-09-04 PROCEDURE — 99207 PR CDG-CODE INCORRECT PER BILLING BASED ON TIME: CPT | Performed by: NURSE PRACTITIONER

## 2021-09-04 PROCEDURE — 80048 BASIC METABOLIC PNL TOTAL CA: CPT | Performed by: NURSE PRACTITIONER

## 2021-09-04 PROCEDURE — 36415 COLL VENOUS BLD VENIPUNCTURE: CPT | Performed by: NURSE PRACTITIONER

## 2021-09-04 PROCEDURE — 83735 ASSAY OF MAGNESIUM: CPT | Performed by: NURSE PRACTITIONER

## 2021-09-04 PROCEDURE — 97165 OT EVAL LOW COMPLEX 30 MIN: CPT | Mod: GO | Performed by: OCCUPATIONAL THERAPIST

## 2021-09-04 PROCEDURE — 99239 HOSP IP/OBS DSCHRG MGMT >30: CPT | Performed by: NURSE PRACTITIONER

## 2021-09-04 PROCEDURE — 85027 COMPLETE CBC AUTOMATED: CPT | Performed by: NURSE PRACTITIONER

## 2021-09-04 RX ORDER — LIDOCAINE 4 G/G
1 PATCH TOPICAL EVERY 24 HOURS
Qty: 30 PATCH | Refills: 1 | Status: SHIPPED | OUTPATIENT
Start: 2021-09-04 | End: 2022-09-23

## 2021-09-04 RX ORDER — ACETAMINOPHEN 325 MG/1
650 TABLET ORAL DAILY
COMMUNITY
Start: 2021-09-04

## 2021-09-04 RX ORDER — LANOLIN ALCOHOL/MO/W.PET/CERES
3 CREAM (GRAM) TOPICAL
COMMUNITY
Start: 2021-09-04 | End: 2022-09-23

## 2021-09-04 RX ADMIN — PANTOPRAZOLE SODIUM 40 MG: 40 TABLET, DELAYED RELEASE ORAL at 08:22

## 2021-09-04 RX ADMIN — ACETAMINOPHEN 975 MG: 325 TABLET, FILM COATED ORAL at 08:22

## 2021-09-04 RX ADMIN — ISOSORBIDE MONONITRATE 60 MG: 30 TABLET, EXTENDED RELEASE ORAL at 08:22

## 2021-09-04 RX ADMIN — LOSARTAN POTASSIUM 100 MG: 50 TABLET, FILM COATED ORAL at 08:21

## 2021-09-04 RX ADMIN — POTASSIUM CHLORIDE 50 MEQ: 750 TABLET, EXTENDED RELEASE ORAL at 08:21

## 2021-09-04 RX ADMIN — CARVEDILOL 25 MG: 25 TABLET, FILM COATED ORAL at 08:22

## 2021-09-04 RX ADMIN — GABAPENTIN 300 MG: 300 CAPSULE ORAL at 08:22

## 2021-09-04 RX ADMIN — AMLODIPINE BESYLATE 10 MG: 5 TABLET ORAL at 08:22

## 2021-09-04 RX ADMIN — CILOSTAZOL 100 MG: 50 TABLET ORAL at 08:29

## 2021-09-04 ASSESSMENT — ACTIVITIES OF DAILY LIVING (ADL)
ADLS_ACUITY_SCORE: 18

## 2021-09-04 NOTE — PLAN OF CARE
Temp: 97.5  F (36.4  C) Temp src: Oral BP: (!) 143/61 Pulse: 79   Resp: 18 SpO2: 94 % O2 Device: None (Room air)      S-(situation): Patient discharged to home via wheelchair with spouse    B-(background): Fall, hyponatremia    A-(assessment): Neuro: A/O x 4, pleasantly confused at times  Pulm: lung sounds clear  CV: apical pulse normal  GI: bowel sounds normoactive x  : adequate urine output per external cath  Skin: scattered bruising to bilateral hips, right eye bruise,     Adequate pain control with scheduled tylenol. Worked with PT.       R-(recommendations): Discharge instructions reviewed with patient and spouse. Listed belongings gathered and returned to patient.          Discharge Nursing Criteria:     Care Plan and Patient education resolved: Yes    New Medications- pt has been educated about purpose and side effects: Yes    Vaccines  Influenza status verified at discharge:  Yes    MISC  Prescriptions if needed, hard copies sent with patient  NA  Home medications returned to patient: NA  Medication Bin checked and emptied on discharge Yes  Patient reports post-discharge pain management plan is effective: Yes

## 2021-09-04 NOTE — PLAN OF CARE
Physical Therapy Discharge Summary    Reason for therapy discharge:    All goals and outcomes met, no further needs identified.    Progress towards therapy goal(s). See goals on Care Plan in [facility] electronic health record for goal details.  Goals met    Therapy recommendation(s):    Continued therapy is recommended.  Rationale/Recommendations:  patient and wife would benefit from home PT for further evaluation and Rx in the home setting .

## 2021-09-04 NOTE — DISCHARGE SUMMARY
[facility]  Hospitalist Discharge Summary      Date of Admission:  8/31/2021  Date of Discharge:  9/4/2021 12:41 PM  Discharging Provider: Felicia Vigil CNP      Discharge Diagnoses   Principal Problem:    Hyponatremia  Active Problems:    Hyperlipidemia LDL goal <130    GERD (gastroesophageal reflux disease)    CKD (chronic kidney disease) stage 3, GFR 30-59 ml/min    S/P CABG x 6    Stroke, embolic (H)    Essential hypertension with goal blood pressure less than 140/90    Abnormal gait    Falls frequently    Facial injury, initial encounter    Acute midline low back pain without sciatica        Follow-ups Needed After Discharge   Follow-up Appointments     Follow-up and recommended labs and tests       Follow up with primary care provider, Jonas West, within 7 days for   hospital follow- up.  The following labs/tests are recommended: BMP.             Unresulted Labs Ordered in the Past 30 Days of this Admission     Date and Time Order Name Status Description    9/4/2021 12:01 AM Osmolality In process     9/3/2021 11:53 AM Adrenal corticotropin In process       These results will be followed up by PCP    Discharge Disposition   Discharged to home  Condition at discharge: Stable      Hospital Course           Hyponatremia  Michele Vance is a 76 year old male admitted on 8/31/2021. He presented to the emergency department by EMS with 24-hour history of gait difficulty, with 2 falls that occurred earlier this morning.  Patient initially fell getting up onto the bed, but then got up again to use the bathroom and fell in the bathroom.  He has visible injuries to the right side of the face.   Fortunately the CT imaging of his head, neck and lumbar spine did not show any acute abnormality.  Patient was able to stand and take a couple of lateral steps but when he tried to go forward he was unstable and cannot ambulate without assistance.  Patient work-up included a chest x-ray  which was clear, EKG that showed a bundle branch block which is unchanged from previous, a second troponin that was improved from the first, and a Covid swab which is negative.  CBC stable for patient, slight anemia with hgb 10.4. INR 1.10. Urine negative for infection. Troponin slightly elevated at 0.09, stable on repeat troponin at 0.086. Patient with noted hyponatremia, sodium at 120. Osmolality at 249. Urine osmolality 215, sodium urine 31.  Patient with hyponatremia, seems to be chronic although he has not had an evaluation for it.  He had hyponatremia with each visit to the ER in April, June and July. Prior to his cholecystectomy his sodium was low and his surgery was postponed, he was told to drink gatorade for this. He has been drinking a large amount of gatorade and other fluids to try to stop the low sodium from happening again.  Cortisol stim test was negative. Over the course of hospitalization his sodium slowly improved with fluid restriction. Will continue 1500 mL/day fluid restriction.             Hypokalemia   Chronic, patient is on potassium replacement at 50 mEq BID, using the 10 Farhat tabs. Unclear etiology, patient is not on diuretics. 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        Abnormal gait    Falls frequently    Facial injury, initial encounter    Acute midline low back pain without sciatica    Insufficiency fracture, right sacral wing and right iliac bone  Patient with significant lower back pain with any movement. Limited movement of lower extremities secondary to pain more than weakness. MRI lumbar spine done yesterday showed insufficiency fracture of right sacral wing and right iliac bone. Discussed findings with Dr. Butler, on call for orthopedic surgery, who noted no surgical intervention warranted and recommended pain control and ambulation. Patient denies pain at rest.  Will start scheduled Tylenol 975 mg TID and Lidoderm patch. Oxycodone 2.5-5 mg every 4 hours as needed for pain.           Hyperlipidemia LDL goal <130  Noted, chronic.  Continue home meds.       GERD (gastroesophageal reflux disease)  Noted, chronic.  Continue home meds.       CKD (chronic kidney disease) stage 3, GFR 30-59 ml/min  Noted, chronic. Creatinine 1.23, GFR 57.   Continue home meds. Recheck labs daily.        S/P CABG x 6    Stroke, embolic (H)    Essential hypertension with goal blood pressure less than 140/90  Noted, chronic.  Continue home meds.      Consultations This Hospital Stay   PHYSICAL THERAPY ADULT IP CONSULT  OCCUPATIONAL THERAPY ADULT IP CONSULT  CARE MANAGEMENT / SOCIAL WORK IP CONSULT    Code Status   No CPR- Do NOT Intubate    Time Spent on this Encounter   I, Felicia Vigil CNP, personally saw the patient today and spent greater than 30 minutes discharging this patient.       Felicia Vigil CNP  [address] SURGICAL  [address] [place] [address]  Phone: [phone]  ______________________________________________________________________    Physical Exam   Vital Signs: Temp: 97.5  F (36.4  C) Temp src: Oral BP: (!) 143/61 Pulse: 79   Resp: 18 SpO2: 94 % O2 Device: None (Room air)    Weight: 174 lbs 0 oz  Constitutional: awake, alert, cooperative, no apparent distress   Eyes: Lids and lashes normal; PERRLA   Respiratory: No increased work of breathing, good air exchange, clear to auscultation bilaterally   Cardiovascular: regular rate and rhythm and normal S1 and S2  GI: normal bowel sounds, non-distended and non-tender  Skin: normal skin color, texture, turgor  Musculoskeletal: no lower extremity pitting edema present  Neurologic: Awake, alert, oriented to name, place and time.  Cranial nerves II-XII are grossly intact.  Motor is 5 out of 5 bilaterally in upper extremities; 3/5 in lower extremities.  Cerebellar finger to nose, heel to shin intact.  Sensory is intact.       Primary Care Physician   Jonas West    Discharge Orders      HOME CARE  NURSING REFERRAL      Reason for your hospital stay    You were in the hospital for low sodium and weakness.     Follow-up and recommended labs and tests     Follow up with primary care provider, Jonas West, within 7 days for hospital follow- up.  The following labs/tests are recommended: BMP.     Activity    Your activity upon discharge: activity as tolerated     Walker Order for DME - ONLY FOR DME    DME Documentation:   Describe the reason for need to support medical necessity: due to history of falls and pain with ambulation secondary to non-operative pelvic fractures.     I, the undersigned, certify that the above prescribed supplies are medically necessary for this patient and is both reasonable and necessary in reference to accepted standards of medical and necessary in reference to accepted standards of medical practice in the treatment of this patient's condition and is not prescribed as a convenience.     Diet    Follow this diet upon discharge: Orders Placed This Encounter      Fluid restriction 1500 ML FLUID      Room Service      Combination Diet Low Saturated Fat Na <2400mg Diet       Significant Results and Procedures   Most Recent 3 CBC's:Recent Labs   Lab Test 09/04/21  0600 09/03/21  0613 09/02/21  0611   WBC 6.5 7.4 6.4   HGB 9.5* 9.6* 9.4*   MCV 95 93 94    264 233     Most Recent 3 BMP's:Recent Labs   Lab Test 09/04/21  0600 09/03/21  0613 09/02/21  1137 09/02/21  0611    134 133 132*  132*   POTASSIUM 5.0 4.7  --  4.8   CHLORIDE 108 109  --  108   CO2 21 20  --  19*   BUN 19 13  --  13   CR 1.39* 1.23  --  1.09   ANIONGAP 5 5  --  5   RAHEEM 9.2 9.0  --  8.5   GLC 92 122*  --  113*   ,   Results for orders placed or performed during the hospital encounter of 08/31/21   CT Head w/o Contrast    Narrative    CT OF THE HEAD WITHOUT CONTRAST   8/31/2021 7:25 AM     COMPARISON: Head CT 7/19/2021    HISTORY:  Head trauma, minor (Age >= 65y)     TECHNIQUE:  Axial CT images of the head  from the skull base to the  vertex were acquired without IV contrast.    FINDINGS:   INTRACRANIAL CONTENTS: No intracranial hemorrhage, extraaxial  collection, or mass effect.  No CT evidence of acute infarct.    Chronic infarcts in the right frontal lobe, left periventricular  parenchyma and cerebellar hemispheres are again identified. Moderate  generalized volume loss.    VISUALIZED ORBITS/SINUSES/MASTOIDS: No significant orbital  abnormality.  No significant paranasal sinus mucosal disease. No  significant middle ear or mastoid effusion.    OSSEOUS STRUCTURES/SOFT TISSUES: Right facial hematoma. No skull  fracture.      Impression    IMPRESSION:  1.  Right facial hematoma. No skull fracture.  2.  No acute intracranial abnormality.  3.  Chronic ischemic changes as noted above    Radiation dose for this scan was reduced using automated exposure  control, adjustment of the mA and/or kV according to patient size, or  iterative reconstruction technique    YURI OWENS MD         SYSTEM ID:  [id number]   XR Pelvis and Hip Right 1 View    Narrative    PELVIS AND RIGHT HIP, ONE VIEW   8/31/2021 7:31 AM     HISTORY:  Fall in the bathroom, right-sided hip pain.    FINDINGS: Lower lumbar spine degenerative change.      Impression    IMPRESSION: Mild right hip joint space narrowing. No fracture  identified.     CHERELLE THORPE MD         SYSTEM ID:  [id number]   Lumbar spine CT w/o contrast    Narrative    CT OF THE LUMBAR SPINE WITHOUT CONTRAST     8/31/2021 7:23 AM     COMPARISON: CT of the abdomen and pelvis 7/19/2021    HISTORY: Low back pain, trauma     TECHNIQUE: Axial images of the lumbar spine were acquired without  intravenous contrast. Multiplanar reformations were created.      FINDINGS:   Chronic T11, T12 and L2 superior endplate compressions again noted. No  acute fracture. No posterior metastatic subluxation. Mild/moderate  bilateral L4-5 and moderate to severe bilateral L5-S1 neural foraminal  stenoses again noted.  No high-grade spinal canal stenosis.    Grossly normal paraspinal soft tissues.     CONCLUSION:  1.  No acute fracture or post-traumatic subluxation.  2.  Chronic T11, T12 and L2 superior endplate compressions.  3.  Moderate to severe bilateral L5-S1 neural foraminal stenoses.    Radiation dose for this scan was reduced using automated exposure  control, adjustment of the mA and/or kV according to patient size, or  iterative reconstruction technique    YURI OWENS MD         SYSTEM ID:  [id number]   Cervical spine CT w/o contrast    Narrative    CT OF THE CERVICAL SPINE WITHOUT CONTRAST   8/31/2021 7:24 AM     COMPARISON: Chest CT 7/14/2021    HISTORY: Neck trauma (Age >= 65y); fall, gait disturbance; rule out  fracture     TECHNIQUE: Axial images of the cervical spine were acquired without  intravenous contrast. Multiplanar reformations were created.      FINDINGS: Mild chronic T2 in T3 superior endplate compressions. These  were present on the comparison chest CT. No acute fracture. No  posttraumatic subluxation. Chronic degenerative change results in  multilevel moderate neural foraminal stenoses. No high-grade spinal  canal stenosis.    Grossly normal lung apices and neck soft tissues.      Impression    IMPRESSION:  1.  No acute fracture. No posttraumatic subluxation.  2.  Mild chronic T2 and T3 superior endplate compressions.      Radiation dose for this scan was reduced using automated exposure  control, adjustment of the mA and/or kV according to patient size, or  iterative reconstruction technique    YURI OWENS MD         SYSTEM ID:  [id number]   CT Pelvis Bone wo Contrast    Narrative    CT PELVIS BONE WITHOUT CONTRAST8/31/2021 10:36 AM     HISTORY: Fall, pain.    TECHNIQUE:  Axial images with reconstructions. No IV contrast.  Radiation dose for this scan was reduced using automated exposure  control, adjustment of the mA and/or kV according to patient size, or  iterative reconstruction  technique.    COMPARISON:  None    FINDINGS:      Bones, joints: Osteopenia. Lower lumbar spine degenerative change.  Mild right hip arthropathy.    Additional findings: Bladder distention. Mild prostate enlargement. At  the lateral aspect of the right hip there is subcutaneous edema versus  contusion.      Impression    IMPRESSION:  1. No fracture identified.   2. Additional findings discussed above.     CHERELLE THORPE MD         SYSTEM ID:  [id number]   XR Chest Port 1 View    Narrative    XR CHEST PORT 1 VIEW 8/31/2021 1:33 PM    HISTORY: Fall, pain    COMPARISON: Chest x-ray 7/19/2021      Impression    IMPRESSION: Sternotomy with postoperative changes in the mediastinum.  Cardiac silhouette at the upper limits of normal, with normal  pulmonary vasculature. No consolidative opacities. No pleural  effusion. No pneumothorax.    MAME ZHANG MD         SYSTEM ID:  [id number]   MR Lumbar Spine w/o & w Contrast    Narrative    MRI LUMBAR SPINE WITHOUT AND WITH CONTRAST   9/2/2021 4:09 PM     HISTORY: Motor neuron disease, frequent falls, gait instability, acute  midline low back pain.    TECHNIQUE: Multiplanar multisequence MRI of the lumbar spine without  and with 7.5mL Gadavist     COMPARISON: MRI of the lumbar spine dated 4/6/2017. CT of the lumbar  spine 8/31/2021. Lumbar spine radiographs 4/6/2017.     FINDINGS: Nomenclature is based on 5 lumbar vertebral bodies. Again  seen are chronic T12 and L2 compression deformities with mild L2  vertebral body height loss and mild to moderate T12 vertebral body  height loss. The appearance is unchanged. On the localizer images,  previously seen chronic-appearing T11 superior endplate compression  deformity is partially visualized. No other significant vertebral body  height loss. Unchanged minimal retrolisthesis of L2 on L3, L3 on L4  and L5 on S1. Sagittal alignment is otherwise normal. Modic type I  degenerative endplate signal changes at L2-L3 and L3-L4.  Presumed  intraosseous hemangioma in the L5 vertebral body, unchanged. Normal  appearance of the distal spinal cord with conus terminating at the L2  inferior endplate level. Partial visualization of new T2/STIR  hyperintense, T1 hypointense, enhancing serpiginous signal  abnormalities involving the right sacral wing and the right iliac bone  (series 9 image 40, series 5 image 1) concerning for nondisplaced  fractures. These may represent recent insufficiency fractures. The  visualized paraspinous soft tissues appear grossly unremarkable.    Segmental analysis:  T12-L1: Normal disc height and signal. No herniation. Normal facets.  No significant spinal canal or neural foraminal stenosis. Overall, no  significant change.    L1-L2: Minimal disc height loss. Shallow symmetric disc bulge. Mild  facet arthropathy. No spinal canal stenosis. No significant neural  foraminal stenosis. Overall, unchanged.    L2-L3: Disc desiccation with mild to moderate disc height loss. Disc  bulge slightly eccentric to the left. Mild facet arthropathy. Mild  ligamentum flavum thickening. No significant spinal canal stenosis.  Mild left neural foraminal stenosis. Minimal right neural foraminal  stenosis. Overall, no change.    L3-L4: Disc desiccation with mild disc height loss. Symmetric disc  bulge. Mild facet arthropathy. Mild ligamentum flavum thickening. No  significant spinal canal stenosis. Minimal right lateral recess  encroachment. Mild bilateral neural foraminal stenosis. Overall, no  significant change.    L4-L5: Disc desiccation with minimal disc height loss. Disc bulge  slightly eccentric to the right with posterolateral endplate  osteophytes and moderate to severe bilateral facet arthropathy.  Ligamentum flavum thickening. Mild right lateral recess stenosis and  mild overall spinal canal stenosis, as before. Mild-to-moderate  bilateral neural foraminal stenosis. Overall, no significant change.    L5-S1: Moderate disc height  loss. Symmetric disc bulge with posterior  endplate osteophytic ridging, more pronounced on the left, and  moderate facet arthropathy. No significant spinal canal stenosis.  Moderate to severe left neural foraminal stenosis and moderate right  neural foraminal stenosis. Overall, the findings do not appear  significantly changed.      Impression    IMPRESSION:  1. New serpiginous/linear signal abnormalities and enhancement  involving the right sacral wing and right iliac bone, concerning for  insufficiency fractures, potentially recent.  2. No significant change in multilevel degenerative findings in the  lumbar spine, as described.  3. Unchanged chronic T12 and L2 compression deformities.    HAROON WAGNER MD         SYSTEM ID:  [id number]       Discharge Medications   Current Discharge Medication List      START taking these medications    Details   acetaminophen (TYLENOL) 325 MG tablet Take 3 tablets (975 mg) by mouth 3 times daily      Lidocaine (LIDOCARE) 4 % Patch Place 1 patch onto the skin every 24 hours To prevent lidocaine toxicity, patient should be patch free for 12 hrs daily.  Qty: 30 patch, Refills: 1    Associated Diagnoses: Acute midline low back pain without sciatica      melatonin 3 MG tablet Take 1 tablet (3 mg) by mouth nightly as needed for sleep    Associated Diagnoses: Insomnia, unspecified type         CONTINUE these medications which have NOT CHANGED    Details   alirocumab (PRALUENT) 150 MG/ML injectable syringe Inject 1 mL (150 mg) Subcutaneous every 14 days  Qty: 8 mL, Refills: 3    Associated Diagnoses: Hyperlipidemia LDL goal <130      amitriptyline (ELAVIL) 25 MG tablet TAKE 1 TABLET TWICE A DAY  Qty: 180 tablet, Refills: 2    Associated Diagnoses: Migraine without aura and without status migrainosus, not intractable      amLODIPine (NORVASC) 10 MG tablet Take 1 tablet (10 mg) by mouth daily  Qty: 90 tablet, Refills: 3    Associated Diagnoses: Essential hypertension, benign      aspirin  (ASPIRIN) 81 MG EC tablet Take 81 mg by mouth every other day      carvedilol (COREG) 25 MG tablet Take 1 tablet (25 mg) by mouth 2 times daily (with meals)  Qty: 180 tablet, Refills: 3    Associated Diagnoses: Benign essential HTN      cilostazol (PLETAL) 100 MG tablet Take 100 mg by mouth 2 times daily       gabapentin (NEURONTIN) 300 MG capsule TAKE 1 CAPSULE TWICE A DAY  Qty: 180 capsule, Refills: 3    Associated Diagnoses: Arthritis      isosorbide mononitrate (IMDUR) 60 MG 24 hr tablet Take 1 tablet (60 mg) by mouth daily  Qty: 90 tablet, Refills: 3    Associated Diagnoses: ELAM (dyspnea on exertion)      losartan (COZAAR) 100 MG tablet Take 1 tablet (100 mg) by mouth daily  Qty: 90 tablet, Refills: 3    Associated Diagnoses: Essential hypertension, benign      omeprazole (PRILOSEC) 40 MG DR capsule TAKE 1 CAPSULE DAILY  Qty: 90 capsule, Refills: 3    Associated Diagnoses: Gastroesophageal reflux disease without esophagitis      ondansetron (ZOFRAN) 4 MG tablet Take 1 tablet (4 mg) by mouth as needed for nausea  Qty: 90 tablet, Refills: 0    Associated Diagnoses: Nausea      potassium chloride ER (KLOR-CON M) 10 MEQ CR tablet TAKE 5 TABLETS TWICE A DAY  Qty: 300 tablet, Refills: 0    Associated Diagnoses: Hypokalemia      albuterol (PROAIR HFA/PROVENTIL HFA/VENTOLIN HFA) 108 (90 Base) MCG/ACT inhaler Inhale 2 puffs into the lungs every 4 hours as needed for shortness of breath / dyspnea or wheezing  Qty: 8 g, Refills: 0    Comments: Pharmacy may dispense brand covered by insurance (Proair, or proventil or ventolin or generic albuterol inhaler)      benzonatate (TESSALON) 200 MG capsule Take 1 capsule (200 mg) by mouth 3 times daily as needed for cough  Qty: 30 capsule, Refills: 0      oxyCODONE (ROXICODONE) 5 MG tablet Take 1-2 tablets (5-10 mg) by mouth every 6 hours as needed for moderate to severe pain  Qty: 10 tablet, Refills: 0    Associated Diagnoses: Post-op pain         STOP taking these medications     "   diphenhydrAMINE-acetaminophen (TYLENOL PM)  MG tablet Comments:   Reason for Stopping:             Allergies   Allergies   Allergen Reactions     Statins [Hmg-Coa-R Inhibitors] Other (See Comments)     Rhabdo  Same as \"statins\"     Gemfibrozil      Resting thigh-calf pain     Sulfa Drugs      Reacted as a child     Zetia [Ezetimibe]      Muscle cramping     "

## 2021-09-04 NOTE — PROGRESS NOTES
Occupational Therapy Discharge Summary    Reason for therapy discharge:    Discharged to home with home therapy.    Progress towards therapy goal(s). See goals on Care Plan in [facility] electronic health record for goal details.  Goals partially met.  Barriers to achieving goals:   discharge from facility.    Therapy recommendation(s):    Continued therapy is recommended.  Rationale/Recommendations:  To progress independence and safety in ADL's and functional transfers .     Renetta GONZALEZ/KAYLA

## 2021-09-04 NOTE — PROGRESS NOTES
Care Management Discharge Note    Discharge Date: 09/04/2021     Discharge Disposition: Home with Home Care    Discharge Services: PT/OT    Discharge DME: None    Discharge Transportation: family or friend will provide    Private pay costs discussed: Not applicable    PAS Confirmation Code:  N/A  Patient/family educated on Medicare website which has current facility and service quality ratings: yes    Education Provided on the Discharge Plan: yes    Persons Notified of Discharge Plans: Spouse Justin  Patient/Family in Agreement with the Plan: yes    Handoff Referral Completed: Yes    Additional Information:  Informed of Pt's discharge today. CM left a vm with the Pt's spouse to follow up for any further questions prior to discharge, left return #.     Referral placed to [facility] (Phone: [phone])  -PT/OT    Spouse Michelle is scheduled to provide transportation home on discharge.     Michelle reported she purchased a sleep number bed for the Pt to use on the main level of their home to prevent the Pt from having to use the stairs in the home.     Plan: Discharge to home in the care of the spouse    *Referral to [facility] (Phone: [phone])  -PT/OT    JOANNE Escobar

## 2021-09-04 NOTE — PROGRESS NOTES
09/04/21 0900   Quick Adds   Type of Visit Initial Occupational Therapy Evaluation   Living Environment   People in home spouse   Current Living Arrangements house   Home Accessibility stairs to enter home;stairs within home   Number of Stairs, Main Entrance 3   Stair Railings, Main Entrance railing on left side (ascending)  (Wife indicates installation of bilateral rails before d/c)   Number of Stairs, Within Home, Primary greater than 10 stairs   Stair Railings, Within Home, Primary railing on right side (ascending)   Transportation Anticipated family or friend will provide   Living Environment Comments Wife (Justin) indicates that she is available, willing and able to assist in care needs for patient upon discharge.  She indicates that she has a bed set up on the main floor to reduce stair navigation.  Has a tub shower.    Self-Care   Usual Activity Tolerance moderate   Current Activity Tolerance poor   Regular Exercise No   Equipment Currently Used at Home shower chair;raised toilet seat   Disability/Function   Hearing Difficulty or Deaf no   Wear Glasses or Blind yes   Vision Management glasses    Concentrating, Remembering or Making Decisions Difficulty yes   Concentration Management Wife assists in cares    Difficulty Communicating no   Difficulty Eating/Swallowing no   Eating/Swallowing swallowing liquids;swallowing solid food   Walking or Climbing Stairs Difficulty no   Dressing/Bathing Difficulty no   Toileting issues no   Doing Errands Independently Difficulty (such as shopping) no   Errands Management Wife assists in higher order IADL's    Fall history within last six months yes   Number of times patient has fallen within last six months 3   Change in Functional Status Since Onset of Current Illness/Injury yes   General Information   Onset of Illness/Injury or Date of Surgery 08/31/21   Referring Physician Kenneth Gallagher NP    Patient/Family Therapy Goal Statement (OT) To return home with assistance as  needed    Additional Occupational Profile Info/Pertinent History of Current Problem Patient is a 76 year old male, admitted from the ED due to 2 falls at home and changes in gait, patient found to have hyponatremia, hypokalemia. Patient with a previous medical history of CKD, HTN, hyperlipidemia, IBS, GERD, CVA, COPD, panlobular emphysema, former smoker, carotid angioplasty, CABG x 6.   Limitations/Impairments safety/cognitive   Left Upper Extremity (Weight-bearing Status) full weight-bearing (FWB)   Right Upper Extremity (Weight-bearing Status) full weight-bearing (FWB)   Left Lower Extremity (Weight-bearing Status) full weight-bearing (FWB)   Right Lower Extremity (Weight-bearing Status) full weight-bearing (FWB)   Cognitive Status Examination   Orientation Status person;place;time   Affect/Mental Status (Cognitive) confused   Follows Commands follows two-step commands;75-90% accuracy;increased processing time needed;repetition of directions required   Safety Deficit moderate deficit  (Wife articulates needing reminders to use walker at baseline)   Memory Deficit moderate deficit   Attention Deficit moderate deficit   Executive Function Deficit information processing;insight/awareness of deficits;organization/sequencing;planning/decision-making;problem-solving/reasoning   Cognitive Status Comments Short Blessed Test administered this date with a score of 9 indicating moderate cognitive impairment.  Patient's wife indicates that he has memory challenges at baseline as a result of past CVA's.     Visual Perception   Visual Impairment/Limitations WFL   Pain Assessment   Patient Currently in Pain No   Range of Motion Comprehensive   General Range of Motion bilateral upper extremity ROM WFL   Strength Comprehensive (MMT)   Comment, General Manual Muscle Testing (MMT) Assessment Generalized weakness with limited activity endurance.    Coordination   Upper Extremity Coordination No deficits were identified   Bed Mobility    Bed Mobility supine-sit;sit-supine   Supine-Sit Sargent (Bed Mobility) set up;supervision   Sit-Supine Sargent (Bed Mobility) set up;supervision   Assistive Device (Bed Mobility) bed rails   Activities of Daily Living   BADL Assessment toileting   Toileting   Sargent Level (Toileting) verbal cues;supervision   Assistive Devices (Toileting) grab bar, toilet;raised toilet seat   Position (Toileting) unsupported sitting   Clinical Impression   Criteria for Skilled Therapeutic Interventions Met (OT) yes  (Discharge probable )   OT Diagnosis Decreased baseline independence in safe completion of ADL's and functional transfers    OT Problem List-Impairments impacting ADL problems related to;activity tolerance impaired;strength   Assessment of Occupational Performance 1-3 Performance Deficits   Identified Performance Deficits Functional transfers, ADL's    Planned Therapy Interventions (OT) ADL retraining;strengthening;home program guidelines;progressive activity/exercise   Clinical Decision Making Complexity (OT) low complexity   Therapy Frequency (OT) 6x/week   Predicted Duration of Therapy 1-2 days    Anticipated Equipment Needs Upon Discharge (OT)   (None anticipated )   Risk & Benefits of therapy have been explained evaluation/treatment results reviewed;care plan/treatment goals reviewed;risks/benefits reviewed;current/potential barriers reviewed;participants voiced agreement with care plan;participants included;patient;spouse/significant other   OT Discharge Planning    OT Discharge Recommendation (DC Rec) home with home care occupational therapy   OT Rationale for DC Rec Home with wife who is able to physically assist and care for patient, she is making modifications to the home currently.    OT Brief overview of current status  Progressing independence in bed mobility, supportive family.    Total Evaluation Time (Minutes)   Total Evaluation Time (Minutes) 15       Thank you for referring Michele to  Occupational Therapy,     Renetta GONZALEZ/KAYLA

## 2021-09-04 NOTE — PLAN OF CARE
"S-(situation): End of shift note    B-(background): Hyponatremia    A-(assessment): Vital signs stable. /56 (BP Location: Right arm)   Pulse 84   Temp 98.1  F (36.7  C) (Oral)   Resp 18   Ht 1.727 m (5' 8\")   Wt 78.9 kg (174 lb)   SpO2 95%   BMI 26.46 kg/m  .  Afebrile. Lung sounds CTA B/L.  On RA.   A&O x 3, confused with situation.  External male catheter in use. Denies pain at this time, treated with scheduled tylenol. Bruising to rt side of hip, face and forearm.   Able to make needs known and uses call light appropriately.     R-(recommendations): Continue to monitor per care plan.    "

## 2021-09-07 ENCOUNTER — PATIENT OUTREACH (OUTPATIENT)
Dept: CARE COORDINATION | Facility: CLINIC | Age: 76
End: 2021-09-07

## 2021-09-07 DIAGNOSIS — Z71.89 OTHER SPECIFIED COUNSELING: ICD-10-CM

## 2021-09-07 NOTE — PROGRESS NOTES
Clinic Care Coordination Contact  [facility]/Voicemail       Clinical Data: Care Coordinator Outreach  Outreach attempted x 1.  Left message on patient's voicemail with call back information and requested return call.  Plan:  Care Coordinator will try to reach patient again in 1-2 business days.      Cassidy PEPEN, RN, PHN, [facility]  Primary Clinic Care Coordination    [facility]  Primary Care Clinics  [email or web address]   Office: [phone]  Employed by [facility]

## 2021-09-08 ENCOUNTER — DOCUMENTATION ONLY (OUTPATIENT)
Dept: CARE COORDINATION | Facility: CLINIC | Age: 76
End: 2021-09-08

## 2021-09-08 ENCOUNTER — PATIENT OUTREACH (OUTPATIENT)
Dept: FAMILY MEDICINE | Facility: CLINIC | Age: 76
End: 2021-09-08
Payer: MEDICARE

## 2021-09-08 LAB — ACTH PLAS-MCNC: 16 PG/ML

## 2021-09-08 ASSESSMENT — ACTIVITIES OF DAILY LIVING (ADL)
DEPENDENT_IADLS:: CLEANING;COOKING;LAUNDRY;SHOPPING;MEAL PREPARATION;MEDICATION MANAGEMENT;MONEY MANAGEMENT;TRANSPORTATION;INCONTINENCE

## 2021-09-08 NOTE — PROGRESS NOTES
Clinic Care Coordination Contact  Care Team Conversations    Wife is asking if his appointment on Friday can be switched to virtual that is very difficult to get him in and out of the house and car.      Please review and advise      Cassidy ARMENTA, RN, PHN, CCM  Primary Clinic Care Coordination    [facility]  Primary Care Clinics  [email or web address]   Office: [phone]  Employed by [facility]

## 2021-09-08 NOTE — LETTER
[facility]  Patient Centered Plan of Care  About Me:        Patient Name:  Michele Vance    YOB: 1945  Age:         76 year old   [facility] MRN:    [id number] Telephone Information:  Home Phone [phone]   Mobile [phone]       Address:  [address] [place] [address] Email address:  [email or web address]      Emergency Contact(s)    Name Relationship Lgl Grd Work Phone Home Phone Mobile Phone   1. CAT VANCE* Spouse   [phone]           Primary language:  English     needed? No   [employer]:  [phone] op. 1  Other communication barriers: Cognitive impairment  Preferred Method of Communication:  Mail  Current living arrangement: I live in a private home with spouse  Mobility Status/ Medical Equipment: Independent w/Device    Health Maintenance  Health Maintenance Reviewed: Due/Overdue     My Access Plan  Medical Emergency 911   Primary Clinic Line [facility] - [phone]   24 Hour Appointment Line [phone] or  [phone]) (toll-free)   24 Hour Nurse Line [phone] (toll-free)   Preferred Urgent Care     Preferred Hospital [facility]  [phone]   Preferred Pharmacy [facility] ONLY     Behavioral Health Crisis Line The National Suicide Prevention Lifeline at 1-310.667.7332 or 911     My Care Team Members  Patient Care Team       Relationship Specialty Notifications Start End    Jonas West MD PCP - General Internal Medicine  11/16/18     Phone: [phone] Fax: [phone]         [address] [facility] [address]    Benito Hale MD (Inactive) Referring Physician Surgery  2/12/14     Micheal Hogan MD MD Internal Medicine  2/12/14     Phone: [phone] Fax: [phone]         [address] [place] [address]    Mikie Wise MD MD Cardiology Admissions  10/14/14     Phone: [phone] Pager: [phone] Fax: [phone]        [address]  [facility] [address]    Yusuf Xiong MD MD Orthopaedic Surgery  7/24/15     Phone: [phone] Fax: [phone]         [address] [facility] [address]    Jonas West MD Assigned PCP   3/17/19     Phone: [phone] Fax: [phone] [address] [facility] [address]    Shola Cuellar MD Assigned Neuroscience Provider   10/23/20     Phone: [phone] Fax: [phone] [address] [facility] [address]    Julio Oropeza MD Assigned Heart and Vascular Provider   3/21/21     Phone: [phone] Fax: [phone] [address] [facility] [address]    Anthony Epps MD Assigned Surgical Provider   7/16/21     Phone: [phone] Fax: [phone]         [address] [facility] [address]            My Care Plans  Self Management and Treatment Plan  Goals and (Comments)  Goals        General    Reducing Risks     Goal Statement: reduce my risk of falls over the next 6 months  Date Goal set: 9/8/2021  Barriers: weakness, low sodium  Strengths: motivated and engaged  Date to Achieve By: 3/1/2022  Patient expressed understanding of goal: yes  Action steps to achieve this goal:  1. I will use my walker every time I am up walking  2. I will call for help if I feel to weak to walk by myself  3. I will do my daily exercises per PT.    4. I will monitor my sodium levels as recommended.           Action Plans on File:     Advance Care Plans/Directives Type:        My Medical and Care Information  Problem List   Patient Active Problem List   Diagnosis     Hypertension     Hyperlipidemia LDL goal <130     Myalgia and myositis     GERD (gastroesophageal reflux disease)     Impingement syndrome, shoulder, right     Advanced directives, counseling/discussion     Anemia     CKD (chronic kidney disease) stage 3, GFR 30-59 ml/min      Arthritis     Carotid bruit     Carotid stenosis, bilateral     Left main coronary artery disease     S/P CABG x 6     Insomnia     Nutritional deficiency     Pain     Urinary retention     Stroke, embolic (H)     Cerebral infarction due to occlusion or stenosis of carotid artery     Carotid artery occlusion with cerebral infarction (H)     Dizziness     Chronic constipation     Irritable bowel syndrome     Essential hypertension with goal blood pressure less than 140/90     Panlobular emphysema (H)     Carotid stenosis     Status post balloon angioplasty of pulmonary artery branches     H/O carotid angioplasty     Biliary colic     Abnormal gait     Falls frequently     Facial injury, initial encounter     Acute midline low back pain without sciatica     Hyponatremia      Current Medications and Allergies:  See printed Medication Report.    Care Coordination Start Date: 09/08/2021   Frequency of Care Coordination: 2 weeks   Form Last Updated: 09/08/2021

## 2021-09-08 NOTE — TELEPHONE ENCOUNTER
Patient switched to virtual. Spouse said that they havent been successful but will attempt is again if needed.

## 2021-09-08 NOTE — LETTER
[facility] CARE COORDINATION  [address]   [address]  Tel. [phone] / Fax [phone]  September 8, 2021    Michele Vance  [address] [place] [address]      Dear Michele,(Michelle)    I am a clinic care coordinator who works with Jonas West MD at [facility]. I wanted to thank you for spending the time to talk with me.  Below is a description of clinic care coordination and how I can further assist you.      The clinic care coordination team is made up of a registered nurse,  and community health worker who understand the health care system. The goal of clinic care coordination is to help you manage your health and improve access to the health care system in the most efficient manner. The team can assist you in meeting your health care goals by providing education, coordinating services, strengthening the communication among your providers and supporting you with any resource needs.    Please feel free to contact me at [phone] with any questions or concerns. We are focused on providing you with the highest-quality healthcare experience possible and that all starts with you.     Sincerely,     Cassidy ARMENTA, RN, PHN, [facility]  Primary Clinic Care Coordination    [facility]  Primary Care Clinics  [email or web address]   Office: [phone]  Employed by [facility] [employer] [facility]          Enclosed: I have enclosed a copy of the Patient Centered Plan of Care. This has helpful information and goals that we have talked about. Please keep this in an easy to access place to use as needed.

## 2021-09-08 NOTE — PROGRESS NOTES
Clinic Care Coordination Contact  [facility]: Post-Discharge Note  SITUATION                                                      Admission:    Admission Date: 08/31/21      Discharge:   Discharge Date: 09/04/21  Discharge Diagnosis: Hyponatremia    BACKGROUND                                                    For discharge note:  Patient initially fell getting up onto the bed, but then got up again to use the bathroom and fell in the bathroom.  He has visible injuries to the right side of the face.     ASSESSMENT      Enrollment  Primary Care Care Coordination Status: Enrolled  Clinical Pathway Name: None  Outreach Frequency: 2 weeks    Discharge Assessment  How are your symptoms? (Red Flag symptoms escalate to triage hotline per guidelines): Improved  Do you feel your condition is stable enough to be safe at home until your provider visit?: Yes  Does the patient have their discharge instructions? : Yes  Does the patient have questions regarding their discharge instructions? : No  Were you started on any new medications or were there changes to any of your previous medications? : No  Does the patient have all of their medications?: Yes  Do you have questions regarding any of your medications? : No  Do you have all of your needed medical supplies or equipment (DME)?  (i.e. oxygen tank, CPAP, cane, etc.): Yes  Discharge follow-up appointment scheduled within 14 calendar days? : Yes  Discharge Follow Up Appointment Date: 09/10/21  Discharge Follow Up Appointment Scheduled with?: Primary Care Provider         Post-op (Clinicians Only)  Did the patient have surgery or a procedure: No  Fever: No  Chills: No  Eating & Drinking: eating and drinking without complaints/concerns  PO Intake: regular diet (fluid restriction 1500)  Bowel Function: normal  Urinary Status: voiding without complaint/concerns    Care Management       Care Mgmt General Assessment  Referral  Referral Source: IP Handoff  Health Care  Home/Utilization  Preferred Hospital: [facility] [place]  [phone]  Living Situation  Current living arrangement:: I live in a private home with spouse  Type of residence:: Private home - stairs  Resources  Patient receiving home care services:: Yes  Skilled Home Care Services: Skilled Nursing;Physicial Therapy;Occupational Therapy;Home Health Aid  Community Resources: Home Care  Supplies used at home:: None  Equipment Currently Used at Home: shower chair;raised toilet seat  Referrals Placed: None  Employment Status: retired  Psychosocial  Mental health DX:: No  Mental health management concern (GOAL):: No  Chemical Dependency Status: No Current Concerns  Informal Support system:: Spouse;Family  Functional Status  Dependent ADLs:: Ambulation-walker  Dependent IADLs:: Cleaning;Cooking;Laundry;Shopping;Meal Preparation;Medication Management;Money Management;Transportation;Incontinence  Bed or wheelchair confined:: No  Mobility Status: Independent w/Device  Fallen 2 or more times in the past year?: Yes  Any fall with injury in the past year?: Yes  Advance Care Plan/Directive  Advanced Care Plans/Directives on file:: No  Advanced Care Plan/Directive Status: Not Applicable and     Care Mgmt Encounter Assessment  Preventative Care  Routine Health maintenance Reviewed: Due/Overdue   Clinic Utilization  Difficulty keeping appointments:: No  Compliance Concerns: No  No-Show Concerns: No  No PCP office visit in Past Year: No  Transportation  Transportation means:: Regular car;Family     Primary Diagnosis  Primary Diagnosis: Injury/Fall  Barriers in Communication  Other concerns:: Cognitive impairment  How confident are you filling out medical forms by yourself:: Not Assessed  Pain  Pain (GOAL):: No  Medication Review  Medication adherence problem (GOAL):: No  Knowledgeable about how to use meds:: No  Medication side effects suspected:: No  Diet/Exercise/Sleep  Diet:: Regular  Inadequate nutrition  (GOAL):: No  Tube Feeding: No  Inadequate activity/exercise (GOAL):: No  Significant changes in sleep pattern (GOAL): No    PLAN                                                      Outpatient Plan: Wife has asked if we could change his appointment to virtual as it is difficult to get the patient out to the house and into the car.  Patient does have home care coming up to see him and can draw labs if needed.      Future Appointments   Date Time Provider Department Center   9/10/2021  4:00 PM Jonas West MD [facility]         For any urgent concerns, please contact our 24 hour nurse triage line: [phone] ([phone])         Cassidy ARMENTA, RN, PHN, CCM  Primary Clinic Care Coordination    [facility]  Primary Care Clinics  [email or web address]   Office: [phone]  Employed by [facility]

## 2021-09-08 NOTE — PROGRESS NOTES
[facility] now requests orders and shares plan of care/discharge summaries for some patients through [employer].  Please REPLY TO THIS MESSAGE OR ROUTE BACK TO THE AUTHOR in order to give authorization for orders when needed.  This is considered a verbal order, you will still receive a faxed copy of orders for signature.  Thank you for your assistance in improving collaboration for our patients.    Notice of delay in start of home care services.  Due to paperwork issues patient was discharged on 9/4/21 but was not seen until today.  Call with questions    Fernanda Wade RN  [phone]

## 2021-09-09 ENCOUNTER — TELEPHONE (OUTPATIENT)
Dept: INTERNAL MEDICINE | Facility: CLINIC | Age: 76
End: 2021-09-09

## 2021-09-09 NOTE — TELEPHONE ENCOUNTER
Reason for Call: Request for an order or referral: Verbal Orders    Order or referral being requested: Amparo from [facility] for verbal orders. PT in home 2x a week for 5 weeks, OT and Skilled RN evaluation for recent hospital stay.     Date needed: at your convenience    Has the patient been seen by the PCP for this problem? NO    Additional comments: Patient also believes they will have lab work either tomorrow or early next week.     Phone number Patient can be reached at:  Other phone number:  [phone]    Best Time:  Any    Can we leave a detailed message on this number?  YES    Call taken on 9/9/2021 at 8:18 AM by Queenie Perez

## 2021-09-10 ENCOUNTER — VIRTUAL VISIT (OUTPATIENT)
Dept: INTERNAL MEDICINE | Facility: CLINIC | Age: 76
End: 2021-09-10
Payer: MEDICARE

## 2021-09-10 DIAGNOSIS — E87.1 HYPONATREMIA: Primary | ICD-10-CM
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
## 2021-09-10 DIAGNOSIS — I25.10 ASCVD (ARTERIOSCLEROTIC CARDIOVASCULAR DISEASE): ICD-10-CM

## 2021-09-10 DIAGNOSIS — S32.9XXD CLOSED NONDISPLACED FRACTURE OF PELVIS WITH ROUTINE HEALING, UNSPECIFIED PART OF PELVIS, SUBSEQUENT ENCOUNTER: ICD-10-CM

## 2021-09-10 DIAGNOSIS — E87.6 HYPOKALEMIA: ICD-10-CM

## 2021-09-10 PROCEDURE — 99495 TRANSJ CARE MGMT MOD F2F 14D: CPT | Performed by: INTERNAL MEDICINE

## 2021-09-10 NOTE — PROGRESS NOTES
"Michele is a 76 year old who is being evaluated via a billable video visit.      How would you like to obtain your AVS? MyChart  If the video visit is dropped, the invitation should be resent by: Send to e-mail at: [email or web address]  Will anyone else be joining your video visit? No    Video Start Time: 4:11 PM    Assessment & Plan     Hyponatremia  Patient who had hyponatremia down to 120. He had a lot of hyponatremia last summer. It would go up to 130 then back down to 120. He has been on fluid restriction of 1500 cc. Used to drink about 5 L a day. No new medications. Unfortunately with his leg pain he is unable to get in the clinic and this was a virtual visit. We will ask home care to draw the labs when they are out there next.  - Comprehensive metabolic panel (BMP + Alb, Alk Phos, ALT, AST, Total. Bili, TP); Future  - CBC with platelets and differential; Future    Fall, subsequent encounter  Fall twice injury to the right iliac wing was seen by orthopedics in the hospital hopefully physical therapy he seems to be getting better and will get walking more.  - CBC with platelets and differential; Future    Hypokalemia  We will kalemia we will follow his next lab results. Not on any diuretics.    ASCVD (arteriosclerotic cardiovascular disease)  Giorgi disease stable he does have some anemia we want to follow this to make sure that is not causing his weakness as well.  - CBC with platelets and differential; Future    Closed nondisplaced fracture of pelvis with routine healing, unspecified part of pelvis, subsequent encounter  Closed nondisplaced fracture of the pelvis as mentioned has seen orthopedics getting better with PT at home.               BMI:   Estimated body mass index is 26.46 kg/m  as calculated from the following:    Height as of 8/31/21: 1.727 m (5' 8\").    Weight as of 8/31/21: 78.9 kg (174 lb).           No follow-ups on file.    Jonas West MD  [facility]    Subjective "   Michele is a 76 year old who presents for the following health issues  accompanied by his spouse:    [facility]       Hospital Follow-up Visit:    Hospital/Nursing Home/IP Rehab Facility: [facility]  Date of Admission: 8/31/21  Date of Discharge: 9/4/21  Reason(s) for Admission: Hyponatremia      Was your hospitalization related to COVID-19? No   Problems taking medications regularly:  None  Medication changes since discharge: None  Problems adhering to non-medication therapy:  None    Summary of hospitalization:  [facility] discharge summary reviewed  Diagnostic Tests/Treatments reviewed.  Follow up needed: none  Other Healthcare Providers Involved in Patient s Care:         Homecare  Update since discharge: improved. Post Discharge Medication Reconciliation: discharge medications reconciled and changed, per note/orders.  Plan of care communicated with patient and family          He is at home now.  Had two falls and had pain in the right iliac bone so no PT and limited movement. Can get comfortable in bed but hard to move around.  Walk with the walker at home, homecare has PT and OT.     Sodium came up with IVF, 1500 ml a day on discharge. He was drinking over 5000ml a day before.     Getting better, getting up, urinating while standing up.     Eating ok, having BM, was constipated, urinating well.     Past Medical History:   Diagnosis Date     Carotid stenosis     right endartectomy     Chronic kidney disease     stage 3     Coronary artery disease 3-2014    CABG x6     CVA (cerebral infarction)     balance problems, mild     Elevated CK      Esophageal reflux      Hypercholesteremia      Hyponatremia 8/31/2021     Nonsenile cataract      Other and unspecified hyperlipidemia      PAD (peripheral artery disease) (H)      Rhabdomyolysis 2010     Unspecified essential hypertension      Current Outpatient Medications   Medication     acetaminophen (TYLENOL) 325 MG tablet      alirocumab (PRALUENT) 150 MG/ML injectable syringe     amitriptyline (ELAVIL) 25 MG tablet     amLODIPine (NORVASC) 10 MG tablet     aspirin (ASPIRIN) 81 MG EC tablet     carvedilol (COREG) 25 MG tablet     cilostazol (PLETAL) 100 MG tablet     gabapentin (NEURONTIN) 300 MG capsule     isosorbide mononitrate (IMDUR) 60 MG 24 hr tablet     losartan (COZAAR) 100 MG tablet     melatonin 3 MG tablet     omeprazole (PRILOSEC) 40 MG DR capsule     ondansetron (ZOFRAN) 4 MG tablet     potassium chloride ER (KLOR-CON M) 10 MEQ CR tablet     albuterol (PROAIR HFA/PROVENTIL HFA/VENTOLIN HFA) 108 (90 Base) MCG/ACT inhaler     Lidocaine (LIDOCARE) 4 % Patch     No current facility-administered medications for this visit.     Social History     Tobacco Use     Smoking status: Former Smoker     Packs/day: 2.50     Years: 20.00     Pack years: 50.00     Types: Cigarettes     Quit date: 2000     Years since quittin.7     Smokeless tobacco: Never Used   Substance Use Topics     Alcohol use: No     Alcohol/week: 0.0 standard drinks     Drug use: No         Review of Systems   Constitutional, HEENT, cardiovascular, pulmonary, gi and gu systems are negative, except as otherwise noted.      Objective           Vitals:  No vitals were obtained today due to virtual visit.    Physical Exam   GENERAL: Healthy, alert and no distress  EYES: Eyes grossly normal to inspection.  No discharge or erythema, or obvious scleral/conjunctival abnormalities.  RESP: No audible wheeze, cough, or visible cyanosis.  No visible retractions or increased work of breathing.    SKIN: Visible skin clear. No significant rash, abnormal pigmentation or lesions.  SKIN: bruise beneath the right eye.   NEURO: Cranial nerves grossly intact.  Mentation and speech appropriate for age.  PSYCH: Mentation appears normal, affect normal/bright, judgement and insight intact, normal speech and appearance well-groomed.                Video-Visit Details    Type of  service:  Video Visit    Video End Time:4:30 PM    Originating Location (pt. Location): Home    Distant Location (provider location):  [facility]     Platform used for Video Visit: Desiree

## 2021-09-14 ENCOUNTER — TELEPHONE (OUTPATIENT)
Dept: INTERNAL MEDICINE | Facility: CLINIC | Age: 76
End: 2021-09-14

## 2021-09-14 NOTE — TELEPHONE ENCOUNTER
Reason for Call:  Other call back    Detailed comments: Amparo from [facility] called and she needs a nurse to call her and give verbal orders for the lab orders that are in the patients chart so she can draw them. Thank you. You can call her at [phone]    Phone Number Patient can be reached at: Other phone number:  [phone]    Best Time: Any    Can we leave a detailed message on this number? YES    Call taken on 9/14/2021 at 11:55 AM by Queenie Perez

## 2021-09-14 NOTE — TELEPHONE ENCOUNTER
Called and spoke to Amparo with homecare. They are going to go out and draw labs for patient. He has 2 labs ordered and homecare wants to review what was ordered. RN reviewed orders. They will call with any questions or concerns.     KATHI Stoll, RN  [facility]

## 2021-09-15 ENCOUNTER — TELEPHONE (OUTPATIENT)
Dept: INTERNAL MEDICINE | Facility: CLINIC | Age: 76
End: 2021-09-15

## 2021-09-15 DIAGNOSIS — E87.5 HYPERKALEMIA: Primary | ICD-10-CM

## 2021-09-15 PROCEDURE — 82040 ASSAY OF SERUM ALBUMIN: CPT | Performed by: INTERNAL MEDICINE

## 2021-09-15 PROCEDURE — 82247 BILIRUBIN TOTAL: CPT | Performed by: INTERNAL MEDICINE

## 2021-09-15 PROCEDURE — 85004 AUTOMATED DIFF WBC COUNT: CPT | Performed by: INTERNAL MEDICINE

## 2021-09-15 NOTE — TELEPHONE ENCOUNTER
----- Message from Jonas West MD sent at 9/15/2021  3:13 PM CDT -----  Please see if home care konstantin this lab and if they can repeat a basic panel.  His potassium was high his creatinine was high.  He should push fluids and repeat it tomorrow.

## 2021-09-15 NOTE — TELEPHONE ENCOUNTER
Dr. West wants patient to increase fluids to 2500 ml, do not take evening potassium tabs,  then only take three in the am and pm for tomorrow and recheck basic panel on Friday.  Order will be faxed to home care at [phone].  Home care and pt's wife informed of new orders/plan.  Order pending

## 2021-09-17 ENCOUNTER — LAB (OUTPATIENT)
Dept: LAB | Facility: CLINIC | Age: 76
End: 2021-09-17
Payer: MEDICARE

## 2021-09-17 ENCOUNTER — TELEPHONE (OUTPATIENT)
Dept: INTERNAL MEDICINE | Facility: CLINIC | Age: 76
End: 2021-09-17

## 2021-09-17 DIAGNOSIS — E87.1 HYPONATREMIA: Primary | ICD-10-CM

## 2021-09-17 DIAGNOSIS — E87.1 HYPONATREMIA: ICD-10-CM

## 2021-09-17 DIAGNOSIS — I25.10 ASCVD (ARTERIOSCLEROTIC CARDIOVASCULAR DISEASE): ICD-10-CM

## 2021-09-17 DIAGNOSIS — E87.5 HYPERKALEMIA: ICD-10-CM
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
## 2021-09-17 LAB
ALBUMIN SERPL-MCNC: 3.4 G/DL (ref 3.4–5)
ALP SERPL-CCNC: 157 U/L (ref 40–150)
ALT SERPL W P-5'-P-CCNC: 21 U/L (ref 0–70)
ANION GAP SERPL CALCULATED.3IONS-SCNC: 4 MMOL/L (ref 3–14)
AST SERPL W P-5'-P-CCNC: 16 U/L (ref 0–45)
BASOPHILS # BLD AUTO: 0 10E3/UL (ref 0–0.2)
BASOPHILS NFR BLD AUTO: 0 %
BILIRUB SERPL-MCNC: 0.4 MG/DL (ref 0.2–1.3)
BUN SERPL-MCNC: 28 MG/DL (ref 7–30)
CALCIUM SERPL-MCNC: 9.1 MG/DL (ref 8.5–10.1)
CHLORIDE BLD-SCNC: 106 MMOL/L (ref 94–109)
CO2 SERPL-SCNC: 23 MMOL/L (ref 20–32)
CREAT SERPL-MCNC: 2.02 MG/DL (ref 0.66–1.25)
EOSINOPHIL # BLD AUTO: 0.3 10E3/UL (ref 0–0.7)
EOSINOPHIL NFR BLD AUTO: 4 %
ERYTHROCYTE [DISTWIDTH] IN BLOOD BY AUTOMATED COUNT: 13.5 % (ref 10–15)
GFR SERPL CREATININE-BSD FRML MDRD: 31 ML/MIN/1.73M2
GLUCOSE BLD-MCNC: 112 MG/DL (ref 70–99)
HCT VFR BLD AUTO: 29.7 % (ref 40–53)
HGB BLD-MCNC: 10 G/DL (ref 13.3–17.7)
IMM GRANULOCYTES # BLD: 0 10E3/UL
IMM GRANULOCYTES NFR BLD: 0 %
LYMPHOCYTES # BLD AUTO: 1.7 10E3/UL (ref 0.8–5.3)
LYMPHOCYTES NFR BLD AUTO: 25 %
MCH RBC QN AUTO: 32.9 PG (ref 26.5–33)
MCHC RBC AUTO-ENTMCNC: 33.7 G/DL (ref 31.5–36.5)
MCV RBC AUTO: 98 FL (ref 78–100)
MONOCYTES # BLD AUTO: 0.6 10E3/UL (ref 0–1.3)
MONOCYTES NFR BLD AUTO: 8 %
NEUTROPHILS # BLD AUTO: 4.3 10E3/UL (ref 1.6–8.3)
NEUTROPHILS NFR BLD AUTO: 63 %
NRBC # BLD AUTO: 0 10E3/UL
NRBC BLD AUTO-RTO: 0 /100
PLATELET # BLD AUTO: 426 10E3/UL (ref 150–450)
POTASSIUM BLD-SCNC: 5.3 MMOL/L (ref 3.4–5.3)
PROT SERPL-MCNC: 7.3 G/DL (ref 6.8–8.8)
RBC # BLD AUTO: 3.04 10E6/UL (ref 4.4–5.9)
SODIUM SERPL-SCNC: 133 MMOL/L (ref 133–144)
WBC # BLD AUTO: 6.8 10E3/UL (ref 4–11)

## 2021-09-17 PROCEDURE — 80053 COMPREHEN METABOLIC PANEL: CPT

## 2021-09-17 PROCEDURE — 36415 COLL VENOUS BLD VENIPUNCTURE: CPT

## 2021-09-17 PROCEDURE — 85025 COMPLETE CBC W/AUTO DIFF WBC: CPT

## 2021-09-17 NOTE — TELEPHONE ENCOUNTER
Spoke to wife with [facility] she had already read the mychart but, she would like to know when should they recheck? She noticed his sodium is dropping. She is concerned about this.   Please advise.  Christina Bermeo CMA on 9/17/2021 at 5:02 PM

## 2021-09-17 NOTE — TELEPHONE ENCOUNTER
----- Message from Jonas West MD sent at 9/17/2021  2:44 PM CDT -----  Please call him and make sure they know his potassium is better, kidneys are still a little off so drink 2 to 2.5 liters a day.  Keep potassium supplement at 3 pills twice a day.

## 2021-09-23 ENCOUNTER — LAB (OUTPATIENT)
Dept: LAB | Facility: CLINIC | Age: 76
End: 2021-09-23
Payer: MEDICARE

## 2021-09-23 DIAGNOSIS — E87.1 HYPONATREMIA: ICD-10-CM

## 2021-09-23 LAB
ANION GAP SERPL CALCULATED.3IONS-SCNC: 5 MMOL/L (ref 3–14)
BUN SERPL-MCNC: 20 MG/DL (ref 7–30)
CALCIUM SERPL-MCNC: 8.4 MG/DL (ref 8.5–10.1)
CHLORIDE BLD-SCNC: 101 MMOL/L (ref 94–109)
CO2 SERPL-SCNC: 24 MMOL/L (ref 20–32)
CREAT SERPL-MCNC: 1.48 MG/DL (ref 0.66–1.25)
GFR SERPL CREATININE-BSD FRML MDRD: 46 ML/MIN/1.73M2
GLUCOSE BLD-MCNC: 117 MG/DL (ref 70–99)
POTASSIUM BLD-SCNC: 5.1 MMOL/L (ref 3.4–5.3)
SODIUM SERPL-SCNC: 130 MMOL/L (ref 133–144)

## 2021-09-23 PROCEDURE — 36415 COLL VENOUS BLD VENIPUNCTURE: CPT

## 2021-09-23 PROCEDURE — 80048 BASIC METABOLIC PNL TOTAL CA: CPT

## 2021-09-23 NOTE — ED TRIAGE NOTES
Pt comes in via EMS for complaints of L sided weakness that started around 1345 today. Pt denies headache, n/v. Code stroke called. Patient's airway, breathing, circulation, and disability/mental status (ABCDs) intact/WDL during triage.    
Yes

## 2021-09-27 ENCOUNTER — MYC MEDICAL ADVICE (OUTPATIENT)
Dept: INTERNAL MEDICINE | Facility: CLINIC | Age: 76
End: 2021-09-27

## 2021-09-27 DIAGNOSIS — I73.9 PAD (PERIPHERAL ARTERY DISEASE) (H): Primary | ICD-10-CM

## 2021-09-27 RX ORDER — CILOSTAZOL 100 MG/1
100 TABLET ORAL 2 TIMES DAILY
Qty: 180 TABLET | Refills: 1 | Status: SHIPPED | OUTPATIENT
Start: 2021-09-27 | End: 2022-03-21

## 2021-09-28 ENCOUNTER — MYC MEDICAL ADVICE (OUTPATIENT)
Dept: INTERNAL MEDICINE | Facility: CLINIC | Age: 76
End: 2021-09-28

## 2021-09-28 ENCOUNTER — DOCUMENTATION ONLY (OUTPATIENT)
Dept: LAB | Facility: CLINIC | Age: 76
End: 2021-09-28

## 2021-09-28 DIAGNOSIS — E87.1 HYPONATREMIA: Primary | ICD-10-CM

## 2021-09-28 NOTE — CONFIDENTIAL NOTE
Dr. West , this patient is asking for lab orders. K+ and NA last checked 9/23/21.  Component      Latest Ref Rng & Units 9/23/2021   Sodium      133 - 144 mmol/L 130 (L)   Potassium      3.4 - 5.3 mmol/L 5.1   Chloride      94 - 109 mmol/L 101   Carbon Dioxide      20 - 32 mmol/L 24   Anion Gap      3 - 14 mmol/L 5   Urea Nitrogen      7 - 30 mg/dL 20   Creatinine      0.66 - 1.25 mg/dL 1.48 (H)   Calcium      8.5 - 10.1 mg/dL 8.4 (L)   Glucose      70 - 99 mg/dL 117 (H)   GFR Estimate      >60 mL/min/1.73m2 46 (L)   Reed,RN

## 2021-09-29 ENCOUNTER — LAB (OUTPATIENT)
Dept: LAB | Facility: CLINIC | Age: 76
End: 2021-09-29
Payer: MEDICARE

## 2021-09-29 DIAGNOSIS — E87.1 HYPONATREMIA: ICD-10-CM

## 2021-09-29 LAB
ANION GAP SERPL CALCULATED.3IONS-SCNC: 4 MMOL/L (ref 3–14)
BUN SERPL-MCNC: 27 MG/DL (ref 7–30)
CALCIUM SERPL-MCNC: 8.7 MG/DL (ref 8.5–10.1)
CHLORIDE BLD-SCNC: 108 MMOL/L (ref 94–109)
CO2 SERPL-SCNC: 20 MMOL/L (ref 20–32)
CREAT SERPL-MCNC: 1.6 MG/DL (ref 0.66–1.25)
GFR SERPL CREATININE-BSD FRML MDRD: 42 ML/MIN/1.73M2
GLUCOSE BLD-MCNC: 124 MG/DL (ref 70–99)
POTASSIUM BLD-SCNC: 4.8 MMOL/L (ref 3.4–5.3)
SODIUM SERPL-SCNC: 132 MMOL/L (ref 133–144)

## 2021-09-29 PROCEDURE — 36415 COLL VENOUS BLD VENIPUNCTURE: CPT

## 2021-09-29 PROCEDURE — 80048 BASIC METABOLIC PNL TOTAL CA: CPT

## 2021-09-29 NOTE — PROGRESS NOTES
Michele KAYLA Vance has an upcoming lab appointment:    Future Appointments   Date Time Provider Department Center   9/29/2021 12:30 PM [facility]     Patient is scheduled for the following lab(s):     Patient either has no future order, or has Health Maintenance labs due. Please review and place either future orders or HMPO (Review of Health Maintenance Protocol Orders), as appropriate.    Health Maintenance Due   Topic     ANNUAL REVIEW OF HM ORDERS      MICROALBUMIN      LIPID      Adela Zarco

## 2021-10-02 ENCOUNTER — HEALTH MAINTENANCE LETTER (OUTPATIENT)
Age: 76
End: 2021-10-02

## 2021-10-06 ENCOUNTER — IMMUNIZATION (OUTPATIENT)
Dept: FAMILY MEDICINE | Facility: CLINIC | Age: 76
End: 2021-10-06
Payer: MEDICARE

## 2021-10-06 DIAGNOSIS — Z23 NEED FOR PROPHYLACTIC VACCINATION AND INOCULATION AGAINST INFLUENZA: Primary | ICD-10-CM

## 2021-10-06 PROCEDURE — 99207 PR NO CHARGE NURSE ONLY: CPT

## 2021-10-06 PROCEDURE — G0008 ADMIN INFLUENZA VIRUS VAC: HCPCS

## 2021-10-06 PROCEDURE — 90662 IIV NO PRSV INCREASED AG IM: CPT

## 2021-10-09 ENCOUNTER — TELEPHONE (OUTPATIENT)
Dept: EMERGENCY MEDICINE | Facility: CLINIC | Age: 76
End: 2021-10-09

## 2021-10-09 ENCOUNTER — HOSPITAL ENCOUNTER (EMERGENCY)
Facility: CLINIC | Age: 76
Discharge: HOME | End: 2021-10-09
Attending: FAMILY MEDICINE | Admitting: FAMILY MEDICINE
Payer: MEDICARE

## 2021-10-09 VITALS
BODY MASS INDEX: 24.77 KG/M2 | OXYGEN SATURATION: 99 % | HEART RATE: 70 BPM | TEMPERATURE: 97.8 F | SYSTOLIC BLOOD PRESSURE: 122 MMHG | RESPIRATION RATE: 18 BRPM | DIASTOLIC BLOOD PRESSURE: 71 MMHG | WEIGHT: 162.9 LBS

## 2021-10-09 DIAGNOSIS — L02.414 CUTANEOUS ABSCESS OF LEFT UPPER EXTREMITY: ICD-10-CM

## 2021-10-09 PROCEDURE — 99283 EMERGENCY DEPT VISIT LOW MDM: CPT | Mod: 25 | Performed by: FAMILY MEDICINE

## 2021-10-09 PROCEDURE — 99284 EMERGENCY DEPT VISIT MOD MDM: CPT | Mod: 25 | Performed by: FAMILY MEDICINE

## 2021-10-09 PROCEDURE — 10060 I&D ABSCESS SIMPLE/SINGLE: CPT | Performed by: FAMILY MEDICINE

## 2021-10-09 PROCEDURE — 87205 SMEAR GRAM STAIN: CPT | Performed by: FAMILY MEDICINE

## 2021-10-09 PROCEDURE — 87186 SC STD MICRODIL/AGAR DIL: CPT | Performed by: FAMILY MEDICINE

## 2021-10-09 NOTE — ED PROVIDER NOTES
"  History     Chief Complaint   Patient presents with     Wound Check     HPI  Michele Vance is a 76 year old male who presents with concerns of increasing pain and some drainage from a nonhealing wound on his left arm.  Patient originally had the injury after a fall about a month ago.  Has been seeing wound care and they discharged him just a week or so ago.  Over the last few days they have noticed some increasing swelling and some drainage from the area.  It started getting more painful here last night.  Denies any fevers or chills.  Denies any new trauma to the area.  Nothing makes the pain better or worse.    Allergies:  Allergies   Allergen Reactions     Statins [Hmg-Coa-R Inhibitors] Other (See Comments)     Rhabdo  Same as \"statins\"     Gemfibrozil      Resting thigh-calf pain     Sulfa Drugs      Reacted as a child     Zetia [Ezetimibe]      Muscle cramping       Problem List:    Patient Active Problem List    Diagnosis Date Noted     Abnormal gait 08/31/2021     Priority: Medium     Falls frequently 08/31/2021     Priority: Medium     Facial injury, initial encounter 08/31/2021     Priority: Medium     Acute midline low back pain without sciatica 08/31/2021     Priority: Medium     Hyponatremia 08/31/2021     Priority: Medium     Biliary colic 07/08/2021     Priority: Medium     Added automatically from request for surgery [id number]       H/O carotid angioplasty 09/04/2020     Priority: Medium     Status post balloon angioplasty of pulmonary artery branches 09/03/2020     Priority: Medium     Carotid stenosis      Priority: Medium     right endartectomy       Essential hypertension with goal blood pressure less than 140/90 06/02/2016     Priority: Medium     Chronic constipation 12/24/2014     Priority: Medium     Irritable bowel syndrome 12/24/2014     Priority: Medium     Carotid artery occlusion with cerebral infarction (H) 08/06/2014     Priority: Medium     Dizziness 08/06/2014     Priority: Medium     " Panlobular emphysema (H) 06/04/2014     Priority: Medium     Cerebral infarction due to occlusion or stenosis of carotid artery 05/07/2014     Priority: Medium     Problem list name updated by automated process. Provider to review       Stroke, embolic (H) 04/25/2014     Priority: Medium     Insomnia 03/12/2014     Priority: Medium     Nutritional deficiency 03/12/2014     Priority: Medium     Pain 03/12/2014     Priority: Medium     Urinary retention 03/12/2014     Priority: Medium     S/P CABG x 6 03/06/2014     Priority: Medium     Left main coronary artery disease 03/04/2014     Priority: Medium     Arthritis 01/14/2013     Priority: Medium     Carotid bruit 01/14/2013     Priority: Medium     Carotid stenosis, bilateral 01/14/2013     Priority: Medium     Anemia 04/06/2012     Priority: Medium     CKD (chronic kidney disease) stage 3, GFR 30-59 ml/min (H) 04/06/2012     Priority: Medium     Advanced directives, counseling/discussion 04/05/2012     Priority: Medium     Impingement syndrome, shoulder, right 03/16/2011     Priority: Medium     Hypertension 11/22/2010     Priority: Medium     Hyperlipidemia LDL goal <130 11/22/2010     Priority: Medium     Myalgia and myositis 11/22/2010     Priority: Medium     GERD (gastroesophageal reflux disease) 11/22/2010     Priority: Medium        Past Medical History:    Past Medical History:   Diagnosis Date     Carotid stenosis      Chronic kidney disease      Coronary artery disease 3-2014     CVA (cerebral infarction)      Elevated CK      Esophageal reflux      Hypercholesteremia      Hyponatremia 8/31/2021     Nonsenile cataract      Other and unspecified hyperlipidemia      PAD (peripheral artery disease) (H)      Rhabdomyolysis 2010     Unspecified essential hypertension        Past Surgical History:    Past Surgical History:   Procedure Laterality Date     APPENDECTOMY  1974     BYPASS GRAFT ARTERY CORONARY  3/5/2014    Procedure: BYPASS GRAFT ARTERY CORONARY;   Median Sternotomy, Coronary Artery Bypass Graft X6 used Left internal mammary artery, Left and Right Greater Saphenous vein, on Pump oxygenator.;  Surgeon: Tim Montanez MD;  Location: [facility] OR     CATARACT IOL, RT/LT Bilateral 2008    in [place]     cataracts       COLONOSCOPY  12/12/02    [facility]     COLONOSCOPY N/A 10/7/2014    Procedure: COMBINED COLONOSCOPY, SINGLE OR MULTIPLE BIOPSY/POLYPECTOMY BY BIOPSY;  Surgeon: Micheal Mora MD;  Location: [facility] GI     CV LOWER EXTREMITY ANGIOGRAM BILATERAL Bilateral 9/9/2019    Procedure: Lower Extremity Angiogram Bilateral;  Surgeon: Julio Oropeza MD;  Location: [facility] CARDIAC CATH LAB     DENTAL SURGERY      TMJ, implants     ENDARTERECTOMY CAROTID      right carotid stent     ESOPHAGOSCOPY, GASTROSCOPY, DUODENOSCOPY (EGD), COMBINED Left 10/7/2014    Procedure: COMBINED ESOPHAGOSCOPY, GASTROSCOPY, DUODENOSCOPY (EGD), BIOPSY SINGLE OR MULTIPLE;  Surgeon: Micheal Mora MD;  Location: [facility] GI     ESOPHAGOSCOPY, GASTROSCOPY, DUODENOSCOPY (EGD), COMBINED Left 10/7/2014    Procedure: COMBINED ESOPHAGOSCOPY, GASTROSCOPY, DUODENOSCOPY (EGD), REMOVE FOREIGN BODY;  Surgeon: Micheal Mora MD;  Location: [facility] GI     HC CAPSULE ENDOSCOPY N/A 10/7/2014    Procedure: CAPSULE/PILL CAM ENDOSCOPY;  Surgeon: Micheal Mora MD;  Location: [facility] GI     INJECT EPIDURAL LUMBAR Right 5/8/2017    Procedure: INJECT EPIDURAL LUMBAR;  Lumbar transforaminal Epidural Steroid Injection right lumbar 4-5, and right  lumbar 5-Sacral 1;  Surgeon: Anthony Gonzalez MD;  Location: [facility]     INJECT JOINT SACROILIAC Bilateral 8/28/2017    Procedure: INJECT JOINT SACROILIAC;  sacroiliac joint injection bilateral;  Surgeon: Anthony Gonzalez MD;  Location: [facility] OR     IR CAROTID CEREBRAL ANGIOGRAM BILATERAL  9/3/2020     KNEE SURGERY  1976    left knee reconstruction     LAPAROSCOPIC CHOLECYSTECTOMY N/A 7/16/2021    Procedure: CHOLECYSTECTOMY, LAPAROSCOPIC;  Surgeon: Supriya  MD Anthony;  Location:  OR     [facility]      both eyes     RELEASE CARPAL TUNNEL  2011    RELEASE CARPAL TUNNEL performed by JO MADRID at  OR     RELEASE CARPAL TUNNEL  2011    RELEASE CARPAL TUNNEL performed by JO MADRID at  OR     [facility] UGI ENDOSCOPY, SIMPLE EXAM  99    [facility]       Family History:    Family History   Problem Relation Age of Onset     Hypertension Mother      Eye Disorder Mother      Cerebrovascular Disease Father      Heart Disease Father      Lipids Father      Obesity Father      Cancer Sister      Heart Disease Sister      Glaucoma No family hx of      Macular Degeneration No family hx of      Kidney Disease No family hx of        Social History:  Marital Status:   [2]  Social History     Tobacco Use     Smoking status: Former Smoker     Packs/day: 2.50     Years: 20.00     Pack years: 50.00     Types: Cigarettes     Quit date: 2000     Years since quittin.7     Smokeless tobacco: Never Used   Substance Use Topics     Alcohol use: No     Alcohol/week: 0.0 standard drinks     Drug use: No        Medications:    acetaminophen (TYLENOL) 325 MG tablet  albuterol (PROAIR HFA/PROVENTIL HFA/VENTOLIN HFA) 108 (90 Base) MCG/ACT inhaler  alirocumab (PRALUENT) 150 MG/ML injectable syringe  amitriptyline (ELAVIL) 25 MG tablet  amLODIPine (NORVASC) 10 MG tablet  aspirin (ASPIRIN) 81 MG EC tablet  carvedilol (COREG) 25 MG tablet  cilostazol (PLETAL) 100 MG tablet  gabapentin (NEURONTIN) 300 MG capsule  isosorbide mononitrate (IMDUR) 60 MG 24 hr tablet  Lidocaine (LIDOCARE) 4 % Patch  losartan (COZAAR) 100 MG tablet  melatonin 3 MG tablet  omeprazole (PRILOSEC) 40 MG DR capsule  ondansetron (ZOFRAN) 4 MG tablet  potassium chloride ER (KLOR-CON M) 10 MEQ CR tablet          Review of Systems   All other systems reviewed and are negative.      Physical Exam   BP: 122/71  Pulse: 70  Temp: 97.8  F (36.6  C)  Resp: 18  Weight: 73.9 kg (162 lb  14.4 oz)  SpO2: 99 %      Physical Exam  Vitals and nursing note reviewed.   Constitutional:       General: He is not in acute distress.     Appearance: Normal appearance. He is not ill-appearing.   Musculoskeletal:      Cervical back: Normal range of motion.   Skin:     Capillary Refill: Capillary refill takes less than 2 seconds.      Findings: No rash.      Comments: There is a 3 cm bruised raised fluctuant mass on the left forearm.  There is some purulent drainage coming from the area.  This is on the left forearm.   Neurological:      Mental Status: He is alert.         ED [facility]    PROCEDURE: -Incision/Drainage    Date/Time: 10/9/2021 10:04 AM  Performed by: Artem Dyer MD  Authorized by: Artem Dyer MD       LOCATION:      Type:  Abscess    PRE-PROCEDURE DETAILS:     Skin preparation:  Betadine    ANESTHESIA (see MAR for exact dosages):     Anesthesia method:  Local infiltration    Local anesthetic:  Lidocaine 1% WITH epi    PROCEDURE TYPE:     Complexity:  Simple    PROCEDURE DETAILS:     Needle aspiration: no      Incision types:  Single straight    Incision depth:  Dermal    Scalpel blade:  11    Wound management:  Probed and deloculated    Drainage:  Purulent and bloody    Drainage amount:  Moderate    Wound treatment:  Wound left open    Packing materials:  1/4 in gauze  Post-procedure details:     PROCEDURE   Patient Tolerance:  Patient tolerated the procedure well with no immediate complications                 No results found for this or any previous visit (from the past 24 hour(s)).    Medications - No data to display     Incision and drainage was done on the wound as noted above.  Wound culture was sent.  I do not see any indication to do any antibiotics, now this open this may allow it to heal up better.  I think it sealed up and then just an abscess started to develop.  We will have patient get seen on Monday for a wound  recheck.  Patient will be discharged at this time.    Assessments & Plan (with Medical Decision Making)  Cutaneous abscess of the left upper extremity     I have reviewed the nursing notes.    I have reviewed the findings, diagnosis, plan and need for follow up with the patient.      New Prescriptions    No medications on file       Final diagnoses:   Cutaneous abscess of left upper extremity       10/9/2021   [facility] EMERGENCY DEPT     Artem Dyer MD  10/09/21 1009

## 2021-10-09 NOTE — TELEPHONE ENCOUNTER
Patient needs to be worked in to Mondays schedule with any provider for a wound check per Dr. Dyer

## 2021-10-11 ENCOUNTER — OFFICE VISIT (OUTPATIENT)
Dept: INTERNAL MEDICINE | Facility: CLINIC | Age: 76
End: 2021-10-11
Payer: MEDICARE

## 2021-10-11 VITALS
HEIGHT: 68 IN | SYSTOLIC BLOOD PRESSURE: 112 MMHG | OXYGEN SATURATION: 99 % | BODY MASS INDEX: 24.69 KG/M2 | HEART RATE: 71 BPM | TEMPERATURE: 97.1 F | DIASTOLIC BLOOD PRESSURE: 50 MMHG | RESPIRATION RATE: 16 BRPM | WEIGHT: 162.9 LBS

## 2021-10-11 DIAGNOSIS — E87.1 HYPONATREMIA: ICD-10-CM

## 2021-10-11 DIAGNOSIS — Z23 HIGH PRIORITY FOR 2019-NCOV VACCINE: ICD-10-CM

## 2021-10-11 DIAGNOSIS — E87.6 HYPOKALEMIA: ICD-10-CM

## 2021-10-11 DIAGNOSIS — T14.8XXA NON-HEALING NON-SURGICAL WOUND: Primary | ICD-10-CM

## 2021-10-11 PROCEDURE — 91300 COVID-19,PF,PFIZER (12+ YRS): CPT | Performed by: INTERNAL MEDICINE

## 2021-10-11 PROCEDURE — 0003A COVID-19,PF,PFIZER (12+ YRS): CPT | Performed by: INTERNAL MEDICINE

## 2021-10-11 PROCEDURE — 99214 OFFICE O/P EST MOD 30 MIN: CPT | Mod: 25 | Performed by: INTERNAL MEDICINE

## 2021-10-11 RX ORDER — POTASSIUM CHLORIDE 750 MG/1
30 TABLET, EXTENDED RELEASE ORAL 2 TIMES DAILY
Qty: 300 TABLET | Refills: 0 | COMMUNITY
Start: 2021-10-11 | End: 2021-12-02

## 2021-10-11 ASSESSMENT — PAIN SCALES - GENERAL: PAINLEVEL: NO PAIN (0)

## 2021-10-11 ASSESSMENT — MIFFLIN-ST. JEOR: SCORE: 1448.41

## 2021-10-11 NOTE — PROGRESS NOTES
Assessment & Plan     Non-healing non-surgical wound  Patient has a nonsurgical wound on the left arm, initially was thought to be a skin tear however I am not sure of this.  Did not heal for a month and a half, was infected the other day in the ER had it lanced and drained it is growing gram-positive cocci.  There is no surrounding infection at this time we will watch it and if it continues to not heal I think it may have to be removed completely with surgical excision.    Hypokalemia  Hypokalemia we adjusted his potassium to be 30 mEq twice a day and this has been stable.  - potassium chloride ER (KLOR-CON M) 10 MEQ CR tablet; Take 3 tablets (30 mEq) by mouth 2 times daily  - Basic metabolic panel  (Ca, Cl, CO2, Creat, Gluc, K, Na, BUN); Future    Hyponatremia  3 of hyponatremia he is only drinking 2 L a day instead of 5 L and is doing better.  - Basic metabolic panel  (Ca, Cl, CO2, Creat, Gluc, K, Na, BUN); Future                 Return in about 1 week (around 10/18/2021) for If not better with this diagnosis.    Jonas West MD  [facility] YURIDIA Bautista is a 76 year old who presents for the following health issues  accompanied by his spouse:    [facility]       Hospital Follow-up Visit:    Hospital/Nursing Home/IP Rehab Facility: [facility]  Date of Admission: 10/09/2021  Date of Discharge: 10/09/2021  Reason(s) for Admission: Wound on Left forearm      Was your hospitalization related to COVID-19? No   Problems taking medications regularly:  None  Medication changes since discharge: Potassium dosage change  Problems adhering to non-medication therapy:  None    Summary of hospitalization:  ER note reviewed   Diagnostic Tests/Treatments reviewed.  Follow up needed:   Other Healthcare Providers Involved in Patient s Care:           Update since discharge:  Post Discharge Medication Reconciliation: discharge medications reconciled and changed, per  note/orders.  Plan of care communicated with patient and family          Skin tear from a fall a month ago, was getting better but then got infected somehow.  Abscess and was drained two days ago.      Potassium stable and drinking 2000 ml a day.  Labs have been good.     Past Medical History:   Diagnosis Date     Carotid stenosis     right endartectomy     Chronic kidney disease     stage 3     Coronary artery disease 3-    CABG x6     CVA (cerebral infarction)     balance problems, mild     Elevated CK      Esophageal reflux      Hypercholesteremia      Hyponatremia 2021     Nonsenile cataract      Other and unspecified hyperlipidemia      PAD (peripheral artery disease) (H)      Rhabdomyolysis      Unspecified essential hypertension      Current Outpatient Medications   Medication     alirocumab (PRALUENT) 150 MG/ML injectable syringe     amitriptyline (ELAVIL) 25 MG tablet     amLODIPine (NORVASC) 10 MG tablet     aspirin (ASPIRIN) 81 MG EC tablet     carvedilol (COREG) 25 MG tablet     cilostazol (PLETAL) 100 MG tablet     gabapentin (NEURONTIN) 300 MG capsule     isosorbide mononitrate (IMDUR) 60 MG 24 hr tablet     losartan (COZAAR) 100 MG tablet     melatonin 3 MG tablet     omeprazole (PRILOSEC) 40 MG DR capsule     ondansetron (ZOFRAN) 4 MG tablet     potassium chloride ER (KLOR-CON M) 10 MEQ CR tablet     acetaminophen (TYLENOL) 325 MG tablet     albuterol (PROAIR HFA/PROVENTIL HFA/VENTOLIN HFA) 108 (90 Base) MCG/ACT inhaler     Lidocaine (LIDOCARE) 4 % Patch     No current facility-administered medications for this visit.     Social History     Tobacco Use     Smoking status: Former Smoker     Packs/day: 2.50     Years: 20.00     Pack years: 50.00     Types: Cigarettes     Quit date: 2000     Years since quittin.7     Smokeless tobacco: Never Used   Vaping Use     Vaping Use: Never used   Substance Use Topics     Alcohol use: No     Alcohol/week: 0.0 standard drinks     Drug use: No  "        Review of Systems         Objective    /50   Pulse 71   Temp 97.1  F (36.2  C) (Temporal)   Resp 16   Ht 1.727 m (5' 8\")   Wt 73.9 kg (162 lb 14.4 oz)   SpO2 99%   BMI 24.77 kg/m    Body mass index is 24.77 kg/m .  Physical Exam   Acute distress  Left arm has no sign of infection on the forearm there is an area that is a crater about a centimeter deep the edges I am concerned could have some skin cancer in it.  Unclear if this was truly from a skin tear or something that was previously there.    Area was wrapped up with gauze and Coban they will changes on a daily basis no further need for packing as it is a flat base                "

## 2021-10-12 LAB
BACTERIA ABSC ANAEROBE+AEROBE CULT: ABNORMAL
GRAM STAIN RESULT: ABNORMAL

## 2021-10-15 ENCOUNTER — MYC MEDICAL ADVICE (OUTPATIENT)
Dept: INTERNAL MEDICINE | Facility: CLINIC | Age: 76
End: 2021-10-15

## 2021-10-15 DIAGNOSIS — I10 ESSENTIAL HYPERTENSION, BENIGN: ICD-10-CM

## 2021-10-20 RX ORDER — LOSARTAN POTASSIUM 100 MG/1
100 TABLET ORAL DAILY
Qty: 90 TABLET | Refills: 1 | Status: SHIPPED | OUTPATIENT
Start: 2021-10-20 | End: 2021-12-02

## 2021-10-20 NOTE — TELEPHONE ENCOUNTER
Routing refill request to provider for review/approval because:  Labs out of range:  Creatinine    AFSHIN StollN, RN  [facility]

## 2021-10-20 NOTE — TELEPHONE ENCOUNTER
[facility] said they don't have this and that he used all of his refill  Can you please send a new script.  Thank you!

## 2021-10-24 DIAGNOSIS — G43.009 MIGRAINE WITHOUT AURA AND WITHOUT STATUS MIGRAINOSUS, NOT INTRACTABLE: ICD-10-CM

## 2021-10-29 DIAGNOSIS — I10 ESSENTIAL HYPERTENSION, BENIGN: ICD-10-CM

## 2021-10-29 RX ORDER — AMLODIPINE BESYLATE 10 MG/1
TABLET ORAL
Qty: 90 TABLET | Refills: 3 | Status: SHIPPED | OUTPATIENT
Start: 2021-10-29 | End: 2021-12-02

## 2021-10-29 NOTE — TELEPHONE ENCOUNTER
Pending Prescriptions:                       Disp   Refills    amLODIPine (NORVASC) 10 MG tablet [Pharmac*90 tab*3        Sig: TAKE 1 TABLET DAILY      Routing refill request to provider for review/approval because:  Labs out of range:    Creatinine   Date Value Ref Range Status   09/29/2021 1.60 (H) 0.66 - 1.25 mg/dL Final   07/02/2021 1.31 (H) 0.66 - 1.25 mg/dL Final         Prisca Montaño RN

## 2021-11-01 DIAGNOSIS — R06.09 DOE (DYSPNEA ON EXERTION): ICD-10-CM

## 2021-11-01 RX ORDER — ISOSORBIDE MONONITRATE 60 MG/1
60 TABLET, EXTENDED RELEASE ORAL DAILY
Qty: 90 TABLET | Refills: 0 | Status: SHIPPED | OUTPATIENT
Start: 2021-11-01 | End: 2021-12-02

## 2021-11-02 ENCOUNTER — DOCUMENTATION ONLY (OUTPATIENT)
Dept: OTHER | Facility: CLINIC | Age: 76
End: 2021-11-02

## 2021-11-18 ENCOUNTER — LAB (OUTPATIENT)
Dept: LAB | Facility: CLINIC | Age: 76
End: 2021-11-18
Payer: MEDICARE

## 2021-11-18 DIAGNOSIS — E87.6 HYPOKALEMIA: ICD-10-CM

## 2021-11-18 DIAGNOSIS — E87.1 HYPONATREMIA: ICD-10-CM

## 2021-11-18 LAB
ANION GAP SERPL CALCULATED.3IONS-SCNC: 2 MMOL/L (ref 3–14)
BUN SERPL-MCNC: 22 MG/DL (ref 7–30)
CALCIUM SERPL-MCNC: 8.5 MG/DL (ref 8.5–10.1)
CHLORIDE BLD-SCNC: 104 MMOL/L (ref 94–109)
CO2 SERPL-SCNC: 24 MMOL/L (ref 20–32)
CREAT SERPL-MCNC: 1.38 MG/DL (ref 0.66–1.25)
GFR SERPL CREATININE-BSD FRML MDRD: 50 ML/MIN/1.73M2
GLUCOSE BLD-MCNC: 102 MG/DL (ref 70–99)
POTASSIUM BLD-SCNC: 4.8 MMOL/L (ref 3.4–5.3)
SODIUM SERPL-SCNC: 130 MMOL/L (ref 133–144)

## 2021-11-18 PROCEDURE — 80048 BASIC METABOLIC PNL TOTAL CA: CPT

## 2021-11-18 PROCEDURE — 36415 COLL VENOUS BLD VENIPUNCTURE: CPT

## 2021-12-02 ENCOUNTER — OFFICE VISIT (OUTPATIENT)
Dept: CARDIOLOGY | Facility: CLINIC | Age: 76
End: 2021-12-02
Payer: MEDICARE

## 2021-12-02 VITALS
HEART RATE: 62 BPM | BODY MASS INDEX: 26.14 KG/M2 | HEIGHT: 68 IN | DIASTOLIC BLOOD PRESSURE: 62 MMHG | OXYGEN SATURATION: 98 % | WEIGHT: 172.5 LBS | SYSTOLIC BLOOD PRESSURE: 102 MMHG

## 2021-12-02 DIAGNOSIS — I73.9 PAD (PERIPHERAL ARTERY DISEASE) (H): ICD-10-CM

## 2021-12-02 DIAGNOSIS — I25.10 CORONARY ARTERY DISEASE INVOLVING NATIVE CORONARY ARTERY OF NATIVE HEART WITHOUT ANGINA PECTORIS: Primary | ICD-10-CM

## 2021-12-02 DIAGNOSIS — R06.09 DOE (DYSPNEA ON EXERTION): ICD-10-CM

## 2021-12-02 DIAGNOSIS — I10 ESSENTIAL HYPERTENSION, BENIGN: ICD-10-CM

## 2021-12-02 DIAGNOSIS — E87.6 HYPOKALEMIA: ICD-10-CM

## 2021-12-02 DIAGNOSIS — I10 BENIGN ESSENTIAL HTN: ICD-10-CM

## 2021-12-02 DIAGNOSIS — I70.1 RENAL ARTERY STENOSIS (H): ICD-10-CM

## 2021-12-02 PROCEDURE — 99214 OFFICE O/P EST MOD 30 MIN: CPT | Performed by: INTERNAL MEDICINE

## 2021-12-02 RX ORDER — AMLODIPINE BESYLATE 10 MG/1
10 TABLET ORAL DAILY
Qty: 90 TABLET | Refills: 3 | Status: SHIPPED | OUTPATIENT
Start: 2021-12-02 | End: 2022-10-03

## 2021-12-02 RX ORDER — POTASSIUM CHLORIDE 750 MG/1
30 TABLET, EXTENDED RELEASE ORAL 2 TIMES DAILY
Qty: 300 TABLET | Refills: 0 | Status: SHIPPED | OUTPATIENT
Start: 2021-12-02 | End: 2022-01-26

## 2021-12-02 RX ORDER — CARVEDILOL 25 MG/1
25 TABLET ORAL 2 TIMES DAILY WITH MEALS
Qty: 180 TABLET | Refills: 3 | Status: SHIPPED | OUTPATIENT
Start: 2021-12-02 | End: 2022-10-12

## 2021-12-02 RX ORDER — LOSARTAN POTASSIUM 100 MG/1
100 TABLET ORAL DAILY
Qty: 90 TABLET | Refills: 1 | Status: SHIPPED | OUTPATIENT
Start: 2021-12-02 | End: 2022-09-30

## 2021-12-02 RX ORDER — ISOSORBIDE MONONITRATE 60 MG/1
60 TABLET, EXTENDED RELEASE ORAL DAILY
Qty: 90 TABLET | Refills: 0 | Status: SHIPPED | OUTPATIENT
Start: 2021-12-02 | End: 2022-04-19

## 2021-12-02 ASSESSMENT — MIFFLIN-ST. JEOR: SCORE: 1491.95

## 2021-12-02 NOTE — PROGRESS NOTES
Service Date: 2021    Jonas West MD   [facility]  [address] [place] [address]    RE:      Mcihele Vance  MRN:  [id number]  :   1945    Dear Dr. West:    Michele Vance, a 76-year-old man with coronary artery disease, hypertension, atherosclerotic peripheral vascular disease and dyslipidemia (statin intolerance/on alirocumab), was seen today at your request for followup.    Since I last saw the patient in , he has been followed  by Christiane Coffey, our physician assistant, and by Dr. Oropeza, who last saw the patient 2020 for followup of a left-sided transient ischemic attack that prompted a [facility]  emergency room visit in 2020.   Urgent evaluation with CT and MRA showed no evidence of acute stroke. Cerebrovascular imaging  testing demonstrated new right carotid artery in-stent restenosis   The patient  underwent balloon dilatation of the in-stent restenosis in 2020 by Dr. Cuellar.  Since that balloon intervention, the patient has been free of recurrent neurologic events.  He last was evaluated by Dr. Cuellar with a telephone visit in 2021 who plans to follow his cerebrovascular disease with Carotid ultrasounds. At the time of our visit today, the patient denied new focal neurologic deficits.       In 2021, the patient developed symptomatic biliary colic and underwent a laparoscopic cholecystectomy.  The patient still follows with his surgeon, Dr. Epps, for peripheral vascular disease.      Mr. Vance was recently hospitalized for symptomatic hyponatremia resulting in confusion and a mechanical fall, resulting in a 4 day hospitalization in late August/early 2021. His hyponatremia is now stable and the confusion has resolved. .  The patient recently received care for a left arm wound infection, a consequence of the 2021mechanical fall,  which has now resolved.    Mr. Vance remains free of chest, arm, neck, jaw or back discomfort with  exertion since his CAB.  His dyslipidemia and blood pressure control remain well controlled on current therapy        Mr. Vance's primary concern has been episodic swelling in his lower extremities which seems worse in the afternoon and better in the morning.  He is not using compressive stockings nor does he elevate his legs on a regular basis.     PMHX  1) CAD : CAB x 6  2014; LIMA to LAD, SVG to D1, SVG to D2, SVG to M1, SVG to M2, SVG to PDA. No angina since CAB  2) Atherosclerotic peripheral vascular disease.      A. Old stroke : sp Right carotid stent, with balloon PTA for in stent restenosis 9/2020, Followed by       B. Renal artery stenosis : s/p stent implantation for refractory hypertension, now well controlled.      C. S/p SFA PTA/stenting : followed by    3) sp cholecystectomy 7/2021  4) HTN  5) hyponatremia  6) dyslipidemia  Statin intolerant/ on alirocumab ( Praluent)    PHYSICAL EXAMINATION:    GENERAL:  Today demonstrates a very pleasant and cooperative 76-year-old man who appears comfortable at rest.  VITAL SIGNS:  His blood pressure is 102/62, his heart rate 62.  His height is 1.73 meters, his weight is 78 kg, his BMI is 26.23.  LUNGS:  Clear to percussion and auscultation.  CARDIOVASCULAR:  Shows a normal S1 with a normal S2.  There is no S3.  There is no murmur, rub or click.  RESPIRATORY:  His lungs are clear.  EXTREMITIES:  His legs have mild bilateral pedal edema with no ulcers.    MEDICATIONS:    1.  Alirocumab (Praluent) 150 every 14 days.    2.  Amitriptyline 25 mg b.i.d.  3.  Amlodipine 10 mg daily.  4.  Aspirin 81 daily.  5.  Carvedilol 25 mg b.i.d.  6.  Cilostazol 100 mg twice a day.  7.  Gabapentin 300 mg twice a day.  8.  Isosorbide mononitrate 60 mg daily.  9.  Losartan 100 mg daily.  10.  Potassium 10 mEq daily.  11.  Albuterol inhaler.    LABORATORY STUDIES:  His most recent sodium was 130 with a potassium of 4.8, creatinine of 1.38.  His hemoglobin is  10.0.    ASSESSMENT:  Mr. Cid is asymptomatic on guideline-directed medical therapy with optimal blood pressure and acceptable LDL cholesterol levels.    It is highly likely that venous insufficiency is contributing to his intermittent  leg edema but amlodipine could also be contributing.  His blood pressure is very well controlled and  his blood pressure medications could be safely reduced as long as his systolic blood pressures remain less than or equal to 130 mmHg.  Simple measures such as leg elevation and compressive stockings also might be helpful. I reviewed potential treatment of his leg swelling in detail with the patient and his wife, who may consider cutting his amlodiine.    RECOMMENDATIONS:    1.  You may wish to consider support stockings and leg elevation  to treat the patient's edema.  I would  avoid diuretics unless he fails to respond to decreasing amlodipine and nonpharmacologic intervention, especially in view of his hyponatremia.  2.  I would advise cutting amlodipine to 5 mg daily and stopping as long as systolic blood pressure remains less than 130 mmHg. Isosorbide could be stopped if he is free of angina.  3.  Followup visit in about 1 year, earlier if any new symptoms.    We greatly appreciate the opportunity to care for your patient, Mr. Michele Cid.    Sincerely,    Stevie Felipe MD        D: 2021   T: 2021   MT: [email or web address]    Name:     MICHELE CID  MRN:      [id number]        Account:      [id number]   :      1945           Service Date: 2021       Document: [id number]

## 2021-12-02 NOTE — PROGRESS NOTES
HISTORY OF PRESENT ILLNESS:  Dr. Corado did a right carotid  Balloon ultrasounds. Sp cholecystectomy.     Orders this Visit:  No orders of the defined types were placed in this encounter.    No orders of the defined types were placed in this encounter.    There are no discontinued medications.    Encounter Diagnoses   Name Primary?     Renal artery stenosis (H)      PAD (peripheral artery disease) (H)      Benign essential HTN      Essential hypertension, benign      ELAM (dyspnea on exertion)      Hypokalemia        CURRENT MEDICATIONS:  Current Outpatient Medications   Medication Sig Dispense Refill     acetaminophen (TYLENOL) 325 MG tablet Take 975 mg by mouth 3 times daily        alirocumab (PRALUENT) 150 MG/ML injectable syringe Inject 1 mL (150 mg) Subcutaneous every 14 days 8 mL 3     amitriptyline (ELAVIL) 25 MG tablet TAKE 1 TABLET TWICE A  tablet 3     amLODIPine (NORVASC) 10 MG tablet TAKE 1 TABLET DAILY 90 tablet 3     aspirin (ASPIRIN) 81 MG EC tablet Take 81 mg by mouth every other day       carvedilol (COREG) 25 MG tablet Take 1 tablet (25 mg) by mouth 2 times daily (with meals) 180 tablet 3     cilostazol (PLETAL) 100 MG tablet Take 1 tablet (100 mg) by mouth 2 times daily 180 tablet 1     gabapentin (NEURONTIN) 300 MG capsule TAKE 1 CAPSULE TWICE A DAY (Patient taking differently: Take 300 mg by mouth 2 times daily ) 180 capsule 3     isosorbide mononitrate (IMDUR) 60 MG 24 hr tablet Take 1 tablet (60 mg) by mouth daily 90 tablet 0     Lidocaine (LIDOCARE) 4 % Patch Place 1 patch onto the skin every 24 hours To prevent lidocaine toxicity, patient should be patch free for 12 hrs daily. 30 patch 1     losartan (COZAAR) 100 MG tablet Take 1 tablet (100 mg) by mouth daily 90 tablet 1     melatonin 3 MG tablet Take 1 tablet (3 mg) by mouth nightly as needed for sleep       omeprazole (PRILOSEC) 40 MG DR capsule TAKE 1 CAPSULE DAILY 90 capsule 3     ondansetron (ZOFRAN) 4 MG tablet Take 1 tablet (4  "mg) by mouth as needed for nausea 90 tablet 0     potassium chloride ER (KLOR-CON M) 10 MEQ CR tablet Take 3 tablets (30 mEq) by mouth 2 times daily 300 tablet 0     albuterol (PROAIR HFA/PROVENTIL HFA/VENTOLIN HFA) 108 (90 Base) MCG/ACT inhaler Inhale 2 puffs into the lungs every 4 hours as needed for shortness of breath / dyspnea or wheezing 8 g 0       ALLERGIES     Allergies   Allergen Reactions     Statins [Hmg-Coa-R Inhibitors] Other (See Comments)     Rhabdo  Same as \"statins\"     Gemfibrozil      Resting thigh-calf pain     Sulfa Drugs      Reacted as a child     Zetia [Ezetimibe]      Muscle cramping       PAST MEDICAL, SURGICAL, FAMILY, SOCIAL HISTORY:  History was reviewed and updated as needed, see medical record.    Review of Systems:  A 12-point review of systems was completed, see medical record for detailed review of systems information.    Physical Exam:  Vitals: /62 (BP Location: Left arm, Patient Position: Sitting, Cuff Size: Adult Regular)   Pulse 62   Ht 1.727 m (5' 8\")   Wt 78.2 kg (172 lb 8 oz)   SpO2 98%   BMI 26.23 kg/m      Constitutional:           Skin:           Head:           Eyes:           ENT:           Neck:           Chest:           Cardiac:                    Abdomen:           Vascular:                                        Extremities and Back:           Neurological:           ASSESSMENT: stable ? amolidipine dose decrease       RECOMMENDATIONS:  Elevate legs   Consider decrease amlodipine target  130  Follow-up in one year      Recent Lab Results:  LIPID RESULTS:  Lab Results   Component Value Date    CHOL 161 03/06/2020    HDL 53 03/06/2020    LDL 95 03/06/2020    TRIG 65 03/06/2020    CHOLHDLRATIO 5.2 (H) 07/23/2015       LIVER ENZYME RESULTS:  Lab Results   Component Value Date    AST 16 09/17/2021    AST 12 07/02/2021    ALT 21 09/17/2021    ALT 21 07/02/2021       CBC RESULTS:  Lab Results   Component Value Date    WBC 6.8 09/17/2021    WBC 5.8 07/02/2021    " RBC 3.04 (L) 09/17/2021    RBC 3.52 (L) 07/02/2021    HGB 10.0 (L) 09/17/2021    HGB 11.7 (L) 07/02/2021    HCT 29.7 (L) 09/17/2021    HCT 32.3 (L) 07/02/2021    MCV 98 09/17/2021    MCV 92 07/02/2021    MCH 32.9 09/17/2021    MCH 33.2 (H) 07/02/2021    MCHC 33.7 09/17/2021    MCHC 36.2 07/02/2021    RDW 13.5 09/17/2021    RDW 12.4 07/02/2021     09/17/2021     07/02/2021       BMP RESULTS:  Lab Results   Component Value Date     (L) 11/18/2021     (L) 07/02/2021    POTASSIUM 4.8 11/18/2021    POTASSIUM 4.9 07/02/2021    CHLORIDE 104 11/18/2021    CHLORIDE 91 (L) 07/02/2021    CO2 24 11/18/2021    CO2 23 07/02/2021    ANIONGAP 2 (L) 11/18/2021    ANIONGAP 7 07/02/2021     (H) 11/18/2021     (H) 07/02/2021    BUN 22 11/18/2021    BUN 13 07/02/2021    CR 1.38 (H) 11/18/2021    CR 1.31 (H) 07/02/2021    GFRESTIMATED 50 (L) 11/18/2021    GFRESTIMATED 53 (L) 07/02/2021    GFRESTBLACK 61 07/02/2021    RAHEEM 8.5 11/18/2021    RAHEEM 8.9 07/02/2021        A1C RESULTS:  Lab Results   Component Value Date    A1C 5.6 03/07/2014       INR RESULTS:  Lab Results   Component Value Date    INR 1.10 08/31/2021    INR 1.09 09/03/2020    INR 1.08 09/01/2020       We greatly appreciate the opportunity to be involved in the care of your patient, Michele Vance.    Sincerely,  Stevie Felipe MD      CC  No referring provider defined for this encounter.

## 2021-12-02 NOTE — LETTER
12/2/2021    Jonas West MD  [address] [address]    RE: Michele Vance       Dear Colleague,    I had the pleasure of seeing Michele Vance in the [facility].  HISTORY OF PRESENT ILLNESS:  Dr. Corado did a right carotid  Balloon ultrasounds. Sp cholecystectomy.     Orders this Visit:  No orders of the defined types were placed in this encounter.    No orders of the defined types were placed in this encounter.    There are no discontinued medications.    Encounter Diagnoses   Name Primary?     Renal artery stenosis (H)      PAD (peripheral artery disease) (H)      Benign essential HTN      Essential hypertension, benign      ELAM (dyspnea on exertion)      Hypokalemia        CURRENT MEDICATIONS:  Current Outpatient Medications   Medication Sig Dispense Refill     acetaminophen (TYLENOL) 325 MG tablet Take 975 mg by mouth 3 times daily        alirocumab (PRALUENT) 150 MG/ML injectable syringe Inject 1 mL (150 mg) Subcutaneous every 14 days 8 mL 3     amitriptyline (ELAVIL) 25 MG tablet TAKE 1 TABLET TWICE A  tablet 3     amLODIPine (NORVASC) 10 MG tablet TAKE 1 TABLET DAILY 90 tablet 3     aspirin (ASPIRIN) 81 MG EC tablet Take 81 mg by mouth every other day       carvedilol (COREG) 25 MG tablet Take 1 tablet (25 mg) by mouth 2 times daily (with meals) 180 tablet 3     cilostazol (PLETAL) 100 MG tablet Take 1 tablet (100 mg) by mouth 2 times daily 180 tablet 1     gabapentin (NEURONTIN) 300 MG capsule TAKE 1 CAPSULE TWICE A DAY (Patient taking differently: Take 300 mg by mouth 2 times daily ) 180 capsule 3     isosorbide mononitrate (IMDUR) 60 MG 24 hr tablet Take 1 tablet (60 mg) by mouth daily 90 tablet 0     Lidocaine (LIDOCARE) 4 % Patch Place 1 patch onto the skin every 24 hours To prevent lidocaine toxicity, patient should be patch free for 12 hrs daily. 30 patch 1     losartan (COZAAR) 100 MG tablet Take 1 tablet (100 mg) by  "mouth daily 90 tablet 1     melatonin 3 MG tablet Take 1 tablet (3 mg) by mouth nightly as needed for sleep       omeprazole (PRILOSEC) 40 MG DR capsule TAKE 1 CAPSULE DAILY 90 capsule 3     ondansetron (ZOFRAN) 4 MG tablet Take 1 tablet (4 mg) by mouth as needed for nausea 90 tablet 0     potassium chloride ER (KLOR-CON M) 10 MEQ CR tablet Take 3 tablets (30 mEq) by mouth 2 times daily 300 tablet 0     albuterol (PROAIR HFA/PROVENTIL HFA/VENTOLIN HFA) 108 (90 Base) MCG/ACT inhaler Inhale 2 puffs into the lungs every 4 hours as needed for shortness of breath / dyspnea or wheezing 8 g 0       ALLERGIES     Allergies   Allergen Reactions     Statins [Hmg-Coa-R Inhibitors] Other (See Comments)     Rhabdo  Same as \"statins\"     Gemfibrozil      Resting thigh-calf pain     Sulfa Drugs      Reacted as a child     Zetia [Ezetimibe]      Muscle cramping       PAST MEDICAL, SURGICAL, FAMILY, SOCIAL HISTORY:  History was reviewed and updated as needed, see medical record.    Review of Systems:  A 12-point review of systems was completed, see medical record for detailed review of systems information.    Physical Exam:  Vitals: /62 (BP Location: Left arm, Patient Position: Sitting, Cuff Size: Adult Regular)   Pulse 62   Ht 1.727 m (5' 8\")   Wt 78.2 kg (172 lb 8 oz)   SpO2 98%   BMI 26.23 kg/m      Constitutional:           Skin:           Head:           Eyes:           ENT:           Neck:           Chest:           Cardiac:                    Abdomen:           Vascular:                                        Extremities and Back:           Neurological:           ASSESSMENT: stable ? amolidipine dose decrease       RECOMMENDATIONS:  Elevate legs   Consider decrease amlodipine target  130  Follow-up in one year      Recent Lab Results:  LIPID RESULTS:  Lab Results   Component Value Date    CHOL 161 03/06/2020    HDL 53 03/06/2020    LDL 95 03/06/2020    TRIG 65 03/06/2020    CHOLHDLRATIO 5.2 (H) 07/23/2015 "       LIVER ENZYME RESULTS:  Lab Results   Component Value Date    AST 16 09/17/2021    AST 12 07/02/2021    ALT 21 09/17/2021    ALT 21 07/02/2021       CBC RESULTS:  Lab Results   Component Value Date    WBC 6.8 09/17/2021    WBC 5.8 07/02/2021    RBC 3.04 (L) 09/17/2021    RBC 3.52 (L) 07/02/2021    HGB 10.0 (L) 09/17/2021    HGB 11.7 (L) 07/02/2021    HCT 29.7 (L) 09/17/2021    HCT 32.3 (L) 07/02/2021    MCV 98 09/17/2021    MCV 92 07/02/2021    MCH 32.9 09/17/2021    MCH 33.2 (H) 07/02/2021    MCHC 33.7 09/17/2021    MCHC 36.2 07/02/2021    RDW 13.5 09/17/2021    RDW 12.4 07/02/2021     09/17/2021     07/02/2021       BMP RESULTS:  Lab Results   Component Value Date     (L) 11/18/2021     (L) 07/02/2021    POTASSIUM 4.8 11/18/2021    POTASSIUM 4.9 07/02/2021    CHLORIDE 104 11/18/2021    CHLORIDE 91 (L) 07/02/2021    CO2 24 11/18/2021    CO2 23 07/02/2021    ANIONGAP 2 (L) 11/18/2021    ANIONGAP 7 07/02/2021     (H) 11/18/2021     (H) 07/02/2021    BUN 22 11/18/2021    BUN 13 07/02/2021    CR 1.38 (H) 11/18/2021    CR 1.31 (H) 07/02/2021    GFRESTIMATED 50 (L) 11/18/2021    GFRESTIMATED 53 (L) 07/02/2021    GFRESTBLACK 61 07/02/2021    RAHEEM 8.5 11/18/2021    RAHEEM 8.9 07/02/2021        A1C RESULTS:  Lab Results   Component Value Date    A1C 5.6 03/07/2014       INR RESULTS:  Lab Results   Component Value Date    INR 1.10 08/31/2021    INR 1.09 09/03/2020    INR 1.08 09/01/2020       We greatly appreciate the opportunity to be involved in the care of your patient, Michele Vance.        Stevie Felipe MD   [facility]

## 2021-12-08 NOTE — TELEPHONE ENCOUNTER
Prescription approved per [facility] Refill Protocol.    Charlotte Olguin RN   
reglan      Last Written Prescription Date: 6/22/17  Last Fill Quantity: 120,  # refills: 1   Last Office Visit with G, P or [facility] prescribing provider: 7/25/17                                             
General

## 2022-01-10 ENCOUNTER — OFFICE VISIT (OUTPATIENT)
Dept: INTERNAL MEDICINE | Facility: CLINIC | Age: 77
End: 2022-01-10
Payer: MEDICARE

## 2022-01-10 VITALS
SYSTOLIC BLOOD PRESSURE: 120 MMHG | BODY MASS INDEX: 25.85 KG/M2 | TEMPERATURE: 97.9 F | WEIGHT: 170 LBS | HEART RATE: 68 BPM | RESPIRATION RATE: 8 BRPM | DIASTOLIC BLOOD PRESSURE: 60 MMHG | OXYGEN SATURATION: 99 %

## 2022-01-10 DIAGNOSIS — M70.21 OLECRANON BURSITIS, RIGHT ELBOW: Primary | ICD-10-CM

## 2022-01-10 PROCEDURE — 99213 OFFICE O/P EST LOW 20 MIN: CPT | Performed by: INTERNAL MEDICINE

## 2022-01-10 NOTE — PROGRESS NOTES
"  Assessment & Plan     Olecranon bursitis, right elbow  Right olecranon bursitis, discussed with the patient is not infected, nothing to do at this time.  Recommended he wrap it with an Ace bandage and wear an elbow pad.  He says is already done these things.  I did offer him an x-ray or referral to orthopedics but did not expect them to drain it either.  He will wait and give it more time.               BMI:   Estimated body mass index is 25.85 kg/m  as calculated from the following:    Height as of 12/2/21: 1.727 m (5' 8\").    Weight as of this encounter: 77.1 kg (170 lb).           No follow-ups on file.    Jonas West MD  [facility]    Gene Bautista is a 76 year old who presents for the following health issues     HPI     Chief Complaint   Patient presents with     Joint Swelling     Right      Would like fluid drained from elbow.   Right elbow has been swollen for the last 2 months, no sign of infection.  Previously had one on the left that was drained by a previous doctor.    Review of Systems       Objective    /60   Pulse 68   Temp 97.9  F (36.6  C)   Resp 8   Wt 77.1 kg (170 lb)   SpO2 99%   BMI 25.85 kg/m    Body mass index is 25.85 kg/m .  Physical Exam   No acute distress  The right olecranon bursa is swollen.  Not distended or overly tight, not warm or red.  Bony protuberance is palpable                "

## 2022-01-26 DIAGNOSIS — E87.6 HYPOKALEMIA: ICD-10-CM

## 2022-01-26 RX ORDER — POTASSIUM CHLORIDE 750 MG/1
30 TABLET, EXTENDED RELEASE ORAL 2 TIMES DAILY
Qty: 300 TABLET | Refills: 6 | Status: SHIPPED | OUTPATIENT
Start: 2022-01-26 | End: 2022-09-30

## 2022-02-09 DIAGNOSIS — E78.5 HYPERLIPIDEMIA LDL GOAL <130: Primary | ICD-10-CM

## 2022-02-09 DIAGNOSIS — Z95.1 S/P CABG X 6: ICD-10-CM

## 2022-02-09 DIAGNOSIS — I65.23 CAROTID STENOSIS, BILATERAL: ICD-10-CM

## 2022-02-09 NOTE — TELEPHONE ENCOUNTER
Received refill request for:  Praluent   Last OV was: Last OV 12/2/21 w/   Labs/EKG: 3/6/20 last lipids. Labs to be done w/ PMD or next OV  F/U scheduled: Orders for 12/2022  [facility]

## 2022-03-17 DIAGNOSIS — I73.9 PAD (PERIPHERAL ARTERY DISEASE) (H): ICD-10-CM

## 2022-03-21 RX ORDER — CILOSTAZOL 100 MG/1
TABLET ORAL
Qty: 180 TABLET | Refills: 3 | Status: SHIPPED | OUTPATIENT
Start: 2022-03-21 | End: 2023-03-17

## 2022-03-21 NOTE — TELEPHONE ENCOUNTER
Pending Prescriptions:                       Disp   Refills    cilostazol (PLETAL) 100 MG tablet [Pharmac*180 ta*3        Sig: TAKE 1 TABLET TWICE A DAY    Routing refill request to provider for review/approval because:  Labs out of range:  HGB, CR    Erin Aden RN

## 2022-03-28 ENCOUNTER — HOSPITAL ENCOUNTER (OUTPATIENT)
Dept: ULTRASOUND IMAGING | Facility: CLINIC | Age: 77
Discharge: HOME | End: 2022-03-28
Attending: NEUROLOGICAL SURGERY | Admitting: NEUROLOGICAL SURGERY
Payer: MEDICARE

## 2022-03-28 DIAGNOSIS — I65.23 BILATERAL CAROTID ARTERY STENOSIS: ICD-10-CM

## 2022-03-28 PROCEDURE — 93880 EXTRACRANIAL BILAT STUDY: CPT

## 2022-04-11 ENCOUNTER — IMMUNIZATION (OUTPATIENT)
Dept: FAMILY MEDICINE | Facility: CLINIC | Age: 77
End: 2022-04-11
Payer: MEDICARE

## 2022-04-11 PROCEDURE — 91305 COVID-19,PF,PFIZER (12+ YRS): CPT

## 2022-04-11 PROCEDURE — 0054A COVID-19,PF,PFIZER (12+ YRS): CPT

## 2022-04-15 DIAGNOSIS — M19.90 ARTHRITIS: ICD-10-CM

## 2022-04-15 RX ORDER — GABAPENTIN 300 MG/1
300 CAPSULE ORAL 2 TIMES DAILY
Qty: 180 CAPSULE | Refills: 3 | Status: SHIPPED | OUTPATIENT
Start: 2022-04-15 | End: 2023-02-03

## 2022-04-15 NOTE — TELEPHONE ENCOUNTER
Pending Prescriptions:                       Disp   Refills    gabapentin (NEURONTIN) 300 MG capsule [Pha*180 ca*3        Sig: TAKE 1 CAPSULE TWICE A DAY    Routing refill request to provider for review/approval because:  Drug not on the [facility] refill protocol     Erin Aden RN

## 2022-04-18 ENCOUNTER — TELEPHONE (OUTPATIENT)
Dept: CARDIOLOGY | Facility: CLINIC | Age: 77
End: 2022-04-18
Payer: MEDICARE

## 2022-04-18 NOTE — TELEPHONE ENCOUNTER
Pt stating he received a message from pharmacy stating his Praluent needs Prior Auth completed prior to sending out injections.     Sending a message to PA team. VIPIN Chin

## 2022-04-19 DIAGNOSIS — R06.09 DOE (DYSPNEA ON EXERTION): ICD-10-CM

## 2022-04-19 RX ORDER — ISOSORBIDE MONONITRATE 60 MG/1
60 TABLET, EXTENDED RELEASE ORAL DAILY
Qty: 90 TABLET | Refills: 1 | Status: SHIPPED | OUTPATIENT
Start: 2022-04-19 | End: 2022-10-12

## 2022-04-19 NOTE — TELEPHONE ENCOUNTER
Central Prior Authorization Team   Phone: [phone]    PA Initiation    Medication: Praluent   Insurance Company: [employer] - Phone [phone] Fax [phone]  Pharmacy Filling the Rx: [facility] - [place], [place] - [address] [place] [address]  Filling Pharmacy Phone: [phone]  Filling Pharmacy Fax:    Start Date: 4/19/2022       form filled out, faxed back for review. Asked for urgent determination.

## 2022-04-20 NOTE — TELEPHONE ENCOUNTER
Prior Authorization Approval    Authorization Effective Date: 4/19/2022  Authorization Expiration Date: 4/19/2023  Medication: Praluent   Approved Dose/Quantity:   Reference #:     Insurance Company: [employer] [phone] Fax [phone]  Expected CoPay:       CoPay Card Available:      Foundation Assistance Needed:    Which Pharmacy is filling the prescription (Not needed for infusion/clinic administered): [facility] - [address]  Pharmacy Notified: Yes  Patient Notified: Yes

## 2022-05-10 ENCOUNTER — MYC MEDICAL ADVICE (OUTPATIENT)
Dept: INTERNAL MEDICINE | Facility: CLINIC | Age: 77
End: 2022-05-10
Payer: MEDICARE

## 2022-05-10 DIAGNOSIS — I10 ESSENTIAL HYPERTENSION, BENIGN: Primary | ICD-10-CM

## 2022-05-11 ENCOUNTER — LAB (OUTPATIENT)
Dept: LAB | Facility: CLINIC | Age: 77
End: 2022-05-11
Payer: MEDICARE

## 2022-05-11 DIAGNOSIS — I10 ESSENTIAL HYPERTENSION, BENIGN: ICD-10-CM

## 2022-05-11 LAB
ANION GAP SERPL CALCULATED.3IONS-SCNC: 1 MMOL/L (ref 3–14)
BUN SERPL-MCNC: 22 MG/DL (ref 7–30)
CALCIUM SERPL-MCNC: 8.6 MG/DL (ref 8.5–10.1)
CHLORIDE BLD-SCNC: 106 MMOL/L (ref 94–109)
CO2 SERPL-SCNC: 28 MMOL/L (ref 20–32)
CREAT SERPL-MCNC: 1.46 MG/DL (ref 0.66–1.25)
GFR SERPL CREATININE-BSD FRML MDRD: 50 ML/MIN/1.73M2
GLUCOSE BLD-MCNC: 113 MG/DL (ref 70–99)
POTASSIUM BLD-SCNC: 4.5 MMOL/L (ref 3.4–5.3)
SODIUM SERPL-SCNC: 135 MMOL/L (ref 133–144)

## 2022-05-11 PROCEDURE — 36415 COLL VENOUS BLD VENIPUNCTURE: CPT

## 2022-05-11 PROCEDURE — 80048 BASIC METABOLIC PNL TOTAL CA: CPT

## 2022-05-14 ENCOUNTER — HEALTH MAINTENANCE LETTER (OUTPATIENT)
Age: 77
End: 2022-05-14

## 2022-09-22 ENCOUNTER — IMMUNIZATION (OUTPATIENT)
Dept: FAMILY MEDICINE | Facility: CLINIC | Age: 77
End: 2022-09-22
Payer: MEDICARE

## 2022-09-22 PROCEDURE — 91312 COVID-19,PF,PFIZER BOOSTER BIVALENT: CPT

## 2022-09-22 PROCEDURE — 0124A COVID-19,PF,PFIZER BOOSTER BIVALENT: CPT

## 2022-09-22 PROCEDURE — 90677 PCV20 VACCINE IM: CPT

## 2022-09-22 PROCEDURE — 90662 IIV NO PRSV INCREASED AG IM: CPT

## 2022-09-22 PROCEDURE — G0008 ADMIN INFLUENZA VIRUS VAC: HCPCS | Mod: 59

## 2022-09-22 PROCEDURE — G0009 ADMIN PNEUMOCOCCAL VACCINE: HCPCS

## 2022-09-22 NOTE — PROGRESS NOTES
Prior to immunization administration, verified patients identity using patient s name and date of birth. Please see Immunization Activity for additional information.     Screening Questionnaire for Adult Immunization    Are you sick today?   No   Do you have allergies to medications, food, a vaccine component or latex?   No   Have you ever had a serious reaction after receiving a vaccination?   No   Do you have a long-term health problem with heart, lung, kidney, or metabolic disease (e.g., diabetes), asthma, a blood disorder, no spleen, complement component deficiency, a cochlear implant, or a spinal fluid leak?  Are you on long-term aspirin therapy?   No   Do you have cancer, leukemia, HIV/AIDS, or any other immune system problem?   No   Do you have a parent, brother, or sister with an immune system problem?   No   In the past 3 months, have you taken medications that affect  your immune system, such as prednisone, other steroids, or anticancer drugs; drugs for the treatment of rheumatoid arthritis, Crohn s disease, or psoriasis; or have you had radiation treatments?   No   Have you had a seizure, or a brain or other nervous system problem?   No   During the past year, have you received a transfusion of blood or blood    products, or been given immune (gamma) globulin or antiviral drug?   No   For women: Are you pregnant or is there a chance you could become       pregnant during the next month?   No   Have you received any vaccinations in the past 4 weeks?   No     Immunization questionnaire answers were all negative.        Per orders of Dr Soni, injection of Prevnar 20 given by Juanis Moreno. Patient instructed to remain in clinic for 15 minutes afterwards, and to report any adverse reaction to me immediately.       Screening performed by Juanis Moreno on 9/22/2022 at 9:52 AM.

## 2022-09-23 ENCOUNTER — NURSE TRIAGE (OUTPATIENT)
Dept: INTERNAL MEDICINE | Facility: CLINIC | Age: 77
End: 2022-09-23

## 2022-09-23 ENCOUNTER — HOSPITAL ENCOUNTER (EMERGENCY)
Facility: CLINIC | Age: 77
Discharge: ANOTHER HOSPITAL | End: 2022-09-23
Attending: NURSE PRACTITIONER | Admitting: NURSE PRACTITIONER
Payer: MEDICARE

## 2022-09-23 ENCOUNTER — APPOINTMENT (OUTPATIENT)
Dept: GENERAL RADIOLOGY | Facility: CLINIC | Age: 77
End: 2022-09-23
Attending: NURSE PRACTITIONER
Payer: MEDICARE

## 2022-09-23 VITALS
BODY MASS INDEX: 26.37 KG/M2 | HEART RATE: 76 BPM | WEIGHT: 168 LBS | HEIGHT: 67 IN | TEMPERATURE: 98.1 F | RESPIRATION RATE: 21 BRPM | SYSTOLIC BLOOD PRESSURE: 161 MMHG | DIASTOLIC BLOOD PRESSURE: 87 MMHG | OXYGEN SATURATION: 97 %

## 2022-09-23 DIAGNOSIS — R55 NEAR SYNCOPE: ICD-10-CM

## 2022-09-23 DIAGNOSIS — R42 LIGHTHEADEDNESS: ICD-10-CM

## 2022-09-23 DIAGNOSIS — E87.1 HYPONATREMIA: ICD-10-CM

## 2022-09-23 DIAGNOSIS — I21.4 NSTEMI (NON-ST ELEVATED MYOCARDIAL INFARCTION) (H): ICD-10-CM

## 2022-09-23 LAB
ALBUMIN SERPL-MCNC: 3.3 G/DL (ref 3.4–5)
ALP SERPL-CCNC: 118 U/L (ref 40–150)
ALT SERPL W P-5'-P-CCNC: 28 U/L (ref 0–70)
ANION GAP SERPL CALCULATED.3IONS-SCNC: 5 MMOL/L (ref 3–14)
AST SERPL W P-5'-P-CCNC: 19 U/L (ref 0–45)
BASOPHILS # BLD AUTO: 0 10E3/UL (ref 0–0.2)
BASOPHILS NFR BLD AUTO: 0 %
BILIRUB SERPL-MCNC: 0.5 MG/DL (ref 0.2–1.3)
BUN SERPL-MCNC: 20 MG/DL (ref 7–30)
CALCIUM SERPL-MCNC: 8.7 MG/DL (ref 8.5–10.1)
CHLORIDE BLD-SCNC: 100 MMOL/L (ref 94–109)
CO2 SERPL-SCNC: 23 MMOL/L (ref 20–32)
CREAT SERPL-MCNC: 1.72 MG/DL (ref 0.66–1.25)
EOSINOPHIL # BLD AUTO: 0.1 10E3/UL (ref 0–0.7)
EOSINOPHIL NFR BLD AUTO: 2 %
ERYTHROCYTE [DISTWIDTH] IN BLOOD BY AUTOMATED COUNT: 13.1 % (ref 10–15)
GFR SERPL CREATININE-BSD FRML MDRD: 41 ML/MIN/1.73M2
GLUCOSE BLD-MCNC: 103 MG/DL (ref 70–99)
HCT VFR BLD AUTO: 35.4 % (ref 40–53)
HGB BLD-MCNC: 12.6 G/DL (ref 13.3–17.7)
IMM GRANULOCYTES # BLD: 0 10E3/UL
IMM GRANULOCYTES NFR BLD: 0 %
LYMPHOCYTES # BLD AUTO: 1.1 10E3/UL (ref 0.8–5.3)
LYMPHOCYTES NFR BLD AUTO: 15 %
MCH RBC QN AUTO: 34.1 PG (ref 26.5–33)
MCHC RBC AUTO-ENTMCNC: 35.6 G/DL (ref 31.5–36.5)
MCV RBC AUTO: 96 FL (ref 78–100)
MONOCYTES # BLD AUTO: 1 10E3/UL (ref 0–1.3)
MONOCYTES NFR BLD AUTO: 13 %
NEUTROPHILS # BLD AUTO: 5.3 10E3/UL (ref 1.6–8.3)
NEUTROPHILS NFR BLD AUTO: 70 %
NRBC # BLD AUTO: 0 10E3/UL
NRBC BLD AUTO-RTO: 0 /100
PLATELET # BLD AUTO: 228 10E3/UL (ref 150–450)
POTASSIUM BLD-SCNC: 5 MMOL/L (ref 3.4–5.3)
PROT SERPL-MCNC: 6.9 G/DL (ref 6.8–8.8)
RBC # BLD AUTO: 3.7 10E6/UL (ref 4.4–5.9)
SARS-COV-2 RNA RESP QL NAA+PROBE: NEGATIVE
SODIUM SERPL-SCNC: 128 MMOL/L (ref 133–144)
TROPONIN I SERPL HS-MCNC: 134 NG/L
TROPONIN I SERPL HS-MCNC: 142 NG/L
WBC # BLD AUTO: 7.5 10E3/UL (ref 4–11)

## 2022-09-23 PROCEDURE — 87635 SARS-COV-2 COVID-19 AMP PRB: CPT | Performed by: NURSE PRACTITIONER

## 2022-09-23 PROCEDURE — 84484 ASSAY OF TROPONIN QUANT: CPT | Mod: 91 | Performed by: NURSE PRACTITIONER

## 2022-09-23 PROCEDURE — 80053 COMPREHEN METABOLIC PANEL: CPT | Performed by: NURSE PRACTITIONER

## 2022-09-23 PROCEDURE — 36415 COLL VENOUS BLD VENIPUNCTURE: CPT | Performed by: NURSE PRACTITIONER

## 2022-09-23 PROCEDURE — 93010 ELECTROCARDIOGRAM REPORT: CPT | Performed by: NURSE PRACTITIONER

## 2022-09-23 PROCEDURE — 99285 EMERGENCY DEPT VISIT HI MDM: CPT | Mod: 25 | Performed by: NURSE PRACTITIONER

## 2022-09-23 PROCEDURE — 85004 AUTOMATED DIFF WBC COUNT: CPT | Performed by: NURSE PRACTITIONER

## 2022-09-23 PROCEDURE — 71046 X-RAY EXAM CHEST 2 VIEWS: CPT

## 2022-09-23 PROCEDURE — 84484 ASSAY OF TROPONIN QUANT: CPT | Performed by: NURSE PRACTITIONER

## 2022-09-23 PROCEDURE — 93005 ELECTROCARDIOGRAM TRACING: CPT | Performed by: NURSE PRACTITIONER

## 2022-09-23 PROCEDURE — C9803 HOPD COVID-19 SPEC COLLECT: HCPCS | Performed by: NURSE PRACTITIONER

## 2022-09-23 RX ORDER — PHENOL 1.4 %
10 AEROSOL, SPRAY (ML) MUCOUS MEMBRANE AT BEDTIME
COMMUNITY

## 2022-09-23 ASSESSMENT — ACTIVITIES OF DAILY LIVING (ADL)
ADLS_ACUITY_SCORE: 37

## 2022-09-23 NOTE — ED TRIAGE NOTES
Went to get out of his chair about 30 minutes prior to arrival and suddenly was dizzy falling back into his chair.  Wife took BP which was 76/39 and 87/51 nurse line told him to come in.  Patient is restricted on fluid intake and states he is still mildly dizzy.

## 2022-09-23 NOTE — TELEPHONE ENCOUNTER
Patient's wife calling in regards to low blood pressure on her  (patient).     Blood pressure 78/39 left arm  Blood pressure 86/49 right arm  After walking around 87/51    Patient is feeling dizzy and per caller looking very pale.     CALL  NOW:   * Immediate medical attention is needed. You need to hang up and call 911 (or an ambulance).   * Triager Discretion:  I'll call you back in a few minutes to be sure you were able to reach them.      Advised patient's wife to call 911. She declines and states ED is only 4 blocks away and she will drive him right now. Advised if he worsens on the way to call 911. She agrees and states understanding.     AFSHIN RazoN, RN  [facility]  September 23, 2022      Reason for Disposition    Systolic BP < 90 and feeling dizzy, lightheaded, or weak    Protocols used: BLOOD PRESSURE - LOW-A-OH

## 2022-09-23 NOTE — ED PROVIDER NOTES
"  History     Chief Complaint   Patient presents with     Hypotension     HPI  Michele Vance is a 77 year old male with history of hypertension, CKD stage III, CAD/s/p-CABG x6, hyperlipidemia, CVA (embolic), hyponatremia (on fluid restriction), anemia, GERD, irritable bowel syndrome who presents for evaluation of hypotension and near syncope today. At approximately 1:30pm today patient went to stand up from his chair and felt dizzy/lightheaded and fell back in his chair. Wife was folding clothes and heard a thump.  She came to the living room and saw patient sitting back in his chair. He looked pale and said he was lightheaded. She took his Blood Pressure (76/39 left and 87/51 right).     Patient had Covid-19 booster yesterday.  Has appointment with PCP (Dr. West) on 10/12/2022.    Allergies:  Allergies   Allergen Reactions     Statins [Hmg-Coa-R Inhibitors] Other (See Comments)     Rhabdo  Same as \"statins\"     Gemfibrozil      Resting thigh-calf pain     Sulfa Drugs      Reacted as a child     Zetia [Ezetimibe]      Muscle cramping       Problem List:    Patient Active Problem List    Diagnosis Date Noted     Abnormal gait 08/31/2021     Priority: Medium     Falls frequently 08/31/2021     Priority: Medium     Facial injury, initial encounter 08/31/2021     Priority: Medium     Acute midline low back pain without sciatica 08/31/2021     Priority: Medium     Hyponatremia 08/31/2021     Priority: Medium     Biliary colic 07/08/2021     Priority: Medium     Added automatically from request for surgery [id number]       H/O carotid angioplasty 09/04/2020     Priority: Medium     Status post balloon angioplasty of pulmonary artery branches 09/03/2020     Priority: Medium     Carotid stenosis      Priority: Medium     right endartectomy       Essential hypertension with goal blood pressure less than 140/90 06/02/2016     Priority: Medium     Chronic constipation 12/24/2014     Priority: Medium     Irritable bowel " syndrome 12/24/2014     Priority: Medium     Carotid artery occlusion with cerebral infarction (H) 08/06/2014     Priority: Medium     Dizziness 08/06/2014     Priority: Medium     Panlobular emphysema (H) 06/04/2014     Priority: Medium     Cerebral infarction due to occlusion or stenosis of carotid artery 05/07/2014     Priority: Medium     Problem list name updated by automated process. Provider to review       Stroke, embolic (H) 04/25/2014     Priority: Medium     Insomnia 03/12/2014     Priority: Medium     Nutritional deficiency 03/12/2014     Priority: Medium     Pain 03/12/2014     Priority: Medium     Urinary retention 03/12/2014     Priority: Medium     S/P CABG x 6 03/06/2014     Priority: Medium     Left main coronary artery disease 03/04/2014     Priority: Medium     Arthritis 01/14/2013     Priority: Medium     Carotid bruit 01/14/2013     Priority: Medium     Carotid stenosis, bilateral 01/14/2013     Priority: Medium     Anemia 04/06/2012     Priority: Medium     CKD (chronic kidney disease) stage 3, GFR 30-59 ml/min (H) 04/06/2012     Priority: Medium     Impingement syndrome, shoulder, right 03/16/2011     Priority: Medium     Hypertension 11/22/2010     Priority: Medium     Hyperlipidemia LDL goal <130 11/22/2010     Priority: Medium     Myalgia and myositis 11/22/2010     Priority: Medium     GERD (gastroesophageal reflux disease) 11/22/2010     Priority: Medium        Past Medical History:    Past Medical History:   Diagnosis Date     Carotid stenosis      Chronic kidney disease      Coronary artery disease 3-2014     CVA (cerebral infarction)      Elevated CK      Esophageal reflux      Hypercholesteremia      Hyponatremia 8/31/2021     Nonsenile cataract      Other and unspecified hyperlipidemia      PAD (peripheral artery disease) (H)      Rhabdomyolysis 2010     Unspecified essential hypertension        Past Surgical History:    Past Surgical History:   Procedure Laterality Date      APPENDECTOMY  1974     BYPASS GRAFT ARTERY CORONARY  3/5/2014    Procedure: BYPASS GRAFT ARTERY CORONARY;  Median Sternotomy, Coronary Artery Bypass Graft X6 used Left internal mammary artery, Left and Right Greater Saphenous vein, on Pump oxygenator.;  Surgeon: Tim Montanez MD;  Location: [facility] OR     CATARACT IOL, RT/LT Bilateral 2008    in [place]     cataracts       COLONOSCOPY  12/12/02    [facility]     COLONOSCOPY N/A 10/7/2014    Procedure: COMBINED COLONOSCOPY, SINGLE OR MULTIPLE BIOPSY/POLYPECTOMY BY BIOPSY;  Surgeon: Micheal Mora MD;  Location: [facility] GI     CV LOWER EXTREMITY ANGIOGRAM BILATERAL Bilateral 9/9/2019    Procedure: Lower Extremity Angiogram Bilateral;  Surgeon: Julio Oropeza MD;  Location:  HEART CARDIAC CATH LAB     DENTAL SURGERY      TMJ, implants     ENDARTERECTOMY CAROTID      right carotid stent     ESOPHAGOSCOPY, GASTROSCOPY, DUODENOSCOPY (EGD), COMBINED Left 10/7/2014    Procedure: COMBINED ESOPHAGOSCOPY, GASTROSCOPY, DUODENOSCOPY (EGD), BIOPSY SINGLE OR MULTIPLE;  Surgeon: Micheal Mora MD;  Location: [facility] GI     ESOPHAGOSCOPY, GASTROSCOPY, DUODENOSCOPY (EGD), COMBINED Left 10/7/2014    Procedure: COMBINED ESOPHAGOSCOPY, GASTROSCOPY, DUODENOSCOPY (EGD), REMOVE FOREIGN BODY;  Surgeon: Micheal Mora MD;  Location: [facility] GI     HC CAPSULE ENDOSCOPY N/A 10/7/2014    Procedure: CAPSULE/PILL CAM ENDOSCOPY;  Surgeon: Micheal Mora MD;  Location: [facility] GI     INJECT EPIDURAL LUMBAR Right 5/8/2017    Procedure: INJECT EPIDURAL LUMBAR;  Lumbar transforaminal Epidural Steroid Injection right lumbar 4-5, and right  lumbar 5-Sacral 1;  Surgeon: Anthony Gonzalez MD;  Location: [facility]     INJECT JOINT SACROILIAC Bilateral 8/28/2017    Procedure: INJECT JOINT SACROILIAC;  sacroiliac joint injection bilateral;  Surgeon: Anthony Gonzalez MD;  Location: [facility] OR     IR CAROTID CEREBRAL ANGIOGRAM BILATERAL  9/3/2020     KNEE SURGERY  1976    left knee reconstruction      LAPAROSCOPIC CHOLECYSTECTOMY N/A 2021    Procedure: CHOLECYSTECTOMY, LAPAROSCOPIC;  Surgeon: Anthony Epps MD;  Location:  OR     lasix      both eyes     RELEASE CARPAL TUNNEL  2011    RELEASE CARPAL TUNNEL performed by JO MADRID at  OR     RELEASE CARPAL TUNNEL  2011    RELEASE CARPAL TUNNEL performed by JO MADRID at  OR     [facility] UGI ENDOSCOPY, SIMPLE EXAM  99    [facility]       Family History:    Family History   Problem Relation Age of Onset     Hypertension Mother      Eye Disorder Mother      Cerebrovascular Disease Father      Heart Disease Father      Lipids Father      Obesity Father      Cancer Sister      Heart Disease Sister      Glaucoma No family hx of      Macular Degeneration No family hx of      Kidney Disease No family hx of        Social History:  Marital Status:   [2]  Social History     Tobacco Use     Smoking status: Former Smoker     Packs/day: 2.50     Years: 20.00     Pack years: 50.00     Types: Cigarettes     Quit date: 2000     Years since quittin.7     Smokeless tobacco: Never Used   Vaping Use     Vaping Use: Never used   Substance Use Topics     Alcohol use: No     Alcohol/week: 0.0 standard drinks     Drug use: No        Medications:    acetaminophen (TYLENOL) 325 MG tablet  alirocumab (PRALUENT) 150 MG/ML injectable pen  amitriptyline (ELAVIL) 25 MG tablet  aspirin (ASA) 81 MG EC tablet  carvedilol (COREG) 25 MG tablet  cilostazol (PLETAL) 100 MG tablet  diphenhydrAMINE-acetaminophen (TYLENOL PM)  MG tablet  gabapentin (NEURONTIN) 300 MG capsule  isosorbide mononitrate (IMDUR) 60 MG 24 hr tablet  losartan (COZAAR) 100 MG tablet  Melatonin 10 MG TABS tablet  omeprazole (PRILOSEC) 40 MG DR capsule  ondansetron (ZOFRAN) 4 MG tablet  potassium chloride ER (KLOR-CON M) 10 MEQ CR tablet  albuterol (PROAIR HFA/PROVENTIL HFA/VENTOLIN HFA) 108 (90 Base) MCG/ACT inhaler  amLODIPine (NORVASC) 10 MG  "tablet          Review of Systems  As mentioned above in the history present illness. All other systems were reviewed and are negative.    Physical Exam   BP: 114/58  Pulse: 72  Temp: 98.1  F (36.7  C)  Resp: 15  Height: 170.2 cm (5' 7\")  Weight: 76.2 kg (168 lb)  SpO2: 98 %      Physical Exam  Constitutional:       General: He is not in acute distress.     Appearance: Normal appearance. He is well-developed. He is not ill-appearing.   HENT:      Head: Normocephalic and atraumatic.      Right Ear: External ear normal.      Left Ear: External ear normal.      Nose: Nose normal.      Mouth/Throat:      Mouth: Mucous membranes are moist.   Eyes:      Conjunctiva/sclera: Conjunctivae normal.   Cardiovascular:      Rate and Rhythm: Normal rate and regular rhythm.      Heart sounds: Normal heart sounds. No murmur heard.  Pulmonary:      Effort: Pulmonary effort is normal. No respiratory distress.      Breath sounds: Normal breath sounds.   Abdominal:      General: Bowel sounds are normal. There is no distension.      Palpations: Abdomen is soft.      Tenderness: There is no abdominal tenderness.   Musculoskeletal:         General: Normal range of motion.   Skin:     General: Skin is warm and dry.      Coloration: Skin is pale.      Findings: No rash.   Neurological:      General: No focal deficit present.      Mental Status: He is alert and oriented to person, place, and time.         ED Course               EKG Interpretation:      Interpreted by MIAH Mejia CNP  Time reviewed:1500   Symptoms at time of EKG: None   Rhythm: Normal sinus   Rate: Normal  Axis: Left Axis Deviation  Ectopy: None  Conduction: Right bundle branch block (complete) and Bifasicular  ST Segments/ T Waves: No ST-T wave changes and No acute ischemic changes  Q Waves: None  Comparison to prior: Unchanged from 08/31/2021    Clinical Impression: NSR with Right BBB and bifascicular block           Procedures              Results for " orders placed or performed during the hospital encounter of 09/23/22 (from the past 24 hour(s))   CBC with platelets differential    Narrative    The following orders were created for panel order CBC with platelets differential.  Procedure                               Abnormality         Status                     ---------                               -----------         ------                     CBC with platelets and d...[[id number]]  Abnormal            Final result                 Please view results for these tests on the individual orders.   Comprehensive metabolic panel   Result Value Ref Range    Sodium 128 (L) 133 - 144 mmol/L    Potassium 5.0 3.4 - 5.3 mmol/L    Chloride 100 94 - 109 mmol/L    Carbon Dioxide (CO2) 23 20 - 32 mmol/L    Anion Gap 5 3 - 14 mmol/L    Urea Nitrogen 20 7 - 30 mg/dL    Creatinine 1.72 (H) 0.66 - 1.25 mg/dL    Calcium 8.7 8.5 - 10.1 mg/dL    Glucose 103 (H) 70 - 99 mg/dL    Alkaline Phosphatase 118 40 - 150 U/L    AST 19 0 - 45 U/L    ALT 28 0 - 70 U/L    Protein Total 6.9 6.8 - 8.8 g/dL    Albumin 3.3 (L) 3.4 - 5.0 g/dL    Bilirubin Total 0.5 0.2 - 1.3 mg/dL    GFR Estimate 41 (L) >60 mL/min/1.73m2   Troponin I   Result Value Ref Range    Troponin I High Sensitivity 142 (HH) <79 ng/L   CBC with platelets and differential   Result Value Ref Range    WBC Count 7.5 4.0 - 11.0 10e3/uL    RBC Count 3.70 (L) 4.40 - 5.90 10e6/uL    Hemoglobin 12.6 (L) 13.3 - 17.7 g/dL    Hematocrit 35.4 (L) 40.0 - 53.0 %    MCV 96 78 - 100 fL    MCH 34.1 (H) 26.5 - 33.0 pg    MCHC 35.6 31.5 - 36.5 g/dL    RDW 13.1 10.0 - 15.0 %    Platelet Count 228 150 - 450 10e3/uL    % Neutrophils 70 %    % Lymphocytes 15 %    % Monocytes 13 %    % Eosinophils 2 %    % Basophils 0 %    % Immature Granulocytes 0 %    NRBCs per 100 WBC 0 <1 /100    Absolute Neutrophils 5.3 1.6 - 8.3 10e3/uL    Absolute Lymphocytes 1.1 0.8 - 5.3 10e3/uL    Absolute Monocytes 1.0 0.0 - 1.3 10e3/uL    Absolute Eosinophils 0.1 0.0 - 0.7  10e3/uL    Absolute Basophils 0.0 0.0 - 0.2 10e3/uL    Absolute Immature Granulocytes 0.0 <=0.4 10e3/uL    Absolute NRBCs 0.0 10e3/uL   XR Chest 2 Views    Narrative    CHEST TWO VIEWS    9/23/2022 3:42 PM     HISTORY: Near syncope    COMPARISON: 8/31/2021.      Impression    IMPRESSION: No acute cardiopulmonary disease.    YOVANY GUZMAN MD         SYSTEM ID:  [id number]   Asymptomatic COVID-19 Virus (Coronavirus) by PCR Nose    Specimen: Nose; Swab   Result Value Ref Range    SARS CoV2 PCR Negative Negative    Narrative    Testing was performed using the ruth  SARS-CoV-2 & Influenza A/B Assay on the ruth  [employer]  System.  This test should be ordered for the detection of SARS-COV-2 in individuals who meet SARS-CoV-2 clinical and/or epidemiological criteria. Test performance is unknown in asymptomatic patients.  This test is for in vitro diagnostic use under the FDA EUA for laboratories certified under CLIA to perform moderate and/or high complexity testing. This test has not been FDA cleared or approved.  A negative test does not rule out the presence of PCR inhibitors in the specimen or target RNA in concentration below the limit of detection for the assay. The possibility of a false negative should be considered if the patient's recent exposure or clinical presentation suggests COVID-19.  [facility] are certified under the Clinical Laboratory Improvement Amendments of 1988 (CLIA-88) as qualified to perform moderate and/or high complexity laboratory testing.   Troponin I (now)   Result Value Ref Range    Troponin I High Sensitivity 134 (HH) <79 ng/L       Medications - No data to display    3:30pm reviewed lab findings with patient and wife. Concerning for NSTEMI. (elevated troponin, no obvious EKG changes). Patient remains asymptomatic here and free of chest pain.    Assessments & Plan (with Medical Decision Making)  77 year old male with history of hypertension, CKD stage III, CAD/s/p-CABG x6,  hyperlipidemia, CVA (embolic), hyponatremia (on fluid restriction), anemia, GERD, irritable bowel syndrome who presents for evaluation of hypotension and near syncope today. At approximately 1:30pm today patient went to stand up from his chair and felt dizzy/lightheaded and fell back in his chair. Wife was folding clothes and heard a thump.  She came to the living room and saw patient sitting back in his chair. He looked pale and said he was lightheaded. She took his Blood Pressure (76/39 left and 87/51 right).   Patient had Covid-19 booster yesterday.  On exam patient is afebrile. Normotensive. No tachycardia. Lung sounds CTA. No  Hypoxia. No LE edema.   EKG Sinus Rhythm with Right BBB/bifasicular block, appears unchanged compared to prior. No leukocytosis. Hgb 12.6 (which is up from 10.0 last year). Sodium is low at 128 (prior history of hyponatremia, Sodium was 135 in May). Remainder of electrolytes are normal. Creat 1.72 and GFR 41. (creat up from 1.46 in May). Troponin elevated at 146, repeat 134. CXR is normal.  I discussed the lab findings with patient and wife. I am concerned for NSTEMI and recommend admission where there is cardiology. Patient and wife in agreement. Asymptomatic Covid-19 test obtained and is negative.  Patient has remained stable-asymptomatic, denies chest pain or shortness of breath.    Unfortunately, there are no available beds within the [facility] or [facility] system.  There is [facility] available bed at [facility].  I spoke with Dr. Rose, who accepts patient for admission/transfer.  Normal vital signs. He has been up and ambulated without return of dizziness/lightheadedness.     Vitals:    09/23/22 1745 09/23/22 1815 09/23/22 1830 09/23/22 1845   BP: 125/71 110/83  (!) 147/78   Pulse: 68  67 72   Resp: 25 16 15 24   Temp:       TempSrc:       SpO2: 96% 95% 98% 97%   Weight:       Height:           New Prescriptions    No medications on file       Final diagnoses:   Near  syncope   NSTEMI (non-ST elevated myocardial infarction) (H)   Hyponatremia   Lightheadedness       9/23/2022   [facility] EMERGENCY DEPT     Delmi Freire APRN CNP  09/23/22 190

## 2022-09-25 ENCOUNTER — MYC MEDICAL ADVICE (OUTPATIENT)
Dept: CARDIOLOGY | Facility: CLINIC | Age: 77
End: 2022-09-25

## 2022-09-25 ENCOUNTER — MYC MEDICAL ADVICE (OUTPATIENT)
Dept: INTERNAL MEDICINE | Facility: CLINIC | Age: 77
End: 2022-09-25

## 2022-09-25 DIAGNOSIS — K21.9 GASTROESOPHAGEAL REFLUX DISEASE WITHOUT ESOPHAGITIS: ICD-10-CM

## 2022-09-26 NOTE — TELEPHONE ENCOUNTER
Patient has canceled the appointment with Dr. Lundberg.    Visit type changed to ED/Hosp follow up with .

## 2022-09-26 NOTE — TELEPHONE ENCOUNTER
Responded to patient via YOUnite message. Offered office visit with Dr. Felipe on 10/3/22 at 1pm.

## 2022-09-28 ENCOUNTER — TRANSFERRED RECORDS (OUTPATIENT)
Dept: INTERNAL MEDICINE | Facility: CLINIC | Age: 77
End: 2022-09-28

## 2022-09-28 RX ORDER — OMEPRAZOLE 40 MG/1
CAPSULE, DELAYED RELEASE ORAL
Qty: 90 CAPSULE | Refills: 1 | Status: SHIPPED | OUTPATIENT
Start: 2022-09-28 | End: 2023-03-16

## 2022-09-28 NOTE — TELEPHONE ENCOUNTER
Prilosec  Prescription approved per [facility] Refill Protocol.

## 2022-09-30 DIAGNOSIS — E87.6 HYPOKALEMIA: ICD-10-CM

## 2022-09-30 DIAGNOSIS — I10 ESSENTIAL HYPERTENSION, BENIGN: ICD-10-CM

## 2022-09-30 RX ORDER — POTASSIUM CHLORIDE 750 MG/1
30 TABLET, EXTENDED RELEASE ORAL 2 TIMES DAILY
Qty: 540 TABLET | Refills: 0 | Status: SHIPPED | OUTPATIENT
Start: 2022-09-30 | End: 2023-01-02

## 2022-09-30 RX ORDER — LOSARTAN POTASSIUM 100 MG/1
100 TABLET ORAL DAILY
Qty: 90 TABLET | Refills: 0 | Status: SHIPPED | OUTPATIENT
Start: 2022-09-30 | End: 2022-10-12

## 2022-09-30 NOTE — TELEPHONE ENCOUNTER
Received refill request for:  Losartan    Future Appointments   Date Time Provider Department Center   10/3/2022  1:00 PM Stevie Felipe MD [facility]   10/12/2022 11:20 AM Jonas West MD [facility] Cardiology Refill Guideline reviewed.  Medication meets criteria for refill.    Felisha Dodson RN, BSN  09/30/22 at 10:47 AM

## 2022-10-03 ENCOUNTER — OFFICE VISIT (OUTPATIENT)
Dept: CARDIOLOGY | Facility: CLINIC | Age: 77
End: 2022-10-03
Payer: MEDICARE

## 2022-10-03 VITALS
HEIGHT: 68 IN | SYSTOLIC BLOOD PRESSURE: 130 MMHG | WEIGHT: 176.5 LBS | DIASTOLIC BLOOD PRESSURE: 70 MMHG | BODY MASS INDEX: 26.75 KG/M2 | OXYGEN SATURATION: 97 % | HEART RATE: 62 BPM

## 2022-10-03 DIAGNOSIS — I25.10 CORONARY ARTERY DISEASE INVOLVING NATIVE CORONARY ARTERY OF NATIVE HEART WITHOUT ANGINA PECTORIS: ICD-10-CM

## 2022-10-03 DIAGNOSIS — Z95.1 S/P CABG X 6: Primary | ICD-10-CM

## 2022-10-03 DIAGNOSIS — I65.23 CAROTID STENOSIS, BILATERAL: ICD-10-CM

## 2022-10-03 DIAGNOSIS — I63.10 CEREBROVASCULAR ACCIDENT (CVA) DUE TO EMBOLISM OF PRECEREBRAL ARTERY (H): ICD-10-CM

## 2022-10-03 DIAGNOSIS — I10 BENIGN ESSENTIAL HTN: ICD-10-CM

## 2022-10-03 PROCEDURE — 99214 OFFICE O/P EST MOD 30 MIN: CPT | Performed by: INTERNAL MEDICINE

## 2022-10-03 RX ORDER — ASPIRIN 325 MG
325 TABLET ORAL DAILY
COMMUNITY

## 2022-10-03 NOTE — PROGRESS NOTES
HISTORY OF PRESENT ILLNESS:  No LOC... stood up then sat down. Troponin levels were.  BP was low.  No longer that    Orders this Visit:  No orders of the defined types were placed in this encounter.    Orders Placed This Encounter   Medications     aspirin (ASA) 325 MG tablet     Sig: Take 325 mg by mouth daily     Medications Discontinued During This Encounter   Medication Reason     amLODIPine (NORVASC) 10 MG tablet      aspirin (ASA) 81 MG EC tablet        No diagnosis found.    CURRENT MEDICATIONS:  Current Outpatient Medications   Medication Sig Dispense Refill     acetaminophen (TYLENOL) 325 MG tablet Take 650 mg by mouth daily Taking 500mg twice daily       alirocumab (PRALUENT) 150 MG/ML injectable pen Inject 1 mL (150 mg) Subcutaneous every 14 days 6 mL 3     amitriptyline (ELAVIL) 25 MG tablet TAKE 1 TABLET TWICE A DAY (Patient taking differently: Take 50 mg by mouth At Bedtime) 180 tablet 3     aspirin (ASA) 325 MG tablet Take 325 mg by mouth daily       carvedilol (COREG) 25 MG tablet Take 1 tablet (25 mg) by mouth 2 times daily (with meals) 180 tablet 3     cilostazol (PLETAL) 100 MG tablet TAKE 1 TABLET TWICE A DAY (Patient taking differently: Take 100 mg by mouth 2 times daily) 180 tablet 3     diphenhydrAMINE-acetaminophen (TYLENOL PM)  MG tablet Take 2 tablets by mouth At Bedtime       gabapentin (NEURONTIN) 300 MG capsule Take 1 capsule (300 mg) by mouth 2 times daily 180 capsule 3     isosorbide mononitrate (IMDUR) 60 MG 24 hr tablet Take 1 tablet (60 mg) by mouth daily 90 tablet 1     losartan (COZAAR) 100 MG tablet Take 1 tablet (100 mg) by mouth daily 90 tablet 0     Melatonin 10 MG TABS tablet Take 10 mg by mouth At Bedtime       omeprazole (PRILOSEC) 40 MG DR capsule TAKE 1 CAPSULE DAILY 90 capsule 1     potassium chloride ER (KLOR-CON M) 10 MEQ CR tablet Take 3 tablets (30 mEq) by mouth 2 times daily 540 tablet 0     albuterol (PROAIR HFA/PROVENTIL HFA/VENTOLIN HFA) 108 (90 Base)  "MCG/ACT inhaler Inhale 2 puffs into the lungs every 4 hours as needed for shortness of breath / dyspnea or wheezing 8 g 0     ondansetron (ZOFRAN) 4 MG tablet Take 1 tablet (4 mg) by mouth as needed for nausea (Patient not taking: Reported on 10/3/2022) 90 tablet 0       ALLERGIES     Allergies   Allergen Reactions     Statins [Hmg-Coa-R Inhibitors] Other (See Comments)     Rhabdo  Same as \"statins\"     Gemfibrozil      Resting thigh-calf pain     Sulfa Drugs      Reacted as a child     Zetia [Ezetimibe]      Muscle cramping       PAST MEDICAL, SURGICAL, FAMILY, SOCIAL HISTORY:  History was reviewed and updated as needed, see medical record.    Review of Systems:  A 12-point review of systems was completed, see medical record for detailed review of systems information.    Physical Exam:  Vitals: /70 (BP Location: Left arm, Patient Position: Sitting, Cuff Size: Adult Regular)   Pulse 62   Ht 1.727 m (5' 8\")   Wt 80.1 kg (176 lb 8 oz)   SpO2 97%   BMI 26.84 kg/m      Constitutional:           Skin:           Head:           Eyes:           ENT:           Neck:           Chest:           Cardiac:                    Abdomen:           Vascular:                                        Extremities and Back:           Neurological:           ASSESSMENT: stable        RECOMMENDATIONS:  No further episodes   Follow-up in oney year      Recent Lab Results:  LIPID RESULTS:  Lab Results   Component Value Date    CHOL 161 03/06/2020    HDL 53 03/06/2020    LDL 95 03/06/2020    TRIG 65 03/06/2020    CHOLHDLRATIO 5.2 (H) 07/23/2015       LIVER ENZYME RESULTS:  Lab Results   Component Value Date    AST 19 09/23/2022    AST 12 07/02/2021    ALT 28 09/23/2022    ALT 21 07/02/2021       CBC RESULTS:  Lab Results   Component Value Date    WBC 7.5 09/23/2022    WBC 5.8 07/02/2021    RBC 3.70 (L) 09/23/2022    RBC 3.52 (L) 07/02/2021    HGB 12.6 (L) 09/23/2022    HGB 11.7 (L) 07/02/2021    HCT 35.4 (L) 09/23/2022    HCT 32.3 " (L) 07/02/2021    MCV 96 09/23/2022    MCV 92 07/02/2021    MCH 34.1 (H) 09/23/2022    MCH 33.2 (H) 07/02/2021    MCHC 35.6 09/23/2022    MCHC 36.2 07/02/2021    RDW 13.1 09/23/2022    RDW 12.4 07/02/2021     09/23/2022     07/02/2021       BMP RESULTS:  Lab Results   Component Value Date     (L) 09/23/2022     (L) 07/02/2021    POTASSIUM 5.0 09/23/2022    POTASSIUM 4.9 07/02/2021    CHLORIDE 100 09/23/2022    CHLORIDE 91 (L) 07/02/2021    CO2 23 09/23/2022    CO2 23 07/02/2021    ANIONGAP 5 09/23/2022    ANIONGAP 7 07/02/2021     (H) 09/23/2022     (H) 07/02/2021    BUN 20 09/23/2022    BUN 13 07/02/2021    CR 1.72 (H) 09/23/2022    CR 1.31 (H) 07/02/2021    GFRESTIMATED 41 (L) 09/23/2022    GFRESTIMATED 53 (L) 07/02/2021    GFRESTBLACK 61 07/02/2021    RAHEEM 8.7 09/23/2022    RAHEEM 8.9 07/02/2021        A1C RESULTS:  Lab Results   Component Value Date    A1C 5.6 03/07/2014       INR RESULTS:  Lab Results   Component Value Date    INR 1.10 08/31/2021    INR 1.09 09/03/2020    INR 1.08 09/01/2020       We greatly appreciate the opportunity to be involved in the care of your patient, Michele Vance.    Sincerely,  Stevie Felipe MD      CC  No referring provider defined for this encounter.

## 2022-10-03 NOTE — PROGRESS NOTES
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    On 09/24/2022, Mr. Vance arose from a seated position very rapidly and then fell back into his chair.  He was lightheaded, and his wife checked his blood pressure at that time and noted to be in the 70-80 range systolic.  He was brought to the Emergency Room and was entirely back to normal.  His blood pressure on evaluation in the Emergency Room was 114/58 with a heart rate of 72.  His ECG was unchanged with sinus rhythm and left anterior fascicular block.  The patient's comprehensive metabolic profile, CBC, chest x-ray were all unchanged.  His high sensitivity troponin level was mildly elevated at 142, and the [facility] Emergency Room doctors elected to send the patient to the local [facility] to be admitted.    Troponin levels were trended at the [facility] and noted to be normal.  No further cardiac testing was performed and the patient remained normotensive with no significant merced or tachy dysrhythmias.  The patient was discharged and told to follow up in our clinic.    Since then, the patient's wife has checked his blood pressure on multiple occasions at home.  There have been no further symptoms.  According to the blood pressure report she brought to me, his blood pressures dropped about 10 mm systolic while standing, but there have been no symptoms.  He has never had syncope and usually takes care when arising quickly from seated or standing positions, but did not do so during the episode just before the Emergency Room visit.    There has been no chest, arm, neck,  jaw or back discomfort.  There has been no new focal neurologic deficits.  The patient has been compliant with all medical therapy.    PHYSICAL EXAMINATION:    GENERAL:  Exam today demonstrates a very pleasant and cooperative 77-year-old man.  VITAL SIGNS:  His blood pressure is 130/70, his heart rate 62.  His height is 1.73 meters, his weight is 80 kg, BMI is 26.8.  RESPIRATORY:  His lungs are clear to percussion and auscultation.  CARDIOVASCULAR:  Shows a normal S1 with a normal S2.  There is no S3.  There is no murmur, rub or click.  His pulses are full throughout.    MEDICATIONS:    1.  Alirocumab (Praluent) 150 every 14 days.  2.  Amitriptyline 50 mg daily.  3.  Aspirin 325 daily.  4.  Carvedilol 25 mg b.i.d.   5.  Cilostazol (Pletal) 100 mg b.i.d.  6.  Gabapentin 300 mg twice a day.  7.  Isosorbide mononitrate 60 mg daily.  8.  Losartan 100 mg daily.  9.  Omeprazole 40 mg daily.  10.  Potassium 30 mEq twice a day.  11.  Albuterol inhaler     PAST MEDICAL HISTORY:    1.  Coronary artery disease.  a.  Bypass surgery 2014 with LIMA graft to LAD, saphenous venous bypass graft to first diagonal branch, saphenous venous bypass graft to second diagonal branch, saphenous venous bypass graft to the 1st marginal branch, saphenous venous bypass graft to second marginal branch, and saphenous venous bypass graft to posterior descending branch.  b.  No angina since bypass surgery.  2.  Atherosclerotic peripheral vascular disease.  a.  Old stroke -- status post right carotid stent with balloon PTA for in-stent restenosis 09/2020.  Followed by Dr. Cuellar  b.  Renal artery stenosis -- status post stent implantation for refractory hypertension, now well-controlled.  c.  Status post superficial femoral artery PTA/stenting -- followed by Dr. Epps.  3.  Status post cholecystectomy 07/2021.  4.  Hypertension.  5.  Hyponatremia.  6.  Dyslipidemia -- statin intolerant.  On alirocumab (Praluent).  7. CKDZ Cr 1.7    LABORATORY  STUDIES:  I reviewed the patient's lab tests and his ECG from his evaluation on 2022.  He has a sinus rhythm with a right bundle branch block and left anterior fascicular block, unchanged from his previous studies.  His most recent hemoglobin was 12.6.    ASSESSMENT:  Mr. Cid had 1 isolated episode of lightheadedness without syncope in association with getting up rapidly from a seated position.  In addition to carvedilol, he is on multiple vasodilators and may have blunted postural reflexes.  Both he and his wife are very hesitant to undergo further cardiac testing and/or intervention for the isolated episode, and I agree with their concerns.  I would hold off on further cardiac testing at this time, but have a low threshold for repeat testing should he have any more spells.    In the meantime, the patient should avoid arising rapidly from seated or lying down positions.    RECOMMENDATIONS:    1.  No further testing at this time.  2.  Continue present medical therapy.  3.  Followup visit with me in about 1 year, earlier if any new symptoms.    We greatly appreciate the opportunity to care for your patient, Mr. Michele Cid.    Stevie Felipe MD     cc:  [place] [address]    Stevie Felipe MD        D: 10/03/2022   T: 10/03/2022   MT: SERAFIN    Name:     MICHELE CID  MRN:      [id number]        Account:      [id number]   :      1945           Service Date: 10/03/2022       Document: [id number]

## 2022-10-03 NOTE — LETTER
10/3/2022    Jonas West MD  [address] [facility] [address]    RE: Michele Vance       Dear Colleague,     I had the pleasure of seeing Michele Vance in the [facility].  HISTORY OF PRESENT ILLNESS:  No LOC... stood up then sat down. Troponin levels were.  BP was low.  No longer that    Orders this Visit:  No orders of the defined types were placed in this encounter.    Orders Placed This Encounter   Medications     aspirin (ASA) 325 MG tablet     Sig: Take 325 mg by mouth daily     Medications Discontinued During This Encounter   Medication Reason     amLODIPine (NORVASC) 10 MG tablet      aspirin (ASA) 81 MG EC tablet        No diagnosis found.    CURRENT MEDICATIONS:  Current Outpatient Medications   Medication Sig Dispense Refill     acetaminophen (TYLENOL) 325 MG tablet Take 650 mg by mouth daily Taking 500mg twice daily       alirocumab (PRALUENT) 150 MG/ML injectable pen Inject 1 mL (150 mg) Subcutaneous every 14 days 6 mL 3     amitriptyline (ELAVIL) 25 MG tablet TAKE 1 TABLET TWICE A DAY (Patient taking differently: Take 50 mg by mouth At Bedtime) 180 tablet 3     aspirin (ASA) 325 MG tablet Take 325 mg by mouth daily       carvedilol (COREG) 25 MG tablet Take 1 tablet (25 mg) by mouth 2 times daily (with meals) 180 tablet 3     cilostazol (PLETAL) 100 MG tablet TAKE 1 TABLET TWICE A DAY (Patient taking differently: Take 100 mg by mouth 2 times daily) 180 tablet 3     diphenhydrAMINE-acetaminophen (TYLENOL PM)  MG tablet Take 2 tablets by mouth At Bedtime       gabapentin (NEURONTIN) 300 MG capsule Take 1 capsule (300 mg) by mouth 2 times daily 180 capsule 3     isosorbide mononitrate (IMDUR) 60 MG 24 hr tablet Take 1 tablet (60 mg) by mouth daily 90 tablet 1     losartan (COZAAR) 100 MG tablet Take 1 tablet (100 mg) by mouth daily 90 tablet 0     Melatonin 10 MG TABS tablet Take 10 mg by mouth At Bedtime       omeprazole (PRILOSEC) 40 MG DR capsule TAKE 1 CAPSULE  "DAILY 90 capsule 1     potassium chloride ER (KLOR-CON M) 10 MEQ CR tablet Take 3 tablets (30 mEq) by mouth 2 times daily 540 tablet 0     albuterol (PROAIR HFA/PROVENTIL HFA/VENTOLIN HFA) 108 (90 Base) MCG/ACT inhaler Inhale 2 puffs into the lungs every 4 hours as needed for shortness of breath / dyspnea or wheezing 8 g 0     ondansetron (ZOFRAN) 4 MG tablet Take 1 tablet (4 mg) by mouth as needed for nausea (Patient not taking: Reported on 10/3/2022) 90 tablet 0       ALLERGIES     Allergies   Allergen Reactions     Statins [Hmg-Coa-R Inhibitors] Other (See Comments)     Rhabdo  Same as \"statins\"     Gemfibrozil      Resting thigh-calf pain     Sulfa Drugs      Reacted as a child     Zetia [Ezetimibe]      Muscle cramping       PAST MEDICAL, SURGICAL, FAMILY, SOCIAL HISTORY:  History was reviewed and updated as needed, see medical record.    Review of Systems:  A 12-point review of systems was completed, see medical record for detailed review of systems information.    Physical Exam:  Vitals: /70 (BP Location: Left arm, Patient Position: Sitting, Cuff Size: Adult Regular)   Pulse 62   Ht 1.727 m (5' 8\")   Wt 80.1 kg (176 lb 8 oz)   SpO2 97%   BMI 26.84 kg/m      Constitutional:           Skin:           Head:           Eyes:           ENT:           Neck:           Chest:           Cardiac:                    Abdomen:           Vascular:                                        Extremities and Back:           Neurological:           ASSESSMENT: stable        RECOMMENDATIONS:  No further episodes   Follow-up in oney year      Recent Lab Results:  LIPID RESULTS:  Lab Results   Component Value Date    CHOL 161 03/06/2020    HDL 53 03/06/2020    LDL 95 03/06/2020    TRIG 65 03/06/2020    CHOLHDLRATIO 5.2 (H) 07/23/2015       LIVER ENZYME RESULTS:  Lab Results   Component Value Date    AST 19 09/23/2022    AST 12 07/02/2021    ALT 28 09/23/2022    ALT 21 07/02/2021       CBC RESULTS:  Lab Results   Component " Value Date    WBC 7.5 09/23/2022    WBC 5.8 07/02/2021    RBC 3.70 (L) 09/23/2022    RBC 3.52 (L) 07/02/2021    HGB 12.6 (L) 09/23/2022    HGB 11.7 (L) 07/02/2021    HCT 35.4 (L) 09/23/2022    HCT 32.3 (L) 07/02/2021    MCV 96 09/23/2022    MCV 92 07/02/2021    MCH 34.1 (H) 09/23/2022    MCH 33.2 (H) 07/02/2021    MCHC 35.6 09/23/2022    MCHC 36.2 07/02/2021    RDW 13.1 09/23/2022    RDW 12.4 07/02/2021     09/23/2022     07/02/2021       BMP RESULTS:  Lab Results   Component Value Date     (L) 09/23/2022     (L) 07/02/2021    POTASSIUM 5.0 09/23/2022    POTASSIUM 4.9 07/02/2021    CHLORIDE 100 09/23/2022    CHLORIDE 91 (L) 07/02/2021    CO2 23 09/23/2022    CO2 23 07/02/2021    ANIONGAP 5 09/23/2022    ANIONGAP 7 07/02/2021     (H) 09/23/2022     (H) 07/02/2021    BUN 20 09/23/2022    BUN 13 07/02/2021    CR 1.72 (H) 09/23/2022    CR 1.31 (H) 07/02/2021    GFRESTIMATED 41 (L) 09/23/2022    GFRESTIMATED 53 (L) 07/02/2021    GFRESTBLACK 61 07/02/2021    RAHEEM 8.7 09/23/2022    RAHEEM 8.9 07/02/2021        A1C RESULTS:  Lab Results   Component Value Date    A1C 5.6 03/07/2014       INR RESULTS:  Lab Results   Component Value Date    INR 1.10 08/31/2021    INR 1.09 09/03/2020    INR 1.08 09/01/2020       We greatly appreciate the opportunity to be involved in the care of your patient, Michele Vance.    Sincerely,  Stevie Felipe MD      CC  No referring provider defined for this encounter.                                                                       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    On  09/24/2022, Mr. Vance arose from a seated position very rapidly and then fell back into his chair.  He was lightheaded, and his wife checked his blood pressure at that time and noted to be in the 70-80 range systolic.  He was brought to the Emergency Room and was entirely back to normal.  His blood pressure on evaluation in the Emergency Room was 114/58 with a heart rate of 72.  His ECG was unchanged with sinus rhythm and left anterior fascicular block.  The patient's comprehensive metabolic profile, CBC, chest x-ray were all unchanged.  His high sensitivity troponin level was mildly elevated at 142, and the [facility] Emergency Room doctors elected to send the patient to the local [facility] to be admitted.    Troponin levels were trended at the [facility] and noted to be normal.  No further cardiac testing was performed and the patient remained normotensive with no significant merced or tachy dysrhythmias.  The patient was discharged and told to follow up in our clinic.    Since then, the patient's wife has checked his blood pressure on multiple occasions at home.  There have been no further symptoms.  According to the blood pressure report she brought to me, his blood pressures dropped about 10 mm systolic while standing, but there have been no symptoms.  He has never had syncope and usually takes care when arising quickly from seated or standing positions, but did not do so during the episode just before the Emergency Room visit.    There has been no chest, arm, neck, jaw or back discomfort.  There has been no new focal neurologic deficits.  The patient has been compliant with all medical therapy.    PHYSICAL EXAMINATION:    GENERAL:  Exam today demonstrates a very pleasant and cooperative 77-year-old man.  VITAL SIGNS:  His blood pressure is 130/70, his heart rate 62.  His height is 1.73 meters, his weight is 80 kg, BMI is 26.8.  RESPIRATORY:  His lungs are clear to percussion and  auscultation.  CARDIOVASCULAR:  Shows a normal S1 with a normal S2.  There is no S3.  There is no murmur, rub or click.  His pulses are full throughout.    MEDICATIONS:    1.  Alirocumab (Praluent) 150 every 14 days.  2.  Amitriptyline 50 mg daily.  3.  Aspirin 325 daily.  4.  Carvedilol 25 mg b.i.d.   5.  Cilostazol (Pletal) 100 mg b.i.d.  6.  Gabapentin 300 mg twice a day.  7.  Isosorbide mononitrate 60 mg daily.  8.  Losartan 100 mg daily.  9.  Omeprazole 40 mg daily.  10.  Potassium 30 mEq twice a day.  11.  Albuterol inhaler     PAST MEDICAL HISTORY:    1.  Coronary artery disease.  a.  Bypass surgery 2014 with LIMA graft to LAD, saphenous venous bypass graft to first diagonal branch, saphenous venous bypass graft to second diagonal branch, saphenous venous bypass graft to the 1st marginal branch, saphenous venous bypass graft to second marginal branch, and saphenous venous bypass graft to posterior descending branch.  b.  No angina since bypass surgery.  2.  Atherosclerotic peripheral vascular disease.  a.  Old stroke -- status post right carotid stent with balloon PTA for in-stent restenosis 09/2020.  Followed by Dr. Cuellar  b.  Renal artery stenosis -- status post stent implantation for refractory hypertension, now well-controlled.  c.  Status post superficial femoral artery PTA/stenting -- followed by Dr. Epps.  3.  Status post cholecystectomy 07/2021.  4.  Hypertension.  5.  Hyponatremia.  6.  Dyslipidemia -- statin intolerant.  On alirocumab (Praluent).  7. CKDZ Cr 1.7    LABORATORY STUDIES:  I reviewed the patient's lab tests and his ECG from his evaluation on 09/24/2022.  He has a sinus rhythm with a right bundle branch block and left anterior fascicular block, unchanged from his previous studies.  His most recent hemoglobin was 12.6.    ASSESSMENT:  Mr. Vance had 1 isolated episode of lightheadedness without syncope in association with getting up rapidly from a seated position.  In addition to  carvedilol, he is on multiple vasodilators and may have blunted postural reflexes.  Both he and his wife are very hesitant to undergo further cardiac testing and/or intervention for the isolated episode, and I agree with their concerns.  I would hold off on further cardiac testing at this time, but have a low threshold for repeat testing should he have any more spells.    In the meantime, the patient should avoid arising rapidly from seated or lying down positions.    RECOMMENDATIONS:    1.  No further testing at this time.  2.  Continue present medical therapy.  3.  Followup visit with me in about 1 year, earlier if any new symptoms.    We greatly appreciate the opportunity to care for your patient, Mr. Michele Cid.    Stevie Felipe MD     cc:  [place] [address]    Stevie Felipe MD        D: 10/03/2022   T: 10/03/2022   MT: LARISSAG    Name:     MICHELE CID  MRN:      [id number]        Account:      [id number]   :      1945           Service Date: 10/03/2022       Document: [id number]      Thank you for allowing me to participate in the care of your patient.      Sincerely,     Stevie Felipe MD     [facility]  cc:   No referring provider defined for this encounter.

## 2022-10-12 ENCOUNTER — OFFICE VISIT (OUTPATIENT)
Dept: INTERNAL MEDICINE | Facility: CLINIC | Age: 77
End: 2022-10-12
Payer: MEDICARE

## 2022-10-12 VITALS
WEIGHT: 175.6 LBS | BODY MASS INDEX: 26.7 KG/M2 | TEMPERATURE: 98 F | DIASTOLIC BLOOD PRESSURE: 68 MMHG | RESPIRATION RATE: 18 BRPM | HEART RATE: 81 BPM | SYSTOLIC BLOOD PRESSURE: 114 MMHG | OXYGEN SATURATION: 96 %

## 2022-10-12 DIAGNOSIS — N18.31 STAGE 3A CHRONIC KIDNEY DISEASE (H): ICD-10-CM

## 2022-10-12 DIAGNOSIS — Z00.00 MEDICARE ANNUAL WELLNESS VISIT, SUBSEQUENT: Primary | ICD-10-CM

## 2022-10-12 DIAGNOSIS — R06.09 DOE (DYSPNEA ON EXERTION): ICD-10-CM

## 2022-10-12 DIAGNOSIS — E87.1 HYPONATREMIA: ICD-10-CM

## 2022-10-12 DIAGNOSIS — I10 BENIGN ESSENTIAL HTN: ICD-10-CM

## 2022-10-12 DIAGNOSIS — I70.1 RENAL ARTERY STENOSIS (H): ICD-10-CM

## 2022-10-12 DIAGNOSIS — I25.10 ASCVD (ARTERIOSCLEROTIC CARDIOVASCULAR DISEASE): ICD-10-CM

## 2022-10-12 DIAGNOSIS — I10 ESSENTIAL HYPERTENSION, BENIGN: ICD-10-CM

## 2022-10-12 DIAGNOSIS — J43.1 PANLOBULAR EMPHYSEMA (H): ICD-10-CM

## 2022-10-12 DIAGNOSIS — I95.1 ORTHOSTATIC HYPOTENSION: ICD-10-CM

## 2022-10-12 DIAGNOSIS — G43.009 MIGRAINE WITHOUT AURA AND WITHOUT STATUS MIGRAINOSUS, NOT INTRACTABLE: ICD-10-CM

## 2022-10-12 PROCEDURE — G0439 PPPS, SUBSEQ VISIT: HCPCS | Performed by: INTERNAL MEDICINE

## 2022-10-12 PROCEDURE — 99214 OFFICE O/P EST MOD 30 MIN: CPT | Mod: 25 | Performed by: INTERNAL MEDICINE

## 2022-10-12 RX ORDER — LOSARTAN POTASSIUM 100 MG/1
100 TABLET ORAL DAILY
Qty: 90 TABLET | Refills: 3 | Status: SHIPPED | OUTPATIENT
Start: 2022-10-12 | End: 2023-12-18

## 2022-10-12 RX ORDER — CARVEDILOL 25 MG/1
25 TABLET ORAL 2 TIMES DAILY WITH MEALS
Qty: 180 TABLET | Refills: 3 | Status: SHIPPED | OUTPATIENT
Start: 2022-10-12 | End: 2023-11-07

## 2022-10-12 RX ORDER — ISOSORBIDE MONONITRATE 60 MG/1
60 TABLET, EXTENDED RELEASE ORAL DAILY
Qty: 90 TABLET | Refills: 3 | Status: SHIPPED | OUTPATIENT
Start: 2022-10-12 | End: 2023-11-07

## 2022-10-12 ASSESSMENT — ENCOUNTER SYMPTOMS
COUGH: 1
HEMATURIA: 0
HEADACHES: 0
SHORTNESS OF BREATH: 1
EYE PAIN: 0
ABDOMINAL PAIN: 0
CONSTIPATION: 1
PALPITATIONS: 0
JOINT SWELLING: 0
NAUSEA: 0
HEMATOCHEZIA: 0
ARTHRALGIAS: 1
DIZZINESS: 0
PARESTHESIAS: 0
DIARRHEA: 0
SORE THROAT: 0
CHILLS: 0
FREQUENCY: 0
WEAKNESS: 1
NERVOUS/ANXIOUS: 0
FEVER: 0
MYALGIAS: 1
DYSURIA: 0
HEARTBURN: 0

## 2022-10-12 ASSESSMENT — ACTIVITIES OF DAILY LIVING (ADL)
CURRENT_FUNCTION: TRANSPORTATION REQUIRES ASSISTANCE
CURRENT_FUNCTION: PREPARING MEALS REQUIRES ASSISTANCE
CURRENT_FUNCTION: HOUSEWORK REQUIRES ASSISTANCE

## 2022-10-12 ASSESSMENT — PAIN SCALES - GENERAL: PAINLEVEL: NO PAIN (0)

## 2022-10-12 NOTE — PROGRESS NOTES
"SUBJECTIVE:   Michele is a 77 year old who presents for Preventive Visit.    Patient has been advised of split billing requirements and indicates understanding: Yes  Are you in the first 12 months of your Medicare coverage?  No    Healthy Habits:     In general, how would you rate your overall health?  Good    Frequency of exercise:  1 day/week    Duration of exercise:  Less than 15 minutes    Do you usually eat at least 4 servings of fruit and vegetables a day, include whole grains    & fiber and avoid regularly eating high fat or \"junk\" foods?  No    Taking medications regularly:  Yes    Medication side effects:  None    Ability to successfully perform activities of daily living:  Transportation requires assistance, preparing meals requires assistance and housework requires assistance    Home Safety:  No safety concerns identified    Hearing Impairment:  No hearing concerns    In the past 6 months, have you been bothered by leaking of urine?  No    In general, how would you rate your overall mental or emotional health?  Good      PHQ-2 Total Score: 0    Additional concerns today:  No        ER visit with low bp lightheaded, elevated troponin. Trended down at [facility] [place]. Stable when saw cardiology here.    Had a SI injection yesterday to help his back and hip.      Sodium was a little low at 127, chronically low sodium.   Drinking 76 ounces and will check again today.      BP shows some orthostatic changes.        Do you feel safe in your environment? Yes    Have you ever done Advance Care Planning? (For example, a Health Directive, POLST, or a discussion with a medical provider or your loved ones about your wishes): Yes, advance care planning is on file.    Fall risk  Fallen 2 or more times in the past year?: Yes  Any fall with injury in the past year?: No  1 fall the other day, tripped on the bird.    Cognitive Screening   1) Repeat 3 items (Leader, Season, Table)    2) Clock draw: NORMAL  3) 3 item recall: Recalls 1 " object   Results: NORMAL clock, 1-2 items recalled: COGNITIVE IMPAIRMENT LESS LIKELY    Mini-CogTM Copyright S Jovanna. Licensed by the author for use in [facility]; reprinted with permission (matthew@[facility]). All rights reserved.      Do you have sleep apnea, excessive snoring or daytime drowsiness?: no    Reviewed and updated as needed this visit by clinical staff    Allergies  Meds              Reviewed and updated as needed this visit by Provider                 Social History     Tobacco Use     Smoking status: Former     Packs/day: 2.50     Years: 20.00     Pack years: 50.00     Types: Cigarettes     Quit date: 2000     Years since quittin.7     Smokeless tobacco: Never   Substance Use Topics     Alcohol use: No     Alcohol/week: 0.0 standard drinks       Alcohol Use 10/12/2022   Prescreen: >3 drinks/day or >7 drinks/week? Not Applicable   Prescreen: >3 drinks/day or >7 drinks/week? -       Current providers sharing in care for this patient include:   Patient Care Team:  Jonas West MD as PCP - General (Internal Medicine)  Benito Hale MD (Inactive) as Referring Physician (Surgery)  Micheal Hogan MD as MD (Internal Medicine)  Mikie Wise MD as MD (Cardiology)  Yusuf Xiong MD as MD (Orthopaedic Surgery)  Jonas West MD as Assigned PCP  Shola Cuellar MD as Assigned Neuroscience Provider  Anthony Epps MD as Assigned Surgical Provider  Stevie Felipe MD as Assigned Heart and Vascular Provider    The following health maintenance items are reviewed in Epic and correct as of today:  Health Maintenance   Topic Date Due     ANNUAL REVIEW OF  ORDERS  Never done     HEPATITIS B IMMUNIZATION (1 of 3 - 3-dose series) Never done     MEDICARE ANNUAL WELLNESS VISIT  2018     MICROALBUMIN  2020     LIPID  2021     DTAP/TDAP/TD IMMUNIZATION (2 - Td or Tdap) 2021     COLORECTAL CANCER SCREENING  2023     BMP   "09/23/2023     HEMOGLOBIN  09/23/2023     FALL RISK ASSESSMENT  10/12/2023     ADVANCE CARE PLANNING  11/02/2026     SPIROMETRY  Completed     HEPATITIS C SCREENING  Completed     COPD ACTION PLAN  Completed     MIGRAINE ACTION PLAN  Completed     PHQ-2 (once per calendar year)  Completed     INFLUENZA VACCINE  Completed     Pneumococcal Vaccine: 65+ Years  Completed     URINALYSIS  Completed     ZOSTER IMMUNIZATION  Completed     COVID-19 Vaccine  Completed     IPV IMMUNIZATION  Aged Out     MENINGITIS IMMUNIZATION  Aged Out     Lab work is in process  Pneumonia Vaccine: up to date.    Review of Systems   Constitutional: Negative for chills and fever.   HENT: Positive for congestion. Negative for ear pain, hearing loss and sore throat.    Eyes: Negative for pain and visual disturbance.   Respiratory: Positive for cough and shortness of breath.    Cardiovascular: Positive for peripheral edema. Negative for chest pain and palpitations.   Gastrointestinal: Positive for constipation. Negative for abdominal pain, diarrhea, heartburn, hematochezia and nausea.   Genitourinary: Positive for impotence and urgency. Negative for dysuria, frequency, genital sores, hematuria and penile discharge.   Musculoskeletal: Positive for arthralgias and myalgias. Negative for joint swelling.   Skin: Negative for rash.   Neurological: Positive for weakness. Negative for dizziness, headaches and paresthesias.   Psychiatric/Behavioral: Negative for mood changes. The patient is not nervous/anxious.        OBJECTIVE:   /68   Pulse 81   Temp 98  F (36.7  C) (Temporal)   Resp 18   Wt 79.7 kg (175 lb 9.6 oz)   SpO2 96%   BMI 26.70 kg/m   Estimated body mass index is 26.7 kg/m  as calculated from the following:    Height as of 10/3/22: 1.727 m (5' 8\").    Weight as of this encounter: 79.7 kg (175 lb 9.6 oz).  Physical Exam  GENERAL: healthy, alert and no distress  EYES: Eyes grossly normal to inspection, PERRL and conjunctivae and " sclerae normal  HENT: ear canals and TM's normal, nose and mouth without ulcers or lesions  NECK: no adenopathy, no asymmetry, masses, or scars and thyroid normal to palpation  RESP: lungs clear to auscultation - no rales, rhonchi or wheezes  CV: regular rate and rhythm, normal S1 S2, no S3 or S4, no murmur, click or rub, no peripheral edema and peripheral pulses strong  ABDOMEN: soft, nontender, no hepatosplenomegaly, no masses and bowel sounds normal  MS: no gross musculoskeletal defects noted, no edema  SKIN: no suspicious lesions or rashes  NEURO: Normal strength and tone, mentation intact and speech normal  PSYCH: mentation appears normal, affect normal/bright        ASSESSMENT / PLAN:       ICD-10-CM    1. Medicare annual wellness visit, subsequent  Z00.00       2. Stage 3a chronic kidney disease (H)  N18.31 Comprehensive metabolic panel (BMP + Alb, Alk Phos, ALT, AST, Total. Bili, TP)     Albumin Random Urine Quantitative with Creat Ratio      3. Hyponatremia  E87.1       4. ASCVD (arteriosclerotic cardiovascular disease)  I25.10 Lipid panel reflex to direct LDL Fasting     Comprehensive metabolic panel (BMP + Alb, Alk Phos, ALT, AST, Total. Bili, TP)     Albumin Random Urine Quantitative with Creat Ratio      5. Orthostatic hypotension  I95.1       6. Migraine without aura and without status migrainosus, not intractable  G43.009 amitriptyline (ELAVIL) 25 MG tablet      7. Benign essential HTN  I10 carvedilol (COREG) 25 MG tablet      8. ELAM (dyspnea on exertion)  R06.09 isosorbide mononitrate (IMDUR) 60 MG 24 hr tablet      9. Essential hypertension, benign  I10 losartan (COZAAR) 100 MG tablet      10. Panlobular emphysema (H)  J43.1       11. Renal artery stenosis (H)  I70.1           Patient here for Medicare wellness check.  Overall is doing well immunizations are up-to-date, colonoscopy is up-to-date.  Memory is not great only 1 out of 3 but normal for him they say.  History of 1 fall  History of no  "alcohol use.  New issues today he has his coronary artery disease managed by cardiology he was in the hospital with a hypotensive episode thought to be orthostatic he continues on carvedilol and Imdur.  Try to drink more fluids.  If he has other issues we will have to cut back on the Imdur.    Blood pressure control with Coreg could reduce that if needed    Chronic migraines we will refill the amitriptyline    We will check future labs to check his sodium as it has been low chronically in the 127 128 range.  He will get a fasting lipid panel with labs next week.            Patient has been advised of split billing requirements and indicates understanding: Yes    COUNSELING:  Reviewed preventive health counseling, as reflected in patient instructions       Regular exercise       Healthy diet/nutrition    Estimated body mass index is 26.7 kg/m  as calculated from the following:    Height as of 10/3/22: 1.727 m (5' 8\").    Weight as of this encounter: 79.7 kg (175 lb 9.6 oz).        He reports that he quit smoking about 22 years ago. His smoking use included cigarettes. He has a 50.00 pack-year smoking history. He has never used smokeless tobacco.      Appropriate preventive services were discussed with this patient, including applicable screening as appropriate for cardiovascular disease, diabetes, osteopenia/osteoporosis, and glaucoma.  As appropriate for age/gender, discussed screening for colorectal cancer, prostate cancer, breast cancer, and cervical cancer. Checklist reviewing preventive services available has been given to the patient.    Reviewed patients plan of care and provided an AVS. The Basic Care Plan (routine screening as documented in Health Maintenance) for Michele meets the Care Plan requirement. This Care Plan has been established and reviewed with the Patient and spouse.    Counseling Resources:  ATP IV Guidelines  Pooled Cohorts Equation Calculator  Breast Cancer Risk Calculator  Breast Cancer: " Medication to Reduce Risk  FRAX Risk Assessment  ICSI Preventive Guidelines  Dietary Guidelines for Americans, 2010  USDA's MyPlate  ASA Prophylaxis  Lung CA Screening    Jonas West MD  [facility]    Identified Health Risks:

## 2022-10-18 ENCOUNTER — LAB (OUTPATIENT)
Dept: LAB | Facility: CLINIC | Age: 77
End: 2022-10-18
Payer: MEDICARE

## 2022-10-18 DIAGNOSIS — I25.10 ASCVD (ARTERIOSCLEROTIC CARDIOVASCULAR DISEASE): ICD-10-CM

## 2022-10-18 DIAGNOSIS — N18.31 STAGE 3A CHRONIC KIDNEY DISEASE (H): ICD-10-CM

## 2022-10-18 LAB
ALBUMIN SERPL-MCNC: 3.4 G/DL (ref 3.4–5)
ALP SERPL-CCNC: 90 U/L (ref 40–150)
ALT SERPL W P-5'-P-CCNC: 35 U/L (ref 0–70)
ANION GAP SERPL CALCULATED.3IONS-SCNC: 4 MMOL/L (ref 3–14)
AST SERPL W P-5'-P-CCNC: 14 U/L (ref 0–45)
BILIRUB SERPL-MCNC: 0.6 MG/DL (ref 0.2–1.3)
BUN SERPL-MCNC: 31 MG/DL (ref 7–30)
CALCIUM SERPL-MCNC: 8.7 MG/DL (ref 8.5–10.1)
CHLORIDE BLD-SCNC: 101 MMOL/L (ref 94–109)
CHOLEST SERPL-MCNC: 147 MG/DL
CO2 SERPL-SCNC: 25 MMOL/L (ref 20–32)
CREAT SERPL-MCNC: 1.53 MG/DL (ref 0.66–1.25)
CREAT UR-MCNC: 49 MG/DL
FASTING STATUS PATIENT QL REPORTED: YES
GFR SERPL CREATININE-BSD FRML MDRD: 47 ML/MIN/1.73M2
GLUCOSE BLD-MCNC: 105 MG/DL (ref 70–99)
HDLC SERPL-MCNC: 41 MG/DL
LDLC SERPL CALC-MCNC: 89 MG/DL
MICROALBUMIN UR-MCNC: 9 MG/L
MICROALBUMIN/CREAT UR: 18.37 MG/G CR (ref 0–17)
NONHDLC SERPL-MCNC: 106 MG/DL
POTASSIUM BLD-SCNC: 5.2 MMOL/L (ref 3.4–5.3)
PROT SERPL-MCNC: 7.2 G/DL (ref 6.8–8.8)
SODIUM SERPL-SCNC: 130 MMOL/L (ref 133–144)
TRIGL SERPL-MCNC: 85 MG/DL

## 2022-10-18 PROCEDURE — 80061 LIPID PANEL: CPT

## 2022-10-18 PROCEDURE — 36415 COLL VENOUS BLD VENIPUNCTURE: CPT

## 2022-10-18 PROCEDURE — 82043 UR ALBUMIN QUANTITATIVE: CPT

## 2022-10-18 PROCEDURE — 80053 COMPREHEN METABOLIC PANEL: CPT

## 2022-12-01 ENCOUNTER — MYC MEDICAL ADVICE (OUTPATIENT)
Dept: INTERNAL MEDICINE | Facility: CLINIC | Age: 77
End: 2022-12-01

## 2022-12-04 ENCOUNTER — MYC MEDICAL ADVICE (OUTPATIENT)
Dept: INTERNAL MEDICINE | Facility: CLINIC | Age: 77
End: 2022-12-04

## 2022-12-28 DIAGNOSIS — E87.6 HYPOKALEMIA: ICD-10-CM

## 2023-01-02 RX ORDER — POTASSIUM CHLORIDE 750 MG/1
TABLET, EXTENDED RELEASE ORAL
Qty: 540 TABLET | Refills: 1 | Status: SHIPPED | OUTPATIENT
Start: 2023-01-02 | End: 2023-06-09

## 2023-01-24 ENCOUNTER — TELEPHONE (OUTPATIENT)
Dept: INTERNAL MEDICINE | Facility: CLINIC | Age: 78
End: 2023-01-24

## 2023-01-24 ENCOUNTER — LAB (OUTPATIENT)
Dept: LAB | Facility: CLINIC | Age: 78
End: 2023-01-24
Payer: MEDICARE

## 2023-01-24 ENCOUNTER — E-VISIT (OUTPATIENT)
Dept: INTERNAL MEDICINE | Facility: CLINIC | Age: 78
End: 2023-01-24
Payer: MEDICARE

## 2023-01-24 DIAGNOSIS — R41.0 CONFUSION: ICD-10-CM

## 2023-01-24 DIAGNOSIS — E87.1 HYPONATREMIA: ICD-10-CM

## 2023-01-24 DIAGNOSIS — N18.31 STAGE 3A CHRONIC KIDNEY DISEASE (H): Primary | ICD-10-CM

## 2023-01-24 DIAGNOSIS — N18.31 STAGE 3A CHRONIC KIDNEY DISEASE (H): ICD-10-CM

## 2023-01-24 DIAGNOSIS — E87.1 HYPONATREMIA: Primary | ICD-10-CM

## 2023-01-24 LAB
ALBUMIN UR-MCNC: NEGATIVE MG/DL
ANION GAP SERPL CALCULATED.3IONS-SCNC: 9 MMOL/L (ref 7–15)
APPEARANCE UR: CLEAR
BILIRUB UR QL STRIP: NEGATIVE
BUN SERPL-MCNC: 16.7 MG/DL (ref 8–23)
CALCIUM SERPL-MCNC: 9.5 MG/DL (ref 8.8–10.2)
CHLORIDE SERPL-SCNC: 91 MMOL/L (ref 98–107)
COLOR UR AUTO: YELLOW
CREAT SERPL-MCNC: 1.31 MG/DL (ref 0.67–1.17)
DEPRECATED HCO3 PLAS-SCNC: 24 MMOL/L (ref 22–29)
ERYTHROCYTE [DISTWIDTH] IN BLOOD BY AUTOMATED COUNT: 12.8 % (ref 10–15)
GFR SERPL CREATININE-BSD FRML MDRD: 56 ML/MIN/1.73M2
GLUCOSE SERPL-MCNC: 116 MG/DL (ref 70–99)
GLUCOSE UR STRIP-MCNC: NEGATIVE MG/DL
HCT VFR BLD AUTO: 36.7 % (ref 40–53)
HGB BLD-MCNC: 12.6 G/DL (ref 13.3–17.7)
HGB UR QL STRIP: NEGATIVE
KETONES UR STRIP-MCNC: NEGATIVE MG/DL
LEUKOCYTE ESTERASE UR QL STRIP: NEGATIVE
MCH RBC QN AUTO: 34.1 PG (ref 26.5–33)
MCHC RBC AUTO-ENTMCNC: 34.3 G/DL (ref 31.5–36.5)
MCV RBC AUTO: 100 FL (ref 78–100)
NITRATE UR QL: NEGATIVE
PH UR STRIP: 7 [PH] (ref 5–7)
PLATELET # BLD AUTO: 272 10E3/UL (ref 150–450)
POTASSIUM SERPL-SCNC: 5 MMOL/L (ref 3.4–5.3)
RBC # BLD AUTO: 3.69 10E6/UL (ref 4.4–5.9)
SODIUM SERPL-SCNC: 124 MMOL/L (ref 136–145)
SP GR UR STRIP: 1.01 (ref 1–1.03)
UROBILINOGEN UR STRIP-MCNC: NORMAL MG/DL
WBC # BLD AUTO: 4.8 10E3/UL (ref 4–11)

## 2023-01-24 PROCEDURE — 99207 PR NO CHARGE LOS: CPT | Performed by: INTERNAL MEDICINE

## 2023-01-24 PROCEDURE — 81003 URINALYSIS AUTO W/O SCOPE: CPT

## 2023-01-24 PROCEDURE — 85027 COMPLETE CBC AUTOMATED: CPT

## 2023-01-24 PROCEDURE — 36415 COLL VENOUS BLD VENIPUNCTURE: CPT

## 2023-01-24 PROCEDURE — 80048 BASIC METABOLIC PNL TOTAL CA: CPT

## 2023-01-24 NOTE — PATIENT INSTRUCTIONS
Thank you for choosing us for your care. Given your symptoms, I would like you to do a lab-only visit to determine what is causing them.  I have placed the orders.  Please schedule an appointment with the lab right here in [place], or call [phone].  I will let you know when the results are back and next steps to take.

## 2023-01-24 NOTE — TELEPHONE ENCOUNTER
Called them today and he has less strength, fell once.  Last night was confused.      Labs show his sodium down to 124. Previously was down to 120.      They had increased his fluids from 1.5 to 2300.     Plan to decrease down to 1.8 liters. Recheck in 3 days.

## 2023-01-27 ENCOUNTER — LAB (OUTPATIENT)
Dept: LAB | Facility: CLINIC | Age: 78
End: 2023-01-27
Payer: MEDICARE

## 2023-01-27 DIAGNOSIS — E87.1 HYPONATREMIA: ICD-10-CM

## 2023-01-27 LAB
ANION GAP SERPL CALCULATED.3IONS-SCNC: 9 MMOL/L (ref 7–15)
BUN SERPL-MCNC: 20.1 MG/DL (ref 8–23)
CALCIUM SERPL-MCNC: 9 MG/DL (ref 8.8–10.2)
CHLORIDE SERPL-SCNC: 92 MMOL/L (ref 98–107)
CREAT SERPL-MCNC: 1.46 MG/DL (ref 0.67–1.17)
DEPRECATED HCO3 PLAS-SCNC: 23 MMOL/L (ref 22–29)
GFR SERPL CREATININE-BSD FRML MDRD: 50 ML/MIN/1.73M2
GLUCOSE SERPL-MCNC: 103 MG/DL (ref 70–99)
POTASSIUM SERPL-SCNC: 4.9 MMOL/L (ref 3.4–5.3)
SODIUM SERPL-SCNC: 124 MMOL/L (ref 136–145)

## 2023-01-27 PROCEDURE — 36415 COLL VENOUS BLD VENIPUNCTURE: CPT

## 2023-01-27 PROCEDURE — 80048 BASIC METABOLIC PNL TOTAL CA: CPT

## 2023-02-02 ENCOUNTER — LAB (OUTPATIENT)
Dept: LAB | Facility: CLINIC | Age: 78
End: 2023-02-02
Payer: MEDICARE

## 2023-02-02 DIAGNOSIS — E87.1 HYPONATREMIA: ICD-10-CM

## 2023-02-02 DIAGNOSIS — M19.90 ARTHRITIS: ICD-10-CM

## 2023-02-02 LAB
ANION GAP SERPL CALCULATED.3IONS-SCNC: 10 MMOL/L (ref 7–15)
BUN SERPL-MCNC: 26.3 MG/DL (ref 8–23)
CALCIUM SERPL-MCNC: 9.4 MG/DL (ref 8.8–10.2)
CHLORIDE SERPL-SCNC: 101 MMOL/L (ref 98–107)
CREAT SERPL-MCNC: 1.55 MG/DL (ref 0.67–1.17)
DEPRECATED HCO3 PLAS-SCNC: 22 MMOL/L (ref 22–29)
GFR SERPL CREATININE-BSD FRML MDRD: 46 ML/MIN/1.73M2
GLUCOSE SERPL-MCNC: 123 MG/DL (ref 70–99)
POTASSIUM SERPL-SCNC: 4.7 MMOL/L (ref 3.4–5.3)
SODIUM SERPL-SCNC: 133 MMOL/L (ref 136–145)

## 2023-02-02 PROCEDURE — 80048 BASIC METABOLIC PNL TOTAL CA: CPT

## 2023-02-02 PROCEDURE — 36415 COLL VENOUS BLD VENIPUNCTURE: CPT

## 2023-02-03 DIAGNOSIS — I65.23 CAROTID STENOSIS, BILATERAL: ICD-10-CM

## 2023-02-03 DIAGNOSIS — Z95.1 S/P CABG X 6: ICD-10-CM

## 2023-02-03 DIAGNOSIS — E78.5 HYPERLIPIDEMIA LDL GOAL <130: ICD-10-CM

## 2023-02-03 RX ORDER — GABAPENTIN 300 MG/1
CAPSULE ORAL
Qty: 180 CAPSULE | Refills: 3 | Status: SHIPPED | OUTPATIENT
Start: 2023-02-03 | End: 2023-09-25

## 2023-02-03 NOTE — TELEPHONE ENCOUNTER
Pending Prescriptions:                       Disp   Refills    gabapentin (NEURONTIN) 300 MG capsule [Pha*180 ca*3        Sig: TAKE 1 CAPSULE TWICE A DAY      Routing refill request to provider for review/approval because:  Drug not on the G refill protocol     Amparo Duncan RN on 2/3/2023 at 9:13 AM

## 2023-02-03 NOTE — TELEPHONE ENCOUNTER
Received refill request for:  [facility] Cardiology Refill Guideline reviewed.  Medication meets criteria for refill.    Felisha Dodson RN, BSN  02/03/23 at 12:16 PM

## 2023-03-15 DIAGNOSIS — K21.9 GASTROESOPHAGEAL REFLUX DISEASE WITHOUT ESOPHAGITIS: ICD-10-CM

## 2023-03-15 DIAGNOSIS — I73.9 PAD (PERIPHERAL ARTERY DISEASE) (H): ICD-10-CM

## 2023-03-16 RX ORDER — OMEPRAZOLE 40 MG/1
CAPSULE, DELAYED RELEASE ORAL
Qty: 90 CAPSULE | Refills: 1 | Status: SHIPPED | OUTPATIENT
Start: 2023-03-16 | End: 2023-08-24

## 2023-03-16 NOTE — TELEPHONE ENCOUNTER
Pending Prescriptions:                       Disp   Refills    cilostazol (PLETAL) 100 MG tablet [Pharmac*180 ta*3        Sig: TAKE 1 TABLET TWICE A DAY    Signed Prescriptions:                        Disp   Refills    omeprazole (PRILOSEC) 40 MG DR capsule     90 cap*1        Sig: TAKE 1 CAPSULE DAILY  Authorizing Provider: CARLOS ARORA  Ordering User: BERT MACK      Routing refill request to provider for review/approval because:  Labs out of range:  hgb, creatinine    Bert Mack RN on 3/16/2023 at 5:20 PM

## 2023-03-16 NOTE — TELEPHONE ENCOUNTER
Prescription approved per [facility] Refill Protocol.  Amparo Duncan RN on 3/16/2023 at 5:20 PM

## 2023-03-17 RX ORDER — CILOSTAZOL 100 MG/1
100 TABLET ORAL 2 TIMES DAILY
Qty: 180 TABLET | Refills: 3 | Status: SHIPPED | OUTPATIENT
Start: 2023-03-17 | End: 2024-02-27

## 2023-04-24 NOTE — DISCHARGE INSTRUCTIONS
Leave the dressing alone over the weekend.  On Monday you need to follow-up in the clinic to have the packing removed and the dressing change, otherwise you can remove the packing and then just change with a fresh sterile dressing.  Do this daily until this heals up.  I would not put any antibacterial ointment and just do dry dressing changes.  You can use Tylenol or ibuprofen as needed for pain.  2.  Please get seen sooner if you do see any redness spreading from the area or increasing pain.   DISCHARGE

## 2023-04-27 ENCOUNTER — IMMUNIZATION (OUTPATIENT)
Dept: FAMILY MEDICINE | Facility: CLINIC | Age: 78
End: 2023-04-27
Payer: MEDICARE

## 2023-04-27 ENCOUNTER — TELEPHONE (OUTPATIENT)
Dept: INTERNAL MEDICINE | Facility: CLINIC | Age: 78
End: 2023-04-27

## 2023-04-27 PROCEDURE — 0124A COVID-19 VACCINE BIVALENT BOOSTER 12+ (PFIZER): CPT

## 2023-04-27 PROCEDURE — 91312 COVID-19 VACCINE BIVALENT BOOSTER 12+ (PFIZER): CPT

## 2023-04-27 NOTE — TELEPHONE ENCOUNTER
Patient is coming in for a 2nd Covid Bivalent. According to CDC-FDA 65 years and older can get a 2nd dose 3 months after the last one. Provider needs to order as it is not on health maintenance for me to administer and order. Please sign     Daniela Moreno MA 4/27/2023

## 2023-05-09 ENCOUNTER — MYC MEDICAL ADVICE (OUTPATIENT)
Dept: CARDIOLOGY | Facility: CLINIC | Age: 78
End: 2023-05-09
Payer: MEDICARE

## 2023-05-09 ENCOUNTER — TELEPHONE (OUTPATIENT)
Dept: CARDIOLOGY | Facility: CLINIC | Age: 78
End: 2023-05-09
Payer: MEDICARE

## 2023-05-09 DIAGNOSIS — Z95.1 S/P CABG X 6: ICD-10-CM

## 2023-05-09 DIAGNOSIS — E78.5 HYPERLIPIDEMIA LDL GOAL <130: ICD-10-CM

## 2023-05-09 DIAGNOSIS — I65.23 CAROTID STENOSIS, BILATERAL: ICD-10-CM

## 2023-05-15 NOTE — TELEPHONE ENCOUNTER
Sent in a refill for Praluent for 1 yr. PA was approved. Sent reply to patient via [employer] message. VIPIN Chin

## 2023-06-09 DIAGNOSIS — E87.6 HYPOKALEMIA: ICD-10-CM

## 2023-06-09 RX ORDER — POTASSIUM CHLORIDE 750 MG/1
TABLET, EXTENDED RELEASE ORAL
Qty: 540 TABLET | Refills: 3 | Status: ON HOLD | OUTPATIENT
Start: 2023-06-09 | End: 2024-01-28

## 2023-06-09 NOTE — TELEPHONE ENCOUNTER
Prescription approved per [facility] Refill Protocol.  Amparo Duncan RN on 6/9/2023 at 1:50 PM

## 2023-06-27 ENCOUNTER — PATIENT OUTREACH (OUTPATIENT)
Dept: GASTROENTEROLOGY | Facility: CLINIC | Age: 78
End: 2023-06-27
Payer: MEDICARE

## 2023-06-27 DIAGNOSIS — Z12.11 SPECIAL SCREENING FOR MALIGNANT NEOPLASMS, COLON: Primary | ICD-10-CM

## 2023-06-27 NOTE — PROGRESS NOTES
"Patient last colonoscopy in 2018. It was recommended patient repeat screening in 5 years. Patient is now overdue and meets CRC criteria to place order. Order placed.     Ordering/Referring Provider: Jonas West MD  BMI: Estimated body mass index is 26.7 kg/m  as calculated from the following:    Height as of 10/3/22: 1.727 m (5' 8\").    Weight as of 10/12/22: 79.7 kg (175 lb 9.6 oz).     Sedation:  Based on patient's medical history patient is appropriate for   moderate sedation. If patient's BMI > 50 do not schedule in ASC.    Location:  Does patient have an LVAD?  No    Does patient have a history of moderate to severe sleep apnea?  No    Does patient have a history of asthma, COPD or any other lung disease?  Yes     Patient has a documented history of Panlobular Emphysema.     Review of chart, indicate respiratory disease no documentation on severity. Has not had any recent hospitalizations related to this..    Does patient use home oxygen?  No    Does patient have a history of cardiac disease?  Yes     In the past 6 months, has the patient been hospitalized for any cardiac issues including cardiomyopathy, heart attack, or stent placement?  No    Does the patient have any implantable devices (pacemaker, ICD)?  No    Is patient awaiting a heart or lung transplant?   No    Has patient had a stroke or transient ischemic attack in the last 6 months?   No    Is the patient currently on dialysis?   No    Prep:  Previous prep (last colonoscopy):   N/A    Quality of previous prep:   Fair    Is patient currently on dialysis, is ESRD, or CKD stage 4/5?   No (standard prep)    Does patient have a diagnosis of diabetes?  No    Does patient have a diagnosis of cystic fibrosis (CF)?  No    BMI > 40?  No    Final Referral Status:  meets the criteria for placement of colonoscopy screening  referral order.      Referral order placed with the following recommendations:  Sedation: Moderate Sedation  Location Type: No " Scheduling Restrictions  Prep: MiraLAX (No Mag Citrate)

## 2023-08-02 ENCOUNTER — HOSPITAL ENCOUNTER (OUTPATIENT)
Dept: MRI IMAGING | Facility: CLINIC | Age: 78
Discharge: HOME | End: 2023-08-02
Attending: NURSE PRACTITIONER | Admitting: NURSE PRACTITIONER
Payer: MEDICARE

## 2023-08-02 DIAGNOSIS — M54.50 LOW BACK PAIN, UNSPECIFIED: ICD-10-CM

## 2023-08-02 PROCEDURE — 72148 MRI LUMBAR SPINE W/O DYE: CPT

## 2023-08-08 ENCOUNTER — TRANSFERRED RECORDS (OUTPATIENT)
Dept: HEALTH INFORMATION MANAGEMENT | Facility: CLINIC | Age: 78
End: 2023-08-08
Payer: MEDICARE

## 2023-08-18 ENCOUNTER — TRANSFERRED RECORDS (OUTPATIENT)
Dept: HEALTH INFORMATION MANAGEMENT | Facility: CLINIC | Age: 78
End: 2023-08-18
Payer: MEDICARE

## 2023-08-24 DIAGNOSIS — K21.9 GASTROESOPHAGEAL REFLUX DISEASE WITHOUT ESOPHAGITIS: ICD-10-CM

## 2023-08-24 RX ORDER — OMEPRAZOLE 40 MG/1
CAPSULE, DELAYED RELEASE ORAL
Qty: 90 CAPSULE | Refills: 0 | Status: SHIPPED | OUTPATIENT
Start: 2023-08-24 | End: 2023-12-18

## 2023-09-12 ENCOUNTER — PATIENT OUTREACH (OUTPATIENT)
Dept: CARE COORDINATION | Facility: CLINIC | Age: 78
End: 2023-09-12
Payer: MEDICARE

## 2023-09-25 ENCOUNTER — OFFICE VISIT (OUTPATIENT)
Dept: INTERNAL MEDICINE | Facility: CLINIC | Age: 78
End: 2023-09-25
Payer: MEDICARE

## 2023-09-25 VITALS
RESPIRATION RATE: 16 BRPM | DIASTOLIC BLOOD PRESSURE: 58 MMHG | SYSTOLIC BLOOD PRESSURE: 112 MMHG | TEMPERATURE: 97.4 F | WEIGHT: 171 LBS | BODY MASS INDEX: 25.91 KG/M2 | OXYGEN SATURATION: 99 % | HEIGHT: 68 IN | HEART RATE: 60 BPM

## 2023-09-25 DIAGNOSIS — M19.90 ARTHRITIS: ICD-10-CM

## 2023-09-25 DIAGNOSIS — G89.29 CHRONIC LEFT-SIDED LOW BACK PAIN WITHOUT SCIATICA: Primary | ICD-10-CM

## 2023-09-25 DIAGNOSIS — R09.81 NASAL CONGESTION: ICD-10-CM

## 2023-09-25 DIAGNOSIS — M54.50 CHRONIC LEFT-SIDED LOW BACK PAIN WITHOUT SCIATICA: Primary | ICD-10-CM

## 2023-09-25 PROCEDURE — 99213 OFFICE O/P EST LOW 20 MIN: CPT | Performed by: INTERNAL MEDICINE

## 2023-09-25 RX ORDER — GABAPENTIN 300 MG/1
300 CAPSULE ORAL 3 TIMES DAILY
Qty: 270 CAPSULE | Refills: 3 | Status: SHIPPED | OUTPATIENT
Start: 2023-09-25 | End: 2024-08-06

## 2023-09-25 RX ORDER — FLUTICASONE PROPIONATE 50 MCG
1 SPRAY, SUSPENSION (ML) NASAL DAILY
Qty: 18 G | Refills: 11 | Status: SHIPPED | OUTPATIENT
Start: 2023-09-25

## 2023-09-25 ASSESSMENT — ENCOUNTER SYMPTOMS: BACK PAIN: 1

## 2023-09-25 NOTE — PROGRESS NOTES
"  Assessment & Plan     Nasal congestion  Nasal congestion he has been using somebody else's Flonase.  Has some allergy symptoms we will refill Flonase for him to use chronically.  - fluticasone (FLONASE) 50 MCG/ACT nasal spray; Spray 1 spray into both nostrils daily    Arthritis  Arthritis which is worse in his back.  MRI shows as he is not a candidate for ablation as nothing was positive on that testing.  We will increase his gabapentin to 3 times a day dosing 300 mg.  - gabapentin (NEURONTIN) 300 MG capsule; Take 1 capsule (300 mg) by mouth 3 times daily    Chronic left-sided low back pain without sciatica  Chronic left-sided low back pain without sciatica.  I think this may be more sacroiliac.  We reviewed his MRI that was just done with [facility].  No significant disc abnormalities.  Recommend he strengthen the area, get up and move more.  Avoid significant lifting.  - Physical Therapy Referral; Future             BMI:   Estimated body mass index is 26 kg/m  as calculated from the following:    Height as of this encounter: 1.727 m (5' 8\").    Weight as of this encounter: 77.6 kg (171 lb).           Jonas West MD  [facility]    Gene Bautista is a 78 year old, presenting for the following health issues:  Recheck Medication and Back Pain      9/25/2023     1:14 PM   Additional Questions   Roomed by Jossie Null   Accompanied by Wife-Justin     Pt describes a constant pain during certain activities. Walking is not an issue, just any motions that move the back or put strain on his back. The pain starts in his lower left back near his sacrum, and can radiate to posterior left thigh and middle left back, but never to his right side or around to the front of his abdomen. He has been seen for this over the last 10 years; gabapentin has been in use for years but only BID, was originally ordered TID. Pt wondering if increasing back to TID would be helpful.     Saw [facility] and lidocaine " "wasn't helpful so no ablation.  Feels better in a new chair with lumbar support.     Unrelated, would also like flonase for congestion.    Back Pain     History of Present Illness       Back Pain:  He presents for follow up of back pain. Patient's back pain is a chronic problem.  Location of back pain:  Left lower back  Description of back pain: cramping  Back pain spreads: left buttocks, left thigh and left knee    Since patient first noticed back pain, pain is: always present, but gets better and worse  Does back pain interfere with his job:  Not applicable       He eats 0-1 servings of fruits and vegetables daily.He consumes 4 sweetened beverage(s) daily.He exercises with enough effort to increase his heart rate 9 or less minutes per day.  He exercises with enough effort to increase his heart rate 3 or less days per week.   He is taking medications regularly.       Review of Systems   Musculoskeletal:  Positive for back pain.            Objective    /58   Pulse 60   Temp 97.4  F (36.3  C) (Temporal)   Resp 16   Ht 1.727 m (5' 8\")   Wt 77.6 kg (171 lb)   SpO2 99%   BMI 26.00 kg/m    Body mass index is 26 kg/m .  Physical Exam     No acute distress  Spine is nontender  Mild pain over the sacroiliac area  Negative straight leg raise  Strength in his lower legs are 5 out of 5 throughout.                    "

## 2023-09-26 ENCOUNTER — PATIENT OUTREACH (OUTPATIENT)
Dept: CARE COORDINATION | Facility: CLINIC | Age: 78
End: 2023-09-26
Payer: MEDICARE

## 2023-11-06 DIAGNOSIS — R06.09 DOE (DYSPNEA ON EXERTION): ICD-10-CM

## 2023-11-06 DIAGNOSIS — I10 BENIGN ESSENTIAL HTN: ICD-10-CM

## 2023-11-07 RX ORDER — CARVEDILOL 25 MG/1
25 TABLET ORAL 2 TIMES DAILY WITH MEALS
Qty: 180 TABLET | Refills: 2 | Status: SHIPPED | OUTPATIENT
Start: 2023-11-07 | End: 2024-08-06

## 2023-11-07 RX ORDER — ISOSORBIDE MONONITRATE 60 MG/1
60 TABLET, EXTENDED RELEASE ORAL DAILY
Qty: 90 TABLET | Refills: 2 | Status: ON HOLD | OUTPATIENT
Start: 2023-11-07 | End: 2024-01-28

## 2023-11-11 NOTE — PROGRESS NOTES
[address] [address]  Phone: [phone]  Primary Provider: Jonas West  Pre-op Performing Provider: JONAS WEST    PREOPERATIVE EVALUATION:  Today's date: 7/12/2021    Michele Vance is a 75 year old male who presents for a preoperative evaluation.    Surgical Information:  Surgery/Procedure: cholecystectomy, laparoscopic  Surgery Location: [facility]  Surgeon: Dr. Epps  Surgery Date: 7/16/21  Time of Surgery: 7:30 am  Where patient plans to recover: At home with family  Fax number for surgical facility: Note does not need to be faxed, will be available electronically in Epic.    Type of Anesthesia Anticipated: General    Assessment & Plan     The proposed surgical procedure is considered INTERMEDIATE risk.    Problem List Items Addressed This Visit     None      Visit Diagnoses     Preop general physical exam    -  Primary    Relevant Orders    Basic metabolic panel  (Ca, Cl, CO2, Creat, Gluc, K, Na, BUN)    Essential hypertension, benign        ASCVD (arteriosclerotic cardiovascular disease)        Hyponatremia        Relevant Orders    Basic metabolic panel  (Ca, Cl, CO2, Creat, Gluc, K, Na, BUN)    Gallstones        Relevant Orders    Basic metabolic panel  (Ca, Cl, CO2, Creat, Gluc, K, Na, BUN)        Patient has gallstones.  He is going to have his gallbladder removed.  He has had a typical symptoms on and off for a while.  Question of some irritable bowel but may be from his gallbladder disease.  He also had a recent pneumonia was treated.    He has chronic heart disease and carotid stenosis which been treated with CABG and an endarterectomy.  Recently had an ultrasound of his carotids was normal.  EKG shows a chronic right bundle branch block.  He does have some risk with this chronic heart disease but he stable.  He will take his Coreg on the morning of surgery.  His amlodipine and Imdur.  His blood pressure has been stable he will hold  his losartan.    He had recently some hyponatremia possibly from the lung disease and we will recheck his sodium today.  He drinks quite a bit of fluids but this has been stable for the last couple years.       Risks and Recommendations:  The patient has the following additional risks and recommendations for perioperative complications:   - No identified additional risk factors other than previously addressed    Medication Instructions:   - aspirin: Discontinue aspirin 7-10 days prior to procedure to reduce bleeding risk. It should be resumed postoperatively.    - ACE/ARB: HOLD due to exceptional risk of hypotension during surgery.     RECOMMENDATION:  APPROVAL GIVEN to proceed with proposed procedure, without further diagnostic evaluation.    Review of external notes as documented above surgical and ER notes                   Subjective     HPI related to upcoming procedure: having some atypical symptoms of nausea, stomach problems.  ER found stones on the ultrasound, plan to have gallbladder removed.     Preop Questions 7/12/2021   1. Have you ever had a heart attack or stroke? YES - CABG 2014, carotid endartectomy    2. Have you ever had surgery on your heart or blood vessels, such as a stent placement, a coronary artery bypass, or surgery on an artery in your head, neck, heart, or legs? YES - balloon of carotid,    3. Do you have chest pain with activity? No   4. Do you have a history of  heart failure? No   5. Do you currently have a cold, bronchitis or symptoms of other infection? No   6. Do you have a cough, shortness of breath, or wheezing? No   7. Do you or anyone in your family have previous history of blood clots? No   8. Do you or does anyone in your family have a serious bleeding problem such as prolonged bleeding following surgeries or cuts? No   9. Have you ever had problems with anemia or been told to take iron pills? No   10. Have you had any abnormal blood loss such as black, tarry or bloody  stools? No   11. Have you ever had a blood transfusion? No   12. Are you willing to have a blood transfusion if it is medically needed before, during, or after your surgery? Yes   13. Have you or any of your relatives ever had problems with anesthesia? No   14. Do you have sleep apnea, excessive snoring or daytime drowsiness? No   15. Do you have any artifical heart valves or other implanted medical devices like a pacemaker, defibrillator, or continuous glucose monitor? No   16. Do you have artificial joints? No   17. Are you allergic to latex? No       Health Care Directive:  Patient does not have a Health Care Directive or Living Will: Discussed advance care planning with patient; however, patient declined at this time.will bring it in.     Preoperative Review of :   reviewed - just gabapentin      Status of Chronic Conditions:  CAD - Patient has a longstanding history of moderate-severe CAD. Patient denies recent chest pain or NTG use, denies exercise induced dyspnea or PND. Last Stress test 2018, EKG April 2021.     HYPERTENSION - Patient has longstanding history of HTN , currently denies any symptoms referable to elevated blood pressure. Specifically denies chest pain, palpitations, dyspnea, orthopnea, PND or peripheral edema. Blood pressure readings have been in normal range. Current medication regimen is as listed below. Patient denies any side effects of medication.       Review of Systems  Constitutional, neuro, ENT, endocrine, pulmonary, cardiac, gastrointestinal, genitourinary, musculoskeletal, integument and psychiatric systems are negative, except as otherwise noted.    Patient Active Problem List    Diagnosis Date Noted     Biliary colic 07/08/2021     Priority: Medium     Added automatically from request for surgery [id number]       H/O carotid angioplasty 09/04/2020     Priority: Medium     Status post balloon angioplasty of pulmonary artery branches 09/03/2020     Priority: Medium     Renal  artery stenosis (H) 09/05/2019     Priority: Medium     Added automatically from request for surgery [id number]       Carotid stenosis      Priority: Medium     right endartectomy       Essential hypertension with goal blood pressure less than 140/90 06/02/2016     Priority: Medium     PVD (peripheral vascular disease) (H) 07/22/2015     Priority: Medium     Chronic constipation 12/24/2014     Priority: Medium     Irritable bowel syndrome 12/24/2014     Priority: Medium     Health Care Home 09/11/2014     Priority: Medium     Status:  Unable to reach  Care Coordinator:  Erika Marcus    See Letters for [facility] Care Plan  Date:  September 11, 2014         Carotid artery occlusion with cerebral infarction (H) 08/06/2014     Priority: Medium     Dizziness 08/06/2014     Priority: Medium     Panlobular emphysema (H) 06/04/2014     Priority: Medium     Cerebral infarction due to occlusion or stenosis of carotid artery 05/07/2014     Priority: Medium     Problem list name updated by automated process. Provider to review       Stroke, embolic (H) 04/25/2014     Priority: Medium     Mesenteric artery stenosis (H) 04/04/2014     Priority: Medium     2014: Asymptomatic SMA and MELI stenoses, meriting clinical FU       Insomnia 03/12/2014     Priority: Medium     Nutritional deficiency 03/12/2014     Priority: Medium     Pain 03/12/2014     Priority: Medium     Urinary retention 03/12/2014     Priority: Medium     S/P CABG x 6 03/06/2014     Priority: Medium     Left main coronary artery disease 03/04/2014     Priority: Medium     PAD (peripheral artery disease) (H) 03/04/2014     Priority: Medium     2/19/14: RUBEN 0.73 LLE       Arthritis 01/14/2013     Priority: Medium     Carotid bruit 01/14/2013     Priority: Medium     Carotid stenosis, bilateral 01/14/2013     Priority: Medium     Anemia 04/06/2012     Priority: Medium     CKD (chronic kidney disease) stage 3, GFR 30-59 ml/min 04/06/2012     Priority: Medium     Advanced  directives, counseling/discussion 04/05/2012     Priority: Medium     Impingement syndrome, shoulder, right 03/16/2011     Priority: Medium     Hypertension 11/22/2010     Priority: Medium     Hyperlipidemia LDL goal <130 11/22/2010     Priority: Medium     Myalgia and myositis 11/22/2010     Priority: Medium     GERD (gastroesophageal reflux disease) 11/22/2010     Priority: Medium      Past Medical History:   Diagnosis Date     Carotid stenosis     right endartectomy     Chronic kidney disease     stage 3     Coronary artery disease 3-2014    CABG x6     CVA (cerebral infarction)     balance problems, mild     Elevated CK      Esophageal reflux      Hypercholesteremia      Nonsenile cataract      Other and unspecified hyperlipidemia      PAD (peripheral artery disease) (H)      Rhabdomyolysis 2010     Unspecified essential hypertension      Past Surgical History:   Procedure Laterality Date     APPENDECTOMY  1974     BYPASS GRAFT ARTERY CORONARY  3/5/2014    Procedure: BYPASS GRAFT ARTERY CORONARY;  Median Sternotomy, Coronary Artery Bypass Graft X6 used Left internal mammary artery, Left and Right Greater Saphenous vein, on Pump oxygenator.;  Surgeon: Tim Montanez MD;  Location: [facility] OR     CATARACT IOL, RT/LT Bilateral 2008    in [place]     cataracts       COLONOSCOPY  12/12/02    [facility]     COLONOSCOPY N/A 10/7/2014    Procedure: COMBINED COLONOSCOPY, SINGLE OR MULTIPLE BIOPSY/POLYPECTOMY BY BIOPSY;  Surgeon: Micheal Mora MD;  Location:  GI     CV LOWER EXTREMITY ANGIOGRAM BILATERAL Bilateral 9/9/2019    Procedure: Lower Extremity Angiogram Bilateral;  Surgeon: Julio Oropeza MD;  Location:  [facility] CATH LAB     DENTAL SURGERY      TMJ, implants     ENDARTERECTOMY CAROTID      right carotid stent     ESOPHAGOSCOPY, GASTROSCOPY, DUODENOSCOPY (EGD), COMBINED Left 10/7/2014    Procedure: COMBINED ESOPHAGOSCOPY, GASTROSCOPY, DUODENOSCOPY (EGD), BIOPSY SINGLE OR MULTIPLE;   Surgeon: Micheal Mora MD;  Location: [facility] GI     ESOPHAGOSCOPY, GASTROSCOPY, DUODENOSCOPY (EGD), COMBINED Left 10/7/2014    Procedure: COMBINED ESOPHAGOSCOPY, GASTROSCOPY, DUODENOSCOPY (EGD), REMOVE FOREIGN BODY;  Surgeon: Micheal Mora MD;  Location: [facility] GI     HC CAPSULE ENDOSCOPY N/A 10/7/2014    Procedure: CAPSULE/PILL CAM ENDOSCOPY;  Surgeon: Micheal Mora MD;  Location: [facility] GI     INJECT EPIDURAL LUMBAR Right 5/8/2017    Procedure: INJECT EPIDURAL LUMBAR;  Lumbar transforaminal Epidural Steroid Injection right lumbar 4-5, and right  lumbar 5-Sacral 1;  Surgeon: Anthony Gonzalez MD;  Location: [facility] OR     INJECT JOINT SACROILIAC Bilateral 8/28/2017    Procedure: INJECT JOINT SACROILIAC;  sacroiliac joint injection bilateral;  Surgeon: Anthony Gonzalez MD;  Location: [facility] OR     IR CAROTID CEREBRAL ANGIOGRAM BILATERAL  9/3/2020     KNEE SURGERY  1976    left knee reconstruction     lasix  2001    both eyes     RELEASE CARPAL TUNNEL  1/13/2011    RELEASE CARPAL TUNNEL performed by JO MADRID at  OR     RELEASE CARPAL TUNNEL  1/20/2011    RELEASE CARPAL TUNNEL performed by JO MADRID at  OR     [facility] UGI ENDOSCOPY, SIMPLE EXAM  01/08/99    [facility]     Current Outpatient Medications   Medication Sig Dispense Refill     albuterol (PROAIR HFA/PROVENTIL HFA/VENTOLIN HFA) 108 (90 Base) MCG/ACT inhaler Inhale 2 puffs into the lungs every 4 hours as needed for shortness of breath / dyspnea or wheezing 8 g 0     alirocumab (PRALUENT) 150 MG/ML injectable syringe Inject 1 mL (150 mg) Subcutaneous every 14 days 8 mL 3     amitriptyline (ELAVIL) 25 MG tablet TAKE 1 TABLET TWICE A DAY (Patient taking differently: Take 50 mg by mouth At Bedtime ) 180 tablet 2     amLODIPine (NORVASC) 10 MG tablet Take 1 tablet (10 mg) by mouth daily 90 tablet 3     aspirin (ASPIRIN) 81 MG EC tablet Take 81 mg by mouth every other day       benzonatate (TESSALON) 200 MG capsule Take 1 capsule (200  "mg) by mouth 3 times daily as needed for cough 30 capsule 0     carvedilol (COREG) 25 MG tablet Take 1 tablet (25 mg) by mouth 2 times daily (with meals) 180 tablet 3     cilostazol (PLETAL) 100 MG tablet        gabapentin (NEURONTIN) 300 MG capsule TAKE 1 CAPSULE TWICE A DAY (Patient taking differently: Take 300 mg by mouth 2 times daily ) 180 capsule 3     isosorbide mononitrate (IMDUR) 60 MG 24 hr tablet Take 1 tablet (60 mg) by mouth daily 90 tablet 3     losartan (COZAAR) 100 MG tablet Take 1 tablet (100 mg) by mouth daily 90 tablet 3     omeprazole (PRILOSEC) 40 MG DR capsule TAKE 1 CAPSULE DAILY 90 capsule 0     ondansetron (ZOFRAN) 4 MG tablet Take 1 tablet (4 mg) by mouth as needed for nausea 90 tablet 0     potassium chloride ER (KLOR-CON M) 10 MEQ CR tablet TAKE 5 TABLETS TWICE A  tablet 3       Allergies   Allergen Reactions     Statins [Hmg-Coa-R Inhibitors] Other (See Comments)     Rhabdo  Same as \"statins\"     Gemfibrozil      Resting thigh-calf pain     Sulfa Drugs      Reacted as a child     Zetia [Ezetimibe]      Muscle cramping        Social History     Tobacco Use     Smoking status: Former Smoker     Packs/day: 2.50     Years: 20.00     Pack years: 50.00     Types: Cigarettes     Quit date: 2000     Years since quittin.5     Smokeless tobacco: Never Used   Substance Use Topics     Alcohol use: No     Alcohol/week: 0.0 standard drinks       History   Drug Use No         Objective     /64   Pulse 62   Temp 96.9  F (36.1  C) (Temporal)   Resp 16   Wt 74.8 kg (165 lb)   SpO2 98%   BMI 25.09 kg/m      Physical Exam  GENERAL APPEARANCE: healthy, alert and no distress     HENT: ear canals and TM's normal and nose and mouth without ulcers or lesions     NECK: no adenopathy, no asymmetry, masses, or scars and thyroid normal to palpation     RESP: lungs clear to auscultation - no rales, rhonchi or wheezes     CV: regular rates and rhythm, normal S1 S2, no S3 or S4 and no " murmur, click or rub     ABDOMEN:  soft, nontender, no HSM or masses and bowel sounds normal     MS: extremities normal- no gross deformities noted, no evidence of inflammation in joints, FROM in all extremities.     SKIN: no suspicious lesions or rashes     NEURO: Normal strength and tone, sensory exam grossly normal, mentation intact and speech normal     PSYCH: mentation appears normal. and affect normal/bright     LYMPHATICS: No cervical adenopathy    Recent Labs   Lab Test 07/02/21  1110 06/25/21  1147 09/03/20  1728 09/03/20  1135 09/01/20  1059 09/01/20  1059   HGB 11.7* 11.3*   < > 12.4*  --  13.2*    290   < > 256  --  281   INR  --   --   --  1.09  --  1.08   * 125*  --   --    < > 138   POTASSIUM 4.9 5.3  --   --    < > 4.4   CR 1.31* 1.35*  --  1.30*  --  1.27*    < > = values in this interval not displayed.        Diagnostics:  Labs pending at this time.  Results will be reviewed when available.   No EKG this visit, completed in the last 90 days.chronic RBBB     Revised Cardiac Risk Index (RCRI):  The patient has the following serious cardiovascular risks for perioperative complications:   - No serious cardiac risks = 0 points     RCRI Interpretation: 1 point: Class II (low risk - 0.9% complication rate)         Signed Electronically by: Jonas West MD  Copy of this evaluation report is provided to requesting physician.       no vomiting

## 2023-11-20 ENCOUNTER — IMMUNIZATION (OUTPATIENT)
Dept: FAMILY MEDICINE | Facility: CLINIC | Age: 78
End: 2023-11-20
Payer: MEDICARE

## 2023-11-20 DIAGNOSIS — Z23 ENCOUNTER FOR IMMUNIZATION: Primary | ICD-10-CM

## 2023-11-20 PROCEDURE — G0008 ADMIN INFLUENZA VIRUS VAC: HCPCS

## 2023-11-20 PROCEDURE — 90480 ADMN SARSCOV2 VAC 1/ONLY CMP: CPT

## 2023-11-20 PROCEDURE — 90662 IIV NO PRSV INCREASED AG IM: CPT

## 2023-11-20 PROCEDURE — 91320 SARSCV2 VAC 30MCG TRS-SUC IM: CPT

## 2023-11-20 PROCEDURE — 99207 PR NO CHARGE NURSE ONLY: CPT

## 2023-12-04 ENCOUNTER — OFFICE VISIT (OUTPATIENT)
Dept: INTERNAL MEDICINE | Facility: CLINIC | Age: 78
End: 2023-12-04
Payer: MEDICARE

## 2023-12-04 VITALS
BODY MASS INDEX: 25.91 KG/M2 | TEMPERATURE: 97.9 F | OXYGEN SATURATION: 100 % | DIASTOLIC BLOOD PRESSURE: 66 MMHG | SYSTOLIC BLOOD PRESSURE: 126 MMHG | WEIGHT: 171 LBS | HEART RATE: 68 BPM | RESPIRATION RATE: 16 BRPM | HEIGHT: 68 IN

## 2023-12-04 DIAGNOSIS — K08.9 POOR DENTITION: ICD-10-CM

## 2023-12-04 DIAGNOSIS — I25.10 LEFT MAIN CORONARY ARTERY DISEASE: ICD-10-CM

## 2023-12-04 DIAGNOSIS — I10 BENIGN ESSENTIAL HTN: ICD-10-CM

## 2023-12-04 DIAGNOSIS — I70.1 RENAL ARTERY STENOSIS (H): ICD-10-CM

## 2023-12-04 DIAGNOSIS — N18.31 STAGE 3A CHRONIC KIDNEY DISEASE (H): ICD-10-CM

## 2023-12-04 DIAGNOSIS — Z01.818 PRE-OP EXAM: Primary | ICD-10-CM

## 2023-12-04 LAB
ANION GAP SERPL CALCULATED.3IONS-SCNC: 9 MMOL/L (ref 7–15)
BUN SERPL-MCNC: 15.7 MG/DL (ref 8–23)
CALCIUM SERPL-MCNC: 9.4 MG/DL (ref 8.8–10.2)
CHLORIDE SERPL-SCNC: 96 MMOL/L (ref 98–107)
CREAT SERPL-MCNC: 1.42 MG/DL (ref 0.67–1.17)
DEPRECATED HCO3 PLAS-SCNC: 25 MMOL/L (ref 22–29)
EGFRCR SERPLBLD CKD-EPI 2021: 51 ML/MIN/1.73M2
ERYTHROCYTE [DISTWIDTH] IN BLOOD BY AUTOMATED COUNT: 12.9 % (ref 10–15)
GLUCOSE SERPL-MCNC: 100 MG/DL (ref 70–99)
HCT VFR BLD AUTO: 37.2 % (ref 40–53)
HGB BLD-MCNC: 12.5 G/DL (ref 13.3–17.7)
MCH RBC QN AUTO: 33.3 PG (ref 26.5–33)
MCHC RBC AUTO-ENTMCNC: 33.6 G/DL (ref 31.5–36.5)
MCV RBC AUTO: 99 FL (ref 78–100)
PLATELET # BLD AUTO: 262 10E3/UL (ref 150–450)
POTASSIUM SERPL-SCNC: 5.1 MMOL/L (ref 3.4–5.3)
RBC # BLD AUTO: 3.75 10E6/UL (ref 4.4–5.9)
SODIUM SERPL-SCNC: 130 MMOL/L (ref 135–145)
WBC # BLD AUTO: 8.3 10E3/UL (ref 4–11)

## 2023-12-04 PROCEDURE — 36415 COLL VENOUS BLD VENIPUNCTURE: CPT | Performed by: INTERNAL MEDICINE

## 2023-12-04 PROCEDURE — 93000 ELECTROCARDIOGRAM COMPLETE: CPT | Performed by: INTERNAL MEDICINE

## 2023-12-04 PROCEDURE — 99214 OFFICE O/P EST MOD 30 MIN: CPT | Mod: 25 | Performed by: INTERNAL MEDICINE

## 2023-12-04 PROCEDURE — 85027 COMPLETE CBC AUTOMATED: CPT | Performed by: INTERNAL MEDICINE

## 2023-12-04 PROCEDURE — 80048 BASIC METABOLIC PNL TOTAL CA: CPT | Performed by: INTERNAL MEDICINE

## 2023-12-04 RX ORDER — RESPIRATORY SYNCYTIAL VIRUS VACCINE 120MCG/0.5
0.5 KIT INTRAMUSCULAR ONCE
Qty: 1 EACH | Refills: 0 | Status: CANCELLED | OUTPATIENT
Start: 2023-12-04 | End: 2023-12-04

## 2023-12-04 NOTE — PROGRESS NOTES
[address] [address]  Phone: [phone]  Primary Provider: Jonas West  Pre-op Performing Provider: JONAS WEST  [id number]}  PREOPERATIVE EVALUATION:  Today's date: 12/4/2023    Michele is a 78 year old, presenting for the following:  Pre-Op Exam        12/4/2023     9:44 AM   Additional Questions   Roomed by Jossie Null     Surgical Information:  Surgery/Procedure: Dental implant surgery  Surgery Location: [facility]  Surgeon: Dr. Aragon  Surgery Date: 12/19/23  Time of Surgery: TBD  Where patient plans to recover: At home with family  Fax number for surgical facility: [phone]    Assessment & Plan     The proposed surgical procedure is considered INTERMEDIATE risk.    Problem List Items Addressed This Visit       CKD (chronic kidney disease) stage 3, GFR 30-59 ml/min (H)    Left main coronary artery disease    Renal artery stenosis (H24)     Other Visit Diagnoses       Pre-op exam    -  Primary    Relevant Orders    EKG 12-lead complete w/read - Clinics (Completed)    CBC with platelets    Basic metabolic panel  (Ca, Cl, CO2, Creat, Gluc, K, Na, BUN)    Benign essential HTN        Relevant Orders    EKG 12-lead complete w/read - Clinics (Completed)    CBC with platelets    Basic metabolic panel  (Ca, Cl, CO2, Creat, Gluc, K, Na, BUN)    Poor dentition        Relevant Orders    EKG 12-lead complete w/read - Clinics (Completed)    CBC with platelets    Basic metabolic panel  (Ca, Cl, CO2, Creat, Gluc, K, Na, BUN)                   Risks and Recommendations:  The patient has the following additional risks and recommendations for perioperative complications:   - left carotid stenosis and ascvd makes him higher risk,     Antiplatelet or Anticoagulation Medication Instructions:   - aspirin: Discontinue aspirin 7-10 days prior to procedure to reduce bleeding risk. It should be resumed postoperatively.     Additional Medication  Instructions:   - ACE/ARB: HOLD on day of surgery (minimum 11 hours for general anesthesia).    Addendum due to high blood pressure, take his losartan on the day of surgery,   Ok for surgery with bp medication before surgery.          RECOMMENDATION:  APPROVAL GIVEN to proceed with proposed procedure, without further diagnostic evaluation.      Subjective       HPI related to upcoming procedure: tooth disease and needs multiple teeth removed with oral surgeon and a CRNA present.         12/4/2023     9:23 AM   Preop Questions   1. Have you ever had a heart attack or stroke? YES - had CABG  Strokes left carotid stenosis but medically managed.   2. Have you ever had surgery on your heart or blood vessels, such as a stent placement, a coronary artery bypass, or surgery on an artery in your head, neck, heart, or legs? YES - CABG 2014, no symptoms since then   3. Do you have chest pain with activity? No   4. Do you have a history of  heart failure? No   5. Do you currently have a cold, bronchitis or symptoms of other infection? No   6. Do you have a cough, shortness of breath, or wheezing? No   7. Do you or anyone in your family have previous history of blood clots? No   8. Do you or does anyone in your family have a serious bleeding problem such as prolonged bleeding following surgeries or cuts? No   9. Have you ever had problems with anemia or been told to take iron pills? No   10. Have you had any abnormal blood loss such as black, tarry or bloody stools? No   11. Have you ever had a blood transfusion? No   12. Are you willing to have a blood transfusion if it is medically needed before, during, or after your surgery? Yes   13. Have you or any of your relatives ever had problems with anesthesia? No   14. Do you have sleep apnea, excessive snoring or daytime drowsiness? No   15. Do you have any artifical heart valves or other implanted medical devices like a pacemaker, defibrillator, or continuous glucose monitor? No    16. Do you have artificial joints? No   17. Are you allergic to latex? No       Health Care Directive:  Patient has a Health Care Directive on file    Preoperative Review of :   reviewed - no record of controlled substances prescribed.      Status of Chronic Conditions:  CAD - Patient has a longstanding history of moderate-severe CAD. Patient denies recent chest pain or NTG use, denies exercise induced dyspnea or PND. Last Stress test , EKG .todya    HYPERLIPIDEMIA - Patient has a long history of significant Hyperlipidemia requiring medication for treatment with recent good control. Patient reports no problems or side effects with the medication.     HYPERTENSION - Patient has longstanding history of HTN , currently denies any symptoms referable to elevated blood pressure. Specifically denies chest pain, palpitations, dyspnea, orthopnea, PND or peripheral edema. Blood pressure readings have been in normal range. Current medication regimen is as listed below. Patient denies any side effects of medication.     Review of Systems  CONSTITUTIONAL: NEGATIVE for fever, chills, change in weight  ENT/MOUTH: NEGATIVE for ear, mouth and throat problems  RESP: NEGATIVE for significant cough or SOB  CV: NEGATIVE for chest pain, palpitations or peripheral edema    Patient Active Problem List    Diagnosis Date Noted    Abnormal gait 08/31/2021     Priority: Medium    Falls frequently 08/31/2021     Priority: Medium    Facial injury, initial encounter 08/31/2021     Priority: Medium    Acute midline low back pain without sciatica 08/31/2021     Priority: Medium    Hyponatremia 08/31/2021     Priority: Medium    Biliary colic 07/08/2021     Priority: Medium     Added automatically from request for surgery [id number]      H/O carotid angioplasty 09/04/2020     Priority: Medium    Status post balloon angioplasty of pulmonary artery branches 09/03/2020     Priority: Medium    Carotid stenosis      Priority: Medium     right  endartectomy      Essential hypertension with goal blood pressure less than 140/90 06/02/2016     Priority: Medium    Chronic constipation 12/24/2014     Priority: Medium    Irritable bowel syndrome 12/24/2014     Priority: Medium    Carotid artery occlusion with cerebral infarction (H) 08/06/2014     Priority: Medium    Dizziness 08/06/2014     Priority: Medium    Panlobular emphysema (H) 06/04/2014     Priority: Medium    Cerebral infarction due to occlusion or stenosis of carotid artery 05/07/2014     Priority: Medium     Problem list name updated by automated process. 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     Priority: Medium    Insomnia 03/12/2014     Priority: Medium    Nutritional deficiency 03/12/2014     Priority: Medium    Pain 03/12/2014     Priority: Medium    Urinary retention 03/12/2014     Priority: Medium    S/P CABG x 6 03/06/2014     Priority: Medium    Left main coronary artery disease 03/04/2014     Priority: Medium    Arthritis 01/14/2013     Priority: Medium    Carotid bruit 01/14/2013     Priority: Medium    Carotid stenosis, bilateral 01/14/2013     Priority: Medium    Anemia 04/06/2012     Priority: Medium    CKD (chronic kidney disease) stage 3, GFR 30-59 ml/min (H) 04/06/2012     Priority: Medium    Impingement syndrome, shoulder, right 03/16/2011     Priority: Medium    Hypertension 11/22/2010     Priority: Medium    Hyperlipidemia LDL goal <130 11/22/2010     Priority: Medium    Myalgia and myositis 11/22/2010     Priority: Medium    GERD (gastroesophageal reflux disease) 11/22/2010     Priority: Medium      Past Medical History:   Diagnosis Date    Carotid stenosis     right endartectomy    Chronic kidney disease     stage 3    Coronary artery disease 3-2014    CABG x6    CVA (cerebral infarction)     balance problems, mild    Elevated CK     Esophageal reflux     Hypercholesteremia     Hyponatremia 8/31/2021    Nonsenile cataract     Other and unspecified  hyperlipidemia     PAD (peripheral artery disease) (H)     Rhabdomyolysis 2010    Unspecified essential hypertension      Past Surgical History:   Procedure Laterality Date    APPENDECTOMY  1974    BYPASS GRAFT ARTERY CORONARY  3/5/2014    Procedure: BYPASS GRAFT ARTERY CORONARY;  Median Sternotomy, Coronary Artery Bypass Graft X6 used Left internal mammary artery, Left and Right Greater Saphenous vein, on Pump oxygenator.;  Surgeon: Tim Montanez MD;  Location: [facility] OR    CATARACT IOL, RT/LT Bilateral 2008    in [place]    cataracts      COLONOSCOPY  12/12/02    [facility]    COLONOSCOPY N/A 10/7/2014    Procedure: COMBINED COLONOSCOPY, SINGLE OR MULTIPLE BIOPSY/POLYPECTOMY BY BIOPSY;  Surgeon: Micheal Mora MD;  Location: [facility] GI    CV LOWER EXTREMITY ANGIOGRAM BILATERAL Bilateral 9/9/2019    Procedure: Lower Extremity Angiogram Bilateral;  Surgeon: Juloi Oropeza MD;  [facility]    DENTAL SURGERY      TMJ, implants    ENDARTERECTOMY CAROTID      right carotid stent    ESOPHAGOSCOPY, GASTROSCOPY, DUODENOSCOPY (EGD), COMBINED Left 10/7/2014    Procedure: COMBINED ESOPHAGOSCOPY, GASTROSCOPY, DUODENOSCOPY (EGD), BIOPSY SINGLE OR MULTIPLE;  Surgeon: Micheal Mora MD;  Location: [facility] GI    ESOPHAGOSCOPY, GASTROSCOPY, DUODENOSCOPY (EGD), COMBINED Left 10/7/2014    Procedure: COMBINED ESOPHAGOSCOPY, GASTROSCOPY, DUODENOSCOPY (EGD), REMOVE FOREIGN BODY;  Surgeon: Micheal Mora MD;  Location: [facility]    HC CAPSULE ENDOSCOPY N/A 10/7/2014    Procedure: CAPSULE/PILL CAM ENDOSCOPY;  Surgeon: Micheal Mora MD;  Location: [facility]    INJECT EPIDURAL LUMBAR Right 5/8/2017    Procedure: INJECT EPIDURAL LUMBAR;  Lumbar transforaminal Epidural Steroid Injection right lumbar 4-5, and right  lumbar 5-Sacral 1;  Surgeon: Anthony Gonzalez MD;  Location: [facility]    INJECT JOINT SACROILIAC Bilateral 8/28/2017    Procedure: INJECT JOINT SACROILIAC;  sacroiliac joint injection bilateral;   Surgeon: Anthony Gonzalez MD;  Location: [facility] OR    IR CAROTID CEREBRAL ANGIOGRAM BILATERAL  9/3/2020    KNEE SURGERY  1976    left knee reconstruction    LAPAROSCOPIC CHOLECYSTECTOMY N/A 7/16/2021    Procedure: CHOLECYSTECTOMY, LAPAROSCOPIC;  Surgeon: Anthony Epps MD;  Location: [facility] OR    lasix  2001    both eyes    RELEASE CARPAL TUNNEL  1/13/2011    RELEASE CARPAL TUNNEL performed by JO MADRID at  OR    RELEASE CARPAL TUNNEL  1/20/2011    RELEASE CARPAL TUNNEL performed by JO MADRID at  OR    [facility] ENDOSCOPY, SIMPLE EXAM  01/08/99    [facility]     Current Outpatient Medications   Medication Sig Dispense Refill    acetaminophen (TYLENOL) 325 MG tablet Take 650 mg by mouth daily Taking 500mg twice daily      alirocumab (PRALUENT) 150 MG/ML injectable pen Inject 1 mL (150 mg) Subcutaneous every 14 days 6 mL 3    amitriptyline (ELAVIL) 25 MG tablet Take 2 tablets (50 mg) by mouth At Bedtime 180 tablet 3    aspirin (ASA) 325 MG tablet Take 325 mg by mouth daily      carvedilol (COREG) 25 MG tablet TAKE 1 TABLET TWICE A DAY WITH MEALS 180 tablet 2    cilostazol (PLETAL) 100 MG tablet Take 1 tablet (100 mg) by mouth 2 times daily 180 tablet 3    diphenhydrAMINE-acetaminophen (TYLENOL PM)  MG tablet Take 2 tablets by mouth At Bedtime      fluticasone (FLONASE) 50 MCG/ACT nasal spray Spray 1 spray into both nostrils daily 18 g 11    gabapentin (NEURONTIN) 300 MG capsule Take 1 capsule (300 mg) by mouth 3 times daily 270 capsule 3    isosorbide mononitrate (IMDUR) 60 MG 24 hr tablet TAKE 1 TABLET DAILY 90 tablet 2    losartan (COZAAR) 100 MG tablet Take 1 tablet (100 mg) by mouth daily 90 tablet 3    Melatonin 10 MG TABS tablet Take 10 mg by mouth At Bedtime      omeprazole (PRILOSEC) 40 MG DR capsule TAKE 1 CAPSULE DAILY 90 capsule 0    ondansetron (ZOFRAN) 4 MG tablet Take 1 tablet (4 mg) by mouth as needed for nausea 90 tablet 0    potassium chloride ER (KLOR-CON M) 10  "MEQ CR tablet TAKE 3 TABLETS TWICE A  tablet 3    albuterol (PROAIR HFA/PROVENTIL HFA/VENTOLIN HFA) 108 (90 Base) MCG/ACT inhaler Inhale 2 puffs into the lungs every 4 hours as needed for shortness of breath / dyspnea or wheezing 8 g 0       Allergies   Allergen Reactions    Statins [Statins] Other (See Comments)     Rhabdo  Same as \"statins\"    Gemfibrozil      Resting thigh-calf pain    Sulfa Antibiotics      Reacted as a child    Zetia [Ezetimibe]      Muscle cramping        Social History     Tobacco Use    Smoking status: Former     Packs/day: 2.50     Years: 20.00     Additional pack years: 0.00     Total pack years: 50.00     Types: Cigarettes     Quit date: 2000     Years since quittin.9    Smokeless tobacco: Never   Substance Use Topics    Alcohol use: No     Alcohol/week: 0.0 standard drinks of alcohol     History   Drug Use No         Objective     /66   Pulse 68   Temp 97.9  F (36.6  C) (Temporal)   Resp 16   Ht 1.727 m (5' 8\")   Wt 77.6 kg (171 lb)   SpO2 100%   BMI 26.00 kg/m      Physical Exam  GENERAL APPEARANCE: healthy, alert and no distress  HENT: ear canals and TM's normal and nose and mouth without ulcers or lesions  RESP: lungs clear to auscultation - no rales, rhonchi or wheezes  CV: regular rate and rhythm, normal S1 S2, no S3 or S4 and no murmur, click or rub   ABDOMEN: soft, nontender, no HSM or masses and bowel sounds normal  NEURO: Normal strength and tone, sensory exam grossly normal, mentation intact and speech normal    Recent Labs   Lab Test 23  1401 23  1242 23  1219 10/18/22  0906 22  1451   HGB  --   --  12.6*  --  12.6*   PLT  --   --  272  --  228   * 124* 124*   < > 128*   POTASSIUM 4.7 4.9 5.0   < > 5.0   CR 1.55* 1.46* 1.31*   < > 1.72*    < > = values in this interval not displayed.      Diagnostics:  Labs pending at this time.  Results will be reviewed when available.   EKG: appears normal, NSR, normal axis, normal " intervals, no acute ST/T changes c/w ischemia, no LVH by voltage criteria, Right Bundle Branch Block, unchanged from previous tracings    Revised Cardiac Risk Index (RCRI):  The patient has the following serious cardiovascular risks for perioperative complications:   - No serious cardiac risks = 0 points     RCRI Interpretation: 1 point: Class II (low risk - 0.9% complication rate)         Signed Electronically by: Jonas West MD  Copy of this evaluation report is provided to requesting physician.

## 2023-12-09 ENCOUNTER — HEALTH MAINTENANCE LETTER (OUTPATIENT)
Age: 78
End: 2023-12-09

## 2023-12-18 DIAGNOSIS — G43.009 MIGRAINE WITHOUT AURA AND WITHOUT STATUS MIGRAINOSUS, NOT INTRACTABLE: ICD-10-CM

## 2023-12-18 DIAGNOSIS — I10 ESSENTIAL HYPERTENSION, BENIGN: ICD-10-CM

## 2023-12-18 DIAGNOSIS — K21.9 GASTROESOPHAGEAL REFLUX DISEASE WITHOUT ESOPHAGITIS: ICD-10-CM

## 2023-12-18 RX ORDER — OMEPRAZOLE 40 MG/1
CAPSULE, DELAYED RELEASE ORAL
Qty: 90 CAPSULE | Refills: 1 | Status: SHIPPED | OUTPATIENT
Start: 2023-12-18 | End: 2023-12-19

## 2023-12-18 RX ORDER — LOSARTAN POTASSIUM 100 MG/1
100 TABLET ORAL DAILY
Qty: 90 TABLET | Refills: 3 | Status: ON HOLD | OUTPATIENT
Start: 2023-12-18 | End: 2024-01-28

## 2023-12-19 ENCOUNTER — MYC MEDICAL ADVICE (OUTPATIENT)
Dept: INTERNAL MEDICINE | Facility: CLINIC | Age: 78
End: 2023-12-19
Payer: MEDICARE

## 2023-12-19 ENCOUNTER — TELEPHONE (OUTPATIENT)
Dept: INTERNAL MEDICINE | Facility: CLINIC | Age: 78
End: 2023-12-19
Payer: MEDICARE

## 2023-12-19 DIAGNOSIS — K21.9 GASTROESOPHAGEAL REFLUX DISEASE WITHOUT ESOPHAGITIS: ICD-10-CM

## 2023-12-19 RX ORDER — OMEPRAZOLE 40 MG/1
40 CAPSULE, DELAYED RELEASE ORAL DAILY
Qty: 90 CAPSULE | Refills: 1 | Status: SHIPPED | OUTPATIENT
Start: 2023-12-19 | End: 2024-05-30

## 2023-12-19 NOTE — TELEPHONE ENCOUNTER
His blood pressure was fine and his preop 126/66 so hard to give him anything else without making him hypotensive.    His blood pressure was up from nerves or when the blood pressure came back down.  Possibly get more information.

## 2023-12-19 NOTE — TELEPHONE ENCOUNTER
FYI - Status Update    Who is Calling: family member, Spouse Michelle calling    Update: The patient was supposed to have oral surgery today, he help his   RX   losartan (COZAAR) 100 MG tablet 90   But he was unable to have his surgery today due to his BP being too high around 205/89. They are wondering if there is something else besides the Losartan that he can take so he can have his procedure done.  Does caller want a call/response back: Yes     [facility] or would you prefer to receive a phone call?:   Patient would prefer a phone call   Okay to leave a detailed message?: Yes at Home number on file [phone] (home)

## 2023-12-20 NOTE — TELEPHONE ENCOUNTER
RN Triage    Patient Contact    Attempt # 1    Was call answered?  No.  Left message on voicemail with information to call me back.    Amparo Duncan RN on 12/20/2023 at 7:46 AM

## 2023-12-20 NOTE — TELEPHONE ENCOUNTER
He did not take his Losartan before his procedure.    The dentist won't do the procedure until he is cleared to have either his Losartan or another medication before his procedure instead of the Losartan.    Once he took his Losartan at home his blood pressure came back down.    Please refax approval for surgery with approval to take blood pressure medication before his procedure if appropriate.    Amparo Duncan RN on 12/20/2023 at 7:56 AM

## 2024-01-27 ENCOUNTER — APPOINTMENT (OUTPATIENT)
Dept: CT IMAGING | Facility: CLINIC | Age: 79
DRG: 312 | End: 2024-01-27
Attending: EMERGENCY MEDICINE
Payer: MEDICARE

## 2024-01-27 ENCOUNTER — HOSPITAL ENCOUNTER (INPATIENT)
Facility: CLINIC | Age: 79
LOS: 1 days | Discharge: HOME | DRG: 312 | End: 2024-01-28
Attending: EMERGENCY MEDICINE | Admitting: INTERNAL MEDICINE
Payer: MEDICARE

## 2024-01-27 DIAGNOSIS — I10 ESSENTIAL HYPERTENSION, BENIGN: ICD-10-CM

## 2024-01-27 DIAGNOSIS — R06.09 DOE (DYSPNEA ON EXERTION): ICD-10-CM

## 2024-01-27 DIAGNOSIS — I67.9 CEREBRAL HYPOPERFUSION: ICD-10-CM

## 2024-01-27 DIAGNOSIS — G47.00 PERSISTENT INSOMNIA: Primary | ICD-10-CM

## 2024-01-27 DIAGNOSIS — I95.9 HYPOTENSION, UNSPECIFIED HYPOTENSION TYPE: ICD-10-CM

## 2024-01-27 DIAGNOSIS — R40.4 TRANSIENT ALTERATION OF AWARENESS: ICD-10-CM

## 2024-01-27 DIAGNOSIS — E87.5 HYPERKALEMIA: ICD-10-CM

## 2024-01-27 DIAGNOSIS — I65.23 CAROTID STENOSIS, BILATERAL: ICD-10-CM

## 2024-01-27 LAB
ALBUMIN UR-MCNC: NEGATIVE MG/DL
ANION GAP SERPL CALCULATED.3IONS-SCNC: 12 MMOL/L (ref 7–15)
ANION GAP SERPL CALCULATED.3IONS-SCNC: 9 MMOL/L (ref 7–15)
APPEARANCE UR: CLEAR
APTT PPP: 27 SECONDS (ref 22–38)
BASOPHILS # BLD AUTO: 0 10E3/UL (ref 0–0.2)
BASOPHILS NFR BLD AUTO: 0 %
BILIRUB UR QL STRIP: NEGATIVE
BUN SERPL-MCNC: 21.4 MG/DL (ref 8–23)
BUN SERPL-MCNC: 24.2 MG/DL (ref 8–23)
CALCIUM SERPL-MCNC: 8.9 MG/DL (ref 8.8–10.2)
CALCIUM SERPL-MCNC: 9.2 MG/DL (ref 8.8–10.2)
CHLORIDE SERPL-SCNC: 96 MMOL/L (ref 98–107)
CHLORIDE SERPL-SCNC: 98 MMOL/L (ref 98–107)
COLOR UR AUTO: NORMAL
CREAT SERPL-MCNC: 1.48 MG/DL (ref 0.67–1.17)
CREAT SERPL-MCNC: 1.64 MG/DL (ref 0.67–1.17)
DEPRECATED HCO3 PLAS-SCNC: 20 MMOL/L (ref 22–29)
DEPRECATED HCO3 PLAS-SCNC: 21 MMOL/L (ref 22–29)
EGFRCR SERPLBLD CKD-EPI 2021: 43 ML/MIN/1.73M2
EGFRCR SERPLBLD CKD-EPI 2021: 48 ML/MIN/1.73M2
EOSINOPHIL # BLD AUTO: 0.2 10E3/UL (ref 0–0.7)
EOSINOPHIL NFR BLD AUTO: 2 %
ERYTHROCYTE [DISTWIDTH] IN BLOOD BY AUTOMATED COUNT: 13.8 % (ref 10–15)
GLUCOSE BLDC GLUCOMTR-MCNC: 104 MG/DL (ref 70–99)
GLUCOSE BLDC GLUCOMTR-MCNC: 118 MG/DL (ref 70–99)
GLUCOSE BLDC GLUCOMTR-MCNC: 132 MG/DL (ref 70–99)
GLUCOSE BLDC GLUCOMTR-MCNC: 134 MG/DL (ref 70–99)
GLUCOSE BLDC GLUCOMTR-MCNC: 136 MG/DL (ref 70–99)
GLUCOSE BLDC GLUCOMTR-MCNC: 140 MG/DL (ref 70–99)
GLUCOSE BLDC GLUCOMTR-MCNC: 143 MG/DL (ref 70–99)
GLUCOSE BLDC GLUCOMTR-MCNC: 144 MG/DL (ref 70–99)
GLUCOSE BLDC GLUCOMTR-MCNC: 179 MG/DL (ref 70–99)
GLUCOSE SERPL-MCNC: 123 MG/DL (ref 70–99)
GLUCOSE SERPL-MCNC: 185 MG/DL (ref 70–99)
GLUCOSE UR STRIP-MCNC: NEGATIVE MG/DL
HCT VFR BLD AUTO: 35 % (ref 40–53)
HGB BLD-MCNC: 11.8 G/DL (ref 13.3–17.7)
HGB UR QL STRIP: NEGATIVE
HOLD SPECIMEN: NORMAL
IMM GRANULOCYTES # BLD: 0 10E3/UL
IMM GRANULOCYTES NFR BLD: 0 %
INR PPP: 1.23 (ref 0.85–1.15)
KETONES UR STRIP-MCNC: NEGATIVE MG/DL
LEUKOCYTE ESTERASE UR QL STRIP: NEGATIVE
LYMPHOCYTES # BLD AUTO: 1 10E3/UL (ref 0.8–5.3)
LYMPHOCYTES NFR BLD AUTO: 11 %
MCH RBC QN AUTO: 33.4 PG (ref 26.5–33)
MCHC RBC AUTO-ENTMCNC: 33.7 G/DL (ref 31.5–36.5)
MCV RBC AUTO: 99 FL (ref 78–100)
MONOCYTES # BLD AUTO: 0.8 10E3/UL (ref 0–1.3)
MONOCYTES NFR BLD AUTO: 8 %
NEUTROPHILS # BLD AUTO: 7.5 10E3/UL (ref 1.6–8.3)
NEUTROPHILS NFR BLD AUTO: 79 %
NITRATE UR QL: NEGATIVE
NRBC # BLD AUTO: 0 10E3/UL
NRBC BLD AUTO-RTO: 0 /100
OSMOLALITY UR: 264 MMOL/KG (ref 100–1200)
PH UR STRIP: 6 [PH] (ref 5–7)
PLATELET # BLD AUTO: 253 10E3/UL (ref 150–450)
POTASSIUM SERPL-SCNC: 4.3 MMOL/L (ref 3.4–5.3)
POTASSIUM SERPL-SCNC: 4.4 MMOL/L (ref 3.4–5.3)
POTASSIUM SERPL-SCNC: 4.6 MMOL/L (ref 3.4–5.3)
POTASSIUM SERPL-SCNC: 6.2 MMOL/L (ref 3.4–5.3)
RBC # BLD AUTO: 3.53 10E6/UL (ref 4.4–5.9)
SODIUM SERPL-SCNC: 127 MMOL/L (ref 135–145)
SODIUM SERPL-SCNC: 129 MMOL/L (ref 135–145)
SODIUM UR-SCNC: 87 MMOL/L
SP GR UR STRIP: 1.01 (ref 1–1.03)
TROPONIN T SERPL HS-MCNC: 29 NG/L
TROPONIN T SERPL HS-MCNC: 33 NG/L
UROBILINOGEN UR STRIP-MCNC: NORMAL MG/DL
WBC # BLD AUTO: 9.5 10E3/UL (ref 4–11)

## 2024-01-27 PROCEDURE — 83935 ASSAY OF URINE OSMOLALITY: CPT | Performed by: INTERNAL MEDICINE

## 2024-01-27 PROCEDURE — 250N000011 HC RX IP 250 OP 636: Performed by: EMERGENCY MEDICINE

## 2024-01-27 PROCEDURE — 99292 CRITICAL CARE ADDL 30 MIN: CPT | Performed by: EMERGENCY MEDICINE

## 2024-01-27 PROCEDURE — 99291 CRITICAL CARE FIRST HOUR: CPT | Mod: 25 | Performed by: EMERGENCY MEDICINE

## 2024-01-27 PROCEDURE — 120N000001 HC R&B MED SURG/OB

## 2024-01-27 PROCEDURE — 93005 ELECTROCARDIOGRAM TRACING: CPT | Performed by: EMERGENCY MEDICINE

## 2024-01-27 PROCEDURE — 81003 URINALYSIS AUTO W/O SCOPE: CPT | Performed by: INTERNAL MEDICINE

## 2024-01-27 PROCEDURE — 85730 THROMBOPLASTIN TIME PARTIAL: CPT | Performed by: EMERGENCY MEDICINE

## 2024-01-27 PROCEDURE — 96361 HYDRATE IV INFUSION ADD-ON: CPT | Performed by: EMERGENCY MEDICINE

## 2024-01-27 PROCEDURE — 85025 COMPLETE CBC W/AUTO DIFF WBC: CPT | Performed by: EMERGENCY MEDICINE

## 2024-01-27 PROCEDURE — 80048 BASIC METABOLIC PNL TOTAL CA: CPT | Performed by: EMERGENCY MEDICINE

## 2024-01-27 PROCEDURE — 82962 GLUCOSE BLOOD TEST: CPT

## 2024-01-27 PROCEDURE — 84300 ASSAY OF URINE SODIUM: CPT | Performed by: INTERNAL MEDICINE

## 2024-01-27 PROCEDURE — 96374 THER/PROPH/DIAG INJ IV PUSH: CPT | Mod: 59 | Performed by: EMERGENCY MEDICINE

## 2024-01-27 PROCEDURE — 85610 PROTHROMBIN TIME: CPT | Performed by: EMERGENCY MEDICINE

## 2024-01-27 PROCEDURE — 36415 COLL VENOUS BLD VENIPUNCTURE: CPT | Performed by: EMERGENCY MEDICINE

## 2024-01-27 PROCEDURE — 99222 1ST HOSP IP/OBS MODERATE 55: CPT | Mod: AI | Performed by: INTERNAL MEDICINE

## 2024-01-27 PROCEDURE — 84132 ASSAY OF SERUM POTASSIUM: CPT | Mod: 91 | Performed by: INTERNAL MEDICINE

## 2024-01-27 PROCEDURE — 96375 TX/PRO/DX INJ NEW DRUG ADDON: CPT | Performed by: EMERGENCY MEDICINE

## 2024-01-27 PROCEDURE — 250N000012 HC RX MED GY IP 250 OP 636 PS 637: Performed by: EMERGENCY MEDICINE

## 2024-01-27 PROCEDURE — 70450 CT HEAD/BRAIN W/O DYE: CPT | Mod: MG,XU

## 2024-01-27 PROCEDURE — 258N000001 HC RX 258: Performed by: EMERGENCY MEDICINE

## 2024-01-27 PROCEDURE — 250N000013 HC RX MED GY IP 250 OP 250 PS 637: Performed by: INTERNAL MEDICINE

## 2024-01-27 PROCEDURE — 70496 CT ANGIOGRAPHY HEAD: CPT | Mod: MG

## 2024-01-27 PROCEDURE — 99449 NTRPROF PH1/NTRNET/EHR 31/>: CPT | Performed by: PSYCHIATRY & NEUROLOGY

## 2024-01-27 PROCEDURE — 84484 ASSAY OF TROPONIN QUANT: CPT | Performed by: EMERGENCY MEDICINE

## 2024-01-27 PROCEDURE — 93010 ELECTROCARDIOGRAM REPORT: CPT | Performed by: EMERGENCY MEDICINE

## 2024-01-27 PROCEDURE — 250N000009 HC RX 250: Performed by: EMERGENCY MEDICINE

## 2024-01-27 PROCEDURE — 36415 COLL VENOUS BLD VENIPUNCTURE: CPT | Performed by: INTERNAL MEDICINE

## 2024-01-27 PROCEDURE — 258N000003 HC RX IP 258 OP 636: Performed by: EMERGENCY MEDICINE

## 2024-01-27 RX ORDER — FUROSEMIDE 10 MG/ML
40 INJECTION INTRAMUSCULAR; INTRAVENOUS ONCE
Status: COMPLETED | OUTPATIENT
Start: 2024-01-27 | End: 2024-01-27

## 2024-01-27 RX ORDER — PANTOPRAZOLE SODIUM 40 MG/1
40 TABLET, DELAYED RELEASE ORAL 2 TIMES DAILY
Status: DISCONTINUED | OUTPATIENT
Start: 2024-01-28 | End: 2024-01-28 | Stop reason: HOSPADM

## 2024-01-27 RX ORDER — NICOTINE POLACRILEX 4 MG
15-30 LOZENGE BUCCAL
Status: DISCONTINUED | OUTPATIENT
Start: 2024-01-27 | End: 2024-01-28 | Stop reason: HOSPADM

## 2024-01-27 RX ORDER — CALCIUM GLUCONATE 20 MG/ML
1 INJECTION, SOLUTION INTRAVENOUS ONCE
Status: COMPLETED | OUTPATIENT
Start: 2024-01-27 | End: 2024-01-27

## 2024-01-27 RX ORDER — AMOXICILLIN 250 MG
1 CAPSULE ORAL 2 TIMES DAILY PRN
Status: DISCONTINUED | OUTPATIENT
Start: 2024-01-27 | End: 2024-01-28 | Stop reason: HOSPADM

## 2024-01-27 RX ORDER — IOPAMIDOL 755 MG/ML
500 INJECTION, SOLUTION INTRAVASCULAR ONCE
Status: COMPLETED | OUTPATIENT
Start: 2024-01-27 | End: 2024-01-27

## 2024-01-27 RX ORDER — LIDOCAINE 40 MG/G
CREAM TOPICAL
Status: DISCONTINUED | OUTPATIENT
Start: 2024-01-27 | End: 2024-01-28 | Stop reason: HOSPADM

## 2024-01-27 RX ORDER — AMOXICILLIN 250 MG
2 CAPSULE ORAL 2 TIMES DAILY PRN
Status: DISCONTINUED | OUTPATIENT
Start: 2024-01-27 | End: 2024-01-28 | Stop reason: HOSPADM

## 2024-01-27 RX ORDER — ALBUTEROL SULFATE 5 MG/ML
10 SOLUTION RESPIRATORY (INHALATION) ONCE
Qty: 2 ML | Refills: 0 | Status: COMPLETED | OUTPATIENT
Start: 2024-01-27 | End: 2024-01-27

## 2024-01-27 RX ORDER — ONDANSETRON 2 MG/ML
4 INJECTION INTRAMUSCULAR; INTRAVENOUS EVERY 6 HOURS PRN
Status: DISCONTINUED | OUTPATIENT
Start: 2024-01-27 | End: 2024-01-28 | Stop reason: HOSPADM

## 2024-01-27 RX ORDER — DEXTROSE MONOHYDRATE 25 G/50ML
25 INJECTION, SOLUTION INTRAVENOUS ONCE
Status: COMPLETED | OUTPATIENT
Start: 2024-01-27 | End: 2024-01-27

## 2024-01-27 RX ORDER — GABAPENTIN 300 MG/1
300 CAPSULE ORAL EVERY MORNING
Status: DISCONTINUED | OUTPATIENT
Start: 2024-01-28 | End: 2024-01-28 | Stop reason: HOSPADM

## 2024-01-27 RX ORDER — DEXTROSE MONOHYDRATE 25 G/50ML
25-50 INJECTION, SOLUTION INTRAVENOUS
Status: DISCONTINUED | OUTPATIENT
Start: 2024-01-27 | End: 2024-01-28 | Stop reason: HOSPADM

## 2024-01-27 RX ORDER — CALCIUM CARBONATE 500 MG/1
1000 TABLET, CHEWABLE ORAL 4 TIMES DAILY PRN
Status: DISCONTINUED | OUTPATIENT
Start: 2024-01-27 | End: 2024-01-28 | Stop reason: HOSPADM

## 2024-01-27 RX ORDER — GABAPENTIN 300 MG/1
300 CAPSULE ORAL EVERY MORNING
COMMUNITY

## 2024-01-27 RX ORDER — CILOSTAZOL 50 MG/1
100 TABLET ORAL 2 TIMES DAILY
Status: DISCONTINUED | OUTPATIENT
Start: 2024-01-27 | End: 2024-01-28 | Stop reason: HOSPADM

## 2024-01-27 RX ORDER — CARVEDILOL 25 MG/1
25 TABLET ORAL 2 TIMES DAILY WITH MEALS
Status: DISCONTINUED | OUTPATIENT
Start: 2024-01-27 | End: 2024-01-28 | Stop reason: HOSPADM

## 2024-01-27 RX ORDER — SODIUM CHLORIDE 9 MG/ML
INJECTION, SOLUTION INTRAVENOUS CONTINUOUS PRN
Status: DISCONTINUED | OUTPATIENT
Start: 2024-01-27 | End: 2024-01-28 | Stop reason: HOSPADM

## 2024-01-27 RX ORDER — ONDANSETRON 4 MG/1
4 TABLET, ORALLY DISINTEGRATING ORAL EVERY 6 HOURS PRN
Status: DISCONTINUED | OUTPATIENT
Start: 2024-01-27 | End: 2024-01-28 | Stop reason: HOSPADM

## 2024-01-27 RX ORDER — ASPIRIN 325 MG
325 TABLET, DELAYED RELEASE (ENTERIC COATED) ORAL DAILY
Status: DISCONTINUED | OUTPATIENT
Start: 2024-01-28 | End: 2024-01-28 | Stop reason: HOSPADM

## 2024-01-27 RX ORDER — GABAPENTIN 300 MG/1
600 CAPSULE ORAL EVERY EVENING
Status: DISCONTINUED | OUTPATIENT
Start: 2024-01-27 | End: 2024-01-28 | Stop reason: HOSPADM

## 2024-01-27 RX ORDER — HYDROCODONE BITARTRATE AND ACETAMINOPHEN 5; 325 MG/1; MG/1
1 TABLET ORAL EVERY 6 HOURS PRN
COMMUNITY
Start: 2023-11-16

## 2024-01-27 RX ORDER — FLUTICASONE PROPIONATE 50 MCG
1 SPRAY, SUSPENSION (ML) NASAL DAILY
Status: DISCONTINUED | OUTPATIENT
Start: 2024-01-27 | End: 2024-01-28 | Stop reason: HOSPADM

## 2024-01-27 RX ADMIN — CALCIUM GLUCONATE 1 G: 20 INJECTION, SOLUTION INTRAVENOUS at 11:00

## 2024-01-27 RX ADMIN — SODIUM BICARBONATE 50 MEQ: 84 INJECTION, SOLUTION INTRAVENOUS at 11:13

## 2024-01-27 RX ADMIN — DEXTROSE MONOHYDRATE 300 ML: 100 INJECTION, SOLUTION INTRAVENOUS at 11:21

## 2024-01-27 RX ADMIN — IOPAMIDOL 67 ML: 755 INJECTION, SOLUTION INTRAVENOUS at 10:14

## 2024-01-27 RX ADMIN — SODIUM CHLORIDE 100 ML: 9 INJECTION, SOLUTION INTRAVENOUS at 10:15

## 2024-01-27 RX ADMIN — DEXTROSE MONOHYDRATE 25 G: 25 INJECTION, SOLUTION INTRAVENOUS at 11:22

## 2024-01-27 RX ADMIN — AMITRIPTYLINE HYDROCHLORIDE 50 MG: 25 TABLET, FILM COATED ORAL at 22:36

## 2024-01-27 RX ADMIN — CARVEDILOL 25 MG: 25 TABLET, FILM COATED ORAL at 17:43

## 2024-01-27 RX ADMIN — SODIUM CHLORIDE 1000 ML: 9 INJECTION, SOLUTION INTRAVENOUS at 11:40

## 2024-01-27 RX ADMIN — SODIUM CHLORIDE: 9 INJECTION, SOLUTION INTRAVENOUS at 18:07

## 2024-01-27 RX ADMIN — FUROSEMIDE 40 MG: 10 INJECTION, SOLUTION INTRAMUSCULAR; INTRAVENOUS at 10:50

## 2024-01-27 RX ADMIN — HUMAN INSULIN 8 UNITS: 100 INJECTION, SOLUTION SUBCUTANEOUS at 11:24

## 2024-01-27 RX ADMIN — CILOSTAZOL 100 MG: 50 TABLET ORAL at 22:36

## 2024-01-27 RX ADMIN — GABAPENTIN 600 MG: 300 CAPSULE ORAL at 18:06

## 2024-01-27 RX ADMIN — ALBUTEROL SULFATE 10 MG: 2.5 SOLUTION RESPIRATORY (INHALATION) at 11:46

## 2024-01-27 ASSESSMENT — ACTIVITIES OF DAILY LIVING (ADL)
ADLS_ACUITY_SCORE: 30
ADLS_ACUITY_SCORE: 28
ADLS_ACUITY_SCORE: 30
ADLS_ACUITY_SCORE: 37
ADLS_ACUITY_SCORE: 30
ADLS_ACUITY_SCORE: 28
ADLS_ACUITY_SCORE: 37

## 2024-01-27 NOTE — ED PROVIDER NOTES
"  History     Chief Complaint   Patient presents with    Altered Mental Status     HPI  Michele Vance is a 78 year old male who dents via EMS from home over concern of altered mental status and low blood pressure.  Last known well was last night around 10 PM.  Today family noted Michele was altered, was not responding like his normal self, and had low blood pressures at home.  When EMS responded to the call, noted systolic pressures in the 70s to 80s.  Patient was slow to respond and using incorrect words.  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  Patient remains alert and oriented.  They did notice a slight facial droop and facial asymmetry, but 1 week ago Michele had a dental procedure where he had his upper teeth removed.  Does have previous history of stroke and coronary stents.  Also has history of carotid artery occlusion    Allergies:  Allergies   Allergen Reactions    Statins [Statins] Other (See Comments)     Rhabdo  Same as \"statins\"    Gemfibrozil      Resting thigh-calf pain    Sulfa Antibiotics      Reacted as a child    Zetia [Ezetimibe]      Muscle cramping       Problem List:    Patient Active Problem List    Diagnosis Date Noted    Transient alteration of awareness 01/27/2024     Priority: Medium    Hyperkalemia 01/27/2024     Priority: Medium    Cerebral hypoperfusion 01/27/2024     Priority: Medium    Renal artery stenosis (H24) 12/04/2023     Priority: Medium    Abnormal gait 08/31/2021     Priority: Medium    Falls frequently 08/31/2021     Priority: Medium    Facial injury, initial encounter 08/31/2021     Priority: Medium    Acute midline low back pain without sciatica 08/31/2021     Priority: Medium    Hyponatremia 08/31/2021     Priority: Medium    Biliary colic 07/08/2021     Priority: Medium     Added automatically from request for surgery [id number]      " H/O carotid angioplasty 09/04/2020     Priority: Medium    Status post balloon angioplasty of pulmonary artery branches 09/03/2020     Priority: Medium    Carotid stenosis      Priority: Medium     right endartectomy      Essential hypertension with goal blood pressure less than 140/90 06/02/2016     Priority: Medium    Chronic constipation 12/24/2014     Priority: Medium    Irritable bowel syndrome 12/24/2014     Priority: Medium    Carotid artery occlusion with cerebral infarction (H) 08/06/2014     Priority: Medium    Dizziness 08/06/2014     Priority: Medium    Cerebral infarction due to occlusion or stenosis of carotid artery 05/07/2014     Priority: Medium     Problem list name updated by automated process. 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     Priority: Medium    Insomnia 03/12/2014     Priority: Medium    Nutritional deficiency 03/12/2014     Priority: Medium    Pain 03/12/2014     Priority: Medium    Urinary retention 03/12/2014     Priority: Medium    S/P CABG x 6 03/06/2014     Priority: Medium    Left main coronary artery disease 03/04/2014     Priority: Medium    Arthritis 01/14/2013     Priority: Medium    Carotid bruit 01/14/2013     Priority: Medium    Carotid stenosis, bilateral 01/14/2013     Priority: Medium    Anemia 04/06/2012     Priority: Medium    CKD (chronic kidney disease) stage 3, GFR 30-59 ml/min (H) 04/06/2012     Priority: Medium    Impingement syndrome, shoulder, right 03/16/2011     Priority: Medium    Hypertension 11/22/2010     Priority: Medium    Hyperlipidemia LDL goal <130 11/22/2010     Priority: Medium    Myalgia and myositis 11/22/2010     Priority: Medium    GERD (gastroesophageal reflux disease) 11/22/2010     Priority: Medium        Past Medical History:    Past Medical History:   Diagnosis Date    Carotid stenosis     Chronic kidney disease     Coronary artery disease 3-2014    CVA (cerebral infarction)     Elevated CK     Esophageal reflux      Hypercholesteremia     Hyponatremia 8/31/2021    Nonsenile cataract     Other and unspecified hyperlipidemia     PAD (peripheral artery disease) (H)     Rhabdomyolysis 2010    Unspecified essential hypertension        Past Surgical History:    Past Surgical History:   Procedure Laterality Date    APPENDECTOMY  1974    BYPASS GRAFT ARTERY CORONARY  3/5/2014    Procedure: BYPASS GRAFT ARTERY CORONARY;  Median Sternotomy, Coronary Artery Bypass Graft X6 used Left internal mammary artery, Left and Right Greater Saphenous vein, on Pump oxygenator.;  Surgeon: Tim Montanez MD;  Location: [facility] OR    CATARACT IOL, RT/LT Bilateral 2008    in [place]    cataracts      COLONOSCOPY  12/12/02    [facility]    COLONOSCOPY N/A 10/7/2014    Procedure: COMBINED COLONOSCOPY, SINGLE OR MULTIPLE BIOPSY/POLYPECTOMY BY BIOPSY;  Surgeon: Micheal Mora MD;  Location: [facility] GI    CV LOWER EXTREMITY ANGIOGRAM BILATERAL Bilateral 9/9/2019    Procedure: Lower Extremity Angiogram Bilateral;  Surgeon: Julio Oropeza MD;  Location: [facility] CARDIAC CATH LAB    DENTAL SURGERY      TMJ, implants    ENDARTERECTOMY CAROTID      right carotid stent    ESOPHAGOSCOPY, GASTROSCOPY, DUODENOSCOPY (EGD), COMBINED Left 10/7/2014    Procedure: COMBINED ESOPHAGOSCOPY, GASTROSCOPY, DUODENOSCOPY (EGD), BIOPSY SINGLE OR MULTIPLE;  Surgeon: Micheal Mora MD;  Location: [facility] GI    ESOPHAGOSCOPY, GASTROSCOPY, DUODENOSCOPY (EGD), COMBINED Left 10/7/2014    Procedure: COMBINED ESOPHAGOSCOPY, GASTROSCOPY, DUODENOSCOPY (EGD), REMOVE FOREIGN BODY;  Surgeon: Micheal Mora MD;  Location: [facility] GI    HC CAPSULE ENDOSCOPY N/A 10/7/2014    Procedure: CAPSULE/PILL CAM ENDOSCOPY;  Surgeon: Micheal Mora MD;  Location: [facility] GI    INJECT EPIDURAL LUMBAR Right 5/8/2017    Procedure: INJECT EPIDURAL LUMBAR;  Lumbar transforaminal Epidural Steroid Injection right lumbar 4-5, and right  lumbar 5-Sacral 1;  Surgeon: Anthony Gonzalez MD;  Location: [facility] OR    INJECT  JOINT SACROILIAC Bilateral 2017    Procedure: INJECT JOINT SACROILIAC;  sacroiliac joint injection bilateral;  Surgeon: Anthony Gonzalez MD;  Location: [facility] OR    IR CAROTID CEREBRAL ANGIOGRAM BILATERAL  9/3/2020    KNEE SURGERY      left knee reconstruction    LAPAROSCOPIC CHOLECYSTECTOMY N/A 2021    Procedure: CHOLECYSTECTOMY, LAPAROSCOPIC;  Surgeon: Anthony Epps MD;  Location: [facility] OR    lasix  2001    both eyes    RELEASE CARPAL TUNNEL  2011    RELEASE CARPAL TUNNEL performed by JO MDARID at  OR    RELEASE CARPAL TUNNEL  2011    RELEASE CARPAL TUNNEL performed by JO MADRID at  OR    [facility]GI ENDOSCOPY, SIMPLE EXAM  99    [facility]       Family History:    Family History   Problem Relation Age of Onset    Hypertension Mother     Eye Disorder Mother     Cerebrovascular Disease Father     Heart Disease Father     Lipids Father     Obesity Father     Cancer Sister     Heart Disease Sister     Glaucoma No family hx of     Macular Degeneration No family hx of     Kidney Disease No family hx of        Social History:  Marital Status:   [2]  Social History     Tobacco Use    Smoking status: Former     Packs/day: 2.50     Years: 20.00     Additional pack years: 0.00     Total pack years: 50.00     Types: Cigarettes     Quit date: 2000     Years since quittin.0    Smokeless tobacco: Never   Vaping Use    Vaping Use: Never used   Substance Use Topics    Alcohol use: No     Alcohol/week: 0.0 standard drinks of alcohol    Drug use: No        Medications:    acetaminophen (TYLENOL) 325 MG tablet  alirocumab (PRALUENT) 150 MG/ML injectable pen  amitriptyline (ELAVIL) 25 MG tablet  aspirin (ASA) 325 MG tablet  carvedilol (COREG) 25 MG tablet  cilostazol (PLETAL) 100 MG tablet  diphenhydrAMINE-acetaminophen (TYLENOL PM)  MG tablet  fluticasone (FLONASE) 50 MCG/ACT nasal spray  gabapentin (NEURONTIN) 300 MG capsule  gabapentin  (NEURONTIN) 300 MG capsule  HYDROcodone-acetaminophen (NORCO) 5-325 MG tablet  isosorbide mononitrate (IMDUR) 60 MG 24 hr tablet  losartan (COZAAR) 100 MG tablet  Melatonin 10 MG TABS tablet  omeprazole (PRILOSEC) 40 MG DR capsule  potassium chloride ER (KLOR-CON M) 10 MEQ CR tablet          Review of Systems   All other systems reviewed and are negative.      Physical Exam   BP: (!) 165/61  Pulse: 77  Temp: 96.8  F (36  C)  Resp: 18  Weight: 77.6 kg (171 lb)  SpO2: 97 %      Physical Exam  Vitals and nursing note reviewed.   Constitutional:       General: He is not in acute distress.     Appearance: He is not diaphoretic.   HENT:      Head: Normocephalic and atraumatic.   Eyes:      Extraocular Movements: Extraocular movements intact.      Pupils: Pupils are equal, round, and reactive to light.   Cardiovascular:      Rate and Rhythm: Normal rate.   Pulmonary:      Effort: Pulmonary effort is normal.      Breath sounds: Normal breath sounds.   Skin:     Comments: Slight ecchymosis at corners of mouth, no oral lesions or acute bleeding   Neurological:      Mental Status: He is alert.      Comments: See NIH stroke scale below         ED Course                 Procedures              EKG Interpretation:      Interpreted by Rosa Maria Torres DO  Time reviewed: 1045  Symptoms at time of EKG: altered mental status   Rhythm: normal sinus   Rate: Normal  Axis: Right Axis Deviation  Ectopy: none  Conduction: right bundle branch block (complete)  ST Segments/ T Waves: Non-specific ST-T wave changes  Q Waves: none  Comparison to prior: Unchanged    Clinical Impression: right bundle branch block            Critical Care time:  none     The patient has stroke symptoms:         ED Stroke specific documentation           NIHSS PDF     Patient last known well time: 2200 yesterday  ED Provider first to bedside at: 1000  CT Results received at: 1035    Thrombolytics:   Not given due to:   - minor/isolated/quickly resolving  symptoms    If treating with thrombolytics: Ensure SBP<180 and DBP<105 prior to treatment with thrombolytics.  Administering thrombolytics after treatment with IV labetalol, hydralazine, or nicardipine is reasonable once BP control is established.    Endovascular Retrieval:  Not initiated due to absence of proximal vessel occlusion    National Institutes of Health Stroke Scale (Baseline)  Time Performed: 1000     Score    Level of consciousness: (0)   Alert, keenly responsive    LOC questions: (0)   Answers both questions correctly    LOC commands: (0)   Performs both tasks correctly    Best gaze: (0)   Normal    Visual: (0)   No visual loss    Facial palsy: (1)   Minor paralysis (flat nasolabial fold, smile asymmetry)    Motor arm (left): (0)   No drift    Motor arm (right): (0)   No drift    Motor leg (left): (0)   No drift    Motor leg (right): (0)   No drift    Limb ataxia: (0)   Absent    Sensory: (0)   Normal- no sensory loss    Best language: (0)   Normal- no aphasia    Dysarthria: (0)   Normal    Extinction and inattention: (0)   No abnormality        Total Score:  1        Stroke Mimics were considered (including migraine headache, seizure disorder, hypoglycemia (or hyperglycemia), head or spinal trauma, CNS infection, Toxin ingestion and shock state (e.g. sepsis) .                   Results for orders placed or performed during the hospital encounter of 01/27/24 (from the past 24 hour(s))   Glucose by meter   Result Value Ref Range    GLUCOSE BY METER POCT 104 (H) 70 - 99 mg/dL   CBC with Platelets & Differential    Narrative    The following orders were created for panel order CBC with Platelets & Differential.  Procedure                               Abnormality         Status                     ---------                               -----------         ------                     CBC with platelets and d...[[id number]]  Abnormal            Final result                 Please view results for these tests  on the individual orders.   Basic metabolic panel   Result Value Ref Range    Sodium 127 (L) 135 - 145 mmol/L    Potassium 6.2 (HH) 3.4 - 5.3 mmol/L    Chloride 98 98 - 107 mmol/L    Carbon Dioxide (CO2) 20 (L) 22 - 29 mmol/L    Anion Gap 9 7 - 15 mmol/L    Urea Nitrogen 24.2 (H) 8.0 - 23.0 mg/dL    Creatinine 1.64 (H) 0.67 - 1.17 mg/dL    GFR Estimate 43 (L) >60 mL/min/1.73m2    Calcium 9.2 8.8 - 10.2 mg/dL    Glucose 123 (H) 70 - 99 mg/dL   INR   Result Value Ref Range    INR 1.23 (H) 0.85 - 1.15   Partial thromboplastin time   Result Value Ref Range    aPTT 27 22 - 38 Seconds   Troponin T, High Sensitivity   Result Value Ref Range    Troponin T, High Sensitivity 33 (H) <=22 ng/L   [place] Draw    Narrative    The following orders were created for panel order [place] Draw.  Procedure                               Abnormality         Status                     ---------                               -----------         ------                     Extra Blood Culture Bottle[[id number]]                       Final result               Extra Blue Top Tube[[id number]]                              Final result               Extra Green Top (Lithium...[[id number]]                      Final result               Extra Purple Top Tube[[id number]]                            Final result               Extra Heparinized Syringe[[id number]]                        Final result                 Please view results for these tests on the individual orders.   CBC with platelets and differential   Result Value Ref Range    WBC Count 9.5 4.0 - 11.0 10e3/uL    RBC Count 3.53 (L) 4.40 - 5.90 10e6/uL    Hemoglobin 11.8 (L) 13.3 - 17.7 g/dL    Hematocrit 35.0 (L) 40.0 - 53.0 %    MCV 99 78 - 100 fL    MCH 33.4 (H) 26.5 - 33.0 pg    MCHC 33.7 31.5 - 36.5 g/dL    RDW 13.8 10.0 - 15.0 %    Platelet Count 253 150 - 450 10e3/uL    % Neutrophils 79 %    % Lymphocytes 11 %    % Monocytes 8 %    % Eosinophils 2 %    % Basophils 0 %    % Immature  Granulocytes 0 %    NRBCs per 100 WBC 0 <1 /100    Absolute Neutrophils 7.5 1.6 - 8.3 10e3/uL    Absolute Lymphocytes 1.0 0.8 - 5.3 10e3/uL    Absolute Monocytes 0.8 0.0 - 1.3 10e3/uL    Absolute Eosinophils 0.2 0.0 - 0.7 10e3/uL    Absolute Basophils 0.0 0.0 - 0.2 10e3/uL    Absolute Immature Granulocytes 0.0 <=0.4 10e3/uL    Absolute NRBCs 0.0 10e3/uL   Extra Blue Top Tube   Result Value Ref Range    Hold Specimen JIC    Extra Green Top (Lithium Heparin) Tube   Result Value Ref Range    Hold Specimen JIC    Extra Purple Top Tube   Result Value Ref Range    Hold Specimen JIC    Extra Blood Culture Bottle   Result Value Ref Range    Hold Specimen JIC    Extra Heparinized Syringe   Result Value Ref Range    Hold Specimen extra    CT Head w/o Contrast    Narrative    EXAM: CT HEAD WITHOUT CONTRAST  LOCATION: [facility]  DATE: 01/27/2024    INDICATION: Acute neuro deficit, stroke suspected.  COMPARISON: CT head 08/31/2021.  TECHNIQUE: Routine CT Head without IV contrast. Multiplanar reformats. Dose reduction techniques were used.    FINDINGS:  INTRACRANIAL CONTENTS: No intracranial hemorrhage, extra-axial collection, or mass effect.  Questionable new hypodensity in the right hemipons (series 2, image 8), possibly representing recent infarction. Stable small to moderate sized region of chronic   infarction in the mid right frontal lobe, within the right middle cerebral artery anterior division territory. Stable small region of chronic infarction in the posterior aspect of the left superior frontal gyrus, within the left anterior cerebral artery   territory. Stable chronic left basal ganglia infarct. Stable multiple chronic wedge-shaped regions of infarction within both cerebellar hemispheres. Stable mild to moderate chronic small vessel ischemic changes within the cerebral hemispheric white   matter bilaterally. Stable mild to moderate generalized brain parenchymal volume loss. The  ventricles are not enlarged out of proportion to the cerebral sulci.    VISUALIZED ORBITS/SINUSES/MASTOIDS: Prior bilateral cataract surgery. Visualized portions of the orbits are otherwise unremarkable. No paranasal sinus mucosal disease. No middle ear or mastoid effusion.    BONES/SOFT TISSUES: No acute abnormality.      Impression    IMPRESSION:  1.  Questionable new hypodensity in the right hemipons, possibly representing recent infarction. If clinically indicated, consider MRI for further evaluation.  2.  Stable chronic regions of infarction within the right middle cerebral artery, left lenticulostriate and left anterior cerebral artery territories.  3.  Stable multifocal chronic infarcts throughout both cerebellar hemispheres.  4.  Stable mild to moderate chronic small vessel ischemic disease and generalized brain parenchymal volume loss.     Glucose by meter   Result Value Ref Range    GLUCOSE BY METER POCT 179 (H) 70 - 99 mg/dL   Glucose by meter   Result Value Ref Range    GLUCOSE BY METER POCT 144 (H) 70 - 99 mg/dL   Glucose by meter   Result Value Ref Range    GLUCOSE BY METER POCT 134 (H) 70 - 99 mg/dL       Medications   sodium chloride 0.9 % infusion (has no administration in time range)   glucose gel 15-30 g (has no administration in time range)     Or   dextrose 50 % injection 25-50 mL (has no administration in time range)     Or   glucagon injection 1 mg (has no administration in time range)   dextrose 10% BOLUS 300 mL (300 mLs Intravenous $New Bag 1/27/24 1121)   iopamidol (ISOVUE-370) solution 500 mL (67 mLs Intravenous $Given 1/27/24 1014)   sodium chloride 0.9 % bag 100mL for CT scan flush use (100 mLs Intravenous $Given 1/27/24 1015)   sodium bicarbonate 8.4 % injection 50 mEq (50 mEq Intravenous $Given 1/27/24 1113)   dextrose 50 % injection 25 g (25 g Intravenous $Given 1/27/24 1122)   insulin (regular) (HumuLIN R/NovoLIN R) injection 8 Units (8 Units Intravenous $Given 1/27/24 1124)    furosemide (LASIX) injection 40 mg (40 mg Intravenous $Given 1/27/24 1050)   albuterol (PROVENTIL) neb solution 10 mg (10 mg Nebulization $Given 1/27/24 1146)   calcium gluconate 1 g in 50 mL in sodium chloride intermittent infusion (1 g Intravenous $Given 1/27/24 1100)   sodium chloride 0.9% BOLUS 1,000 mL (1,000 mLs Intravenous $New Bag 1/27/24 1140)       Assessments & Plan (with Medical Decision Making)  Michele is a 78 y.o M with PMH symptomatic R carotid stenosis, CVA, HTN, PAD, with altered mental status noticed this morning. See history and physical exam as above  Stroke eval called, provider to bedside. Has low NIHSS, but will get CT and CTA. Will speak with stroke neurology  Discussed with Dr. Das, see his consult note for recommendations. Suspected cerebral hypoperfusion due to significant vascular disease and low BP noted this morning. May cause CVA if perfusion remains low. Recommend MRI/echo and observation locally. Aware we cannot get MRI or echo until Monday. Will follow via telestroke consult. Continue aspirin and Pletal.  Additional work up showed hyponatremia with sodium of 127, and significant hyperkalemia with potassium of 6.2. Hyperkalemia protocol ordered. Electrolyte disturbance could be contributing to altered mental status  Spoke with hospitalist, Dr. Vera, accepted for admission. He will come evaluate the patient in the ED. A repeat potassium showed improvement to 4.3     I have reviewed the nursing notes.    I have reviewed the findings, diagnosis, plan and need for follow up with the patient.       ED to Inpatient Handoff:    Discussed with Dr. Garcia at 1315  Patient accepted for Inpatient Stay  Pending studies include N/A  Code Status: Not Addressed             Medical Decision Making  The patient's presentation was of moderate complexity (an acute illness with systemic symptoms).    The patient's evaluation involved:  ordering and/or review of 3+ test(s) in this encounter (see  separate area of note for details)    The patient's management necessitated high risk (a decision regarding hospitalization).        New Prescriptions    No medications on file       Final diagnoses:   Hyperkalemia   Transient alteration of awareness   Cerebral hypoperfusion       1/27/2024   [facility] EMERGENCY DEPT       Rosa Maria Torres, DO  01/30/24 1804       Rosa Maria Torrse,   01/30/24 1806

## 2024-01-27 NOTE — CONSULTS
[facility]    Stroke Telephone Note    I was called by Rosa Maria Torres on 01/27/24 regarding patient Michele Vance. The patient is a 78 year old male with symptomatic R carotidstenosis s/p stenting,CKD, stroke, HTN, HL, PAD, who presents with decreased LOC.  Family noted he was not waking up well this am and called EMS show noted systolic BPs in the 70s-80s.  His BP improved and he woke-up and is now near baseline, there was question of possible right face droop.  He is on Cilostazol and Aspirin.    Per ED physician, family reports similar prior events with hypotension.      Vitals  BP: (!) 165/61   Pulse: 77   Resp: 18   Temp: 96.8  F (36  C)   Weight: 77.6 kg (171 lb)    Imaging Findings  CT head: prior embolic stroke right frontal lobe  CTA head/neck: severe atherosclerosis involving all vessels.  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  Appears stable from CTA 4/2021    Impression  Acute Ischemic Stroke versus TIA (if completely resolved) due to cerebral hypoperfusion due to hypotension and severe intracranial atherosclerosis--especially of the posterior circulation    Recommendations  Permissive HTN: SBP < 220, DBP< 110 ok  Patient has statin allergy--outpatient needs [id number]  Continue aspirin/cilostazol for antithrombotic  Patient will be admitted to correct electrolyte derangement and evaluate for hypotension  --would complete routine stroke work-up as well (ok if MRI/Echo occur on Monday), although strongly suspected to be cerebral hypoperfusion as postulated above, his severe cardiovascular risk factors merit a complete stroke evaluation in this setting to assess infarct burden/mechanism and optimize treatment.  5.    Telestroke consultation    My recommendations are based on the information provided over the phone by iMchele Vance's in-person providers. They are not intended to replace the  "clinical judgment of his in-person providers. I was not requested to personally see or examine the patient at this time.     Gabriel Das MD, MS  Vascular Neurology    To page me or covering stroke neurology team member, click here: [facility]COM  Choose \"On Call\" tab at top, then select \"NEUROLOGY/ALL SITES\" from middle drop-down box, press Enter, then look for \"stroke\" or \"telestroke\" for your site.    I personally spent a total of 35 minutes on January 27, 2024 consulting with his  medical providers and coordinating care.  This includes personally reviewing the patient's medical record including diagnostic testing, neuroimaging, and laboratory studies.         "

## 2024-01-27 NOTE — H&P
[facility]    History and Physical - Hospitalist Service       Date of Admission:  1/27/2024    Assessment & Plan      Michele Vance is a 78 year old male admitted on 1/27/2024. He has a PMH of CAD s/p CABG, hypertension, hyperlipidemia, CVA, CKD stage III presenting from home with acute confusion, found to be hypotensive and also experience near syncope.  On further evaluation, patient was found to be hyperkalemic (without associated EKG abnormality), noted chronic hyponatremia as well as elevated troponin.    # Near syncope  # Hypotension - improved  # Acute change in mentation - resolved   -Per patient's wife, patient had a fall today associated with low blood pressure of 61/30 at home prompting call for rescue.  -On arrival to the ED, patient's hemodynamics has been within normal limits.  -EKG obtained showed Sinus rhythm, with right bundle branch block, left anterior fascicular block no evidence of hyperacute T waves.  Troponin was elevated at 33 -> 29.  -Of note, patient has had similar symptoms in the past which wife attributes to electrolyte abnormalities.  -On evaluation, patient is alert and oriented to place, situation.  Neurological exam grossly normal.  -CT head obtained shows ?New hypodensity in right ryan possibly representing recent infarct.  Hold PTA losartan, Imdur.  Can continue PTA coreg with holding parameters   Obtain TTE, MRI brain given hx of CAD & CVA  PT/OT consult  Case discussed with neurology, cannot rule out ischemic stroke  Continue PTA aspirin and cilostazol  Rest of management including electrolyte correction as detailed below.      # Eelevated troponin - likely demand ischemia, trop already peaked. Follow-up TTE  # Hyper K - resolved.  S/p temporizing measure with adequate effect.  Likely iatrogenic from PTA meds. Hold PTA potassium. Losartan  # Chronic hyponatremia -this has been a case for patient, with baseline Na ~130s; might represent SIADH,  planning to obtain urine studies.  # HLD - takes q.biweekly Alirocumab, next dose in 1 week  # Hx of CAD, CVA - resume PTA ASA  - Patient has statin allergy--outpatient needs PCSK9  - Continue aspirin/cilostazol for antithrombotic   # Hx of chronic migraine - cont Amitriptyline 50 mg at bedtime   # Chronic pain - cont gabapentin   # Recent dental surgery -mechanical/dental soft diet for now  # History of CKD stage III -baseline creatinine 1.4-1.6.  Patient currently at baseline-for now.  Renally dose medications.            Diet: Mechanical/Dental Soft Diet    DVT Prophylaxis: Pneumatic Compression Devices  Burton Catheter: Not present  Lines: None     Cardiac Monitoring: None  Code Status: No CPR- Do NOT Intubate      Clinically Significant Risk Factors Present on Admission        # Hyperkalemia: Highest K = 6.2 mmol/L in last 2 days, will monitor as appropriate  # Hyponatremia: Lowest Na = 127 mmol/L in last 2 days, will monitor as appropriate         # Coagulation Defect: INR = 1.23 (Ref range: 0.85 - 1.15) and/or PTT = 27 Seconds (Ref range: 22 - 38 Seconds), will monitor for bleeding  # Drug Induced Platelet Defect: home medication list includes an antiplatelet medication   # Hypertension: Noted on problem list           # History of CABG: noted on surgical history       Disposition Plan likely discharge on 1/29/2024     Expected Discharge Date: 01/28/2024                  TANISHA LANGFORD MD  Hospitalist Service  [facility]  Securely message with [employer] (more info)  Text page via [employer] Paging/Directory     ______________________________________________________________________    Chief Complaint   Hypertension, confusion    History is obtained from the patient and patient's spouse      History of Present Illness   Michele Vance is a 78 year old male who has a past medical history of coronary artery disease s/p CABG, CKD, hypertension, hyperlipidemia, CVA, chronic pain presenting  from home after experiencing a fall, noted to be hypotensive and acutely confused.  Most of the history obtained from wife who stated patient had a fall at home yesterday which was unwitnessed.  This morning, patient took all of his blood pressure medications at about 7 AM, at around 9 AM this morning, patient had spilled his juice on himself, while wife asked him to get up, he felt back to his chair following weakness of both of his legs.  Wife obtained blood pressure at home which was 67/30.  Patient was also noted to be confused at this time.  Wife subsequently called EMS for assistance.  Upon EMS arrival, his blood pressure was noted to be initially low but this improved subsequently.  Patient has not had any fever, he had not complained of any chest pain or shortness of breath.  Per wife, patient has had similar symptoms in the past which were usually in the setting of electrolyte derangement.  Patient recently had dental procedure on 1/23/2024.   At this current time in the ER, patient denies any acute complaints.      Past Medical History    Past Medical History:   Diagnosis Date    Carotid stenosis     right endartectomy    Chronic kidney disease     stage 3    Coronary artery disease 3-2014    CABG x6    CVA (cerebral infarction)     balance problems, mild    Elevated CK     Esophageal reflux     Hypercholesteremia     Hyponatremia 8/31/2021    Nonsenile cataract     Other and unspecified hyperlipidemia     PAD (peripheral artery disease) (H)     Rhabdomyolysis 2010    Unspecified essential hypertension        Past Surgical History   Past Surgical History:   Procedure Laterality Date    APPENDECTOMY  1974    BYPASS GRAFT ARTERY CORONARY  3/5/2014    Procedure: BYPASS GRAFT ARTERY CORONARY;  Median Sternotomy, Coronary Artery Bypass Graft X6 used Left internal mammary artery, Left and Right Greater Saphenous vein, on Pump oxygenator.;  Surgeon: Tim Montanez MD;  Location: [facility] OR    CATARACT IOL, RT/LT  Bilateral 2008    in [place]    cataracts      COLONOSCOPY  12/12/02    [facility]    COLONOSCOPY N/A 10/7/2014    Procedure: COMBINED COLONOSCOPY, SINGLE OR MULTIPLE BIOPSY/POLYPECTOMY BY BIOPSY;  Surgeon: Micheal Mora MD;  Location: [facility] GI    CV LOWER EXTREMITY ANGIOGRAM BILATERAL Bilateral 9/9/2019    Procedure: Lower Extremity Angiogram Bilateral;  Surgeon: Julio Oropeza MD;  Location:  [facility]    DENTAL SURGERY      TMJ, implants    ENDARTERECTOMY CAROTID      right carotid stent    ESOPHAGOSCOPY, GASTROSCOPY, DUODENOSCOPY (EGD), COMBINED Left 10/7/2014    Procedure: COMBINED ESOPHAGOSCOPY, GASTROSCOPY, DUODENOSCOPY (EGD), BIOPSY SINGLE OR MULTIPLE;  Surgeon: Micheal Mora MD;  Location: [facility] GI    ESOPHAGOSCOPY, GASTROSCOPY, DUODENOSCOPY (EGD), COMBINED Left 10/7/2014    Procedure: COMBINED ESOPHAGOSCOPY, GASTROSCOPY, DUODENOSCOPY (EGD), REMOVE FOREIGN BODY;  Surgeon: Micheal Mora MD;  Location: [facility] GI    HC CAPSULE ENDOSCOPY N/A 10/7/2014    Procedure: CAPSULE/PILL CAM ENDOSCOPY;  Surgeon: Micheal oMra MD;  Location: [facility] GI    INJECT EPIDURAL LUMBAR Right 5/8/2017    Procedure: INJECT EPIDURAL LUMBAR;  Lumbar transforaminal Epidural Steroid Injection right lumbar 4-5, and right  lumbar 5-Sacral 1;  Surgeon: Anthony Gonzalez MD;  Location: [facility]    INJECT JOINT SACROILIAC Bilateral 8/28/2017    Procedure: INJECT JOINT SACROILIAC;  sacroiliac joint injection bilateral;  Surgeon: Anthony Gonzalez MD;  Location: [facility] OR    IR CAROTID CEREBRAL ANGIOGRAM BILATERAL  9/3/2020    KNEE SURGERY  1976    left knee reconstruction    LAPAROSCOPIC CHOLECYSTECTOMY N/A 7/16/2021    Procedure: CHOLECYSTECTOMY, LAPAROSCOPIC;  Surgeon: Anthony Epps MD;  Location: [facility] OR    lasix  2001    both eyes    RELEASE CARPAL TUNNEL  1/13/2011    RELEASE CARPAL TUNNEL performed by JO MADRID at  OR    RELEASE CARPAL TUNNEL  1/20/2011    RELEASE CARPAL TUNNEL performed by  OJ MADRID at  OR    [facility] ENDOSCOPY, SIMPLE EXAM  01/08/99    [facility]       Prior to Admission Medications   Prior to Admission Medications   Prescriptions Last Dose Informant Patient Reported? Taking?   HYDROcodone-acetaminophen (NORCO) 5-325 MG tablet 1/25/2024 at 1930  Yes Yes   Sig: Take 1 tablet by mouth every 6 hours as needed for pain (max 4/24 hours)   Melatonin 10 MG TABS tablet 1/26/2024 at hs Spouse/Significant Other Yes Yes   Sig: Take 10 mg by mouth At Bedtime   acetaminophen (TYLENOL) 325 MG tablet 1/27/2024 at am Spouse/Significant Other Yes Yes   Sig: Take 650 mg by mouth daily   alirocumab (PRALUENT) 150 MG/ML injectable pen 1/14/2024 at am  No Yes   Sig: Inject 1 mL (150 mg) Subcutaneous every 14 days   Patient taking differently: Inject 150 mg Subcutaneous every 14 days Every other Sunday   amitriptyline (ELAVIL) 25 MG tablet 1/26/2024 at hs  No Yes   Sig: TAKE 2 TABLETS AT BEDTIME   Patient taking differently: Take 50 mg by mouth at bedtime   aspirin (ASA) 325 MG tablet 1/27/2024 at am  Yes Yes   Sig: Take 325 mg by mouth daily   carvedilol (COREG) 25 MG tablet 1/27/2024 at am  No Yes   Sig: TAKE 1 TABLET TWICE A DAY WITH MEALS   cilostazol (PLETAL) 100 MG tablet 1/27/2024 at am  No Yes   Sig: Take 1 tablet (100 mg) by mouth 2 times daily   diphenhydrAMINE-acetaminophen (TYLENOL PM)  MG tablet 1/26/2024 at hs Spouse/Significant Other Yes Yes   Sig: Take 2 tablets by mouth At Bedtime   fluticasone (FLONASE) 50 MCG/ACT nasal spray Past Week at unkn  No Yes   Sig: Spray 1 spray into both nostrils daily   gabapentin (NEURONTIN) 300 MG capsule 1/27/2024 at am  No Yes   Sig: Take 1 capsule (300 mg) by mouth 3 times daily   Patient taking differently: Take 600 mg by mouth every evening At 1900, also takes 300mg every morning   gabapentin (NEURONTIN) 300 MG capsule 1/27/2024 at am  Yes Yes   Sig: Take 300 mg by mouth every morning And 600mg at 1900   isosorbide  mononitrate (IMDUR) 60 MG 24 hr tablet 1/27/2024 at am  No Yes   Sig: TAKE 1 TABLET DAILY   losartan (COZAAR) 100 MG tablet 1/27/2024 at am  No Yes   Sig: TAKE 1 TABLET DAILY   omeprazole (PRILOSEC) 40 MG DR capsule 1/27/2024 at am  No Yes   Sig: Take 1 capsule (40 mg) by mouth daily   potassium chloride ER (KLOR-CON M) 10 MEQ CR tablet 1/27/2024 at am  No Yes   Sig: TAKE 3 TABLETS TWICE A DAY   Patient taking differently: Take 30 mEq by mouth 2 times daily      Facility-Administered Medications: None           Physical Exam   Vital Signs: Temp: 96.8  F (36  C) Temp src: Oral BP: (!) 145/70 Pulse: 80   Resp: 13 SpO2: 92 % O2 Device: None (Room air)    Weight: 171 lbs 0 oz    General Appearance: Lying comfortably in bed, on room air, in no acute distress or discomfort.  HEENT: PERRL: EOMI; moist mucous membrane w/o lesions.  Noted to have left-sided facial droop  Neck: No JVD  Pulmonary: Clear to auscultation bilaterally, no wheezes or crackles  CVS: Regular rhythm, soft systolic murmur, normal S1-S2.  GI: BS (+), soft nontender, no rebound or guarding   Extremities: No LE edema.  Skin: No rashes or lesions  Neurologic:   - Mental Status: Alert, relaxed, and cooperative. Thought process coherent.   - Cranial Nerves: Il through Xll intact.   - Motor: Good muscle bulk and tone.   - Strength 5/5 in bilateral distal upper and lower extremity.        Medical Decision Making       65 MINUTES SPENT BY ME on the date of service doing chart review, history, exam, documentation & further activities per the note.      Data   ------------------------- PAST 24 HR DATA REVIEWED -----------------------------------------------    I have personally reviewed the following data over the past 24 hrs:    9.5  \   11.8 (L)   / 253     127 (L) 98 24.2 (H) /  140 (H)   4.3 20 (L) 1.64 (H) \     Trop: 29 (H) BNP: N/A     INR:  1.23 (H) PTT:  27   D-dimer:  N/A Fibrinogen:  N/A       Imaging results reviewed over the past 24 hrs:   Recent  Results (from the past 24 hour(s))   CT Head w/o Contrast    Narrative    EXAM: CT HEAD WITHOUT CONTRAST  LOCATION: [facility]  DATE: 01/27/2024    INDICATION: Acute neuro deficit, stroke suspected.  COMPARISON: CT head 08/31/2021.  TECHNIQUE: Routine CT Head without IV contrast. Multiplanar reformats. Dose reduction techniques were used.    FINDINGS:  INTRACRANIAL CONTENTS: No intracranial hemorrhage, extra-axial collection, or mass effect.  Questionable new hypodensity in the right hemipons (series 2, image 8), possibly representing recent infarction. Stable small to moderate sized region of chronic   infarction in the mid right frontal lobe, within the right middle cerebral artery anterior division territory. Stable small region of chronic infarction in the posterior aspect of the left superior frontal gyrus, within the left anterior cerebral artery   territory. Stable chronic left basal ganglia infarct. Stable multiple chronic wedge-shaped regions of infarction within both cerebellar hemispheres. Stable mild to moderate chronic small vessel ischemic changes within the cerebral hemispheric white   matter bilaterally. Stable mild to moderate generalized brain parenchymal volume loss. The ventricles are not enlarged out of proportion to the cerebral sulci.    VISUALIZED ORBITS/SINUSES/MASTOIDS: Prior bilateral cataract surgery. Visualized portions of the orbits are otherwise unremarkable. No paranasal sinus mucosal disease. No middle ear or mastoid effusion.    BONES/SOFT TISSUES: No acute abnormality.      Impression    IMPRESSION:  1.  Questionable new hypodensity in the right hemipons, possibly representing recent infarction. If clinically indicated, consider MRI for further evaluation.  2.  Stable chronic regions of infarction within the right middle cerebral artery, left lenticulostriate and left anterior cerebral artery territories.  3.  Stable multifocal chronic infarcts  throughout both cerebellar hemispheres.  4.  Stable mild to moderate chronic small vessel ischemic disease and generalized brain parenchymal volume loss.

## 2024-01-27 NOTE — ED TRIAGE NOTES
Pt brought EMS with improving alertness and improving hypotension from home. Patient has notable facial droop on left side of face with mild bruising and history of dental procedure on Tuesday. VSS, alert and oriented x4     Triage Assessment (Adult)       Row Name 01/27/24 1001          Triage Assessment    Airway WDL WDL

## 2024-01-27 NOTE — ED NOTES
ED Nursing criteria listed below was addressed during verbal handoff:     Abnormal vitals: No  Abnormal results: Yes, hyperkalemia and hyponatremia, left sided bruising and facial droop with ataxia. Dysphagia complete, no neuro changes, a/ox4 on arrival  Med Reconciliation completed: Yes  Meds given in ED: Yes, hyperkalemia protocol, 8 units insulin, naBarb, Calcium Gluc, D50, and D5  Any Overdue Meds: No  Core Measures: No  Isolation: No  Special needs:   Skin assessment: No    Observation Patient  Education provided: Na

## 2024-01-27 NOTE — MEDICATION SCRIBE - ADMISSION MEDICATION HISTORY
Medication Scribe Admission Medication History    Admission medication history is complete. The information provided in this note is only as accurate as the sources available at the time of the update.    Information Source(s): Family member and CareEverywhere/SureScripts via in-person    Pertinent Information: wife Michelle present and handles meds for patient    Changes made to PTA medication list:  Added: norco  Deleted: proair, zofran  Changed: clarified gabapentin regimen    Medication Affordability:       Allergies reviewed with patient and updates made in EHR: yes    Medication History Completed By: EVELYN MACK 1/27/2024 12:43 PM    PTA Med List   Medication Sig Note Last Dose    acetaminophen (TYLENOL) 325 MG tablet Take 650 mg by mouth daily  1/27/2024 at am    alirocumab (PRALUENT) 150 MG/ML injectable pen Inject 1 mL (150 mg) Subcutaneous every 14 days (Patient taking differently: Inject 150 mg Subcutaneous every 14 days Every other Sunday)  1/14/2024 at am    amitriptyline (ELAVIL) 25 MG tablet TAKE 2 TABLETS AT BEDTIME (Patient taking differently: Take 50 mg by mouth at bedtime)  1/26/2024 at hs    aspirin (ASA) 325 MG tablet Take 325 mg by mouth daily  1/27/2024 at am    carvedilol (COREG) 25 MG tablet TAKE 1 TABLET TWICE A DAY WITH MEALS  1/27/2024 at am    cilostazol (PLETAL) 100 MG tablet Take 1 tablet (100 mg) by mouth 2 times daily  1/27/2024 at am    diphenhydrAMINE-acetaminophen (TYLENOL PM)  MG tablet Take 2 tablets by mouth At Bedtime  1/26/2024 at hs    fluticasone (FLONASE) 50 MCG/ACT nasal spray Spray 1 spray into both nostrils daily  Past Week at unkn    gabapentin (NEURONTIN) 300 MG capsule Take 1 capsule (300 mg) by mouth 3 times daily  1/27/2024 at am    HYDROcodone-acetaminophen (NORCO) 5-325 MG tablet Take 1 tablet by mouth every 6 hours as needed for pain (max 4/24 hours) 1/27/2024: From [employer] Dispense Report  Last Dispensed: 11/16/2023  Quantity/Days supply:  16/4  Ordering Provider: Oscar  Pharmacy: [facility]  1/25/2024 at 1930    isosorbide mononitrate (IMDUR) 60 MG 24 hr tablet TAKE 1 TABLET DAILY  1/27/2024 at am    losartan (COZAAR) 100 MG tablet TAKE 1 TABLET DAILY  1/27/2024 at am    Melatonin 10 MG TABS tablet Take 10 mg by mouth At Bedtime  1/26/2024 at hs    omeprazole (PRILOSEC) 40 MG DR capsule Take 1 capsule (40 mg) by mouth daily  1/27/2024 at am    potassium chloride ER (KLOR-CON M) 10 MEQ CR tablet TAKE 3 TABLETS TWICE A DAY (Patient taking differently: Take 30 mEq by mouth 2 times daily)  1/27/2024 at am

## 2024-01-28 ENCOUNTER — APPOINTMENT (OUTPATIENT)
Dept: PHYSICAL THERAPY | Facility: CLINIC | Age: 79
DRG: 312 | End: 2024-01-28
Payer: MEDICARE

## 2024-01-28 VITALS
HEART RATE: 94 BPM | TEMPERATURE: 98.6 F | HEIGHT: 68 IN | SYSTOLIC BLOOD PRESSURE: 135 MMHG | BODY MASS INDEX: 25.26 KG/M2 | OXYGEN SATURATION: 95 % | WEIGHT: 166.7 LBS | DIASTOLIC BLOOD PRESSURE: 55 MMHG | RESPIRATION RATE: 18 BRPM

## 2024-01-28 PROBLEM — G47.00 PERSISTENT INSOMNIA: Status: ACTIVE | Noted: 2024-01-28

## 2024-01-28 LAB
ANION GAP SERPL CALCULATED.3IONS-SCNC: 13 MMOL/L (ref 7–15)
BASOPHILS # BLD AUTO: 0 10E3/UL (ref 0–0.2)
BASOPHILS NFR BLD AUTO: 0 %
BUN SERPL-MCNC: 22.6 MG/DL (ref 8–23)
CALCIUM SERPL-MCNC: 8.7 MG/DL (ref 8.8–10.2)
CHLORIDE SERPL-SCNC: 97 MMOL/L (ref 98–107)
CHOLEST SERPL-MCNC: 96 MG/DL
CREAT SERPL-MCNC: 1.65 MG/DL (ref 0.67–1.17)
DEPRECATED HCO3 PLAS-SCNC: 18 MMOL/L (ref 22–29)
EGFRCR SERPLBLD CKD-EPI 2021: 43 ML/MIN/1.73M2
EOSINOPHIL # BLD AUTO: 0.2 10E3/UL (ref 0–0.7)
EOSINOPHIL NFR BLD AUTO: 3 %
ERYTHROCYTE [DISTWIDTH] IN BLOOD BY AUTOMATED COUNT: 13.5 % (ref 10–15)
GLUCOSE SERPL-MCNC: 110 MG/DL (ref 70–99)
HBA1C MFR BLD: 5.7 %
HCT VFR BLD AUTO: 32.3 % (ref 40–53)
HDLC SERPL-MCNC: 52 MG/DL
HGB BLD-MCNC: 11 G/DL (ref 13.3–17.7)
IMM GRANULOCYTES # BLD: 0 10E3/UL
IMM GRANULOCYTES NFR BLD: 0 %
LDLC SERPL CALC-MCNC: 35 MG/DL
LYMPHOCYTES # BLD AUTO: 1.8 10E3/UL (ref 0.8–5.3)
LYMPHOCYTES NFR BLD AUTO: 23 %
MCH RBC QN AUTO: 33.1 PG (ref 26.5–33)
MCHC RBC AUTO-ENTMCNC: 34.1 G/DL (ref 31.5–36.5)
MCV RBC AUTO: 97 FL (ref 78–100)
MONOCYTES # BLD AUTO: 0.9 10E3/UL (ref 0–1.3)
MONOCYTES NFR BLD AUTO: 12 %
NEUTROPHILS # BLD AUTO: 5 10E3/UL (ref 1.6–8.3)
NEUTROPHILS NFR BLD AUTO: 62 %
NONHDLC SERPL-MCNC: 44 MG/DL
NRBC # BLD AUTO: 0 10E3/UL
NRBC BLD AUTO-RTO: 0 /100
PLATELET # BLD AUTO: 252 10E3/UL (ref 150–450)
POTASSIUM SERPL-SCNC: 4.6 MMOL/L (ref 3.4–5.3)
RBC # BLD AUTO: 3.32 10E6/UL (ref 4.4–5.9)
SODIUM SERPL-SCNC: 128 MMOL/L (ref 135–145)
TRIGL SERPL-MCNC: 44 MG/DL
WBC # BLD AUTO: 7.9 10E3/UL (ref 4–11)

## 2024-01-28 PROCEDURE — 80061 LIPID PANEL: CPT | Performed by: NURSE PRACTITIONER

## 2024-01-28 PROCEDURE — 99239 HOSP IP/OBS DSCHRG MGMT >30: CPT | Performed by: NURSE PRACTITIONER

## 2024-01-28 PROCEDURE — 36415 COLL VENOUS BLD VENIPUNCTURE: CPT | Performed by: INTERNAL MEDICINE

## 2024-01-28 PROCEDURE — 85025 COMPLETE CBC W/AUTO DIFF WBC: CPT | Performed by: INTERNAL MEDICINE

## 2024-01-28 PROCEDURE — 97161 PT EVAL LOW COMPLEX 20 MIN: CPT | Mod: GP | Performed by: PHYSICAL THERAPIST

## 2024-01-28 PROCEDURE — 83036 HEMOGLOBIN GLYCOSYLATED A1C: CPT | Performed by: NURSE PRACTITIONER

## 2024-01-28 PROCEDURE — 80048 BASIC METABOLIC PNL TOTAL CA: CPT | Performed by: INTERNAL MEDICINE

## 2024-01-28 PROCEDURE — 250N000013 HC RX MED GY IP 250 OP 250 PS 637: Performed by: HOSPITALIST

## 2024-01-28 PROCEDURE — 250N000013 HC RX MED GY IP 250 OP 250 PS 637: Performed by: INTERNAL MEDICINE

## 2024-01-28 PROCEDURE — G0427 INPT/ED TELECONSULT70: HCPCS | Mod: G0 | Performed by: NURSE PRACTITIONER

## 2024-01-28 RX ORDER — TEMAZEPAM 7.5 MG/1
7.5 CAPSULE ORAL ONCE
Status: COMPLETED | OUTPATIENT
Start: 2024-01-28 | End: 2024-01-28

## 2024-01-28 RX ORDER — ISOSORBIDE MONONITRATE 60 MG/1
TABLET, EXTENDED RELEASE ORAL
COMMUNITY
Start: 2024-01-28 | End: 2024-02-06

## 2024-01-28 RX ORDER — LOSARTAN POTASSIUM 100 MG/1
50 TABLET ORAL DAILY
Start: 2024-01-28 | End: 2024-02-06

## 2024-01-28 RX ADMIN — CARVEDILOL 25 MG: 25 TABLET, FILM COATED ORAL at 09:38

## 2024-01-28 RX ADMIN — ASPIRIN 325 MG: 325 TABLET, COATED ORAL at 09:38

## 2024-01-28 RX ADMIN — GABAPENTIN 300 MG: 300 CAPSULE ORAL at 09:38

## 2024-01-28 RX ADMIN — TEMAZEPAM 7.5 MG: 7.5 CAPSULE ORAL at 01:25

## 2024-01-28 RX ADMIN — PANTOPRAZOLE SODIUM 40 MG: 40 TABLET, DELAYED RELEASE ORAL at 09:38

## 2024-01-28 ASSESSMENT — ACTIVITIES OF DAILY LIVING (ADL)
ADLS_ACUITY_SCORE: 30

## 2024-01-28 NOTE — DISCHARGE SUMMARY
"[facility]  Hospitalist Discharge Summary      Date of Admission:  1/27/2024  Date of Discharge:  1/28/2024  Discharging Provider: Erlinda Horne CNP  Discharge Service: Hospitalist Service    Discharge Diagnoses   Near syncope  Hypotension  Acute change in mentation  Elevated troponin  Hyperkalemia  Chronic hyponatremia  History of CAD and CVA  History of chronic migraine  Chronic pain  History of CKD stage III    Clinically Significant Risk Factors     # Overweight: Estimated body mass index is 25.35 kg/m  as calculated from the following:    Height as of this encounter: 1.727 m (5' 8\").    Weight as of this encounter: 75.6 kg (166 lb 11.2 oz).       Follow-ups Needed After Discharge   1.  Transthoracic echocardiogram in next  1-2 weeks   2.  Follow up with Dr. West in 1 week with follow-up basic metabolic   panel, review of blood pressures.  3.   Follow-up with cardiology, Dr. Felipe next available appointment  4.   NeuroIR/Dr. Cuellar next available appointment          Unresulted Labs Ordered in the Past 30 Days of this Admission       No orders found for last 31 day(s).        These results will be followed up by not applicable    Discharge Disposition   Discharged to home  Condition at discharge: Stable    Hospital Course      Michele Vance is a 78 year old male admitted on 1/27/2024. He has a PMH of CAD s/p CABG, hypertension, hyperlipidemia, CVA, CKD stage III presenting from home with acute confusion, found to be hypotensive and also experience near syncope.  On further evaluation, patient was found to be hyperkalemic (without associated EKG abnormality), noted chronic hyponatremia as well as elevated troponin.    Near syncope  Hypotension - improved  Acute change in mentation - resolved   Per patient's wife, patient had a fall today associated with low blood pressure of 61/30 at home prompting call for rescue. On arrival to the ED, patient's hemodynamics has been within " normal limits. EKG obtained showed Sinus rhythm, with right bundle branch block, left anterior fascicular block no evidence of hyperacute T waves.  Troponin was elevated at 33 -> 29.  Monitored on telemetry normal sinus rhythm with bundle branch block at patient's baseline  Fall 2022 patient had a similar episode with low blood pressure and syncope.  He was hospitalized at [facility].  A couple weeks after hospitalization he followed up with Dr. Felipe cardiology and no change in cardiac medications made at that time.  Stroke neurology consulted and reviewed head CT and head CT angio.  They suspect reduced responsiveness is secondary to cerebral hyper perfusion in the setting of low blood pressure with known significant cervical and intracranial stenosis and diminutive posterior circulation.  They did not recommend any further imaging and feel patient can be discharged home with an outpatient echocardiogram.  Physical therapy consulted and patient appears to be back at baseline mobility.  Patient and spouse both want to return home.  Reviewed patient's blood pressure medications with cardiology in the context of recent near syncopal episode and orthostasis.  Recommend reduce by half the dose of Imdur and losartan.  -Okay to discharge home with spouse  -Continue prior to admission carvedilol, decrease Imdur to 30 mg a day and decrease losartan to 50 mg a day.  -Request spouse check blood pressure 2-3 times a day until follow-up  -Commend follow-up with primary care provider in a week to recheck basic metabolic panel and review blood pressures  -Follow-up with previous outpatient neurologist at next available appointment  -Follow-up with  audiologist at next available appointment  -Continue prior to admission aspirin and cilostazol    Elevated troponin   - likely demand ischemia.  Peaked at 33, recheck 29.    -Follow-up with cardiology  -Outpatient echocardiogram    Hyperkalemia- resolved.  S/p  temporizing measure with adequate effect.  Likely iatrogenic from PTA meds.   -Resuming losartan at half the prior to admission dose  -Continue to hold potassium supplementation  -Recheck potassium at primary care provider visit in a week    Chronic hyponatremia -  This morning sodium 128.  This seems to be within patient's baseline.  -Follow-up basic metabolic panel at next PCP visit in a week    HLD - takes q.biweekly Alirocumab, next dose in 1 week  Hx of CAD, CVA - resume PTA ASA  - Patient has statin allergy--outpatient needs PCSK9  - Continue aspirin/cilostazol for antithrombotic   Hx of chronic migraine - cont Amitriptyline 50 mg at bedtime   Chronic pain - cont gabapentin   Recent dental surgery -mechanical/dental soft diet for now  History of CKD stage III -baseline creatinine 1.4-1.6.  This morning creatinine 1.65.  -Follow-up basic metabolic panel in 1 week with primary care visit      Consultations This Hospital Stay   PHYSICAL THERAPY ADULT IP CONSULT  OCCUPATIONAL THERAPY ADULT IP CONSULT  NEUROLOGY IP STROKE CONSULT    Code Status   No CPR- Do NOT Intubate    Time Spent on this Encounter   I, Erlinda Horne CNP, personally saw the patient today and spent greater than 30 minutes discharging this patient.       Erlinda Horne CNP  [facility] MEDICAL SURGICAL  [address] [place] [address]  Phone: [phone]  ______________________________________________________________________    Physical Exam   Vital Signs: Temp: 98.6  F (37  C) Temp src: Oral BP: 135/55 Pulse: 94   Resp: 18 SpO2: 95 % O2 Device: None (Room air)    Weight: 166 lbs 11.2 oz  General Appearance: Patient sitting up in bed.  No acute distress.  Can answer questions, often deferring to spouse to answer questions for him.  Respiratory: Respiratory effort easy, no cough, lung sounds clear  Cardiovascular: Regular rate and rhythm, no murmur noted  GI: Abdomen soft and nontender  Skin: Warm and dry       Primary Care  Physician   Jonas Wset    Discharge Orders      Reason for your hospital stay    You were admitted to the hospital for observation after an episode of decreased level of consciousness.     Follow-up and recommended labs and tests     1.  Transthoracic echocardiogram in next  1-2 weeks   2.  Follow up with Dr. West in 1 week with follow-up basic metabolic panel, review of blood pressures.  3.   Follow-up with cardiology, Dr. Felipe next available appointment  4,  NeuroIR/Dr. Cuellar next available appointment     Activity    Your activity upon discharge: activity as tolerated     Monitor and record    Monitor and record blood pressure 2-3 times a day and bring record to next appointment     Diet    Follow this diet upon discharge: Regular       Significant Results and Procedures   Most Recent 3 CBC's:  Recent Labs   Lab Test 01/28/24  0624 01/27/24  1007 12/04/23  1050   WBC 7.9 9.5 8.3   HGB 11.0* 11.8* 12.5*   MCV 97 99 99    253 262     Most Recent 3 BMP's:  Recent Labs   Lab Test 01/28/24  0624 01/27/24  1807 01/27/24  1650 01/27/24  1558 01/27/24  1540 01/27/24  1150 01/27/24  1007   * 129*  --   --   --   --  127*   POTASSIUM 4.6 4.4  --   --  4.6   < > 6.2*   CHLORIDE 97* 96*  --   --   --   --  98   CO2 18* 21*  --   --   --   --  20*   BUN 22.6 21.4  --   --   --   --  24.2*   CR 1.65* 1.48*  --   --   --   --  1.64*   ANIONGAP 13 12  --   --   --   --  9   RAHEEM 8.7* 8.9  --   --   --   --  9.2   * 185* 136*   < > 132*   < > 123*    < > = values in this interval not displayed.   ,   Results for orders placed or performed during the hospital encounter of 01/27/24   CT Head w/o Contrast    Narrative    EXAM: CT HEAD WITHOUT CONTRAST  LOCATION: [facility]  DATE: 01/27/2024    INDICATION: Acute neuro deficit, stroke suspected.  COMPARISON: CT head 08/31/2021.  TECHNIQUE: Routine CT Head without IV contrast. Multiplanar reformats. Dose reduction techniques  were used.    FINDINGS:  INTRACRANIAL CONTENTS: No intracranial hemorrhage, extra-axial collection, or mass effect.  Questionable new hypodensity in the right hemipons (series 2, image 8), possibly representing recent infarction. Stable small to moderate sized region of chronic   infarction in the mid right frontal lobe, within the right middle cerebral artery anterior division territory. Stable small region of chronic infarction in the posterior aspect of the left superior frontal gyrus, within the left anterior cerebral artery   territory. Stable chronic left basal ganglia infarct. Stable multiple chronic wedge-shaped regions of infarction within both cerebellar hemispheres. Stable mild to moderate chronic small vessel ischemic changes within the cerebral hemispheric white   matter bilaterally. Stable mild to moderate generalized brain parenchymal volume loss. The ventricles are not enlarged out of proportion to the cerebral sulci.    VISUALIZED ORBITS/SINUSES/MASTOIDS: Prior bilateral cataract surgery. Visualized portions of the orbits are otherwise unremarkable. No paranasal sinus mucosal disease. No middle ear or mastoid effusion.    BONES/SOFT TISSUES: No acute abnormality.      Impression    IMPRESSION:  1.  Questionable new hypodensity in the right hemipons, possibly representing recent infarction. If clinically indicated, consider MRI for further evaluation.  2.  Stable chronic regions of infarction within the right middle cerebral artery, left lenticulostriate and left anterior cerebral artery territories.  3.  Stable multifocal chronic infarcts throughout both cerebellar hemispheres.  4.  Stable mild to moderate chronic small vessel ischemic disease and generalized brain parenchymal volume loss.     CTA Head Neck with Contrast    Narrative    EXAM: CTA HEAD NECK WITH CONTRAST  LOCATION: [facility]  DATE: 01/27/2024    INDICATION: Acute neuro deficit, stroke  suspected.  COMPARISON: CT head 01/27/2024, head and neck CTA 09/25/2017.  CONTRAST: 67 mL Isovue 370.  TECHNIQUE: Head and neck CT angiogram with IV contrast. Axial helical CT images of the head and neck vessels obtained during the arterial phase of intravenous contrast administration. Axial 2D reconstructed images and multiplanar 3D MIP reconstructed   images of the head and neck vessels were performed by the technologist. Dose reduction techniques were used. All stenosis measurements made according to NASCET criteria unless otherwise specified.    FINDINGS:   HEAD CTA:  ANTERIOR CIRCULATION: No stenosis/occlusion, aneurysm, or high-flow vascular malformation. Standard [place] anatomy.    POSTERIOR CIRCULATION: Fetal origin of the posterior cerebral arteries with compensatory hypoplasia of the vertebrobasilar system. Stable multifocal severe stenoses and chronic occlusion of the basilar artery. Mild stenosis of the right vertebral artery   V4 segment. Reconstitution of the diminutive left vertebral artery V4 segment. The posterior cerebral arteries are widely patent. No aneurysm or evidence of vascular malformation.    DURAL VENOUS SINUSES: Expected enhancement of the major dural venous sinuses.    NECK CTA:  RIGHT CAROTID: Right internal carotid artery stent with approximately 25-35% in-stent stenosis, not significantly changed since 09/25/2017.    LEFT CAROTID: Stable approximately 65-75% stenosis of the proximal left internal carotid artery due to mixed calcified and fibrofatty atherosclerotic plaque with approximately 3 mm maximal thickness ulcerated fibrofatty component. No evidence of   dissection.    VERTEBRAL ARTERIES: Chronic occlusion of the left vertebral artery at its origin with partial reconstitution of the left V2 segment. Widely patent right vertebral artery.    AORTIC ARCH: Bovine origin left common carotid artery. Stable moderate stenosis of the proximal left common, carotid artery and  proximal left subclavian artery. Stable mild stenoses of the proximal right common carotid artery, innominate artery and   proximal right subclavian artery.    NONVASCULAR STRUCTURES: Patchy consolidation and tree-in-bud opacities throughout the right upper and lower lobes, concerning for infection or aspiration. Biapical calcified pleural plaques. Multilevel cervical spondylosis.      Impression    IMPRESSION:   HEAD CTA:   1.  No significant change since 09/25/2017. 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  2.  Widely patent intracranial anterior circulation and fetal origin posterior cerebral arteries.  3.  Reconstitution of a diminutive left vertebral artery V4 segment and mild stenosis of the right vertebral artery V4 segment.    NECK CTA:  1.  Chronic occlusion of the left vertebral artery at its origin with partial reconstitution at the level of its V2 segment.  2.  Stable approximately 65-75% stenosis of the proximal left internal carotid artery due to ulcerated atherosclerotic plaque.  3.  Proximal right internal carotid artery stent demonstrates unchanged approximately 25-35% in-stent stenosis.  4.  Stable moderate stenoses of the proximal left common carotid artery, proximal left subclavian artery and mild stenoses of the right common carotid artery, innominate artery and right subclavian artery.  5.  Widely patent right vertebral artery.  6.  Consolidation and tree-in-bud opacities within the right upper and lower lobes, concerning for infection.         *Note: Due to a large number of results and/or encounters for the requested time period, some results have not been displayed. A complete set of results can be found in Results Review.       Discharge Medications   Current Discharge Medication List        CONTINUE these medications which have CHANGED    Details   isosorbide mononitrate (IMDUR) 60 MG 24 hr tablet 1/2 tab (30mg) po every day.    Associated  Diagnoses: ELAM (dyspnea on exertion)      losartan (COZAAR) 100 MG tablet Take 0.5 tablets (50 mg) by mouth daily    Associated Diagnoses: Essential hypertension, benign           CONTINUE these medications which have NOT CHANGED    Details   acetaminophen (TYLENOL) 325 MG tablet Take 650 mg by mouth daily      alirocumab (PRALUENT) 150 MG/ML injectable pen Inject 1 mL (150 mg) Subcutaneous every 14 days  Qty: 6 mL, Refills: 3    Comments: PA was submitted and approved. Please reach out to patient and let him know when this is mailed out.  Associated Diagnoses: Hyperlipidemia LDL goal <130; Carotid stenosis, bilateral; S/P CABG x 6      amitriptyline (ELAVIL) 25 MG tablet TAKE 2 TABLETS AT BEDTIME  Qty: 180 tablet, Refills: 1    Associated Diagnoses: Migraine without aura and without status migrainosus, not intractable      aspirin (ASA) 325 MG tablet Take 325 mg by mouth daily      carvedilol (COREG) 25 MG tablet TAKE 1 TABLET TWICE A DAY WITH MEALS  Qty: 180 tablet, Refills: 2    Associated Diagnoses: Benign essential HTN      cilostazol (PLETAL) 100 MG tablet Take 1 tablet (100 mg) by mouth 2 times daily  Qty: 180 tablet, Refills: 3    Associated Diagnoses: PAD (peripheral artery disease) (H24)      diphenhydrAMINE-acetaminophen (TYLENOL PM)  MG tablet Take 2 tablets by mouth At Bedtime      fluticasone (FLONASE) 50 MCG/ACT nasal spray Spray 1 spray into both nostrils daily  Qty: 18 g, Refills: 11    Associated Diagnoses: Nasal congestion      !! gabapentin (NEURONTIN) 300 MG capsule Take 300 mg by mouth every morning And 600mg at 1900      !! gabapentin (NEURONTIN) 300 MG capsule Take 1 capsule (300 mg) by mouth 3 times daily  Qty: 270 capsule, Refills: 3    Associated Diagnoses: Arthritis      HYDROcodone-acetaminophen (NORCO) 5-325 MG tablet Take 1 tablet by mouth every 6 hours as needed for pain (max 4/24 hours)      Melatonin 10 MG TABS tablet Take 10 mg by mouth At Bedtime      omeprazole (PRILOSEC)  "40 MG DR capsule Take 1 capsule (40 mg) by mouth daily  Qty: 90 capsule, Refills: 1    Associated Diagnoses: Gastroesophageal reflux disease without esophagitis       !! - Potential duplicate medications found. Please discuss with provider.        STOP taking these medications       potassium chloride ER (KLOR-CON M) 10 MEQ CR tablet Comments:   Reason for Stopping:             Allergies   Allergies   Allergen Reactions    Statins [Statins] Other (See Comments)     Rhabdo  Same as \"statins\"    Gemfibrozil      Resting thigh-calf pain    Sulfa Antibiotics      Reacted as a child    Zetia [Ezetimibe]      Muscle cramping     "

## 2024-01-28 NOTE — PLAN OF CARE
Problem: Adult Inpatient Plan of Care  Goal: Plan of Care Review  Description: The Plan of Care Review/Shift note should be completed every shift.  The Outcome Evaluation is a brief statement about your assessment that the patient is improving, declining, or no change.  This information will be displayed automatically on your shift  note.  Outcome: Progressing  Goal: Absence of Hospital-Acquired Illness or Injury  Outcome: Progressing  Intervention: Identify and Manage Fall Risk  Recent Flowsheet Documentation  Taken 1/28/2024 0200 by Jude Murry RN  Safety Promotion/Fall Prevention:   activity supervised   clutter free environment maintained   increased rounding and observation   lighting adjusted   nonskid shoes/slippers when out of bed   room door open   room organization consistent   safety round/check completed   supervised activity  Taken 1/27/2024 2200 by Jude Murry RN  Safety Promotion/Fall Prevention:   activity supervised   clutter free environment maintained   increased rounding and observation   lighting adjusted   nonskid shoes/slippers when out of bed   room door open   room organization consistent   safety round/check completed   supervised activity  Intervention: Prevent Skin Injury  Recent Flowsheet Documentation  Taken 1/28/2024 0200 by Jude Murry RN  Body Position: position changed independently  Taken 1/27/2024 2200 by Jude Murry RN  Body Position: position changed independently  Intervention: Prevent and Manage VTE (Venous Thromboembolism) Risk  Recent Flowsheet Documentation  Taken 1/28/2024 0200 by Jude Murry RN  VTE Prevention/Management: other (see comments)  Taken 1/27/2024 2200 by Jude Murry RN  VTE Prevention/Management: other (see comments)  Intervention: Prevent Infection  Recent Flowsheet Documentation  Taken 1/28/2024 0200 by Jude Murry RN  Infection Prevention:   rest/sleep promoted   single patient room provided  Taken 1/27/2024 2200 by Lovely  VIPIN Roque  Infection Prevention:   rest/sleep promoted   single patient room provided  Goal: Optimal Comfort and Wellbeing  Outcome: Progressing  Goal: Readiness for Transition of Care  Outcome: Progressing     Problem: Risk for Delirium  Goal: Optimal Coping  Outcome: Progressing  Goal: Improved Behavioral Control  Outcome: Progressing  Intervention: Minimize Safety Risk  Recent Flowsheet Documentation  Taken 1/28/2024 0200 by Jude Murry RN  Communication Enhancement Strategies: call light answered in person  Taken 1/27/2024 2200 by Jude Murry RN  Communication Enhancement Strategies: call light answered in person  Goal: Improved Attention and Thought Clarity  Outcome: Progressing  Intervention: Maximize Cognitive Function  Recent Flowsheet Documentation  Taken 1/28/2024 0200 by Jude Murry RN  Sensory Stimulation Regulation: care clustered  Reorientation Measures: clock in view  Taken 1/27/2024 2200 by Jude Murry RN  Sensory Stimulation Regulation: care clustered  Reorientation Measures: clock in view  Goal: Improved Sleep  Outcome: Progressing     Problem: Fall Injury Risk  Goal: Absence of Fall and Fall-Related Injury  Outcome: Progressing  Intervention: Identify and Manage Contributors  Recent Flowsheet Documentation  Taken 1/28/2024 0200 by Jude Murry RN  Medication Review/Management: medications reviewed  Taken 1/27/2024 2200 by Jude Murry RN  Medication Review/Management: medications reviewed  Intervention: Promote Injury-Free Environment  Recent Flowsheet Documentation  Taken 1/28/2024 0200 by Jude Murry RN  Safety Promotion/Fall Prevention:   activity supervised   clutter free environment maintained   increased rounding and observation   lighting adjusted   nonskid shoes/slippers when out of bed   room door open   room organization consistent   safety round/check completed   supervised activity  Taken 1/27/2024 2200 by Jude Murry RN  Safety Promotion/Fall  "Prevention:   activity supervised   clutter free environment maintained   increased rounding and observation   lighting adjusted   nonskid shoes/slippers when out of bed   room door open   room organization consistent   safety round/check completed   supervised activity     Problem: Electrolyte Imbalance  Goal: Electrolyte Balance  Outcome: Progressing       Pt remains alert and confused at times, woke from sleep once and did not know where he was, wife was called and she did confirm that he does have some intermittent confusion in the evenings.  No other neurological deficits observed.   He has had trouble sleeping, provider notified and single dose of Restoril was ordered and given.   He has been somewhat weak and slightly unsteady, but has been able to ambulate with a walker.   Vitals have been stable.   BP (!) 148/70   Pulse (!) 125   Temp 98.6  F (37  C) (Oral)   Resp 18   Ht 1.727 m (5' 8\")   Wt 75.6 kg (166 lb 11.2 oz)   SpO2 93%   BMI 25.35 kg/m    He has had only one very small incontinent void, was bladder scanned and showed around 230 ml.   Telemetry has shown a tachycardic rhythm with a BBB most of the shift.   Will continue to monitor neurologic status and follow plan of care.  "

## 2024-01-28 NOTE — PROGRESS NOTES
01/28/24 1100   Appointment Info   Signing Clinician's Name / Credentials (PT) rosy clayton PT   Living Environment   People in Home spouse   Living Environment Comments 3-4 steps to enter with shon HENDRICKS   General Information   Onset of Illness/Injury or Date of Surgery 01/27/24   Referring Physician Dr strong   Patient/Family Therapy Goals Statement (PT) patient seen with wife they want discharge back home with no further therapy wanted   Pertinent History of Current Problem (include personal factors and/or comorbidities that impact the POC) patient admitted 1/26/24 due to falling at home due to syncope. PMH CAD with CABG , HTN, CVA , CKD . he is seen with wife they report he is back to his baseline function and has their home set up for him to return home , has 4wheeled walker at home and grab bars . they both report that he is back to his baseline function and does not want or require any further therapy   Cognition   Affect/Mental Status (Cognition) WFL   Orientation Status (Cognition) oriented x 3   Pain Assessment   Patient Currently in Pain No   Range of Motion (ROM)   ROM Comment WFL AROM in B UE and LE   Strength (Manual Muscle Testing)   Strength Comments strength is grossly good in B UE and LE   Bed Mobility   Comment, (Bed Mobility) independent   Transfers   Comment, (Transfers) able to move sit to stand and reverse from chair independent   Gait/Stairs (Locomotion)   Comment, (Gait/Stairs) ambulated 300 feet x 1 with w/walker and SBA , slight unsteady but no LOB , able to complete 5 stair steps with B railling and SBA   Balance   Balance Comments patient and wife report he is at his baseline   Clinical Impression   Criteria for Skilled Therapeutic Intervention Evaluation only;No problems identified which require skilled intervention;Current level of function same as previous level of function;No significant expected improvement in functional status;Patient/family refuse skilled intervention at this  time   PT Diagnosis (PT) weakness   Functional limitations due to impairments patient is at his baseline function   Clinical Presentation (PT Evaluation Complexity) stable   Clinical Decision Making (Complexity) low complexity   Risk & Benefits of therapy have been explained evaluation/treatment results reviewed;care plan/treatment goals reviewed;risks/benefits reviewed;current/potential barriers reviewed;participants voiced agreement with care plan;participants included;patient;spouse/significant other   PT Total Evaluation Time   PT Eval, Low Complexity Minutes (09716) 20   PT Discharge Planning   PT Plan patient to be discharge home with wife tomarrow and they feel he is at baseline function and do not want further PT   PT Discharge Recommendation (DC Rec) home with assist   PT Rationale for DC Rec patient and wife feel he is at baseline function and they have everything they need already set up at home   PT Brief overview of current status SBA to independent with transfer , and gait on level and stairs   Total Session Time   Total Session Time (sum of timed and untimed services) 20

## 2024-01-28 NOTE — PROGRESS NOTES
S-(situation): Patient arrives to room 269 via cart from ED    B-(background): Decreased LOC    A-(assessment): A&O x4. Systolic Bps in 140s to 150s.Bruising to sides of the mouth with mild facial droop.  BS were 132,118 and 136.     R-(recommendations): Orders reviewed with patient and wife. Will monitor patient per MD orders.     Inpatient nursing criteria listed below were met:    Health care directives status obtained and documented: Yes  VTE ordered/documented: Yes  Skin issues/needs documented:Yes  Isolation addressed and Signage used: NA  Fall Prevention: Care plan updated Yes Education given and documented Yes  Care Plan initiated and Co-Morbidities added: Yes  Education Assessment documented:Yes  Admission Education Documented: Yes  If present CAUTI/CLABI Education done: NA  New medication patient education completed and documented (Possible Side Effects of Common Medications handout): Yes  Allergies Reviewed: Yes  Admission Medication Reconciliation completed: Yes  Home medications if not able to send immediately home with family stored here: Yes  Reminder note placed in discharge instructions regarding home meds: Yes  Individualized care needs/preferences addressed and charted: Yes  Provider Notified that patient has arrived to the unit: Yes

## 2024-01-28 NOTE — PROGRESS NOTES
S-(situation): Patient discharged to home via car with wife    B-(background): AMS, weakness, stroke symptoms    A-(assessment): VSS, ambulate SBA, no pain, tele-stroke cleared    R-(recommendations): Discharge instructions reviewed with patient and wife. Listed belongings gathered and returned to patient.          Discharge Nursing Criteria:     Care Plan and Patient education resolved: Yes    New Medications- pt has been educated about purpose and side effects: Yes    Vaccines  Influenza status verified at discharge:  Yes    MISC  Prescriptions if needed, hard copies sent with patient  NA  Home medications returned to patient: NA  Medication Bin checked and emptied on discharge Yes  Patient reports post-discharge pain management plan is effective: Yes

## 2024-01-28 NOTE — CONSULTS
"[facility]    Stroke Consult Note    Reason for Consult:  AMS    Chief Complaint: Altered Mental Status       HPI  Michele Vance is a 78 year old male with PMH of CAD s/p CABG, HTN, HLD, prior stroke s/p R carotid stent (initial 2014, s/p balloon angioplasty 2020 d/to in-stent stenosis and multiple TIA w/ L sided weakness), CKD, chronic hyponatremia. He presented to the ED 1/27/24 for evaluation of AMS. LKW was bedtime the night prior (around 2200). Then on the morning of presentation patient was \"not responding like normal self\" and was \"Slow to respond and using incorrect words.\" Per EMS SBP was 70-80s. There was question of possible facial asymmetry (?weakness vs sequela of recent dental work including removal of  upper teeth). By time of ED evaluation SBP improved to 150-160s and he had returned to baseline.     Today, Michele is accompanied by his wife Justin who contributes to ROS and history. She notes that Michele did suffer a minor fall the day prior to admission (1/26) but without clear headache impact. Then yesterday morning (1/27) he seemed generally normal when he awoke, but over the course of the morning he became \"more generally weak and confused.\" When he was attempting to walk his muscles \"looked jerky\" causing his wife to suspect issues with his potassium. He then became even more confused and was slow to respond, prompting EMS activation. She confirms they checked his BP with systolic readings in the 60s with their cuff and in the 80s upon EMS assessment. She denies any clear unilateral or focal symptoms at any point. She does appreciate that his face looks more asymmetrical than normal but attributes this to recent extensive dental surgery and asymmetric swelling (this is evident on examination today, and there is no clear weakness with muscle activation). Michele does not recall the episode that occurred yesterday.    His wife notes he has longstanding difficulty " with orthostatic hypotension, but generally not as severe as the episode yesterday.     Stroke Evaluation Summarized    MRI/Head CT CT: possible new hypodensity in R hemipons, stable chronic infarcts in R frontal, L frontal, L basal ganglia and bilateral cerebellar hemispheres.    Intracranial Vasculature CTA:no LVO;  very diminutive posterior circulation including very small basilar artery with possible chronic occlusion and bilateral fetal PCAs   Cervical Vasculature CTA: no LVO; extensive plaque in aortic arch, dominant R vert,  R ICA stent with mild stenosis, moderate L ICA stenosis     Echocardiogram Pending    EKG/Telemetry Sinus rhythm, R BBB   Other Testing Not Applicable      LDL  1/28/2024: 35 mg/dL    A1C  1/28/2024: 5.7 %    Troponin 1/27/2024: 29 ng/L       Impression  Episode of reduced responsiveness. Suspect secondary to cerebral hypoperfusion in setting of hypotension and known significant cervical and intracranial stenoses and diminutive posterior circulation. Also possible contribution from hyperkalemia and hyponatremia.     Possible new area of hypodensity in R hemipons on CT; suspect artifactual as no focal neurological symptoms per patient/wife history and normal neurological examination (NIH=0). Patient/family decline further imaging with MRI at this time.     Recommendations   -neurochecks and vital signs Q 4 hours  -continue PTA ASA 325mg QD + Cilostazol 100mg BID   -long term BP goal as close to <130/80 as feasible with strict avoidance of hypotension  -LDL 35; continue alirocumab   -Blood glucose monitoring, Hgb A1c 5.7%, (goal <7% for secondary stroke prevention), follow-up with PCP  -TTE, could be completed outpatient if patient is otherwise ready for discharge     Patient Follow-up    -TTE in next  1-2 weeks (if not completed inpatient)   - in the next 1-2 week(s) with PCP  - Cardiologist next available to discuss further management of orthostatic hypotension   - NeuroIR/Dr. Cuellar  "next available (per note 2020 patient was to follow up in 1 month but appears to have been lost to follow up)     Thank you for this consult. No further stroke evaluation is recommended, so we will sign off. Please contact us with any additional questions.    MIAH Villalobos CNP  Vascular Neurology    To page me or covering stroke neurology team member, click here: [facility]  Choose \"On Call\" tab at top, then select \"NEUROLOGY/ALL SITES\" from middle drop-down box, press Enter, then look for \"stroke\" or \"telestroke\" for your site.  _____________________________________________________    Clinically Significant Risk Factors Present on Admission        # Hyperkalemia: Highest K = 6.2 mmol/L in last 2 days, will monitor as appropriate  # Hyponatremia: Lowest Na = 127 mmol/L in last 2 days, will monitor as appropriate         # Coagulation Defect: INR = 1.23 (Ref range: 0.85 - 1.15) and/or PTT = 27 Seconds (Ref range: 22 - 38 Seconds), will monitor for bleeding  # Drug Induced Platelet Defect: home medication list includes an antiplatelet medication   # Hypertension: Noted on problem list      # Overweight: Estimated body mass index is 25.35 kg/m  as calculated from the following:    Height as of this encounter: 1.727 m (5' 8\").    Weight as of this encounter: 75.6 kg (166 lb 11.2 oz).        # History of CABG: noted on surgical history    # CKD, Stage 3b (GFR 30-44): Will monitor and treat as appropriate  - last Cr =  1.65 mg/dL (Ref range: 0.67 - 1.17 mg/dL)  - last GFR = 43 mL/min/1.73m2 (Ref range: >60 mL/min/1.73m2)     Past Medical History    Past Medical History:   Diagnosis Date    Carotid stenosis     right endartectomy    Chronic kidney disease     stage 3    Coronary artery disease 3-2014    CABG x6    CVA (cerebral infarction)     balance problems, mild    Elevated CK     Esophageal reflux     Hypercholesteremia     Hyponatremia 8/31/2021    Nonsenile cataract     Other and unspecified hyperlipidemia     " PAD (peripheral artery disease) (H)     Rhabdomyolysis 2010    Unspecified essential hypertension      Medications   Home Meds  Prior to Admission medications    Medication Sig Start Date End Date Taking? Authorizing Provider   acetaminophen (TYLENOL) 325 MG tablet Take 650 mg by mouth daily 9/4/21  Yes Felicia Vigil CNP   alirocumab (PRALUENT) 150 MG/ML injectable pen Inject 1 mL (150 mg) Subcutaneous every 14 days  Patient taking differently: Inject 150 mg Subcutaneous every 14 days Every other Howie 5/15/23  Yes Stevie Felipe MD   amitriptyline (ELAVIL) 25 MG tablet TAKE 2 TABLETS AT BEDTIME  Patient taking differently: Take 50 mg by mouth at bedtime 12/18/23  Yes Jonas West MD   aspirin (ASA) 325 MG tablet Take 325 mg by mouth daily   Yes Reported, Patient   carvedilol (COREG) 25 MG tablet TAKE 1 TABLET TWICE A DAY WITH MEALS 11/7/23  Yes Jonas West MD   cilostazol (PLETAL) 100 MG tablet Take 1 tablet (100 mg) by mouth 2 times daily 3/17/23  Yes Jonas West MD   diphenhydrAMINE-acetaminophen (TYLENOL PM)  MG tablet Take 2 tablets by mouth At Bedtime   Yes Reported, Patient   fluticasone (FLONASE) 50 MCG/ACT nasal spray Spray 1 spray into both nostrils daily 9/25/23  Yes Jonas West MD   gabapentin (NEURONTIN) 300 MG capsule Take 300 mg by mouth every morning And 600mg at 1900   Yes Reported, Patient   gabapentin (NEURONTIN) 300 MG capsule Take 1 capsule (300 mg) by mouth 3 times daily  Patient taking differently: Take 600 mg by mouth every evening At 1900, also takes 300mg every morning 9/25/23  Yes Jonas West MD   HYDROcodone-acetaminophen (NORCO) 5-325 MG tablet Take 1 tablet by mouth every 6 hours as needed for pain (max 4/24 hours) 11/16/23  Yes Reported, Patient   isosorbide mononitrate (IMDUR) 60 MG 24 hr tablet TAKE 1 TABLET DAILY 11/7/23  Yes Jonas West MD   losartan (COZAAR) 100 MG tablet TAKE 1 TABLET DAILY 12/18/23  Yes Jonas West MD   Melatonin  "10 MG TABS tablet Take 10 mg by mouth At Bedtime   Yes Reported, Patient   omeprazole (PRILOSEC) 40 MG DR capsule Take 1 capsule (40 mg) by mouth daily 12/19/23  Yes Jonas West MD   potassium chloride ER (KLOR-CON M) 10 MEQ CR tablet TAKE 3 TABLETS TWICE A DAY  Patient taking differently: Take 30 mEq by mouth 2 times daily 6/9/23  Yes Jonas West MD       Scheduled Meds   amitriptyline  50 mg Oral At Bedtime    aspirin  325 mg Oral Daily    carvedilol  25 mg Oral BID w/meals    cilostazol  100 mg Oral BID    fluticasone  1 spray Both Nostrils Daily    gabapentin  300 mg Oral QAM    gabapentin  600 mg Oral QPM    pantoprazole  40 mg Oral BID    sodium chloride (PF)  3 mL Intracatheter Q8H       Infusion Meds   - MEDICATION INSTRUCTIONS -      sodium chloride 20 mL/hr at 01/27/24 1807       Allergies   Allergies   Allergen Reactions    Statins [Statins] Other (See Comments)     Rhabdo  Same as \"statins\"    Gemfibrozil      Resting thigh-calf pain    Sulfa Antibiotics      Reacted as a child    Zetia [Ezetimibe]      Muscle cramping          PHYSICAL EXAMINATION   Temp:  [97.6  F (36.4  C)-98.6  F (37  C)] 98.6  F (37  C)  Pulse:  [] 94  Resp:  [11-22] 18  BP: (135-160)/(55-80) 135/55  SpO2:  [92 %-95 %] 95 %    General Exam  General:  patient lying in bed without any acute distress    HEENT:  normocephalic/atraumatic  Pulmonary:  no respiratory distress    Neuro Exam  Mental Status:  alert, oriented x 3, follows commands, speech clear and fluent, naming and repetition normal  Cranial Nerves:  visual fields intact (tested by nurse), EOMI with normal smooth pursuit, facial sensation intact and symmetric (tested by nurse), facial movements symmetric, hearing not formally tested but intact to conversation, no dysarthria, shoulder shrug equal bilaterally, tongue protrusion midline  Motor:  no abnormal movements, able to move all limbs antigravity spontaneously with no signs of hemiparesis observed, no " pronator drift   Reflexes:  unable to test (telestroke)  Sensory:  light touch sensation intact and symmetric throughout upper and lower extremities (assessed by nurse), no extinction on double simultaneous stimulation (assessed by nurse)  Coordination:  normal finger-to-nose and heel-to-shin bilaterally without dysmetria  Station/Gait:  unable to test due to telestroke    Stroke Scales    NIHSS  1a. Level of Consciousness 0-->Alert, keenly responsive   1b. LOC Questions 0-->Answers both questions correctly   1c. LOC Commands 0-->Performs both tasks correctly   2.   Best Gaze 0-->Normal   3.   Visual 0-->No visual loss   4.   Facial Palsy 0-->Normal symmetrical movements   5a. Motor Arm, Left 0-->No drift, limb holds 90 (or 45) degrees for full 10 secs   5b. Motor Arm, Right 0-->No drift, limb holds 90 (or 45) degrees for full 10 secs   6a. Motor Leg, Left 0-->No drift, leg holds 30 degree position for full 5 secs   6b. Motor Leg, right 0-->No drift, leg holds 30 degree position for full 5 secs   7.   Limb Ataxia 0-->Absent   8.   Sensory 0-->Normal, no sensory loss   9.   Best Language 0-->No aphasia, normal   10. Dysarthria 0-->Normal   11. Extinction and Inattention  0-->No abnormality   Total 0 (01/28/24 1031)       Imaging  I personally reviewed all imaging; relevant findings per HPI.    Labs Data   CBC  Recent Labs   Lab 01/28/24  0624 01/27/24  1007   WBC 7.9 9.5   RBC 3.32* 3.53*   HGB 11.0* 11.8*   HCT 32.3* 35.0*    253     Basic Metabolic Panel   Recent Labs   Lab 01/28/24  0624 01/27/24  1807 01/27/24  1650 01/27/24  1558 01/27/24  1540 01/27/24  1150 01/27/24  1007   * 129*  --   --   --   --  127*   POTASSIUM 4.6 4.4  --   --  4.6   < > 6.2*   CHLORIDE 97* 96*  --   --   --   --  98   CO2 18* 21*  --   --   --   --  20*   BUN 22.6 21.4  --   --   --   --  24.2*   CR 1.65* 1.48*  --   --   --   --  1.64*   * 185* 136*   < > 132*   < > 123*   RAHEEM 8.7* 8.9  --   --   --   --  9.2    <  "> = values in this interval not displayed.     Liver Panel  No results for input(s): \"PROTTOTAL\", \"ALBUMIN\", \"BILITOTAL\", \"ALKPHOS\", \"AST\", \"ALT\", \"BILIDIRECT\" in the last 168 hours.  INR    Recent Labs   Lab Test 01/27/24  1007 08/31/21  0658 09/03/20  1135   INR 1.23* 1.10 1.09           Stroke Consult Data Data   Telestroke Service Details  (for non-emergent stroke consult with tele)  Video start time 01/28/24   1030   Video end time 01/28/24   1056   Type of service telemedicine diagnostic assessment of acute neurological changes   Reason telemedicine is appropriate patient requires assessment with a specialist for diagnosis and treatment of neurological symptoms   Mode of transmission secure interactive audio and video communication per Kal   Originating site (patient location) [facility]    Distant site (provider location) [facility]       I have personally spent a total of 65 minutes providing care today, time spent in reviewing medical records and devising the plan as recorded above.   "

## 2024-01-28 NOTE — DISCHARGE INSTRUCTIONS
The Cardiology clinic will be calling you to schedule your ECHO and your appt with Dr. Felipe.    Please call [phone] if you do not hear from them by Wednesday.

## 2024-01-30 NOTE — UTILIZATION REVIEW
Admission Status; Secondary Review Determination       As part of the [facility] Utilization review plan, a self-audit is done on Medicare inpatient admission with less than 2 midnights stay. The 2014 IPPS Final Rule allows outpatient billing in the event that a hospital determines that an inpatient admission was not medically necessary under utilization review process.          (x) Outpatient status would be Appropriate- Short Stay- Post discharge review.     RATIONALE FOR DETERMINATION    78-year-old male with a history of CAD post-CABG, hypertension, hyperlipidemia, CVA, and CKD stage III was admitted following an episode of acute confusion, hypotension (recorded as low as 61/30), near syncope, and a fall. He presented with hyperkalemia and chronic hyponatremia, along with elevated troponin levels (33 -> 29). His EKG showed sinus rhythm with right bundle branch block and left anterior fascicular block. This episode is similar to one he experienced in Fall 2022, leading to hospitalization. Neurology attributed his reduced responsiveness to cerebral hyperperfusion due to low blood pressure, considering his significant cervical and intracranial stenosis. The patient has been deemed stable for discharge with instructions to halve the doses of Imdur and losartan, monitor blood pressure thrice daily, and arrange follow-ups with his primary care provider, neurologist, and cardiologist.  This account and medical record number for a Medicare beneficiary was an inpatient short stay (<2 midnights) and didn't meet medical necessity for inpatient admission. We recommend billing outpatient services based on the severity of illness, intensity of service provided and the inpatient length of stay.  Please contact me within one week of receiving this letter only if you disagree with this determination. If you concur, no further action is needed.          The information on this document is developed by the utilization review team  in order for the business office to ensure compliance.  This only denotes the appropriateness of proper admission status and does not reflect the quality of care rendered.         The definitions of Inpatient Status and Observation Status used in making the determination above are those provided in the CMS Coverage Manual, Chapter 1 and Chapter 6, section 70.4.      Sincerely,       CASSIDY BARRIENTOS MD    System Medical Director  Utilization  Management  [facility].

## 2024-02-01 ENCOUNTER — OFFICE VISIT (OUTPATIENT)
Dept: CARDIOLOGY | Facility: CLINIC | Age: 79
End: 2024-02-01
Payer: MEDICARE

## 2024-02-01 VITALS
HEIGHT: 68 IN | DIASTOLIC BLOOD PRESSURE: 54 MMHG | BODY MASS INDEX: 25.61 KG/M2 | OXYGEN SATURATION: 97 % | RESPIRATION RATE: 20 BRPM | WEIGHT: 169 LBS | HEART RATE: 92 BPM | SYSTOLIC BLOOD PRESSURE: 112 MMHG

## 2024-02-01 DIAGNOSIS — I25.10 CORONARY ARTERY DISEASE INVOLVING NATIVE CORONARY ARTERY OF NATIVE HEART WITHOUT ANGINA PECTORIS: Primary | ICD-10-CM

## 2024-02-01 DIAGNOSIS — I10 BENIGN ESSENTIAL HYPERTENSION: ICD-10-CM

## 2024-02-01 PROCEDURE — 99214 OFFICE O/P EST MOD 30 MIN: CPT | Performed by: INTERNAL MEDICINE

## 2024-02-01 ASSESSMENT — PAIN SCALES - GENERAL: PAINLEVEL: MILD PAIN (3)

## 2024-02-01 NOTE — PROGRESS NOTES
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    Since I last saw the patient on 10/3/2022, he has been followed very carefully by his primary care physicians who have adjusted his blood pressure medications and he had been on a combination of carvedilol 25 twice daily, isosorbide 60 mg daily, and losartan 100 mg daily prior to her recent emergency room visit.  The patient underwent dental work late last week and on Friday morning 1/24/2024, the patient's wife noted the patient appeared fatigued and a bit weak.  He later on recovered, Saturday morning his wife noted again that Mr. Vance was less responsive with slight facial asymmetry and some word finding difficulties.  When paramedics arrived, his blood pressure was in the 80s, the patient rapidly improved and was at baseline by the time he  arrived in the emergency room.  His ECG showed a sinus rhythm with right bundle branch block and left anterior fascicular block, unchanged from previous ECGs with a rate in the 70s.  This the patient's neurologic symptoms resolved entirely, he was not a candidate for thrombolytic therapy and was admitted.  His head CT scan suggested a possible new hypodensity in the right geoffrey- ryan but no other changes.  Head and neck CTA showed no change in the patient's chronic cerebrovascular disease.  The patient initial potassium was 6.2 with a creatinine of 1.64.  His losartan was cut from 100 to 50 mg daily and isosorbide was cut from 80 mg to 30 mg daily.  The patient was then discharged and follow-up has been  arranged with both his primary care provider Dr. West and with a neurologist.  An echocardiogram is pending at the time of this visit.    The patient denies chest discomfort syncope persistent new neurologic deficit or dyspnea.  His wife provides very loving and attentive care states his blood pressures been in the 120 systolic range in the morning does not check them recently.        PAST MEDICAL HISTORY:    1.  Coronary artery disease.  a.  Bypass surgery 2014 with LIMA graft to LAD, saphenous venous bypass graft to first diagonal branch, saphenous venous bypass graft to second diagonal branch, saphenous venous bypass graft to the 1st marginal branch, saphenous venous bypass graft to second marginal branch, and saphenous venous bypass graft to posterior descending branch.  b.  No angina since bypass surgery.  2.  Atherosclerotic peripheral vascular disease.  a.  Old stroke -- status post right carotid stent with balloon PTA for in-stent restenosis 09/2020.  Followed by Dr. Cuellar  b.  Renal artery stenosis -- status post stent implantation for refractory hypertension, now well-controlled.  c.  Status post superficial femoral artery PTA/stenting -- followed by Dr. Epps.  3.  Status post cholecystectomy 07/2021.  4.  Hypertension.  5.  Hyponatremia.  6.  Dyslipidemia -- statin intolerant.  On alirocumab (Praluent).  7. CKDZ Cr 1.7    Orders this Visit:  No orders of the defined types were placed in this encounter.    No orders of the defined types were placed in this encounter.    There are no discontinued medications.    No diagnosis found.    CURRENT MEDICATIONS:  Current Outpatient Medications   Medication Sig Dispense Refill    acetaminophen (TYLENOL) 325 MG tablet Take 650 mg by mouth daily      alirocumab (PRALUENT) 150 MG/ML injectable pen Inject 1 mL (150 mg) Subcutaneous every 14 days (Patient taking differently: Inject 150 mg Subcutaneous every 14 days Every other Sunday) 6 mL 3    amitriptyline  "(ELAVIL) 25 MG tablet TAKE 2 TABLETS AT BEDTIME (Patient taking differently: Take 50 mg by mouth at bedtime) 180 tablet 1    aspirin (ASA) 325 MG tablet Take 325 mg by mouth daily      carvedilol (COREG) 25 MG tablet TAKE 1 TABLET TWICE A DAY WITH MEALS 180 tablet 2    cilostazol (PLETAL) 100 MG tablet Take 1 tablet (100 mg) by mouth 2 times daily 180 tablet 3    diphenhydrAMINE-acetaminophen (TYLENOL PM)  MG tablet Take 2 tablets by mouth At Bedtime      fluticasone (FLONASE) 50 MCG/ACT nasal spray Spray 1 spray into both nostrils daily 18 g 11    gabapentin (NEURONTIN) 300 MG capsule Take 300 mg by mouth every morning And 600mg at 1900      gabapentin (NEURONTIN) 300 MG capsule Take 1 capsule (300 mg) by mouth 3 times daily (Patient taking differently: Take 600 mg by mouth every evening At 1900, also takes 300mg every morning) 270 capsule 3    HYDROcodone-acetaminophen (NORCO) 5-325 MG tablet Take 1 tablet by mouth every 6 hours as needed for pain (max 4/24 hours)      isosorbide mononitrate (IMDUR) 60 MG 24 hr tablet 1/2 tab (30mg) po every day.      losartan (COZAAR) 100 MG tablet Take 0.5 tablets (50 mg) by mouth daily      Melatonin 10 MG TABS tablet Take 10 mg by mouth At Bedtime      omeprazole (PRILOSEC) 40 MG DR capsule Take 1 capsule (40 mg) by mouth daily 90 capsule 1       ALLERGIES     Allergies   Allergen Reactions    Statins [Statins] Other (See Comments)     Rhabdo  Same as \"statins\"    Gemfibrozil      Resting thigh-calf pain    Sulfa Antibiotics      Reacted as a child    Zetia [Ezetimibe]      Muscle cramping       PAST MEDICAL, SURGICAL, FAMILY, SOCIAL HISTORY:  History was reviewed and updated as needed, see medical record.        Review of Systems:  A 12-point review of systems was completed, see medical record for detailed review of systems information.    Physical Exam:  Vitals: /54 (BP Location: Right arm, Patient Position: Sitting, Cuff Size: Adult Regular)   Pulse 92   Resp " "20   Ht 1.727 m (5' 8\")   Wt 76.7 kg (169 lb)   SpO2 97%   BMI 25.70 kg/m      Constitutional: No apparent distress    HEENT: pupils equal round reactive to light, thyroid normal size, JVP is normal    Chest: Clear to percussion and auscultation    Cardiac: Normal S1, normal S2 no S3, no murmur or click            ASSESSMENT: I agree that hypotension with subsequent cerebral hypoperfusion was the likely cause of his recent episode.  I am concerned about the long-term safety of losartan given his current renal function and hyperkalemia, and would advise that we stop this medication.  In the absence of angina, isosorbide mononitrate is likely not providing significant benefit I would simply stop this medication as well.  Given the patient's cerebrovascular disease and history, I would be more permissive with blood pressure even allow values in the 140 mm systolic range.  If more aggressive blood pressure control is needed, either amlodipine or diuretic may be a better choice than ARB or ACE.     I agree with a repeat echocardiogram as he has not had any formal assessment of left ventricular systolic performance since his bypass surgery.  I would not recommend any further cardiac testing unless we see significant change on the patient's echo and /or the patient has recurrent spells of decreased consciousness in the absence of hypotension, suggestive of possible bradyarrhythmia ( has chronic RBBB/LAFB)      RECOMMENDATIONS:   1) stop ISMN and losartan  2) monitor blood pressure at home, if another agent needed options are amlodipine or diuretic  3) agree with TTE  4) agree with formal neurology evaluation  5) agree with follow up with   6) if spells recur, ambulatory monitoring        Recent Lab Results:  LIPID RESULTS:  Lab Results   Component Value Date    CHOL 96 01/28/2024    CHOL 161 03/06/2020    HDL 52 01/28/2024    HDL 53 03/06/2020    LDL 35 01/28/2024    LDL 95 03/06/2020    TRIG 44 01/28/2024    " TRIG 65 03/06/2020    CHOLHDLRATIO 5.2 (H) 07/23/2015       LIVER ENZYME RESULTS:  Lab Results   Component Value Date    AST 14 10/18/2022    AST 12 07/02/2021    ALT 35 10/18/2022    ALT 21 07/02/2021       CBC RESULTS:  Lab Results   Component Value Date    WBC 7.9 01/28/2024    WBC 5.8 07/02/2021    RBC 3.32 (L) 01/28/2024    RBC 3.52 (L) 07/02/2021    HGB 11.0 (L) 01/28/2024    HGB 11.7 (L) 07/02/2021    HCT 32.3 (L) 01/28/2024    HCT 32.3 (L) 07/02/2021    MCV 97 01/28/2024    MCV 92 07/02/2021    MCH 33.1 (H) 01/28/2024    MCH 33.2 (H) 07/02/2021    MCHC 34.1 01/28/2024    MCHC 36.2 07/02/2021    RDW 13.5 01/28/2024    RDW 12.4 07/02/2021     01/28/2024     07/02/2021       BMP RESULTS:  Lab Results   Component Value Date     (L) 01/28/2024     (L) 07/02/2021    POTASSIUM 4.6 01/28/2024    POTASSIUM 5.2 10/18/2022    POTASSIUM 4.9 07/02/2021    CHLORIDE 97 (L) 01/28/2024    CHLORIDE 101 10/18/2022    CHLORIDE 91 (L) 07/02/2021    CO2 18 (L) 01/28/2024    CO2 25 10/18/2022    CO2 23 07/02/2021    ANIONGAP 13 01/28/2024    ANIONGAP 4 10/18/2022    ANIONGAP 7 07/02/2021     (H) 01/28/2024     (H) 01/27/2024     (H) 10/18/2022     (H) 07/02/2021    BUN 22.6 01/28/2024    BUN 31 (H) 10/18/2022    BUN 13 07/02/2021    CR 1.65 (H) 01/28/2024    CR 1.31 (H) 07/02/2021    GFRESTIMATED 43 (L) 01/28/2024    GFRESTIMATED 53 (L) 07/02/2021    GFRESTBLACK 61 07/02/2021    RAHEEM 8.7 (L) 01/28/2024    RAHEEM 8.9 07/02/2021        A1C RESULTS:  Lab Results   Component Value Date    A1C 5.7 (H) 01/28/2024    A1C 5.6 03/07/2014       INR RESULTS:  Lab Results   Component Value Date    INR 1.23 (H) 01/27/2024    INR 1.10 08/31/2021    INR 1.09 09/03/2020    INR 1.08 09/01/2020       We greatly appreciate the opportunity to be involved in the care of your patient, Michele Vance.    Sincerely,  Stevie Felipe MD      CC  No referring provider defined for this  encounter.

## 2024-02-01 NOTE — LETTER
2/1/2024    Jonas West MD  [address] [facility] [address]    RE: Michele Vance       Dear Colleague,     I had the pleasure of seeing Michele Vance in the [facility].  HISTORY OF PRESENT ILLNESS:  Michele Vance a 78 year old male with     Blood pressures at home average 120/60 in am, can go to 140/70  Awaiting neurology consultation have cut doses of ISM and losartan.      PAST MEDICAL HISTORY:    1.  Coronary artery disease.  a.  Bypass surgery 2014 with LIMA graft to LAD, saphenous venous bypass graft to first diagonal branch, saphenous venous bypass graft to second diagonal branch, saphenous venous bypass graft to the 1st marginal branch, saphenous venous bypass graft to second marginal branch, and saphenous venous bypass graft to posterior descending branch.  b.  No angina since bypass surgery.  2.  Atherosclerotic peripheral vascular disease.  a.  Old stroke -- status post right carotid stent with balloon PTA for in-stent restenosis 09/2020.  Followed by Dr. Cuellar  b.  Renal artery stenosis -- status post stent implantation for refractory hypertension, now well-controlled.  c.  Status post superficial femoral artery PTA/stenting -- followed by Dr. Epps.  3.  Status post cholecystectomy 07/2021.  4.  Hypertension.  5.  Hyponatremia.  6.  Dyslipidemia -- statin intolerant.  On alirocumab (Praluent).  7. CKDZ Cr 1.7    Orders this Visit:  No orders of the defined types were placed in this encounter.    No orders of the defined types were placed in this encounter.    There are no discontinued medications.    No diagnosis found.    CURRENT MEDICATIONS:  Current Outpatient Medications   Medication Sig Dispense Refill    acetaminophen (TYLENOL) 325 MG tablet Take 650 mg by mouth daily      alirocumab (PRALUENT) 150 MG/ML injectable pen Inject 1 mL (150 mg) Subcutaneous every 14 days (Patient taking differently: Inject 150 mg Subcutaneous every 14 days Every other Sunday) 6 mL 3  "   amitriptyline (ELAVIL) 25 MG tablet TAKE 2 TABLETS AT BEDTIME (Patient taking differently: Take 50 mg by mouth at bedtime) 180 tablet 1    aspirin (ASA) 325 MG tablet Take 325 mg by mouth daily      carvedilol (COREG) 25 MG tablet TAKE 1 TABLET TWICE A DAY WITH MEALS 180 tablet 2    cilostazol (PLETAL) 100 MG tablet Take 1 tablet (100 mg) by mouth 2 times daily 180 tablet 3    diphenhydrAMINE-acetaminophen (TYLENOL PM)  MG tablet Take 2 tablets by mouth At Bedtime      fluticasone (FLONASE) 50 MCG/ACT nasal spray Spray 1 spray into both nostrils daily 18 g 11    gabapentin (NEURONTIN) 300 MG capsule Take 300 mg by mouth every morning And 600mg at 1900      gabapentin (NEURONTIN) 300 MG capsule Take 1 capsule (300 mg) by mouth 3 times daily (Patient taking differently: Take 600 mg by mouth every evening At 1900, also takes 300mg every morning) 270 capsule 3    HYDROcodone-acetaminophen (NORCO) 5-325 MG tablet Take 1 tablet by mouth every 6 hours as needed for pain (max 4/24 hours)      isosorbide mononitrate (IMDUR) 60 MG 24 hr tablet 1/2 tab (30mg) po every day.      losartan (COZAAR) 100 MG tablet Take 0.5 tablets (50 mg) by mouth daily      Melatonin 10 MG TABS tablet Take 10 mg by mouth At Bedtime      omeprazole (PRILOSEC) 40 MG DR capsule Take 1 capsule (40 mg) by mouth daily 90 capsule 1       ALLERGIES     Allergies   Allergen Reactions    Statins [Statins] Other (See Comments)     Rhabdo  Same as \"statins\"    Gemfibrozil      Resting thigh-calf pain    Sulfa Antibiotics      Reacted as a child    Zetia [Ezetimibe]      Muscle cramping       PAST MEDICAL, SURGICAL, FAMILY, SOCIAL HISTORY:  History was reviewed and updated as needed, see medical record.        Review of Systems:  A 12-point review of systems was completed, see medical record for detailed review of systems information.    Physical Exam:  Vitals: /54 (BP Location: Right arm, Patient Position: Sitting, Cuff Size: Adult Regular)   " "Pulse 92   Resp 20   Ht 1.727 m (5' 8\")   Wt 76.7 kg (169 lb)   SpO2 97%   BMI 25.70 kg/m      Constitutional: No apparent distress    HEENT: pupils equal round reactive to light, thyroid normal size, JVP is normal    Chest: Clear to percussion and auscultation    Cardiac: Normal S1, normal S2 no S3, no murmur or click    Abdomen: Scaphoid.  Liver percusses to 6 cm spleen is not palpable aorta is not tender or enlarged    Extremitiies: no edema.    Neurological:  Cranial nerves II through XII are intact, strength equal and symmetrical, displays normal insight and judgment        ASSESSMENT: Michele Vance is a 78 year old male with          We reviewed the benefits of a regular exercise program, a Mediterranean-style weight loss diet, and contacting us for any changes in between now and the time of her next visit.     RECOMMENDATIONS:   1) stop ISMN and losartan  2) monitor blood pressure at home, if another agent needed options are amlodipine or diuretic  3) agree with TTE  4) agree with formal neurology evaluation  5) agree with follow up with   6) if spells recur, ambulatory monitoring        Recent Lab Results:  LIPID RESULTS:  Lab Results   Component Value Date    CHOL 96 01/28/2024    CHOL 161 03/06/2020    HDL 52 01/28/2024    HDL 53 03/06/2020    LDL 35 01/28/2024    LDL 95 03/06/2020    TRIG 44 01/28/2024    TRIG 65 03/06/2020    CHOLHDLRATIO 5.2 (H) 07/23/2015       LIVER ENZYME RESULTS:  Lab Results   Component Value Date    AST 14 10/18/2022    AST 12 07/02/2021    ALT 35 10/18/2022    ALT 21 07/02/2021       CBC RESULTS:  Lab Results   Component Value Date    WBC 7.9 01/28/2024    WBC 5.8 07/02/2021    RBC 3.32 (L) 01/28/2024    RBC 3.52 (L) 07/02/2021    HGB 11.0 (L) 01/28/2024    HGB 11.7 (L) 07/02/2021    HCT 32.3 (L) 01/28/2024    HCT 32.3 (L) 07/02/2021    MCV 97 01/28/2024    MCV 92 07/02/2021    MCH 33.1 (H) 01/28/2024    MCH 33.2 (H) 07/02/2021    MCHC 34.1 01/28/2024    MCHC 36.2 " 07/02/2021    RDW 13.5 01/28/2024    RDW 12.4 07/02/2021     01/28/2024     07/02/2021       BMP RESULTS:  Lab Results   Component Value Date     (L) 01/28/2024     (L) 07/02/2021    POTASSIUM 4.6 01/28/2024    POTASSIUM 5.2 10/18/2022    POTASSIUM 4.9 07/02/2021    CHLORIDE 97 (L) 01/28/2024    CHLORIDE 101 10/18/2022    CHLORIDE 91 (L) 07/02/2021    CO2 18 (L) 01/28/2024    CO2 25 10/18/2022    CO2 23 07/02/2021    ANIONGAP 13 01/28/2024    ANIONGAP 4 10/18/2022    ANIONGAP 7 07/02/2021     (H) 01/28/2024     (H) 01/27/2024     (H) 10/18/2022     (H) 07/02/2021    BUN 22.6 01/28/2024    BUN 31 (H) 10/18/2022    BUN 13 07/02/2021    CR 1.65 (H) 01/28/2024    CR 1.31 (H) 07/02/2021    GFRESTIMATED 43 (L) 01/28/2024    GFRESTIMATED 53 (L) 07/02/2021    GFRESTBLACK 61 07/02/2021    RAHEEM 8.7 (L) 01/28/2024    RAHEEM 8.9 07/02/2021        A1C RESULTS:  Lab Results   Component Value Date    A1C 5.7 (H) 01/28/2024    A1C 5.6 03/07/2014       INR RESULTS:  Lab Results   Component Value Date    INR 1.23 (H) 01/27/2024    INR 1.10 08/31/2021    INR 1.09 09/03/2020    INR 1.08 09/01/2020       We greatly appreciate the opportunity to be involved in the care of your patient, Michele Vance.    Sincerely,  Stevie Felipe MD      CC  No referring provider defined for this encounter.

## 2024-02-05 ENCOUNTER — TELEPHONE (OUTPATIENT)
Dept: NEUROSURGERY | Facility: CLINIC | Age: 79
End: 2024-02-05
Payer: MEDICARE

## 2024-02-05 DIAGNOSIS — I65.23 CAROTID STENOSIS, BILATERAL: Primary | ICD-10-CM

## 2024-02-05 NOTE — TELEPHONE ENCOUNTER
[facility] Call Center    Phone Message    May a detailed message be left on voicemail: yes     Reason for Call: Other: Patient is needing Dr. Cuellar to go over the recent imaging results. Please look over and call patient.     Action Taken: Other: Neurosurgery    Travel Screening: Not Applicable

## 2024-02-06 ENCOUNTER — OFFICE VISIT (OUTPATIENT)
Dept: INTERNAL MEDICINE | Facility: CLINIC | Age: 79
End: 2024-02-06
Payer: MEDICARE

## 2024-02-06 VITALS
TEMPERATURE: 97.5 F | WEIGHT: 165 LBS | HEART RATE: 76 BPM | BODY MASS INDEX: 25.01 KG/M2 | HEIGHT: 68 IN | SYSTOLIC BLOOD PRESSURE: 110 MMHG | DIASTOLIC BLOOD PRESSURE: 60 MMHG | RESPIRATION RATE: 14 BRPM | OXYGEN SATURATION: 97 %

## 2024-02-06 DIAGNOSIS — E87.5 HYPERKALEMIA: ICD-10-CM

## 2024-02-06 DIAGNOSIS — N18.31 STAGE 3A CHRONIC KIDNEY DISEASE (H): ICD-10-CM

## 2024-02-06 DIAGNOSIS — I70.1 RENAL ARTERY STENOSIS (H): ICD-10-CM

## 2024-02-06 DIAGNOSIS — I95.9 HYPOTENSION, UNSPECIFIED HYPOTENSION TYPE: Primary | ICD-10-CM

## 2024-02-06 DIAGNOSIS — E87.1 HYPONATREMIA: ICD-10-CM

## 2024-02-06 DIAGNOSIS — I10 ESSENTIAL HYPERTENSION, BENIGN: ICD-10-CM

## 2024-02-06 LAB
ANION GAP SERPL CALCULATED.3IONS-SCNC: 9 MMOL/L (ref 7–15)
BUN SERPL-MCNC: 14.8 MG/DL (ref 8–23)
CALCIUM SERPL-MCNC: 9.1 MG/DL (ref 8.8–10.2)
CHLORIDE SERPL-SCNC: 91 MMOL/L (ref 98–107)
CREAT SERPL-MCNC: 1.28 MG/DL (ref 0.67–1.17)
DEPRECATED HCO3 PLAS-SCNC: 26 MMOL/L (ref 22–29)
EGFRCR SERPLBLD CKD-EPI 2021: 58 ML/MIN/1.73M2
GLUCOSE SERPL-MCNC: 109 MG/DL (ref 70–99)
POTASSIUM SERPL-SCNC: 4.4 MMOL/L (ref 3.4–5.3)
SODIUM SERPL-SCNC: 126 MMOL/L (ref 135–145)

## 2024-02-06 PROCEDURE — 80048 BASIC METABOLIC PNL TOTAL CA: CPT | Performed by: INTERNAL MEDICINE

## 2024-02-06 PROCEDURE — 36415 COLL VENOUS BLD VENIPUNCTURE: CPT | Performed by: INTERNAL MEDICINE

## 2024-02-06 PROCEDURE — 99214 OFFICE O/P EST MOD 30 MIN: CPT | Performed by: INTERNAL MEDICINE

## 2024-02-06 RX ORDER — RESPIRATORY SYNCYTIAL VIRUS VACCINE 120MCG/0.5
0.5 KIT INTRAMUSCULAR ONCE
Qty: 1 EACH | Refills: 0 | Status: CANCELLED | OUTPATIENT
Start: 2024-02-06 | End: 2024-02-06

## 2024-02-06 NOTE — PROGRESS NOTES
Assessment & Plan   Problem List Items Addressed This Visit       CKD (chronic kidney disease) stage 3, GFR 30-59 ml/min (H)    Relevant Orders    Basic metabolic panel  (Ca, Cl, CO2, Creat, Gluc, K, Na, BUN)    Renal artery stenosis (H24)    Hyperkalemia    Relevant Orders    Basic metabolic panel  (Ca, Cl, CO2, Creat, Gluc, K, Na, BUN)     Other Visit Diagnoses       Hypotension, unspecified hypotension type    -  Primary    Relevant Orders    Basic metabolic panel  (Ca, Cl, CO2, Creat, Gluc, K, Na, BUN)    Essential hypertension, benign        Relevant Orders    Basic metabolic panel  (Ca, Cl, CO2, Creat, Gluc, K, Na, BUN)           Patient who has a history of coronary disease status post bypass surgery.  Has had some issues with hyponatremia.  He was on potassium replacement, Imdur, losartan, carvedilol.  He was stable at his preop blood pressure 126 systolic.  After his tooth was removed on the 23rd he had an episode of 24 hours without eating or drinking much.  Then limited eating.  He finally had this hypotensive episode and syncope and confusion on the 27th.  He was admitted to the hospital, CTA showed possible region on his right ryan but neurology was not impressed no MRI was done.  Possibly from hypoperfusion with his hypotension.  His losartan and Imdur were cut in half and he was sent home.  Blood pressure is running high at home 140s 150s.  He follows with cardiology and his blood pressure is 112/60 in the cardiology office.  Cardiologist stopped his losartan and Imdur as he felt he needed better perfusion in the brain.  Blood pressure continues to be elevated in the 150s 160 range at home but we think that is an accurate cuff.  His blood pressure today is 116/58 at 110/60.  We will not adjust to home readings, he will stay off losartan, Imdur completely he will continue on carvedilol 25 twice daily but may need to cut that back if he continues to be hypotensive.  Currently he is asymptomatic we  "recommended he drink more fluids, try to eat more but that is limited by his recent dental work.  Patient does have a history of renal artery stenosis with a stent but that stent is probably affecting his blood pressure came down.  We will check his sodium and potassium today and adjust according to the lab results.     MED REC REQUIRED  Post Medication Reconciliation Status: discharge medications reconciled and changed, per note/orders  BMI  Estimated body mass index is 25.09 kg/m  as calculated from the following:    Height as of this encounter: 1.727 m (5' 8\").    Weight as of this encounter: 74.8 kg (165 lb).             Gene Bautista is a 78 year old, presenting for the following health issues:  Hospital F/U      2/6/2024     8:59 AM   Additional Questions   Roomed by [facility] Follow-up Visit:    Hospital/Nursing Home/ Rehab Facility: [facility]  Date of Admission: 1/27/24  Date of Discharge: 1/28/24  Reason(s) for Admission: Near syncope  Hypotension    Was your hospitalization related to COVID-19? No   Problems taking medications regularly:  None  Medication changes since discharge: None  Problems adhering to non-medication therapy:  None    Summary of hospitalization:  [facility] discharge summary reviewed  Diagnostic Tests/Treatments reviewed.  Follow up needed: none  Other Healthcare Providers Involved in Patient s Care:         None  Update since discharge: improved.         Plan of care communicated with patient and family           Had tooth removed and was not drinking much, not eating much.  Got dehydrated and creatinine was elevated and potassium was elevated.      Cardiology stopped his losartan and imdur completely. BP higher at  home above 130-160 but machine reading high.     Echo scheduled on Feb 13th.     History of hyponatremia and hypokalemia.     Right upper arm is sore, less strength, 4+/5  chronic issue.            " "  Objective    /58   Pulse 76   Temp 97.5  F (36.4  C) (Temporal)   Resp 14   Ht 1.727 m (5' 8\")   Wt 74.8 kg (165 lb)   SpO2 97%   BMI 25.09 kg/m    Body mass index is 25.09 kg/m .  Physical Exam   NAD  Heart regular   Lungs clear  Bp recheck 110/60  Ext no edema.             Signed Electronically by: Jonas West MD    "

## 2024-02-12 NOTE — TELEPHONE ENCOUNTER
[facility] Call [facility]    Phone Message    May a detailed message be left on voicemail: yes     Reason for Call: Other: Patient's wife called stating patient has not completed a carotid US and she feels this may be needed before scheduling follow up. She wonders if Dr. Cuellar would want to order this before seeing patient for follow up. She can be reached at [phone].       Action Taken: Message routed to:  [facility] ([facility] Neurosurgery    Travel Screening: Not Applicable

## 2024-02-15 ENCOUNTER — TELEPHONE (OUTPATIENT)
Dept: NEUROSURGERY | Facility: CLINIC | Age: 79
End: 2024-02-15
Payer: MEDICARE

## 2024-02-26 ENCOUNTER — HOSPITAL ENCOUNTER (OUTPATIENT)
Dept: ULTRASOUND IMAGING | Facility: CLINIC | Age: 79
Discharge: HOME | End: 2024-02-26
Attending: NEUROLOGICAL SURGERY | Admitting: NEUROLOGICAL SURGERY
Payer: MEDICARE

## 2024-02-26 DIAGNOSIS — I65.23 CAROTID STENOSIS, BILATERAL: ICD-10-CM

## 2024-02-26 DIAGNOSIS — I73.9 PAD (PERIPHERAL ARTERY DISEASE) (H): ICD-10-CM

## 2024-02-26 PROCEDURE — 93880 EXTRACRANIAL BILAT STUDY: CPT

## 2024-02-27 RX ORDER — CILOSTAZOL 100 MG/1
100 TABLET ORAL 2 TIMES DAILY
Qty: 180 TABLET | Refills: 3 | Status: SHIPPED | OUTPATIENT
Start: 2024-02-27

## 2024-02-29 ENCOUNTER — HOSPITAL ENCOUNTER (OUTPATIENT)
Dept: CARDIOLOGY | Facility: CLINIC | Age: 79
Discharge: HOME | End: 2024-02-29
Attending: NURSE PRACTITIONER | Admitting: NURSE PRACTITIONER
Payer: MEDICARE

## 2024-02-29 ENCOUNTER — LAB (OUTPATIENT)
Dept: LAB | Facility: CLINIC | Age: 79
End: 2024-02-29
Payer: MEDICARE

## 2024-02-29 DIAGNOSIS — I95.9 HYPOTENSION, UNSPECIFIED HYPOTENSION TYPE: ICD-10-CM

## 2024-02-29 DIAGNOSIS — E87.1 HYPONATREMIA: ICD-10-CM

## 2024-02-29 DIAGNOSIS — E87.5 HYPERKALEMIA: ICD-10-CM

## 2024-02-29 LAB
ANION GAP SERPL CALCULATED.3IONS-SCNC: 9 MMOL/L (ref 7–15)
BUN SERPL-MCNC: 13.6 MG/DL (ref 8–23)
CALCIUM SERPL-MCNC: 9.4 MG/DL (ref 8.8–10.2)
CHLORIDE SERPL-SCNC: 93 MMOL/L (ref 98–107)
CREAT SERPL-MCNC: 1.3 MG/DL (ref 0.67–1.17)
DEPRECATED HCO3 PLAS-SCNC: 25 MMOL/L (ref 22–29)
EGFRCR SERPLBLD CKD-EPI 2021: 57 ML/MIN/1.73M2
GLUCOSE SERPL-MCNC: 102 MG/DL (ref 70–99)
LVEF ECHO: NORMAL
POTASSIUM SERPL-SCNC: 4.5 MMOL/L (ref 3.4–5.3)
SODIUM SERPL-SCNC: 127 MMOL/L (ref 135–145)

## 2024-02-29 PROCEDURE — 93306 TTE W/DOPPLER COMPLETE: CPT | Mod: 26 | Performed by: INTERNAL MEDICINE

## 2024-02-29 PROCEDURE — 93306 TTE W/DOPPLER COMPLETE: CPT

## 2024-02-29 PROCEDURE — 80048 BASIC METABOLIC PNL TOTAL CA: CPT

## 2024-02-29 PROCEDURE — 36415 COLL VENOUS BLD VENIPUNCTURE: CPT

## 2024-03-15 NOTE — PROGRESS NOTES
Michele Vance  :  1945  DOS: 3/20/2024  MRN: [id number]  PCP: Jonas West    Sports Medicine Clinic Visit    HPI  Michele Vance is a 78 year old male who is seen as a self referral presenting with right knee pain resulting from a fall.    - Mechanism of Injury:  Fall 6 weeks ago with swelling afterward. Fell backwards and right knee quickly went into flexion, as far as he can recall.  - Prior evaluation:    - None recently, no recent imaging.   - 2016 with Dr. Davila: Xrays after slipping injury. Xrays normal. RICE protocol recommended, no injection.     - Pain Character:  Pain has been present for  6 weeks and improving .  Pain is located in the medial knee and most painful when the leg is in a VELMA position.   - Endorses:  stiffness initially. Pain currently as above.   - Denies:  clicking, popping, grinding, mechanical locking symptoms, instability, numbness, tingling, radicular shooting pain, weakness.   - Alleviating factors:   time , rest  - Aggravating factors:  ambulation and unable to flex for about a week. Now pain only in Varus position  - Treatments tried:  ice, brace initially    - Patient Goals:  get a formal diagnosis, discuss treatment options  - Social History: retired    - Pertinent PMH:  history of vasovagal responses upon standing, which may have contributed to his most recent fall.  CKD 3, carotid stenosis, CAD s/p CABG x 6, CVA      Review of Systems  Musculoskeletal: as above  Remainder of review of systems is negative including constitutional, CV, pulmonary, GI, Skin and Neurologic except as noted in HPI or medical history.    Past Medical History:   Diagnosis Date    Carotid stenosis     right endartectomy    Chronic kidney disease     stage 3    Coronary artery disease 3-    CABG x6    CVA (cerebral infarction)     balance problems, mild    Elevated CK     Esophageal reflux     Hypercholesteremia     Hyponatremia 2021    Nonsenile cataract     Other and unspecified  hyperlipidemia     PAD (peripheral artery disease) (H24)     Rhabdomyolysis 2010    Unspecified essential hypertension      Past Surgical History:   Procedure Laterality Date    APPENDECTOMY  1974    BYPASS GRAFT ARTERY CORONARY  3/5/2014    Procedure: BYPASS GRAFT ARTERY CORONARY;  Median Sternotomy, Coronary Artery Bypass Graft X6 used Left internal mammary artery, Left and Right Greater Saphenous vein, on Pump oxygenator.;  Surgeon: Tim Montanez MD;  Location: [facility]    CATARACT IOL, RT/LT Bilateral 2008    in [place]    cataracts      COLONOSCOPY  12/12/02    [facility]    COLONOSCOPY N/A 10/7/2014    Procedure: COMBINED COLONOSCOPY, SINGLE OR MULTIPLE BIOPSY/POLYPECTOMY BY BIOPSY;  Surgeon: Micheal Mora MD;  Location: [facility] GI    CV LOWER EXTREMITY ANGIOGRAM BILATERAL Bilateral 9/9/2019    Procedure: Lower Extremity Angiogram Bilateral;  Surgeon: Julio Oropeza MD;  Location: [facility] CARDIAC CATH LAB    DENTAL SURGERY      TMJ, implants    ENDARTERECTOMY CAROTID      right carotid stent    ESOPHAGOSCOPY, GASTROSCOPY, DUODENOSCOPY (EGD), COMBINED Left 10/7/2014    Procedure: COMBINED ESOPHAGOSCOPY, GASTROSCOPY, DUODENOSCOPY (EGD), BIOPSY SINGLE OR MULTIPLE;  Surgeon: Micheal Mora MD;  Location: [facility]    ESOPHAGOSCOPY, GASTROSCOPY, DUODENOSCOPY (EGD), COMBINED Left 10/7/2014    Procedure: COMBINED ESOPHAGOSCOPY, GASTROSCOPY, DUODENOSCOPY (EGD), REMOVE FOREIGN BODY;  Surgeon: Micheal Mora MD;  Location: [facility]    HC CAPSULE ENDOSCOPY N/A 10/7/2014    Procedure: CAPSULE/PILL CAM ENDOSCOPY;  Surgeon: Micheal Mora MD;  Location: [facility] GI    INJECT EPIDURAL LUMBAR Right 5/8/2017    Procedure: INJECT EPIDURAL LUMBAR;  Lumbar transforaminal Epidural Steroid Injection right lumbar 4-5, and right  lumbar 5-Sacral 1;  Surgeon: Anthony Gonzalez MD;  [facility]    INJECT JOINT SACROILIAC Bilateral 8/28/2017    Procedure: INJECT JOINT SACROILIAC;  sacroiliac joint injection bilateral;   "Surgeon: Anthony Gonzalez MD;  [facility]    IR CAROTID CEREBRAL ANGIOGRAM BILATERAL  9/3/2020    KNEE SURGERY  1976    left knee reconstruction    LAPAROSCOPIC CHOLECYSTECTOMY N/A 7/16/2021    Procedure: CHOLECYSTECTOMY, LAPAROSCOPIC;  Surgeon: Anthony Epps MD;  Location: [facility] OR    lasix  2001    both eyes    RELEASE CARPAL TUNNEL  1/13/2011    RELEASE CARPAL TUNNEL performed by JO MADRID at  OR    RELEASE CARPAL TUNNEL  1/20/2011    RELEASE CARPAL TUNNEL performed by JO MADRID at  OR    [facility] UGI ENDOSCOPY, SIMPLE EXAM  01/08/99    [facility]     Family History   Problem Relation Age of Onset    Hypertension Mother     Eye Disorder Mother     Cerebrovascular Disease Father     Heart Disease Father     Lipids Father     Obesity Father     Cancer Sister     Heart Disease Sister     Glaucoma No family hx of     Macular Degeneration No family hx of     Kidney Disease No family hx of          Objective  Ht 1.727 m (5' 8\")   Wt 74.8 kg (165 lb)   BMI 25.09 kg/m      General: healthy, alert and in no acute distress.    HEENT: no scleral icterus or conjunctival erythema.   Skin: no suspicious lesions or rash. No jaundice.   CV: regular rhythm by palpation, 2+ distal pulses.  Resp: normal respiratory effort without conversational dyspnea.   Psych: normal mood and affect.    Gait: nonantalgic, appropriate coordination and balance.     Neuro:        - Sensation to light touch:    - Intact throughout the BLE including all peripheral nerve distributions.        - MSR:      RLE  LLE  - Patella 2+ 2+  - Achilles 2+ 2+       - Special tests:   - Slump/SLR:  Neg    MSK - Knee:       - Inspection:    - No significant effusion, erythema, warmth, ecchymosis, lesion.        - ROM:    - Full AROM/PROM with pain during knee flexion/extension.        - Palpation:    - TTP at the medial joint line, MCL.  - NTTP elsewhere.        - Strength:  (*antalgic)  RLE LLE  - Hip Flexion  5 5    - " Hip Abduction 5 5   - Hip Adduction 5 5  - Knee Flexion  5 5  - Knee Extension 5 5  - Dorsiflexion  5 5  - Plantarflexion 5 5  - Ext. Yoel. Longus 5 5  - Inversion  5 5  - Eversion  5 5       - Special tests:        - Lachman:  Neg         - A/P drawer:  Neg        - Pivot shift:  Neg    - Be: Positive for mild pain, no click     - Varus stress:  Neg for laxity or pain     - Valgus stress: Positive for pain and mild laxity   - Patellar grind: Positive   - Thessaly:  Neg       Radiology  I independently reviewed the available relevant imaging, with the following interpretation:   - XR with interpretation as above in HPI    I independently reviewed today's new relevant imaging, with the following interpretation:  - XR R knee shows normal anatomic alignment without significant degenerative changes, no significant knee joint effusion, no acute fracture or dislocation, but there is a mild irregularity of the medial distal femoral condyle near the insertion of the MCL, which may represent an area of chronic injury.      Assessment  1. Injury of right knee, initial encounter    2. Contusion of bone    3. Injury of medial collateral ligament (MCL) of knee        Plan  Michele Vance is a 78 year old male that presents with acute on chronic right knee pain.  He had an exacerbation of pain in the right knee recently with a fall of uncertain mechanism.  He believes that he fell backward in his knee was bent underneath him.  After the fall, he had significant medial knee pain, which still persists.  He is TTP over the distal femoral medial condyle and proximal medial tibial plateau, and MCL.  Mild pain with valgus testing of the knee.  He has a significant history of insufficiency fractures in the pelvis in the past.  Radiographs demonstrate a mild irregularity of the distal medial femoral condyle, which may represent a chronic MCL injury.  At this time, his history and physical exam appear most consistent with a  posttraumatic osseous contusion of the distal medial femoral condyle/proximal medial tibial plateau versus MCL injury.     Discussed the nature of the condition and treatment options and mutually agreed upon the following plan:    - Imaging:          - Reviewed relevant imaging in the chart.  -XR R knee ordered today pre-clinic and independently interpreted by myself.    - Reviewed results and images with patient.   - Medications:          - Discussed pharmacologic options for pain relief.   - May use NSAIDs (Ibuprofen, Naproxen) or Acetaminophen (Tylenol) as needed for pain control.   - May also use topical medications such as lidocaine, IcyHot, BioFreeze, or Voltaren gel as needed for pain control.   - Injections:          - Discussed possible injection options and alternatives.    - Options include possible consideration for intra-articular knee joint corticosteroid injection.  Will defer injections today in favor of conservative care and will consider them in the future as needed.   - Therapy:          - Discussed the benefits of physical therapy vs home exercise program for optimization of range of motion, flexibility, strength, stability and function.   - Preference is for a home exercise program.   - Home Exercise Program given today in clinic and recommendation given to perform HEP daily and after exacerbations.  - Modalities:          - May use ice, heat, massage or other modalities as needed.   - Bracing:          - Discussed bracing options and recommend that he consider a hinged knee stability brace or over-the-counter compression sleeve.  He currently does not feel much instability and pain is mostly with weightbearing, not necessarily with movement.  He would prefer to try without the brace we will obtain 1 if he needs that.  - Surgery:          - Discussed non-operative and operative treatment options for the patient's condition.  Goal is to continue conservative care for as long as possible before  surgery would need to be considered.  - Activity:          - We discussed that with his history of insufficiency fractures in the pelvis, we may be dealing with an insufficiency fracture within the medial side of the knee, and it would be best to decrease the amount of weightbearing through the joint is much as possible over the next few weeks.  He would prefer to go without nonweightbearing status or crutches, which I believe is reasonable based on his current tolerance and activity level.  He will proceed with weightbearing as tolerated and try to limit his weightbearing over the next few weeks as much as possible. Avoid exacerbating activities and activities that are high-risk for falls.   - Follow up:          - In 6 to 8 weeks if needed for re-evaluation and update to treatment plan, or sooner for new/worsening symptoms.  - Patient has clinic contact information for questions or concerns.       Melchor Lofton DO, JAMES  [facility] - Sports Medicine  [facility] - Department of Orthopedic Surgery       Disclaimer:  This note was prepared and written using Dragon Medical dictation software. As a result, there may be errors in the script that have gone undetected. Please consider this when interpreting the information in this note.

## 2024-03-20 ENCOUNTER — OFFICE VISIT (OUTPATIENT)
Dept: ORTHOPEDICS | Facility: CLINIC | Age: 79
End: 2024-03-20
Payer: MEDICARE

## 2024-03-20 ENCOUNTER — ANCILLARY PROCEDURE (OUTPATIENT)
Dept: GENERAL RADIOLOGY | Facility: CLINIC | Age: 79
End: 2024-03-20
Attending: STUDENT IN AN ORGANIZED HEALTH CARE EDUCATION/TRAINING PROGRAM
Payer: MEDICARE

## 2024-03-20 VITALS — BODY MASS INDEX: 25.01 KG/M2 | WEIGHT: 165 LBS | HEIGHT: 68 IN

## 2024-03-20 DIAGNOSIS — S89.90XA INJURY OF MEDIAL COLLATERAL LIGAMENT (MCL) OF KNEE: ICD-10-CM

## 2024-03-20 DIAGNOSIS — T14.8XXA CONTUSION OF BONE: ICD-10-CM

## 2024-03-20 DIAGNOSIS — S89.91XA INJURY OF RIGHT KNEE, INITIAL ENCOUNTER: Primary | ICD-10-CM

## 2024-03-20 DIAGNOSIS — S89.91XA INJURY OF RIGHT KNEE, INITIAL ENCOUNTER: ICD-10-CM

## 2024-03-20 PROCEDURE — 73564 X-RAY EXAM KNEE 4 OR MORE: CPT | Mod: TC | Performed by: RADIOLOGY

## 2024-03-20 PROCEDURE — 99204 OFFICE O/P NEW MOD 45 MIN: CPT | Performed by: STUDENT IN AN ORGANIZED HEALTH CARE EDUCATION/TRAINING PROGRAM

## 2024-03-20 SDOH — HEALTH STABILITY: PHYSICAL HEALTH: ON AVERAGE, HOW MANY DAYS PER WEEK DO YOU ENGAGE IN MODERATE TO STRENUOUS EXERCISE (LIKE A BRISK WALK)?: 0 DAYS

## 2024-03-20 SDOH — HEALTH STABILITY: PHYSICAL HEALTH: ON AVERAGE, HOW MANY MINUTES DO YOU ENGAGE IN EXERCISE AT THIS LEVEL?: 0 MIN

## 2024-03-20 ASSESSMENT — PAIN SCALES - GENERAL: PAINLEVEL: NO PAIN (1)

## 2024-03-20 NOTE — PROGRESS NOTES
Appropriate assistive devices provided during their visit. na (Yes, No, N/A) na (list device)    Exam table and/or cart  placed in the lowest position. yes (Yes, No, N/A)    Brakes on tables/carts/wheelchairs used at all times. yes (Yes, No, N/A)    Non slip footwear applied. na (Yes, No, NA)    Patient was accompanied by staff throughout visit. yes (Yes, No, N/A)    Equipment safety straps used. na (Yes, No, N/A)    Assist with toileting. an (Yes, No, N/A)

## 2024-03-20 NOTE — LETTER
3/20/2024         RE: Michele Vance  [address]  [facility] [address]        Dear Colleague,    Thank you for referring your patient, Michele Vance, to the [facility] [place]. Please see a copy of my visit note below.    Michele Vance  :  1945  DOS: 3/20/2024  MRN: [id number]  PCP: Jonas West    Sports Medicine Clinic Visit    HPI  Michele Vance is a 78 year old male who is seen as a self referral presenting with right knee pain resulting from a fall.    - Mechanism of Injury:  Fall 6 weeks ago with swelling afterward. Fell backwards and right knee quickly went into flexion, as far as he can recall.  - Prior evaluation:    - None recently, no recent imaging.   - 2016 with Dr. Davila: Xrays after slipping injury. Xrays normal. RICE protocol recommended, no injection.     - Pain Character:  Pain has been present for  6 weeks and improving .  Pain is located in the medial knee and most painful when the leg is in a VELMA position.   - Endorses:  stiffness initially. Pain currently as above.   - Denies:  clicking, popping, grinding, mechanical locking symptoms, instability, numbness, tingling, radicular shooting pain, weakness.   - Alleviating factors:   time , rest  - Aggravating factors:  ambulation and unable to flex for about a week. Now pain only in Varus position  - Treatments tried:  ice, brace initially    - Patient Goals:  get a formal diagnosis, discuss treatment options  - Social History: retired    - Pertinent PMH:  history of vasovagal responses upon standing, which may have contributed to his most recent fall.  CKD 3, carotid stenosis, CAD s/p CABG x 6, CVA      Review of Systems  Musculoskeletal: as above  Remainder of review of systems is negative including constitutional, CV, pulmonary, GI, Skin and Neurologic except as noted in HPI or medical history.    Past Medical History:   Diagnosis Date     Carotid stenosis     right endartectomy      Chronic kidney disease     stage 3     Coronary artery disease 3-2014    CABG x6     CVA (cerebral infarction)     balance problems, mild     Elevated CK      Esophageal reflux      Hypercholesteremia      Hyponatremia 8/31/2021     Nonsenile cataract      Other and unspecified hyperlipidemia      PAD (peripheral artery disease) (H24)      Rhabdomyolysis 2010     Unspecified essential hypertension      Past Surgical History:   Procedure Laterality Date     APPENDECTOMY  1974     BYPASS GRAFT ARTERY CORONARY  3/5/2014    Procedure: BYPASS GRAFT ARTERY CORONARY;  Median Sternotomy, Coronary Artery Bypass Graft X6 used Left internal mammary artery, Left and Right Greater Saphenous vein, on Pump oxygenator.;  Surgeon: Tim Montanez MD;  Location: [facility] OR     CATARACT IOL, RT/LT Bilateral 2008    in [place]     cataracts       COLONOSCOPY  12/12/02    [facility]     COLONOSCOPY N/A 10/7/2014    Procedure: COMBINED COLONOSCOPY, SINGLE OR MULTIPLE BIOPSY/POLYPECTOMY BY BIOPSY;  Surgeon: Micheal Mora MD;  Location:  GI     CV LOWER EXTREMITY ANGIOGRAM BILATERAL Bilateral 9/9/2019    Procedure: Lower Extremity Angiogram Bilateral;  Surgeon: Julio Oropeza MD;  Location: [facility] CARDIAC CATH LAB     DENTAL SURGERY      TMJ, implants     ENDARTERECTOMY CAROTID      right carotid stent     ESOPHAGOSCOPY, GASTROSCOPY, DUODENOSCOPY (EGD), COMBINED Left 10/7/2014    Procedure: COMBINED ESOPHAGOSCOPY, GASTROSCOPY, DUODENOSCOPY (EGD), BIOPSY SINGLE OR MULTIPLE;  Surgeon: Micheal Mora MD;  Location:  GI     ESOPHAGOSCOPY, GASTROSCOPY, DUODENOSCOPY (EGD), COMBINED Left 10/7/2014    Procedure: COMBINED ESOPHAGOSCOPY, GASTROSCOPY, DUODENOSCOPY (EGD), REMOVE FOREIGN BODY;  Surgeon: Micheal Mora MD;  Location:  GI     HC CAPSULE ENDOSCOPY N/A 10/7/2014    Procedure: CAPSULE/PILL CAM ENDOSCOPY;  Surgeon: Micheal Mora MD;  Location:  GI     INJECT EPIDURAL LUMBAR Right 5/8/2017    Procedure: INJECT  "EPIDURAL LUMBAR;  Lumbar transforaminal Epidural Steroid Injection right lumbar 4-5, and right  lumbar 5-Sacral 1;  Surgeon: Anthony Gonzalez MD;  [facility]     INJECT JOINT SACROILIAC Bilateral 8/28/2017    Procedure: INJECT JOINT SACROILIAC;  sacroiliac joint injection bilateral;  Surgeon: Anthony Gonzalez MD;  Location: [facility]     IR CAROTID CEREBRAL ANGIOGRAM BILATERAL  9/3/2020     KNEE SURGERY  1976    left knee reconstruction     LAPAROSCOPIC CHOLECYSTECTOMY N/A 7/16/2021    Procedure: CHOLECYSTECTOMY, LAPAROSCOPIC;  Surgeon: Anthony Epps MD;  Location: [facility]     lasix  2001    both eyes     RELEASE CARPAL TUNNEL  1/13/2011    RELEASE CARPAL TUNNEL performed by JO MADRID at  OR     RELEASE CARPAL TUNNEL  1/20/2011    RELEASE CARPAL TUNNEL performed by JO MADRID at  OR     [facility] UGI ENDOSCOPY, SIMPLE EXAM  01/08/99    [facility]     Family History   Problem Relation Age of Onset     Hypertension Mother      Eye Disorder Mother      Cerebrovascular Disease Father      Heart Disease Father      Lipids Father      Obesity Father      Cancer Sister      Heart Disease Sister      Glaucoma No family hx of      Macular Degeneration No family hx of      Kidney Disease No family hx of          Objective  Ht 1.727 m (5' 8\")   Wt 74.8 kg (165 lb)   BMI 25.09 kg/m      General: healthy, alert and in no acute distress.    HEENT: no scleral icterus or conjunctival erythema.   Skin: no suspicious lesions or rash. No jaundice.   CV: regular rhythm by palpation, 2+ distal pulses.  Resp: normal respiratory effort without conversational dyspnea.   Psych: normal mood and affect.    Gait: nonantalgic, appropriate coordination and balance.     Neuro:        - Sensation to light touch:    - Intact throughout the BLE including all peripheral nerve distributions.        - MSR:      RLE  LLE  - Patella 2+ 2+  - Achilles 2+ 2+       - Special tests:   - Slump/SLR:  Neg    MSK - " Knee:       - Inspection:    - No significant effusion, erythema, warmth, ecchymosis, lesion.        - ROM:    - Full AROM/PROM with pain during knee flexion/extension.        - Palpation:    - TTP at the medial joint line, MCL.  - NTTP elsewhere.        - Strength:  (*antalgic)  RLE LLE  - Hip Flexion  5 5    - Hip Abduction 5 5   - Hip Adduction 5 5  - Knee Flexion  5 5  - Knee Extension 5 5  - Dorsiflexion  5 5  - Plantarflexion 5 5  - Ext. Yoel. Longus 5 5  - Inversion  5 5  - Eversion  5 5       - Special tests:        - Lachman:  Neg         - A/P drawer:  Neg        - Pivot shift:  Neg    - Be: Positive for mild pain, no click     - Varus stress:  Neg for laxity or pain     - Valgus stress: Positive for pain and mild laxity   - Patellar grind: Positive   - Thessaly:  Neg       Radiology  I independently reviewed the available relevant imaging, with the following interpretation:   - XR with interpretation as above in HPI    I independently reviewed today's new relevant imaging, with the following interpretation:  - XR R knee shows normal anatomic alignment without significant degenerative changes, no significant knee joint effusion, no acute fracture or dislocation, but there is a mild irregularity of the medial distal femoral condyle near the insertion of the MCL, which may represent an area of chronic injury.      Assessment  1. Injury of right knee, initial encounter    2. Contusion of bone    3. Injury of medial collateral ligament (MCL) of knee        Plan  Michele Vance is a 78 year old male that presents with acute on chronic right knee pain.  He had an exacerbation of pain in the right knee recently with a fall of uncertain mechanism.  He believes that he fell backward in his knee was bent underneath him.  After the fall, he had significant medial knee pain, which still persists.  He is TTP over the distal femoral medial condyle and proximal medial tibial plateau, and MCL.  Mild pain with valgus  testing of the knee.  He has a significant history of insufficiency fractures in the pelvis in the past.  Radiographs demonstrate a mild irregularity of the distal medial femoral condyle, which may represent a chronic MCL injury.  At this time, his history and physical exam appear most consistent with a posttraumatic osseous contusion of the distal medial femoral condyle/proximal medial tibial plateau versus MCL injury.     Discussed the nature of the condition and treatment options and mutually agreed upon the following plan:    - Imaging:          - Reviewed relevant imaging in the chart.  -XR R knee ordered today pre-clinic and independently interpreted by myself.    - Reviewed results and images with patient.   - Medications:          - Discussed pharmacologic options for pain relief.   - May use NSAIDs (Ibuprofen, Naproxen) or Acetaminophen (Tylenol) as needed for pain control.   - May also use topical medications such as lidocaine, IcyHot, BioFreeze, or Voltaren gel as needed for pain control.   - Injections:          - Discussed possible injection options and alternatives.    - Options include possible consideration for intra-articular knee joint corticosteroid injection.  Will defer injections today in favor of conservative care and will consider them in the future as needed.   - Therapy:          - Discussed the benefits of physical therapy vs home exercise program for optimization of range of motion, flexibility, strength, stability and function.   - Preference is for a home exercise program.   - Home Exercise Program given today in clinic and recommendation given to perform HEP daily and after exacerbations.  - Modalities:          - May use ice, heat, massage or other modalities as needed.   - Bracing:          - Discussed bracing options and recommend that he consider a hinged knee stability brace or over-the-counter compression sleeve.  He currently does not feel much instability and pain is mostly  with weightbearing, not necessarily with movement.  He would prefer to try without the brace we will obtain 1 if he needs that.  - Surgery:          - Discussed non-operative and operative treatment options for the patient's condition.  Goal is to continue conservative care for as long as possible before surgery would need to be considered.  - Activity:          - We discussed that with his history of insufficiency fractures in the pelvis, we may be dealing with an insufficiency fracture within the medial side of the knee, and it would be best to decrease the amount of weightbearing through the joint is much as possible over the next few weeks.  He would prefer to go without nonweightbearing status or crutches, which I believe is reasonable based on his current tolerance and activity level.  He will proceed with weightbearing as tolerated and try to limit his weightbearing over the next few weeks as much as possible. Avoid exacerbating activities and activities that are high-risk for falls.   - Follow up:          - In 6 to 8 weeks if needed for re-evaluation and update to treatment plan, or sooner for new/worsening symptoms.  - Patient has clinic contact information for questions or concerns.       Melchor Lofton DO, CAQSM  [facility] Sports Medicine  [facility] - Department of Orthopedic Surgery       Disclaimer:  This note was prepared and written using Dragon Medical dictation software. As a result, there may be errors in the script that have gone undetected. Please consider this when interpreting the information in this note.       Again, thank you for allowing me to participate in the care of your patient.        Sincerely,        Melchor Lofton DO

## 2024-03-20 NOTE — COMMUNITY RESOURCES LIST (ENGLISH)
March 20, 2024           YOUR PERSONALIZED LIST OF SERVICES & PROGRAMS           & RECREATION    Sports      [facility]  [address] [place] [address] (Distance: 17.0 miles)  Phone: [phone]  Website: https://www.[facility].gov/acc  Language: English  Fee: Free, Self pay      of the [place] - Sports clubs and recreational activities - [facility]  [address] [place] [address] (Distance: 10.1 miles)  Language: English  Fee: Self pay, Sliding scale      [facility] - Adult Enrichment  Phone: [phone]  Website: https://Smartio/adults-seniors/adult-enrichment/  Language: English  Hours: Mon 7:30 AM - 4:00 PM Tue 7:30 AM - 4:00 PM Wed 7:30 AM - 4:00 PM Thu 7:30 AM - 4:00 PM Fri 7:30 AM - 4:00 PM    Classes/Groups      [employer] - Sit and Be Fit/[employer] FLEX  [address] [place] [address] (Distance: 14.5 miles)  Website: https://physicaltherapyFusion Smoothies/programs/sit-and-be-fit/  Language: English  Fee: Free      [facility] Open [facility]  [address] [place] [address] (Distance: 9.7 miles)  Website: https://www.[employer].org/  Language: English  Fee: Free, Self pay      - Online and Local Fitness Classes  Phone: [phone]  Website: https://tools.B[employer]/Search/OnlineClasses  Language: English  Fee: Free               IMPORTANT NUMBERS & WEBSITES        Emergency Services  911  .   United Way  211 http://211unitedway.org  .   Poison Control  (593) 167-3933 http://[place]poison.org http://wisconsinpoison.org  .     Suicide and Crisis Lifeline  988 http://988lifeline.org  .   Childhelp National Child Abuse Hotline  415.784.2256 http://Childhelphotline.org   .   National Sexual Assault Hotline  (767) 508-3667 (HOPE) http://Rainn.org   .     National Runaway Safeline  (379) 677-3012 (RUNAWAY) http://N[employer]ar Online MediarunaSemanticator.org  .   Pregnancy & Postpartum  Support  Call/text 205-500-1174  [place]: http://ppsupportmn.org  [place]: http://[employer].com/wi  .   Substance Abuse National Helpline ([facility])  582-867-HELP (5817) http://Findtreatment.gov   .                DISCLAIMER: [employer] does not endorse any service providers mentioned in this resource list. [employer] does not guarantee that the services mentioned in this resource list will be available to you or will improve your health or wellness.    [facility]

## 2024-03-26 ENCOUNTER — VIRTUAL VISIT (OUTPATIENT)
Dept: NEUROSURGERY | Facility: CLINIC | Age: 79
End: 2024-03-26
Payer: MEDICARE

## 2024-03-26 VITALS — BODY MASS INDEX: 25.01 KG/M2 | HEIGHT: 68 IN | WEIGHT: 165 LBS

## 2024-03-26 DIAGNOSIS — I65.23 CAROTID STENOSIS, BILATERAL: Primary | ICD-10-CM

## 2024-03-26 PROCEDURE — 99442 PR PHYSICIAN TELEPHONE EVALUATION 11-20 MIN: CPT | Mod: 93 | Performed by: NEUROLOGICAL SURGERY

## 2024-03-26 ASSESSMENT — PAIN SCALES - GENERAL: PAINLEVEL: NO PAIN (0)

## 2024-03-26 NOTE — NURSING NOTE
Is the patient currently in the state of [place]? YES    Visit mode:TELEPHONE    If the visit is dropped, the patient can be reconnected by: TELEPHONE VISIT: Phone number: [phone]    Will anyone else be joining the visit? NO  (If patient encounters technical issues they should call [phone])    How would you like to obtain your AVS? MyChart    Are changes needed to the allergy or medication list? No    Reason for visit: Follow Up    Gracy WELLS

## 2024-03-26 NOTE — LETTER
3/26/2024       RE: Michele Vance  [address] [facility] [address]         Dear Colleague,    Thank you for referring your patient, Michele Vance, to the [facility] NEUROSURGERY CLINIC [place] [facility]. Please see a copy of my visit note below.      Jonas West MD   [address] [place] [address]       RE:      Michele Vance    MRN:   [id number]    :   1945         Dear Dr. West:     We spoke to Mr. Vance as part of a telephone follow-up in cerebrovascular clinic today. As you know, we have been following him for right carotid stenosis, treated originally with a stent back in . He developed symptomatic in-stent stenosis, and we performed a balloon angioplasty of the stent in 2020.  We have followed him with noninvasive studies for potential restenosis.  He is currently on aspirin 325 mg and cilostazol.  He is no longer on Eliquis.  He has not had any symptoms suggestive of hemispheric TIA or stroke.  However, he was hospitalized in late 2024 with a fall and presyncope.  His systolic blood pressure was in the 60s upon arrival.  Even now, he he is describing ongoing orthostatic symptoms.  He is adapting and takes more time when changing positions.  He has not had any further episodes.  It seems that his antihypertensive regimen has been modified, and he is now on carvedilol only.  His knee pain has improved and he is more mobile.     Over the phone, his speech, language, and phonation were normal.    We reviewed the carotid ultrasound from 2024.  In the right internal carotid artery, peak velocity is 110 cm/s with a ratio of 2.  On the left side, peak velocity is 288 cm/s with a ratio 4.37.  Peak velocity on the left side back in  was 235 cm/s with a ratio of 3.31.  He had a CTA of the head and neck as part of his evaluation in 2024.  He has about 70% stenosis at the origin of the left  ICA with moderate calcification.     He has asymptomatic left carotid stenosis.  His presyncope and lightheadedness is not due to unilateral carotid stenosis.  Although the stenosis has progressed somewhat over the years, he remains asymptomatic.  We will repeat a carotid ultrasound in 6 months at [facility] and follow-up with them.  He should remain on his current antiplatelet regimen.  Please not hesitate to contact us with questions.          Again, thank you for allowing me to participate in the care of your patient.      Sincerely,    Shola Cuellar MD

## 2024-03-26 NOTE — PROGRESS NOTES
Virtual Visit Details    Date of service: 3/26/2024  Type of service:  Telephone Visit   Phone call duration: minutes   Originating Location (pt. Location): Home    Distant Location (provider location):  On-site    Jonas West MD   [address] [place] [address]       RE:      Michele Vance    MRN:   [id number]    :   1945         Dear Dr. West:     We spoke to Mr. Vance as part of a telephone follow-up in cerebrovascular clinic today. As you know, we have been following him for right carotid stenosis, treated originally with a stent back in . He developed symptomatic in-stent stenosis, and we performed a balloon angioplasty of the stent in 2020.  We have followed him with noninvasive studies for potential restenosis.  He is currently on aspirin 325 mg and cilostazol.  He is no longer on Eliquis.  He has not had any symptoms suggestive of hemispheric TIA or stroke.  However, he was hospitalized in late 2024 with a fall and presyncope.  His systolic blood pressure was in the 60s upon arrival.  Even now, he he is describing ongoing orthostatic symptoms.  He is adapting and takes more time when changing positions.  He has not had any further episodes.  It seems that his antihypertensive regimen has been modified, and he is now on carvedilol only.  His knee pain has improved and he is more mobile.     Over the phone, his speech, language, and phonation were normal.    We reviewed the carotid ultrasound from 2024.  In the right internal carotid artery, peak velocity is 110 cm/s with a ratio of 2.  On the left side, peak velocity is 288 cm/s with a ratio 4.37.  Peak velocity on the left side back in  was 235 cm/s with a ratio of 3.31.  He had a CTA of the head and neck as part of his evaluation in 2024.  He has about 70% stenosis at the origin of the left ICA with moderate calcification.     He has asymptomatic left carotid stenosis.  His presyncope and  lightheadedness is not due to unilateral carotid stenosis.  Although the stenosis has progressed somewhat over the years, he remains asymptomatic.  We will repeat a carotid ultrasound in 6 months at [facility] and follow-up with them.  He should remain on his current antiplatelet regimen.  Please not hesitate to contact us with questions.    Sincerely,        Shola Cuellar MD  Department of Neurosurgery

## 2024-03-27 NOTE — PATIENT INSTRUCTIONS
Repeat carotid ultrasound in 6 months in [facility] and follow-up with Dr. Cuellar afterwards.     Stroke & Endovascular RN Care Coordinators:    Daniella Peoples, RN, BSN  Michelle Hall, RN, CNRN, SCRN    If you have any questions please contact the RN Care Coordinators at [phone], option 1.     After business hours call the  at [phone] and have the Neuro-Interventional Fellow paged.    Thank you for choosing [facility] for your health care needs.

## 2024-04-22 ENCOUNTER — TELEPHONE (OUTPATIENT)
Dept: NEUROSURGERY | Facility: CLINIC | Age: 79
End: 2024-04-22
Payer: MEDICARE

## 2024-05-06 ENCOUNTER — TELEPHONE (OUTPATIENT)
Dept: INTERNAL MEDICINE | Facility: CLINIC | Age: 79
End: 2024-05-06
Payer: MEDICARE

## 2024-05-06 ENCOUNTER — NURSE TRIAGE (OUTPATIENT)
Dept: INTERNAL MEDICINE | Facility: CLINIC | Age: 79
End: 2024-05-06
Payer: MEDICARE

## 2024-05-06 ENCOUNTER — MYC MEDICAL ADVICE (OUTPATIENT)
Dept: INTERNAL MEDICINE | Facility: CLINIC | Age: 79
End: 2024-05-06
Payer: MEDICARE

## 2024-05-06 DIAGNOSIS — E87.1 HYPONATREMIA: ICD-10-CM

## 2024-05-06 DIAGNOSIS — I95.9 HYPOTENSION, UNSPECIFIED HYPOTENSION TYPE: Primary | ICD-10-CM

## 2024-05-06 NOTE — TELEPHONE ENCOUNTER
Notified patient's wife (consent to communicate on file) that Dr. West order the lab for them. Offered to schedule lab appointment but she said she will schedule with lab on her own.     Angie Merritt RN on 5/6/2024 at 3:19 PM

## 2024-05-06 NOTE — TELEPHONE ENCOUNTER
RN TRIAGE CALL:    Patient Contact    Attempt # 1    Was call answered?  No.  Left message on voicemail with information to call any one of the triage nurses back regarding an earlier sent My Chart message.    Erin Aden, VIPIN SOLER [facility] (supporting Jonas West MD)2 hours ago (10:29 AM)     Good morning.  Michele has had a couple dizzy spells this morning..  Brief episode of weakness with leg tremor.  Did not fall or pass out.  But he had to sit down right away.  His BP is 115/64 so that seems OK.   Going to have him use walker or keep it within arms reach today.  Thinking he may need his sodium and potassium levels checked.     Can you put in an order to have his electrolytes checked?     Thank you,  Justin Vance for Michele Vance

## 2024-05-06 NOTE — TELEPHONE ENCOUNTER
Wife calling back. She states after she got off the phone with triage this morning patients symptoms started to improve.     She is still requesting labs be ordered now that patient has improved. Informed wife we typically would do this through an office visit, but she states provider has ordered labs without seeing patient.     Will send message to PCP.     Stephanie PEPEN, RN

## 2024-05-06 NOTE — TELEPHONE ENCOUNTER
Nurse Triage SBAR    Is this a 2nd Level Triage? YES, LICENSED PRACTITIONER REVIEW IS REQUIRED    Situation: Patient had dizzy spell this morning at 0945 and it has not cleared yet at 1045. /72   Background: Patient has history of dizzy spells and has been to the ED, and generally electrolyte disturbances.  Assessment: Patient remains dizzy and woozy, has been sitting since this started 0945. Denies nausea, vomiting,  or faster heart rate, pale, CP, SOB.   Protocol Recommended Disposition:   Go To ED/UCC Now (Or To Office With PCP Approval)    Recommendation: Patient remians symptomatic and RN directed to ED for evaluation and wife and patient agreed.     Does the patient meet one of the following criteria for ADS visit consideration? 16+ years old, with an [facility] PCP     Lashell Still RN  [facility] - Registered Nurse  Clinic Triage [place]   May 6, 2024    Reason for Disposition   SEVERE dizziness (e.g., unable to stand, requires support to walk, feels like passing out now)    Additional Information   Negative: SEVERE difficulty breathing (e.g., struggling for each breath, speaks in single words)   Negative: Shock suspected (e.g., cold/pale/clammy skin, too weak to stand, low BP, rapid pulse)   Negative: Difficult to awaken or acting confused (e.g., disoriented, slurred speech)   Negative: Fainted, and still feels dizzy afterwards   Negative: Overdose (accidental or intentional) of medications   Negative: New neurologic deficit that is present now: * Weakness of the face, arm, or leg on one side of the body * Numbness of the face, arm, or leg on one side of the body * Loss of speech or garbled speech   Negative: Heart beating < 50 beats per minute OR > 140 beats per minute   Negative: Sounds like a life-threatening emergency to the triager   Negative: Chest pain   Negative: Rectal bleeding, bloody stool, or tarry-black stool   Negative: Vomiting is main symptom   Negative: Diarrhea is main  "symptom   Negative: Headache is main symptom   Negative: Heat exhaustion suspected (i.e., dehydration from heat exposure)   Negative: Patient states that they are having an anxiety or panic attack   Negative: Dizziness from low blood sugar (i.e., < 60 mg/dl or 3.5 mmol/l)    Answer Assessment - Initial Assessment Questions  1. DESCRIPTION: \"Describe your dizziness.\"    Dizziness started at 0945, walker with seat placed under him and hit did not hit the floor per wife.  2. LIGHTHEADED: \"Do you feel lightheaded?\" (e.g., somewhat faint, woozy, weak upon standing)      woozy, still feels dizzy  3. VERTIGO: \"Do you feel like either you or the room is spinning or tilting?\" (i.e. vertigo)     no  4. SEVERITY: \"How bad is it?\"  \"Do you feel like you are going to faint?\" \"Can you stand and walk?\"    - MILD: Feels slightly dizzy, but walking normally.    - MODERATE: Feels unsteady when walking, but not falling; interferes with normal activities (e.g., school, work).    - SEVERE: Unable to walk without falling, or requires assistance to walk without falling; feels like passing out now.     severe  5. ONSET:  \"When did the dizziness begin?\"  0945  6. AGGRAVATING FACTORS: \"Does anything make it worse?\" (e.g., standing, change in head position)      moving  7. HEART RATE: \"Can you tell me your heart rate?\" \"How many beats in 15 seconds?\"  (Note: not all patients can do this)    unable  8. CAUSE: \"What do you think is causing the dizziness?\"  electrolytes  9. RECURRENT SYMPTOM: \"Have you had dizziness before?\" If Yes, ask: \"When was the last time?\" \"What happened that time?\"  Yes and it was electrolytes a couple months ago  10. OTHER SYMPTOMS: \"Do you have any other symptoms?\" (e.g., fever, chest pain, vomiting, diarrhea, bleeding)     no  11. PREGNANCY: \"Is there any chance you are pregnant?\" \"When was your last menstrual period?\"  N/a    Protocols used: Dizziness-A-OH    "

## 2024-05-06 NOTE — TELEPHONE ENCOUNTER
Patient was already triaged, see alternate encounter. Closing this encounter.     Stephanie Slater BSN, RN

## 2024-05-07 ENCOUNTER — LAB (OUTPATIENT)
Dept: LAB | Facility: CLINIC | Age: 79
End: 2024-05-07
Payer: MEDICARE

## 2024-05-07 DIAGNOSIS — E87.1 HYPONATREMIA: ICD-10-CM

## 2024-05-07 LAB
ANION GAP SERPL CALCULATED.3IONS-SCNC: 9 MMOL/L (ref 7–15)
BUN SERPL-MCNC: 16.2 MG/DL (ref 8–23)
CALCIUM SERPL-MCNC: 9.2 MG/DL (ref 8.8–10.2)
CHLORIDE SERPL-SCNC: 98 MMOL/L (ref 98–107)
CREAT SERPL-MCNC: 1.39 MG/DL (ref 0.67–1.17)
DEPRECATED HCO3 PLAS-SCNC: 26 MMOL/L (ref 22–29)
EGFRCR SERPLBLD CKD-EPI 2021: 52 ML/MIN/1.73M2
GLUCOSE SERPL-MCNC: 125 MG/DL (ref 70–99)
POTASSIUM SERPL-SCNC: 4.5 MMOL/L (ref 3.4–5.3)
SODIUM SERPL-SCNC: 133 MMOL/L (ref 135–145)

## 2024-05-07 PROCEDURE — 80048 BASIC METABOLIC PNL TOTAL CA: CPT

## 2024-05-07 PROCEDURE — 36415 COLL VENOUS BLD VENIPUNCTURE: CPT

## 2024-05-07 NOTE — TELEPHONE ENCOUNTER
Patient was triaged. See triage encounter 5/6/24.    Closing encounter.    Khushbu Andrade, AFSHINN, RN

## 2024-05-18 DIAGNOSIS — G43.009 MIGRAINE WITHOUT AURA AND WITHOUT STATUS MIGRAINOSUS, NOT INTRACTABLE: ICD-10-CM

## 2024-05-30 ENCOUNTER — MYC MEDICAL ADVICE (OUTPATIENT)
Dept: INTERNAL MEDICINE | Facility: CLINIC | Age: 79
End: 2024-05-30
Payer: MEDICARE

## 2024-05-30 ENCOUNTER — TELEPHONE (OUTPATIENT)
Dept: CARDIOLOGY | Facility: CLINIC | Age: 79
End: 2024-05-30
Payer: MEDICARE

## 2024-05-30 ENCOUNTER — MYC MEDICAL ADVICE (OUTPATIENT)
Dept: CARDIOLOGY | Facility: CLINIC | Age: 79
End: 2024-05-30
Payer: MEDICARE

## 2024-05-30 DIAGNOSIS — I65.23 CAROTID STENOSIS, BILATERAL: ICD-10-CM

## 2024-05-30 DIAGNOSIS — E78.5 HYPERLIPIDEMIA LDL GOAL <130: Primary | ICD-10-CM

## 2024-05-30 DIAGNOSIS — E78.5 HYPERLIPIDEMIA LDL GOAL <130: ICD-10-CM

## 2024-05-30 DIAGNOSIS — Z95.1 S/P CABG X 6: ICD-10-CM

## 2024-05-30 DIAGNOSIS — K21.9 GASTROESOPHAGEAL REFLUX DISEASE WITHOUT ESOPHAGITIS: ICD-10-CM

## 2024-05-30 RX ORDER — OMEPRAZOLE 40 MG/1
40 CAPSULE, DELAYED RELEASE ORAL DAILY
Qty: 90 CAPSULE | Refills: 1 | Status: SHIPPED | OUTPATIENT
Start: 2024-05-30

## 2024-05-30 NOTE — TELEPHONE ENCOUNTER
Central Prior Authorization Team   Phone: [phone]    PA Initiation    Medication: Praluent 150mg/ml  Insurance Company:  - Phone [phone] Fax [phone]  Pharmacy Filling the Rx: [facility] - [place], [place] - [address] [place] [address]  Filling Pharmacy Phone: [phone]  Filling Pharmacy Fax:    Start Date: 5/30/2024

## 2024-05-31 NOTE — TELEPHONE ENCOUNTER
PRIOR AUTHORIZATION DENIED    Medication: Praluent 150mg/ml    Denial Date: 5/31/2024    Denial Rational: Patient's plan prefers Repatha.  Repatha will still need a PA even though this is preferred.    If provider is ok switching to Repatha please send a new Rx to the pharmacy and route this encounter back to me to initiate the PA.         Appeal Information: Patient needs to initiate the appeals process per insurance plan.  Central PA cannot initiate appeal.    Patient will need to fill out a form that allows the clinic to represent the patient in the appeals process.  This form is mailed to the patient along with the denial letter.    Once filled out this form can be faxed to Central PA team attn Zora [phone] or emailed to me as well.    I can also email or fax the necessary forms the patient has to fill out to the clinic for him to fill out and sign.  Please also provide a letter of medical necessity from the provider if the decision is to appeal.

## 2024-06-04 ENCOUNTER — TELEPHONE (OUTPATIENT)
Dept: CARDIOLOGY | Facility: CLINIC | Age: 79
End: 2024-06-04
Payer: MEDICARE

## 2024-06-04 RX ORDER — EVOLOCUMAB 140 MG/ML
140 INJECTION, SOLUTION SUBCUTANEOUS
Qty: 6 ML | Refills: 3 | Status: SHIPPED | OUTPATIENT
Start: 2024-06-04

## 2024-06-04 NOTE — TELEPHONE ENCOUNTER
Will send in a Prior Auth for Repatha since Praluent was denied.  Will send RX to [facility].  Latrice Dacosta RN on 6/4/2024 at 8:18 AM

## 2024-06-04 NOTE — TELEPHONE ENCOUNTER
Central Prior Authorization Team   Phone: [phone]    PA Initiation    Medication: Repatha 140mg/ml  Insurance Company: Pretty - Phone [phone] Fax [phone]  Pharmacy [facility]illing the Rx: [facility] - [place], [place] - [address] [place] [address]  Filling Pharmacy Phone: [phone]  Filling Pharmacy Fax:    Start Date: 5/28/2024    Finished through EPA

## 2024-06-06 NOTE — TELEPHONE ENCOUNTER
Prior Authorization Approval    Authorization Effective Date: 5/5/2024  Authorization Expiration Date: 12/31/2099  Medication: Repatha 140mg/ml  Approved Dose/Quantity:   Reference #:     Insurance Company: Between - [employer] [phone] Fax [phone]  Expected CoPay:       CoPay Card Available:      Foundation Assistance Needed:    Which Pharmacy is filling the prescription (Not needed for infusion/clinic administered): [facility] - [address]  Pharmacy Notified:  YES  Patient Notified:  yes- Pharmacy will contact patient when ready to /ship

## 2024-08-04 DIAGNOSIS — M19.90 ARTHRITIS: ICD-10-CM

## 2024-08-04 DIAGNOSIS — I10 BENIGN ESSENTIAL HTN: ICD-10-CM

## 2024-08-06 RX ORDER — CARVEDILOL 25 MG/1
25 TABLET ORAL 2 TIMES DAILY WITH MEALS
Qty: 180 TABLET | Refills: 3 | Status: SHIPPED | OUTPATIENT
Start: 2024-08-06

## 2024-08-06 RX ORDER — GABAPENTIN 300 MG/1
CAPSULE ORAL
Qty: 270 CAPSULE | Refills: 3 | Status: SHIPPED | OUTPATIENT
Start: 2024-08-06

## 2024-08-29 ENCOUNTER — TELEPHONE (OUTPATIENT)
Dept: NEUROSURGERY | Facility: CLINIC | Age: 79
End: 2024-08-29
Payer: MEDICARE

## 2024-08-29 NOTE — TELEPHONE ENCOUNTER
----- Message from Indy SOLER sent at 8/29/2024  8:01 AM CDT -----  Patient should be rescheduled to PM&R for back. He has not seen Dr. Renner and Dr. Renner doesn't see for back.     Indy

## 2024-08-29 NOTE — TELEPHONE ENCOUNTER
Left Voicemail (1st Attempt) and Sent Nuxe[employer]art (1st Attempt) for the patient to call back and schedule the following:    Appointment type: New Med Spine   Provider: Any(Dr. Stevens/Dr. Pepper at )  Return date: next available  Specialty phone number: [phone]  Additional appointment(s) needed:   Additonal Notes:     Attempted to reschedule the appointment with Dr. Renner on 9/6/2004, scheduling error.    Also sent a [employer] message to reschedule to see a Spine Specialist.    Krystyna COSME/Complex Procedure    [facility]   Neurology, NeuroSurgery, NeuroPsychology, Pain Management and Cardiology Specialties  Medical/Surgical Adult Specialties

## 2024-09-16 ENCOUNTER — HOSPITAL ENCOUNTER (OUTPATIENT)
Dept: ULTRASOUND IMAGING | Facility: CLINIC | Age: 79
Discharge: HOME | End: 2024-09-16
Attending: NEUROLOGICAL SURGERY | Admitting: NEUROLOGICAL SURGERY
Payer: MEDICARE

## 2024-09-16 DIAGNOSIS — I65.23 CAROTID STENOSIS, BILATERAL: ICD-10-CM

## 2024-09-16 PROCEDURE — 93880 EXTRACRANIAL BILAT STUDY: CPT

## 2024-09-24 ENCOUNTER — OFFICE VISIT (OUTPATIENT)
Dept: NEUROSURGERY | Facility: CLINIC | Age: 79
End: 2024-09-24
Attending: NEUROLOGICAL SURGERY
Payer: MEDICARE

## 2024-09-24 VITALS
OXYGEN SATURATION: 97 % | WEIGHT: 160.1 LBS | BODY MASS INDEX: 24.26 KG/M2 | DIASTOLIC BLOOD PRESSURE: 81 MMHG | HEART RATE: 58 BPM | SYSTOLIC BLOOD PRESSURE: 169 MMHG | HEIGHT: 68 IN

## 2024-09-24 DIAGNOSIS — I65.23 BILATERAL CAROTID ARTERY STENOSIS: Primary | ICD-10-CM

## 2024-09-24 PROCEDURE — 99213 OFFICE O/P EST LOW 20 MIN: CPT | Performed by: NEUROLOGICAL SURGERY

## 2024-09-24 ASSESSMENT — PAIN SCALES - GENERAL: PAINLEVEL: NO PAIN (0)

## 2024-09-24 NOTE — LETTER
2024       RE: Michele Vance  [address] [place] [address]     Dear Colleague,    Thank you for referring your patient, Michele Vance, to the [facility] NEUROSURGERY CLINIC [place] [facility]. Please see a copy of my visit note below.    Date of service: 2024  Length of service: 25 minutes    Jonas West MD   [address] [place] [address]       RE:      Michele Vance    MRN:   [id number]    :   1945         Dear Dr. West:     We saw Mr. Vance back in cerebrovascular clinic today for bilateral carotid stenosis. As you know, we have been following him for right carotid stenosis, treated originally with a stent back in . He developed symptomatic in-stent stenosis, and we performed a balloon angioplasty of the stent in 2020.  We have followed him with noninvasive studies for potential restenosis.  He is currently on aspirin 325 mg and cilostazol.  He is no longer on Eliquis.  We have also been following asymptomatic left carotid stenosis.  His antihypertensive medications have been adjusted due to orthostasis.  He has intermittent lightheadedness and disequilibrium.  He is independent for the most part but is not very active anymore.    On exam, blood pressure 169/81, heart rate 58, weight 72.6 kg. His general appearance is good.  He moves all extremities with good strength.  Fine finger movements in the left hand are slightly diminished.  Speech and language seem normal but he is not talkative.  Facial strength is normal.    We reviewed his carotid ultrasound from 2024.  We compared it to prior studies and correlated it with a previous CTA of the neck from 2024.  Peak velocity in the right internal carotid artery is 94 cm/s with a ratio of 1.7.  In the left ICA, peak velocity is 235 cm/s with a ratio 5.14.  In comparison, peak velocity in the left ICA was 288 cm/s back in 2024.   Overall, the carotid ultrasound seems stable.    Mr. Vance seems to be asymptomatic from both carotid arteries, specifically the left carotid stenosis.  His presyncopal symptoms and lightheadedness should not be attributable to unilateral carotid stenosis that is not flow-limiting.  Therefore, we recommend continued medical management of the carotid stenosis.  The annual risk of stroke from the left carotid stenosis is relatively low.  We will repeat a carotid ultrasound in 1 year, and this can be done at [facility] for his convenience.  From our standpoint, he should remain on a daily aspirin.  We will defer to you regarding the continued use of cilostazol.  Please do not hesitate to contact us with questions.    Sincerely,        Shola Cuellar MD  Department of Neurosurgery    Again, thank you for allowing me to participate in the care of your patient.      Sincerely,    Shola Cuellar MD

## 2024-09-24 NOTE — PATIENT INSTRUCTIONS
Repeat carotid ultrasound in one year at [facility] in [place]    Stay on aspirin and Pletal.    Stroke & Endovascular RN Care Coordinators:    Daniella Peoples, RN, BSN  Michelle Hall, RN, CNRN, SCRN    If you have any questions please contact the RN Care Coordinators at [phone], option 1.    After business hours call the  at [phone] and have the Neuro-Interventional Fellow paged.    Thank you for choosing [facility] for your health care needs.

## 2024-09-26 NOTE — PROGRESS NOTES
Date of service: 2024  Length of service: 25 minutes    Jonas West MD   [address] [place] [address]       RE:      Michele Vance    MRN:   [id number]    :   1945         Dear Dr. West:     We saw Mr. Vance back in cerebrovascular clinic today for bilateral carotid stenosis. As you know, we have been following him for right carotid stenosis, treated originally with a stent back in . He developed symptomatic in-stent stenosis, and we performed a balloon angioplasty of the stent in 2020.  We have followed him with noninvasive studies for potential restenosis.  He is currently on aspirin 325 mg and cilostazol.  He is no longer on Eliquis.  We have also been following asymptomatic left carotid stenosis.  His antihypertensive medications have been adjusted due to orthostasis.  He has intermittent lightheadedness and disequilibrium.  He is independent for the most part but is not very active anymore.    On exam, blood pressure 169/81, heart rate 58, weight 72.6 kg. His general appearance is good.  He moves all extremities with good strength.  Fine finger movements in the left hand are slightly diminished.  Speech and language seem normal but he is not talkative.  Facial strength is normal.    We reviewed his carotid ultrasound from 2024.  We compared it to prior studies and correlated it with a previous CTA of the neck from 2024.  Peak velocity in the right internal carotid artery is 94 cm/s with a ratio of 1.7.  In the left ICA, peak velocity is 235 cm/s with a ratio 5.14.  In comparison, peak velocity in the left ICA was 288 cm/s back in 2024.  Overall, the carotid ultrasound seems stable.    Mr. Vance seems to be asymptomatic from both carotid arteries, specifically the left carotid stenosis.  His presyncopal symptoms and lightheadedness should not be attributable to unilateral carotid stenosis that is not flow-limiting.  Therefore, we recommend continued  medical management of the carotid stenosis.  The annual risk of stroke from the left carotid stenosis is relatively low.  We will repeat a carotid ultrasound in 1 year, and this can be done at [facility] for his convenience.  From our standpoint, he should remain on a daily aspirin.  We will defer to you regarding the continued use of cilostazol.  Please do not hesitate to contact us with questions.    Sincerely,        Shola Cuellar MD  Department of Neurosurgery

## 2024-11-19 ENCOUNTER — TELEPHONE (OUTPATIENT)
Dept: INTERNAL MEDICINE | Facility: CLINIC | Age: 79
End: 2024-11-19
Payer: MEDICARE

## 2024-11-19 NOTE — TELEPHONE ENCOUNTER
Enlarged prostate is not urgent need for a visit.  I can see him in 2-3 weeks unless there is significant symptoms otherwise and she will in the near future.

## 2024-11-19 NOTE — TELEPHONE ENCOUNTER
Reason for Call:  Appointment Request    Patient requesting this type of appt:      Enlarged prostate    Requested provider: Jonas West    Reason patient unable to be scheduled: Not within requested timeframe    When does patient want to be seen/preferred time: 1-2 days    Comments:     Justin Vnace Pt.'s wife is requesting Michele Vance to be seen ASAP. She wants Michele's PCP to be the person to see him this medical issue.     Could we send this information to you in [place] or would you prefer to receive a phone call?:   Patient would like to be contacted via [place]    Call taken on 11/19/2024 at 12:18 PM by Cedric Zazueta

## 2024-11-25 DIAGNOSIS — K21.9 GASTROESOPHAGEAL REFLUX DISEASE WITHOUT ESOPHAGITIS: ICD-10-CM

## 2024-11-26 RX ORDER — OMEPRAZOLE 40 MG/1
40 CAPSULE, DELAYED RELEASE ORAL DAILY
Qty: 90 CAPSULE | Refills: 0 | Status: SHIPPED | OUTPATIENT
Start: 2024-11-26

## 2024-12-03 ENCOUNTER — OFFICE VISIT (OUTPATIENT)
Dept: INTERNAL MEDICINE | Facility: CLINIC | Age: 79
End: 2024-12-03
Payer: MEDICARE

## 2024-12-03 VITALS
RESPIRATION RATE: 14 BRPM | WEIGHT: 160.3 LBS | TEMPERATURE: 96.9 F | HEIGHT: 68 IN | HEART RATE: 58 BPM | BODY MASS INDEX: 24.29 KG/M2 | SYSTOLIC BLOOD PRESSURE: 150 MMHG | OXYGEN SATURATION: 97 % | DIASTOLIC BLOOD PRESSURE: 70 MMHG

## 2024-12-03 DIAGNOSIS — N18.31 STAGE 3A CHRONIC KIDNEY DISEASE (H): ICD-10-CM

## 2024-12-03 DIAGNOSIS — I10 ESSENTIAL HYPERTENSION, BENIGN: ICD-10-CM

## 2024-12-03 DIAGNOSIS — Z12.5 SCREENING FOR PROSTATE CANCER: ICD-10-CM

## 2024-12-03 DIAGNOSIS — N40.1 BPH WITH OBSTRUCTION/LOWER URINARY TRACT SYMPTOMS: Primary | ICD-10-CM

## 2024-12-03 DIAGNOSIS — N13.8 BPH WITH OBSTRUCTION/LOWER URINARY TRACT SYMPTOMS: Primary | ICD-10-CM

## 2024-12-03 LAB
ANION GAP SERPL CALCULATED.3IONS-SCNC: 11 MMOL/L (ref 7–15)
BUN SERPL-MCNC: 15.5 MG/DL (ref 8–23)
CALCIUM SERPL-MCNC: 9.1 MG/DL (ref 8.8–10.4)
CHLORIDE SERPL-SCNC: 96 MMOL/L (ref 98–107)
CREAT SERPL-MCNC: 1.3 MG/DL (ref 0.67–1.17)
CREAT UR-MCNC: 61.5 MG/DL
EGFRCR SERPLBLD CKD-EPI 2021: 56 ML/MIN/1.73M2
ERYTHROCYTE [DISTWIDTH] IN BLOOD BY AUTOMATED COUNT: 13.2 % (ref 10–15)
GLUCOSE SERPL-MCNC: 96 MG/DL (ref 70–99)
HCO3 SERPL-SCNC: 25 MMOL/L (ref 22–29)
HCT VFR BLD AUTO: 39.7 % (ref 40–53)
HGB BLD-MCNC: 13.8 G/DL (ref 13.3–17.7)
MCH RBC QN AUTO: 34.4 PG (ref 26.5–33)
MCHC RBC AUTO-ENTMCNC: 34.8 G/DL (ref 31.5–36.5)
MCV RBC AUTO: 99 FL (ref 78–100)
MICROALBUMIN UR-MCNC: 12.5 MG/L
MICROALBUMIN/CREAT UR: 20.33 MG/G CR (ref 0–17)
PLATELET # BLD AUTO: 233 10E3/UL (ref 150–450)
POTASSIUM SERPL-SCNC: 4.4 MMOL/L (ref 3.4–5.3)
PSA SERPL DL<=0.01 NG/ML-MCNC: 0.38 NG/ML (ref 0–6.5)
RBC # BLD AUTO: 4.01 10E6/UL (ref 4.4–5.9)
SODIUM SERPL-SCNC: 132 MMOL/L (ref 135–145)
WBC # BLD AUTO: 5 10E3/UL (ref 4–11)

## 2024-12-03 PROCEDURE — 85027 COMPLETE CBC AUTOMATED: CPT | Performed by: INTERNAL MEDICINE

## 2024-12-03 PROCEDURE — 82570 ASSAY OF URINE CREATININE: CPT | Performed by: INTERNAL MEDICINE

## 2024-12-03 PROCEDURE — 80048 BASIC METABOLIC PNL TOTAL CA: CPT | Performed by: INTERNAL MEDICINE

## 2024-12-03 PROCEDURE — G0103 PSA SCREENING: HCPCS | Performed by: INTERNAL MEDICINE

## 2024-12-03 PROCEDURE — G2211 COMPLEX E/M VISIT ADD ON: HCPCS | Performed by: INTERNAL MEDICINE

## 2024-12-03 PROCEDURE — 36415 COLL VENOUS BLD VENIPUNCTURE: CPT | Performed by: INTERNAL MEDICINE

## 2024-12-03 PROCEDURE — 99214 OFFICE O/P EST MOD 30 MIN: CPT | Performed by: INTERNAL MEDICINE

## 2024-12-03 PROCEDURE — 82043 UR ALBUMIN QUANTITATIVE: CPT | Performed by: INTERNAL MEDICINE

## 2024-12-03 RX ORDER — TAMSULOSIN HYDROCHLORIDE 0.4 MG/1
0.4 CAPSULE ORAL DAILY
Qty: 90 CAPSULE | Refills: 3 | Status: SHIPPED | OUTPATIENT
Start: 2024-12-03

## 2024-12-03 SDOH — HEALTH STABILITY: PHYSICAL HEALTH: ON AVERAGE, HOW MANY DAYS PER WEEK DO YOU ENGAGE IN MODERATE TO STRENUOUS EXERCISE (LIKE A BRISK WALK)?: 0 DAYS

## 2024-12-03 SDOH — HEALTH STABILITY: PHYSICAL HEALTH: ON AVERAGE, HOW MANY MINUTES DO YOU ENGAGE IN EXERCISE AT THIS LEVEL?: 0 MIN

## 2024-12-03 ASSESSMENT — PAIN SCALES - GENERAL: PAINLEVEL_OUTOF10: NO PAIN (0)

## 2024-12-03 ASSESSMENT — SOCIAL DETERMINANTS OF HEALTH (SDOH): HOW OFTEN DO YOU GET TOGETHER WITH FRIENDS OR RELATIVES?: NEVER

## 2024-12-03 NOTE — PROGRESS NOTES
Assessment & Plan   Problem List Items Addressed This Visit       CKD (chronic kidney disease) stage 3, GFR 30-59 ml/min (H)    Relevant Orders    Albumin Random Urine Quantitative with Creat Ratio     Other Visit Diagnoses       BPH with obstruction/lower urinary tract symptoms    -  Primary    Relevant Medications    tamsulosin (FLOMAX) 0.4 MG capsule    Other Relevant Orders    CBC with platelets    PSA, screen    Essential hypertension, benign        Relevant Orders    CBC with platelets    Basic metabolic panel  (Ca, Cl, CO2, Creat, Gluc, K, Na, BUN)    Screening for prostate cancer        Relevant Orders    PSA, screen           Patient comes in with a history of chronic kidney disease stage IIIa.  He has a history of hypertension  Hypotension weaned off losartan and Imdur last winter.  His blood pressure is running little bit high currently.  He is having BPH symptoms with hesitancy urgency and leakage at night.  We will check a PSA to make sure there is no prostate cancer he had no nodules on exam just prostate enlargement.  Will start him on Flomax which may need for the blood pressure but hopefully will help his urinary symptoms improved.  He agrees with this plan will follow his blood pressure with his wife.  If his blood pressure continues to run high will let me know and we will restart the losartan at 50 mg daily.         Counseling  Appropriate preventive services were addressed with this patient via screening, questionnaire, or discussion as appropriate for fall prevention, nutrition, physical activity, Tobacco-use cessation, social engagement, weight loss and cognition.  Checklist reviewing preventive services available has been given to the patient.  Reviewed patient's diet, addressing concerns and/or questions.   Patient is at risk for social isolation and has been provided with information about the benefit of social connection.   The patient was instructed to see the dentist every 6 months.  "    The longitudinal plan of care for the diagnosis(es)/condition(s) as documented were addressed during this visit. Due to the added complexity in care, I will continue to support Michele in the subsequent management and with ongoing continuity of care.      Subjective   Michele is a 79 year old, presenting for the following health issues:  Prostate Problem (Enlarged)        12/3/2024    12:23 PM   Additional Questions   Roomed by Marcelle         12/3/2024    12:23 PM   Patient Reported Additional Medications   Patient reports taking the following new medications none     History of Present Illness       Reason for visit:  Enlarged prostate  Symptom onset:  More than a month  Symptom intensity:  Mild  Symptom progression:  Staying the same   He is taking medications regularly.       Having some urinary urgency and leaking in the morning and some hesitancy when he goes.  Was interested in saw palmetto but hasn't started yet.     Patient denies any urinary pain or blood.  Denies history of prostate cancer.    Denies chest pain or shortness of breath.      Objective    BP (!) 169/89   Pulse 58   Temp 96.9  F (36.1  C) (Temporal)   Resp 14   Ht 1.727 m (5' 8\")   Wt 72.7 kg (160 lb 4.8 oz)   SpO2 97%   BMI 24.37 kg/m    Body mass index is 24.37 kg/m .  Physical Exam   GENERAL: alert and no distress  NECK: no adenopathy, no asymmetry, masses, or scars  RESP: lungs clear to auscultation - no rales, rhonchi or wheezes  CV: regular rate and rhythm, normal S1 S2, no S3 or S4, no murmur, click or rub, no peripheral edema  ABDOMEN: soft, nontender, no hepatosplenomegaly, no masses and bowel sounds normal  RECTAL (male): normal sphincter tone, no rectal masses and prostate is enlarged  MS: no gross musculoskeletal defects noted, no edema    Signed Electronically by: Jonas West MD    "

## 2025-01-05 ENCOUNTER — HEALTH MAINTENANCE LETTER (OUTPATIENT)
Age: 80
End: 2025-01-05

## 2025-03-08 ENCOUNTER — HEALTH MAINTENANCE LETTER (OUTPATIENT)
Age: 80
End: 2025-03-08

## 2025-03-11 NOTE — TELEPHONE ENCOUNTER
"Pt sent Alekto message below. Routing to PA team to assist.     \"Good Morning - received a message from [employer] that they need a prior authorization before filling:  Praluent Pen Inj 1ml 2's  150 mg  3 qty  84-day supply  Rx Number [id number]# [id number]  Prescription details  Prescriber: Stevie Felipe     Message as follows:  Before we can ship your medicine, we need your benefits plan to approve it through a process called prior authorization to make sure your plan covers this medicine. We reached out to them, and want to let you know that your medicine delivery could be delayed until they respond. If we don't receive the authorization and are unable to fill this prescription, we'll let you know.\"    "
Called [employer] to check on status case# [id number]      
Central Prior Authorization Team   Phone: [phone]    PA Initiation    Medication: Praluent 150mg/ml  Insurance Company:  - Phone [phone] Fax [phone]  Pharmacy Filling the Rx: [facility] - [place], [place] - [address] [place] [address]  Filling Pharmacy Phone: [phone]  Filling Pharmacy Fax:    Start Date: 5/9/2023    Filled out manual form, faxed to [facility] for review    
Prior Authorization Approval    Authorization Effective Date: 4/10/2023  Authorization Expiration Date: 5/9/2024  Medication: Praluent 150mg/ml  Approved Dose/Quantity:   Reference #:     Insurance Company: [email or web address] [phone] Fax [phone]  Expected CoPay:       CoPay Card Available:      Foundation Assistance Needed:    Which Pharmacy is filling the prescription (Not needed for infusion/clinic administered): [facility] - [address]  Pharmacy Notified: Yes  Patient Notified: Yes      
chest discomfort

## 2025-03-16 DIAGNOSIS — R09.81 NASAL CONGESTION: ICD-10-CM

## 2025-03-16 DIAGNOSIS — I73.9 PAD (PERIPHERAL ARTERY DISEASE): ICD-10-CM

## 2025-03-16 DIAGNOSIS — G43.009 MIGRAINE WITHOUT AURA AND WITHOUT STATUS MIGRAINOSUS, NOT INTRACTABLE: ICD-10-CM

## 2025-03-16 DIAGNOSIS — K21.9 GASTROESOPHAGEAL REFLUX DISEASE WITHOUT ESOPHAGITIS: ICD-10-CM

## 2025-03-17 RX ORDER — CILOSTAZOL 100 MG/1
100 TABLET ORAL 2 TIMES DAILY
Qty: 180 TABLET | Refills: 3 | Status: SHIPPED | OUTPATIENT
Start: 2025-03-17

## 2025-03-17 RX ORDER — FLUTICASONE PROPIONATE 50 MCG
1 SPRAY, SUSPENSION (ML) NASAL DAILY
Qty: 48 G | Refills: 2 | Status: SHIPPED | OUTPATIENT
Start: 2025-03-17

## 2025-03-17 RX ORDER — OMEPRAZOLE 40 MG/1
40 CAPSULE, DELAYED RELEASE ORAL DAILY
Qty: 90 CAPSULE | Refills: 2 | Status: SHIPPED | OUTPATIENT
Start: 2025-03-17

## 2025-03-19 ENCOUNTER — HOSPITAL ENCOUNTER (OUTPATIENT)
Dept: GENERAL RADIOLOGY | Facility: CLINIC | Age: 80
Discharge: HOME | End: 2025-03-19
Attending: INTERNAL MEDICINE
Payer: MEDICARE

## 2025-03-19 ENCOUNTER — OFFICE VISIT (OUTPATIENT)
Dept: INTERNAL MEDICINE | Facility: CLINIC | Age: 80
End: 2025-03-19
Payer: MEDICARE

## 2025-03-19 VITALS
OXYGEN SATURATION: 98 % | TEMPERATURE: 97.2 F | SYSTOLIC BLOOD PRESSURE: 130 MMHG | HEIGHT: 68 IN | WEIGHT: 164 LBS | DIASTOLIC BLOOD PRESSURE: 70 MMHG | HEART RATE: 60 BPM | BODY MASS INDEX: 24.86 KG/M2 | RESPIRATION RATE: 16 BRPM

## 2025-03-19 DIAGNOSIS — N18.31 STAGE 3A CHRONIC KIDNEY DISEASE (H): ICD-10-CM

## 2025-03-19 DIAGNOSIS — R09.89 RESPIRATORY CRACKLES AT LEFT LUNG BASE: ICD-10-CM

## 2025-03-19 DIAGNOSIS — I10 ESSENTIAL HYPERTENSION, BENIGN: ICD-10-CM

## 2025-03-19 DIAGNOSIS — N13.8 BPH WITH OBSTRUCTION/LOWER URINARY TRACT SYMPTOMS: ICD-10-CM

## 2025-03-19 DIAGNOSIS — N40.1 BPH WITH OBSTRUCTION/LOWER URINARY TRACT SYMPTOMS: ICD-10-CM

## 2025-03-19 DIAGNOSIS — Z00.00 MEDICARE ANNUAL WELLNESS VISIT, SUBSEQUENT: Primary | ICD-10-CM

## 2025-03-19 DIAGNOSIS — K21.9 GASTROESOPHAGEAL REFLUX DISEASE WITHOUT ESOPHAGITIS: ICD-10-CM

## 2025-03-19 DIAGNOSIS — I73.9 PAD (PERIPHERAL ARTERY DISEASE): ICD-10-CM

## 2025-03-19 DIAGNOSIS — I25.10 LEFT MAIN CORONARY ARTERY DISEASE: ICD-10-CM

## 2025-03-19 DIAGNOSIS — Z12.11 SCREEN FOR COLON CANCER: ICD-10-CM

## 2025-03-19 PROBLEM — I50.20 HEART FAILURE WITH REDUCED EJECTION FRACTION (H): Status: RESOLVED | Noted: 2025-03-19 | Resolved: 2025-03-19

## 2025-03-19 PROBLEM — I50.20 HEART FAILURE WITH REDUCED EJECTION FRACTION (H): Status: ACTIVE | Noted: 2025-03-19

## 2025-03-19 PROCEDURE — 71046 X-RAY EXAM CHEST 2 VIEWS: CPT

## 2025-03-19 ASSESSMENT — PAIN SCALES - GENERAL: PAINLEVEL_OUTOF10: NO PAIN (0)

## 2025-03-19 NOTE — PROGRESS NOTES
"  {PROVIDER CHARTING PREFERENCE:675516}    Subjective   Michele is a 79 year old, presenting for the following health issues:  Recheck Medication      3/19/2025     9:39 AM   Additional Questions   Accompanied by Justin-wife     History of Present Illness       Reason for visit:  Phaemacy requires appointment to refill meds   He is taking medications regularly.        {MA/LPN/RN Pre-Provider Visit Orders- hCG/UA/Strep (Optional):752519}  {SUPERLIST (Optional):237286}  {additonal problems for provider to add (Optional):180632}    {ROS Picklists (Optional):587824}      Objective    BP (!) 150/74   Pulse 60   Temp 97.2  F (36.2  C) (Temporal)   Resp 16   Ht 1.727 m (5' 8\")   Wt 74.4 kg (164 lb)   SpO2 98%   BMI 24.94 kg/m    Body mass index is 24.94 kg/m .  Physical Exam   {Exam List (Optional):632646}    {Diagnostic Test Results (Optional):469454}        Signed Electronically by: Jonas West MD  {Email feedback regarding this note to [email or web address]   :852257}  "

## 2025-03-19 NOTE — PROGRESS NOTES
Preventive Care Visit  [facility]  Jonas West MD, Internal Medicine  Mar 19, 2025      Assessment & Plan   Problem List Items Addressed This Visit       GERD (gastroesophageal reflux disease)    CKD (chronic kidney disease) stage 3, GFR 30-59 ml/min (H)    Left main coronary artery disease     Other Visit Diagnoses       Medicare annual wellness visit, subsequent    -  Primary    Screen for colon cancer        Relevant Orders    Colonoscopy Screening  Referral    PAD (peripheral artery disease)        Essential hypertension, benign        BPH with obstruction/lower urinary tract symptoms        Respiratory crackles at left lung base        Relevant Orders    XR Chest 2 Views           Patient here for Medicare wellness exam.  Overall he is doing well  Colonoscopy is due and that is ordered  Tetanus shot is recommended  PSA was okay in December.  Chronic memory issues with vascular dementia    Peripheral arterial disease he continues with aspirin, cilostazol, Praluent for his cholesterol and follows with cardiology.  Coronary disease status post CABG he follows with cardiology in the spring.  Will do a cholesterol panel then.    He does have some crackles at the left lung base will start off with an x-ray today.  No symptoms currently.    Chronic memory changes related to his vascular seems to be stable no change at this time.    Blood pressure he is on carvedilol used to be on other meds but his blood pressure on recheck is better at 130/70 will hold off on his losartan  BPH symptoms are much better with Flomax continue that    Reflux is controlled with omeprazole    Chronic kidney disease is stable creatinine usually around 1.3.      The longitudinal plan of care for the diagnosis(es)/condition(s) as documented were addressed during this visit. Due to the added complexity in care, I will continue to support Michele in the subsequent management and with ongoing continuity  of care.       FUTURE APPOINTMENTS:       - Follow-up for annual visit or as needed      Subjective   Michele is a 79 year old, presenting for the following:  Recheck Medication        3/19/2025     9:39 AM   Additional Questions   Accompanied by Justin-wife     HPI     Feeling ok, needs refills and labs. Omeprazole refill needed.     BPH is better with flomax.     Sees cardiology in April and does lipids.         Advance Care Planning  Patient has a Health Care Directive on file  Advance care planning document is on file and is current.      12/3/2024   General Health   How would you rate your overall physical health? Good   Feel stress (tense, anxious, or unable to sleep) Not at all         12/3/2024   Nutrition   Diet: Regular (no restrictions)         12/3/2024   Exercise   Days per week of moderate/strenous exercise 0 days   Average minutes spent exercising at this level 0 min         12/3/2024   Social Factors   Frequency of gathering with friends or relatives Never   Worry food won't last until get money to buy more No   Food not last or not have enough money for food? No   Do you have housing? (Housing is defined as stable permanent housing and does not include staying ouside in a car, in a tent, in an abandoned building, in an overnight shelter, or couch-surfing.) Yes   Are you worried about losing your housing? No   Lack of transportation? No   Unable to get utilities (heat,electricity)? No         12/3/2024   Activities of Daily Living- Home Safety   Needs help with the following daily activites None of the above   Safety concerns in the home None of the above         12/3/2024   Dental   Dentist two times every year? (!) NO         12/3/2024   Hearing Screening   Hearing concerns? None of the above           12/3/2024   Urinary Incontinence Screening   Bothered by leaking urine in past 6 months No           12/3/2024   TB Screening   Were you born outside of the [place]? No           Today's PHQ-2 Score:        3/19/2025     9:25 AM   PHQ-2 (  [employer])   Q1: Little interest or pleasure in doing things 0   Q2: Feeling down, depressed or hopeless 0   PHQ-2 Score 0    Q1: Little interest or pleasure in doing things Not at all   Q2: Feeling down, depressed or hopeless Not at all   PHQ-2 Score 0       Patient-reported           12/3/2024   Substance Use   Alcohol more than 3/day or more than 7/wk Not Applicable   Do you have a current opioid prescription? No   How severe/bad is pain from 1 to 10? 0/10 (No Pain)   Do you use any other substances recreationally? No     Social History     Tobacco Use    Smoking status: Former     Current packs/day: 0.00     Average packs/day: 2.5 packs/day for 20.0 years (50.0 ttl pk-yrs)     Types: Cigarettes     Start date: 1980     Quit date: 2000     Years since quittin.2    Smokeless tobacco: Never   Vaping Use    Vaping status: Never Used   Substance Use Topics    Alcohol use: No     Alcohol/week: 0.0 standard drinks of alcohol    Drug use: No       ASCVD Risk   The ASCVD Risk score (Elizabeth QUINN, et al., 2019) failed to calculate for the following reasons:    Risk score cannot be calculated because patient has a medical history suggesting prior/existing ASCVD    Reviewed and updated as needed this visit by Provider                    Labs reviewed in EPIC  Current providers sharing in care for this patient include:  Patient Care Team:  Jonas West MD as PCP - General (Internal Medicine)  Benito Hale MD (Inactive) as Referring Physician (Surgery)  Micheal Hogan MD as MD (Internal Medicine)  Mikie Wise MD as MD (Cardiology)  Yusuf Xiong MD as MD (Orthopaedic Surgery)  Jonas West MD as Assigned PCP  Stevie Felipe MD as Assigned Heart and Vascular Provider  Shola Cuellar MD as Assigned Neuroscience Provider    The following health maintenance items are reviewed in Epic and correct as of today:  Health  "Maintenance   Topic Date Due    DTAP/TDAP/TD IMMUNIZATION (2 - Td or Tdap) 04/25/2021    RSV VACCINE (1 - 1-dose 75+ series) Never done    COLORECTAL CANCER SCREENING  01/16/2023    MEDICARE ANNUAL WELLNESS VISIT  10/12/2023    LIPID  01/28/2025    COVID-19 Vaccine (9 - 2024-25 season) 03/13/2025    BMP  12/03/2025    MICROALBUMIN  12/03/2025    ANNUAL REVIEW OF HM ORDERS  12/03/2025    FALL RISK ASSESSMENT  12/03/2025    HEMOGLOBIN  12/03/2025    ADVANCE CARE PLANNING  10/12/2027    DIABETES SCREENING  12/03/2027    HEPATITIS C SCREENING  Completed    MIGRAINE ACTION PLAN  Completed    PHQ-2 (once per calendar year)  Completed    INFLUENZA VACCINE  Completed    Pneumococcal Vaccine: 50+ Years  Completed    URINALYSIS  Completed    ZOSTER IMMUNIZATION  Completed    HPV IMMUNIZATION  Aged Out    MENINGITIS IMMUNIZATION  Aged Out    LUNG CANCER SCREENING  Discontinued         Review of Systems  CONSTITUTIONAL: NEGATIVE for fever, chills, change in weight  INTEGUMENTARY/SKIN: NEGATIVE for worrisome rashes, moles or lesions  EYES: NEGATIVE for vision changes or irritation  ENT/MOUTH: NEGATIVE for ear, mouth and throat problems  RESP: NEGATIVE for significant cough or SOB  BREAST: NEGATIVE for masses, tenderness or discharge  CV: NEGATIVE for chest pain, palpitations or peripheral edema  GI: NEGATIVE for nausea, abdominal pain, heartburn, or change in bowel habits  : NEGATIVE for frequency, dysuria, or hematuria  MUSCULOSKELETAL: NEGATIVE for significant arthralgias or myalgia  NEURO: NEGATIVE for weakness, dizziness or paresthesias  ENDOCRINE: NEGATIVE for temperature intolerance, skin/hair changes  HEME: NEGATIVE for bleeding problems  PSYCHIATRIC: NEGATIVE for changes in mood or affect     Objective    Exam  BP (!) 150/74   Pulse 60   Temp 97.2  F (36.2  C) (Temporal)   Resp 16   Ht 1.727 m (5' 8\")   Wt 74.4 kg (164 lb)   SpO2 98%   BMI 24.94 kg/m     Estimated body mass index is 24.94 kg/m  as calculated " "from the following:    Height as of this encounter: 1.727 m (5' 8\").    Weight as of this encounter: 74.4 kg (164 lb).    Physical Exam  GENERAL: alert and no distress  EYES: Eyes grossly normal to inspection, PERRL and conjunctivae and sclerae normal  HENT: ear canals and TM's normal, nose and mouth without ulcers or lesions  NECK: no adenopathy, no asymmetry, masses, or scars  RESP: left lung with crackles, right lung was clear   CV: regular rate and rhythm, normal S1 S2, no S3 or S4, no murmur, click or rub, no peripheral edema  ABDOMEN: soft, nontender, no hepatosplenomegaly, no masses and bowel sounds normal  MS: no gross musculoskeletal defects noted, no edema  SKIN: no suspicious lesions or rashes  NEURO: Normal strength and tone, mentation intact and speech normal  PSYCH: mentation appears normal, affect normal/bright             No data to display                Known vasular dementia and carotid being watched.  Chronic issue.     Signed Electronically by: Jonas West MD  "

## 2025-03-26 ENCOUNTER — HOSPITAL ENCOUNTER (OUTPATIENT)
Facility: CLINIC | Age: 80
End: 2025-03-26
Attending: SPECIALIST | Admitting: SPECIALIST
Payer: MEDICARE

## 2025-03-26 ENCOUNTER — TELEPHONE (OUTPATIENT)
Dept: GASTROENTEROLOGY | Facility: CLINIC | Age: 80
End: 2025-03-26
Payer: MEDICARE

## 2025-03-26 NOTE — TELEPHONE ENCOUNTER
"Endoscopy Scheduling Screen    Have you had any respiratory illness or flu-like symptoms in the last 10 days?  No    What is your communication preference for Instructions and/or Bowel Prep?   MyChart    What insurance is in the chart?  Other:  medicare    Ordering/Referring Provider: Jonas West MD     (If ordering provider performs procedure, schedule with ordering provider unless otherwise instructed. )    BMI: Estimated body mass index is 24.94 kg/m  as calculated from the following:    Height as of 3/19/25: 1.727 m (5' 8\").    Weight as of 3/19/25: 74.4 kg (164 lb).     Sedation Ordered  moderate sedation.   If patient BMI > 50 do not schedule in [facility].    If patient BMI > 45 do not schedule at [facility].    Are you taking methadone or Suboxone?  NO, No RN review required.    Have you been diagnosed and are being treated for severe PTSD or severe anxiety?  NO, No RN review required.    Are you taking any prescription medications for pain 3 or more times per week?   NO, No RN review required.    Do you have a history of malignant hyperthermia?  No    (Females) Are you currently pregnant?   No     Have you been diagnosed or told you have pulmonary hypertension?   No    Do you have an LVAD?  No    Have you been told you have moderate to severe sleep apnea?  No.    Have you been told you have COPD, asthma, or any other lung disease?  No    Has your doctor ordered any cardiac tests like echo, angiogram, stress test, ablation, or EKG, that you have not completed yet?  No    Do you  have a history of any heart conditions?  No     Have you ever had or are you waiting for an organ transplant?  No. Continue scheduling, no site restrictions.    Have you had a stroke or transient ischemic attack (TIA aka \"mini stroke\") in the last 2 years?   No.    Have you been diagnosed with or been told you have cirrhosis of the liver?   No.    Are you currently on dialysis?   No    Do you need assistance transferring?   No    BMI: " "Estimated body mass index is 24.94 kg/m  as calculated from the following:    Height as of 3/19/25: 1.727 m (5' 8\").    Weight as of 3/19/25: 74.4 kg (164 lb).     Is patients BMI > 40 and scheduling location [facility]?  No    Do you take an injectable or oral medication for weight loss or diabetes (excluding insulin)?  No    Do you take the medication Naltrexone?  No    Do you take blood thinners?  No asprin and cilostazol?    Prep   Are you currently on dialysis or do you have chronic kidney disease?  No    Do you have a diagnosis of diabetes?  No    Do you have a diagnosis of cystic fibrosis (CF)?  No    On a regular basis do you go 3 -5 days between bowel movements?  No    BMI > 40?  No    Preferred [place]        [facility] [place] [address] [place] [address]  Phone: [phone] Fax: [phone]      Final Scheduling Details     Procedure scheduled  Colonoscopy    Surgeon:  abdiel     Date of procedure:  4/29     Pre-OP / PAC:   No - Not required for this site.    Location  [facility] - Patient preference.    Sedation   MAC/Deep Sedation  site      Patient Reminders:   You will receive a call from a Nurse to review instructions and health history.  This assessment must be completed prior to your procedure.  Failure to complete the Nurse assessment may result in the procedure being cancelled.      On the day of your procedure, please designate an adult(s) who can drive you home stay with you for the next 24 hours. The medicines used in the exam will make you sleepy. You will not be able to drive.      You cannot take public transportation, ride share services, or non-medical taxi service without a responsible caregiver.  Medical transport services are allowed with the requirement that a responsible caregiver will receive you at your destination.  We require that drivers and caregivers are confirmed prior to your procedure.  "

## 2025-04-07 ENCOUNTER — OFFICE VISIT (OUTPATIENT)
Dept: CARDIOLOGY | Facility: CLINIC | Age: 80
End: 2025-04-07
Payer: MEDICARE

## 2025-04-07 VITALS
SYSTOLIC BLOOD PRESSURE: 122 MMHG | WEIGHT: 166.5 LBS | BODY MASS INDEX: 25.23 KG/M2 | HEART RATE: 63 BPM | RESPIRATION RATE: 18 BRPM | OXYGEN SATURATION: 98 % | DIASTOLIC BLOOD PRESSURE: 68 MMHG | HEIGHT: 68 IN

## 2025-04-07 DIAGNOSIS — I10 BENIGN ESSENTIAL HYPERTENSION: ICD-10-CM

## 2025-04-07 DIAGNOSIS — I25.10 CORONARY ARTERY DISEASE INVOLVING NATIVE CORONARY ARTERY OF NATIVE HEART WITHOUT ANGINA PECTORIS: ICD-10-CM

## 2025-04-07 ASSESSMENT — PAIN SCALES - GENERAL: PAINLEVEL_OUTOF10: NO PAIN (0)

## 2025-04-07 NOTE — PROGRESS NOTES
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    Since I last saw the patient on 2/1/2024, he remains free of cardiovascular symptoms.  He denies chest pain dyspnea palpitation lightheadedness or new focal neurologic deficit.  He and his wife care for 3 dogs and they do not travel.  His blood pressure and LDL cholesterol levels remain optimal on guideline directed medical therapy and PCSK9 inhibitor and he receives excellent medical care from his internist Dr. West.    The patient has chronic left knee pain which he attributes to an old injury he sustained in the .  This knee pain limits his activity somewhat, but not enough that he wishes intervention.        PAST MEDICAL HISTORY:    1.  Coronary artery disease.  a.  Bypass surgery 2014 with LIMA graft to LAD, saphenous venous bypass graft to first diagonal branch, saphenous venous bypass graft to second diagonal branch, saphenous venous bypass graft to the 1st marginal branch, saphenous venous bypass graft to second marginal branch, and saphenous venous bypass graft to posterior descending branch.  b.  No angina since bypass surgery.  2.  Atherosclerotic peripheral vascular disease.  a.  Old stroke -- status post right carotid stent with balloon PTA for in-stent restenosis 09/2020.  Followed by Dr. Cuellar  b.  Renal artery stenosis -- status post stent implantation for refractory hypertension, now well-controlled.  c.  Status post superficial femoral artery PTA/stenting -- followed by Dr. Epps.  3.  Status post cholecystectomy 07/2021.  4.  Hypertension.  5.  Hyponatremia.  6.  Dyslipidemia -- statin intolerant.  On alirocumab  "(Praluent).  7. CKDZ Cr 1.7  8.  Chronic RBBB/LAFB       Orders this Visit:    ECG today shows sinus rhythm with right bundle branch block and left anterior fascicular block, unchanged from his last ECG.  Orders Placed This Encounter   Procedures    EKG 12-lead complete w/read - Clinics (performed today)     No orders of the defined types were placed in this encounter.    There are no discontinued medications.    Encounter Diagnoses   Name Primary?    Coronary artery disease involving native coronary artery of native heart without angina pectoris     Benign essential hypertension        CURRENT MEDICATIONS:  Current Outpatient Medications   Medication Sig Dispense Refill    acetaminophen (TYLENOL) 325 MG tablet Take 650 mg by mouth daily      alirocumab (PRALUENT) 150 MG/ML injectable pen Inject 1 mL (150 mg) Subcutaneous every 14 days Every other Sunday 6 mL 3    amitriptyline (ELAVIL) 25 MG tablet TAKE 2 TABLETS AT BEDTIME 180 tablet 2    aspirin (ASA) 325 MG tablet Take 325 mg by mouth daily      carvedilol (COREG) 25 MG tablet TAKE 1 TABLET TWICE A DAY WITH MEALS 180 tablet 3    cilostazol (PLETAL) 100 MG tablet TAKE 1 TABLET TWICE A  tablet 3    diphenhydrAMINE-acetaminophen (TYLENOL PM)  MG tablet Take 2 tablets by mouth At Bedtime      evolocumab (REPATHA SURECLICK) 140 MG/ML prefilled autoinjector Inject 1 mL (140 mg) Subcutaneous every 14 days 6 mL 3    fluticasone (FLONASE) 50 MCG/ACT nasal spray USE 1 SPRAY IN EACH NOSTRIL DAILY 48 g 2    gabapentin (NEURONTIN) 300 MG capsule TAKE 1 CAPSULE THREE TIMES A  capsule 3    Melatonin 10 MG TABS tablet Take 10 mg by mouth At Bedtime      omeprazole (PRILOSEC) 40 MG DR capsule Take 1 capsule (40 mg) by mouth daily. 90 capsule 2    tamsulosin (FLOMAX) 0.4 MG capsule Take 1 capsule (0.4 mg) by mouth daily. 90 capsule 3       ALLERGIES     Allergies   Allergen Reactions    Statins [Statins] Other (See Comments)     Rhabdo  Same as \"statins\"    " "Gemfibrozil      Resting thigh-calf pain    Sulfa Antibiotics      Reacted as a child    Zetia [Ezetimibe]      Muscle cramping       PAST MEDICAL, SURGICAL, FAMILY, SOCIAL HISTORY:  History was reviewed and updated as needed, see medical record.        Review of Systems:  A 12-point review of systems was completed, see medical record for detailed review of systems information.    Physical Exam:  Vitals: /68 (BP Location: Left arm, Patient Position: Sitting, Cuff Size: Adult Regular)   Pulse 63   Resp 18   Ht 1.727 m (5' 8\")   Wt 75.5 kg (166 lb 8 oz)   SpO2 98%   BMI 25.32 kg/m      Constitutional: No apparent distress    HEENT: pupils equal round reactive to light, thyroid normal size, JVP is normal    Chest: Clear to percussion and auscultation    Cardiac: Normal S1, normal S2 no S3, no murmur or click      ASSESSMENT: The patient remains asymptomatic on guideline directed medical therapy with optimal LDL cholesterol and blood pressure levels.  We have reviewed the benefits of a Mediterranean-style diet and regular activity.  The patient will contact us with any interim symptoms.    RECOMMENDATIONS:   1) continue present medication  2) follow in one  year, earlier if new symptomsl  3) Mediterranean style diet/ exercise  as  tolerated  4)  follow up in one year with lipid  profile        Recent Lab Results:  LIPID RESULTS:  Lab Results   Component Value Date    CHOL 96 01/28/2024    CHOL 161 03/06/2020    HDL 52 01/28/2024    HDL 53 03/06/2020    LDL 35 01/28/2024    LDL 95 03/06/2020    TRIG 44 01/28/2024    TRIG 65 03/06/2020    CHOLHDLRATIO 5.2 (H) 07/23/2015       LIVER ENZYME RESULTS:  Lab Results   Component Value Date    AST 14 10/18/2022    AST 12 07/02/2021    ALT 35 10/18/2022    ALT 21 07/02/2021       CBC RESULTS:  Lab Results   Component Value Date    WBC 5.0 12/03/2024    WBC 5.8 07/02/2021    RBC 4.01 (L) 12/03/2024    RBC 3.52 (L) 07/02/2021    HGB 13.8 12/03/2024    HGB 11.7 (L) " 07/02/2021    HCT 39.7 (L) 12/03/2024    HCT 32.3 (L) 07/02/2021    MCV 99 12/03/2024    MCV 92 07/02/2021    MCH 34.4 (H) 12/03/2024    MCH 33.2 (H) 07/02/2021    MCHC 34.8 12/03/2024    MCHC 36.2 07/02/2021    RDW 13.2 12/03/2024    RDW 12.4 07/02/2021     12/03/2024     07/02/2021       BMP RESULTS:  Lab Results   Component Value Date     (L) 12/03/2024     (L) 07/02/2021    POTASSIUM 4.4 12/03/2024    POTASSIUM 5.2 10/18/2022    POTASSIUM 4.9 07/02/2021    CHLORIDE 96 (L) 12/03/2024    CHLORIDE 101 10/18/2022    CHLORIDE 91 (L) 07/02/2021    CO2 25 12/03/2024    CO2 25 10/18/2022    CO2 23 07/02/2021    ANIONGAP 11 12/03/2024    ANIONGAP 4 10/18/2022    ANIONGAP 7 07/02/2021    GLC 96 12/03/2024     (H) 01/27/2024     (H) 10/18/2022     (H) 07/02/2021    BUN 15.5 12/03/2024    BUN 31 (H) 10/18/2022    BUN 13 07/02/2021    CR 1.30 (H) 12/03/2024    CR 1.31 (H) 07/02/2021    GFRESTIMATED 56 (L) 12/03/2024    GFRESTIMATED 53 (L) 07/02/2021    GFRESTBLACK 61 07/02/2021    RAHEEM 9.1 12/03/2024    RAHEEM 8.9 07/02/2021        A1C RESULTS:  Lab Results   Component Value Date    A1C 5.7 (H) 01/28/2024    A1C 5.6 03/07/2014       INR RESULTS:  Lab Results   Component Value Date    INR 1.23 (H) 01/27/2024    INR 1.10 08/31/2021    INR 1.09 09/03/2020    INR 1.08 09/01/2020       We greatly appreciate the opportunity to be involved in the care of your patient, Michele Vance.    Sincerely,  Stevie Felipe MD      CC  Referred Self, MD  No address on file

## 2025-04-07 NOTE — LETTER
4/7/2025    Jonas West MD  [address] [facility] [address]    RE: Michele Vance       Dear Colleague,     I had the pleasure of seeing Michele Vance in the [facility].  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    Left knee dislocation years ago in  100 feet.  3 dogs          PAST MEDICAL HISTORY:    1.  Coronary artery disease.  a.  Bypass surgery 2014 with LIMA graft to LAD, saphenous venous bypass graft to first diagonal branch, saphenous venous bypass graft to second diagonal branch, saphenous venous bypass graft to the 1st marginal branch, saphenous venous bypass graft to second marginal branch, and saphenous venous bypass graft to posterior descending branch.  b.  No angina since bypass surgery.  2.  Atherosclerotic peripheral vascular disease.  a.  Old stroke -- status post right carotid stent with balloon PTA for in-stent restenosis 09/2020.  Followed by Dr. Cuellar  b.  Renal artery stenosis -- status post stent implantation for refractory hypertension, now well-controlled.  c.  Status post superficial femoral artery PTA/stenting -- followed by Dr. Epps.  3.  Status post cholecystectomy 07/2021.  4.  Hypertension.  5.  Hyponatremia.  6.  Dyslipidemia -- statin intolerant.  On alirocumab (Praluent).  7. CKDZ Cr 1.7      Orders this Visit:  Orders Placed This Encounter   Procedures     EKG 12-lead complete w/read - Clinics (performed today)     No orders of the defined types were placed in this encounter.    There are no discontinued medications.    Encounter Diagnoses   Name Primary?     Coronary artery disease involving native coronary artery of  "native heart without angina pectoris      Benign essential hypertension        CURRENT MEDICATIONS:  Current Outpatient Medications   Medication Sig Dispense Refill     acetaminophen (TYLENOL) 325 MG tablet Take 650 mg by mouth daily       alirocumab (PRALUENT) 150 MG/ML injectable pen Inject 1 mL (150 mg) Subcutaneous every 14 days Every other Sunday 6 mL 3     amitriptyline (ELAVIL) 25 MG tablet TAKE 2 TABLETS AT BEDTIME 180 tablet 2     aspirin (ASA) 325 MG tablet Take 325 mg by mouth daily       carvedilol (COREG) 25 MG tablet TAKE 1 TABLET TWICE A DAY WITH MEALS 180 tablet 3     cilostazol (PLETAL) 100 MG tablet TAKE 1 TABLET TWICE A  tablet 3     diphenhydrAMINE-acetaminophen (TYLENOL PM)  MG tablet Take 2 tablets by mouth At Bedtime       evolocumab (REPATHA SURECLICK) 140 MG/ML prefilled autoinjector Inject 1 mL (140 mg) Subcutaneous every 14 days 6 mL 3     fluticasone (FLONASE) 50 MCG/ACT nasal spray USE 1 SPRAY IN EACH NOSTRIL DAILY 48 g 2     gabapentin (NEURONTIN) 300 MG capsule TAKE 1 CAPSULE THREE TIMES A  capsule 3     Melatonin 10 MG TABS tablet Take 10 mg by mouth At Bedtime       omeprazole (PRILOSEC) 40 MG DR capsule Take 1 capsule (40 mg) by mouth daily. 90 capsule 2     tamsulosin (FLOMAX) 0.4 MG capsule Take 1 capsule (0.4 mg) by mouth daily. 90 capsule 3       ALLERGIES     Allergies   Allergen Reactions     Statins [Statins] Other (See Comments)     Rhabdo  Same as \"statins\"     Gemfibrozil      Resting thigh-calf pain     Sulfa Antibiotics      Reacted as a child     Zetia [Ezetimibe]      Muscle cramping       PAST MEDICAL, SURGICAL, FAMILY, SOCIAL HISTORY:  History was reviewed and updated as needed, see medical record.        Review of Systems:  A 12-point review of systems was completed, see medical record for detailed review of systems information.    Physical Exam:  Vitals: /68 (BP Location: Left arm, Patient Position: Sitting, Cuff Size: Adult Regular)   " "Pulse 63   Resp 18   Ht 1.727 m (5' 8\")   Wt 75.5 kg (166 lb 8 oz)   SpO2 98%   BMI 25.32 kg/m      Constitutional: No apparent distress    HEENT: pupils equal round reactive to light, thyroid normal size, JVP is normal    Chest: Clear to percussion and auscultation    Cardiac: Normal S1, normal S2 no S3, no murmur or click    Abdomen: Scaphoid.  Liver percusses to 6 cm spleen is not palpable aorta is not tender or enlarged    Extremitiies: no edema.    Neurological:  Cranial nerves II through XII are intact, strength equal and symmetrical, displays normal insight and judgment        ASSESSMENT: Michele Vance is a 79 year old male with          We reviewed the benefits of a regular exercise program, a Mediterranean-style weight loss diet, and contacting us for any changes in between now and the time of her next visit.     RECOMMENDATIONS:   1) continue present medication  2) follow in one  year, earlier if new symptomsl  3) follow up in one        Recent Lab Results:  LIPID RESULTS:  Lab Results   Component Value Date    CHOL 96 01/28/2024    CHOL 161 03/06/2020    HDL 52 01/28/2024    HDL 53 03/06/2020    LDL 35 01/28/2024    LDL 95 03/06/2020    TRIG 44 01/28/2024    TRIG 65 03/06/2020    CHOLHDLRATIO 5.2 (H) 07/23/2015       LIVER ENZYME RESULTS:  Lab Results   Component Value Date    AST 14 10/18/2022    AST 12 07/02/2021    ALT 35 10/18/2022    ALT 21 07/02/2021       CBC RESULTS:  Lab Results   Component Value Date    WBC 5.0 12/03/2024    WBC 5.8 07/02/2021    RBC 4.01 (L) 12/03/2024    RBC 3.52 (L) 07/02/2021    HGB 13.8 12/03/2024    HGB 11.7 (L) 07/02/2021    HCT 39.7 (L) 12/03/2024    HCT 32.3 (L) 07/02/2021    MCV 99 12/03/2024    MCV 92 07/02/2021    MCH 34.4 (H) 12/03/2024    MCH 33.2 (H) 07/02/2021    MCHC 34.8 12/03/2024    MCHC 36.2 07/02/2021    RDW 13.2 12/03/2024    RDW 12.4 07/02/2021     12/03/2024     07/02/2021       BMP RESULTS:  Lab Results   Component Value Date    NA " 132 (L) 12/03/2024     (L) 07/02/2021    POTASSIUM 4.4 12/03/2024    POTASSIUM 5.2 10/18/2022    POTASSIUM 4.9 07/02/2021    CHLORIDE 96 (L) 12/03/2024    CHLORIDE 101 10/18/2022    CHLORIDE 91 (L) 07/02/2021    CO2 25 12/03/2024    CO2 25 10/18/2022    CO2 23 07/02/2021    ANIONGAP 11 12/03/2024    ANIONGAP 4 10/18/2022    ANIONGAP 7 07/02/2021    GLC 96 12/03/2024     (H) 01/27/2024     (H) 10/18/2022     (H) 07/02/2021    BUN 15.5 12/03/2024    BUN 31 (H) 10/18/2022    BUN 13 07/02/2021    CR 1.30 (H) 12/03/2024    CR 1.31 (H) 07/02/2021    GFRESTIMATED 56 (L) 12/03/2024    GFRESTIMATED 53 (L) 07/02/2021    GFRESTBLACK 61 07/02/2021    RAHEEM 9.1 12/03/2024    RAHEEM 8.9 07/02/2021        A1C RESULTS:  Lab Results   Component Value Date    A1C 5.7 (H) 01/28/2024    A1C 5.6 03/07/2014       INR RESULTS:  Lab Results   Component Value Date    INR 1.23 (H) 01/27/2024    INR 1.10 08/31/2021    INR 1.09 09/03/2020    INR 1.08 09/01/2020       We greatly appreciate the opportunity to be involved in the care of your patient, Michele Vance.    Sincerely,  Stevie Felipe MD      CC  Referred Self, MD  No address on file                                                                       Thank you for allowing me to participate in the care of your patient.      Sincerely,     Stevie Felipe MD     [facility]  cc:   Lisa Francis MD  No address on file       Patient/Caregiver provided printed discharge information.

## 2025-04-12 ENCOUNTER — APPOINTMENT (OUTPATIENT)
Dept: GENERAL RADIOLOGY | Facility: CLINIC | Age: 80
End: 2025-04-12
Attending: EMERGENCY MEDICINE
Payer: MEDICARE

## 2025-04-12 ENCOUNTER — HOSPITAL ENCOUNTER (EMERGENCY)
Facility: CLINIC | Age: 80
Discharge: HOME | End: 2025-04-12
Attending: EMERGENCY MEDICINE | Admitting: EMERGENCY MEDICINE
Payer: MEDICARE

## 2025-04-12 ENCOUNTER — APPOINTMENT (OUTPATIENT)
Dept: CT IMAGING | Facility: CLINIC | Age: 80
End: 2025-04-12
Attending: EMERGENCY MEDICINE
Payer: MEDICARE

## 2025-04-12 VITALS
RESPIRATION RATE: 16 BRPM | TEMPERATURE: 98.2 F | OXYGEN SATURATION: 98 % | HEART RATE: 82 BPM | SYSTOLIC BLOOD PRESSURE: 189 MMHG | WEIGHT: 166 LBS | BODY MASS INDEX: 25.16 KG/M2 | HEIGHT: 68 IN | DIASTOLIC BLOOD PRESSURE: 88 MMHG
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
## 2025-04-12 DIAGNOSIS — M25.521 RIGHT ELBOW PAIN: ICD-10-CM

## 2025-04-12 DIAGNOSIS — M25.511 ACUTE PAIN OF RIGHT SHOULDER: ICD-10-CM
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
## 2025-04-12 PROCEDURE — 250N000013 HC RX MED GY IP 250 OP 250 PS 637: Performed by: EMERGENCY MEDICINE

## 2025-04-12 PROCEDURE — 250N000011 HC RX IP 250 OP 636: Performed by: EMERGENCY MEDICINE

## 2025-04-12 PROCEDURE — 73030 X-RAY EXAM OF SHOULDER: CPT | Mod: RT

## 2025-04-12 PROCEDURE — 99285 EMERGENCY DEPT VISIT HI MDM: CPT | Mod: 25 | Performed by: EMERGENCY MEDICINE

## 2025-04-12 PROCEDURE — 72125 CT NECK SPINE W/O DYE: CPT

## 2025-04-12 PROCEDURE — 96372 THER/PROPH/DIAG INJ SC/IM: CPT | Performed by: EMERGENCY MEDICINE

## 2025-04-12 PROCEDURE — 99284 EMERGENCY DEPT VISIT MOD MDM: CPT | Performed by: EMERGENCY MEDICINE

## 2025-04-12 PROCEDURE — 73080 X-RAY EXAM OF ELBOW: CPT | Mod: RT

## 2025-04-12 PROCEDURE — 70450 CT HEAD/BRAIN W/O DYE: CPT

## 2025-04-12 RX ORDER — TIZANIDINE 2 MG/1
2 TABLET ORAL 3 TIMES DAILY PRN
Qty: 12 TABLET | Refills: 0 | Status: SHIPPED | OUTPATIENT
Start: 2025-04-12

## 2025-04-12 RX ORDER — ORPHENADRINE CITRATE 30 MG/ML
60 INJECTION INTRAMUSCULAR; INTRAVENOUS ONCE
Status: COMPLETED | OUTPATIENT
Start: 2025-04-12 | End: 2025-04-12

## 2025-04-12 RX ORDER — ACETAMINOPHEN 325 MG/1
975 TABLET ORAL ONCE
Status: COMPLETED | OUTPATIENT
Start: 2025-04-12 | End: 2025-04-12

## 2025-04-12 RX ADMIN — ACETAMINOPHEN 975 MG: 325 TABLET ORAL at 10:03

## 2025-04-12 RX ADMIN — ORPHENADRINE CITRATE 60 MG: 60 INJECTION INTRAMUSCULAR; INTRAVENOUS at 10:03

## 2025-04-12 ASSESSMENT — COLUMBIA-SUICIDE SEVERITY RATING SCALE - C-SSRS
1. IN THE PAST MONTH, HAVE YOU WISHED YOU WERE DEAD OR WISHED YOU COULD GO TO SLEEP AND NOT WAKE UP?: NO
2. HAVE YOU ACTUALLY HAD ANY THOUGHTS OF KILLING YOURSELF IN THE PAST MONTH?: NO

## 2025-04-12 ASSESSMENT — ACTIVITIES OF DAILY LIVING (ADL)
ADLS_ACUITY_SCORE: 59
ADLS_ACUITY_SCORE: 59

## 2025-04-12 NOTE — ED PROVIDER NOTES
"  History     Chief Complaint   Patient presents with    Fall    Shoulder Injury     HPI  Michele Vance is a 79 year old male who presents with wife over concern of fall and shoulder pain.  Fall was unwitnessed at home yesterday.  Wife says that she heard the incident, and came upstairs to find Michele on the floor.  Thinks that he may have gotten out of his chair and says he does often feel his legs will go underneath him and fall.  Michele says that he does not really remember the incident, he was getting up to move some dog toys, and says that next thing he knew he was on the floor.  He said he did hit his head.  Had a headache yesterday but \"slept it off\".  Does not have headache today.  He is having some right sided neck pain, but he does not know if this is being referred from the shoulder or if he just has pain in his neck.    Wife gives more history and states that this morning Michele was not able to use his arm to help him get out of bed.  He has some difficulty pushing up and could not get dressed due to the pain in his shoulder.  She allowed him to take his blood pressure medication with a small sip of water but says otherwise has not given any medication, and has not had anything for pain.  He is right-hand dominant.  Denies any numbness or tingling going down his arm or into his hand.  He is not on any blood thinners.    Allergies:  Allergies   Allergen Reactions    Statins [Statins] Other (See Comments)     Rhabdo  Same as \"statins\"    Gemfibrozil      Resting thigh-calf pain    Sulfa Antibiotics      Reacted as a child    Zetia [Ezetimibe]      Muscle cramping       Problem List:    Patient Active Problem List    Diagnosis Date Noted    Persistent insomnia 01/28/2024     Priority: Medium    Transient alteration of awareness 01/27/2024     Priority: Medium    Hyperkalemia 01/27/2024     Priority: Medium    Cerebral hypoperfusion 01/27/2024     Priority: Medium    Renal artery stenosis 12/04/2023     " Priority: Medium    Abnormal gait 08/31/2021     Priority: Medium    Falls frequently 08/31/2021     Priority: Medium    Facial injury, initial encounter 08/31/2021     Priority: Medium    Acute midline low back pain without sciatica 08/31/2021     Priority: Medium    Hyponatremia 08/31/2021     Priority: Medium    Biliary colic 07/08/2021     Priority: Medium     Added automatically from request for surgery [id number]      H/O carotid angioplasty 09/04/2020     Priority: Medium    Status post balloon angioplasty of pulmonary artery branches 09/03/2020     Priority: Medium    Carotid stenosis      Priority: Medium     right endartectomy      Essential hypertension with goal blood pressure less than 140/90 06/02/2016     Priority: Medium    Chronic constipation 12/24/2014     Priority: Medium    Irritable bowel syndrome 12/24/2014     Priority: Medium    Carotid artery occlusion with cerebral infarction (H) 08/06/2014     Priority: Medium    Dizziness 08/06/2014     Priority: Medium    Cerebral infarction due to occlusion or stenosis of carotid artery 05/07/2014     Priority: Medium     Problem list name updated by automated process. 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     Priority: Medium    Insomnia 03/12/2014     Priority: Medium    Nutritional deficiency 03/12/2014     Priority: Medium    Pain 03/12/2014     Priority: Medium    Urinary retention 03/12/2014     Priority: Medium    S/P CABG x 6 03/06/2014     Priority: Medium    Left main coronary artery disease 03/04/2014     Priority: Medium    Arthritis 01/14/2013     Priority: Medium    Carotid bruit 01/14/2013     Priority: Medium    Carotid stenosis, bilateral 01/14/2013     Priority: Medium    Anemia 04/06/2012     Priority: Medium    CKD (chronic kidney disease) stage 3, GFR 30-59 ml/min (H) 04/06/2012     Priority: Medium    Impingement syndrome, shoulder, right 03/16/2011     Priority: Medium    Hypertension 11/22/2010     Priority: Medium     Hyperlipidemia LDL goal <130 11/22/2010     Priority: Medium    Myalgia and myositis 11/22/2010     Priority: Medium    GERD (gastroesophageal reflux disease) 11/22/2010     Priority: Medium        Past Medical History:    Past Medical History:   Diagnosis Date    Carotid stenosis     Chronic kidney disease     Coronary artery disease 3-2014    CVA (cerebral infarction)     Elevated CK     Esophageal reflux     Hypercholesteremia     Hyponatremia 8/31/2021    Nonsenile cataract     Other and unspecified hyperlipidemia     PAD (peripheral artery disease)     Rhabdomyolysis 2010    Unspecified essential hypertension        Past Surgical History:    Past Surgical History:   Procedure Laterality Date    APPENDECTOMY  1974    BYPASS GRAFT ARTERY CORONARY  3/5/2014    Procedure: BYPASS GRAFT ARTERY CORONARY;  Median Sternotomy, Coronary Artery Bypass Graft X6 used Left internal mammary artery, Left and Right Greater Saphenous vein, on Pump oxygenator.;  Surgeon: Tim Montanez MD;  Location: [facility] OR    CATARACT IOL, RT/LT Bilateral 2008    in [place]    cataracts      COLONOSCOPY  12/12/02    [facility]    COLONOSCOPY N/A 10/7/2014    Procedure: COMBINED COLONOSCOPY, SINGLE OR MULTIPLE BIOPSY/POLYPECTOMY BY BIOPSY;  Surgeon: Micheal Mora MD;  Location:  GI    CV LOWER EXTREMITY ANGIOGRAM BILATERAL Bilateral 9/9/2019    Procedure: Lower Extremity Angiogram Bilateral;  Surgeon: Julio Oropeza MD;  Location:  HEART CARDIAC CATH LAB    DENTAL SURGERY      TMJ, implants    ENDARTERECTOMY CAROTID      right carotid stent    ESOPHAGOSCOPY, GASTROSCOPY, DUODENOSCOPY (EGD), COMBINED Left 10/7/2014    Procedure: COMBINED ESOPHAGOSCOPY, GASTROSCOPY, DUODENOSCOPY (EGD), BIOPSY SINGLE OR MULTIPLE;  Surgeon: Micheal Mora MD;  Location:  GI    ESOPHAGOSCOPY, GASTROSCOPY, DUODENOSCOPY (EGD), COMBINED Left 10/7/2014    Procedure: COMBINED ESOPHAGOSCOPY, GASTROSCOPY, DUODENOSCOPY (EGD), REMOVE FOREIGN BODY;   Surgeon: Micheal Mora MD;  Location: [facility] GI    HC CAPSULE ENDOSCOPY N/A 10/7/2014    Procedure: CAPSULE/PILL CAM ENDOSCOPY;  Surgeon: Micheal Mora MD;  Location: [facility] GI    INJECT EPIDURAL LUMBAR Right 2017    Procedure: INJECT EPIDURAL LUMBAR;  Lumbar transforaminal Epidural Steroid Injection right lumbar 4-5, and right  lumbar 5-Sacral 1;  Surgeon: Anthony Gonzalez MD;  Location: [facility]    INJECT JOINT SACROILIAC Bilateral 2017    Procedure: INJECT JOINT SACROILIAC;  sacroiliac joint injection bilateral;  Surgeon: Anthony Gonzalez MD;  Location: [facility] OR    IR CAROTID CEREBRAL ANGIOGRAM BILATERAL  9/3/2020    KNEE SURGERY      left knee reconstruction    LAPAROSCOPIC CHOLECYSTECTOMY N/A 2021    Procedure: CHOLECYSTECTOMY, LAPAROSCOPIC;  Surgeon: Anthony Epps MD;  Location: [facility] OR    lasix      both eyes    RELEASE CARPAL TUNNEL  2011    RELEASE CARPAL TUNNEL performed by JO MADRID at  OR    RELEASE CARPAL TUNNEL  2011    RELEASE CARPAL TUNNEL performed by JO MADRID at  OR    [facility] ENDOSCOPY, SIMPLE EXAM  99    [facility]       Family History:    Family History   Problem Relation Age of Onset    Hypertension Mother     Eye Disorder Mother     Cerebrovascular Disease Father     Heart Disease Father     Lipids Father     Obesity Father     Cancer Sister     Heart Disease Sister     Glaucoma No family hx of     Macular Degeneration No family hx of     Kidney Disease No family hx of        Social History:  Marital Status:   [2]  Social History     Tobacco Use    Smoking status: Former     Current packs/day: 0.00     Average packs/day: 2.5 packs/day for 20.0 years (50.0 ttl pk-yrs)     Types: Cigarettes     Start date: 1980     Quit date: 2000     Years since quittin.2    Smokeless tobacco: Never   Vaping Use    Vaping status: Never Used   Substance Use Topics    Alcohol use: No     Alcohol/week: 0.0 standard  "drinks of alcohol    Drug use: No        Medications:    tiZANidine (ZANAFLEX) 2 MG tablet  acetaminophen (TYLENOL) 325 MG tablet  alirocumab (PRALUENT) 150 MG/ML injectable pen  amitriptyline (ELAVIL) 25 MG tablet  aspirin (ASA) 325 MG tablet  carvedilol (COREG) 25 MG tablet  cilostazol (PLETAL) 100 MG tablet  diphenhydrAMINE-acetaminophen (TYLENOL PM)  MG tablet  evolocumab (REPATHA SURECLICK) 140 MG/ML prefilled autoinjector  fluticasone (FLONASE) 50 MCG/ACT nasal spray  gabapentin (NEURONTIN) 300 MG capsule  Melatonin 10 MG TABS tablet  omeprazole (PRILOSEC) 40 MG DR capsule  tamsulosin (FLOMAX) 0.4 MG capsule          Review of Systems   All other systems reviewed and are negative.      Physical Exam   BP: (!) 189/88  Pulse: 82  Temp: 98.2  F (36.8  C)  Resp: 16  Height: 172.7 cm (5' 8\")  Weight: 75.3 kg (166 lb)  SpO2: 98 %      Physical Exam  Vitals and nursing note reviewed.   Constitutional:       General: He is not in acute distress.     Appearance: He is not toxic-appearing.   HENT:      Head: Normocephalic and atraumatic.   Eyes:      Extraocular Movements: Extraocular movements intact.      Pupils: Pupils are equal, round, and reactive to light.   Musculoskeletal:      Right shoulder: No swelling, deformity or tenderness. Decreased range of motion.      Left shoulder: Normal.      Right elbow: Decreased range of motion. Tenderness present in medial epicondyle.      Left elbow: Normal.      Cervical back: Normal range of motion. No rigidity. Muscular tenderness (Right sided) present. No spinous process tenderness.   Skin:     General: Skin is warm.      Capillary Refill: Capillary refill takes less than 2 seconds.      Findings: No bruising or lesion.   Neurological:      General: No focal deficit present.      Mental Status: He is alert.         ED Course        Procedures              Critical Care time:  none              Results for orders placed or performed during the hospital encounter of " 04/12/25 (from the past 24 hours)   CT Head w/o Contrast    Narrative    EXAM: CT HEAD W/O CONTRAST  LOCATION: [facility]  DATE: 4/12/2025    INDICATION: Head injury.  COMPARISON: CT head without contrast 1/27/2024.  TECHNIQUE: Routine CT Head without IV contrast. Multiplanar reformats. Dose reduction techniques were used.    FINDINGS:  INTRACRANIAL CONTENTS: No acute intracranial hemorrhage. No extra-axial fluid collection. No midline shift. Chronic infarcts involving the left corona radiata, the left parasagittal frontoparietal region, the right middle frontal gyrus, and the bilateral   cerebellar hemispheres. Mild presumed chronic small vessel ischemic changes. Mild to moderate generalized volume loss. No hydrocephalus.     VISUALIZED ORBITS/SINUSES/MASTOIDS: No intraorbital abnormality. No paranasal sinus mucosal disease. No middle ear or mastoid effusion.    BONES/SOFT TISSUES: No acute abnormality.      Impression    IMPRESSION:  1.  No CT evidence for acute intracranial process.  2.  Brain atrophy and presumed chronic microvascular ischemic changes as above.   CT Cervical Spine w/o Contrast    Narrative    EXAM: CT CERVICAL SPINE W/O CONTRAST  LOCATION: [facility]  DATE: 4/12/2025    INDICATION: Fall from standing, head trauma, right sided neck pain  COMPARISON: CT angiogram head and neck 1/27/2024.  TECHNIQUE: Routine CT Cervical Spine without IV contrast. Multiplanar reformats. Dose reduction techniques were used.    FINDINGS:  VERTEBRA: Cervical vertebral body heights are maintained. Grade 1 anterolisthesis C3 on C4 and C4 on C5. Chronic superior endplate compression fractures of the T2 and T3 vertebral bodies. No acute cervical spine fracture.      CANAL/FORAMINA: Severe right neural foraminal stenosis at C3-C4. Severe right neural foraminal stenosis at C4-C5. Moderate right neural foraminal stenosis at C5-C6.    PARASPINAL: No extraspinal  abnormality.      Impression    IMPRESSION:  1.  No acute cervical spine fracture.   XR Shoulder Right G/E 3 Views    Narrative    EXAM: XR SHOULDER RIGHT G/E 3 VIEWS  LOCATION: [facility]  DATE: 4/12/2025    INDICATION: Fall, shoulder pain, decreased mobility  COMPARISON: 12/10/2018      Impression    IMPRESSION: No acute fracture or malalignment. Mild glenohumeral and moderate acromioclavicular joint degenerative changes. Osteopenia.   XR Elbow Right G/E 3 Views    Narrative    EXAM: XR ELBOW RIGHT G/E 3 VIEWS  LOCATION: [facility]  DATE: 4/12/2025    INDICATION: Fall, shoulder pain, elbow pain, decreased ROM  COMPARISON: None.      Impression    IMPRESSION: No acute fracture or malalignment. Mild elbow joint degenerative changes. No elbow joint effusion. Mild soft tissue swelling over the olecranon. Tiny chronic ossicle at the lateral humeral epicondyle. Atherosclerosis.     *Note: Due to a large number of results and/or encounters for the requested time period, some results have not been displayed. A complete set of results can be found in Results Review.       Medications   acetaminophen (TYLENOL) tablet 975 mg (975 mg Oral $Given 4/12/25 1003)   orphenadrine (NORFLEX) injection 60 mg (60 mg Intramuscular $Given 4/12/25 1003)       Assessments & Plan (with Medical Decision Making)  Michele is a 79-year-old male presenting with wife over concern of fall at home yesterday with shoulder injury.  See history and physical exam as above  Pleasant 79-year-old male in no acute distress, is hypertensive with blood pressure of 189/88 but otherwise vitally stable and afebrile.  No obvious deformity of the shoulder, elbow, wrist, and no swelling or joint effusions noted.  Does not have palpable tenderness over the shoulder but does have palpable tenderness over the medial epicondyles at his right elbow.  Has decreased range of motion secondary to pain.  Normal  cap refill and strong radial pulse.  He is also complaining of neck pain over the right neck musculature.  Otherwise has normal range of motion of his neck, not complaining of any further headache or midline bony tenderness when neck is palpated.  Even though patient is not on blood thinners, since he did report that he hit his head when he fell yesterday, will get head and cervical spine CT, and will get x-rays of shoulder and elbow.  Will give a dose of Tylenol and IM Norflex to help with pain  Imaging results as above.  Degenerative changes of the cervical spine, see report above for full details.  No evidence of acute intracranial hemorrhage or sign of calvarial fracture.  X-ray of shoulder and elbow did not show acute fracture or dislocation.  Patient says that the pain is improved and was able to lift his arm and move with improvement in his pain and range of motion.  Both patient and his wife will feel comfortable discharge plan back home and will give a prescription for some muscle relaxers to utilize if needed.  Discussed medication dosing and side effects.  Can also continue using Tylenol at home and his normal home medications.  ED return precautions discussed.  Discharged in stable condition.     I have reviewed the nursing notes.    I have reviewed the findings, diagnosis, plan and need for follow up with the patient.           Medical Decision Making  The patient's presentation was of low complexity (an acute and uncomplicated illness or injury).    The patient's evaluation involved:  ordering and/or review of 3+ test(s) in this encounter (see separate area of note for details)    The patient's management necessitated moderate risk (prescription drug management including medications given in the ED).        Discharge Medication List as of 4/12/2025 10:35 AM        START taking these medications    Details   tiZANidine (ZANAFLEX) 2 MG tablet Take 1 tablet (2 mg) by mouth 3 times daily as needed for  muscle spasms., Disp-12 tablet, R-0, E-Prescribe             Final diagnoses:   Fall at home, initial encounter   Acute pain of right shoulder   Right elbow pain       4/12/2025   [facility] EMERGENCY DEPT       Rosa Maria Torres DO  04/12/25 1416

## 2025-04-12 NOTE — DISCHARGE INSTRUCTIONS
Your head CT did not show any acute bleeding or injury from your recent fall.  X-ray of your shoulder and elbow did not show any sign of broken bones or dislocation.  You did have a little swelling around your elbow, but this is in the soft tissues and should heal over time    You do have some chronic changes in your neck, especially around the C3-C4, and C4-C5 region.  If you have numbness or tingling or constant pain going down your arm, culprit could be the degenerative changes in your neck.  Be sure to follow-up with your primary doctor if the symptoms persist, as you may need a referral and could possibly benefit from injections in that area    You may take Tylenol every 4-6 hours as needed per bottle instructions for pain.  Do not take more than 4000 mg of Tylenol in a 24-hour period.    You may take the muscle relaxer up to 3 times daily as needed.  Use caution since this can make you drowsy, do not drive or drink alcohol taking this medicine.  Since it can make you drowsy, also use caution when getting out of chair or bed so that you do not get lightheaded and dizzy or have another fall    If any new or worsening symptoms develop do not hesitate to return to the emergency room for evaluation

## 2025-04-12 NOTE — ED TRIAGE NOTES
Here with pain to right shoulder after a fall yesterday. Fall from standing on to the hard wood floor.      Triage Assessment (Adult)       Row Name 04/12/25 0859          Triage Assessment    Airway WDL WDL        Respiratory WDL    Respiratory WDL WDL        Skin Circulation/Temperature WDL    Skin Circulation/Temperature WDL WDL        Cardiac WDL    Cardiac WDL WDL        Peripheral/Neurovascular WDL    Peripheral Neurovascular WDL WDL        Cognitive/Neuro/Behavioral WDL    Cognitive/Neuro/Behavioral WDL WDL

## 2025-04-12 NOTE — ED TRIAGE NOTES
Pt not  on thinners     Triage Assessment (Adult)       Row Name 04/12/25 0859          Triage Assessment    Airway WDL WDL        Respiratory WDL    Respiratory WDL WDL        Skin Circulation/Temperature WDL    Skin Circulation/Temperature WDL WDL        Cardiac WDL    Cardiac WDL WDL        Peripheral/Neurovascular WDL    Peripheral Neurovascular WDL WDL        Cognitive/Neuro/Behavioral WDL    Cognitive/Neuro/Behavioral WDL WDL

## 2025-04-15 ENCOUNTER — TELEPHONE (OUTPATIENT)
Dept: GASTROENTEROLOGY | Facility: CLINIC | Age: 80
End: 2025-04-15
Payer: MEDICARE

## 2025-04-15 DIAGNOSIS — Z12.11 SPECIAL SCREENING FOR MALIGNANT NEOPLASMS, COLON: Primary | ICD-10-CM

## 2025-04-15 RX ORDER — BISACODYL 5 MG/1
TABLET, DELAYED RELEASE ORAL
Qty: 4 TABLET | Refills: 0 | Status: SHIPPED | OUTPATIENT
Start: 2025-04-15

## 2025-04-15 NOTE — TELEPHONE ENCOUNTER
Attempted to contact patient in order to complete pre assessment questions.     No answer. Left message to return call to [phone] option 3     Callback communication sent via [employer].    NOTE: primary number is spouse Michelle. Consent to communicate on file.     Sera Stein RN  Endoscopy Procedure Pre Assessment

## 2025-04-15 NOTE — TELEPHONE ENCOUNTER
Caller: Writer miguelina Bautista - Patient    Reason for Reschedule/Cancellation (please be detailed, any staff messages or encounters to note?):   Patient has a shoulder injury and is unable to lay for the procedure - per RN Assessment    Did you cancel or rescheduled an EUS procedure? No.    Is screening questionnaire older than 3 months from the reschedule date.   If Yes, please complete screening questionnaire. No    Prior to reschedule please review:  Ordering Provider: Jonas West  Sedation Determined: MAC  Does patient have any ASC Exclusions, please identify?: No    Notes on Cancelled Procedure:  Procedure: Lower Endoscopy [Colonoscopy]   Date: 4/29/25  Location: [facility]; [address] [place] [address] , [place] [address]  Surgeon: Supriya    Rescheduled: No, Spoke with patient who will call back to reschedule when his shoulder is healed

## 2025-04-15 NOTE — TELEPHONE ENCOUNTER
Pre visit planning completed.      Procedure details:    Patient scheduled for Colonoscopy on 4.29.25.     Arrival time: 1430. Procedure time 1530    Facility location: [facility]; [address], [place] [address]. Check in location: Main entrance at Surgery registation desk.    Sedation type: MAC    Pre op exam needed? No.    Indication for procedure: screening, prev. polyps      Chart review:     Electronic implanted devices? No    Recent diagnosis of diverticulitis within the last 6 weeks? No      Medication review:    Diabetic? No    Anticoagulants? Yes Cilostazol (Pletal): Recommended HOLD 3 days before procedure.  Consult with your managing provider.    Weight loss medication/injectable? No.    Other medication HOLDING recommendations:  N/A      Prep for procedure:     Bowel prep recommendation: Standard Golytely. Bowel prep sent to      [facility] [place], [place] - [address]  Due to: CKD noted    Procedure information and instructions sent via [employer]         Sarah Mcpherson RN  Endoscopy Procedure Pre Assessment   [phone] option 3

## 2025-04-15 NOTE — TELEPHONE ENCOUNTER
Incoming call received from Spouse Michelle, C2C is on file.  Michelle wanted to mention two things.     1) Pt is going to have to r/s procedure as he jst fell and injured his shoulder and is not going to be able to lay for the procedure.   They are going to follow up with ortho regarding this and r/s when he is up to it.     2)Michelle stated that pt will not be able to drink the gallon og Golytely due to his sodium restriction with his CKD, and wanted to have the Pill prep (Sutab). It was discussed with caller that d/t the dx of CKD, the Sutab prep is a contraindication, and that the safest prep for pt is the Golytely.  Michelle stated her understanding and said they will deal with that when it comes.     Wife requested current procedure to be cancelled.     msg sent to endo scheduling to remove case.     Allison Elkins RN   Pre-procedure assessment RN

## 2025-04-16 ENCOUNTER — OFFICE VISIT (OUTPATIENT)
Dept: ORTHOPEDICS | Facility: CLINIC | Age: 80
End: 2025-04-16
Payer: MEDICARE

## 2025-04-16 VITALS — BODY MASS INDEX: 24.55 KG/M2 | WEIGHT: 162 LBS | TEMPERATURE: 98.1 F | HEIGHT: 68 IN

## 2025-04-16 DIAGNOSIS — S46.001A INJURY OF RIGHT ROTATOR CUFF, INITIAL ENCOUNTER: Primary | ICD-10-CM

## 2025-04-16 PROCEDURE — 1125F AMNT PAIN NOTED PAIN PRSNT: CPT | Performed by: ORTHOPAEDIC SURGERY

## 2025-04-16 PROCEDURE — 99203 OFFICE O/P NEW LOW 30 MIN: CPT | Performed by: ORTHOPAEDIC SURGERY

## 2025-04-16 ASSESSMENT — PAIN SCALES - GENERAL: PAINLEVEL_OUTOF10: SEVERE PAIN (10)

## 2025-04-16 NOTE — LETTER
4/16/2025      Michele Vance  [address]  [facility] [address]      Dear Colleague,    Thank you for referring your patient, Michele Vance, to the [facility]. Please see a copy of my visit note below.    S:  Patient fell injuring R shoulder.  Pain and difficulty with voluntary rotation/abduction/flexion RUE.         Patient Active Problem List   Diagnosis     Hypertension     Hyperlipidemia LDL goal <130     Myalgia and myositis     GERD (gastroesophageal reflux disease)     Impingement syndrome, shoulder, right     Anemia     CKD (chronic kidney disease) stage 3, GFR 30-59 ml/min (H)     Arthritis     Carotid bruit     Carotid stenosis, bilateral     Left main coronary artery disease     S/P CABG x 6     Insomnia     Nutritional deficiency     Pain     Urinary retention     Stroke, embolic (H)     Cerebral infarction due to occlusion or stenosis of carotid artery     Carotid artery occlusion with cerebral infarction (H)     Dizziness     Chronic constipation     Irritable bowel syndrome     Essential hypertension with goal blood pressure less than 140/90     Carotid stenosis     Status post balloon angioplasty of pulmonary artery branches     H/O carotid angioplasty     Biliary colic     Abnormal gait     Falls frequently     Facial injury, initial encounter     Acute midline low back pain without sciatica     Hyponatremia     Renal artery stenosis     Transient alteration of awareness     Hyperkalemia     Cerebral hypoperfusion     Persistent insomnia            Past Medical History:   Diagnosis Date     Carotid stenosis     right endartectomy     Chronic kidney disease     stage 3     Coronary artery disease 3-2014    CABG x6     CVA (cerebral infarction)     balance problems, mild     Elevated CK      Esophageal reflux      Hypercholesteremia      Hyponatremia 8/31/2021     Nonsenile cataract      Other and unspecified hyperlipidemia      PAD (peripheral artery disease)       Rhabdomyolysis 2010     Unspecified essential hypertension             Past Surgical History:   Procedure Laterality Date     APPENDECTOMY  1974     BYPASS GRAFT ARTERY CORONARY  3/5/2014    Procedure: BYPASS GRAFT ARTERY CORONARY;  Median Sternotomy, Coronary Artery Bypass Graft X6 used Left internal mammary artery, Left and Right Greater Saphenous vein, on Pump oxygenator.;  Surgeon: Tim Montanez MD;  Location: [facility] OR     CATARACT IOL, RT/LT Bilateral 2008    in [place]     cataracts       COLONOSCOPY  12/12/02    [facility]     COLONOSCOPY N/A 10/7/2014    Procedure: COMBINED COLONOSCOPY, SINGLE OR MULTIPLE BIOPSY/POLYPECTOMY BY BIOPSY;  Surgeon: Micheal Mora MD;  Location: [facility]     CV LOWER EXTREMITY ANGIOGRAM BILATERAL Bilateral 9/9/2019    Procedure: Lower Extremity Angiogram Bilateral;  Surgeon: Julio Oropeza MD;  Location: [facility] CARDIAC CATH LAB     DENTAL SURGERY      TMJ, implants     ENDARTERECTOMY CAROTID      right carotid stent     ESOPHAGOSCOPY, GASTROSCOPY, DUODENOSCOPY (EGD), COMBINED Left 10/7/2014    Procedure: COMBINED ESOPHAGOSCOPY, GASTROSCOPY, DUODENOSCOPY (EGD), BIOPSY SINGLE OR MULTIPLE;  Surgeon: Micheal Mora MD;  Location: [facility]     ESOPHAGOSCOPY, GASTROSCOPY, DUODENOSCOPY (EGD), COMBINED Left 10/7/2014    Procedure: COMBINED ESOPHAGOSCOPY, GASTROSCOPY, DUODENOSCOPY (EGD), REMOVE FOREIGN BODY;  Surgeon: Micheal Mora MD;  Location: [facility]     HC CAPSULE ENDOSCOPY N/A 10/7/2014    Procedure: CAPSULE/PILL CAM ENDOSCOPY;  Surgeon: Micheal Mora MD;  Location: [facility]     INJECT EPIDURAL LUMBAR Right 5/8/2017    Procedure: INJECT EPIDURAL LUMBAR;  Lumbar transforaminal Epidural Steroid Injection right lumbar 4-5, and right  lumbar 5-Sacral 1;  Surgeon: Anthony Gonzalez MD;  [facility]     INJECT JOINT SACROILIAC Bilateral 8/28/2017    Procedure: INJECT JOINT SACROILIAC;  sacroiliac joint injection bilateral;  Surgeon: Anthony Gonzalez MD;   "Location: [facility]     IR CAROTID CEREBRAL ANGIOGRAM BILATERAL  9/3/2020     KNEE SURGERY      left knee reconstruction     LAPAROSCOPIC CHOLECYSTECTOMY N/A 2021    Procedure: CHOLECYSTECTOMY, LAPAROSCOPIC;  Surgeon: Anthony Epps MD;  Location: [facility]     lasix  2001    both eyes     RELEASE CARPAL TUNNEL  2011    RELEASE CARPAL TUNNEL performed by JO MADRID at  OR     RELEASE CARPAL TUNNEL  2011    RELEASE CARPAL TUNNEL performed by JO MADRID at  OR     [facility] UGI ENDOSCOPY, SIMPLE EXAM  99    [facility]            Social History     Tobacco Use     Smoking status: Former     Current packs/day: 0.00     Average packs/day: 2.5 packs/day for 20.0 years (50.0 ttl pk-yrs)     Types: Cigarettes     Start date: 1980     Quit date: 2000     Years since quittin.3     Smokeless tobacco: Never   Substance Use Topics     Alcohol use: No     Alcohol/week: 0.0 standard drinks of alcohol            Family History   Problem Relation Age of Onset     Hypertension Mother      Eye Disorder Mother      Cerebrovascular Disease Father      Heart Disease Father      Lipids Father      Obesity Father      Cancer Sister      Heart Disease Sister      Glaucoma No family hx of      Macular Degeneration No family hx of      Kidney Disease No family hx of                Allergies   Allergen Reactions     Statins [Statins] Other (See Comments)     Rhabdo  Same as \"statins\"     Gemfibrozil      Resting thigh-calf pain     Sulfa Antibiotics      Reacted as a child     Zetia [Ezetimibe]      Muscle cramping            Current Outpatient Medications   Medication Sig Dispense Refill     acetaminophen (TYLENOL) 325 MG tablet Take 650 mg by mouth daily       amitriptyline (ELAVIL) 25 MG tablet TAKE 2 TABLETS AT BEDTIME 180 tablet 2     aspirin (ASA) 325 MG tablet Take 325 mg by mouth daily       carvedilol (COREG) 25 MG tablet TAKE 1 TABLET TWICE A DAY WITH MEALS 180 tablet 3     " cilostazol (PLETAL) 100 MG tablet TAKE 1 TABLET TWICE A  tablet 3     diphenhydrAMINE-acetaminophen (TYLENOL PM)  MG tablet Take 2 tablets by mouth At Bedtime       evolocumab (REPATHA SURECLICK) 140 MG/ML prefilled autoinjector Inject 1 mL (140 mg) Subcutaneous every 14 days 6 mL 3     gabapentin (NEURONTIN) 300 MG capsule TAKE 1 CAPSULE THREE TIMES A  capsule 3     Melatonin 10 MG TABS tablet Take 10 mg by mouth At Bedtime       omeprazole (PRILOSEC) 40 MG DR capsule Take 1 capsule (40 mg) by mouth daily. 90 capsule 2     tamsulosin (FLOMAX) 0.4 MG capsule Take 1 capsule (0.4 mg) by mouth daily. 90 capsule 3     alirocumab (PRALUENT) 150 MG/ML injectable pen Inject 1 mL (150 mg) Subcutaneous every 14 days Every other Sunday (Patient not taking: Reported on 4/16/2025) 6 mL 3     bisacodyl (DULCOLAX) 5 MG EC tablet Take 2 tablets at 3 pm the day before your procedure. If your procedure is before 11 am, take 2 additional tablets at 11 pm. If your procedure is after 11 am, take 2 additional tablets at 6 am. For additional instructions refer to your colonoscopy prep instructions. (Patient not taking: Reported on 4/16/2025) 4 tablet 0     fluticasone (FLONASE) 50 MCG/ACT nasal spray USE 1 SPRAY IN EACH NOSTRIL DAILY (Patient not taking: Reported on 4/16/2025) 48 g 2     polyethylene glycol (GOLYTELY) 236 g suspension The night before the exam at 6 pm drink an 8-ounce glass every 15 minutes until the jug is half empty. If you arrive before 11 AM: Drink the other half of the Golytely jug at 11 PM night before procedure. If you arrive after 11 AM: Drink the other half of the Golytely jug at 6 AM day of procedure. For additional instructions refer to your colonoscopy prep instructions. (Patient not taking: Reported on 4/16/2025) 4000 mL 0     tiZANidine (ZANAFLEX) 2 MG tablet Take 1 tablet (2 mg) by mouth 3 times daily as needed for muscle spasms. (Patient not taking: Reported on 4/16/2025) 12 tablet 0           Review Of Systems  Skin: negative  Eyes: negative  Ears/Nose/Throat: negative  Respiratory: No shortness of breath, dyspnea on exertion, cough, or hemoptysis    O:  Physical Exam:  Weak with ER and slightly improved IR RUE against a force.  Cannot abduct or forward flex without pain.  Pain to palpation about R anterolateral acromion and along the bicipital groove.  CMS intact to RUE.    Lab:Cr 1.3    Images:  EXAM: XR SHOULDER RIGHT G/E 3 VIEWS  LOCATION: [facility]  DATE: 4/12/2025     INDICATION: Fall, shoulder pain, decreased mobility  COMPARISON: 12/10/2018                                                                      IMPRESSION: No acute fracture or malalignment. Mild glenohumeral and moderate acromioclavicular joint degenerative changes. Osteopenia    (Possible cephalization head in relationship to glenoid, AC arthropathy with enthesis), glenohumeral OA         A:  R shoulder rotator cuff pathology with biceps long head disruption, early anterior superior escape      P:  MRI R shoulder to evaluate rotator cuff pathology  Mc ZELAYA  Notify if exacerbation symptoms  See back after study             In addition to the above assessment and plan each active problem on Michele's problem list was evaluated today. This included the questioning of Michele for any medication problems. We will continue the current treatment plan for these active problems except as noted.        Again, thank you for allowing me to participate in the care of your patient.        Sincerely,        Stevie Bradley MD    Electronically signed

## 2025-04-17 NOTE — PROGRESS NOTES
S:  Patient fell injuring R shoulder.  Pain and difficulty with voluntary rotation/abduction/flexion RUE.         Patient Active Problem List   Diagnosis    Hypertension    Hyperlipidemia LDL goal <130    Myalgia and myositis    GERD (gastroesophageal reflux disease)    Impingement syndrome, shoulder, right    Anemia    CKD (chronic kidney disease) stage 3, GFR 30-59 ml/min (H)    Arthritis    Carotid bruit    Carotid stenosis, bilateral    Left main coronary artery disease    S/P CABG x 6    Insomnia    Nutritional deficiency    Pain    Urinary retention    Stroke, embolic (H)    Cerebral infarction due to occlusion or stenosis of carotid artery    Carotid artery occlusion with cerebral infarction (H)    Dizziness    Chronic constipation    Irritable bowel syndrome    Essential hypertension with goal blood pressure less than 140/90    Carotid stenosis    Status post balloon angioplasty of pulmonary artery branches    H/O carotid angioplasty    Biliary colic    Abnormal gait    Falls frequently    Facial injury, initial encounter    Acute midline low back pain without sciatica    Hyponatremia    Renal artery stenosis    Transient alteration of awareness    Hyperkalemia    Cerebral hypoperfusion    Persistent insomnia            Past Medical History:   Diagnosis Date    Carotid stenosis     right endartectomy    Chronic kidney disease     stage 3    Coronary artery disease 3-2014    CABG x6    CVA (cerebral infarction)     balance problems, mild    Elevated CK     Esophageal reflux     Hypercholesteremia     Hyponatremia 8/31/2021    Nonsenile cataract     Other and unspecified hyperlipidemia     PAD (peripheral artery disease)     Rhabdomyolysis 2010    Unspecified essential hypertension             Past Surgical History:   Procedure Laterality Date    APPENDECTOMY  1974    BYPASS GRAFT ARTERY CORONARY  3/5/2014    Procedure: BYPASS GRAFT ARTERY CORONARY;  Median Sternotomy, Coronary Artery Bypass Graft X6 used Left  internal mammary artery, Left and Right Greater Saphenous vein, on Pump oxygenator.;  Surgeon: Tim Montanez MD;  Location: [facility]    CATARACT IOL, RT/LT Bilateral 2008    in [place]    cataracts      COLONOSCOPY  12/12/02    [facility]    COLONOSCOPY N/A 10/7/2014    Procedure: COMBINED COLONOSCOPY, SINGLE OR MULTIPLE BIOPSY/POLYPECTOMY BY BIOPSY;  Surgeon: Micheal Mora MD;  Location: [facility]    CV LOWER EXTREMITY ANGIOGRAM BILATERAL Bilateral 9/9/2019    Procedure: Lower Extremity Angiogram Bilateral;  Surgeon: Julio Oropeza MD;  Location: [facility] CARDIAC CATH LAB    DENTAL SURGERY      TMJ, implants    ENDARTERECTOMY CAROTID      right carotid stent    ESOPHAGOSCOPY, GASTROSCOPY, DUODENOSCOPY (EGD), COMBINED Left 10/7/2014    Procedure: COMBINED ESOPHAGOSCOPY, GASTROSCOPY, DUODENOSCOPY (EGD), BIOPSY SINGLE OR MULTIPLE;  Surgeon: Micheal Mora MD;  Location: [facility]    ESOPHAGOSCOPY, GASTROSCOPY, DUODENOSCOPY (EGD), COMBINED Left 10/7/2014    Procedure: COMBINED ESOPHAGOSCOPY, GASTROSCOPY, DUODENOSCOPY (EGD), REMOVE FOREIGN BODY;  Surgeon: Micheal Mora MD;  Location: [facility]    HC CAPSULE ENDOSCOPY N/A 10/7/2014    Procedure: CAPSULE/PILL CAM ENDOSCOPY;  Surgeon: Micheal Mora MD;  Location: [facility]    INJECT EPIDURAL LUMBAR Right 5/8/2017    Procedure: INJECT EPIDURAL LUMBAR;  Lumbar transforaminal Epidural Steroid Injection right lumbar 4-5, and right  lumbar 5-Sacral 1;  Surgeon: Anthony Gonzalez MD;  [facility]    INJECT JOINT SACROILIAC Bilateral 8/28/2017    Procedure: INJECT JOINT SACROILIAC;  sacroiliac joint injection bilateral;  Surgeon: Anthony Gonzalez MD;  [facility]    IR CAROTID CEREBRAL ANGIOGRAM BILATERAL  9/3/2020    KNEE SURGERY  1976    left knee reconstruction    LAPAROSCOPIC CHOLECYSTECTOMY N/A 7/16/2021    Procedure: CHOLECYSTECTOMY, LAPAROSCOPIC;  Surgeon: Anthony Epps MD;  Location: [facility]    lasix  2001    both eyes    RELEASE CARPAL TUNNEL   "2011    RELEASE CARPAL TUNNEL performed by JO MADRID at  OR    RELEASE CARPAL TUNNEL  2011    RELEASE CARPAL TUNNEL performed by JO MADRID at  OR    [facility] UGI ENDOSCOPY, SIMPLE EXAM  99    [facility]            Social History     Tobacco Use    Smoking status: Former     Current packs/day: 0.00     Average packs/day: 2.5 packs/day for 20.0 years (50.0 ttl pk-yrs)     Types: Cigarettes     Start date: 1980     Quit date: 2000     Years since quittin.3    Smokeless tobacco: Never   Substance Use Topics    Alcohol use: No     Alcohol/week: 0.0 standard drinks of alcohol            Family History   Problem Relation Age of Onset    Hypertension Mother     Eye Disorder Mother     Cerebrovascular Disease Father     Heart Disease Father     Lipids Father     Obesity Father     Cancer Sister     Heart Disease Sister     Glaucoma No family hx of     Macular Degeneration No family hx of     Kidney Disease No family hx of                Allergies   Allergen Reactions    Statins [Statins] Other (See Comments)     Rhabdo  Same as \"statins\"    Gemfibrozil      Resting thigh-calf pain    Sulfa Antibiotics      Reacted as a child    Zetia [Ezetimibe]      Muscle cramping            Current Outpatient Medications   Medication Sig Dispense Refill    acetaminophen (TYLENOL) 325 MG tablet Take 650 mg by mouth daily      amitriptyline (ELAVIL) 25 MG tablet TAKE 2 TABLETS AT BEDTIME 180 tablet 2    aspirin (ASA) 325 MG tablet Take 325 mg by mouth daily      carvedilol (COREG) 25 MG tablet TAKE 1 TABLET TWICE A DAY WITH MEALS 180 tablet 3    cilostazol (PLETAL) 100 MG tablet TAKE 1 TABLET TWICE A  tablet 3    diphenhydrAMINE-acetaminophen (TYLENOL PM)  MG tablet Take 2 tablets by mouth At Bedtime      evolocumab (REPATHA SURECLICK) 140 MG/ML prefilled autoinjector Inject 1 mL (140 mg) Subcutaneous every 14 days 6 mL 3    gabapentin (NEURONTIN) 300 MG capsule TAKE 1 " CAPSULE THREE TIMES A  capsule 3    Melatonin 10 MG TABS tablet Take 10 mg by mouth At Bedtime      omeprazole (PRILOSEC) 40 MG DR capsule Take 1 capsule (40 mg) by mouth daily. 90 capsule 2    tamsulosin (FLOMAX) 0.4 MG capsule Take 1 capsule (0.4 mg) by mouth daily. 90 capsule 3    alirocumab (PRALUENT) 150 MG/ML injectable pen Inject 1 mL (150 mg) Subcutaneous every 14 days Every other Sunday (Patient not taking: Reported on 4/16/2025) 6 mL 3    bisacodyl (DULCOLAX) 5 MG EC tablet Take 2 tablets at 3 pm the day before your procedure. If your procedure is before 11 am, take 2 additional tablets at 11 pm. If your procedure is after 11 am, take 2 additional tablets at 6 am. For additional instructions refer to your colonoscopy prep instructions. (Patient not taking: Reported on 4/16/2025) 4 tablet 0    fluticasone (FLONASE) 50 MCG/ACT nasal spray USE 1 SPRAY IN EACH NOSTRIL DAILY (Patient not taking: Reported on 4/16/2025) 48 g 2    polyethylene glycol (GOLYTELY) 236 g suspension The night before the exam at 6 pm drink an 8-ounce glass every 15 minutes until the jug is half empty. If you arrive before 11 AM: Drink the other half of the Golytely jug at 11 PM night before procedure. If you arrive after 11 AM: Drink the other half of the Golytely jug at 6 AM day of procedure. For additional instructions refer to your colonoscopy prep instructions. (Patient not taking: Reported on 4/16/2025) 4000 mL 0    tiZANidine (ZANAFLEX) 2 MG tablet Take 1 tablet (2 mg) by mouth 3 times daily as needed for muscle spasms. (Patient not taking: Reported on 4/16/2025) 12 tablet 0          Review Of Systems  Skin: negative  Eyes: negative  Ears/Nose/Throat: negative  Respiratory: No shortness of breath, dyspnea on exertion, cough, or hemoptysis    O:  Physical Exam:  Weak with ER and slightly improved IR RUE against a force.  Cannot abduct or forward flex without pain.  Pain to palpation about R anterolateral acromion and along  the bicipital groove.  CMS intact to RUE.    Lab:Cr 1.3    Images:  EXAM: XR SHOULDER RIGHT G/E 3 VIEWS  LOCATION: [facility]  DATE: 4/12/2025     INDICATION: Fall, shoulder pain, decreased mobility  COMPARISON: 12/10/2018                                                                      IMPRESSION: No acute fracture or malalignment. Mild glenohumeral and moderate acromioclavicular joint degenerative changes. Osteopenia    (Possible cephalization head in relationship to glenoid, AC arthropathy with enthesis), glenohumeral OA         A:  R shoulder rotator cuff pathology with biceps long head disruption, early anterior superior escape      P:  MRI R shoulder to evaluate rotator cuff pathology  Mc ZELAYA  Notify if exacerbation symptoms  See back after study             In addition to the above assessment and plan each active problem on Michele's problem list was evaluated today. This included the questioning of Michele for any medication problems. We will continue the current treatment plan for these active problems except as noted.

## 2025-04-24 DIAGNOSIS — E78.5 HYPERLIPIDEMIA LDL GOAL <130: ICD-10-CM

## 2025-04-24 RX ORDER — EVOLOCUMAB 140 MG/ML
140 INJECTION, SOLUTION SUBCUTANEOUS
Qty: 6 ML | Refills: 4 | Status: SHIPPED | OUTPATIENT
Start: 2025-04-24

## 2025-04-29 ENCOUNTER — MYC MEDICAL ADVICE (OUTPATIENT)
Dept: INTERNAL MEDICINE | Facility: CLINIC | Age: 80
End: 2025-04-29
Payer: MEDICARE

## 2025-04-29 DIAGNOSIS — E87.1 HYPONATREMIA: Primary | ICD-10-CM

## 2025-04-29 NOTE — TELEPHONE ENCOUNTER
RN Triage    Patient Contact    Attempt # 1    Was call answered?  No.  Left message on voicemail with information to call me back. and sent Bria Bettencourt RN on 4/29/2025 at 8:38 AM

## 2025-04-29 NOTE — PROGRESS NOTES
Michele Vance  :  1945  DOS: 2025  MRN: [id number]    Sports Medicine Clinic Procedure    Ultrasound Guided Right Shoulder Intra-articular Glenohumeral Joint Injection    Clinical History: Right glenohumeral joint arthritis and right shoulder rotator cuff arthropathy    Diagnosis:   1. Primary osteoarthritis, right shoulder    2. Rotator cuff arthropathy of right shoulder      Referring Physician: Dr. Micheal Luna  Large Joint Injection/Arthocentesis: R glenohumeral joint    Date/Time: 2025 11:47 AM    Performed by: Melchor Lofton DO  Authorized by: Melchor Lofton DO    Indications:  Pain  Needle Size:  22 G  Guidance: ultrasound    Approach:  Posterolateral  Location:  Shoulder      Site:  R glenohumeral joint  Medications:  40 mg triamcinolone 40 MG/ML; 2 mL lidocaine 1 %; 2 mL BUPivacaine 0.5 %  Outcome:  Tolerated well, no immediate complications  Procedure discussed: discussed risks, benefits, and alternatives    Consent Given by:  Patient  Prep: patient was prepped and draped in usual sterile fashion     Ultrasound images of procedure were permanently stored.          Right Glenohumeral Injection - Ultrasound Guided  The patient was informed of the risks and the benefits of the procedure and a written consent was signed.  The patient s shoulder was prepped with chlorhexidine in sterile fashion.   40 mg of kenalog suspension was drawn up into a 5 mL syringe with 2 mL of 1% lidocaine and 2 ml of 0.5% bupivacaine.   Injection was performed using sterile technique.  Under ultrasound guidance a 2-inch 25-gauge needle was used to enter the glenohumeral joint.  Posterolateral approach was used with the patient in lateral recumbent position, arm in neutral position at the side.  Needle placement was visualized and documented with ultrasound.  Ultrasound visualization was necessary due to the small joint space entered.  Injection performed long axis to the probe.  Injection solution visualized  within the joint space.  Images were permanently stored for the patient's record.  There were no complications. The patient tolerated the procedure well. There was negligible bleeding.     Impression:  Successful ultrasound-guided right glenohumeral joint corticosteroid injection.    Plan:  - Injection:    - Expectations and goals of the injection were discussed and verbal and written consent was obtained.  - Performed the above procedure today in clinic. Patient tolerated the procedure well without complications.    - Post-procedure instructions:    - Keep the injection site clean and dry.   - Do not submerge the injection site for 24 hours (no baths, pools). Showers are ok.   - Rest the area for 24-48 hours before resuming normal activities. Avoid overexerting the area for the first few weeks.   - It may take 2-3 days to start noticing the effects of the injection and up to 3-4 weeks to feel significant benefits.   - Follow up:          - With Dr. Luna as recommended for updates to treatment plan, or sooner for new/worsening symptoms.  - Patient has clinic contact information for questions or concerns.      Melchor Lofton DO, ADELEM  [facility] - Sports Medicine  [facility] - Department of Orthopedic Surgery     Disclaimer:  This note was prepared and written using Dragon Medical dictation software. As a result, there may be errors in the script that have gone undetected. Please consider this when interpreting the information in this note.

## 2025-04-29 NOTE — TELEPHONE ENCOUNTER
"Patient hx of low sodium, requiring hospitalization.     He recently had fall and is \"wobbly\". Is following restricted fluid intake. Spouse things these symptoms may be early sign of lower electrolytes.     Wanting to check for peace of mind/correct or determine if needing follow-up.     Writer unsure if wanting electrolyte panel or complete basic. Please advise if needing appointment.     Efrem Snyder RN on 4/29/2025 at 8:56 AM    "

## 2025-04-30 ENCOUNTER — TELEPHONE (OUTPATIENT)
Dept: ORTHOPEDICS | Facility: CLINIC | Age: 80
End: 2025-04-30

## 2025-04-30 ENCOUNTER — OFFICE VISIT (OUTPATIENT)
Dept: ORTHOPEDICS | Facility: CLINIC | Age: 80
End: 2025-04-30
Payer: MEDICARE

## 2025-04-30 ENCOUNTER — LAB (OUTPATIENT)
Dept: LAB | Facility: CLINIC | Age: 80
End: 2025-04-30
Payer: MEDICARE

## 2025-04-30 DIAGNOSIS — M12.811 ROTATOR CUFF ARTHROPATHY OF RIGHT SHOULDER: ICD-10-CM

## 2025-04-30 DIAGNOSIS — E87.1 HYPONATREMIA: ICD-10-CM

## 2025-04-30 DIAGNOSIS — M19.011 PRIMARY OSTEOARTHRITIS, RIGHT SHOULDER: Primary | ICD-10-CM

## 2025-04-30 LAB
ANION GAP SERPL CALCULATED.3IONS-SCNC: 6 MMOL/L (ref 7–15)
BUN SERPL-MCNC: 17.1 MG/DL (ref 8–23)
CALCIUM SERPL-MCNC: 9.7 MG/DL (ref 8.8–10.4)
CHLORIDE SERPL-SCNC: 97 MMOL/L (ref 98–107)
CREAT SERPL-MCNC: 1.29 MG/DL (ref 0.67–1.17)
EGFRCR SERPLBLD CKD-EPI 2021: 57 ML/MIN/1.73M2
GLUCOSE SERPL-MCNC: 121 MG/DL (ref 70–99)
HCO3 SERPL-SCNC: 29 MMOL/L (ref 22–29)
POTASSIUM SERPL-SCNC: 4.3 MMOL/L (ref 3.4–5.3)
SODIUM SERPL-SCNC: 132 MMOL/L (ref 135–145)

## 2025-04-30 PROCEDURE — 36415 COLL VENOUS BLD VENIPUNCTURE: CPT

## 2025-04-30 PROCEDURE — 80048 BASIC METABOLIC PNL TOTAL CA: CPT

## 2025-04-30 RX ORDER — BUPIVACAINE HYDROCHLORIDE 5 MG/ML
2 INJECTION, SOLUTION PERINEURAL
Status: COMPLETED | OUTPATIENT
Start: 2025-04-30 | End: 2025-04-30

## 2025-04-30 RX ORDER — LIDOCAINE HYDROCHLORIDE 10 MG/ML
2 INJECTION, SOLUTION INFILTRATION; PERINEURAL
Status: COMPLETED | OUTPATIENT
Start: 2025-04-30 | End: 2025-04-30

## 2025-04-30 RX ORDER — TRIAMCINOLONE ACETONIDE 40 MG/ML
40 INJECTION, SUSPENSION INTRA-ARTICULAR; INTRAMUSCULAR
Status: COMPLETED | OUTPATIENT
Start: 2025-04-30 | End: 2025-04-30

## 2025-04-30 RX ADMIN — TRIAMCINOLONE ACETONIDE 40 MG: 40 INJECTION, SUSPENSION INTRA-ARTICULAR; INTRAMUSCULAR at 11:47

## 2025-04-30 RX ADMIN — BUPIVACAINE HYDROCHLORIDE 2 ML: 5 INJECTION, SOLUTION PERINEURAL at 11:47

## 2025-04-30 RX ADMIN — LIDOCAINE HYDROCHLORIDE 2 ML: 10 INJECTION, SOLUTION INFILTRATION; PERINEURAL at 11:47

## 2025-04-30 NOTE — TELEPHONE ENCOUNTER
Reason for Call: Request for an order or referral:    Order or referral being requested: Physical Therapy    Date needed: at your convenience    Has the patient been seen by the PCP for this problem? YES    Additional comments: Michele would like to do PT at   [address] [address]  Fax: [phone]     His wife called there yesterday and says they have not received the order. Would you be so kind as to send it again? Thank you!     Phone number Patient can be reached at:  Cell number on file:    Telephone Information:   Mobile [phone]       Best Time:  any    Can we leave a detailed message on this number?  YES but they don't need a call unless there are questions. Thanks!!    Call taken on 4/30/2025 at 11:23 AM by Juvencio Muñoz

## 2025-04-30 NOTE — TELEPHONE ENCOUNTER
Order has been faxed as requested. Message was left notifying patient.     Lucy Can RN   [facility]

## 2025-04-30 NOTE — LETTER
2025      Michele Vance  [address]  [facility] [address]      Dear Colleague,    Thank you for referring your patient, Mcihele Vance, to the [facility] [place]. Please see a copy of my visit note below.    Michele Vance  :  1945  DOS: 2025  MRN: [id number]    Sports Medicine Clinic Procedure    Ultrasound Guided Right Shoulder Intra-articular Glenohumeral Joint Injection    Clinical History: Right glenohumeral joint arthritis and right shoulder rotator cuff arthropathy    Diagnosis:   1. Primary osteoarthritis, right shoulder    2. Rotator cuff arthropathy of right shoulder      Referring Physician: Dr. Micheal Luna  Large Joint Injection/Arthocentesis: R glenohumeral joint    Date/Time: 2025 11:47 AM    Performed by: Melchor Lofton DO  Authorized by: Melchor Lofton DO    Indications:  Pain  Needle Size:  22 G  Guidance: ultrasound    Approach:  Posterolateral  Location:  Shoulder      Site:  R glenohumeral joint  Medications:  40 mg triamcinolone 40 MG/ML; 2 mL lidocaine 1 %; 2 mL BUPivacaine 0.5 %  Outcome:  Tolerated well, no immediate complications  Procedure discussed: discussed risks, benefits, and alternatives    Consent Given by:  Patient  Prep: patient was prepped and draped in usual sterile fashion     Ultrasound images of procedure were permanently stored.          Right Glenohumeral Injection - Ultrasound Guided  The patient was informed of the risks and the benefits of the procedure and a written consent was signed.  The patient s shoulder was prepped with chlorhexidine in sterile fashion.   40 mg of kenalog suspension was drawn up into a 5 mL syringe with 2 mL of 1% lidocaine and 2 ml of 0.5% bupivacaine.   Injection was performed using sterile technique.  Under ultrasound guidance a 2-inch 25-gauge needle was used to enter the glenohumeral joint.  Posterolateral approach was used with the patient in lateral recumbent position, arm  in neutral position at the side.  Needle placement was visualized and documented with ultrasound.  Ultrasound visualization was necessary due to the small joint space entered.  Injection performed long axis to the probe.  Injection solution visualized within the joint space.  Images were permanently stored for the patient's record.  There were no complications. The patient tolerated the procedure well. There was negligible bleeding.     Impression:  Successful ultrasound-guided right glenohumeral joint corticosteroid injection.    Plan:  - Injection:    - Expectations and goals of the injection were discussed and verbal and written consent was obtained.  - Performed the above procedure today in clinic. Patient tolerated the procedure well without complications.    - Post-procedure instructions:    - Keep the injection site clean and dry.   - Do not submerge the injection site for 24 hours (no baths, pools). Showers are ok.   - Rest the area for 24-48 hours before resuming normal activities. Avoid overexerting the area for the first few weeks.   - It may take 2-3 days to start noticing the effects of the injection and up to 3-4 weeks to feel significant benefits.   - Follow up:          - With Dr. Luna as recommended for updates to treatment plan, or sooner for new/worsening symptoms.  - Patient has clinic contact information for questions or concerns.      Melchor Lofton DO, CAQSM  [facility]  [facility] - Department of Orthopedic Surgery     Disclaimer:  This note was prepared and written using Dragon Medical dictation software. As a result, there may be errors in the script that have gone undetected. Please consider this when interpreting the information in this note.       Again, thank you for allowing me to participate in the care of your patient.        Sincerely,        Melchor Lofton DO    Electronically signed

## 2025-04-30 NOTE — TELEPHONE ENCOUNTER
Patient read MyChart and has lab only appointment, closing encounter.     Efrem Snyder RN on 4/30/2025 at 8:04 AM

## 2025-05-08 ENCOUNTER — TRANSFERRED RECORDS (OUTPATIENT)
Dept: HEALTH INFORMATION MANAGEMENT | Facility: CLINIC | Age: 80
End: 2025-05-08

## 2025-06-05 NOTE — PROGRESS NOTES
Office Visit-Follow up    Chief Complaint: Michele Vance is a 79 year old male who is being seen for   Chief Complaint   Patient presents with    Right Shoulder - Follow Up     Corticosteroid injection with Dr. Lofton 2025       History of Present Illness:   Today's visit:  Since his last visit he underwent a right glenohumeral joint injection under ultrasound by Dr. Lofton on 2025 as well as a recommendation referral for physical therapy.  Returns today with his wife.  Shoulder feels really good.  No significant pain.  He is very happy with the pain improvement in motion that he is gotten with physical therapy.    2025 office visit:  Seen by Dr Bradley on 25 and a MRI of shoulder was ordered.     Presents with his wife.  Some issues in the shoulder in the past.  Some generalized achiness and soreness.  History of a previous injection and physical therapy a few years ago.  However overall typically does well.  Fell .  Increasing pain.  Presents with an MRI.  Pain has been getting better.  Has been using a sling and muscle relaxants as well as Tylenol.  Nonradiating.  Social History     Occupational History    Occupation:      Employer: RETIRED   Tobacco Use    Smoking status: Former     Current packs/day: 0.00     Average packs/day: 2.5 packs/day for 20.0 years (50.0 ttl pk-yrs)     Types: Cigarettes     Start date: 1980     Quit date: 2000     Years since quittin.4    Smokeless tobacco: Never   Vaping Use    Vaping status: Never Used   Substance and Sexual Activity    Alcohol use: No     Alcohol/week: 0.0 standard drinks of alcohol    Drug use: No    Sexual activity: Yes     Partners: Female       Physical Exam:  Vitals: There were no vitals taken for this visit.  BMI= There is no height or weight on file to calculate BMI.  Constitutional: healthy, alert and no acute distress   Psychiatric: mentation appears normal and affect normal/bright  NEURO: no  focal deficits  RESP: Normal with easy respirations and no use of accessory muscles to breathe, no audible wheezing or retractions  CV: RUE:  no edema           SKIN: No erythema, rashes, excoriation, or breakdown. No evidence of infection.   JOINT/EXTREMITIES:right shoulder: Near full active range of motion.  No point areas of tenderness.  No pain with supraspinatus testing.  GAIT: not tested             Diagnostic Modalities:  Right shoulder MRI:  IMPRESSION:     1. Full-thickness, full-width tear of supraspinatus, tendon retraction to the mid humeral head and grade 2 muscle atrophy. There is also mild infraspinatus & subscapularis tendinopathy, without tear.     2. Full-thickness avulsion/disruption of the biceps long head tendon, with significant tendon retraction.     3. Mild osteoarthritis of the glenohumeral joint.     4. Mild narrowing of the acromiohumeral space with thickening of the coracoacromial ligament and mild subacromial-subdeltoid bursal fluid/bursitis. Additionally, there is mild/moderate AC joint arthropathy, which effaces the underlying supraspinatus.     5. Finding keeping with any clinical symptoms of adhesive capsulitis.     6. Circumferential degeneration and fraying of the labrum, which is of doubtful clinical significance.    Impression: right shoulder rotator cuff arthropathy     Plan:  All of the above pertinent physical exam and imaging modalities findings was reviewed with Michele and his wife.    He is happy.  Very limited pain.  Feels like his movement and strength of come back.  We discussed frequency injections.  If he has return of pain in the next few months send us a [employer] message and we will get him set up to see Dr. Lofton.          Return to clinic 1, year,prn  or sooner as needed for changes.  Re-x-ray on return: No    Swapnil Luna D.O.

## 2025-06-11 ENCOUNTER — OFFICE VISIT (OUTPATIENT)
Dept: ORTHOPEDICS | Facility: CLINIC | Age: 80
End: 2025-06-11
Payer: MEDICARE

## 2025-06-11 DIAGNOSIS — M12.811 ROTATOR CUFF ARTHROPATHY OF RIGHT SHOULDER: Primary | ICD-10-CM

## 2025-06-11 PROCEDURE — 99213 OFFICE O/P EST LOW 20 MIN: CPT | Performed by: ORTHOPAEDIC SURGERY

## 2025-06-11 NOTE — LETTER
2025      Michele Vance  [address]  [facility] [address]      Dear Colleague,    Thank you for referring your patient, Michele Vance, to the [facility]. Please see a copy of my visit note below.    Office Visit-Follow up    Chief Complaint: Michele Vance is a 79 year old male who is being seen for   Chief Complaint   Patient presents with     Right Shoulder - Follow Up     Corticosteroid injection with Dr. Lofton 2025       History of Present Illness:   Today's visit:  Since his last visit he underwent a right glenohumeral joint injection under ultrasound by Dr. Lofton on 2025 as well as a recommendation referral for physical therapy.  Returns today with his wife.  Shoulder feels really good.  No significant pain.  He is very happy with the pain improvement in motion that he is gotten with physical therapy.    2025 office visit:  Seen by Dr Bradley on 25 and a MRI of shoulder was ordered.     Presents with his wife.  Some issues in the shoulder in the past.  Some generalized achiness and soreness.  History of a previous injection and physical therapy a few years ago.  However overall typically does well.  Fell .  Increasing pain.  Presents with an MRI.  Pain has been getting better.  Has been using a sling and muscle relaxants as well as Tylenol.  Nonradiating.  Social History     Occupational History     Occupation:      Employer: RETIRED   Tobacco Use     Smoking status: Former     Current packs/day: 0.00     Average packs/day: 2.5 packs/day for 20.0 years (50.0 ttl pk-yrs)     Types: Cigarettes     Start date: 1980     Quit date: 2000     Years since quittin.4     Smokeless tobacco: Never   Vaping Use     Vaping status: Never Used   Substance and Sexual Activity     Alcohol use: No     Alcohol/week: 0.0 standard drinks of alcohol     Drug use: No     Sexual activity: Yes     Partners: Female       Physical  Exam:  Vitals: There were no vitals taken for this visit.  BMI= There is no height or weight on file to calculate BMI.  Constitutional: healthy, alert and no acute distress   Psychiatric: mentation appears normal and affect normal/bright  NEURO: no focal deficits  RESP: Normal with easy respirations and no use of accessory muscles to breathe, no audible wheezing or retractions  CV: RUE:  no edema           SKIN: No erythema, rashes, excoriation, or breakdown. No evidence of infection.   JOINT/EXTREMITIES:right shoulder: Near full active range of motion.  No point areas of tenderness.  No pain with supraspinatus testing.  GAIT: not tested             Diagnostic Modalities:  Right shoulder MRI:  IMPRESSION:     1. Full-thickness, full-width tear of supraspinatus, tendon retraction to the mid humeral head and grade 2 muscle atrophy. There is also mild infraspinatus & subscapularis tendinopathy, without tear.     2. Full-thickness avulsion/disruption of the biceps long head tendon, with significant tendon retraction.     3. Mild osteoarthritis of the glenohumeral joint.     4. Mild narrowing of the acromiohumeral space with thickening of the coracoacromial ligament and mild subacromial-subdeltoid bursal fluid/bursitis. Additionally, there is mild/moderate AC joint arthropathy, which effaces the underlying supraspinatus.     5. Finding keeping with any clinical symptoms of adhesive capsulitis.     6. Circumferential degeneration and fraying of the labrum, which is of doubtful clinical significance.    Impression: right shoulder rotator cuff arthropathy     Plan:  All of the above pertinent physical exam and imaging modalities findings was reviewed with Michele and his wife.    He is happy.  Very limited pain.  Feels like his movement and strength of come back.  We discussed frequency injections.  If he has return of pain in the next few months send us a [employer] message and we will get him set up to see   Kirsty.          Return to clinic 1, year,prn  or sooner as needed for changes.  Re-x-ray on return: No    Swapnil Luna D.O.          Again, thank you for allowing me to participate in the care of your patient.        Sincerely,        Micheal Luna, DO    Electronically signed

## 2025-08-01 DIAGNOSIS — I10 BENIGN ESSENTIAL HTN: ICD-10-CM

## 2025-08-04 RX ORDER — CARVEDILOL 25 MG/1
25 TABLET ORAL 2 TIMES DAILY WITH MEALS
Qty: 180 TABLET | Refills: 1 | Status: SHIPPED | OUTPATIENT
Start: 2025-08-04

## (undated) DEVICE — SU VICRYL 3-0 SH 27" UND J416H

## (undated) DEVICE — CATH GUIDING INTERNAL MAMMARY 6FRX55MM 67019055

## (undated) DEVICE — GUIDEWIRE VASC 0.014INX180CM RUNTHROUGH 25-1011

## (undated) DEVICE — VALVE HEMOSTASIS .096" COPILOT MECH 1003331

## (undated) DEVICE — ESU GROUND PAD UNIVERSAL W/O CORD

## (undated) DEVICE — ENDO TROCAR FIRST ENTRY KII FIOS Z-THRD 11X100MM CTF33

## (undated) DEVICE — ESU HOOK TIP 5MM CONMED

## (undated) DEVICE — DEVICE SUTURE GRASPER TROCAR CLOSURE 14GA PMITCSG

## (undated) DEVICE — DEFIB PRO-PADZ LVP LQD GEL ADULT 8900-2105-01

## (undated) DEVICE — ESU CORD MONOPOLAR HIGH FREQUENCY 26006M-D/10

## (undated) DEVICE — SU PDS II 1 CT-1 MONOFIL Z347H

## (undated) DEVICE — TUBING EXTENSION SET MICROBORE 8.2" LL 7N8310

## (undated) DEVICE — NDL SPINAL 26GA 3 1/2"

## (undated) DEVICE — SU MONOCRYL 4-0 PS-2 18" UND Y496G

## (undated) DEVICE — MANIFOLD KIT ANGIO AUTOMATED 014613

## (undated) DEVICE — TRAY EPIDURAL 18GA SINGLE SHOT 406092

## (undated) DEVICE — ENDO POUCH UNIV RETRIEVAL SYSTEM INZII 10MM CD001

## (undated) DEVICE — ENDO CLIP CARTIRDGE K2 MED/LG 10 1112

## (undated) DEVICE — GLOVE PROTEXIS W/NEU-THERA 7.5  2D73TE75

## (undated) DEVICE — CATH BALLOON EMERGE 3.0X12MM H7493918912300

## (undated) DEVICE — GW VASC 190CM .014IN HI-TRQ 1009660J

## (undated) DEVICE — KIT HAND CONTROL ANGIOTOUCH ACIST 65CM AT-P65

## (undated) DEVICE — SYR 03ML LL W/O NDL 309657

## (undated) DEVICE — CATH ANGIO OMNI FLUSH 5FRX65CM CVD 10732201

## (undated) DEVICE — NDL ECLIPSE 18GA 1.5"

## (undated) DEVICE — TOTE ANGIO CORP PC15AT SAN32CC83O

## (undated) DEVICE — GLOVE PROTEXIS W/NEU-THERA 8.0  2D73TE80

## (undated) DEVICE — ENDO TROCAR SLEEVE KII Z-THREADED 05X100MM CTS02

## (undated) DEVICE — NDL PERC ENTRY THINWALL 18GA 7.0" G00166

## (undated) DEVICE — TUBING IV EXTENSION SET 34"

## (undated) DEVICE — INTRO SHEATH 6FRX10CM PINNACLE RSS602

## (undated) DEVICE — NDL EPIDURAL TUOHY 20GA 3.5" LF DISP 183A12

## (undated) DEVICE — GUIDEWIRE VERSACORE 0.035"X300CM  J-TIP 1012068-07

## (undated) DEVICE — INFL DVC KIT W/10CC NITRO IN4530

## (undated) DEVICE — ESU ENDO SCISSORS 5MM CVD 5DCS

## (undated) DEVICE — CATHETER IVUS VISIONS PV 0.014

## (undated) DEVICE — CATH BALLOON NC EMERGE 4.00X15MM H7493926715400

## (undated) DEVICE — ADH SKIN CLOSURE PREMIERPRO EXOFIN 1.0ML 3470

## (undated) DEVICE — PACK GENERAL LAPAOSCOPY

## (undated) DEVICE — SYR 03ML LL W/O NDL

## (undated) DEVICE — GUIDEWIRE HI-TORQUE VERSACORE MOD J 1

## (undated) DEVICE — CLIP APPLIER ENDO ROTATING 10MM MED/LG ER320

## (undated) DEVICE — INTRO SHEATH 5FRX10CM PINNACLE RSS502

## (undated) DEVICE — INTRO CATH CHECK-FLO FLEXOR ANSEL-1 6FRX45CM G29982

## (undated) DEVICE — ESU SUCTION/IRRIGATION SYSTEM PISTOL GRIP

## (undated) DEVICE — SOL NACL 0.9% INJ 1000ML BAG 07983-09

## (undated) DEVICE — ENDO TROCAR FIRST ENTRY KII FIOS Z-THRD 05X100MM CTF03

## (undated) DEVICE — SYR 10ML PREFILLED 0.9% NACL INJ NOT STERILE 306500

## (undated) RX ORDER — FENTANYL CITRATE 50 UG/ML
INJECTION, SOLUTION INTRAMUSCULAR; INTRAVENOUS
Status: DISPENSED
Start: 2020-09-03

## (undated) RX ORDER — DEXAMETHASONE SODIUM PHOSPHATE 10 MG/ML
INJECTION, SOLUTION INTRAMUSCULAR; INTRAVENOUS
Status: DISPENSED
Start: 2021-07-16

## (undated) RX ORDER — GLYCOPYRROLATE 0.2 MG/ML
INJECTION INTRAMUSCULAR; INTRAVENOUS
Status: DISPENSED
Start: 2021-07-16

## (undated) RX ORDER — FENTANYL CITRATE 50 UG/ML
INJECTION, SOLUTION INTRAMUSCULAR; INTRAVENOUS
Status: DISPENSED
Start: 2021-07-16

## (undated) RX ORDER — LIDOCAINE HYDROCHLORIDE 10 MG/ML
INJECTION, SOLUTION EPIDURAL; INFILTRATION; INTRACAUDAL; PERINEURAL
Status: DISPENSED
Start: 2020-09-03

## (undated) RX ORDER — HYDRALAZINE HYDROCHLORIDE 20 MG/ML
INJECTION INTRAMUSCULAR; INTRAVENOUS
Status: DISPENSED
Start: 2019-09-09

## (undated) RX ORDER — CLOPIDOGREL 300 MG/1
TABLET, FILM COATED ORAL
Status: DISPENSED
Start: 2019-09-09

## (undated) RX ORDER — GLYCOPYRROLATE 0.2 MG/ML
INJECTION, SOLUTION INTRAMUSCULAR; INTRAVENOUS
Status: DISPENSED
Start: 2020-09-03

## (undated) RX ORDER — HEPARIN SODIUM 1000 [USP'U]/ML
INJECTION, SOLUTION INTRAVENOUS; SUBCUTANEOUS
Status: DISPENSED
Start: 2019-09-09

## (undated) RX ORDER — NOREPINEPHRINE BITARTRATE 0.06 MG/ML
INJECTION, SOLUTION INTRAVENOUS
Status: DISPENSED
Start: 2020-09-03

## (undated) RX ORDER — ATROPINE SULFATE 0.1 MG/ML
INJECTION INTRAVENOUS
Status: DISPENSED
Start: 2020-09-03

## (undated) RX ORDER — BUPIVACAINE HYDROCHLORIDE 5 MG/ML
INJECTION, SOLUTION EPIDURAL; INTRACAUDAL
Status: DISPENSED
Start: 2019-05-09

## (undated) RX ORDER — FENTANYL CITRATE-0.9 % NACL/PF 10 MCG/ML
PLASTIC BAG, INJECTION (ML) INTRAVENOUS
Status: DISPENSED
Start: 2021-07-16

## (undated) RX ORDER — HEPARIN SODIUM 1000 [USP'U]/ML
INJECTION, SOLUTION INTRAVENOUS; SUBCUTANEOUS
Status: DISPENSED
Start: 2020-09-03

## (undated) RX ORDER — FENTANYL CITRATE 50 UG/ML
INJECTION, SOLUTION INTRAMUSCULAR; INTRAVENOUS
Status: DISPENSED
Start: 2019-09-09

## (undated) RX ORDER — SODIUM CHLORIDE 9 MG/ML
INJECTION, SOLUTION INTRAVENOUS
Status: DISPENSED
Start: 2020-09-03

## (undated) RX ORDER — EPHEDRINE SULFATE 50 MG/ML
INJECTION, SOLUTION INTRAMUSCULAR; INTRAVENOUS; SUBCUTANEOUS
Status: DISPENSED
Start: 2021-07-16

## (undated) RX ORDER — HEPARIN SODIUM 200 [USP'U]/100ML
INJECTION, SOLUTION INTRAVENOUS
Status: DISPENSED
Start: 2019-09-09

## (undated) RX ORDER — BUPIVACAINE HYDROCHLORIDE AND EPINEPHRINE 2.5; 5 MG/ML; UG/ML
INJECTION, SOLUTION EPIDURAL; INFILTRATION; INTRACAUDAL; PERINEURAL
Status: DISPENSED
Start: 2021-07-16

## (undated) RX ORDER — ONDANSETRON 2 MG/ML
INJECTION INTRAMUSCULAR; INTRAVENOUS
Status: DISPENSED
Start: 2021-07-16

## (undated) RX ORDER — PROPOFOL 10 MG/ML
INJECTION, EMULSION INTRAVENOUS
Status: DISPENSED
Start: 2021-07-16

## (undated) RX ORDER — LIDOCAINE HYDROCHLORIDE 10 MG/ML
INJECTION, SOLUTION EPIDURAL; INFILTRATION; INTRACAUDAL; PERINEURAL
Status: DISPENSED
Start: 2019-09-09

## (undated) RX ORDER — ONDANSETRON 2 MG/ML
INJECTION INTRAMUSCULAR; INTRAVENOUS
Status: DISPENSED
Start: 2019-09-09

## (undated) RX ORDER — LIDOCAINE HYDROCHLORIDE 20 MG/ML
INJECTION, SOLUTION EPIDURAL; INFILTRATION; INTRACAUDAL; PERINEURAL
Status: DISPENSED
Start: 2021-07-16

## (undated) RX ORDER — TRIAMCINOLONE ACETONIDE 40 MG/ML
INJECTION, SUSPENSION INTRA-ARTICULAR; INTRAMUSCULAR
Status: DISPENSED
Start: 2019-05-09